# Patient Record
Sex: MALE | Race: BLACK OR AFRICAN AMERICAN | Employment: OTHER | URBAN - METROPOLITAN AREA
[De-identification: names, ages, dates, MRNs, and addresses within clinical notes are randomized per-mention and may not be internally consistent; named-entity substitution may affect disease eponyms.]

---

## 2017-12-06 ENCOUNTER — HOSPITAL ENCOUNTER (OUTPATIENT)
Dept: LAB | Age: 67
Discharge: HOME | End: 2017-12-06

## 2017-12-06 PROCEDURE — 99001 SPECIMEN HANDLING PT-LAB: CPT | Performed by: NURSE PRACTITIONER

## 2018-11-05 ENCOUNTER — APPOINTMENT (OUTPATIENT)
Dept: MRI IMAGING | Age: 68
DRG: 552 | End: 2018-11-05
Attending: EMERGENCY MEDICINE
Payer: MEDICARE

## 2018-11-05 ENCOUNTER — APPOINTMENT (OUTPATIENT)
Dept: CT IMAGING | Age: 68
DRG: 552 | End: 2018-11-05
Attending: EMERGENCY MEDICINE
Payer: MEDICARE

## 2018-11-05 ENCOUNTER — APPOINTMENT (OUTPATIENT)
Dept: GENERAL RADIOLOGY | Age: 68
DRG: 552 | End: 2018-11-05
Attending: EMERGENCY MEDICINE
Payer: MEDICARE

## 2018-11-05 ENCOUNTER — HOSPITAL ENCOUNTER (INPATIENT)
Age: 68
LOS: 8 days | Discharge: HOME HEALTH | DRG: 552 | End: 2018-11-13
Attending: EMERGENCY MEDICINE | Admitting: INTERNAL MEDICINE
Payer: MEDICARE

## 2018-11-05 DIAGNOSIS — M54.9 INTRACTABLE BACK PAIN: Primary | ICD-10-CM

## 2018-11-05 DIAGNOSIS — R03.0 ELEVATED BLOOD PRESSURE READING: ICD-10-CM

## 2018-11-05 DIAGNOSIS — D72.829 LEUKOCYTOSIS, UNSPECIFIED TYPE: ICD-10-CM

## 2018-11-05 PROBLEM — M54.59 INTRACTABLE LOW BACK PAIN: Status: ACTIVE | Noted: 2018-11-05

## 2018-11-05 PROBLEM — R53.81 DEBILITY: Status: ACTIVE | Noted: 2018-11-05

## 2018-11-05 LAB
ALBUMIN SERPL-MCNC: 3.2 G/DL (ref 3.4–5)
ALBUMIN/GLOB SERPL: 0.7 {RATIO} (ref 0.8–1.7)
ALP SERPL-CCNC: 72 U/L (ref 45–117)
ALT SERPL-CCNC: 17 U/L (ref 16–61)
ANION GAP SERPL CALC-SCNC: 12 MMOL/L (ref 3–18)
APPEARANCE UR: CLEAR
AST SERPL-CCNC: 19 U/L (ref 15–37)
BACTERIA URNS QL MICRO: ABNORMAL /HPF
BASOPHILS # BLD: 0 K/UL (ref 0–0.06)
BASOPHILS NFR BLD: 0 % (ref 0–3)
BILIRUB SERPL-MCNC: 0.9 MG/DL (ref 0.2–1)
BILIRUB UR QL: NEGATIVE
BUN SERPL-MCNC: 15 MG/DL (ref 7–18)
BUN/CREAT SERPL: 17 (ref 12–20)
CALCIUM SERPL-MCNC: 8.7 MG/DL (ref 8.5–10.1)
CHLORIDE SERPL-SCNC: 101 MMOL/L (ref 100–108)
CO2 SERPL-SCNC: 24 MMOL/L (ref 21–32)
COLOR UR: ABNORMAL
CREAT SERPL-MCNC: 0.87 MG/DL (ref 0.6–1.3)
CRP SERPL-MCNC: 22.1 MG/DL (ref 0–0.3)
DIFFERENTIAL METHOD BLD: ABNORMAL
EOSINOPHIL # BLD: 0 K/UL (ref 0–0.4)
EOSINOPHIL NFR BLD: 0 % (ref 0–5)
EPITH CASTS URNS QL MICRO: ABNORMAL /LPF (ref 0–5)
ERYTHROCYTE [DISTWIDTH] IN BLOOD BY AUTOMATED COUNT: 13.4 % (ref 11.6–14.5)
ERYTHROCYTE [SEDIMENTATION RATE] IN BLOOD: 30 MM/HR (ref 0–20)
GLOBULIN SER CALC-MCNC: 4.3 G/DL (ref 2–4)
GLUCOSE BLD STRIP.AUTO-MCNC: 167 MG/DL (ref 70–110)
GLUCOSE BLD STRIP.AUTO-MCNC: 194 MG/DL (ref 70–110)
GLUCOSE SERPL-MCNC: 128 MG/DL (ref 74–99)
GLUCOSE UR STRIP.AUTO-MCNC: NEGATIVE MG/DL
HCT VFR BLD AUTO: 40.1 % (ref 36–48)
HGB BLD-MCNC: 14 G/DL (ref 13–16)
HGB UR QL STRIP: NEGATIVE
KETONES UR QL STRIP.AUTO: ABNORMAL MG/DL
LEUKOCYTE ESTERASE UR QL STRIP.AUTO: NEGATIVE
LYMPHOCYTES # BLD: 1.8 K/UL (ref 0.8–3.5)
LYMPHOCYTES NFR BLD: 9 % (ref 20–51)
MAGNESIUM SERPL-MCNC: 1.7 MG/DL (ref 1.6–2.6)
MCH RBC QN AUTO: 31.1 PG (ref 24–34)
MCHC RBC AUTO-ENTMCNC: 34.9 G/DL (ref 31–37)
MCV RBC AUTO: 89.1 FL (ref 74–97)
METAMYELOCYTES NFR BLD MANUAL: 1 %
MONOCYTES # BLD: 1.4 K/UL (ref 0–1)
MONOCYTES NFR BLD: 7 % (ref 2–9)
NEUTS BAND NFR BLD MANUAL: 3 % (ref 0–5)
NEUTS SEG # BLD: 16.6 K/UL (ref 1.8–8)
NEUTS SEG NFR BLD: 80 % (ref 42–75)
NITRITE UR QL STRIP.AUTO: NEGATIVE
PH UR STRIP: 7 [PH] (ref 5–8)
PLATELET # BLD AUTO: 215 K/UL (ref 135–420)
PLATELET COMMENTS,PCOM: ABNORMAL
PMV BLD AUTO: 10.2 FL (ref 9.2–11.8)
POTASSIUM SERPL-SCNC: 4.1 MMOL/L (ref 3.5–5.5)
PROT SERPL-MCNC: 7.5 G/DL (ref 6.4–8.2)
PROT UR STRIP-MCNC: 100 MG/DL
RBC # BLD AUTO: 4.5 M/UL (ref 4.7–5.5)
RBC #/AREA URNS HPF: ABNORMAL /HPF (ref 0–5)
RBC MORPH BLD: ABNORMAL
SODIUM SERPL-SCNC: 137 MMOL/L (ref 136–145)
SP GR UR REFRACTOMETRY: 1.02 (ref 1–1.03)
UROBILINOGEN UR QL STRIP.AUTO: 1 EU/DL (ref 0.2–1)
WBC # BLD AUTO: 20 K/UL (ref 4.6–13.2)
WBC URNS QL MICRO: NEGATIVE /HPF (ref 0–4)

## 2018-11-05 PROCEDURE — 87186 SC STD MICRODIL/AGAR DIL: CPT

## 2018-11-05 PROCEDURE — 74011000250 HC RX REV CODE- 250: Performed by: INTERNAL MEDICINE

## 2018-11-05 PROCEDURE — 87077 CULTURE AEROBIC IDENTIFY: CPT

## 2018-11-05 PROCEDURE — 74011250636 HC RX REV CODE- 250/636: Performed by: INTERNAL MEDICINE

## 2018-11-05 PROCEDURE — 82962 GLUCOSE BLOOD TEST: CPT

## 2018-11-05 PROCEDURE — 96374 THER/PROPH/DIAG INJ IV PUSH: CPT

## 2018-11-05 PROCEDURE — 71045 X-RAY EXAM CHEST 1 VIEW: CPT

## 2018-11-05 PROCEDURE — 74011636320 HC RX REV CODE- 636/320: Performed by: EMERGENCY MEDICINE

## 2018-11-05 PROCEDURE — 72131 CT LUMBAR SPINE W/O DYE: CPT

## 2018-11-05 PROCEDURE — 74011636637 HC RX REV CODE- 636/637: Performed by: INTERNAL MEDICINE

## 2018-11-05 PROCEDURE — 83735 ASSAY OF MAGNESIUM: CPT | Performed by: EMERGENCY MEDICINE

## 2018-11-05 PROCEDURE — 86140 C-REACTIVE PROTEIN: CPT | Performed by: EMERGENCY MEDICINE

## 2018-11-05 PROCEDURE — 80053 COMPREHEN METABOLIC PANEL: CPT | Performed by: EMERGENCY MEDICINE

## 2018-11-05 PROCEDURE — 99285 EMERGENCY DEPT VISIT HI MDM: CPT

## 2018-11-05 PROCEDURE — 85025 COMPLETE CBC W/AUTO DIFF WBC: CPT | Performed by: EMERGENCY MEDICINE

## 2018-11-05 PROCEDURE — 87040 BLOOD CULTURE FOR BACTERIA: CPT | Performed by: EMERGENCY MEDICINE

## 2018-11-05 PROCEDURE — 81001 URINALYSIS AUTO W/SCOPE: CPT | Performed by: EMERGENCY MEDICINE

## 2018-11-05 PROCEDURE — A9575 INJ GADOTERATE MEGLUMI 0.1ML: HCPCS | Performed by: EMERGENCY MEDICINE

## 2018-11-05 PROCEDURE — 74011250636 HC RX REV CODE- 250/636

## 2018-11-05 PROCEDURE — 65270000029 HC RM PRIVATE

## 2018-11-05 PROCEDURE — 74011250637 HC RX REV CODE- 250/637: Performed by: EMERGENCY MEDICINE

## 2018-11-05 PROCEDURE — 74011250637 HC RX REV CODE- 250/637: Performed by: INTERNAL MEDICINE

## 2018-11-05 PROCEDURE — 85652 RBC SED RATE AUTOMATED: CPT | Performed by: EMERGENCY MEDICINE

## 2018-11-05 PROCEDURE — 74011250636 HC RX REV CODE- 250/636: Performed by: EMERGENCY MEDICINE

## 2018-11-05 PROCEDURE — 96376 TX/PRO/DX INJ SAME DRUG ADON: CPT

## 2018-11-05 PROCEDURE — 96361 HYDRATE IV INFUSION ADD-ON: CPT

## 2018-11-05 PROCEDURE — 72158 MRI LUMBAR SPINE W/O & W/DYE: CPT

## 2018-11-05 RX ORDER — NEBIVOLOL 5 MG/1
10 TABLET ORAL DAILY
Status: DISCONTINUED | OUTPATIENT
Start: 2018-11-05 | End: 2018-11-13 | Stop reason: HOSPADM

## 2018-11-05 RX ORDER — ONDANSETRON 2 MG/ML
4 INJECTION INTRAMUSCULAR; INTRAVENOUS
Status: DISCONTINUED | OUTPATIENT
Start: 2018-11-05 | End: 2018-11-13 | Stop reason: HOSPADM

## 2018-11-05 RX ORDER — INSULIN LISPRO 100 [IU]/ML
INJECTION, SOLUTION INTRAVENOUS; SUBCUTANEOUS
Status: DISCONTINUED | OUTPATIENT
Start: 2018-11-05 | End: 2018-11-13 | Stop reason: HOSPADM

## 2018-11-05 RX ORDER — DEXAMETHASONE SODIUM PHOSPHATE 4 MG/ML
10 INJECTION, SOLUTION INTRA-ARTICULAR; INTRALESIONAL; INTRAMUSCULAR; INTRAVENOUS; SOFT TISSUE
Status: COMPLETED | OUTPATIENT
Start: 2018-11-05 | End: 2018-11-05

## 2018-11-05 RX ORDER — NEBIVOLOL 10 MG/1
10 TABLET ORAL
Status: COMPLETED | OUTPATIENT
Start: 2018-11-05 | End: 2018-11-05

## 2018-11-05 RX ORDER — HEPARIN SODIUM 5000 [USP'U]/ML
5000 INJECTION, SOLUTION INTRAVENOUS; SUBCUTANEOUS EVERY 8 HOURS
Status: DISCONTINUED | OUTPATIENT
Start: 2018-11-05 | End: 2018-11-08

## 2018-11-05 RX ORDER — MORPHINE SULFATE 2 MG/ML
4 INJECTION, SOLUTION INTRAMUSCULAR; INTRAVENOUS
Status: COMPLETED | OUTPATIENT
Start: 2018-11-05 | End: 2018-11-05

## 2018-11-05 RX ORDER — MORPHINE SULFATE 4 MG/ML
INJECTION INTRAVENOUS
Status: DISPENSED
Start: 2018-11-05 | End: 2018-11-06

## 2018-11-05 RX ORDER — LOSARTAN POTASSIUM 50 MG/1
50 TABLET ORAL DAILY
Status: DISCONTINUED | OUTPATIENT
Start: 2018-11-05 | End: 2018-11-13 | Stop reason: HOSPADM

## 2018-11-05 RX ORDER — MAGNESIUM SULFATE 100 %
16 CRYSTALS MISCELLANEOUS AS NEEDED
Status: DISCONTINUED | OUTPATIENT
Start: 2018-11-05 | End: 2018-11-13 | Stop reason: HOSPADM

## 2018-11-05 RX ORDER — VANCOMYCIN/0.9 % SOD CHLORIDE 1.5G/250ML
1500 PLASTIC BAG, INJECTION (ML) INTRAVENOUS ONCE
Status: COMPLETED | OUTPATIENT
Start: 2018-11-05 | End: 2018-11-05

## 2018-11-05 RX ORDER — DIAZEPAM 5 MG/1
5 TABLET ORAL
Status: COMPLETED | OUTPATIENT
Start: 2018-11-05 | End: 2018-11-05

## 2018-11-05 RX ORDER — OXYCODONE AND ACETAMINOPHEN 5; 325 MG/1; MG/1
1 TABLET ORAL
Status: DISCONTINUED | OUTPATIENT
Start: 2018-11-05 | End: 2018-11-08

## 2018-11-05 RX ORDER — LOSARTAN POTASSIUM 25 MG/1
50 TABLET ORAL
Status: COMPLETED | OUTPATIENT
Start: 2018-11-05 | End: 2018-11-05

## 2018-11-05 RX ORDER — CYCLOBENZAPRINE HCL 10 MG
5 TABLET ORAL
Status: COMPLETED | OUTPATIENT
Start: 2018-11-05 | End: 2018-11-05

## 2018-11-05 RX ORDER — DEXAMETHASONE SODIUM PHOSPHATE 4 MG/ML
10 INJECTION, SOLUTION INTRA-ARTICULAR; INTRALESIONAL; INTRAMUSCULAR; INTRAVENOUS; SOFT TISSUE
Status: DISCONTINUED | OUTPATIENT
Start: 2018-11-05 | End: 2018-11-05

## 2018-11-05 RX ORDER — DEXTROSE 50 % IN WATER (D50W) INTRAVENOUS SYRINGE
25-50 AS NEEDED
Status: DISCONTINUED | OUTPATIENT
Start: 2018-11-05 | End: 2018-11-13 | Stop reason: HOSPADM

## 2018-11-05 RX ORDER — VANCOMYCIN HYDROCHLORIDE
1250 EVERY 12 HOURS
Status: DISCONTINUED | OUTPATIENT
Start: 2018-11-06 | End: 2018-11-06

## 2018-11-05 RX ORDER — MORPHINE SULFATE 4 MG/ML
4 INJECTION INTRAVENOUS
Status: COMPLETED | OUTPATIENT
Start: 2018-11-05 | End: 2018-11-05

## 2018-11-05 RX ORDER — SODIUM CHLORIDE 9 MG/ML
75 INJECTION, SOLUTION INTRAVENOUS CONTINUOUS
Status: DISCONTINUED | OUTPATIENT
Start: 2018-11-05 | End: 2018-11-06

## 2018-11-05 RX ADMIN — NEBIVOLOL HYDROCHLORIDE 10 MG: 5 TABLET ORAL at 12:54

## 2018-11-05 RX ADMIN — MORPHINE SULFATE 4 MG: 2 INJECTION, SOLUTION INTRAMUSCULAR; INTRAVENOUS at 15:18

## 2018-11-05 RX ADMIN — CEFEPIME 2 G: 2 INJECTION, POWDER, FOR SOLUTION INTRAVENOUS at 21:54

## 2018-11-05 RX ADMIN — DIAZEPAM 5 MG: 5 TABLET ORAL at 12:10

## 2018-11-05 RX ADMIN — VANCOMYCIN HYDROCHLORIDE 1500 MG: 10 INJECTION, POWDER, LYOPHILIZED, FOR SOLUTION INTRAVENOUS at 22:09

## 2018-11-05 RX ADMIN — DEXAMETHASONE SODIUM PHOSPHATE 10 MG: 4 INJECTION, SOLUTION INTRAMUSCULAR; INTRAVENOUS at 12:23

## 2018-11-05 RX ADMIN — SODIUM CHLORIDE 125 ML/HR: 900 INJECTION, SOLUTION INTRAVENOUS at 22:08

## 2018-11-05 RX ADMIN — CYCLOBENZAPRINE HYDROCHLORIDE 5 MG: 10 TABLET, FILM COATED ORAL at 07:52

## 2018-11-05 RX ADMIN — MORPHINE SULFATE 4 MG: 4 INJECTION INTRAVENOUS at 07:53

## 2018-11-05 RX ADMIN — OXYCODONE AND ACETAMINOPHEN 1 TABLET: 5; 325 TABLET ORAL at 22:06

## 2018-11-05 RX ADMIN — GADOTERATE MEGLUMINE 14 ML: 376.9 INJECTION INTRAVENOUS at 16:27

## 2018-11-05 RX ADMIN — NEBIVOLOL HYDROCHLORIDE 10 MG: 5 TABLET ORAL at 21:54

## 2018-11-05 RX ADMIN — SODIUM CHLORIDE 1000 ML: 900 INJECTION, SOLUTION INTRAVENOUS at 07:50

## 2018-11-05 RX ADMIN — LOSARTAN POTASSIUM 50 MG: 50 TABLET ORAL at 21:55

## 2018-11-05 RX ADMIN — INSULIN LISPRO 2 UNITS: 100 INJECTION, SOLUTION INTRAVENOUS; SUBCUTANEOUS at 21:52

## 2018-11-05 RX ADMIN — SODIUM CHLORIDE 125 ML/HR: 900 INJECTION, SOLUTION INTRAVENOUS at 08:47

## 2018-11-05 RX ADMIN — LOSARTAN POTASSIUM 50 MG: 25 TABLET, FILM COATED ORAL at 12:10

## 2018-11-05 NOTE — ED NOTES
Attempted to ambulate patient, pt attempted partial weight bearing without success with the help of myself, DELL Duke and Ghislaine Esquivel RN. Pt able to stand but felt \"wobbly\" and weak in his legs, returned to bed without incident.

## 2018-11-05 NOTE — ED TRIAGE NOTES
Pt arrived via EMS c/o mid to lower back pain that started yesterday, states he had a fall 12 weeks ago and is unsure if the events are related, denies trauma for today's pain, in position of comfort, a&ox4

## 2018-11-05 NOTE — ED PROVIDER NOTES
EMERGENCY DEPARTMENT HISTORY AND PHYSICAL EXAM 
 
7:26 AM 
 
 
Date: 11/5/2018 Patient Name: Mary Anne Stanley History of Presenting Illness Chief Complaint Patient presents with  Back Pain History Provided By: Patient Chief Complaint: Back pain Duration:  2 Days Timing:  Constant and worsening Location: Mid and lower back, worse on the right side Severity: Moderate Modifying Factors: Pain is exacerbated by palpation and ambulation Associated Symptoms: Patient denies numbness, urinary symptoms, weight loss, weight gain, fever, buttocks pain, and any other associated symptoms or complaints. Additional History (Context): Mary Anne Stanley is a 80 y.o. male with a past medical history of DM, throat cancer, and essential HTN benign who presents with c/o back pain onset 2 days ago. Patient describes the pain as located in the mid to lower back, worse on the right side, constant, worsening, moderate, radiates to his right leg, and is exacerbated by ambulating. He states that he fell 12 weeks ago off the porch and is not sure whether this is related to his pain today. He does not take any pain medicine at home. He is usually active and able to ambulate, but due to this pain, he has difficulty walking. He has a history of head and neck cancer and states that he has been \"cancer free\" since 10 years ago when he had a surgery. He does not receive chemotherapy. Patient denies numbness, urinary symptoms, weight loss, weight gain, fever, buttocks pain, and any other associated symptoms or complaints. PCP: Alli Crandall MD 
 
Current Outpatient Medications Medication Sig Dispense Refill  metFORMIN (GLUCOPHAGE) 1,000 mg tablet Take 1,000 mg by mouth daily.  losartan (COZAAR) 50 mg tablet Take  by mouth daily.  nebivolol (BYSTOLIC) 10 mg tablet Take  by mouth daily.  omeprazole (PRILOSEC) 20 mg capsule Take 20 mg by mouth daily.  Dexlansoprazole 60 mg CpDB Take  by mouth.  azelastine-fluticasone 137-50 mcg/spray spry by Nasal route.  calcium-cholecalciferol, d3, 600-125 mg-unit tab Take  by mouth.  omega-3 fatty acids-vitamin e 1,000 mg cap Take 1 Cap by mouth.  IRON, FERROUS SULFATE, PO Take  by mouth. 3 times a week  Aspirin, Buffered 81 mg tab Take  by mouth. Past History Past Medical History: 
Past Medical History:  
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 
Past Surgical History:  
Procedure Laterality Date  HX HEENT  2008 Throat Ca - 1 vocal chord removed  HX HEENT    
 eye surgery Family History: No family history on file. Social History: 
Social History Tobacco Use  Smoking status: Former Smoker Substance Use Topics  Alcohol use: No  
 Drug use: No  
 
 
Allergies: 
No Known Allergies Review of Systems Review of Systems Constitutional: Negative for activity change, fatigue and fever. HENT: Negative for congestion and rhinorrhea. Eyes: Negative for visual disturbance. Respiratory: Negative for shortness of breath. Cardiovascular: Negative for chest pain and palpitations. Gastrointestinal: Negative for abdominal pain, diarrhea, nausea and vomiting. Genitourinary: Negative for dysuria and hematuria. Musculoskeletal: Positive for back pain (lower right). Skin: Negative for rash. Neurological: Negative for dizziness, weakness and light-headedness. All other systems reviewed and are negative. Physical Exam  
 
Visit Vitals Pulse 90 Temp 98.8 °F (37.1 °C) Resp 27 SpO2 98% Physical Exam  
Constitutional: He is oriented to person, place, and time.  He appears well-developed and well-nourished. He appears distressed. Laying in bed, pain with minimal movement HENT:  
Head: Normocephalic and atraumatic. Right Ear: External ear normal.  
Left Ear: External ear normal.  
Nose: Nose normal.  
Mouth/Throat: Oropharynx is clear and moist.  
Eyes: Conjunctivae and EOM are normal. Pupils are equal, round, and reactive to light. No scleral icterus. Neck: Normal range of motion. Neck supple. No JVD present. No tracheal deviation present. No thyromegaly present. Chronic dysphonia Cardiovascular: Normal rate, regular rhythm, normal heart sounds and intact distal pulses. Exam reveals no gallop and no friction rub. No murmur heard. Pulmonary/Chest: Effort normal and breath sounds normal. He exhibits no tenderness. Abdominal: Soft. Bowel sounds are normal. He exhibits no distension. There is no tenderness. There is no rebound and no guarding. Musculoskeletal: Normal range of motion. He exhibits no edema or tenderness. R lumbar midline and paraspinal tenderness, no erythema, warm to touch No R hip pain to palpation No pain with internal or external rotation Pain increases with flexion at the hip Lymphadenopathy:  
  He has no cervical adenopathy. Neurological: He is alert and oriented to person, place, and time. No cranial nerve deficit. Coordination normal.  
Full strength UE, pain limits R LE exam, L LE motor/sensory intact Skin: Skin is warm and dry. Psychiatric: He has a normal mood and affect. His behavior is normal. Judgment and thought content normal.  
Supportive wife at the bedside Nursing note and vitals reviewed. Diagnostic Study Results Labs - No results found for this or any previous visit (from the past 12 hour(s)). Radiologic Studies - No orders to display No results found. Medical Decision Making I am the first provider for this patient. I reviewed the vital signs, available nursing notes, past medical history, past surgical history, family history and social history. Vital Signs-Reviewed the patient's vital signs. Pulse Oximetry Analysis -  97% on room air (normal) Records Reviewed: Nursing Notes (Time of Review: 7:26 AM) 
 
ED Course: Progress Notes, Reevaluation, and Consults: 
12:12 PM 
Patient CT shows no evidence of infection, mass, or fracture. Patient has a white count and still can not ambulate after treatment, increasing my concern for possible spinal infection. I will obtain MRI and lumbar spine and give dose of decadron. I will give him his morning blood pressure medications that he is taking and reevaluate. 5:52 PM 
Patient's MRI shows no clear evidence of infection. There is some inflammation and spinal stenosis. Patient is starting to feel better and will attempt to walk, and if able to walk will consider outpatient care as he does not have any acute infection. If patient can not walk, will consider admission for intractable pain. After extensive medication and testing pt still cannot stand. Discussed the case over secure text with Dr. Loria Severin and will consult. Will discussed admission with the hospitalist. Angelina Mcneal, DO 6:13 PM 
 
Pt was seen by Dr. Yinka Raymond, would like cultures, no abx. Angelina Elizabet, DO 7:28 PM 
 
Sepsis was not present OA, no clear infection noted in the ED so no abx given. Angelina Mcneal, DO 7:28 PM 
 
 
 
Provider Notes (Medical Decision Making): MDM Number of Diagnoses or Management Options Diagnosis management comments: Pt is a 69yo male with a hx of head and neck CA with surgical resection now cancer free without chemo/RT, DM, HTN presents to the ED with complaint R sided low back pain that has been increasing for the past 2 days with increasing pain with R leg movement. He cannot walk now without fever or chills.   Pt denies recent infection, fall, or fever. Pt has marked pain, will give supportive care, hydrate, CT LS as he cannot move well enough for XR, then reevaluate. Graham Brown DO 7:53 AM 
 
 
 
Diagnosis Clinical Impression: Intractable back pain, leukocytosis, elevated BP Disposition: Admit Follow-up Information None Medication List  
  
ASK your doctor about these medications   
aspirin, buffered 81 mg Tab 
  
azelastine-fluticasone 137-50 mcg/spray Spry 
  
calcium-cholecalciferol (d3) 600-125 mg-unit Tab Dexlansoprazole 60 mg Cpdb IRON (FERROUS SULFATE) PO 
  
losartan 50 mg tablet Commonly known as:  COZAAR 
  
metFORMIN 1,000 mg tablet Commonly known as:  GLUCOPHAGE 
  
nebivolol 10 mg tablet Commonly known as:  BYSTOLIC 
  
omega-3 fatty acids-vitamin e 1,000 mg Cap 
  
omeprazole 20 mg capsule Commonly known as:  PRILOSEC 
  
  
 
_______________________________ Attestations: 
Scribe Attestation Oma Sober acting as a scribe for and in the presence of Dwayne Vera MD     
November 05, 2018 at 7:26 AM 
    
Provider Attestation:     
I personally performed the services described in the documentation, reviewed the documentation, as recorded by the scribe in my presence, and it accurately and completely records my words and actions. November 05, 2018 at 7:26 AM - Dwayne Vera MD   
_______________________________

## 2018-11-05 NOTE — ED NOTES
Dr Erika Goyal at bedside, would like patient to ambulate, will seek out [facility] ED tech to ambulate patient.

## 2018-11-06 LAB
ANION GAP SERPL CALC-SCNC: 9 MMOL/L (ref 3–18)
BASOPHILS # BLD: 0 K/UL (ref 0–0.06)
BASOPHILS NFR BLD: 0 % (ref 0–3)
BUN SERPL-MCNC: 17 MG/DL (ref 7–18)
BUN/CREAT SERPL: 23 (ref 12–20)
CALCIUM SERPL-MCNC: 7.8 MG/DL (ref 8.5–10.1)
CHLORIDE SERPL-SCNC: 107 MMOL/L (ref 100–108)
CO2 SERPL-SCNC: 22 MMOL/L (ref 21–32)
CREAT SERPL-MCNC: 0.73 MG/DL (ref 0.6–1.3)
DIFFERENTIAL METHOD BLD: ABNORMAL
EOSINOPHIL # BLD: 0 K/UL (ref 0–0.4)
EOSINOPHIL NFR BLD: 0 % (ref 0–5)
ERYTHROCYTE [DISTWIDTH] IN BLOOD BY AUTOMATED COUNT: 13.1 % (ref 11.6–14.5)
ERYTHROCYTE [SEDIMENTATION RATE] IN BLOOD: 45 MM/HR (ref 0–20)
GLUCOSE BLD STRIP.AUTO-MCNC: 102 MG/DL (ref 70–110)
GLUCOSE BLD STRIP.AUTO-MCNC: 122 MG/DL (ref 70–110)
GLUCOSE BLD STRIP.AUTO-MCNC: 128 MG/DL (ref 70–110)
GLUCOSE BLD STRIP.AUTO-MCNC: 161 MG/DL (ref 70–110)
GLUCOSE SERPL-MCNC: 126 MG/DL (ref 74–99)
HCT VFR BLD AUTO: 34.7 % (ref 36–48)
HGB BLD-MCNC: 12 G/DL (ref 13–16)
LYMPHOCYTES # BLD: 1.8 K/UL (ref 0.8–3.5)
LYMPHOCYTES NFR BLD: 10 % (ref 20–51)
MCH RBC QN AUTO: 30.8 PG (ref 24–34)
MCHC RBC AUTO-ENTMCNC: 34.6 G/DL (ref 31–37)
MCV RBC AUTO: 89.2 FL (ref 74–97)
METAMYELOCYTES NFR BLD MANUAL: 1 %
MONOCYTES # BLD: 1.1 K/UL (ref 0–1)
MONOCYTES NFR BLD: 6 % (ref 2–9)
NEUTS BAND NFR BLD MANUAL: 7 % (ref 0–5)
NEUTS SEG # BLD: 15.2 K/UL (ref 1.8–8)
NEUTS SEG NFR BLD: 76 % (ref 42–75)
PLATELET # BLD AUTO: 206 K/UL (ref 135–420)
PLATELET COMMENTS,PCOM: ABNORMAL
PMV BLD AUTO: 10.1 FL (ref 9.2–11.8)
POTASSIUM SERPL-SCNC: 4 MMOL/L (ref 3.5–5.5)
RBC # BLD AUTO: 3.89 M/UL (ref 4.7–5.5)
RBC MORPH BLD: ABNORMAL
SODIUM SERPL-SCNC: 138 MMOL/L (ref 136–145)
WBC # BLD AUTO: 18.3 K/UL (ref 4.6–13.2)

## 2018-11-06 PROCEDURE — 74011250636 HC RX REV CODE- 250/636: Performed by: INTERNAL MEDICINE

## 2018-11-06 PROCEDURE — 82962 GLUCOSE BLOOD TEST: CPT

## 2018-11-06 PROCEDURE — 90686 IIV4 VACC NO PRSV 0.5 ML IM: CPT | Performed by: INTERNAL MEDICINE

## 2018-11-06 PROCEDURE — 85652 RBC SED RATE AUTOMATED: CPT | Performed by: INTERNAL MEDICINE

## 2018-11-06 PROCEDURE — 74011636637 HC RX REV CODE- 636/637: Performed by: INTERNAL MEDICINE

## 2018-11-06 PROCEDURE — 74011250636 HC RX REV CODE- 250/636: Performed by: EMERGENCY MEDICINE

## 2018-11-06 PROCEDURE — 86141 C-REACTIVE PROTEIN HS: CPT | Performed by: INTERNAL MEDICINE

## 2018-11-06 PROCEDURE — 74011250637 HC RX REV CODE- 250/637: Performed by: INTERNAL MEDICINE

## 2018-11-06 PROCEDURE — 65270000029 HC RM PRIVATE

## 2018-11-06 PROCEDURE — 85025 COMPLETE CBC W/AUTO DIFF WBC: CPT | Performed by: INTERNAL MEDICINE

## 2018-11-06 PROCEDURE — 87040 BLOOD CULTURE FOR BACTERIA: CPT | Performed by: INTERNAL MEDICINE

## 2018-11-06 PROCEDURE — 80048 BASIC METABOLIC PNL TOTAL CA: CPT | Performed by: INTERNAL MEDICINE

## 2018-11-06 PROCEDURE — 90471 IMMUNIZATION ADMIN: CPT

## 2018-11-06 PROCEDURE — 36415 COLL VENOUS BLD VENIPUNCTURE: CPT | Performed by: INTERNAL MEDICINE

## 2018-11-06 PROCEDURE — 74011000250 HC RX REV CODE- 250: Performed by: INTERNAL MEDICINE

## 2018-11-06 RX ORDER — DICLOFENAC EPOLAMINE 0.01 G/1
1 PATCH TOPICAL EVERY 12 HOURS
Status: DISCONTINUED | OUTPATIENT
Start: 2018-11-06 | End: 2018-11-08

## 2018-11-06 RX ORDER — VANCOMYCIN HYDROCHLORIDE
1250 EVERY 12 HOURS
Status: DISCONTINUED | OUTPATIENT
Start: 2018-11-06 | End: 2018-11-07

## 2018-11-06 RX ADMIN — SODIUM CHLORIDE 125 ML/HR: 900 INJECTION, SOLUTION INTRAVENOUS at 13:35

## 2018-11-06 RX ADMIN — HEPARIN SODIUM 5000 UNITS: 5000 INJECTION, SOLUTION INTRAVENOUS; SUBCUTANEOUS at 13:35

## 2018-11-06 RX ADMIN — CEFEPIME 2 G: 2 INJECTION, POWDER, FOR SOLUTION INTRAVENOUS at 05:00

## 2018-11-06 RX ADMIN — HEPARIN SODIUM 5000 UNITS: 5000 INJECTION, SOLUTION INTRAVENOUS; SUBCUTANEOUS at 06:02

## 2018-11-06 RX ADMIN — HEPARIN SODIUM 5000 UNITS: 5000 INJECTION, SOLUTION INTRAVENOUS; SUBCUTANEOUS at 20:12

## 2018-11-06 RX ADMIN — VANCOMYCIN HYDROCHLORIDE 1250 MG: 10 INJECTION, POWDER, LYOPHILIZED, FOR SOLUTION INTRAVENOUS at 08:39

## 2018-11-06 RX ADMIN — OXYCODONE AND ACETAMINOPHEN 1 TABLET: 5; 325 TABLET ORAL at 07:20

## 2018-11-06 RX ADMIN — INFLUENZA VIRUS VACCINE 0.5 ML: 15; 15; 15; 15 SUSPENSION INTRAMUSCULAR at 07:21

## 2018-11-06 RX ADMIN — VANCOMYCIN HYDROCHLORIDE 1250 MG: 10 INJECTION, POWDER, LYOPHILIZED, FOR SOLUTION INTRAVENOUS at 20:12

## 2018-11-06 RX ADMIN — INSULIN LISPRO 2 UNITS: 100 INJECTION, SOLUTION INTRAVENOUS; SUBCUTANEOUS at 21:11

## 2018-11-06 RX ADMIN — OXYCODONE AND ACETAMINOPHEN 1 TABLET: 5; 325 TABLET ORAL at 13:35

## 2018-11-06 RX ADMIN — OXYCODONE AND ACETAMINOPHEN 1 TABLET: 5; 325 TABLET ORAL at 02:50

## 2018-11-06 RX ADMIN — OXYCODONE AND ACETAMINOPHEN 1 TABLET: 5; 325 TABLET ORAL at 18:21

## 2018-11-06 RX ADMIN — OXYCODONE AND ACETAMINOPHEN 1 TABLET: 5; 325 TABLET ORAL at 23:06

## 2018-11-06 NOTE — PROGRESS NOTES
Pt evaluated He reports his right sided low back pain is improving States it only hurts if he puts weight on the right leg or does a lot of moving (mechanical) He does not feel ill Afebrile, VSS Evaluated by ID, blood culture 11/5 positive. This could represent contamination versus true infection. abx per ID Blood cultures are being repeated Hanna Araujo, NP

## 2018-11-06 NOTE — PROGRESS NOTES
Reason for Admission:   Intractable low back pain, leukocytosis RRAT Score:      10 Plan for utilizing home health:      No home health orders at this time. Likelihood of Readmission:  Low Transition of Care Plan:          The patient plans to return home with his wife. His family will provide transportation upon discharge. He reports independence with his ADL/IADLs. He reports not being able to ambulate since Saturday due to his current admitting problem & RLE mobility \"issues\" as per patient. This NN asked nurse Ashish to request a physical therapy evaluation. He states that he obtains his home medications through [address], which are mailed to him and some he may get from his local pharmacy. He states that he also takes his medications as prescribed. He has no concerns for discharge; he reports having a supportive family. Care Management Interventions PCP Verified by CM: Yes(Last seen by PCP 6 months ago) Mode of Transport at Discharge: Self Transition of Care Consult (CM Consult): Discharge Planning Physical Therapy Consult: (Would benefit from PT evaluation- patient reports RLE problems with ambulation) Current Support Network: Lives with Spouse Confirm Follow Up Transport: Family Plan discussed with Pt/Family/Caregiver: Yes Discharge Location Discharge Placement: Home with family assistance

## 2018-11-06 NOTE — CONSULTS
Infectious Disease Consultation Note    Requested by: Dr. Antoine Elliott    Reason: evaluate for discitis lumbar spine    Current abx Prior abx   Cefepime, vancomycin since 11/5      Lines:       Assessment :    80 y.o. male with a past medical history of type 2 DM (last hgbA1C not known), throat cancer, and essential HTN benign who presented to ed with c/o worsening back pain onset 2 days ago. Patient presents with highly complex picture. Etiology of back pain not entirely clear. I concur with dr. Jl Acevedo that presentation is not classic for infectious discitis/osteomyelitis. However, the very elevated CRP, positive blood cultures would be concerning for early discitis. Patient's radiculopathy could be due to lumbar disc edema and nerve compression. Patient has h/o fall 12 weeks ago and wonder if the back pain is trauma related with/without superimposed infection. Also, if the history patient is giving about his antibiotic use in past 3 months is true; that can mask the findings. On the other hand, patient received immunotherapy shots recently and wondering if that could affect the CRP results. Lastly, blood culture 11/5 positive. This could represent contamination versus true infection. Will need to follow up ID of gram positive organism and follow up culture to determine this. Recommendations:    1. Continue vancomycin. D/c cefepime   2. Repeat blood cultures  3. F/u ID of gram positive cocci in blood cultures  4. Further recommendations based on above results, clinical course     Advance Care planning: full code: discussed  with patient/surrogate decision maker:RamakrishnaBessieKalli: [phone]    Thank you for consultation request. Above plan was discussed in details with patient,  and dr Steffany Carrington. Please call me if any further questions or concerns. Will continue to participate in the care of this patient.     HPI:    80 y.o. male with a past medical history of type 2 DM (last hgbA1C not known), throat cancer, and essential HTN benign who presented to ed with c/o worsening back pain onset 2 days ago. Patient describes the pain as located in the mid to lower back, worse on the right side, constant, worsening, moderate, radiates to his right leg, and is exacerbated by ambulating. He states that he fell 12 weeks ago off the porch and is not sure whether this is related to his pain today. He does not take any pain medicine at home. He is usually active and able to ambulate, but due to this pain, he has difficulty walking. He has a history of head and neck cancer and states that he has been \"cancer free\" since 10 years ago when he had a surgery. He does not receive chemotherapy.   Evaluated in ER, WBC 20k, MRI LS spine read as \" Significant facet arthropathy at L2/3, L3/4 and L4/5 with effusions and  multifocal areas of enhancing edema suggesting inflammation, not convincing for infection\". Patient was evaluated by dr. Nancy Tam and recommended to hold abx. Patient was given decadron in ed. I was consulted for further recommendations. Patient was started on cefepime, vancomycin per my verbal recommendations to dr. Xin Ayala. I am seeing the patient for further mx. Patient states that his back pain is slightly better today. The pain is worse right to the lumbar spine. No subjective fever/chills/malaise throughout this time. He received shots in [employer] about 6 days ago. Patient recollects using 2 courses of abx in the past 3 months - each was one week course - last course was one month ago. I called dr. Jennifer Glass (ent surgeon to confirm). She told me that patient received immunotherapy shots for allergies 6 days ago. Was given diflucan. No antibiotic per her knowledge. Patient denies headaches, visual disturbances, sore throat, runny nose, earaches, cp, sob, chills, cough, abdominal pain, diarrhea, burning micturition, pain or weakness in extremities. He denies recent sick contacts.  No h/o recent travel.  No known h/o MRSA colonization or infection in the past.                 Past Medical History:   Diagnosis Date    Cancer 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     Chest pain, unspecified     Diabetes ([address]     Essential hypertension, benign     GERD (gastroesophageal reflux disease)     Nonspecific abnormal electrocardiogram (ECG) (EKG)        Past Surgical History:   Procedure Laterality Date    HX HEENT  2008    Throat Ca - 1 vocal chord removed     HX HEENT      eye surgery           Medication List      ASK your doctor about these medications    aspirin, buffered 81 mg Tab     azelastine-fluticasone 137-50 mcg/spray Spry     calcium-cholecalciferol (d3) 600-125 mg-unit Tab     Dexlansoprazole 60 mg Cpdb     IRON (FERROUS SULFATE) PO     losartan 50 mg tablet  Commonly known as:  COZAAR     metFORMIN 1,000 mg tablet  Commonly known as:  GLUCOPHAGE     nebivolol 10 mg tablet  Commonly known as:  BYSTOLIC     omega-3 fatty acids-vitamin e 1,000 mg Cap     omeprazole 20 mg capsule  Commonly known as:  PRILOSEC            Current Facility-Administered Medications   Medication Dose Route Frequency    sodium chloride (OCEAN) 0.65 % nasal squeeze bottle 2 Spray  2 Spray Both Nostrils Q2H PRN    0.9% sodium chloride infusion  125 mL/hr IntraVENous CONTINUOUS    losartan (COZAAR) tablet 50 mg  50 mg Oral DAILY    nebivolol (BYSTOLIC) tablet 10 mg  10 mg Oral DAILY    oxyCODONE-acetaminophen (PERCOCET) 5-325 mg per tablet 1 Tab  1 Tab Oral Q4H PRN    ondansetron (ZOFRAN) injection 4 mg  4 mg IntraVENous Q4H PRN    heparin (porcine) injection 5,000 Units  5,000 Units SubCUTAneous Q8H    vancomycin (VANCOCIN) 1250 mg in  ml infusion  1,250 mg IntraVENous Q12H    cefepime (MAXIPIME) 2 g in sterile water (preservative free) 10 mL IV syringe  2 g IntraVENous Q8H    insulin lispro (HUMALOG) injection   SubCUTAneous AC&HS    glucose chewable tablet 16 g  16 g Oral PRN  glucagon (GLUCAGEN) injection 1 mg  1 mg IntraMUSCular PRN    dextrose (D50W) injection syrg 12.5-25 g  25-50 mL IntraVENous PRN       Allergies: Patient has no known allergies. No family history on file. Social History     Socioeconomic History    Marital status:      Spouse name: Not on file    Number of children: Not on file    Years of education: Not on file    Highest education level: Not on file   Social Needs    Financial resource strain: Not on file    Food insecurity - worry: Not on file    Food insecurity - inability: Not on file    Transportation needs - medical: Not on file   [email or web address] needs - non-medical: Not on file   Occupational History    Not on file   Tobacco Use    Smoking status: Former Smoker   Substance and Sexual Activity    Alcohol use: No    Drug use: No    Sexual activity: Not on file   Other Topics Concern    Not on file   Social History Narrative    Not on file     Social History     Tobacco Use   Smoking Status Former Smoker        Temp (24hrs), Av.4 °F (36.3 °C), Min:95.7 °F (35.4 °C), Max:98.8 °F (37.1 °C)    Visit Vitals  BP (!) 167/93 (BP 1 Location: Left arm, BP Patient Position: At rest)   Pulse 87   Temp 97.5 °F (36.4 °C)   Resp 16   Ht 5' 10\" (1.778 m) Comment: Simultaneous filing. User may not have seen previous data. Wt 78 kg (172 lb)   SpO2 96%   BMI 24.68 kg/m²       ROS: 12 point ROS obtained in details. Pertinent positives as mentioned in HPI,   otherwise negative    Physical Exam:    Constitutional: He is oriented to person, place, and time. He appears well-developed and well-nourished. Laying in bed, AAOx3, NAD   HENT:   Head: Normocephalic and atraumatic. Right Ear: External ear normal.   Left Ear: External ear normal.   Nose: Nose normal.   Mouth/Throat: Oropharynx is clear and moist.   Eyes: Conjunctivae and EOM are normal. Pupils are equal, round, and reactive to light. No scleral icterus. Neck: Normal range of motion. Neck supple. No JVD present. No tracheal deviation present. No thyromegaly present. Chronic dysphonia    Cardiovascular: Normal rate, regular rhythm, normal heart sounds and intact distal pulses. Exam reveals no gallop and no friction rub. No murmur heard. Pulmonary/Chest: Effort normal and breath sounds normal. He exhibits no tenderness. Abdominal: Soft. Bowel sounds are normal. He exhibits no distension. There is no tenderness. There is no rebound and no guarding. Musculoskeletal: Normal range of motion. He exhibits no edema or tenderness. no lumbar spine tenderness. Tenderness right to lumbar spine around L2-4 levels. no erythema, notwarm to touch. No R hip pain to palpation  No pain with internal or external rotation. Pain increases with flexion at the hip    Lymphadenopathy:   He has no cervical adenopathy. Neurological: He is alert and oriented to person, place, and time. No cranial nerve deficit. Coordination normal.   Full strength UE, pain limits R LE exam, L LE motor/sensory intact    Skin: Skin is warm and dry. Psychiatric: He has a normal mood and affect.  His behavior is normal. Judgment and thought content normal.   Nursing note and vitals reviewed.           Labs: Results:   Chemistry Recent Labs     11/06/18  0357 11/05/18  0704   * 128*    137   K 4.0 4.1    101   CO2 22 24   BUN 17 15   CREA 0.73 0.87   CA 7.8* 8.7   AGAP 9 12   BUCR 23* 17   AP  --  72   TP  --  7.5   ALB  --  3.2*   GLOB  --  4.3*   AGRAT  --  0.7*      CBC w/Diff Recent Labs     11/06/18  0357 11/05/18  0704   WBC 18.3* 20.0*   RBC 3.89* 4.50*   HGB 12.0* 14.0   HCT 34.7* 40.1    215   GRANS 76* 80*   LYMPH 10* 9*   EOS 0 0      Microbiology Recent Labs     11/05/18  1937 11/05/18  1922   CULT NO GROWTH AFTER 11 HOURS NO GROWTH AFTER 11 HOURS          RADIOLOGY:    All available imaging studies/reports in [facility] for this admission were reviewed    Dr. Rufina Mathew, Infectious Disease Specialist  [phone]  November 6, 2018  10:07 AM

## 2018-11-06 NOTE — H&P
History and Physical 
NAME:  Chester Braun :   1950 MRN:   [id number] Date/Time:  2018 CHIEF COMPLAINT: lower back pain HISTORY OF PRESENT ILLNESS:    
Mr. Yenifer Wayne is a 80 y.o.   male with a PMH of throat cancer, HTN, DM-2 who presents with c/c of lower back pain. Patient arrived via EMS c/o lower back pain that started yesterday. He also has weakness of his right leg and had difficulty to walk. He denies bowel or bladder incontinence. He has no fever or chills. He denies urinary symptoms, no urine retension. He had MRI of the lumbar spine in ED that showed multifactorial moderate and severe degrees of foraminal stenosis from L1/2, significant facet arthropathy at L2/3, L3/4 and L4/5 with effusions and multifocal areas of enhancing edema suggesting inflammation. Dr Markos Zambrano was consulted and suspected possible epidural infection. Recommended IV antibiotics and will assess him tomorrow and will decide if needs IR biopsy. Past Medical History:  
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  
Procedure Laterality Date  HX HEENT   Throat Ca - 1 vocal chord removed  HX HEENT    
 eye surgery Social History Tobacco Use  Smoking status: Former Smoker Substance Use Topics  Alcohol use: No  
  
 
No family history on file. No Known Allergies Prior to Admission medications Medication Sig Start Date End Date Taking? Authorizing Provider  
metFORMIN (GLUCOPHAGE) 1,000 mg tablet Take 1,000 mg by mouth daily. Provider, Historical  
losartan (COZAAR) 50 mg tablet Take  by mouth daily.     Provider, Historical  
 nebivolol (BYSTOLIC) 10 mg tablet Take  by mouth daily. Provider, Historical  
omeprazole (PRILOSEC) 20 mg capsule Take 20 mg by mouth daily. Provider, Historical  
Dexlansoprazole 60 mg CpDB Take  by mouth. Provider, Historical  
azelastine-fluticasone 137-50 mcg/spray spry by Nasal route. Provider, Historical  
calcium-cholecalciferol, d3, 600-125 mg-unit tab Take  by mouth. Provider, Historical  
omega-3 fatty acids-vitamin e 1,000 mg cap Take 1 Cap by mouth. Provider, Historical  
IRON, FERROUS SULFATE, PO Take  by mouth. 3 times a week    Provider, Historical  
Aspirin, Buffered 81 mg tab Take  by mouth. Provider, Historical  
 
 
REVIEW OF SYSTEMS:  
 
CONSTITUTIONAL: No Fever, No chills, No weight loss, No Night sweats HEENT:  No epistaxis, No diff in swallowing CVS: No chest pain, No palpitations, No syncope, No peripheral edema, No PND, No orthopnea RS: No shortness of breath, No cough, No hemoptysis, No pleuritic chest pain GI: No abd pain, No vomitting, No diarrhea, No hematemesis, No rectal bleeding, No acid reflux or heartburn NEURO: No focal weakness, No headaches, No seizures PSYCH: No anxiety, No depression MUSCULOSKLETAL: No joint pain or swelling : No hematuria or dysuria SKIN: No rash Physical Exam: VITALS:   
Vital signs reviewed; most recent are: 
 
Visit Vitals BP (!) 150/92 Pulse 89 Temp 97.1 °F (36.2 °C) Resp 22 Wt 78.5 kg (173 lb) SpO2 97% BMI 24.82 kg/m² SpO2 Readings from Last 6 Encounters:  
11/05/18 97% 02/12/16 97% Intake/Output Summary (Last 24 hours) at 11/5/2018 2018 Last data filed at 11/5/2018 2829 Gross per 24 hour Intake 1000 ml Output  Net 1000 ml GENERAL: Not in acute distress HEENT: pink conjunctiva, un icteric sclera, NECK: No lymphadenopthy or thyroid swelling, JVD not seen LYMPH: No supraclavicular or cervical or axillary nodes on both sides CVS: S1S2, No murmurs, No gallop or rub RS: CTA, No wheezing or crackles Abd: Soft, non tender, not distended, No guarding, No rigidity NEURO:  RLE weakness, other extremities normal ton, 2+ reflex, intact sensation Extrm: no leg edema or swelling Skin: No rash Labs: 
Recent Results (from the past 24 hour(s)) CBC WITH AUTOMATED DIFF Collection Time: 11/05/18  7:04 AM  
Result Value Ref Range WBC 20.0 (H) 4.6 - 13.2 K/uL  
 RBC 4.50 (L) 4.70 - 5.50 M/uL  
 HGB 14.0 13.0 - 16.0 g/dL HCT 40.1 36.0 - 48.0 % MCV 89.1 74.0 - 97.0 FL  
 MCH 31.1 24.0 - 34.0 PG  
 MCHC 34.9 31.0 - 37.0 g/dL  
 RDW 13.4 11.6 - 14.5 % PLATELET 695 890 - 702 K/uL MPV 10.2 9.2 - 11.8 FL  
 NEUTROPHILS 80 (H) 42 - 75 % BAND NEUTROPHILS 3 0 - 5 % LYMPHOCYTES 9 (L) 20 - 51 % MONOCYTES 7 2 - 9 % EOSINOPHILS 0 0 - 5 % BASOPHILS 0 0 - 3 % METAMYELOCYTES 1 (H) 0 %  
 ABS. NEUTROPHILS 16.6 (H) 1.8 - 8.0 K/UL  
 ABS. LYMPHOCYTES 1.8 0.8 - 3.5 K/UL  
 ABS. MONOCYTES 1.4 (H) 0 - 1.0 K/UL  
 ABS. EOSINOPHILS 0.0 0.0 - 0.4 K/UL  
 ABS. BASOPHILS 0.0 0.0 - 0.06 K/UL  
 DF MANUAL PLATELET COMMENTS ADEQUATE PLATELETS    
 RBC COMMENTS NORMOCYTIC, NORMOCHROMIC METABOLIC PANEL, COMPREHENSIVE Collection Time: 11/05/18  7:04 AM  
Result Value Ref Range Sodium 137 136 - 145 mmol/L Potassium 4.1 3.5 - 5.5 mmol/L Chloride 101 100 - 108 mmol/L  
 CO2 24 21 - 32 mmol/L Anion gap 12 3.0 - 18 mmol/L Glucose 128 (H) 74 - 99 mg/dL BUN 15 7.0 - 18 MG/DL Creatinine 0.87 0.6 - 1.3 MG/DL  
 BUN/Creatinine ratio 17 12 - 20 GFR est AA >60 >60 ml/min/1.73m2 GFR est non-AA >60 >60 ml/min/1.73m2 Calcium 8.7 8.5 - 10.1 MG/DL Bilirubin, total 0.9 0.2 - 1.0 MG/DL  
 ALT (SGPT) 17 16 - 61 U/L  
 AST (SGOT) 19 15 - 37 U/L Alk. phosphatase 72 45 - 117 U/L Protein, total 7.5 6.4 - 8.2 g/dL Albumin 3.2 (L) 3.4 - 5.0 g/dL Globulin 4.3 (H) 2.0 - 4.0 g/dL A-G Ratio 0.7 (L) 0.8 - 1.7 MAGNESIUM  
 Collection Time: 11/05/18  7:04 AM  
Result Value Ref Range Magnesium 1.7 1.6 - 2.6 mg/dL URINALYSIS W/ RFLX MICROSCOPIC Collection Time: 11/05/18  9:55 AM  
Result Value Ref Range Color DARK YELLOW Appearance CLEAR Specific gravity 1.022 1.005 - 1.030    
 pH (UA) 7.0 5.0 - 8.0 Protein 100 (A) NEG mg/dL Glucose NEGATIVE  NEG mg/dL Ketone TRACE (A) NEG mg/dL Bilirubin NEGATIVE  NEG Blood NEGATIVE  NEG Urobilinogen 1.0 0.2 - 1.0 EU/dL Nitrites NEGATIVE  NEG Leukocyte Esterase NEGATIVE  NEG    
URINE MICROSCOPIC ONLY Collection Time: 11/05/18  9:55 AM  
Result Value Ref Range WBC NEGATIVE  0 - 4 /hpf  
 RBC 0 to 2 0 - 5 /hpf Epithelial cells FEW 0 - 5 /lpf Bacteria FEW (A) NEG /hpf  
GLUCOSE, POC Collection Time: 11/05/18  7:10 PM  
Result Value Ref Range Glucose (POC) 167 (H) 70 - 110 mg/dL Active Problems: 
  Debility (11/5/2018) Leukocytosis (11/5/2018) Intractable low back pain (11/5/2018) Assessment: 1. Lower back pain 2. Suspected Lumbar discitis, L3-4, L4-5 enhancing edema,  
3. H/o throat cancer,  
4. HTN, controlled 5. DM-2, controlled Plan:  
 
 
· Patient being admitted · Start IV antibiotics cefepim and vancomycin · Consulted ID, d/w Dr Camilo Main · Blood culture, CRP, ESR · Dr David Ramon also consulted,  
· Monitor blood sugar, SSI coverage · DVT prophylaxsis Total time:  62 minutes 
 
        
_______________________________________________________________________ Attending Physician:  Anthony Michele MD  
 
 
Copies: Franky Mortensen MD

## 2018-11-06 NOTE — ROUTINE PROCESS
Bedside shift change report given to Maria Isabel Barnes RN (oncoming nurse) by Ivana Rosales RN (offgoing nurse). Report included the following information SBAR, Intake/Output, MAR and Recent Results.

## 2018-11-06 NOTE — PROGRESS NOTES
vss afeb Was sleeping soundly, Awoke easily non toxic. Patient feels improved this morning. Some sense of tightness in right leg And continued mechanical lbp mainly around right PSIS where yesterday more in low lumbar region. No objective neurology Labs In ER on presentation ESR 30,CRP 22.1, WBC 20K This am WBC 18.3 ESR & CRP Pending HCT went from 40-34 AP Patient improving-  
Hard to determine what process is going on. Was started on abx last night which now clouds evaluative picture. Was also given a dose of decadron yesterday which makes it difficult to  WBC. CRP was elevated on presentation- put ESR 30 which is low or an infection HCT has decreased for 40-34. Seems alot though no evident bleeding. ID has been asked to see patient. [facility] Synovitis and synovial cyst would explain all symptoms and clinical picture other than elevated WBC and CRP Still no fever and patient appeared and stated improvement prior to starting abx. Plan Observe ID consult

## 2018-11-06 NOTE — ED NOTES
TRANSFER - OUT REPORT: 
 
Verbal report given to Rebekah Diggs RN(name) on Cecelia Captain  being transferred to (unit) for routine progression of care Report consisted of patients Situation, Background, Assessment and  
Recommendations(SBAR). Information from the following report(s) SBAR, ED Summary, Intake/Output, MAR, Recent Results and Cardiac Rhythm NSR was reviewed with the receiving nurse. Opportunity for questions and clarification was provided. Patient transported with: 
Patient Belongings

## 2018-11-06 NOTE — PROGRESS NOTES
conducted an initial consultation and Spiritual Assessment for Hernandez Kramer, who is a 80 y.o.,male. Patients Primary Language is: [place]. According to the patients EMR Jainism Affiliation is: [facility].  
 
The reason the Patient came to the hospital is:  
Patient Active Problem List  
 Diagnosis Date Noted  Debility 11/05/2018  Leukocytosis 11/05/2018  Intractable low back pain 11/05/2018 The  provided the following Interventions: 
Initiated a relationship of care and support. Explored issues of mina, belief, spirituality and Zoroastrianism/ritual needs while hospitalized. Listened empathically. Provided chaplaincy education. Provided information about Spiritual Care Services. Offered prayer and assurance of continued prayers on patient's behalf. Chart reviewed. The following outcomes where achieved: 
Patient shared limited information about both their medical narrative and spiritual journey/beliefs.  confirmed Patient's Jainism Affiliation. Patient processed feeling about current hospitalization. Patient expressed gratitude for 's visit. Assessment: 
Patient does not have any Zoroastrianism/cultural needs that will affect patients preferences in health care. There are no spiritual or Zoroastrianism issues which require intervention at this time. Plan: 
Chaplains will continue to follow and will provide pastoral care on an as needed/requested basis.  recommends bedside caregivers page  on duty if patient shows signs of acute spiritual or emotional distress. Chaplain Resident Mercedes Elias Spiritual Care  
[phone]

## 2018-11-06 NOTE — CONSULTS
Consult    Patient: Carmella Wilburn MRN: [id number]  SSN: [id number]    YOB: 1950  Age: 80 y.o. Sex: male      Subjective:      Carmella Wilburn is a 80 y.o. male who is being seen for back pain. History of unmonitored DM. No back history. Pain started Saturday. Actually improved now. Initially right paracentral LBP then some radicular complaints. Now radiculopathy improved. Evidently felt weak in EHL TA yesterday- not today. No fever or chills. No cuts or other hx recent infection. No bowel or bladder issues. Denies feeling ill. Evaluated in ER, WBC 20k, MRI LS spine read as stenosis no infection. I am called for consult. PE benign looking male, NAD, mechanical LBP. Ome tenderness on right LB. Some r lbp on SLR no radiculopathy, no numbness no weakness, nl reflexes. MRI with chronic spinal stenosis, also synovial cyst on right l4/5, synovitis multiple levels. Lateral recess stenosis l5 andl3 R>L. No epidural abscess.           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   Social History     Tobacco Use    Smoking status: Former Smoker   Substance Use Topics    Alcohol use: No      Current Facility-Administered Medications   Medication Dose Route Frequency Provider Last Rate Last Dose    morphine 4 mg/mL injection             0.9% sodium chloride infusion  125 mL/hr IntraVENous CONTINUOUS Apple [place]  mL/hr at 11/05/18 0847 125 mL/hr at 11/05/18 0847     Current Outpatient Medications   Medication Sig Dispense Refill    metFORMIN (GLUCOPHAGE) 1,000 mg tablet Take 1,000 mg by mouth daily.  losartan (COZAAR) 50 mg tablet Take  by mouth daily.  nebivolol (BYSTOLIC) 10 mg tablet Take  by mouth daily.  omeprazole (PRILOSEC) 20 mg capsule Take 20 mg by mouth daily.  Dexlansoprazole 60 mg CpDB Take  by mouth.  azelastine-fluticasone 137-50 mcg/spray spry by Nasal route.  calcium-cholecalciferol, d3, 600-125 mg-unit tab Take  by mouth.  omega-3 fatty acids-vitamin e 1,000 mg cap Take 1 Cap by mouth.  IRON, FERROUS SULFATE, PO Take  by mouth. 3 times a week      Aspirin, Buffered 81 mg tab Take  by mouth. No Known Allergies    Review of Systems:  Pertinent items are noted in the History of Present Illness. Objective:     Vitals:    11/05/18 1815 11/05/18 1830 11/05/18 1845 11/05/18 1900   BP: 155/82 138/81  151/85   Pulse: 90 91     Resp: 23 (!) 32 (!) 31 24   Temp:       SpO2: 92% 94% 95% 96%   Weight:            Physical Exam:  General:  Alert, cooperative, no distress, appears stated age. Eyes:  Conjunctivae/corneas clear. PERRL, EOMs intact. Fundi benign   Ears:  Normal TMs and external ear canals both ears. Nose: Nares normal. Septum midline. Mucosa normal. No drainage or sinus tenderness. Mouth/Throat: Lips, mucosa, and tongue normal. Teeth and gums normal.   Neck: Supple, symmetrical, trachea midline, no adenopathy, thyroid: no enlargment/tenderness/nodules, no carotid bruit and no JVD. Back:   Symmetric, no curvature. ROM normal. No CVA tenderness. Lungs:   Clear to auscultation bilaterally. Heart:  Regular rate and rhythm, S1, S2 normal, no murmur, click, rub or gallop. Abdomen:   Soft, non-tender. Bowel sounds normal. No masses,  No organomegaly. Extremities: Extremities normal, atraumatic, no cyanosis or edema. Pulses: 2+ and symmetric all extremities.    Skin: Skin color, texture, turgor normal. No rashes or lesions   Lymph nodes: Cervical, supraclavicular, and axillary nodes normal.   Neurologic: CNII-XII intact. Normal strength, sensation and reflexes throughout. Assessment:       Gentleman with acute lbp though says it is improved from yesterday. Currently no radiculopathy or any neuro findings. Had no abx but did have a dose of decadron in ER. Certainly arthritis with synovitis, synovial cyst and spinal stenosis could explain patients presentation and spontaneous improvement. Concern is is WBC elevated to 20k and history of DM on oral agents without monitoring. This could be early septic arthritis- but lack of fever, spontaneous improvement, lack of source, and patient looking and feeling well would mitigate against that. Plan:     Agree with admission and observation. Pan culture  Hold steroids  Start abx if clinical course concerning  Repeat labs though now wbc will be affected by steroids given. Discussed at length and decision making with patient and family.         Signed By: Batsheva Lyons MD     November 5, 2018

## 2018-11-06 NOTE — PROGRESS NOTES
SUBJECTIVE: 
 
Back pain present. No chest pain or abdominal pain,  No SOB or cough. No nausea or vomiting. No tingling or numbness. OBJECTIVE: 
 
/78 (BP 1 Location: Left arm, BP Patient Position: At rest)   Pulse 83   Temp 98.1 °F (36.7 °C)   Resp 16   Ht 5' 10\" (1.778 m) Comment: Simultaneous filing. User may not have seen previous data. Wt 78 kg (172 lb)   SpO2 99%   BMI 24.68 kg/m² CVS: RRR 
RS: CTA bilaterally, no wheezes GI: NT, BS + Extremities: no pedal edema General: NAD, Awake CNS: moves all extremities ASSESSMENT: 
 
1. Lower back pain 2. Suspected Lumbar discitis, L3-4, L4-5. 
3. GPC bacteremia 4. H/o throat cancer. 5. HTN. 6. DM-2. PLAN: 
 
Cont current management MRI report reviewed ID and surgery input noted BMP:  
Lab Results Component Value Date/Time  11/06/2018 03:57 AM  
 K 4.0 11/06/2018 03:57 AM  
  11/06/2018 03:57 AM  
 CO2 22 11/06/2018 03:57 AM  
 AGAP 9 11/06/2018 03:57 AM  
  (H) 11/06/2018 03:57 AM  
 BUN 17 11/06/2018 03:57 AM  
 CREA 0.73 11/06/2018 03:57 AM  
 GFRAA >60 11/06/2018 03:57 AM  
 GFRNA >60 11/06/2018 03:57 AM  
 
CBC:  
Lab Results Component Value Date/Time  WBC 18.3 (H) 11/06/2018 03:57 AM  
 HGB 12.0 (L) 11/06/2018 03:57 AM  
 HCT 34.7 (L) 11/06/2018 03:57 AM  
  11/06/2018 03:57 AM

## 2018-11-07 ENCOUNTER — APPOINTMENT (OUTPATIENT)
Dept: MRI IMAGING | Age: 68
DRG: 552 | End: 2018-11-07
Attending: INTERNAL MEDICINE
Payer: MEDICARE

## 2018-11-07 LAB
ANION GAP SERPL CALC-SCNC: 13 MMOL/L (ref 3–18)
BASOPHILS # BLD: 0 K/UL (ref 0–0.1)
BASOPHILS NFR BLD: 0 % (ref 0–2)
BUN SERPL-MCNC: 10 MG/DL (ref 7–18)
BUN/CREAT SERPL: 14 (ref 12–20)
CALCIUM SERPL-MCNC: 8.4 MG/DL (ref 8.5–10.1)
CHLORIDE SERPL-SCNC: 105 MMOL/L (ref 100–108)
CO2 SERPL-SCNC: 23 MMOL/L (ref 21–32)
CREAT SERPL-MCNC: 0.74 MG/DL (ref 0.6–1.3)
CRP SERPL HS-MCNC: 205.13 MG/L (ref 0–3)
DATE LAST DOSE: ABNORMAL
DIFFERENTIAL METHOD BLD: ABNORMAL
EOSINOPHIL # BLD: 0.1 K/UL (ref 0–0.4)
EOSINOPHIL NFR BLD: 0 % (ref 0–5)
ERYTHROCYTE [DISTWIDTH] IN BLOOD BY AUTOMATED COUNT: 13.1 % (ref 11.6–14.5)
GLUCOSE BLD STRIP.AUTO-MCNC: 129 MG/DL (ref 70–110)
GLUCOSE BLD STRIP.AUTO-MCNC: 133 MG/DL (ref 70–110)
GLUCOSE BLD STRIP.AUTO-MCNC: 136 MG/DL (ref 70–110)
GLUCOSE BLD STRIP.AUTO-MCNC: 156 MG/DL (ref 70–110)
GLUCOSE SERPL-MCNC: 103 MG/DL (ref 74–99)
HCT VFR BLD AUTO: 35.7 % (ref 36–48)
HGB BLD-MCNC: 12.2 G/DL (ref 13–16)
LYMPHOCYTES # BLD: 1.4 K/UL (ref 0.9–3.6)
LYMPHOCYTES NFR BLD: 11 % (ref 21–52)
MCH RBC QN AUTO: 30.6 PG (ref 24–34)
MCHC RBC AUTO-ENTMCNC: 34.2 G/DL (ref 31–37)
MCV RBC AUTO: 89.5 FL (ref 74–97)
MONOCYTES # BLD: 1.1 K/UL (ref 0.05–1.2)
MONOCYTES NFR BLD: 8 % (ref 3–10)
NEUTS SEG # BLD: 10.5 K/UL (ref 1.8–8)
NEUTS SEG NFR BLD: 81 % (ref 40–73)
PLATELET # BLD AUTO: 194 K/UL (ref 135–420)
PMV BLD AUTO: 10 FL (ref 9.2–11.8)
POTASSIUM SERPL-SCNC: 3.8 MMOL/L (ref 3.5–5.5)
RBC # BLD AUTO: 3.99 M/UL (ref 4.7–5.5)
REPORTED DOSE,DOSE: ABNORMAL UNITS
REPORTED DOSE/TIME,TMG: 2100
SODIUM SERPL-SCNC: 141 MMOL/L (ref 136–145)
VANCOMYCIN TROUGH SERPL-MCNC: 8.7 UG/ML (ref 10–20)
WBC # BLD AUTO: 13 K/UL (ref 4.6–13.2)

## 2018-11-07 PROCEDURE — 97162 PT EVAL MOD COMPLEX 30 MIN: CPT

## 2018-11-07 PROCEDURE — 74011250637 HC RX REV CODE- 250/637: Performed by: INTERNAL MEDICINE

## 2018-11-07 PROCEDURE — 80202 ASSAY OF VANCOMYCIN: CPT | Performed by: INTERNAL MEDICINE

## 2018-11-07 PROCEDURE — 36415 COLL VENOUS BLD VENIPUNCTURE: CPT | Performed by: INTERNAL MEDICINE

## 2018-11-07 PROCEDURE — 74011250636 HC RX REV CODE- 250/636: Performed by: INTERNAL MEDICINE

## 2018-11-07 PROCEDURE — 65270000029 HC RM PRIVATE

## 2018-11-07 PROCEDURE — 74011636637 HC RX REV CODE- 636/637: Performed by: INTERNAL MEDICINE

## 2018-11-07 PROCEDURE — 82962 GLUCOSE BLOOD TEST: CPT

## 2018-11-07 PROCEDURE — 85025 COMPLETE CBC W/AUTO DIFF WBC: CPT | Performed by: INTERNAL MEDICINE

## 2018-11-07 PROCEDURE — 80048 BASIC METABOLIC PNL TOTAL CA: CPT | Performed by: INTERNAL MEDICINE

## 2018-11-07 PROCEDURE — 74011000250 HC RX REV CODE- 250: Performed by: ORTHOPAEDIC SURGERY

## 2018-11-07 PROCEDURE — 72148 MRI LUMBAR SPINE W/O DYE: CPT

## 2018-11-07 PROCEDURE — 74011000250 HC RX REV CODE- 250: Performed by: INTERNAL MEDICINE

## 2018-11-07 RX ORDER — AZELASTINE HYDROCHLORIDE, FLUTICASONE PROPIONATE 137; 50 UG/1; UG/1
1 SPRAY, METERED NASAL EVERY 12 HOURS
Status: DISCONTINUED | OUTPATIENT
Start: 2018-11-07 | End: 2018-11-07

## 2018-11-07 RX ORDER — POLYETHYLENE GLYCOL 3350 17 G/17G
17 POWDER, FOR SOLUTION ORAL DAILY
Status: DISCONTINUED | OUTPATIENT
Start: 2018-11-07 | End: 2018-11-08

## 2018-11-07 RX ORDER — LATANOPROST 50 UG/ML
1 SOLUTION/ DROPS OPHTHALMIC
COMMUNITY

## 2018-11-07 RX ORDER — LATANOPROST 50 UG/ML
1 SOLUTION/ DROPS OPHTHALMIC EVERY EVENING
Status: DISCONTINUED | OUTPATIENT
Start: 2018-11-07 | End: 2018-11-13 | Stop reason: HOSPADM

## 2018-11-07 RX ORDER — VANCOMYCIN/0.9 % SOD CHLORIDE 1.5G/250ML
1500 PLASTIC BAG, INJECTION (ML) INTRAVENOUS ONCE
Status: COMPLETED | OUTPATIENT
Start: 2018-11-07 | End: 2018-11-07

## 2018-11-07 RX ORDER — FACIAL-BODY WIPES
10 EACH TOPICAL
Status: COMPLETED | OUTPATIENT
Start: 2018-11-07 | End: 2018-11-07

## 2018-11-07 RX ORDER — NIFEDIPINE 10 MG/1
10 CAPSULE ORAL ONCE
Status: COMPLETED | OUTPATIENT
Start: 2018-11-07 | End: 2018-11-07

## 2018-11-07 RX ORDER — GLUCOSAM/CHONDRO/HERB 149/HYAL 750-100 MG
1 TABLET ORAL 2 TIMES DAILY
Status: DISCONTINUED | OUTPATIENT
Start: 2018-11-08 | End: 2018-11-13 | Stop reason: HOSPADM

## 2018-11-07 RX ORDER — VANCOMYCIN HYDROCHLORIDE
1250 EVERY 8 HOURS
Status: DISCONTINUED | OUTPATIENT
Start: 2018-11-07 | End: 2018-11-08

## 2018-11-07 RX ADMIN — Medication 2 SPRAY: at 18:02

## 2018-11-07 RX ADMIN — VANCOMYCIN HYDROCHLORIDE 1500 MG: 10 INJECTION, POWDER, LYOPHILIZED, FOR SOLUTION INTRAVENOUS at 12:00

## 2018-11-07 RX ADMIN — POLYETHYLENE GLYCOL 3350 17 G: 17 POWDER, FOR SOLUTION ORAL at 08:54

## 2018-11-07 RX ADMIN — LOSARTAN POTASSIUM 50 MG: 50 TABLET ORAL at 08:53

## 2018-11-07 RX ADMIN — INSULIN LISPRO 2 UNITS: 100 INJECTION, SOLUTION INTRAVENOUS; SUBCUTANEOUS at 12:45

## 2018-11-07 RX ADMIN — HEPARIN SODIUM 5000 UNITS: 5000 INJECTION, SOLUTION INTRAVENOUS; SUBCUTANEOUS at 13:00

## 2018-11-07 RX ADMIN — HEPARIN SODIUM 5000 UNITS: 5000 INJECTION, SOLUTION INTRAVENOUS; SUBCUTANEOUS at 04:19

## 2018-11-07 RX ADMIN — VANCOMYCIN HYDROCHLORIDE 1250 MG: 10 INJECTION, POWDER, LYOPHILIZED, FOR SOLUTION INTRAVENOUS at 20:59

## 2018-11-07 RX ADMIN — BISACODYL 10 MG: 10 SUPPOSITORY RECTAL at 15:00

## 2018-11-07 RX ADMIN — OXYCODONE AND ACETAMINOPHEN 1 TABLET: 5; 325 TABLET ORAL at 18:00

## 2018-11-07 RX ADMIN — OXYCODONE AND ACETAMINOPHEN 1 TABLET: 5; 325 TABLET ORAL at 10:28

## 2018-11-07 RX ADMIN — HEPARIN SODIUM 5000 UNITS: 5000 INJECTION, SOLUTION INTRAVENOUS; SUBCUTANEOUS at 20:59

## 2018-11-07 RX ADMIN — OXYCODONE AND ACETAMINOPHEN 1 TABLET: 5; 325 TABLET ORAL at 06:42

## 2018-11-07 RX ADMIN — NIFEDIPINE 10 MG: 10 CAPSULE, LIQUID FILLED ORAL at 04:19

## 2018-11-07 RX ADMIN — OXYCODONE AND ACETAMINOPHEN 1 TABLET: 5; 325 TABLET ORAL at 21:09

## 2018-11-07 RX ADMIN — NEBIVOLOL HYDROCHLORIDE 10 MG: 5 TABLET ORAL at 08:53

## 2018-11-07 RX ADMIN — LATANOPROST 1 DROP: 50 SOLUTION/ DROPS OPHTHALMIC at 21:09

## 2018-11-07 NOTE — PROGRESS NOTES
Infectious Disease progress Note Requested by: Dr. Antoine Elliott Reason: evaluate for discitis lumbar spine Current abx Prior abx  
 vancomycin since 11/5 Cefepime 11/5-11/6 Lines:  
 
 
Assessment : 
 
80 y.o. male with a past medical history of type 2 DM (last hgbA1C not known), throat cancer, and essential HTN benign who presented to ed with c/o worsening back pain onset 2 days ago. Patient presents with highly complex picture. Etiology of back pain not entirely clear. I concur with dr. Jl Acevedo that presentation is not classic for infectious discitis/osteomyelitis. However, the very elevated CRP, positive blood cultures would be concerning for early discitis. Patient's radiculopathy could be due to lumbar disc edema and nerve compression. Patient has h/o fall 12 weeks ago and wonder if the back pain is trauma related with/without superimposed infection. D/w dr. Keshawn Benjamin - patient has not received any antibiotics in the past 4 months. This would rule out partially treated spinal infection. patient received immunotherapy shots recently and wondering if that could affect the CRP results. Lastly, blood culture 11/5 positive. This could represent contamination versus true infection. Will need to follow up ID of gram positive organism and follow up culture to determine this. Recommendations: 1. Continue vancomycin. 2. F/u Repeat blood cultures 3. F/u ID of gram positive cocci in blood cultures 4. F/u results of repeat MRI spine. Recommend to image right paraspinal area/psoas to r/o psoas abscess - discussed with MRI tech. Advance Care planning: full code: discussed  with patient/surrogate decision maker:Kalli Durbin: [phone] Above plan was discussed in details with patient,  and dr Narciso Barahona. Please call me if any further questions or concerns. Will continue to participate in the care of this patient. subjective: Patient states that his back pain is slightly better today. Able to move his extremities. Patient denies headaches, visual disturbances, sore throat, runny nose, earaches, cp, sob, chills, cough, abdominal pain, diarrhea, burning micturition, pain or weakness in extremities. Medication List  
  
ASK your doctor about these medications   
aspirin, buffered 81 mg Tab 
  
azelastine-fluticasone 137-50 mcg/spray Spry 
  
calcium-cholecalciferol (d3) 600-125 mg-unit Tab Dexlansoprazole 60 mg Cpdb IRON (FERROUS SULFATE) PO 
  
losartan 50 mg tablet Commonly known as:  COZAAR 
  
metFORMIN 1,000 mg tablet Commonly known as:  GLUCOPHAGE 
  
nebivolol 10 mg tablet Commonly known as:  BYSTOLIC 
  
omega-3 fatty acids-vitamin e 1,000 mg Cap 
  
omeprazole 20 mg capsule Commonly known as:  PRILOSEC Current Facility-Administered Medications Medication Dose Route Frequency  polyethylene glycol (MIRALAX) packet 17 g  17 g Oral DAILY  sodium chloride (OCEAN) 0.65 % nasal squeeze bottle 2 Spray  2 Spray Both Nostrils Q2H PRN  
 vancomycin (VANCOCIN) 1250 mg in  ml infusion  1,250 mg IntraVENous Q12H  Vancomycin Trough Level Due 11/7/18 @ 0830  1 Each Other DAILY  diclofenac (FLECTOR) 1.3 % transdermal patch 1 Patch  1 Patch TransDERmal Q12H  
 losartan (COZAAR) tablet 50 mg  50 mg Oral DAILY  nebivolol (BYSTOLIC) tablet 10 mg  10 mg Oral DAILY  oxyCODONE-acetaminophen (PERCOCET) 5-325 mg per tablet 1 Tab  1 Tab Oral Q4H PRN  
 ondansetron (ZOFRAN) injection 4 mg  4 mg IntraVENous Q4H PRN  
 heparin (porcine) injection 5,000 Units  5,000 Units SubCUTAneous Q8H  
 insulin lispro (HUMALOG) injection   SubCUTAneous AC&HS  
 glucose chewable tablet 16 g  16 g Oral PRN  
 glucagon (GLUCAGEN) injection 1 mg  1 mg IntraMUSCular PRN  
 dextrose (D50W) injection syrg 12.5-25 g  25-50 mL IntraVENous PRN Allergies: Patient has no known allergies. Temp (24hrs), Av.2 °F (36.8 °C), Min:97.2 °F (36.2 °C), Max:99.1 °F (37.3 °C) Visit Vitals /85 (BP 1 Location: Left arm, BP Patient Position: At rest) Pulse 97 Temp 99.1 °F (37.3 °C) Resp 20 Ht 5' 10\" (1.778 m) Comment: Simultaneous filing. User may not have seen previous data. Wt 80.1 kg (176 lb 9.6 oz) SpO2 97% BMI 25.34 kg/m² ROS: 12 point ROS obtained in details. Pertinent positives as mentioned in HPI,  
otherwise negative Physical Exam: 
 
Constitutional: He is oriented to person, place, and time. He appears well-developed and well-nourished. Laying in bed, AAOx3, NAD HENT:  
Head: Normocephalic and atraumatic. Right Ear: External ear normal.  
Left Ear: External ear normal.  
Nose: Nose normal.  
Mouth/Throat: Oropharynx is clear and moist.  
Eyes: Conjunctivae and EOM are normal. Pupils are equal, round, and reactive to light. No scleral icterus. Neck: Normal range of motion. Neck supple. No JVD present. No tracheal deviation present. No thyromegaly present. Chronic dysphonia Cardiovascular: Normal rate, regular rhythm, normal heart sounds and intact distal pulses. Exam reveals no gallop and no friction rub. No murmur heard. Pulmonary/Chest: Effort normal and breath sounds normal. He exhibits no tenderness. Abdominal: Soft. Bowel sounds are normal. He exhibits no distension. There is no tenderness. There is no rebound and no guarding. Musculoskeletal: Normal range of motion. He exhibits no edema or tenderness. no lumbar spine tenderness. Tenderness right to lumbar spine around L2-4 levels. no erythema, notwarm to touch. No R hip pain to palpation No pain with internal or external rotation. Pain increases with flexion at the hip Lymphadenopathy:   He has no cervical adenopathy. Neurological: He is alert and oriented to person, place, and time. No cranial nerve deficit.  Coordination normal.  
 Full strength UE, pain limits R LE exam, L LE motor/sensory intact Skin: Skin is warm and dry. Psychiatric: He has a normal mood and affect. His behavior is normal. Judgment and thought content normal.  
Nursing note and vitals reviewed. 
  
 
 
 
Labs: Results:  
Chemistry Recent Labs 11/07/18 
7214 11/06/18 
0357 11/05/18 
3670 * 126* 128*  138 137  
K 3.8 4.0 4.1  107 101 CO2 23 22 24 BUN 10 17 15 CREA 0.74 0.73 0.87 CA 8.4* 7.8* 8.7 AGAP 13 9 12 BUCR 14 23* 17  
AP  --   --  72  
TP  --   --  7.5 ALB  --   --  3.2*  
GLOB  --   --  4.3* AGRAT  --   --  0.7* CBC w/Diff Recent Labs 11/07/18 
9628 11/06/18 
0357 11/05/18 
0168 WBC 13.0 18.3* 20.0*  
RBC 3.99* 3.89* 4.50* HGB 12.2* 12.0* 14.0  
HCT 35.7* 34.7* 40.1  206 215 GRANS 81* 76* 80* LYMPH 11* 10* 9* EOS 0 0 0 Microbiology Recent Labs 11/06/18 
1108 11/05/18 
1937 11/05/18 
1922 CULT NO GROWTH AFTER 19 HOURS AEROBIC BOTTLE POSSIBLE STREPTOCOCCI, ALPHA HEMOLYTIC* AEROBIC BOTTLE STAPHYLOCOCCUS SPECIES*  AEROBIC BOTTLE POSSIBLE 2ND STAPHYLOCOCCUS SPECIES* RADIOLOGY: 
 
All available imaging studies/reports in [facility] for this admission were reviewed Dr. Bobby Perez, Infectious Disease Specialist 
[phone] November 7, 2018 10:07 AM

## 2018-11-07 NOTE — PROGRESS NOTES
ARU/IPR REFERRAL CONTACT NOTE [id number] [place] [address] for Physical Rehabilitation RE:  Velia Conde Thank you for the opportunity to review this patient's case for admission to [id number] [place] [address] for Physical Rehabilitation. Based on our pre-admission screening:  
 
Nannette ] Our Team/Medical Director is following this case. Comments: Will review in team meeting 11/8/18. Thank you for this referral. Should you have any questions please do not hesitate to call. Sincerely, 
JERAMIE Saunders PTA for Natividad Robles [facility] Admissions [facility] Physical Rehabilitation 
[phone]

## 2018-11-07 NOTE — PROGRESS NOTES
11/07/18 0354 Vitals Temp 98.7 °F (37.1 °C) Temp Source Oral  
Pulse (Heart Rate) 74 Heart Rate Source Monitor Resp Rate 20  
O2 Sat (%) 98 % Level of Consciousness Alert  
BP (!) 207/113 
(notified nurse lance martinez) MAP (Calculated) 144 BP 1 Location Left arm BP 1 Method Automatic  
BP Patient Position At rest  
MEWS Score 3 Patient Observation Repositioned Head of bed elevated (degrees) Patient Turned Turns self Observations vital  
Ambulate No (Comment) Activity In bed MD informed who gave a one time order for Nifedipine 10 mg po, same given.

## 2018-11-07 NOTE — PROGRESS NOTES
Problem: Mobility Impaired (Adult and Pediatric) Goal: *Acute Goals and Plan of Care (Insert Text) Physical Therapy Goals Initiated 11/7/2018 and to be accomplished within 7 day(s) 1. Patient will move from supine to sit and sit to supine, scoot up and down and roll side to side in bed with supervision/set-up. 2.  Patient will transfer from bed to chair and chair to bed with supervision/set-up using the least restrictive device. 3.  Patient will perform sit to stand with supervision/set-up. 4.  Patient will ambulate with supervision/set-up for >150 feet with the least restrictive device. 5.  Patient will ascend/descend 4 stairs using least restrictive device with minimal assistance/contact guard assist. 
Outcome: Progressing Towards Goal 
physical Therapy EVALUATION Patient: Arabella Stewart (89 y.o. male) Date: 11/7/2018 Primary Diagnosis: Intractable low back pain Debility Leukocytosis Precautions: Fall OBJECTIVE/ASSESSMENT : 
Based on the objective data described below, the patient presents with impaired functional mobility including bed mobility, transfers, ambulation, and general activity tolerance following admission for intractable back pain. Patient has history of throat cancer. Patient presented today sitting up in recliner, alert and agreeable to PT evaluation. Patient demonstrated 3/5 R hip flexor and quad strength, 4/5 hamstring strength. He reports localized R sided PSIS pain that increases with weight-bearing. Patient transferred to standing with Osmany, ambulated 79 ft with RW and Osmany. Patient required intermittent cuing to push through UEs to offset pain and keep RW close to body for proper mechanics. At conclusion of session, patient left sitting up in recliner with call bell in reach, needs met, and nurse notified.  Patient is highly motivated to participate in rehab services, was independent with all functional mobility PTA. He would benefit from rehab to maximize safety and independence prior to return home. Education: transfers, ambulation, assistive device management, body mechanics, safety awareness -- patient verbalized/demonstrated understanding Patient will benefit from skilled intervention to address the above impairments. Patients rehabilitation potential is considered to be Good Factors which may influence rehabilitation potential include:  
[]         None noted 
[]         Mental ability/status []         Medical condition 
[]         Home/family situation and support systems 
[]         Safety awareness 
[]         Pain tolerance/management 
[]         Other: PLAN : 
Recommendations and Planned Interventions: 
[x]           Bed Mobility Training             [x]    Neuromuscular Re-Education 
[x]           Transfer Training                   []    Orthotic/Prosthetic Training 
[x]           Gait Training                          []    Modalities [x]           Therapeutic Exercises          []    Edema Management/Control 
[x]           Therapeutic Activities            [x]    Patient and Family Training/Education 
[]           Other (comment): Frequency/Duration: Patient will be followed by physical therapy 1-2 times per day, 4-7 days per week to address goals. Discharge Recommendations: Rehab Further Equipment Recommendations for Discharge: rolling walker SUBJECTIVE:  
Patient stated This came on very suddenly. My back started to hurt last week and by Sunday I couldn't get out of bed.  OBJECTIVE DATA SUMMARY:  
 
Past Medical History:  
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  
Procedure Laterality Date  HX HEENT  2008 Throat Ca - 1 vocal chord removed  HX HEENT    
 eye surgery Barriers to Learning/Limitations: None Compensate with: N/A Prior Level of Function/Home Situation: Patient lives with wife in single story home, was independent with all functional mobility. Home Situation Home Environment: Private residence # Steps to Enter: 4 Rails to Enter: No 
One/Two Story Residence: One story Living Alone: No 
Support Systems: Spouse/Significant Other/Partner Patient Expects to be Discharged to[de-identified] Unknown Current DME Used/Available at Home: NoneCritical Behavior: 
Neurologic State: Alert Psychosocial 
Patient Behaviors: Calm; Cooperative Purposeful Interaction: Yes Pt Identified Daily Priority: Clinical issues (comment) Caritas Process: Attend basic human needs Caring Interventions: Reassure Reassure: Caring roundsStrength:   
Strength: Generally decreased, functional(RLE 3/5, LLE 5/5 grossly) Tone & Sensation:  
Tone: Normal(BLE) Sensation: Intact(BLE) Range Of Motion: 
AROM: Within functional limits(BLE) Functional Mobility: 
Bed Mobility: 
Scooting: Supervision Transfers: 
Sit to Stand: Minimum assistance Stand to Sit: Minimum assistance Balance:  
Sitting: Impaired Sitting - Static: Good (unsupported) Sitting - Dynamic: Fair (occasional) Standing: Impaired; With support Standing - Static: Fair Standing - Dynamic : FairAmbulation/Gait Training: 
Distance (ft): 70 Feet (ft) Assistive Device: Walker, rolling Ambulation - Level of Assistance: Minimal assistance Base of Support: Widened;Center of gravity altered;Shift to left Speed/Geovanna: Pace decreased (<100 feet/min) Pain: 
Pre session: 5/10 R low back Post session: 5/10 R low back Activity Tolerance:  
Fair/good Please refer to the flowsheet for vital signs taken during this treatment. After treatment:  
[x] Patient left in no apparent distress sitting up in chair 
[] Patient left sitting on EOB [] Patient left in no apparent distress in bed 
[] Patient declined to be OOB at this time due to 
[x] Call bell left within reach [x] Nursing notified(Velvet) [] Caregiver present 
[] Bed alarm activated 
[] SCDs in place COMMUNICATION/EDUCATION:  
[x]         Fall prevention education was provided and the patient/caregiver indicated understanding. [x]         Patient/family have participated as able in goal setting and plan of care. [x]         Patient/family agree to work toward stated goals and plan of care. []         Patient understands intent and goals of therapy, but is neutral about his/her participation. []         Patient is unable to participate in goal setting and plan of care. Thank you for this referral. 
[place] Time Calculation: 13 mins Mobility [id number] Current  CJ= 20-39%   Goal  CI= 1-19%. The severity rating is based on the Level of Assistance required for Functional Mobility and ADLs.  
 
Eval Complexity: History: MEDIUM  Complexity : 1-2 comorbidities / personal factors will impact the outcome/ POC Exam:MEDIUM Complexity : 3 Standardized tests and measures addressing body structure, function, activity limitation and / or participation in recreation  Presentation: MEDIUM Complexity : Evolving with changing characteristics  Clinical Decision Making:Medium Complexity   Overall Complexity:MEDIUM

## 2018-11-07 NOTE — PROGRESS NOTES
MRI Safety Screening form needs to be filled out and faxed to [phone] MRI can be scheduled. If unable to acquire information from pt, MPOA must be contacted.  If pt is claustrophobic or will need pain meds, please have ordered in advance to help facilitate MRI exam.

## 2018-11-07 NOTE — PROGRESS NOTES
PT is recommending Rehab. Informed [place] of Acute Rehab Unit. Met with pt and gave him list of SNF in case ARU will not accept him.

## 2018-11-07 NOTE — ROUTINE PROCESS
Bedside shift change report given to JERAMIE Guadalupe RN (oncoming nurse) by [place] RN (offgoing nurse). Report included the following information SBAR, Intake/Output, MAR and Recent Results.

## 2018-11-07 NOTE — PROGRESS NOTES
vss afeb, BP elevated overnight 207/113 Co lbp on right- somewhat worse than yesterday- though it was only being rx'd with flector patch. Neuro intact WBC today 13K Blood glucose normal 
Repeat BCx ntd Plan Wbc improving ? Secondary to abx rx or end of steroid effect Continued lbp which would be expected from known synovitis Plan Observe Await cultures Repeat MRI LS spine as patient with increased pain and previous study with motion artifact.

## 2018-11-07 NOTE — PROGRESS NOTES
SUBJECTIVE: 
 
Sitting up in chair. States he had a lot of back pain earlier today but better. No chest or abdominal pain. Cough present. No nausea or vomiting. Passing flatus. OBJECTIVE: 
 
/84 (BP 1 Location: Right arm, BP Patient Position: At rest)   Pulse 89   Temp 98.2 °F (36.8 °C)   Resp 20   Ht 5' 10\" (1.778 m) Comment: Simultaneous filing. User may not have seen previous data. Wt 80.1 kg (176 lb 9.6 oz)   SpO2 98%   BMI 25.34 kg/m² CVS: RRR 
RS: CTA bilaterally, no wheezes GI: NT, BS + Extremities: no pedal edema General: NAD, Awake, hoarse voice CNS: moves all extremities ASSESSMENT: 
 
1. Lower back pain due to # 2 2. Suspected Lumbar discitis, L3-4, L4-5. 
3. GPC bacteremia 4. H/o throat cancer. 5. HTN. 6. DM-2. 
7. Cough and chronic hoarseness of voice 8. Constipation PLAN: 
 
Cont current management MRI L spine with IV constranst 
ID and surgery to follow Speech therapy to evaluate him IS added Treat constipation BMP:  
Lab Results Component Value Date/Time  11/07/2018 03:33 AM  
 K 3.8 11/07/2018 03:33 AM  
  11/07/2018 03:33 AM  
 CO2 23 11/07/2018 03:33 AM  
 AGAP 13 11/07/2018 03:33 AM  
  (H) 11/07/2018 03:33 AM  
 BUN 10 11/07/2018 03:33 AM  
 CREA 0.74 11/07/2018 03:33 AM  
 GFRAA >60 11/07/2018 03:33 AM  
 GFRNA >60 11/07/2018 03:33 AM  
 
CBC:  
Lab Results Component Value Date/Time  WBC 13.0 11/07/2018 03:33 AM  
 HGB 12.2 (L) 11/07/2018 03:33 AM  
 HCT 35.7 (L) 11/07/2018 03:33 AM  
  11/07/2018 03:33 AM

## 2018-11-08 ENCOUNTER — APPOINTMENT (OUTPATIENT)
Dept: CT IMAGING | Age: 68
DRG: 552 | End: 2018-11-08
Attending: INTERNAL MEDICINE
Payer: MEDICARE

## 2018-11-08 LAB
ANION GAP SERPL CALC-SCNC: 14 MMOL/L (ref 3–18)
BASOPHILS # BLD: 0 K/UL (ref 0–0.1)
BASOPHILS NFR BLD: 0 % (ref 0–2)
BUN SERPL-MCNC: 7 MG/DL (ref 7–18)
BUN/CREAT SERPL: 11 (ref 12–20)
CALCIUM SERPL-MCNC: 8.9 MG/DL (ref 8.5–10.1)
CHLORIDE SERPL-SCNC: 103 MMOL/L (ref 100–108)
CO2 SERPL-SCNC: 21 MMOL/L (ref 21–32)
CREAT SERPL-MCNC: 0.66 MG/DL (ref 0.6–1.3)
DATE LAST DOSE: NORMAL
DIFFERENTIAL METHOD BLD: ABNORMAL
EOSINOPHIL # BLD: 0.1 K/UL (ref 0–0.4)
EOSINOPHIL NFR BLD: 1 % (ref 0–5)
ERYTHROCYTE [DISTWIDTH] IN BLOOD BY AUTOMATED COUNT: 12.9 % (ref 11.6–14.5)
GLUCOSE BLD STRIP.AUTO-MCNC: 133 MG/DL (ref 70–110)
GLUCOSE BLD STRIP.AUTO-MCNC: 134 MG/DL (ref 70–110)
GLUCOSE BLD STRIP.AUTO-MCNC: 136 MG/DL (ref 70–110)
GLUCOSE BLD STRIP.AUTO-MCNC: 150 MG/DL (ref 70–110)
GLUCOSE SERPL-MCNC: 119 MG/DL (ref 74–99)
HCT VFR BLD AUTO: 37 % (ref 36–48)
HCT VFR BLD AUTO: 37.4 % (ref 36–48)
HGB BLD-MCNC: 12.7 G/DL (ref 13–16)
HGB BLD-MCNC: 13 G/DL (ref 13–16)
LYMPHOCYTES # BLD: 1.7 K/UL (ref 0.9–3.6)
LYMPHOCYTES NFR BLD: 13 % (ref 21–52)
MCH RBC QN AUTO: 30.4 PG (ref 24–34)
MCHC RBC AUTO-ENTMCNC: 34.3 G/DL (ref 31–37)
MCV RBC AUTO: 88.5 FL (ref 74–97)
MONOCYTES # BLD: 1 K/UL (ref 0.05–1.2)
MONOCYTES NFR BLD: 7 % (ref 3–10)
NEUTS SEG # BLD: 10.3 K/UL (ref 1.8–8)
NEUTS SEG NFR BLD: 79 % (ref 40–73)
PLATELET # BLD AUTO: 215 K/UL (ref 135–420)
PMV BLD AUTO: 9.5 FL (ref 9.2–11.8)
POTASSIUM SERPL-SCNC: 3.8 MMOL/L (ref 3.5–5.5)
RBC # BLD AUTO: 4.18 M/UL (ref 4.7–5.5)
REPORTED DOSE,DOSE: NORMAL UNITS
REPORTED DOSE/TIME,TMG: 400
SODIUM SERPL-SCNC: 138 MMOL/L (ref 136–145)
VANCOMYCIN TROUGH SERPL-MCNC: 17.8 UG/ML (ref 10–20)
WBC # BLD AUTO: 13.1 K/UL (ref 4.6–13.2)

## 2018-11-08 PROCEDURE — 74011250637 HC RX REV CODE- 250/637: Performed by: INTERNAL MEDICINE

## 2018-11-08 PROCEDURE — 74011250636 HC RX REV CODE- 250/636: Performed by: INTERNAL MEDICINE

## 2018-11-08 PROCEDURE — 74011636320 HC RX REV CODE- 636/320: Performed by: INTERNAL MEDICINE

## 2018-11-08 PROCEDURE — 85025 COMPLETE CBC W/AUTO DIFF WBC: CPT

## 2018-11-08 PROCEDURE — 92610 EVALUATE SWALLOWING FUNCTION: CPT

## 2018-11-08 PROCEDURE — 65270000029 HC RM PRIVATE

## 2018-11-08 PROCEDURE — 36415 COLL VENOUS BLD VENIPUNCTURE: CPT

## 2018-11-08 PROCEDURE — 80048 BASIC METABOLIC PNL TOTAL CA: CPT

## 2018-11-08 PROCEDURE — 74011000250 HC RX REV CODE- 250: Performed by: INTERNAL MEDICINE

## 2018-11-08 PROCEDURE — 97165 OT EVAL LOW COMPLEX 30 MIN: CPT

## 2018-11-08 PROCEDURE — 80202 ASSAY OF VANCOMYCIN: CPT

## 2018-11-08 PROCEDURE — 74011636637 HC RX REV CODE- 636/637: Performed by: INTERNAL MEDICINE

## 2018-11-08 PROCEDURE — 73701 CT LOWER EXTREMITY W/DYE: CPT

## 2018-11-08 PROCEDURE — 97535 SELF CARE MNGMENT TRAINING: CPT

## 2018-11-08 PROCEDURE — 82962 GLUCOSE BLOOD TEST: CPT

## 2018-11-08 PROCEDURE — 85018 HEMOGLOBIN: CPT

## 2018-11-08 PROCEDURE — 94640 AIRWAY INHALATION TREATMENT: CPT

## 2018-11-08 RX ORDER — OXYCODONE AND ACETAMINOPHEN 5; 325 MG/1; MG/1
1 TABLET ORAL
Status: DISCONTINUED | OUTPATIENT
Start: 2018-11-08 | End: 2018-11-13 | Stop reason: HOSPADM

## 2018-11-08 RX ORDER — CLONIDINE HYDROCHLORIDE 0.1 MG/1
0.1 TABLET ORAL
Status: DISCONTINUED | OUTPATIENT
Start: 2018-11-08 | End: 2018-11-13 | Stop reason: HOSPADM

## 2018-11-08 RX ORDER — SODIUM CHLORIDE 9 MG/ML
50 INJECTION, SOLUTION INTRAVENOUS CONTINUOUS
Status: DISPENSED | OUTPATIENT
Start: 2018-11-08 | End: 2018-11-08

## 2018-11-08 RX ORDER — LIDOCAINE 4 G/100G
1 PATCH TOPICAL EVERY 24 HOURS
Status: DISCONTINUED | OUTPATIENT
Start: 2018-11-08 | End: 2018-11-13 | Stop reason: HOSPADM

## 2018-11-08 RX ORDER — IPRATROPIUM BROMIDE AND ALBUTEROL SULFATE 2.5; .5 MG/3ML; MG/3ML
3 SOLUTION RESPIRATORY (INHALATION)
Status: DISCONTINUED | OUTPATIENT
Start: 2018-11-08 | End: 2018-11-11

## 2018-11-08 RX ORDER — POLYETHYLENE GLYCOL 3350 17 G/17G
17 POWDER, FOR SOLUTION ORAL 2 TIMES DAILY
Status: DISCONTINUED | OUTPATIENT
Start: 2018-11-08 | End: 2018-11-13 | Stop reason: HOSPADM

## 2018-11-08 RX ADMIN — HEPARIN SODIUM 5000 UNITS: 5000 INJECTION, SOLUTION INTRAVENOUS; SUBCUTANEOUS at 04:11

## 2018-11-08 RX ADMIN — NEBIVOLOL HYDROCHLORIDE 10 MG: 5 TABLET ORAL at 09:16

## 2018-11-08 RX ADMIN — VANCOMYCIN HYDROCHLORIDE 1250 MG: 10 INJECTION, POWDER, LYOPHILIZED, FOR SOLUTION INTRAVENOUS at 03:59

## 2018-11-08 RX ADMIN — OMEGA-3 FATTY ACIDS CAP 1000 MG 1 CAPSULE: 1000 CAP at 09:16

## 2018-11-08 RX ADMIN — LATANOPROST 1 DROP: 50 SOLUTION/ DROPS OPHTHALMIC at 17:10

## 2018-11-08 RX ADMIN — INSULIN LISPRO 2 UNITS: 100 INJECTION, SOLUTION INTRAVENOUS; SUBCUTANEOUS at 12:31

## 2018-11-08 RX ADMIN — SODIUM CHLORIDE 50 ML/HR: 900 INJECTION, SOLUTION INTRAVENOUS at 09:17

## 2018-11-08 RX ADMIN — OXYCODONE AND ACETAMINOPHEN 1 TABLET: 5; 325 TABLET ORAL at 03:55

## 2018-11-08 RX ADMIN — OXYCODONE AND ACETAMINOPHEN 1 TABLET: 5; 325 TABLET ORAL at 17:28

## 2018-11-08 RX ADMIN — IOPAMIDOL 100 ML: 612 INJECTION, SOLUTION INTRAVENOUS at 10:30

## 2018-11-08 RX ADMIN — POLYETHYLENE GLYCOL 3350 17 G: 17 POWDER, FOR SOLUTION ORAL at 17:11

## 2018-11-08 RX ADMIN — OMEGA-3 FATTY ACIDS CAP 1000 MG 1 CAPSULE: 1000 CAP at 17:11

## 2018-11-08 RX ADMIN — IPRATROPIUM BROMIDE AND ALBUTEROL SULFATE 3 ML: .5; 3 SOLUTION RESPIRATORY (INHALATION) at 20:11

## 2018-11-08 RX ADMIN — LOSARTAN POTASSIUM 50 MG: 50 TABLET ORAL at 09:16

## 2018-11-08 NOTE — ROUTINE PROCESS
Report received from [facility]. Patient is alert, in bed, no distress noted. 0425  Patient alert, medicated for back pain. Assisted to recliner per his request because of back discomfort. Call bell in reach. Continue to monitor. 0730 Bedside, Verbal and Written shift change report given to [facility] (oncoming nurse) by Camilo Avila (offgoing nurse). Report included the following information Kardex, ED Summary, MAR and Accordion.

## 2018-11-08 NOTE — PROGRESS NOTES
Infectious Disease progress Note Requested by: Dr. aJsson Horta Reason: evaluate for discitis lumbar spine Current abx Prior abx  
 vancomycin since 11/5 Cefepime 11/5-11/6 Lines:  
 
 
Assessment : 
 
80 y.o. male with a past medical history of type 2 DM (last hgbA1C not known), throat cancer, and essential HTN benign who presented to ed with c/o worsening back pain onset 2 days ago. Patient presents with highly complex picture. Etiology of back pain not entirely clear. I concur with dr. Samantha Casillas that presentation is not classic for infectious discitis/osteomyelitis. However, the very elevated CRP, positive blood cultures would be concerning for early discitis. Patient's radiculopathy could be due to lumbar disc edema and nerve compression. Patient has h/o fall 12 weeks ago and wonder if the back pain is trauma related with/without superimposed infection. D/w dr. Sid Torres - patient has not received any antibiotics in the past 4 months. This would rule out partially treated spinal infection. patient received immunotherapy shots recently and wondering if that could affect the CRP results. Repeat MRI 11/7/18 results noted. Extensive retroperitoneal edema/stranding, left greater than right, with probable bilateral renal cysts. There is edema about the psoas muscle, without evidence of intramuscular abscess. Mild right greater than left gluteal musculature edema. Etiology of retroperitoneal edema unclear - doubt pyelonephritis/UTI related. Need to remain watchful for idiopathic retroperitoneal fibrosis/malignancy. R/o undiagnosed right hip soft tissue pathology. Decreased wbc. Repeat blood cultures 11/6 negative. Cons, strep viridans in blood culture 11/5 could be contaminant. Will discontinue abx and monitor off abx since low clinical suspicion of infection. Recommendations: 
 
1.  discontinue vancomycin. 2. F/u Repeat blood cultures 3.  F/u CT right hip with contrast  
 4. Recommend urology evaluation to determine etiology of retroperitoneal edema if ct right hip non diagnostic Advance Care planning: full code: discussed  with patient/surrogate decision maker:Kalli Durbin: [phone] Above plan was discussed in details with patient,  and dr Bobby Lang. Will d/w dr. Marian Lord. Please call me if any further questions or concerns. Will continue to participate in the care of this patient. subjective: 
 
Patient states that his back pain is slightly better today. Able to move his extremities. Patient denies headaches, visual disturbances, sore throat, runny nose, earaches, cp, sob, chills, cough, abdominal pain, diarrhea, burning micturition, pain or weakness in extremities. Medication List  
  
ASK your doctor about these medications   
aspirin, buffered 81 mg Tab 
  
calcium-cholecalciferol (d3) 600-125 mg-unit Tab Dexlansoprazole 60 mg Cpdb IRON (FERROUS SULFATE) PO 
  
latanoprost 0.005 % ophthalmic solution Commonly known as:  XALATAN 
  
losartan 50 mg tablet Commonly known as:  COZAAR 
  
metFORMIN 1,000 mg tablet Commonly known as:  GLUCOPHAGE 
  
nebivolol 10 mg tablet Commonly known as:  BYSTOLIC 
  
omega-3 fatty acids-vitamin e 1,000 mg Cap 
  
omeprazole 20 mg capsule Commonly known as:  PRILOSEC QNASL 80 mcg/actuation Hfaa Generic drug:  beclomethasone dipropionate Current Facility-Administered Medications Medication Dose Route Frequency  lidocaine 4 % patch 1 Patch  1 Patch TransDERmal Q24H  
 oxyCODONE-acetaminophen (PERCOCET) 5-325 mg per tablet 1 Tab  1 Tab Oral Q6H PRN  polyethylene glycol (MIRALAX) packet 17 g  17 g Oral BID  cloNIDine HCl (CATAPRES) tablet 0.1 mg  0.1 mg Oral Q8H PRN  
 0.9% sodium chloride infusion  50 mL/hr IntraVENous CONTINUOUS  
 albuterol-ipratropium (DUO-NEB) 2.5 MG-0.5 MG/3 ML  3 mL Nebulization Q6H RT  
 Vancomycin Lab Information  1 Each Other Daily  omega 3-DHA-EPA-fish oil 1,000 mg (120 mg-180 mg) capsule 1 Cap  1 Cap Oral BID  latanoprost (XALATAN) 0.005 % ophthalmic solution 1 Drop  1 Drop Both Eyes QPM  
 beclomethasone dipropionate (QNASL 80mcg) HFAA 2 Spray (Patient Supplied)  2 Spray Nasal DAILY  sodium chloride (OCEAN) 0.65 % nasal squeeze bottle 2 Spray  2 Spray Both Nostrils Q2H PRN  
 losartan (COZAAR) tablet 50 mg  50 mg Oral DAILY  nebivolol (BYSTOLIC) tablet 10 mg  10 mg Oral DAILY  ondansetron (ZOFRAN) injection 4 mg  4 mg IntraVENous Q4H PRN  
 insulin lispro (HUMALOG) injection   SubCUTAneous AC&HS  
 glucose chewable tablet 16 g  16 g Oral PRN  
 glucagon (GLUCAGEN) injection 1 mg  1 mg IntraMUSCular PRN  
 dextrose (D50W) injection syrg 12.5-25 g  25-50 mL IntraVENous PRN Allergies: Patient has no known allergies. Temp (24hrs), Av.2 °F (36.8 °C), Min:97.4 °F (36.3 °C), Max:99 °F (37.2 °C) Visit Vitals /78 (BP 1 Location: Left arm, BP Patient Position: At rest) Pulse 94 Temp 97.6 °F (36.4 °C) Resp 18 Ht 5' 10\" (1.778 m) Comment: Simultaneous filing. User may not have seen previous data. Wt 80.1 kg (176 lb 9.6 oz) SpO2 100% BMI 25.34 kg/m² ROS: 12 point ROS obtained in details. Pertinent positives as mentioned in HPI,  
otherwise negative Physical Exam: 
 
Constitutional: He is oriented to person, place, and time. He appears well-developed and well-nourished. Laying in bed, AAOx3, NAD HENT:  
Head: Normocephalic and atraumatic. Right Ear: External ear normal.  
Left Ear: External ear normal.  
Nose: Nose normal.  
Mouth/Throat: Oropharynx is clear and moist.  
Eyes: Conjunctivae and EOM are normal. Pupils are equal, round, and reactive to light. No scleral icterus. Neck: Normal range of motion. Neck supple. No JVD present. No tracheal deviation present. No thyromegaly present. Chronic dysphonia Cardiovascular: Normal rate, regular rhythm, normal heart sounds and intact distal pulses. Exam reveals no gallop and no friction rub. No murmur heard. Pulmonary/Chest: Effort normal and breath sounds normal. He exhibits no tenderness. Abdominal: Soft. Bowel sounds are normal. He exhibits no distension. There is no tenderness. There is no rebound and no guarding. Musculoskeletal: Normal range of motion. He exhibits no edema or tenderness. no lumbar spine tenderness. tenderness right to lumbar spine - right iliac crest. no erythema, notwarm to touch. No R hip pain to palpation No pain with internal or external rotation. Lymphadenopathy:   He has no cervical adenopathy. Neurological: He is alert and oriented to person, place, and time. No cranial nerve deficit. Coordination normal.  
Full strength UE, pain limits R LE exam, L LE motor/sensory intact Skin: Skin is warm and dry. Psychiatric: He has a normal mood and affect. His behavior is normal. Judgment and thought content normal.  
Nursing note and vitals reviewed. 
  
 
 
 
Labs: Results:  
Chemistry Recent Labs 11/08/18 
0340 11/07/18 
5396 11/06/18 
3898 * 103* 126*  141 138  
K 3.8 3.8 4.0  
 105 107 CO2 21 23 22 BUN 7 10 17 CREA 0.66 0.74 0.73 CA 8.9 8.4* 7.8* AGAP 14 13 9 BUCR 11* 14 23* CBC w/Diff Recent Labs 11/08/18 
0340 11/07/18 
0159 11/06/18 
4073 WBC 13.1 13.0 18.3*  
RBC 4.18* 3.99* 3.89* HGB 12.7* 12.2* 12.0*  
HCT 37.0 35.7* 34.7*  
 194 206 GRANS 79* 81* 76* LYMPH 13* 11* 10* EOS 1 0 0 Microbiology Recent Labs 11/06/18 
1108 11/05/18 
1937 11/05/18 
1922 CULT NO GROWTH 2 DAYS AEROBIC BOTTLE STREPTOCOCCUS VIRIDANS*  ANAEROBIC BOTTLE STAPHYLOCOCCUS SPECIES, COAGULASE NEGATIVE* AEROBIC BOTTLE STAPHYLOCOCCUS SPECIES, COAGULASE NEGATIVE*  AEROBIC BOTTLE POSSIBLE 2ND STAPHYLOCOCCUS SPECIES, COAGULASE NEGATIVE* RADIOLOGY: 
 
All available imaging studies/reports in [facility] for this admission were reviewed Dr. Dilip Joyce, Infectious Disease Specialist 
[phone] November 8, 2018 10:07 AM

## 2018-11-08 NOTE — PROGRESS NOTES
SUBJECTIVE: 
 
Patient had red blood come out from rectum this morning. No headaches or dizziness. No chest or abdominal pain currently. No dysuria. Back pain and right hip pain present. No nausea or vomiting. No SOB or cough. OBJECTIVE: 
 
/78 (BP 1 Location: Left arm, BP Patient Position: At rest)   Pulse 94   Temp 97.6 °F (36.4 °C)   Resp 18   Ht 5' 10\" (1.778 m) Comment: Simultaneous filing. User may not have seen previous data. Wt 80.1 kg (176 lb 9.6 oz)   SpO2 100%   BMI 25.34 kg/m² CVS: RRR 
RS: CTA bilaterally, no wheezes GI: NT, BS + Extremities: no pedal edema General: NAD, Awake, hoarse voice CNS: moves all extremities while in bed, Motor strength 4-5/5 in RLE Spine: no CVA tenderness, mild paraspinal tenderness around sacral area and hip joint. ASSESSMENT: 
 
1. Lower back pain due to # 2 2. Suspected Lumbar discitis, L3-4, L4-5. 
3. Staph and strept bacteremia 4. H/o throat cancer. 5. HTN. 6. DM-2. 
7. Cough and chronic hoarseness of voice 8. Constipation, resolved 9. BRPR, could be hemorrhoidal 
10. Right hip pain r/o psoas etiology PLAN: 
 
Cont current management GI consulted, d/roger heparin subQ, Stat H/H, and changed to Lidoderm patch D/W ID about MRI report. Low suspicion for pyelonephritis, check CT Right hip, and monitor off antibiotic. PT/OT to follow BMP:  
Lab Results Component Value Date/Time  11/08/2018 03:40 AM  
 K 3.8 11/08/2018 03:40 AM  
  11/08/2018 03:40 AM  
 CO2 21 11/08/2018 03:40 AM  
 AGAP 14 11/08/2018 03:40 AM  
  (H) 11/08/2018 03:40 AM  
 BUN 7 11/08/2018 03:40 AM  
 CREA 0.66 11/08/2018 03:40 AM  
 GFRAA >60 11/08/2018 03:40 AM  
 GFRNA >60 11/08/2018 03:40 AM  
 
CBC:  
Lab Results Component Value Date/Time  WBC 13.1 11/08/2018 03:40 AM  
 HGB 12.7 (L) 11/08/2018 03:40 AM  
 HCT 37.0 11/08/2018 03:40 AM  
  11/08/2018 03:40 AM

## 2018-11-08 NOTE — ROUTINE PROCESS
[facility] Bedside and Verbal shift change report given to Inez Gonzalez RN (oncoming nurse) by Marek Albright RN (offgoing nurse). Report included the following information SBAR, Kardex, Intake/Output, MAR and Recent Results. Patient alert/oriented x4, wife at bedside. No verbal complaints of pain, nausea/vomiting, fever/chills, sweating, shortness of breath/wheezing, or any other apparent signs of acute distress. Vital signs are stable.

## 2018-11-08 NOTE — PROGRESS NOTES
ARU/IPR REFERRAL CONTACT NOTE [id number] [place] [address] Physical Rehabilitation RE: Andre Fontana Referral received to review this patient's case for admission to [id number] [place] [address] Physical Rehabilitation. Current status reviewed.  When appropriate, will need OT evaluation/treatment on this patient to complete the pre-admission evaluation.  Will continue to follow. Thank you for this referral.  Should you have any questions please do not hesitate to call. Sincerely, Naitvidad Riojas Admissions Liaison [facility] Physical Rehabilitation 
[phone]

## 2018-11-08 NOTE — PROGRESS NOTES
Paged Dr. Jones Record, patient having dafne blood coming from rectum; orders received for occult stool and H&H; vitals stable; no complaints of dizziness; will continue to monitor

## 2018-11-08 NOTE — ROUTINE PROCESS
Bedside shift change report given to Giovana (oncoming nurse) by Candido Pressley RN (offgoing nurse). Report included the following information SBAR, Kardex, MAR, Accordion and Recent Results.

## 2018-11-08 NOTE — PROGRESS NOTES
ARU/IPR REFERRAL CONTACT NOTE [id number] [place] [address] for Physical Rehabilitation 
   
RE: Willie Posey  
             
Thank you for the opportunity to review this patient's case for admission to [address] [place] [address] for Physical Rehabilitation. Per further review with team,  patient does not meet criteria for admission to [facility] Physical Rehabilitation.  Documents do not reflect rehab diagnosis.  Reiseñor 3 vs home with home care upon discharge. Again, Thank you for this referral. Should you have any questions please do not hesitate to call. Sincerely, Natividad Ashley Clinical Liaison [facility] Physical [facility] 
[phone]

## 2018-11-08 NOTE — CONSULTS
[email or web address]  [phone]    GASTROENTEROLOGY CONSULT      Impression:   1. Rectal bleeding - likely hemorrhoidal. Recommend optimizing constipation - titrate Miralax as needed, consider Flex sigmoidoscopy if bleeding occurs again. H/H stable  2. Chronic constipation      Plan:     1. Monitor for further bleeding  2. Miralax- titrate as needed for soft stool  3. Colonoscopy due 2/2019 - can be done outpatient      Chief Complaint: Rectal bleeding      HPI:  Colten Cordero is a 80 y.o. male who I am being asked to see in consultation for an opinion regarding rectal bleeding. He is admitted for back pain concern for possible osteomyelitis. Rectal bleeding occurred this morning while sitting in bed, noted small - moderate amount of bright red blood when he got up (soaked undergarment). No associated rectal or abdominal pain. Single episode, has not reoccurred. He notes a history of a bleeding hemorrhoid earlier this year. He has chronic constipation for which he takes Miralax daily. Prior to this episode he had been constipated and received a suppository yesterday. His last colonoscopy was in 2016, showed polyps with a 3 year repeat recommended.     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     Chest pain, unspecified     Diabetes ([address]     Essential hypertension, benign     GERD (gastroesophageal reflux disease)     Nonspecific abnormal electrocardiogram (ECG) (EKG)        PSH:   Past Surgical History:   Procedure Laterality Date    HX HEENT  2008    Throat Ca - 1 vocal chord removed     HX HEENT      eye surgery        Social HX:   Social History     Socioeconomic History    Marital status:      Spouse name: Not on file    Number of children: Not on file    Years of education: Not on file    Highest education level: Not on file   Social Needs    Financial resource strain: Not on file    Food insecurity - worry: Not on file    Food insecurity - inability: Not on file    Transportation needs - medical: Not on file   DxTerity needs - non-medical: Not on file   Occupational History    Not on file   Tobacco Use    Smoking status: Former Smoker   Substance and Sexual Activity    Alcohol use: No    Drug use: No    Sexual activity: Not on file   Other Topics Concern    Not on file   Social History Narrative    Not on file       FHX:   No family history on file. Allergy:   No Known Allergies    Patient Active Problem List   Diagnosis Code    Debility R53.81    Leukocytosis D72.829    Intractable low back pain M54.5       Home Medications:     Medications Prior to Admission   Medication Sig    latanoprost (XALATAN) 0.005 % ophthalmic solution Administer 1 Drop to both eyes nightly.  beclomethasone dipropionate (QNASL) 80 mcg/actuation HFAA 80 mcg by Nasal route daily. Directions on at home label are as follows: Use 2 sprays in each Nostril Daily    metFORMIN (GLUCOPHAGE) 1,000 mg tablet Take 1,000 mg by mouth daily.  losartan (COZAAR) 50 mg tablet Take  by mouth daily.  nebivolol (BYSTOLIC) 10 mg tablet Take  by mouth daily.  omeprazole (PRILOSEC) 20 mg capsule Take 20 mg by mouth daily.  Dexlansoprazole 60 mg CpDB Take  by mouth.  calcium-cholecalciferol, d3, 600-125 mg-unit tab Take  by mouth.  omega-3 fatty acids-vitamin e 1,000 mg cap Take 1 Cap by mouth.  IRON, FERROUS SULFATE, PO Take  by mouth. 3 times a week    Aspirin, Buffered 81 mg tab Take  by mouth. Review of Systems:     Constitutional: No fevers, chills, weight loss, fatigue. Skin: No rashes, pruritis, jaundice, ulcerations, erythema. HENT: No headaches, nosebleeds, sinus pressure, rhinorrhea, sore throat. Eyes: No visual changes, blurred vision, eye pain, photophobia, jaundice. Cardiovascular: No chest pain, heart palpitations. Respiratory: No cough, SOB, wheezing, chest discomfort, orthopnea. Gastrointestinal: No abdominal pain, nausea, vomiting   Genitourinary: No dysuria, bleeding, discharge, pyuria. Musculoskeletal: No weakness, arthralgias, wasting. Endo: No sweats. Heme: No bruising, easy bleeding. Allergies: As noted. Neurological: Cranial nerves intact. Alert and oriented. Gait not assessed. Psychiatric:  No anxiety, depression, hallucinations. Visit Vitals  BP (!) 173/97 (BP 1 Location: Left arm, BP Patient Position: At rest)   Pulse 100   Temp 99.7 °F (37.6 °C)   Resp 18   Ht 5' 10\" (1.778 m) Comment: Simultaneous filing. User may not have seen previous data. Wt 80.1 kg (176 lb 9.6 oz)   SpO2 96%   BMI 25.34 kg/m²       Physical Assessment:     constitutional: appearance: well developed, well nourished, normal habitus, no deformities, in no acute distress. skin: inspection: no rashes, ulcers, icterus or other lesions; no clubbing or telangiectasias. eyes: inspection: normal conjunctivae and lids; no jaundice pupils: normal  ENMT: mouth: normal oral mucosa,lips and gums; good dentition. neck: trachea midline  respiratory: effort: normal chest excursion;  cardiovascular: normal rhythm; no thrill or murmurs. abdominal: abdomen: normal consistency; no tenderness or masses. hernias: no hernias appreciated. liver: normal size and consistency. spleen: not palpable. rectal: external hemorrhoids   musculoskeletal: digits and nails: no clubbing, cyanosis, petechiae or other inflammatory conditions. neurologic: cranial nerves: II-XII normal.   psychiatric: judgement/insight: within normal limits. memory: within normal limits for recent and remote events. mood and affect: no evidence of depression, anxiety or agitation. orientation: oriented to time, space and person.         Basic Metabolic Profile   Recent Labs     11/08/18  0340      K 3.8      CO2 21   BUN 7   *   CA 8.9         CBC w/Diff    Recent Labs     11/08/18  0848 11/08/18  0340   WBC  -- 13.1   RBC  --  4.18*   HGB 13.0 12.7*   HCT 37.4 37.0   MCV  --  88.5   MCH  --  30.4   MCHC  --  34.3   RDW  --  12.9   PLT  --  215    Recent Labs     11/08/18  0340  11/06/18  0357   GRANS 79*   < > 76*   LYMPH 13*   < > 10*   EOS 1   < > 0   METAS  --   --  1*    < > = values in this interval not displayed. Hepatic Function   No results for input(s): ALB, TP, TBILI, GPT, SGOT, AP, AML, LPSE in the last 72 hours. No lab exists for component: DBILI     Coags   No results for input(s): PTP, INR, APTT in the last 72 hours. No lab exists for component: ARIA Devi.   11/08/18, 1:55 PM   [facility] [address]  Cell: [phone]  [email or web address]

## 2018-11-08 NOTE — PROGRESS NOTES
Problem: Self Care Deficits Care Plan (Adult) Goal: *Acute Goals and Plan of Care (Insert Text) Outcome: Resolved/Met Date Met: 18 Occupational Therapy EVALUATION/discharge Patient: Tee Carter (51 y.o. male) Date: 2018 Primary Diagnosis: Intractable low back pain Debility Leukocytosis Precautions: Fall ASSESSMENT AND RECOMMENDATIONS: 
Mr. Yoni Miranda is a pleasant 80 yr old male admitted to the hospital 18 with c/o significant low back pain, difficulty walking, and RLE weakness. An MRI of the spine revealed the followin. Extensive retroperitoneal edema/stranding, nonspecific. Correlate clinically 
for UTI/pyelonephritis. Bilateral probable renal cysts. -There is edema about the psoas musculature, but no evidence of intramuscular 
abscess. 
  
2. Multilevel severe facet arthropathy with associated small facet effusions, 
and right L4-L5 posterior facet synovial cysts. Also at L4 to left pedicle and 
facet marrow edema, and perifacet edema. Findings may all be due to active facet 
degeneration. Early infection less likely but may be considered in the 
appropriate clinical setting 
-No evidence of discitis/osteomyelitis. 
  
3. Advanced spinal canal and thecal sac stenosis largely due to epidural 
lipomatosis and degenerative changes are not significantly changed since MRI 
from 2 days prior. See prior MRI report for further detail. During the OT eval today, the pt completed bed mobility, sit to stand with a RW, lower body dressing, and a toilet transfer with stand-by assist. The pt reported much improved pain today (4 out of 10 back pain reported), as compared to his pain when he was admitted to the hospital. OT provided education to the pt on techniques to help decrease back pain, facilitate ease with functional transfers and ADL completion, and to promote spinal integrity.  For example, OT educated the pt on the log roll technique for bed mobility, positioning in bed/when sleeping to promote spinal alignment, use of a toilet riser at home to facilitate ease with toilet transfers, and the use of a reacher for picking up items on a low surface/ground and/or performing lower body dressing. An educational packet was reviewed and issued to the pt. The pt also presented with good understanding, recall of information and skills taught, and teach back abilities. He does not require further OT services in the hospital setting, therefore OT will sign off. This OT believes the pt would benefit significantly from outpatient spinal rehabilitation for pain management & manual therapy. Skilled occupational therapy is not indicated at this time. Discharge Recommendations: Outpatient Further Equipment Recommendations for Discharge: reacher (issued to pt) Barriers to Learning/Limitations: None Compensate with: visual, verbal, tactile, kinesthetic cues/model COMPLEXITY Eval Complexity: History: LOW Complexity : Brief history review ; Examination: LOW Complexity : 1-3 performance deficits relating to physical, cognitive , or psychosocial skils that result in activity limitations and / or participation restrictions ; Decision Making:MEDIUM Complexity : Patient may present with comorbidities that affect occupational performnce. Miniml to moderate modification of tasks or assistance (eg, physical or verbal ) with assesment(s) is necessary to enable patient to complete evaluation  Assessment: Low Complexity G-CODES:  
 
Self Care  Current  CI= 1-19%  Goal  CI= 1-19%  D/C  CI= 1-19%. The severity rating is based on the Level of Assistance required for Functional Mobility and ADLs. SUBJECTIVE:  
Patient stated, \"I feel like I can walk all the way up the hallway with this thing. \" (pt referring to using a RW) OBJECTIVE DATA SUMMARY:  
 
Past Medical History:  
Diagnosis Date  Cancer 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  
Procedure Laterality Date  HX HEENT  2008 Throat Ca - 1 vocal chord removed  HX HEENT    
 eye surgery Prior Level of Function/Home Situation: Pt was independent with ADLs, ambulation, driving, and household IADLs. He is retired. Home Situation Home Environment: Private residence # Steps to Enter: 4 Rails to Enter: No 
One/Two Story Residence: One story Living Alone: No 
Support Systems: Spouse/Significant Other/Partner Patient Expects to be Discharged to[de-identified] Private residence Current DME Used/Available at Home: Cane, straight Tub or Shower Type: Tub/Shower combination 
[x]     Right hand dominant   []     Left hand Cognitive/Behavioral Status: 
Neurologic State: Alert Orientation Level: Oriented X4 Cognition: Follows commands Safety/Judgement: Good awareness of safety precautions Vision/Perceptual:   
       
Acuity: [facility]) Coordination: BUE and BLE Balance: 
Sitting - Static: (normal) Sitting - Dynamic: (good) Standing - Static: (good with RW) Standing - Dynamic : (fair+ with RW) Strength: 
Strength: Within functional limits(BUE and BLE) Tone & Sensation: 
Tone: Normal 
Sensation: (Pt denied numbness & tingling of BUE and BLE.) Range of Motion: 
AROM: Within functional limits(BUE and BLE) Functional Mobility and Transfers for ADLs: 
Bed Mobility: 
  
Supine to Sit: Supervision(pt was educated on the log roll technique) Scooting: Supervision Transfers: 
Sit to Stand: Stand-by assistance Toilet Transfer : Stand-by assistance ADL Assessment: 
Feeding: Independent Oral Facial Hygiene/Grooming: Supervision(standing at sink level) Bathing: Stand-by assistance Upper Body Dressing: Independent Lower Body Dressing: Stand-by assistance Toileting: Stand by assistance Cognitive Retraining Safety/Judgement: Good awareness of safety precautions Pain: 
Pt reports 4/10 pain or discomfort prior to treatment.   
Pt reports 4/10 pain or discomfort post treatment. Activity Tolerance:  
good Please refer to the flowsheet for vital signs taken during this treatment. After treatment:  
[x]  Patient left in no apparent distress sitting up in chair 
[]  Patient left in no apparent distress in bed 
[x]  Call bell left within reach 
[]  Nursing notified 
[]  Caregiver present 
[]  Bed alarm activated COMMUNICATION/EDUCATION:  
Communication/Collaboration: 
[x]      Home safety education was provided and the patient/caregiver indicated understanding. [x]      Patient/family have participated as able and agree with findings and recommendations. []      Patient is unable to participate in plan of care at this time. [facility] OT Time Calculation: 36 mins

## 2018-11-08 NOTE — PROGRESS NOTES
vss afeb Neuro intact Pain improved- no pain this morning +- SLR on right, pain or weakness IP- but improved Has ambulated with assist to BR 
 
MRI reviewed- degenerative changes with facet arthropathy, synovitis, synovial cyst formation, spinal stenosis- all finding consistent with patient with back and leg pain without having to consider infection. Radiologist does not retro peritoneal process and renal cysts,cw some possible  inflammatory process. Patient without abdominal pain, normal UA, uncertain if any significance. No evidence of epidural abscess, discitis, or other abscess or collection. As patient improving would continue PT OT.

## 2018-11-08 NOTE — PROGRESS NOTES
Problem: Dysphagia (Adult) Goal: *Acute Goals and Plan of Care (Insert Text) Patient will: 1. Tolerate PO trials with 0 s/s overt distress in 4/5 trials 2. Utilize compensatory swallow strategies/maneuvers (decrease bite/sip, size/rate, alt. liq/sol) with min cues in 4/5 trials 3. Complete an objective swallow study (i.e., MBSS) to assess swallow integrity, r/o aspiration, and determine of safest LRD, min A per MD discretion Rec:    
Reg solid with thin liquids Aspiration precautions HOB >45 during po intake, remain >30 for 30-45 minutes after po Small bites/sips; alternate liquid/solid with slow feeding rate Oral care TID Meds per pt preference Speech LAnguage Pathology bedside swallow evaluation Patient: Peder Masker (18 y.o. male) Date: 11/8/2018 Primary Diagnosis: Intractable low back pain Debility Leukocytosis Precautions: aspiration  Fall ASSESSMENT : 
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 Pt reports that he has been eating reg solids with thin liquids since surgery with no breathing difficulties/PNAs. Stoma closed. He reports that he has one working vocal fold attributing to breathy/hoarse vocal quality. Pt also reported completion of SLP services post surgery with DC of reg solids and thin liquids. Pt completed a MBS at this facility in 2009 with no airway compromise noted with any consistency. This AM, pt tolerated reg solid and thin liquids with no overt s/sx of aspiration and positive oral clearance. Laryngeal elevation appeared weak to palpation. Suspect pt is at baseline for oropharyngeal swallow fxn. Rec reg solid diet with thin liquids, aspiration precautions, oral care TID, and meds as tolerated. SLP available for MBS per MD discretion. Otherwise, will f/u x 1-2 more visits.   
 
Patient will benefit from skilled intervention to address the above impairments. Patients rehabilitation potential is considered to be Good Factors which may influence rehabilitation potential include:  
[x]            Medical condition PLAN : 
Recommendations and Planned Interventions: See above Frequency/Duration: Patient will be followed by speech-language pathology x 1-2 more visits to address goals. Discharge Recommendations: Outpatient and To Be Determined SUBJECTIVE:  
Patient stated I've already seen speech therapy for this and they cleared me for everything. OBJECTIVE:  
 
Past Medical History:  
Diagnosis Date  Cancer 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  
Procedure Laterality Date  HX HEENT  2008 Throat Ca - 1 vocal chord removed  HX HEENT    
 eye surgery Prior Level of Function/Home Situation: private residence Home Situation Home Environment: Private residence # Steps to Enter: 4 Rails to Enter: No 
One/Two Story Residence: One story Living Alone: No 
Support Systems: Spouse/Significant Other/Partner Patient Expects to be Discharged to[de-identified] Unknown Current DME Used/Available at Home: None Diet prior to admission: reg solid with thin Current Diet:  Reg solid with thin Cognitive and Communication Status: 
Neurologic State: Alert Orientation Level: Oriented X4 Cognition: Follows commands Perception: Appears intact Perseveration: No perseveration noted Safety/Judgement: Awareness of environment, Fall prevention Oral Assessment: 
Oral Assessment Labial: No impairment Dentition: Natural;Intact Oral Hygiene: adequate Lingual: No impairment Velum: No impairment Mandible: No impairment P.O. Trials: 
[facility] Vocal quality prior to P.O.: Hoarse;Breathy Consistency Presented: Thin liquid; Solid How Presented: Self-fed/presented;Cup/sip;Spoon;Straw Bolus Acceptance: No impairment Bolus Formation/Control: No impairment Propulsion: No impairment Oral Residue: None Initiation of Swallow: No impairment Laryngeal Elevation: Weak Aspiration Signs/Symptoms: None Pharyngeal Phase Characteristics: No impairment, issues, or problems Effective Modifications: Small sips and bites; Alternate liquids/solids Cues for Modifications: Minimal 
 Oral Phase Severity: No impairment Pharyngeal Phase Severity : Mild GCODESwallowing:  Swallow Current Status CI= 1-19%  Swallow Goal Status CI= 1-19% The severity rating is based on the following outcomes:   
Clinical Judgment Pain: 
Pt c/o 0/10 pain prior to evaluation. Pt c/o 0/10 pain post evaluation. After treatment:  
[]            Patient left in no apparent distress sitting up in chair 
[x]            Patient left in no apparent distress in bed 
[x]            Call bell left within reach 
[]            Nursing notified 
[]            Caregiver present 
[]            Bed alarm activated COMMUNICATION/EDUCATION:  
[x]            SLP educated pt with regard to compensatory swallow strategies and 
     aspiration/reflux precautions including: small bites/sips, 
     alternate liquids/solids, decrease feeding rate, HOB > 45 with all po, and  
     upright in bed at 30 degrees after po for at least 45 minutes. [x]            Patient/family have participated as able in goal setting and plan of care. Thank you for this referral. 
 
Rigoberto Juan M.S. CCC-SLP/L Speech-Language Pathologist

## 2018-11-08 NOTE — PROGRESS NOTES
Pt states he will prefer out-patient rehab or home health. He states his wife will be coming this evening and they will discuss.

## 2018-11-09 ENCOUNTER — APPOINTMENT (OUTPATIENT)
Dept: CT IMAGING | Age: 68
DRG: 552 | End: 2018-11-09
Attending: ORTHOPAEDIC SURGERY
Payer: MEDICARE

## 2018-11-09 ENCOUNTER — APPOINTMENT (OUTPATIENT)
Dept: NON INVASIVE DIAGNOSTICS | Age: 68
DRG: 552 | End: 2018-11-09
Attending: INTERNAL MEDICINE
Payer: MEDICARE

## 2018-11-09 LAB
BACTERIA SPEC CULT: ABNORMAL
BASOPHILS # BLD: 0 K/UL (ref 0–0.1)
BASOPHILS NFR BLD: 0 % (ref 0–2)
DIFFERENTIAL METHOD BLD: ABNORMAL
ECHO AO ROOT DIAM: 3.36 CM
ECHO LA AREA 4C: 16.9 CM2
ECHO LA VOL 2C: 48.1 ML (ref 18–58)
ECHO LA VOL 4C: 43.36 ML (ref 18–58)
ECHO LA VOL BP: 48.53 ML (ref 18–58)
ECHO LA VOL/BSA BIPLANE: 24.73 ML/M2 (ref 16–28)
ECHO LA VOLUME INDEX A2C: 24.51 ML/M2 (ref 16–28)
ECHO LA VOLUME INDEX A4C: 22.09 ML/M2 (ref 16–28)
ECHO LV E' LATERAL VELOCITY: 6.4 CM/S
ECHO LV E' SEPTAL VELOCITY: 5.37 CM/S
ECHO LV INTERNAL DIMENSION DIASTOLIC: 5.2 CM (ref 4.2–5.9)
ECHO LV INTERNAL DIMENSION SYSTOLIC: 3.54 CM
ECHO LV IVSD: 0.83 CM (ref 0.6–1)
ECHO LV MASS 2D: 202.6 G (ref 88–224)
ECHO LV MASS INDEX 2D: 103.2 G/M2 (ref 49–115)
ECHO LV POSTERIOR WALL DIASTOLIC: 1.01 CM (ref 0.6–1)
ECHO LVOT DIAM: 2.14 CM
ECHO LVOT PEAK GRADIENT: 2.9 MMHG
ECHO LVOT PEAK VELOCITY: 84.51 CM/S
ECHO LVOT VTI: 16.72 CM
ECHO MV A VELOCITY: 81.66 CM/S
ECHO MV E DECELERATION TIME (DT): 186 MS
ECHO MV E VELOCITY: 0.62 CM/S
ECHO MV E/A RATIO: 0.01
ECHO MV E/E' LATERAL: 0.1
ECHO MV E/E' RATIO (AVERAGED): 0.11
ECHO MV E/E' SEPTAL: 0.12
ECHO PULMONARY ARTERY SYSTOLIC PRESSURE (PASP): 26 MMHG
ECHO RV TAPSE: 2.06 CM (ref 1.5–2)
ECHO TV REGURGITANT MAX VELOCITY: 241.78 CM/S
ECHO TV REGURGITANT PEAK GRADIENT: 23.4 MMHG
EOSINOPHIL # BLD: 0.1 K/UL (ref 0–0.4)
EOSINOPHIL NFR BLD: 1 % (ref 0–5)
ERYTHROCYTE [DISTWIDTH] IN BLOOD BY AUTOMATED COUNT: 12.6 % (ref 11.6–14.5)
FERRITIN SERPL-MCNC: 148 NG/ML (ref 8–388)
GLUCOSE BLD STRIP.AUTO-MCNC: 143 MG/DL (ref 70–110)
GLUCOSE BLD STRIP.AUTO-MCNC: 151 MG/DL (ref 70–110)
GLUCOSE BLD STRIP.AUTO-MCNC: 226 MG/DL (ref 70–110)
GRAM STN SPEC: ABNORMAL
HCT VFR BLD AUTO: 31 % (ref 36–48)
HGB BLD-MCNC: 10.5 G/DL (ref 13–16)
IRON SATN MFR SERPL: 5 %
IRON SERPL-MCNC: 11 UG/DL (ref 50–175)
LYMPHOCYTES # BLD: 1.4 K/UL (ref 0.9–3.6)
LYMPHOCYTES NFR BLD: 13 % (ref 21–52)
MCH RBC QN AUTO: 29.6 PG (ref 24–34)
MCHC RBC AUTO-ENTMCNC: 33.9 G/DL (ref 31–37)
MCV RBC AUTO: 87.3 FL (ref 74–97)
MONOCYTES # BLD: 1 K/UL (ref 0.05–1.2)
MONOCYTES NFR BLD: 9 % (ref 3–10)
NEUTS SEG # BLD: 8.7 K/UL (ref 1.8–8)
NEUTS SEG NFR BLD: 77 % (ref 40–73)
PLATELET # BLD AUTO: 214 K/UL (ref 135–420)
PMV BLD AUTO: 9.5 FL (ref 9.2–11.8)
RBC # BLD AUTO: 3.55 M/UL (ref 4.7–5.5)
SERVICE CMNT-IMP: ABNORMAL
SERVICE CMNT-IMP: ABNORMAL
TIBC SERPL-MCNC: 231 UG/DL (ref 250–450)
WBC # BLD AUTO: 11.2 K/UL (ref 4.6–13.2)

## 2018-11-09 PROCEDURE — 93306 TTE W/DOPPLER COMPLETE: CPT

## 2018-11-09 PROCEDURE — 74011250637 HC RX REV CODE- 250/637: Performed by: INTERNAL MEDICINE

## 2018-11-09 PROCEDURE — 85025 COMPLETE CBC W/AUTO DIFF WBC: CPT

## 2018-11-09 PROCEDURE — 74011636320 HC RX REV CODE- 636/320: Performed by: INTERNAL MEDICINE

## 2018-11-09 PROCEDURE — 36415 COLL VENOUS BLD VENIPUNCTURE: CPT

## 2018-11-09 PROCEDURE — 82962 GLUCOSE BLOOD TEST: CPT

## 2018-11-09 PROCEDURE — 94640 AIRWAY INHALATION TREATMENT: CPT

## 2018-11-09 PROCEDURE — 74011250637 HC RX REV CODE- 250/637: Performed by: PHYSICIAN ASSISTANT

## 2018-11-09 PROCEDURE — 92526 ORAL FUNCTION THERAPY: CPT

## 2018-11-09 PROCEDURE — 82728 ASSAY OF FERRITIN: CPT

## 2018-11-09 PROCEDURE — 74011636637 HC RX REV CODE- 636/637: Performed by: INTERNAL MEDICINE

## 2018-11-09 PROCEDURE — 74011000250 HC RX REV CODE- 250: Performed by: INTERNAL MEDICINE

## 2018-11-09 PROCEDURE — 93005 ELECTROCARDIOGRAM TRACING: CPT

## 2018-11-09 PROCEDURE — 71260 CT THORAX DX C+: CPT

## 2018-11-09 PROCEDURE — 65270000029 HC RM PRIVATE

## 2018-11-09 PROCEDURE — 83540 ASSAY OF IRON: CPT

## 2018-11-09 RX ORDER — HYDROCORTISONE ACETATE PRAMOXINE HCL 2.5; 1 G/100G; G/100G
CREAM TOPICAL 3 TIMES DAILY
Status: DISCONTINUED | OUTPATIENT
Start: 2018-11-09 | End: 2018-11-13 | Stop reason: HOSPADM

## 2018-11-09 RX ADMIN — IPRATROPIUM BROMIDE AND ALBUTEROL SULFATE 3 ML: .5; 3 SOLUTION RESPIRATORY (INHALATION) at 01:35

## 2018-11-09 RX ADMIN — OMEGA-3 FATTY ACIDS CAP 1000 MG 1 CAPSULE: 1000 CAP at 19:31

## 2018-11-09 RX ADMIN — HYDROCORTISONE ACETATE PRAMOXINE HCL: 2.5; 1 CREAM TOPICAL at 15:47

## 2018-11-09 RX ADMIN — INSULIN LISPRO 4 UNITS: 100 INJECTION, SOLUTION INTRAVENOUS; SUBCUTANEOUS at 21:14

## 2018-11-09 RX ADMIN — IPRATROPIUM BROMIDE AND ALBUTEROL SULFATE 3 ML: .5; 3 SOLUTION RESPIRATORY (INHALATION) at 07:33

## 2018-11-09 RX ADMIN — IOPAMIDOL 97 ML: 612 INJECTION, SOLUTION INTRAVENOUS at 13:00

## 2018-11-09 RX ADMIN — IPRATROPIUM BROMIDE AND ALBUTEROL SULFATE 3 ML: .5; 3 SOLUTION RESPIRATORY (INHALATION) at 20:26

## 2018-11-09 RX ADMIN — CLONIDINE HYDROCHLORIDE 0.1 MG: 0.1 TABLET ORAL at 09:35

## 2018-11-09 RX ADMIN — INSULIN LISPRO 2 UNITS: 100 INJECTION, SOLUTION INTRAVENOUS; SUBCUTANEOUS at 08:01

## 2018-11-09 RX ADMIN — NEBIVOLOL HYDROCHLORIDE 10 MG: 5 TABLET ORAL at 09:00

## 2018-11-09 RX ADMIN — OXYCODONE AND ACETAMINOPHEN 1 TABLET: 5; 325 TABLET ORAL at 01:30

## 2018-11-09 RX ADMIN — OMEGA-3 FATTY ACIDS CAP 1000 MG 1 CAPSULE: 1000 CAP at 09:35

## 2018-11-09 RX ADMIN — HYDROCORTISONE ACETATE PRAMOXINE HCL: 2.5; 1 CREAM TOPICAL at 21:19

## 2018-11-09 RX ADMIN — OXYCODONE AND ACETAMINOPHEN 1 TABLET: 5; 325 TABLET ORAL at 13:34

## 2018-11-09 RX ADMIN — IPRATROPIUM BROMIDE AND ALBUTEROL SULFATE 3 ML: .5; 3 SOLUTION RESPIRATORY (INHALATION) at 14:00

## 2018-11-09 RX ADMIN — LOSARTAN POTASSIUM 50 MG: 50 TABLET ORAL at 09:35

## 2018-11-09 RX ADMIN — OXYCODONE AND ACETAMINOPHEN 1 TABLET: 5; 325 TABLET ORAL at 19:31

## 2018-11-09 RX ADMIN — LATANOPROST 1 DROP: 50 SOLUTION/ DROPS OPHTHALMIC at 19:32

## 2018-11-09 NOTE — ROUTINE PROCESS
Patient called with rectal bleeding, hemorrhoids noted. Patient cleaned and renny pad applied. Patient medicated for pain. 0510 Patient quietly resting on rounds.

## 2018-11-09 NOTE — PROGRESS NOTES
SUBJECTIVE: 
 
Patient had another episode of red blood come out from rectum earlier today. No headaches or dizziness. No chest or abdominal pain currently. No dysuria. Back pain and right hip pain present. No nausea or vomiting. No SOB or cough. OBJECTIVE: 
 
/77 (BP 1 Location: Left arm, BP Patient Position: At rest)   Pulse 94   Temp 98.5 °F (36.9 °C)   Resp 20   Ht 5' 10\" (1.778 m) Comment: Simultaneous filing. User may not have seen previous data. Wt 78.7 kg (173 lb 6.4 oz)   SpO2 97%   BMI 24.88 kg/m² CVS: RRR 
RS: CTA bilaterally, no wheezes GI: NT, BS + Extremities: no pedal edema General: NAD, Awake, hoarse voice CNS: moves all extremities while in bed, Motor strength 4-5/5 in RLE 
 
 
ASSESSMENT: 
 
1. Lower back pain due to # 2 2. Suspected Lumbar discitis, L3-4, L4-5. 
3. Staph and strept bacteremia 4. H/o throat cancer. 5. HTN. 6. DM-2. 
7. Cough and chronic hoarseness of voice 8. Constipation, resolved 9. BRPR, could be hemorrhoidal 
10. Abnormal CT hip PLAN: 
 
Cont current management GI to follow. Check H/H Ct CHEST ABD PELVIS pending ECHO ordered ST/PT/OT to follow BMP:  
No results found for: NA, K, CL, CO2, AGAP, GLU, BUN, CREA, GFRAA, GFRNA 
CBC:  
Lab Results Component Value Date/Time  WBC 11.2 11/09/2018 02:50 AM  
 HGB 10.5 (L) 11/09/2018 02:50 AM  
 HCT 31.0 (L) 11/09/2018 02:50 AM  
  11/09/2018 02:50 AM

## 2018-11-09 NOTE — PROGRESS NOTES
[email or web address] 
[phone] Gastroenterology follow up-Progress note Impression: 1. Rectal bleeding - likely hemorrhoidal. Recommend optimizing constipation - titrate Miralax as needed, consider Flex sigmoidoscopy if bleeding occurs again. H/H decreased 13/37.4 -->10.5/31.0 2. Chronic constipation Plan: 1. Analpram 
2. Miralax- titrate as needed for soft stool 3. Instructed on sitz baths, titrating miralax dose, fiber supplement as bulking agent 4. Continue to monitor H/H 
5. Colonoscopy due 2/2019 - can be done outpatient once acute issues resolved Chief Complaint: Rectal bleeding Subjective:  Had further bleeding today, required use of pad. No pain ROS: Denies any fevers, chills, rash. Eyes: conjunctiva normal, EOM normal  
Neck: ROM normal, supple and trachea normal  
Cardiovascular: heart normal, intact distal pulses, normal rate and regular rhythm Pulmonary/Chest Wall: breath sounds normal and effort normal  
Abdominal: appearance normal, bowel sounds normal and soft, non-acute, non-tender Patient Active Problem List  
Diagnosis Code  Debility R53.81  Leukocytosis D72.829  
 Intractable low back pain M54.5 Visit Vitals /77 (BP 1 Location: Left arm, BP Patient Position: At rest) Pulse 94 Temp 98.5 °F (36.9 °C) Resp 20 Ht 5' 10\" (1.778 m) Comment: Simultaneous filing. User may not have seen previous data. Wt 78.7 kg (173 lb 6.4 oz) SpO2 97% BMI 24.88 kg/m² Intake/Output Summary (Last 24 hours) at 11/9/2018 1305 Last data filed at 11/9/2018 3586 Gross per 24 hour Intake 420 ml Output 1450 ml Net -1030 ml CBC w/Diff Lab Results Component Value Date/Time  WBC 11.2 11/09/2018 02:50 AM  
 RBC 3.55 (L) 11/09/2018 02:50 AM  
 HGB 10.5 (L) 11/09/2018 02:50 AM  
 HCT 31.0 (L) 11/09/2018 02:50 AM  
 MCV 87.3 11/09/2018 02:50 AM  
 MCH 29.6 11/09/2018 02:50 AM  
 MCHC 33.9 11/09/2018 02:50 AM  
 RDW 12.6 11/09/2018 02:50 AM  
  11/09/2018 02:50 AM  
 Lab Results Component Value Date/Time GRANS 77 (H) 11/09/2018 02:50 AM  
 LYMPH 13 (L) 11/09/2018 02:50 AM  
 EOS 1 11/09/2018 02:50 AM  
 BANDS 7 (H) 11/06/2018 03:57 AM  
 BASOS 0 11/09/2018 02:50 AM  
 METAS 1 (H) 11/06/2018 03:57 AM  
  
Basic Metabolic Profile Recent Labs 11/08/18 
0340   
K 3.8  CO2 21 BUN 7  
CA 8.9 Hepatic Function Lab Results Component Value Date/Time ALB 3.2 (L) 11/05/2018 07:04 AM  
 TP 7.5 11/05/2018 07:04 AM  
 AP 72 11/05/2018 07:04 AM  
 Lab Results Component Value Date/Time SGOT 19 11/05/2018 07:04 AM  
  
 
 
Coags No results for input(s): PTP, INR, APTT in the last 72 hours. No lab exists for component: ARIA Patel 
11/09/18, 1:10 PM  
[facility]. [email or web address] Phone: [phone] Pager: [phone]

## 2018-11-09 NOTE — ROUTINE PROCESS
Bedside and Verbal shift change report given to [facility] (oncoming nurse) by Giovana (offgoing nurse). Report included the following information SBAR, Kardex, MAR and Recent Results. Patient quietly resting on rounds, call light and urinal in reach.

## 2018-11-09 NOTE — PROGRESS NOTES
Speech therapy orders received and discontinued. Pt has been seen this AM and DC on reg diet with thin liquids. Please order MBS if aspiration is a concern. Otherwise, no further skilled SLP services are indicated. Thank you for this referral. 
 
Esa Alatorre M.S. CCC-SLP/L Speech-Language Pathologist

## 2018-11-09 NOTE — PROGRESS NOTES
Problem: Dysphagia (Adult) Goal: *Acute Goals and Plan of Care (Insert Text) Patient will: 1. Tolerate PO trials with 0 s/s overt distress in 4/5 trials - met 2. Utilize compensatory swallow strategies/maneuvers (decrease bite/sip, size/rate, alt. liq/sol) with min cues in 4/5 trials - met 3. Complete an objective swallow study (i.e., MBSS) to assess swallow integrity, r/o aspiration, and determine of safest LRD, min A per MD discretion - n/a Rec:    
Reg solid with thin liquids Aspiration precautions HOB >45 during po intake, remain >30 for 30-45 minutes after po Small bites/sips; alternate liquid/solid with slow feeding rate Oral care TID Meds per pt preference Outcome: Resolved/Met Date Met: 11/09/18 Speech language pathology dysphagia treatment/discharge Patient: Philipp Madsen (47 y.o. male) Date: 11/9/2018 Diagnosis: Intractable low back pain Debility Leukocytosis <principal problem not specified> Precautions: aspiration Fall ASSESSMENT: 
Pt was seen at bedside for f/u dysphagia management. Pt tolerated reg solid, thin liquids + straw, and med pass from RN with thin liquid wash without any s/sx of aspiration. Pt with no c/o dysphagia this PM stating, \"I have been through speech therapy ten years ago and nothing really has changed since then. Any problems that I have, I know how to deal with in my own way. \" Continue to rec reg solid diet with thin liquids, aspiration precautions, oral care TID, and meds as tolerated. No further skilled SLP services are indicated at this time. Please re-consult if needed. PLAN: 
Recommendations and Planned Interventions: See above Patient continues to benefit from skilled intervention to address the above impairments. Continue treatment per established plan of care. Discharge Recommendations:  Outpatient and To Be Determined SUBJECTIVE:  
Patient stated: see above OBJECTIVE:  
Cognitive and Communication Status: Neurologic State: Alert Orientation Level: Oriented X4 Cognition: Follows commands Perception: Appears intact Perseveration: No perseveration noted Safety/Judgement: Good awareness of safety precautions Dysphagia Treatment: 
Oral Assessment: 
Oral Assessment Labial: No impairment Dentition: Natural, Intact Oral Hygiene: adequate Lingual: No impairment Velum: No impairment Mandible: No impairment P.O. Trials: 
 Patient Position: 90 in fchair Vocal quality prior to P.O.: Hoarse, Breathy Consistency Presented: Thin liquid, Solid How Presented: Self-fed/presented, Cup/sip, Spoon, Straw Bolus Acceptance: No impairment Bolus Formation/Control: No impairment Propulsion: No impairment Oral Residue: None Initiation of Swallow: No impairment Laryngeal Elevation: Weak Aspiration Signs/Symptoms: None Pharyngeal Phase Characteristics: No impairment, issues, or problems Effective Modifications: Small sips and bites, Alternate liquids/solids Cues for Modifications: Minimal 
   Oral Phase Severity: No impairment Pharyngeal Phase Severity : Mild PAIN: 
Pt reports 0/10 pain or discomfort prior to tx. Pt reports 0/10 pain or discomfort post tx. After treatment:  
[]              Patient left in no apparent distress sitting up in chair 
[x]              Patient left in no apparent distress in bed 
[x]              Call bell left within reach [x]              Nursing notified 
[]              Caregiver present 
[]              Bed alarm activated COMMUNICATION/EDUCATION:  
[x]              SLP educated pt with regard to compensatory swallow strategies and 
      aspiration/reflux precautions including: small bites/sips, 
      alternate liquids/solids, decrease feeding rate, HOB > 45 with all po, and 
                 upright in bed at 30 degrees after po for at least 45 minutes. Thank you for this referral. 
 
Alexandr Tiwari M.S. CCC-SLP/L Speech-Language Pathologist

## 2018-11-09 NOTE — PROGRESS NOTES
vss afeb Wbc 11 Having active rectal bleeding- sousama yanes, HGB 12.7 now 10.5. CO low back pain and sense of weakness rle when walking No objective neurology Abx stopped by ID 
 
AP Continued rectal bleeding may require rigid sig as considered by GI Still no explanation of MRI findings of intra- abdominal inflammatory process 
- will order CT abd and pelvis Patient with spinal stenosis and facet arthropathy with synovial cyst which would explain back and RLE pain. No evidence of infection. Now off abx being observed. WBC down. Pain present but improved. As an isolated finding and complaint patient would be candidate for a lumbar decompression on L3/4,4/5,5/S1, But would avoid semi elective surgery until medical issues regarding infection and bleeding resolved.

## 2018-11-09 NOTE — PROGRESS NOTES
Infectious Disease progress Note Requested by: Dr. Jaswant Cortés Reason: evaluate for discitis lumbar spine Current abx Prior abx Cefepime 11/5-11/6 
[id number]/8 Lines:  
 
 
Assessment : 
 
80 y.o. male with a past medical history of type 2 DM (last hgbA1C not known), throat cancer, and essential HTN benign who presented to ed with c/o worsening back pain onset 2 days ago. Patient presents with highly complex picture. Etiology of back pain not entirely clear. I concur with dr. Jeanie Ardon that presentation is not classic for infectious discitis/osteomyelitis. However, the very elevated CRP, positive blood cultures would be concerning for early discitis. Patient's radiculopathy could be due to lumbar disc edema and nerve compression. Patient has h/o fall 12 weeks ago and wonder if the back pain is trauma related with/without superimposed infection. D/w dr. Marylou Baron - patient has not received any antibiotics in the past 4 months. This would rule out partially treated spinal infection. patient received immunotherapy shots recently and wondering if that could affect the CRP results. Repeat MRI 11/7/18 results noted. Extensive retroperitoneal edema/stranding, left greater than right, with probable bilateral renal cysts. There is edema about the psoas muscle, without evidence of intramuscular abscess. Mild right greater than left gluteal musculature edema. Etiology of retroperitoneal edema unclear - doubt pyelonephritis/UTI related. Ct right hip 11/8 - nonspecific density/fluid or edema in the right paracolic gutter. Plans for ct abd/pelvis noted. Rectal bleeding - Gi f/u appreciated. Plans for sigmoidoscopy Decreased wbc. Repeat blood cultures 11/6 negative. Cons, strep viridans in blood culture 11/5 could be contaminant. Will discontinue abx and monitor off abx since low clinical suspicion of infection. Recommendations: 
 
1. Hold off abx 2. mx of Gi bleed per Gi 
3. F/u CT c/a/p 
4. F/u spine surgery recommendations 5. Further recommendations based on above test results, clinical course Advance Care planning: full code: discussed  with patient/surrogate decision maker:Kalli Durbin: [phone] Above plan was discussed in details with patient,  and dr Yessica Ferrara. Please call me if any further questions or concerns. Will continue to participate in the care of this patient. subjective: 
 
Patient states that his back pain is slightly better today. Able to move his extremities. Patient denies headaches, visual disturbances, sore throat, runny nose, earaches, cp, sob, chills, cough, abdominal pain, diarrhea, burning micturition, pain or weakness in extremities. Medication List  
  
ASK your doctor about these medications   
aspirin, buffered 81 mg Tab 
  
calcium-cholecalciferol (d3) 600-125 mg-unit Tab Dexlansoprazole 60 mg Cpdb IRON (FERROUS SULFATE) PO 
  
latanoprost 0.005 % ophthalmic solution Commonly known as:  XALATAN 
  
losartan 50 mg tablet Commonly known as:  COZAAR 
  
metFORMIN 1,000 mg tablet Commonly known as:  GLUCOPHAGE 
  
nebivolol 10 mg tablet Commonly known as:  BYSTOLIC 
  
omega-3 fatty acids-vitamin e 1,000 mg Cap 
  
omeprazole 20 mg capsule Commonly known as:  PRILOSEC QNASL 80 mcg/actuation Hfaa Generic drug:  beclomethasone dipropionate Current Facility-Administered Medications Medication Dose Route Frequency  lidocaine 4 % patch 1 Patch  1 Patch TransDERmal Q24H  
 oxyCODONE-acetaminophen (PERCOCET) 5-325 mg per tablet 1 Tab  1 Tab Oral Q6H PRN  polyethylene glycol (MIRALAX) packet 17 g  17 g Oral BID  cloNIDine HCl (CATAPRES) tablet 0.1 mg  0.1 mg Oral Q8H PRN  
 albuterol-ipratropium (DUO-NEB) 2.5 MG-0.5 MG/3 ML  3 mL Nebulization Q6H RT  
 omega 3-DHA-EPA-fish oil 1,000 mg (120 mg-180 mg) capsule 1 Cap  1 Cap Oral BID  
  latanoprost (XALATAN) 0.005 % ophthalmic solution 1 Drop  1 Drop Both Eyes QPM  
 beclomethasone dipropionate (QNASL 80mcg) HFAA 2 Spray (Patient Supplied)  2 Spray Nasal DAILY  sodium chloride (OCEAN) 0.65 % nasal squeeze bottle 2 Spray  2 Spray Both Nostrils Q2H PRN  
 losartan (COZAAR) tablet 50 mg  50 mg Oral DAILY  nebivolol (BYSTOLIC) tablet 10 mg  10 mg Oral DAILY  ondansetron (ZOFRAN) injection 4 mg  4 mg IntraVENous Q4H PRN  
 insulin lispro (HUMALOG) injection   SubCUTAneous AC&HS  
 glucose chewable tablet 16 g  16 g Oral PRN  
 glucagon (GLUCAGEN) injection 1 mg  1 mg IntraMUSCular PRN  
 dextrose (D50W) injection syrg 12.5-25 g  25-50 mL IntraVENous PRN Allergies: Patient has no known allergies. Temp (24hrs), Av.8 °F (37.1 °C), Min:98.4 °F (36.9 °C), Max:99.7 °F (37.6 °C) Visit Vitals /77 (BP 1 Location: Left arm, BP Patient Position: At rest) Pulse 94 Temp 98.5 °F (36.9 °C) Resp 20 Ht 5' 10\" (1.778 m) Comment: Simultaneous filing. User may not have seen previous data. Wt 78.7 kg (173 lb 6.4 oz) SpO2 97% BMI 24.88 kg/m² ROS: 12 point ROS obtained in details. Pertinent positives as mentioned in HPI,  
otherwise negative Physical Exam: 
 
Constitutional: He is oriented to person, place, and time. He appears well-developed and well-nourished. Laying in bed, AAOx3, NAD HENT:  
Head: Normocephalic and atraumatic. Right Ear: External ear normal.  
Left Ear: External ear normal.  
Nose: Nose normal.  
Mouth/Throat: Oropharynx is clear and moist.  
Eyes: Conjunctivae and EOM are normal. Pupils are equal, round, and reactive to light. No scleral icterus. Neck: Normal range of motion. Neck supple. No JVD present. No tracheal deviation present. No thyromegaly present. Chronic dysphonia Cardiovascular: Normal rate, regular rhythm, normal heart sounds and intact distal pulses. Exam reveals no gallop and no friction rub. No murmur heard. Pulmonary/Chest: Effort normal and breath sounds normal. He exhibits no tenderness. Abdominal: Soft. Bowel sounds are normal. He exhibits no distension. There is no tenderness. There is no rebound and no guarding. Musculoskeletal: Normal range of motion. He exhibits no edema or tenderness. no lumbar spine tenderness. tenderness right to lumbar spine - right iliac crest. no erythema, notwarm to touch. No R hip pain to palpation No pain with internal or external rotation. Lymphadenopathy:   He has no cervical adenopathy. Neurological: He is alert and oriented to person, place, and time. No cranial nerve deficit. Coordination normal.  
Full strength UE, pain limits R LE exam, L LE motor/sensory intact Skin: Skin is warm and dry. Psychiatric: He has a normal mood and affect. His behavior is normal. Judgment and thought content normal.  
Nursing note and vitals reviewed. 
  
 
 
 
Labs: Results:  
Chemistry Recent Labs 11/08/18 
0340 11/07/18 
0892 * 103*  141  
K 3.8 3.8  105 CO2 21 23 BUN 7 10 CREA 0.66 0.74 CA 8.9 8.4* AGAP 14 13 BUCR 11* 14  
  
CBC w/Diff Recent Labs 11/09/18 
0250 11/08/18 
0848 11/08/18 
0340 11/07/18 
4180 WBC 11.2  --  13.1 13.0  
RBC 3.55*  --  4.18* 3.99* HGB 10.5* 13.0 12.7* 12.2* HCT 31.0* 37.4 37.0 35.7*   --  215 194 GRANS 77*  --  79* 81* LYMPH 13*  --  13* 11* EOS 1  --  1 0 Microbiology Recent Labs 11/06/18 
1108 CULT NO GROWTH 3 DAYS  
  
 
 
RADIOLOGY: 
 
All available imaging studies/reports in [facility] for this admission were reviewed Dr. Rubia Stubbs, Infectious Disease Specialist 
[phone] November 9, 2018 10:07 AM

## 2018-11-09 NOTE — ROUTINE PROCESS
Bedside and Verbal shift change report given to Artie Sanchez RN (oncoming nurse) by Luz Thorpe RN (offgoing nurse). Report included the following information SBAR, Kardex, MAR and Recent Results. Report received at bedside; Patient is alert/oriented, pain complaints addressed by Yarelis Burdick RN, no additional complaints of pain, no nausea/vomiting, fever/chills, shortness of breath/wheezing, or dizziness/ha. Vital signs are stable.

## 2018-11-09 NOTE — PROGRESS NOTES
Patient is experiencing rectal bleed from outward hemorrhoid (dark red/but trickles when pt expels gas or try to have bowel movement; no straining involved). I cleaned patient up and applied a peripad with elastic underwear. No apparent signs of distress (Shortness of breath/wheezing; fever/chills, nausea/vomiting, dizziness or headache) Vital signs are stable. Alisha Fair NP from GI team has been made a hogan (Dr. Adilson Eller will be notified). Will continue to monitor patient

## 2018-11-10 LAB
ANION GAP SERPL CALC-SCNC: 10 MMOL/L (ref 3–18)
ATRIAL RATE: 86 BPM
BASOPHILS # BLD: 0 K/UL (ref 0–0.1)
BASOPHILS NFR BLD: 0 % (ref 0–2)
BUN SERPL-MCNC: 7 MG/DL (ref 7–18)
BUN/CREAT SERPL: 11 (ref 12–20)
CALCIUM SERPL-MCNC: 8.6 MG/DL (ref 8.5–10.1)
CALCULATED P AXIS, ECG09: 44 DEGREES
CALCULATED R AXIS, ECG10: 26 DEGREES
CALCULATED T AXIS, ECG11: 12 DEGREES
CHLORIDE SERPL-SCNC: 102 MMOL/L (ref 100–108)
CO2 SERPL-SCNC: 26 MMOL/L (ref 21–32)
CREAT SERPL-MCNC: 0.64 MG/DL (ref 0.6–1.3)
DIAGNOSIS, 93000: NORMAL
DIFFERENTIAL METHOD BLD: ABNORMAL
EOSINOPHIL # BLD: 0.2 K/UL (ref 0–0.4)
EOSINOPHIL NFR BLD: 2 % (ref 0–5)
ERYTHROCYTE [DISTWIDTH] IN BLOOD BY AUTOMATED COUNT: 12.7 % (ref 11.6–14.5)
GLUCOSE BLD STRIP.AUTO-MCNC: 128 MG/DL (ref 70–110)
GLUCOSE BLD STRIP.AUTO-MCNC: 146 MG/DL (ref 70–110)
GLUCOSE BLD STRIP.AUTO-MCNC: 188 MG/DL (ref 70–110)
GLUCOSE BLD STRIP.AUTO-MCNC: 217 MG/DL (ref 70–110)
GLUCOSE SERPL-MCNC: 109 MG/DL (ref 74–99)
HCT VFR BLD AUTO: 28.6 % (ref 36–48)
HGB BLD-MCNC: 9.9 G/DL (ref 13–16)
LYMPHOCYTES # BLD: 1.8 K/UL (ref 0.9–3.6)
LYMPHOCYTES NFR BLD: 18 % (ref 21–52)
MAGNESIUM SERPL-MCNC: 1.8 MG/DL (ref 1.6–2.6)
MCH RBC QN AUTO: 30.1 PG (ref 24–34)
MCHC RBC AUTO-ENTMCNC: 34.6 G/DL (ref 31–37)
MCV RBC AUTO: 86.9 FL (ref 74–97)
MONOCYTES # BLD: 0.9 K/UL (ref 0.05–1.2)
MONOCYTES NFR BLD: 9 % (ref 3–10)
NEUTS SEG # BLD: 7.2 K/UL (ref 1.8–8)
NEUTS SEG NFR BLD: 71 % (ref 40–73)
P-R INTERVAL, ECG05: 152 MS
PLATELET # BLD AUTO: 209 K/UL (ref 135–420)
PMV BLD AUTO: 8.8 FL (ref 9.2–11.8)
POTASSIUM SERPL-SCNC: 3.4 MMOL/L (ref 3.5–5.5)
Q-T INTERVAL, ECG07: 370 MS
QRS DURATION, ECG06: 76 MS
QTC CALCULATION (BEZET), ECG08: 442 MS
RBC # BLD AUTO: 3.29 M/UL (ref 4.7–5.5)
SODIUM SERPL-SCNC: 138 MMOL/L (ref 136–145)
VENTRICULAR RATE, ECG03: 86 BPM
WBC # BLD AUTO: 10 K/UL (ref 4.6–13.2)

## 2018-11-10 PROCEDURE — 77010033678 HC OXYGEN DAILY

## 2018-11-10 PROCEDURE — 97110 THERAPEUTIC EXERCISES: CPT

## 2018-11-10 PROCEDURE — 74011250637 HC RX REV CODE- 250/637: Performed by: INTERNAL MEDICINE

## 2018-11-10 PROCEDURE — 65270000029 HC RM PRIVATE

## 2018-11-10 PROCEDURE — 74011250636 HC RX REV CODE- 250/636: Performed by: INTERNAL MEDICINE

## 2018-11-10 PROCEDURE — 83735 ASSAY OF MAGNESIUM: CPT

## 2018-11-10 PROCEDURE — 36415 COLL VENOUS BLD VENIPUNCTURE: CPT

## 2018-11-10 PROCEDURE — 94640 AIRWAY INHALATION TREATMENT: CPT

## 2018-11-10 PROCEDURE — 74011636637 HC RX REV CODE- 636/637: Performed by: INTERNAL MEDICINE

## 2018-11-10 PROCEDURE — 80048 BASIC METABOLIC PNL TOTAL CA: CPT

## 2018-11-10 PROCEDURE — 85025 COMPLETE CBC W/AUTO DIFF WBC: CPT

## 2018-11-10 PROCEDURE — 94760 N-INVAS EAR/PLS OXIMETRY 1: CPT

## 2018-11-10 PROCEDURE — 82962 GLUCOSE BLOOD TEST: CPT

## 2018-11-10 PROCEDURE — 74011000250 HC RX REV CODE- 250: Performed by: INTERNAL MEDICINE

## 2018-11-10 PROCEDURE — 97530 THERAPEUTIC ACTIVITIES: CPT

## 2018-11-10 RX ORDER — POTASSIUM CHLORIDE 20 MEQ/1
20 TABLET, EXTENDED RELEASE ORAL
Status: COMPLETED | OUTPATIENT
Start: 2018-11-10 | End: 2018-11-10

## 2018-11-10 RX ORDER — BISACODYL 5 MG
5 TABLET, DELAYED RELEASE (ENTERIC COATED) ORAL
Status: COMPLETED | OUTPATIENT
Start: 2018-11-10 | End: 2018-11-10

## 2018-11-10 RX ORDER — LANOLIN ALCOHOL/MO/W.PET/CERES
1 CREAM (GRAM) TOPICAL
Status: DISCONTINUED | OUTPATIENT
Start: 2018-11-11 | End: 2018-11-13 | Stop reason: HOSPADM

## 2018-11-10 RX ADMIN — IRON SUCROSE 300 MG: 20 INJECTION, SOLUTION INTRAVENOUS at 15:59

## 2018-11-10 RX ADMIN — OMEGA-3 FATTY ACIDS CAP 1000 MG 1 CAPSULE: 1000 CAP at 08:16

## 2018-11-10 RX ADMIN — LATANOPROST 1 DROP: 50 SOLUTION/ DROPS OPHTHALMIC at 17:33

## 2018-11-10 RX ADMIN — HYDROCORTISONE ACETATE PRAMOXINE HCL: 2.5; 1 CREAM TOPICAL at 15:42

## 2018-11-10 RX ADMIN — NEBIVOLOL HYDROCHLORIDE 10 MG: 5 TABLET ORAL at 08:16

## 2018-11-10 RX ADMIN — INSULIN LISPRO 2 UNITS: 100 INJECTION, SOLUTION INTRAVENOUS; SUBCUTANEOUS at 12:26

## 2018-11-10 RX ADMIN — IPRATROPIUM BROMIDE AND ALBUTEROL SULFATE 3 ML: .5; 3 SOLUTION RESPIRATORY (INHALATION) at 20:41

## 2018-11-10 RX ADMIN — IPRATROPIUM BROMIDE AND ALBUTEROL SULFATE 3 ML: .5; 3 SOLUTION RESPIRATORY (INHALATION) at 01:45

## 2018-11-10 RX ADMIN — OXYCODONE AND ACETAMINOPHEN 1 TABLET: 5; 325 TABLET ORAL at 18:54

## 2018-11-10 RX ADMIN — POTASSIUM CHLORIDE 20 MEQ: 20 TABLET, EXTENDED RELEASE ORAL at 15:40

## 2018-11-10 RX ADMIN — IPRATROPIUM BROMIDE AND ALBUTEROL SULFATE 3 ML: .5; 3 SOLUTION RESPIRATORY (INHALATION) at 15:27

## 2018-11-10 RX ADMIN — BISACODYL 5 MG: 5 TABLET, COATED ORAL at 15:40

## 2018-11-10 RX ADMIN — IPRATROPIUM BROMIDE AND ALBUTEROL SULFATE 3 ML: .5; 3 SOLUTION RESPIRATORY (INHALATION) at 07:07

## 2018-11-10 RX ADMIN — OMEGA-3 FATTY ACIDS CAP 1000 MG 1 CAPSULE: 1000 CAP at 17:32

## 2018-11-10 RX ADMIN — OXYCODONE AND ACETAMINOPHEN 1 TABLET: 5; 325 TABLET ORAL at 13:15

## 2018-11-10 RX ADMIN — INSULIN LISPRO 4 UNITS: 100 INJECTION, SOLUTION INTRAVENOUS; SUBCUTANEOUS at 22:11

## 2018-11-10 RX ADMIN — HYDROCORTISONE ACETATE PRAMOXINE HCL: 2.5; 1 CREAM TOPICAL at 22:14

## 2018-11-10 RX ADMIN — LOSARTAN POTASSIUM 50 MG: 50 TABLET ORAL at 08:16

## 2018-11-10 RX ADMIN — CLONIDINE HYDROCHLORIDE 0.1 MG: 0.1 TABLET ORAL at 08:16

## 2018-11-10 RX ADMIN — OXYCODONE AND ACETAMINOPHEN 1 TABLET: 5; 325 TABLET ORAL at 08:16

## 2018-11-10 RX ADMIN — HYDROCORTISONE ACETATE PRAMOXINE HCL: 2.5; 1 CREAM TOPICAL at 10:00

## 2018-11-10 RX ADMIN — OXYCODONE AND ACETAMINOPHEN 1 TABLET: 5; 325 TABLET ORAL at 00:44

## 2018-11-10 NOTE — PROGRESS NOTES
11/10/18 0741 Vitals Temp 99.2 °F (37.3 °C) Temp Source Oral  
Pulse (Heart Rate) (!) 112 Heart Rate Source Monitor Resp Rate 20  
O2 Sat (%) 97 % Level of Consciousness Alert  
BP (!) 188/98 MAP (Calculated) 128 BP 1 Location Left arm BP 1 Method Automatic  
BP Patient Position At rest  
MEWS Score 3  
 
MEWS score of 3. Patient in pain. PRN clonidine and percocet given. Will reassess and apply lidocaine patch as scheduled.

## 2018-11-10 NOTE — ROUTINE PROCESS
MEW score noted at current time. Patient stable will continue to monitor at current time  Patient stable

## 2018-11-10 NOTE — ROUTINE PROCESS
Bedside shift report received from [facility]. Patient is awake, appears stable, and is resting in bed using scheduled nebulizer treatment. Will continue to monitor.

## 2018-11-10 NOTE — PROGRESS NOTES
Problem: Falls - Risk of 
Goal: *Absence of Falls Document Ольга Pavon Fall Risk and appropriate interventions in the flowsheet. Outcome: Progressing Towards Goal 
Fall Risk Interventions: 
Mobility Interventions: Patient to call before getting OOB Medication Interventions: Patient to call before getting OOB Elimination Interventions: Call light in reach Problem: Pressure Injury - Risk of 
Goal: *Prevention of pressure injury Document Edin Scale and appropriate interventions in the flowsheet. Outcome: Progressing Towards Goal 
Pressure Injury Interventions: Activity Interventions: Increase time out of bed Mobility Interventions: HOB 30 degrees or less Nutrition Interventions: Document food/fluid/supplement intake

## 2018-11-10 NOTE — PROGRESS NOTES
Problem: Mobility Impaired (Adult and Pediatric) Goal: *Acute Goals and Plan of Care (Insert Text) Physical Therapy Goals Initiated 11/7/2018 and to be accomplished within 7 day(s) 1. Patient will move from supine to sit and sit to supine, scoot up and down and roll side to side in bed with supervision/set-up. 2.  Patient will transfer from bed to chair and chair to bed with supervision/set-up using the least restrictive device. 3.  Patient will perform sit to stand with supervision/set-up. 4.  Patient will ambulate with supervision/set-up for >150 feet with the least restrictive device. 5.  Patient will ascend/descend 4 stairs using least restrictive device with minimal assistance/contact guard assist.  
physical Therapy TREATMENT Patient: Arabella Stewart (26 y.o. male) Date: 11/10/2018 Diagnosis: Intractable low back pain Debility Leukocytosis <principal problem not specified> Precautions: Fall Chart, physical therapy assessment, plan of care and goals were reviewed. ASSESSMENT: 
Pt performed  Supine  And  Seated  There  Ex    Fairly  Well. Pt performed bed  Mobility  (S). Ambulated  20ft RW  Cues  For upright  Posture  And  And safety  With  RW. Short session due  To pt had  hemroid  Bleeding. NSG notified. Progression toward goals: 
[x]      Improving appropriately and progressing toward goals 
[]      Improving slowly and progressing toward goals 
[]      Not making progress toward goals and plan of care will be adjusted PLAN: 
Patient continues to benefit from skilled intervention to address the above impairments. Continue treatment per established plan of care. Discharge Recommendations:  Rehab, Home Health, Inpatient Rehab and Outpatient Further Equipment Recommendations for Discharge:  rolling walker and N/A  
 
G-CODES:  
 
 
 
SUBJECTIVE:  
Patient stated Im ok had  Pain meds  At 8. OBJECTIVE DATA SUMMARY:  
Critical Behavior: 
Neurologic State: Alert Orientation Level: Oriented X4 Cognition: Appropriate decision making, Appropriate for age attention/concentration, Appropriate safety awareness, Follows commands Safety/Judgement: Good awareness of safety precautions Functional Mobility Training: 
Bed Mobility: 
  
Supine to Sit: Supervision Scooting: Supervision Transfers: 
Sit to Stand: Stand-by assistance Stand to Sit: Stand-by assistance Balance: 
Sitting: Impaired Sitting - Static: Good (unsupported) Sitting - Dynamic: Fair (occasional) Standing: Intact; With support Standing - Static: Fair Standing - Dynamic : FairAmbulation/Gait Training: 
Distance (ft): 20 Feet (ft) Assistive Device: Walker, rolling Ambulation - Level of Assistance: Contact guard assistance Gait Abnormalities: Antalgic;Decreased step clearance Base of Support: Center of gravity altered; Widened Speed/Geovanna: Pace decreased (<100 feet/min) Step Length: Left shortened;Right shortened Therapeutic Exercises:  
Supine there ex:  SLR/AP/quad  Sets   Seated:  Marches/LAQx  10 each Pain: 
Pain Scale 1: Numeric (0 - 10) Pain Intensity 1: 10 
Pain Location 1: Back Pain Orientation 1: Lower Pain Description 1: Aching Pain Intervention(s) 1: Medication (see MAR) Activity Tolerance:  
Fair Please refer to the flowsheet for vital signs taken during this treatment. After treatment:  
[] Patient left in no apparent distress sitting up in chair 
[x] Patient left in no apparent distress in bed 
[x] Call bell left within reach [x] Nursing notified 
[] Caregiver present [x] Bed alarm activated Sun Isi, PTA Time Calculation: 22 mins

## 2018-11-10 NOTE — PROGRESS NOTES
SUBJECTIVE: 
 
Patient had another episode of red blood come out from rectum earlier today. No headaches or dizziness. No chest or abdominal pain currently. No dysuria. Tolerating diet. No nausea or vomiting. No SOB or cough. OBJECTIVE: 
 
/78 (BP 1 Location: Left arm, BP Patient Position: At rest)   Pulse 93   Temp 97.8 °F (36.6 °C)   Resp 20   Ht 5' 10\" (1.778 m)   Wt 78.5 kg (173 lb)   SpO2 96% Comment: room air  BMI 24.82 kg/m² CVS: RRR 
RS: CTA bilaterally, no wheezes GI: NT, BS + Extremities: no pedal edema General: NAD, Awake, hoarse voice CNS: moves all extremities while in bed, Motor strength 4-5/5 in RLE 
 
 
ASSESSMENT: 
 
1. Lower back pain due to # 2 2. Suspected Lumbar inflammatory discitis, L3-4, L4-5. 
3. Staph and strept bacteremia - resolved 4. H/o throat cancer. 5. HTN. 6. DM-2. 
7. Cough and chronic hoarseness of voice 8. Constipation, resolved 9. BRPR, could be hemorrhoidal 
10. Abnormal CT abd pelvis showing perinephric inflammation PLAN: 
 
Cont current management GI to follow - colo on Monday IV venofer and replace K 
ECHO report reviewed ST/PT/OT to follow BMP:  
Lab Results Component Value Date/Time  11/10/2018 02:04 AM  
 K 3.4 (L) 11/10/2018 02:04 AM  
  11/10/2018 02:04 AM  
 CO2 26 11/10/2018 02:04 AM  
 AGAP 10 11/10/2018 02:04 AM  
  (H) 11/10/2018 02:04 AM  
 BUN 7 11/10/2018 02:04 AM  
 CREA 0.64 11/10/2018 02:04 AM  
 GFRAA >60 11/10/2018 02:04 AM  
 GFRNA >60 11/10/2018 02:04 AM  
 
CBC:  
Lab Results Component Value Date/Time  WBC 10.0 11/10/2018 02:04 AM  
 HGB 9.9 (L) 11/10/2018 02:04 AM  
 HCT 28.6 (L) 11/10/2018 02:04 AM  
  11/10/2018 02:04 AM

## 2018-11-10 NOTE — PROGRESS NOTES
Kevin [address], [address] [facility], [address] [place] [address] 
[email or web address] Impression/Plan: 1. LGI bleed, persistent. Ext hemorrhoids noted, large amount of bleeding noted in pad Plan:  [place] on mon Chief Complaint: LGI bleed Subjective:   
cont'd bleeding w/ a drop in Hgb. Had colo 3 yr ago and multiple TAs removed. Repeat colo in 3 yr recommended. Eyes: conjunctiva normal, EOM normal  
Neck: ROM normal, supple and trachea normal  
Cardiovascular: heart normal, intact distal pulses, normal rate and regular rhythm Pulmonary/Chest Wall: breath sounds normal and effort normal  
Abdominal: appearance normal, bowel sounds normal and soft, non-acute, non-tender Visit Vitals BP (!) 188/98 (BP 1 Location: Left arm, BP Patient Position: At rest) Pulse (!) 112 Temp 99.2 °F (37.3 °C) Resp 20 Ht 5' 10\" (1.778 m) Wt 78.5 kg (173 lb) SpO2 97% BMI 24.82 kg/m² Intake/Output Summary (Last 24 hours) at 11/10/2018 1026 Last data filed at 11/10/2018 9220 Gross per 24 hour Intake 730 ml Output 1550 ml Net -820 ml  
 
 
CBC w/Diff Lab Results Component Value Date/Time WBC 10.0 11/10/2018 02:04 AM  
 RBC 3.29 (L) 11/10/2018 02:04 AM  
 HGB 9.9 (L) 11/10/2018 02:04 AM  
 HCT 28.6 (L) 11/10/2018 02:04 AM  
 MCV 86.9 11/10/2018 02:04 AM  
 MCH 30.1 11/10/2018 02:04 AM  
 MCHC 34.6 11/10/2018 02:04 AM  
 RDW 12.7 11/10/2018 02:04 AM  
  11/10/2018 02:04 AM  
 Lab Results Component Value Date/Time GRANS 71 11/10/2018 02:04 AM  
 LYMPH 18 (L) 11/10/2018 02:04 AM  
 EOS 2 11/10/2018 02:04 AM  
 BANDS 7 (H) 11/06/2018 03:57 AM  
 BASOS 0 11/10/2018 02:04 AM  
 METAS 1 (H) 11/06/2018 03:57 AM  
  
Basic Metabolic Profile Recent Labs 11/10/18 
8993   
K 3.4*  
 CO2 26 BUN 7  
CA 8.6 MG 1.8 Hepatic Function Lab Results Component Value Date/Time ALB 3.2 (L) 11/05/2018 07:04 AM  
 TP 7.5 11/05/2018 07:04 AM  
 AP 72 11/05/2018 07:04 AM  
 Lab Results Component Value Date/Time SGOT 19 11/05/2018 07:04 AM  
  
 
 
 
 
 
 
 
Bigg Harman MD, MD 
[facility], [address] 
[email or web address]  L

## 2018-11-10 NOTE — PROGRESS NOTES
Afebrile, bp elevated and tachy 198/98  112 Wbc 10k Bleeding minimal 
Co back pain Ambulated yesterday to BR Neuro stable CT chest/abd/ pelvis perinephric inflammation, no abscess or other pathology Plan Patient with mechanical lbp and some radiculopathy rle- cw arthritis No fever nl wbc off abx Mobilize with PT Control bp Conservative care for back unless patient unable to progress

## 2018-11-10 NOTE — ROUTINE PROCESS
Magdalena Leonard Bedside and Verbal shift change report given to Guerline Porras RN (oncoming nurse) by Kimi Becerril RN (offgoing nurse). Report included the following information SBAR, Kardex, Intake/Output, MAR and Recent Results. Patient has no acute distress (shortness of breath/ wheezing, nausea/vomiting, fever/chills, dizziness/headache). Pain complaints addressed and charted. All other vital signs are stable.

## 2018-11-10 NOTE — PROGRESS NOTES
Notified by [facility], TABBY, that patient having rectal bleed during PT. Bright red blood on one soaked menstrual pad with small/medium blood clot, and about another pad's worth of blood on sheets and bed pad. Blood seen by Dr. Josetta Spatz. Will continue to monitor.

## 2018-11-10 NOTE — ROUTINE PROCESS
Ganga Stafford Bedside and Verbal shift change report given to Elham James RN (oncoming nurse) by John Avery RN (offgoing nurse). Report included the following information SBAR, Kardex, Intake/Output, MAR and Recent Results. Patient has no acute distress (shortness of breath/ wheezing, nausea/vomiting, fever/chills, dizziness/headache). Pain complaints addressed and charted. All other vital signs are stable.

## 2018-11-11 LAB
BASOPHILS # BLD: 0 K/UL (ref 0–0.1)
BASOPHILS NFR BLD: 0 % (ref 0–2)
DIFFERENTIAL METHOD BLD: ABNORMAL
EOSINOPHIL # BLD: 0.2 K/UL (ref 0–0.4)
EOSINOPHIL NFR BLD: 2 % (ref 0–5)
ERYTHROCYTE [DISTWIDTH] IN BLOOD BY AUTOMATED COUNT: 12.6 % (ref 11.6–14.5)
GLUCOSE BLD STRIP.AUTO-MCNC: 112 MG/DL (ref 70–110)
GLUCOSE BLD STRIP.AUTO-MCNC: 116 MG/DL (ref 70–110)
GLUCOSE BLD STRIP.AUTO-MCNC: 132 MG/DL (ref 70–110)
HCT VFR BLD AUTO: 29.1 % (ref 36–48)
HGB BLD-MCNC: 9.9 G/DL (ref 13–16)
LYMPHOCYTES # BLD: 1.1 K/UL (ref 0.9–3.6)
LYMPHOCYTES NFR BLD: 11 % (ref 21–52)
MCH RBC QN AUTO: 29.9 PG (ref 24–34)
MCHC RBC AUTO-ENTMCNC: 34 G/DL (ref 31–37)
MCV RBC AUTO: 87.9 FL (ref 74–97)
MONOCYTES # BLD: 1.1 K/UL (ref 0.05–1.2)
MONOCYTES NFR BLD: 10 % (ref 3–10)
NEUTS SEG # BLD: 8.5 K/UL (ref 1.8–8)
NEUTS SEG NFR BLD: 77 % (ref 40–73)
PLATELET # BLD AUTO: 295 K/UL (ref 135–420)
PMV BLD AUTO: 9.2 FL (ref 9.2–11.8)
POTASSIUM SERPL-SCNC: 3.5 MMOL/L (ref 3.5–5.5)
RBC # BLD AUTO: 3.31 M/UL (ref 4.7–5.5)
WBC # BLD AUTO: 10.9 K/UL (ref 4.6–13.2)

## 2018-11-11 PROCEDURE — 74011250637 HC RX REV CODE- 250/637: Performed by: INTERNAL MEDICINE

## 2018-11-11 PROCEDURE — 94640 AIRWAY INHALATION TREATMENT: CPT

## 2018-11-11 PROCEDURE — 36415 COLL VENOUS BLD VENIPUNCTURE: CPT

## 2018-11-11 PROCEDURE — 77030012890

## 2018-11-11 PROCEDURE — 85025 COMPLETE CBC W/AUTO DIFF WBC: CPT

## 2018-11-11 PROCEDURE — 65270000029 HC RM PRIVATE

## 2018-11-11 PROCEDURE — 84132 ASSAY OF SERUM POTASSIUM: CPT

## 2018-11-11 PROCEDURE — 74011250636 HC RX REV CODE- 250/636: Performed by: INTERNAL MEDICINE

## 2018-11-11 PROCEDURE — 74011000250 HC RX REV CODE- 250: Performed by: INTERNAL MEDICINE

## 2018-11-11 PROCEDURE — 82962 GLUCOSE BLOOD TEST: CPT

## 2018-11-11 PROCEDURE — 74011000258 HC RX REV CODE- 258: Performed by: INTERNAL MEDICINE

## 2018-11-11 RX ORDER — POLYETHYLENE GLYCOL 3350, SODIUM SULFATE, SODIUM CHLORIDE, POTASSIUM CHLORIDE, SODIUM ASCORBATE, AND ASCORBIC ACID 7.5-2.691G
2 KIT ORAL ONCE
Status: COMPLETED | OUTPATIENT
Start: 2018-11-11 | End: 2018-11-11

## 2018-11-11 RX ORDER — IPRATROPIUM BROMIDE AND ALBUTEROL SULFATE 2.5; .5 MG/3ML; MG/3ML
3 SOLUTION RESPIRATORY (INHALATION)
Status: DISCONTINUED | OUTPATIENT
Start: 2018-11-11 | End: 2018-11-13 | Stop reason: HOSPADM

## 2018-11-11 RX ORDER — ACETAMINOPHEN 325 MG/1
650 TABLET ORAL ONCE
Status: COMPLETED | OUTPATIENT
Start: 2018-11-11 | End: 2018-11-11

## 2018-11-11 RX ORDER — BISACODYL 5 MG
10 TABLET, DELAYED RELEASE (ENTERIC COATED) ORAL
Status: COMPLETED | OUTPATIENT
Start: 2018-11-11 | End: 2018-11-11

## 2018-11-11 RX ORDER — POTASSIUM CHLORIDE 20 MEQ/1
20 TABLET, EXTENDED RELEASE ORAL
Status: COMPLETED | OUTPATIENT
Start: 2018-11-11 | End: 2018-11-11

## 2018-11-11 RX ORDER — OXYCODONE AND ACETAMINOPHEN 5; 325 MG/1; MG/1
1 TABLET ORAL ONCE
Status: COMPLETED | OUTPATIENT
Start: 2018-11-11 | End: 2018-11-11

## 2018-11-11 RX ADMIN — FERROUS SULFATE TAB 325 MG (65 MG ELEMENTAL FE) 325 MG: 325 (65 FE) TAB at 08:38

## 2018-11-11 RX ADMIN — IPRATROPIUM BROMIDE AND ALBUTEROL SULFATE 3 ML: .5; 3 SOLUTION RESPIRATORY (INHALATION) at 01:43

## 2018-11-11 RX ADMIN — OXYCODONE AND ACETAMINOPHEN 1 TABLET: 5; 325 TABLET ORAL at 02:13

## 2018-11-11 RX ADMIN — IPRATROPIUM BROMIDE AND ALBUTEROL SULFATE 3 ML: .5; 3 SOLUTION RESPIRATORY (INHALATION) at 08:48

## 2018-11-11 RX ADMIN — LOSARTAN POTASSIUM 50 MG: 50 TABLET ORAL at 08:38

## 2018-11-11 RX ADMIN — BISACODYL 10 MG: 5 TABLET, COATED ORAL at 10:36

## 2018-11-11 RX ADMIN — LATANOPROST 1 DROP: 50 SOLUTION/ DROPS OPHTHALMIC at 19:02

## 2018-11-11 RX ADMIN — CLONIDINE HYDROCHLORIDE 0.1 MG: 0.1 TABLET ORAL at 21:01

## 2018-11-11 RX ADMIN — HYDROCORTISONE ACETATE PRAMOXINE HCL: 2.5; 1 CREAM TOPICAL at 10:37

## 2018-11-11 RX ADMIN — NEBIVOLOL HYDROCHLORIDE 10 MG: 5 TABLET ORAL at 08:38

## 2018-11-11 RX ADMIN — POLYETHYLENE GLYCOL 3350, SODIUM SULFATE, SODIUM CHLORIDE, POTASSIUM CHLORIDE, ASCORBIC ACID, SODIUM ASCORBATE 2 L: KIT at 12:33

## 2018-11-11 RX ADMIN — HYDROCORTISONE ACETATE PRAMOXINE HCL: 2.5; 1 CREAM TOPICAL at 18:59

## 2018-11-11 RX ADMIN — POLYETHYLENE GLYCOL 3350 17 G: 17 POWDER, FOR SOLUTION ORAL at 08:39

## 2018-11-11 RX ADMIN — OXYCODONE AND ACETAMINOPHEN 1 TABLET: 5; 325 TABLET ORAL at 08:38

## 2018-11-11 RX ADMIN — OXYCODONE AND ACETAMINOPHEN 1 TABLET: 5; 325 TABLET ORAL at 21:05

## 2018-11-11 RX ADMIN — OXYCODONE AND ACETAMINOPHEN 1 TABLET: 5; 325 TABLET ORAL at 00:30

## 2018-11-11 RX ADMIN — OMEGA-3 FATTY ACIDS CAP 1000 MG 1 CAPSULE: 1000 CAP at 18:58

## 2018-11-11 RX ADMIN — CLONIDINE HYDROCHLORIDE 0.1 MG: 0.1 TABLET ORAL at 02:13

## 2018-11-11 RX ADMIN — OMEGA-3 FATTY ACIDS CAP 1000 MG 1 CAPSULE: 1000 CAP at 08:38

## 2018-11-11 RX ADMIN — IRON SUCROSE 200 MG: 20 INJECTION, SOLUTION INTRAVENOUS at 14:47

## 2018-11-11 RX ADMIN — POTASSIUM CHLORIDE 20 MEQ: 20 TABLET, EXTENDED RELEASE ORAL at 12:33

## 2018-11-11 RX ADMIN — OXYCODONE AND ACETAMINOPHEN 1 TABLET: 5; 325 TABLET ORAL at 14:50

## 2018-11-11 RX ADMIN — ACETAMINOPHEN 650 MG: 325 TABLET ORAL at 21:01

## 2018-11-11 NOTE — ROUTINE PROCESS
Verbal bedside report received from [facility]. The report included SBAR, LABS, VS and summary of care. Patient denies any pain at this time. Family member at bedside. No complaints of sob, chest pain. Will continue to monitor.

## 2018-11-11 NOTE — PROGRESS NOTES
ADULT PROTOCOL: JET AEROSOL ASSESSMENT Patient  Nory Alarcon     80 y.o.   male     11/11/2018  8:51 AM 
 
Breath Sounds Pre Procedure:  Breath Sounds Bilateral: Clear Left Breath Sounds: Diminished Breath Sounds Post Procedure: Breath Sounds Bilateral: Clear Breathing pattern: Pre procedure  Breathing Pattern: Regular, Dyspnea with exertion Post procedure  Breathing Pattern: Regular, Dyspnea with exertion Cough: Pre procedure  Cough: Non-productive Post procedure Cough: Non-productive Heart Rate: Pre procedure Pulse: 95 
         Post procedure Pulse: 97 Resp Rate: Pre procedure  Respirations: 18 Post procedure Nebulizer Therapy: Current medications Aerosolized Medications: DuoNeb Problem List:  
Patient Active Problem List  
Diagnosis Code  Debility R53.81  Leukocytosis D72.829  
 Intractable low back pain M54.5 Patient alert and cooperative to use MDI: Yes 
 
Home Respiratory Therapy Regimen/Frequency:  NO Medication Device Frequency SEVERITY INDEX: 
 
ITEM 0 1 2 3 4 Score Respiratory Pattern and or Rate Regular 10-19 Regular 20-24  
24-30   
30-34 Severe SOB or  
Greater than 35 0 Breath Sounds Clear Occasional Wheeze Mild Wheezing Moderate Wheezing  wheezing/Absent breath sounds 0 Shortness of Breath None Dyspnea on Exertion Dyspnea at Rest Moderate Shortness of Breath at Rest Severe Shortness of Breath - Limited Speech 1 Total Score:  1 
 
* Scoring Guidelines 0-4 pts:  PRN-BID  
5-7 pts:  BID, TID, QID 
8-9 pts:  TID, QID, Q6 
10-12 pts:  Q4-Q6 * - Guidelines used with clinical judgement. PRN Treatments can be ordered to supplement scheduled treatments. Regardless of score, frequency should not be less than normal home regimen. Recommended Order/Frequency:  PRN Comments: Respiratory Therapist: Erik Vera, RT

## 2018-11-11 NOTE — CONSULTS
1. Intractable back pain    2. Leukocytosis, unspecified type    3. Elevated blood pressure reading        ASSESSMENT:     68yoM with hx of lower back pain initially thought to have epidural infection noted to have some bilateral non specific perinephric stranding        PLAN:      Images reviewed  No  intervention indicated  Abx on hold per ID. Doubt acute kidney process with no fever, no leukocytosis and normal Cr with no CVA tenderness  Urine culture ordered to document no  tract infection  Will see Tuesday to f/u on culture     Stephanie Mata MD  [facility]  [address] [facility], [address] [place] [address]  [phone]      Chief Complaint   Patient presents with    Back Pain       HISTORY OF PRESENT ILLNESS:  Janette Colón is a 80 y.o. male who is seen in consultation as referred by Dr. Juliet Majano  for bilateral nospecific perinephric stranding. HPI:  Location bilateral retroperinoneum  Duration: pain in right lower back right hip x 1 week  Associated signs/symptoms: LE weakness, gi bleeding  Modifying factors: was on abx now off. Vanc, lois  Severity states pain moderate        No flowsheet data found. 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     Current Facility-Administered Medications   Medication Dose Route Frequency Provider Last Rate Last Dose    albuterol-ipratropium (DUO-NEB) 2.5 MG-0.5 MG/3 ML  3 mL Nebulization Q4H PRN Fly Vargas MD        iron sucrose (VENOFER) 200 mg in 0.9% sodium chloride 100 mL IVPB  200 mg IntraVENous ONCE Dodie Cunningham MD        ferrous sulfate tablet 325 mg  1 Tab Oral DAILY WITH BREAKFAST Fly Vargas MD   325 mg at 11/11/18 0838    hydrocortisone-pramoxine (ANALPRAM HC 2.5%-1%) 2.5-1 % rectal cream   Rectal TID ARIA Gold        lidocaine 4 % patch 1 Patch  1 Patch TransDERmal Q24H Fly Vargas MD   1 Patch at 11/11/18 1037    oxyCODONE-acetaminophen (PERCOCET) 5-325 mg per tablet 1 Tab  1 Tab Oral Q6H PRN Fly Vargas MD   1 Tab at 11/11/18 0838    polyethylene glycol (MIRALAX) packet 17 g  17 g Oral BID Fly Vargas MD   17 g at 11/11/18 0839    cloNIDine HCl (CATAPRES) tablet 0.1 mg  0.1 mg Oral Q8H PRN Fly Vargas MD   0.1 mg at 11/11/18 0213    omega 3-DHA-EPA-fish oil 1,000 mg (120 mg-180 mg) capsule 1 Cap  1 Cap Oral BID Fly Vargas MD   1 Cap at 11/11/18 0838    latanoprost (XALATAN) 0.005 % ophthalmic solution 1 Drop  1 Drop Both Eyes QPM Fly Vargas MD   1 Drop at 11/10/18 1733    beclomethasone dipropionate (QNASL 80mcg) HFAA 2 Spray (Patient Supplied)  2 Spray Nasal DAILY Fly Vargas MD   2 Spray at 11/11/18 1037    sodium chloride (OCEAN) 0.65 % nasal squeeze bottle 2 Spray  2 Spray Both Nostrils Q2H PRN Kim Harris MD   2 Spray at 11/07/18 1802    losartan (COZAAR) tablet 50 mg  50 mg Oral DAILY Kim Harris MD   50 mg at 11/11/18 1408    nebivolol (BYSTOLIC) tablet 10 mg  10 mg Oral DAILY Kim Harris MD   10 mg at 11/11/18 0838    ondansetron (ZOFRAN) injection 4 mg  4 mg IntraVENous Q4H PRN Kim Harris MD        insulin lispro (HUMALOG) injection   SubCUTAneous AC&HS Kim Harris MD   Stopped at 11/11/18 0730    glucose chewable tablet 16 g  16 g Oral PRN Kim Harris MD        glucagon (GLUCAGEN) injection 1 mg  1 mg IntraMUSCular PRN Kim Harris MD  dextrose (D50W) injection syrg 12.5-25 g  25-50 mL IntraVENous PRN Florentino Florian MD           Review of Systems  Constitutional: Fever:  negative  Skin: Rash:  negative  HEENT: Hearing difficulty:  negative  Eyes: Blurred vision:  negative  Cardiovascular: Chest pain:  negative  Respiratory: Shortness of breath:  negative  Gastrointestinal: Nausea/vomiting:  negative  Musculoskeletal: Back pain:  negative  Neurological: Weakness:  negative  Psychological: Memory loss:  negative  Comments/additional findings:  negative          PHYSICAL EXAMINATION:   Visit Vitals  /77 (BP 1 Location: Left arm, BP Patient Position: At rest)   Pulse 78   Temp 98.2 °F (36.8 °C)   Resp 18   Ht 5' 10\" (1.778 m)   Wt 173 lb (78.5 kg)   SpO2 97%   BMI 24.82 kg/m²     Constitutional: Well developed, well nourished male. No acute distress. HEENT: Normocephalic, Atraumatic, EOM's intact   CV:  Radial pulse is intact bilaterally. No peripheral swelling noted   Respiratory: No respiratory distress or difficulties breathing   Abdomen:  No abdominal masses or tenderness. No CVA tenderness. No inguinal hernias noted.  Male:  No CVA tenderness   Skin: No evidence of jaundice. Normal color  Neuro/Psych:  Alert and oriented. Affect appropriate. MSK: right lower back/hip pain tender to palpation  Lymphatic:   No enlarged supraclavicular lymph nodes. REVIEW OF LABS AND IMAGING:      Labs: Results:   Chemistry    Recent Labs     11/11/18  0126 11/10/18  0204   GLU  --  109*   NA  --  138   K 3.5 3.4*   CL  --  102   CO2  --  26   BUN  --  7   CREA  --  0.64   CA  --  8.6   AGAP  --  10   BUCR  --  11*      CBC w/Diff Recent Labs     11/11/18  0126 11/10/18  0204 11/09/18  0250   WBC 10.9 10.0 11.2   RBC 3.31* 3.29* 3.55*   HGB 9.9* 9.9* 10.5*   HCT 29.1* 28.6* 31.0*    209 214   GRANS 77* 71 77*   LYMPH 11* 18* 13*   EOS 2 2 1      Cultures No results for input(s): CULT in the last 72 hours.   All Micro Results Procedure Component Value Units Date/Time    CULTURE, BLOOD [[id number]] Collected:  11/06/18 1108    Order Status:  Completed Specimen:  Whole Blood Updated:  11/11/18 0637     Special Requests: NO SPECIAL REQUESTS        Culture result: NO GROWTH 5 DAYS       CULTURE, BLOOD [[id number]]  (Abnormal)  (Susceptibility) Collected:  11/05/18 1937    Order Status:  Completed Specimen:  Whole Blood Updated:  11/09/18 0800     Special Requests: NO SPECIAL REQUESTS        GRAM STAIN       AEROBIC BOTTLE GRAM POSITIVE COCCOBACILLI                  SMEAR CALLED TO AND CORRECTLY REPEATED BY: Britney [id number] 10/06/2018 TO I.T. ANAEROBIC BOTTLE GRAM POSITIVE COCCI IN GROUPS                  SMEAR CALLED TO AND CORRECTLY REPEATED BY: Zenaida Ireland RN [id number] 11/07/18 TO I.T. Culture result:       AEROBIC BOTTLE STREPTOCOCCUS VIRIDANS                  ANAEROBIC BOTTLE STAPHYLOCOCCUS SPECIES, COAGULASE NEGATIVE          CULTURE, BLOOD [[id number]]  (Abnormal)  (Susceptibility) Collected:  11/05/18 1922    Order Status:  Completed Specimen:  Whole Blood Updated:  11/09/18 0749     Special Requests: NO SPECIAL REQUESTS        GRAM STAIN       AEROBIC BOTTLE GRAM POSITIVE COCCI IN GROUPS                  SMEAR CALLED TO AND CORRECTLY REPEATED BY: Britney [id number] 10/06/2018 TO I.T.            Culture result:       AEROBIC BOTTLE STAPHYLOCOCCUS HAEMOLYTICUS                  AEROBIC BOTTLE STAPHYLOCOCCUS WARNERI                  Urinalysis Color   Date Value Ref Range Status   11/05/2018 DARK YELLOW   Final     Appearance   Date Value Ref Range Status   11/05/2018 CLEAR   Final     Specific gravity   Date Value Ref Range Status   11/05/2018 1.022 1.005 - 1.030   Final     pH (UA)   Date Value Ref Range Status   11/05/2018 7.0 5.0 - 8.0   Final     Protein   Date Value Ref Range Status   11/05/2018 100 (A) NEG mg/dL Final     Ketone   Date Value Ref Range Status   11/05/2018 TRACE (A) NEG mg/dL Final     Bilirubin Date Value Ref Range Status   11/05/2018 NEGATIVE  NEG   Final     Blood   Date Value Ref Range Status   11/05/2018 NEGATIVE  NEG   Final     Urobilinogen   Date Value Ref Range Status   11/05/2018 1.0 0.2 - 1.0 EU/dL Final     Nitrites   Date Value Ref Range Status   11/05/2018 NEGATIVE  NEG   Final     Leukocyte Esterase   Date Value Ref Range Status   11/05/2018 NEGATIVE  NEG   Final     Potassium   Date Value Ref Range Status   11/11/2018 3.5 3.5 - 5.5 mmol/L Final     Creatinine   Date Value Ref Range Status   11/10/2018 0.64 0.6 - 1.3 MG/DL Final     BUN   Date Value Ref Range Status   11/10/2018 7 7.0 - 18 MG/DL Final      PSA No results for input(s): PSA in the last 72 hours.    Coagulation No results found for: PTP, INR, APTT

## 2018-11-11 NOTE — PROGRESS NOTES
Patient had very large liquid stool and small amount of rectal bleeding. Patient bathed with bath wipes, and transferred to toilet. Finished stooling and renny area cleaned with soap and water. Bed cleaned and linens changed. Completed first liter of MOVIPREP, and RN holding second liter at this time due to patient fatigue after much exertion. Has bedpan in bed and call bell in reach. Wife to visit soon. Will continue to monitor and provide second liter of MOVIPREP when patient has rested. Josefina Abbott

## 2018-11-11 NOTE — PROGRESS NOTES
Problem: Pressure Injury - Risk of 
Goal: *Prevention of pressure injury Document Edin Scale and appropriate interventions in the flowsheet. Outcome: Progressing Towards Goal 
Pressure Injury Interventions: 
  
 
Moisture Interventions: Moisture barrier Activity Interventions: Increase time out of bed Mobility Interventions: HOB 30 degrees or less, Pressure redistribution bed/mattress (bed type) Nutrition Interventions: Document food/fluid/supplement intake Friction and Shear Interventions: Apply protective barrier, creams and emollients

## 2018-11-11 NOTE — PROGRESS NOTES
SUBJECTIVE: 
 
No more bleeding. No chest pain but back pain present. No SOB or cough. No nausea or vomiting. OBJECTIVE: 
 
/77 (BP 1 Location: Left arm, BP Patient Position: At rest)   Pulse 78   Temp 98.2 °F (36.8 °C)   Resp 18   Ht 5' 10\" (1.778 m)   Wt 78.5 kg (173 lb)   SpO2 97%   BMI 24.82 kg/m² CVS: RRR 
RS: CTA bilaterally, no wheezes GI: NT, BS + Extremities: no pedal edema General: NAD, Awake, hoarse voice CNS: moves all extremities while in bed, Motor strength 4-5/5 in RLE 
 
 
ASSESSMENT: 
 
1. Lower back pain due to # 2 2. Suspected Lumbar inflammatory discitis, L3-4, L4-5. 
3. Staph and strept bacteremia - resolved 4. H/o throat cancer. 5. HTN. 6. DM-2. 
7. Cough and chronic hoarseness of voice 8. Constipation, resolved 9. BRPR, could be hemorrhoidal 
10. Abnormal CT abd pelvis showing perinephric inflammation PLAN: 
 
Cont current management GI to follow - colo in am 
Urology will be consulted - talked to answering service ST/PT/OT to follow BMP:  
Lab Results Component Value Date/Time K 3.5 11/11/2018 01:26 AM  
 
CBC:  
Lab Results Component Value Date/Time  WBC 10.9 11/11/2018 01:26 AM  
 HGB 9.9 (L) 11/11/2018 01:26 AM  
 HCT 29.1 (L) 11/11/2018 01:26 AM  
  11/11/2018 01:26 AM

## 2018-11-11 NOTE — ROUTINE PROCESS
Bedside shift change report given to Ruthy (oncoming nurse) by Radha Hinojosa (offgoing nurse). Report included the following information SBAR, Kardex and MAR.

## 2018-11-11 NOTE — PROGRESS NOTES
Kevin [address], [address] [facility], [address] 
[email or web address] [employer] Impression/Plan: 1. LGI bleeding -decreased Plan:   [place] in am 
 
Chief Complaint: bleeding Subjective:   
 
Bleeding has subsided, H&H stable . No pain Eyes: conjunctiva normal, EOM normal  
Neck: ROM normal, supple and trachea normal  
Cardiovascular: heart normal, intact distal pulses, normal rate and regular rhythm Pulmonary/Chest Wall: breath sounds normal and effort normal  
Abdominal: appearance normal, bowel sounds normal and soft, non-acute, non-tender Visit Vitals /81 (BP 1 Location: Left arm, BP Patient Position: At rest) Pulse 92 Temp 98 °F (36.7 °C) Resp 18 Ht 5' 10\" (1.778 m) Wt 78.5 kg (173 lb) SpO2 95% BMI 24.82 kg/m² Intake/Output Summary (Last 24 hours) at 11/11/2018 0950 Last data filed at 11/11/2018 8955 Gross per 24 hour Intake 300 ml Output 2275 ml Net -1975 ml CBC w/Diff Lab Results Component Value Date/Time WBC 10.9 11/11/2018 01:26 AM  
 RBC 3.31 (L) 11/11/2018 01:26 AM  
 HGB 9.9 (L) 11/11/2018 01:26 AM  
 HCT 29.1 (L) 11/11/2018 01:26 AM  
 MCV 87.9 11/11/2018 01:26 AM  
 MCH 29.9 11/11/2018 01:26 AM  
 MCHC 34.0 11/11/2018 01:26 AM  
 RDW 12.6 11/11/2018 01:26 AM  
  11/11/2018 01:26 AM  
 Lab Results Component Value Date/Time GRANS 77 (H) 11/11/2018 01:26 AM  
 LYMPH 11 (L) 11/11/2018 01:26 AM  
 EOS 2 11/11/2018 01:26 AM  
 BANDS 7 (H) 11/06/2018 03:57 AM  
 BASOS 0 11/11/2018 01:26 AM  
 METAS 1 (H) 11/06/2018 03:57 AM  
  
Basic Metabolic Profile Recent Labs 11/11/18 
0126 11/10/18 
0219 NA  --  138  
K 3.5 3.4*  
CL  --  102 CO2  --  26 BUN  --  7  
CA  --  8.6 MG  --  1.8 Hepatic Function Lab Results Component Value Date/Time  ALB 3.2 (L) 11/05/2018 07:04 AM  
 TP 7.5 11/05/2018 07:04 AM  
 AP 72 11/05/2018 07:04 AM  
 Lab Results Component Value Date/Time SGOT 19 11/05/2018 07:04 AM  
  
 
 
 
 
 
 
 
Chava Field MD, MD 
[facility], [address] 
[email or web address]

## 2018-11-11 NOTE — ROUTINE PROCESS
Bedside and Verbal shift change report given to [address] (oncoming nurse) by Virgilio Moore RN (offgoing nurse). Report included the following information SBAR, Kardex, Intake/Output, Recent Results and Med Rec Status. Patient asleep in bed. No sob noted.

## 2018-11-12 ENCOUNTER — ANESTHESIA (OUTPATIENT)
Dept: ENDOSCOPY | Age: 68
DRG: 552 | End: 2018-11-12
Payer: MEDICARE

## 2018-11-12 ENCOUNTER — ANESTHESIA EVENT (OUTPATIENT)
Dept: ENDOSCOPY | Age: 68
DRG: 552 | End: 2018-11-12
Payer: MEDICARE

## 2018-11-12 PROBLEM — K63.5 COLON POLYPS: Status: ACTIVE | Noted: 2018-11-12

## 2018-11-12 PROBLEM — K64.9 HEMORRHOIDS: Status: ACTIVE | Noted: 2018-11-12

## 2018-11-12 LAB
ANION GAP SERPL CALC-SCNC: 11 MMOL/L (ref 3–18)
APPEARANCE UR: CLEAR
BACTERIA SPEC CULT: NORMAL
BACTERIA URNS QL MICRO: NEGATIVE /HPF
BASOPHILS # BLD: 0 K/UL (ref 0–0.1)
BASOPHILS NFR BLD: 1 % (ref 0–2)
BILIRUB UR QL: NEGATIVE
BUN SERPL-MCNC: 6 MG/DL (ref 7–18)
BUN/CREAT SERPL: 8 (ref 12–20)
CALCIUM SERPL-MCNC: 9 MG/DL (ref 8.5–10.1)
CHLORIDE SERPL-SCNC: 104 MMOL/L (ref 100–108)
CO2 SERPL-SCNC: 24 MMOL/L (ref 21–32)
COLOR UR: YELLOW
CREAT SERPL-MCNC: 0.71 MG/DL (ref 0.6–1.3)
CRP SERPL-MCNC: 9.3 MG/DL (ref 0–0.3)
DIFFERENTIAL METHOD BLD: ABNORMAL
EOSINOPHIL # BLD: 0.2 K/UL (ref 0–0.4)
EOSINOPHIL NFR BLD: 2 % (ref 0–5)
EPITH CASTS URNS QL MICRO: NORMAL /LPF (ref 0–5)
ERYTHROCYTE [DISTWIDTH] IN BLOOD BY AUTOMATED COUNT: 12.9 % (ref 11.6–14.5)
ERYTHROCYTE [SEDIMENTATION RATE] IN BLOOD: 1 MM/HR (ref 0–20)
GLUCOSE BLD STRIP.AUTO-MCNC: 100 MG/DL (ref 70–110)
GLUCOSE BLD STRIP.AUTO-MCNC: 101 MG/DL (ref 70–110)
GLUCOSE BLD STRIP.AUTO-MCNC: 103 MG/DL (ref 70–110)
GLUCOSE BLD STRIP.AUTO-MCNC: 103 MG/DL (ref 70–110)
GLUCOSE BLD STRIP.AUTO-MCNC: 114 MG/DL (ref 70–110)
GLUCOSE BLD STRIP.AUTO-MCNC: 124 MG/DL (ref 70–110)
GLUCOSE SERPL-MCNC: 92 MG/DL (ref 74–99)
GLUCOSE UR STRIP.AUTO-MCNC: NEGATIVE MG/DL
HCT VFR BLD AUTO: 32.3 % (ref 36–48)
HGB BLD-MCNC: 10.9 G/DL (ref 13–16)
HGB UR QL STRIP: NEGATIVE
HYALINE CASTS URNS QL MICRO: NORMAL /LPF (ref 0–2)
KETONES UR QL STRIP.AUTO: 80 MG/DL
LEUKOCYTE ESTERASE UR QL STRIP.AUTO: NEGATIVE
LYMPHOCYTES # BLD: 2 K/UL (ref 0.9–3.6)
LYMPHOCYTES NFR BLD: 23 % (ref 21–52)
MCH RBC QN AUTO: 30 PG (ref 24–34)
MCHC RBC AUTO-ENTMCNC: 33.7 G/DL (ref 31–37)
MCV RBC AUTO: 89 FL (ref 74–97)
MONOCYTES # BLD: 0.5 K/UL (ref 0.05–1.2)
MONOCYTES NFR BLD: 6 % (ref 3–10)
NEUTS SEG # BLD: 5.9 K/UL (ref 1.8–8)
NEUTS SEG NFR BLD: 68 % (ref 40–73)
NITRITE UR QL STRIP.AUTO: NEGATIVE
PH UR STRIP: 6 [PH] (ref 5–8)
PLATELET # BLD AUTO: 325 K/UL (ref 135–420)
PMV BLD AUTO: 9.5 FL (ref 9.2–11.8)
POTASSIUM SERPL-SCNC: 4.1 MMOL/L (ref 3.5–5.5)
PROT UR STRIP-MCNC: 30 MG/DL
RBC # BLD AUTO: 3.63 M/UL (ref 4.7–5.5)
RBC #/AREA URNS HPF: NEGATIVE /HPF (ref 0–5)
SERVICE CMNT-IMP: NORMAL
SODIUM SERPL-SCNC: 139 MMOL/L (ref 136–145)
SP GR UR REFRACTOMETRY: 1.01 (ref 1–1.03)
UROBILINOGEN UR QL STRIP.AUTO: 0.2 EU/DL (ref 0.2–1)
WBC # BLD AUTO: 8.7 K/UL (ref 4.6–13.2)
WBC URNS QL MICRO: NEGATIVE /HPF (ref 0–4)

## 2018-11-12 PROCEDURE — 88305 TISSUE EXAM BY PATHOLOGIST: CPT

## 2018-11-12 PROCEDURE — 86140 C-REACTIVE PROTEIN: CPT

## 2018-11-12 PROCEDURE — 85652 RBC SED RATE AUTOMATED: CPT

## 2018-11-12 PROCEDURE — 77030020018 HC MRKR ENDOSC SPOT 5ML SYR GISP -B: Performed by: INTERNAL MEDICINE

## 2018-11-12 PROCEDURE — 77030008565 HC TBNG SUC IRR ERBE -B: Performed by: INTERNAL MEDICINE

## 2018-11-12 PROCEDURE — 36415 COLL VENOUS BLD VENIPUNCTURE: CPT

## 2018-11-12 PROCEDURE — 77030038604 HC SNR ENDO EXACTO USEN -B: Performed by: INTERNAL MEDICINE

## 2018-11-12 PROCEDURE — 0DBN8ZZ EXCISION OF SIGMOID COLON, VIA NATURAL OR ARTIFICIAL OPENING ENDOSCOPIC: ICD-10-PCS | Performed by: INTERNAL MEDICINE

## 2018-11-12 PROCEDURE — 74011250637 HC RX REV CODE- 250/637

## 2018-11-12 PROCEDURE — 74011250637 HC RX REV CODE- 250/637: Performed by: INTERNAL MEDICINE

## 2018-11-12 PROCEDURE — 76060000033 HC ANESTHESIA 1 TO 1.5 HR: Performed by: INTERNAL MEDICINE

## 2018-11-12 PROCEDURE — 74011250636 HC RX REV CODE- 250/636: Performed by: ANESTHESIOLOGY

## 2018-11-12 PROCEDURE — 74011250636 HC RX REV CODE- 250/636

## 2018-11-12 PROCEDURE — 76040000008: Performed by: INTERNAL MEDICINE

## 2018-11-12 PROCEDURE — 51798 US URINE CAPACITY MEASURE: CPT

## 2018-11-12 PROCEDURE — 74011000250 HC RX REV CODE- 250: Performed by: INTERNAL MEDICINE

## 2018-11-12 PROCEDURE — 77030013992 HC SNR POLYP ENDOSC BSC -B: Performed by: INTERNAL MEDICINE

## 2018-11-12 PROCEDURE — 77030018846 HC SOL IRR STRL H20 ICUM -A: Performed by: INTERNAL MEDICINE

## 2018-11-12 PROCEDURE — 81001 URINALYSIS AUTO W/SCOPE: CPT

## 2018-11-12 PROCEDURE — 65270000029 HC RM PRIVATE

## 2018-11-12 PROCEDURE — 77030003657 HC NDL SCLER BSC -B: Performed by: INTERNAL MEDICINE

## 2018-11-12 PROCEDURE — 77030009426 HC FCPS BIOP ENDOSC BSC -B: Performed by: INTERNAL MEDICINE

## 2018-11-12 PROCEDURE — 97530 THERAPEUTIC ACTIVITIES: CPT

## 2018-11-12 PROCEDURE — 80048 BASIC METABOLIC PNL TOTAL CA: CPT

## 2018-11-12 PROCEDURE — 85025 COMPLETE CBC W/AUTO DIFF WBC: CPT

## 2018-11-12 PROCEDURE — 0DBM8ZZ EXCISION OF DESCENDING COLON, VIA NATURAL OR ARTIFICIAL OPENING ENDOSCOPIC: ICD-10-PCS | Performed by: INTERNAL MEDICINE

## 2018-11-12 PROCEDURE — 82962 GLUCOSE BLOOD TEST: CPT

## 2018-11-12 PROCEDURE — 0DBK8ZZ EXCISION OF ASCENDING COLON, VIA NATURAL OR ARTIFICIAL OPENING ENDOSCOPIC: ICD-10-PCS | Performed by: INTERNAL MEDICINE

## 2018-11-12 PROCEDURE — 0DBL8ZZ EXCISION OF TRANSVERSE COLON, VIA NATURAL OR ARTIFICIAL OPENING ENDOSCOPIC: ICD-10-PCS | Performed by: INTERNAL MEDICINE

## 2018-11-12 PROCEDURE — 77030010936 HC CLP LIG BSC -C: Performed by: INTERNAL MEDICINE

## 2018-11-12 RX ORDER — FAMOTIDINE 20 MG/1
20 TABLET, FILM COATED ORAL ONCE
Status: COMPLETED | OUTPATIENT
Start: 2018-11-12 | End: 2018-11-12

## 2018-11-12 RX ORDER — INSULIN LISPRO 100 [IU]/ML
INJECTION, SOLUTION INTRAVENOUS; SUBCUTANEOUS
Status: DISCONTINUED | OUTPATIENT
Start: 2018-11-12 | End: 2018-11-12 | Stop reason: HOSPADM

## 2018-11-12 RX ORDER — FAMOTIDINE 20 MG/1
TABLET, FILM COATED ORAL
Status: COMPLETED
Start: 2018-11-12 | End: 2018-11-12

## 2018-11-12 RX ORDER — PROPOFOL 10 MG/ML
INJECTION, EMULSION INTRAVENOUS AS NEEDED
Status: DISCONTINUED | OUTPATIENT
Start: 2018-11-12 | End: 2018-11-12 | Stop reason: HOSPADM

## 2018-11-12 RX ORDER — LIDOCAINE HYDROCHLORIDE 20 MG/ML
INJECTION, SOLUTION EPIDURAL; INFILTRATION; INTRACAUDAL; PERINEURAL AS NEEDED
Status: DISCONTINUED | OUTPATIENT
Start: 2018-11-12 | End: 2018-11-12 | Stop reason: HOSPADM

## 2018-11-12 RX ORDER — PROPOFOL 10 MG/ML
INJECTION, EMULSION INTRAVENOUS
Status: DISCONTINUED | OUTPATIENT
Start: 2018-11-12 | End: 2018-11-12 | Stop reason: HOSPADM

## 2018-11-12 RX ORDER — ONDANSETRON 2 MG/ML
4 INJECTION INTRAMUSCULAR; INTRAVENOUS ONCE
Status: DISCONTINUED | OUTPATIENT
Start: 2018-11-12 | End: 2018-11-12 | Stop reason: HOSPADM

## 2018-11-12 RX ORDER — SODIUM CHLORIDE, SODIUM LACTATE, POTASSIUM CHLORIDE, CALCIUM CHLORIDE 600; 310; 30; 20 MG/100ML; MG/100ML; MG/100ML; MG/100ML
75 INJECTION, SOLUTION INTRAVENOUS CONTINUOUS
Status: DISCONTINUED | OUTPATIENT
Start: 2018-11-12 | End: 2018-11-12 | Stop reason: HOSPADM

## 2018-11-12 RX ORDER — INSULIN LISPRO 100 [IU]/ML
INJECTION, SOLUTION INTRAVENOUS; SUBCUTANEOUS ONCE
Status: DISCONTINUED | OUTPATIENT
Start: 2018-11-12 | End: 2018-11-12 | Stop reason: HOSPADM

## 2018-11-12 RX ORDER — LIDOCAINE HYDROCHLORIDE 10 MG/ML
0.1 INJECTION, SOLUTION EPIDURAL; INFILTRATION; INTRACAUDAL; PERINEURAL AS NEEDED
Status: DISCONTINUED | OUTPATIENT
Start: 2018-11-12 | End: 2018-11-12 | Stop reason: HOSPADM

## 2018-11-12 RX ORDER — MAGNESIUM SULFATE 100 %
4 CRYSTALS MISCELLANEOUS AS NEEDED
Status: DISCONTINUED | OUTPATIENT
Start: 2018-11-12 | End: 2018-11-12 | Stop reason: HOSPADM

## 2018-11-12 RX ORDER — DEXTROSE 50 % IN WATER (D50W) INTRAVENOUS SYRINGE
25-50 AS NEEDED
Status: DISCONTINUED | OUTPATIENT
Start: 2018-11-12 | End: 2018-11-12 | Stop reason: HOSPADM

## 2018-11-12 RX ORDER — SODIUM CHLORIDE 0.9 % (FLUSH) 0.9 %
5-10 SYRINGE (ML) INJECTION AS NEEDED
Status: DISCONTINUED | OUTPATIENT
Start: 2018-11-12 | End: 2018-11-12 | Stop reason: HOSPADM

## 2018-11-12 RX ADMIN — LIDOCAINE HYDROCHLORIDE 20 MG: 20 INJECTION, SOLUTION EPIDURAL; INFILTRATION; INTRACAUDAL; PERINEURAL at 09:19

## 2018-11-12 RX ADMIN — SODIUM CHLORIDE, SODIUM LACTATE, POTASSIUM CHLORIDE, AND CALCIUM CHLORIDE 75 ML/HR: 600; 310; 30; 20 INJECTION, SOLUTION INTRAVENOUS at 08:48

## 2018-11-12 RX ADMIN — HYDROCORTISONE ACETATE PRAMOXINE HCL: 2.5; 1 CREAM TOPICAL at 18:05

## 2018-11-12 RX ADMIN — LOSARTAN POTASSIUM 50 MG: 50 TABLET ORAL at 12:24

## 2018-11-12 RX ADMIN — FAMOTIDINE 20 MG: 20 TABLET ORAL at 08:50

## 2018-11-12 RX ADMIN — OMEGA-3 FATTY ACIDS CAP 1000 MG 1 CAPSULE: 1000 CAP at 12:24

## 2018-11-12 RX ADMIN — OMEGA-3 FATTY ACIDS CAP 1000 MG 1 CAPSULE: 1000 CAP at 18:02

## 2018-11-12 RX ADMIN — LIDOCAINE HYDROCHLORIDE 20 MG: 20 INJECTION, SOLUTION EPIDURAL; INFILTRATION; INTRACAUDAL; PERINEURAL at 09:14

## 2018-11-12 RX ADMIN — NEBIVOLOL HYDROCHLORIDE 10 MG: 5 TABLET ORAL at 12:24

## 2018-11-12 RX ADMIN — OXYCODONE AND ACETAMINOPHEN 1 TABLET: 5; 325 TABLET ORAL at 18:02

## 2018-11-12 RX ADMIN — LIDOCAINE HYDROCHLORIDE 20 MG: 20 INJECTION, SOLUTION EPIDURAL; INFILTRATION; INTRACAUDAL; PERINEURAL at 09:22

## 2018-11-12 RX ADMIN — LATANOPROST 1 DROP: 50 SOLUTION/ DROPS OPHTHALMIC at 18:06

## 2018-11-12 RX ADMIN — LIDOCAINE HYDROCHLORIDE 20 MG: 20 INJECTION, SOLUTION EPIDURAL; INFILTRATION; INTRACAUDAL; PERINEURAL at 09:09

## 2018-11-12 RX ADMIN — PROPOFOL 50 MG: 10 INJECTION, EMULSION INTRAVENOUS at 09:21

## 2018-11-12 RX ADMIN — PROPOFOL 50 MG: 10 INJECTION, EMULSION INTRAVENOUS at 09:05

## 2018-11-12 RX ADMIN — LIDOCAINE HYDROCHLORIDE 20 MG: 20 INJECTION, SOLUTION EPIDURAL; INFILTRATION; INTRACAUDAL; PERINEURAL at 09:05

## 2018-11-12 RX ADMIN — PROPOFOL 50 MG: 10 INJECTION, EMULSION INTRAVENOUS at 09:10

## 2018-11-12 RX ADMIN — PROPOFOL 50 MCG/KG/MIN: 10 INJECTION, EMULSION INTRAVENOUS at 09:05

## 2018-11-12 RX ADMIN — FAMOTIDINE 20 MG: 20 TABLET, FILM COATED ORAL at 08:50

## 2018-11-12 RX ADMIN — OXYCODONE AND ACETAMINOPHEN 1 TABLET: 5; 325 TABLET ORAL at 12:24

## 2018-11-12 NOTE — PROGRESS NOTES
vss afeb Neuro intact Continued back pain but improved Labs off abx today with wbc 8.7 and ER of 1 
CRP pending AP Unlikely spinal infection Progressing with PT/OT For GI procedure today Would continue PT OT and home DC when cleared Will continue outpatient evaluation and treatment in my office.

## 2018-11-12 NOTE — PROGRESS NOTES
physical Therapy TREATMENT Patient: Sharath Méndez (89 y.o. male) Date: 11/12/2018 Diagnosis: Intractable low back pain Debility Leukocytosis <principal problem not specified> Procedure(s) (LRB): 
COLONOSCOPY with Polypectomies, Bx's, Injection, Tattooing & Clip Placement x 3 (N/A) Day of Surgery Precautions: Fall Chart, physical therapy assessment, plan of care and goals were reviewed. ASSESSMENT: 
Pt is seen for deficits in strength, mobility, transfers, safety and ambulation 2/2 recent admission for intractable LBP. He is agreeable to skilled session after encourgement as he reported that he just returned from surgery. Pt had a procedure this am and nursing has cleared him for skilled PT session. Bed mobility for rolling with supervision from side to side and use of handrails, he requires moderate verbal cues for hand placement and for log rolling technique to edge of bed. Pt with moderate c/o R hip and LBP with even the gentle and slow movement to edge of bed. He is encouraged to sit for as long as he can and to equalize weight from side to side without success as he reports his pain is at 9/10 when prompted to do so, pt requires sit to supine with only a few seconds of seated activities. Encouraged pt to request pain medication for R hip pain and nurse is also informed of pts complaints. Pt will continue to benefit from skilled PT session to address weakness and decreased functional independence. Progression toward goals: 
[]      Improving appropriately and progressing toward goals [x]      Improving slowly and progressing toward goals 
[]      Not making progress toward goals and plan of care will be adjusted PLAN: 
Patient continues to benefit from skilled intervention to address the above impairments. Continue treatment per established plan of care. Discharge Recommendations:  DC home with follow up for home health for assessment and then OPPT. Further Equipment Recommendations for Discharge:  TBD  
 
G-CODES:  
 
Mobility [id number] Current  CL= 60-79%. The severity rating is based on the Level of Assistance required for Functional Mobility and ADLs. SUBJECTIVE:  
Patient stated I just got back from surgery.  OBJECTIVE DATA SUMMARY:  
Critical Behavior: 
Neurologic State: Alert Orientation Level: Oriented X4 Cognition: Appropriate decision making, Follows commands, Appropriate safety awareness Safety/Judgement: Good awareness of safety precautions Functional Mobility Training: 
Bed Mobility: 
Rolling: Supervision Supine to Sit: Minimum assistance Sit to Supine: Minimum assistance Scooting: Moderate assistance Transfers: 
Sit to Stand: Other (comment)(not assessed 2/2 R hip pain) Balance: 
Sitting: Impaired; Without support Sitting - Static: Poor (constant support) Sitting - Dynamic: Poor (constant support)Ambulation/Gait Training: Pt is not appropriate for weight bearing activities secondary to reports of severe R hip pain and no relief from postioning for comfort and for ease with mobility training. Will re-assess once pain is adequately managed. Therapeutic Exercises:  
Pt refreshed with HEP that included demonstration of ankle pumps, heel slides, QS, GS, HS and abd/add, SLR x 15 reps each; 2 sets. Pain: 
Pt reports 9/10 pain or discomfort prior to treatment.  (with pressure from sitting and standing) Pt reports 9/10 pain or discomfort post treatment. Activity Tolerance: Pt with poor tolerance to session 2/2 to possibly the R hip pain and the recent procedure that he had to remove polyps and hemorrhoids. Please refer to the flowsheet for vital signs taken during this treatment. After treatment:  
[] Patient left in no apparent distress sitting up in chair 
[] Patient left in no apparent distress in bed 
[x] Call bell left within reach [x] Nursing notified 
[] Caregiver present 
[] Bed alarm activated Humberto Perdomo Time Calculation: 25 mins

## 2018-11-12 NOTE — ROUTINE PROCESS
Bedside shift change report given to Ruthy. Patient is stable, is continuing to have stools. Wife is present and has been helping him with bedpan and getting to bedside commode.

## 2018-11-12 NOTE — PROGRESS NOTES
Problem: Mobility Impaired (Adult and Pediatric) Goal: *Acute Goals and Plan of Care (Insert Text) Physical Therapy Goals Initiated 11/7/2018 and to be accomplished within 7 day(s) 1. Patient will move from supine to sit and sit to supine, scoot up and down and roll side to side in bed with supervision/set-up. 2.  Patient will transfer from bed to chair and chair to bed with supervision/set-up using the least restrictive device. 3.  Patient will perform sit to stand with supervision/set-up. 4.  Patient will ambulate with supervision/set-up for >150 feet with the least restrictive device. 5.  Patient will ascend/descend 4 stairs using least restrictive device with minimal assistance/contact guard assist.  
Outcome: Progressing Towards Goal 
physical Therapy TREATMENT Patient: Jacob Davila (84 y.o. male) Date: 11/12/2018 Diagnosis: Intractable low back pain Debility Leukocytosis <principal problem not specified> Procedure(s) (LRB): 
COLONOSCOPY with Polypectomies, Bx's, Injection, Tattooing & Clip Placement x 3 (N/A) Day of Surgery Precautions: Fall Chart, physical therapy assessment, plan of care and goals were reviewed. ASSESSMENT: 
Pt is seen for deficits in strength, mobility, transfers, safety and ambulation 2/2 recent admission for intractable LBP. He is agreeable to skilled session after encourgement as he reported that he just returned from surgery. Pt had a procedure this am and nursing has cleared him for skilled PT session. Bed mobility for rolling with supervision from side to side and use of handrails, he requires moderate verbal cues for hand placement and for log rolling technique to edge of bed. Pt with moderate c/o R hip and LBP with even the gentle and slow movement to edge of bed.  He is encouraged to sit for as long as he can and to equalize weight from side to side without success as he reports his pain is at 9/10 when prompted to do so, pt requires sit to supine with only a few seconds of seated activities. Encouraged pt to request pain medication for R hip pain and nurse is also informed of pts complaints. Pt will continue to benefit from skilled PT session to address weakness and decreased functional independence. Progression toward goals: 
[]      Improving appropriately and progressing toward goals [x]      Improving slowly and progressing toward goals 
[]      Not making progress toward goals and plan of care will be adjusted PLAN: 
Patient continues to benefit from skilled intervention to address the above impairments. Continue treatment per established plan of care. Discharge Recommendations:  DC home with follow up for home health for assessment and then OPPT. Further Equipment Recommendations for Discharge:  TBD  
 
G-CODES:  
 
Mobility [id number] Current  CL= 60-79%. The severity rating is based on the Level of Assistance required for Functional Mobility and ADLs. SUBJECTIVE:  
Patient stated I just got back from surgery.  OBJECTIVE DATA SUMMARY:  
Critical Behavior: 
Neurologic State: Alert Orientation Level: Oriented X4 Cognition: Appropriate decision making, Follows commands, Appropriate safety awareness Safety/Judgement: Good awareness of safety precautions Functional Mobility Training: 
Bed Mobility: 
Rolling: Supervision Supine to Sit: Minimum assistance Sit to Supine: Minimum assistance Scooting: Moderate assistance Transfers: 
Sit to Stand: Other (comment)(not assessed 2/2 R hip pain) Balance: 
Sitting: Impaired; Without support Sitting - Static: Poor (constant support) Sitting - Dynamic: Poor (constant support)Ambulation/Gait Training: Pt is not appropriate for weight bearing activities secondary to reports of severe R hip pain and no relief from postioning for comfort and for ease with mobility training. Will re-assess once pain is adequately managed. Therapeutic Exercises: Pt refreshed with HEP that included demonstration of ankle pumps, heel slides, QS, GS, HS and abd/add, SLR x 15 reps each; 2 sets. Pain: 
Pt reports 9/10 pain or discomfort prior to treatment.  (with pressure from sitting and standing) Pt reports 9/10 pain or discomfort post treatment. Activity Tolerance: Pt with poor tolerance to session 2/2 to possibly the R hip pain and the recent procedure that he had to remove polyps and hemorrhoids. Please refer to the flowsheet for vital signs taken during this treatment. After treatment:  
[] Patient left in no apparent distress sitting up in chair 
[] Patient left in no apparent distress in bed 
[x] Call bell left within reach [x] Nursing notified 
[] Caregiver present 
[] Bed alarm activated Laura Rangel Time Calculation: 25 mins

## 2018-11-12 NOTE — ANESTHESIA PREPROCEDURE EVALUATION
Anesthetic History No history of anesthetic complications Review of Systems / Medical History Patient summary reviewed and pertinent labs reviewed Pulmonary Within defined limits Neuro/Psych Within defined limits Cardiovascular Hypertension: well controlled Exercise tolerance: >4 METS 
  
GI/Hepatic/Renal 
Within defined limits Endo/Other Diabetes: type 2 Other Findings Comments:  
 
 
  
 
 
 
 
Physical Exam 
 
Airway Mallampati: III 
TM Distance: 4 - 6 cm Neck ROM: normal range of motion Mouth opening: Normal 
 
 Cardiovascular Regular rate and rhythm,  S1 and S2 normal,  no murmur, click, rub, or gallop Rhythm: regular Rate: normal 
 
 
 
 Dental 
 
Dentition: Poor dentition Comments: The patient has very poor dentition. Loose, broken, and or missing teeth. I explained the risk of dental damage and or loss. The patient understands and accepts these risks. Pulmonary Breath sounds clear to auscultation Abdominal 
GI exam deferred Other Findings Anesthetic Plan ASA: 2 Anesthesia type: MAC Induction: Intravenous Anesthetic plan and risks discussed with: Patient

## 2018-11-12 NOTE — ROUTINE PROCESS
Bedside shift change report given to Delories Moritz, RN (oncoming nurse) by Jade Merino RN (offgoing nurse). Report included the following information SBAR, Kardex, Recent Results and Cardiac Rhythm NSR.

## 2018-11-12 NOTE — ROUTINE PROCESS
Bedside and Verbal shift change report given to Jayant Bain. RN (oncoming nurse) by Bernard Livingston RN (offgoing nurse). Report included the following information SBAR, Kardex, Intake/Output, MAR and Recent Results. Patient resting quietly in bed.

## 2018-11-12 NOTE — PROGRESS NOTES
Pt sleeping soundly when rounding Did not disturb Okay from spine perspective to d/c 
F/u in office with Dr. Steffen Sherman on 12/11/18 at 11:30 am, [facility] Yosef Gusman NP

## 2018-11-12 NOTE — H&P
History and physical has been reviewed. The patient has been examined. There have been no significant clinical changes since the completion of the originally dated History and Physical. 
 
Rosa Barrios MD 
[facility]. 
[email or web address] Phone: [phone] Cell: [phone] [email or web address]

## 2018-11-12 NOTE — ROUTINE PROCESS
Bedside verbal report received from [facility]. Report included SBAR, LABS, VS and summary of care. Patient resting quietly in bed. Will continue to monitor.

## 2018-11-12 NOTE — PROGRESS NOTES
Problem: Pressure Injury - Risk of 
Goal: *Prevention of pressure injury Document Edni Scale and appropriate interventions in the flowsheet. Pressure Injury Interventions: 
  
 
Moisture Interventions: Absorbent underpads Activity Interventions: Increase time out of bed Mobility Interventions: HOB 30 degrees or less Nutrition Interventions: Document food/fluid/supplement intake Friction and Shear Interventions: Minimize layers

## 2018-11-12 NOTE — PERIOP NOTES
TRANSFER - OUT REPORT: 
 
Verbal report given to [facility] RN on Fariba Morales  being transferred to  for routine post - op Report consisted of patients Situation, Background, Assessment and  
Recommendations(SBAR). Information from the following report(s) SBAR and MAR was reviewed with the receiving nurse. Lines:  
Peripheral IV 11/12/18 Left;Posterior Hand (Active) Site Assessment Clean, dry, & intact 11/12/2018 10:37 AM  
Phlebitis Assessment 0 11/12/2018 10:37 AM  
Infiltration Assessment 0 11/12/2018 10:37 AM  
Dressing Status Clean, dry, & intact 11/12/2018 10:37 AM  
Dressing Type Tape;Transparent 11/12/2018 10:37 AM  
Hub Color/Line Status Yellow; Flushed;Capped 11/12/2018 10:37 AM  
  
 
Opportunity for questions and clarification was provided. Patient transported with: 
 [facility]

## 2018-11-12 NOTE — PROGRESS NOTES
SUBJECTIVE: 
 
Feeling hungry. No chest or abdominal pain currently. No SOB or cough. No nausea or vomiting. No dizziness. No rectal bleeding since yesterday OBJECTIVE: 
 
/87   Pulse 89   Temp 98.2 °F (36.8 °C)   Resp 18   Ht 5' 10\" (1.778 m)   Wt 77.4 kg (170 lb 9.6 oz)   SpO2 96%   BMI 24.48 kg/m² CVS: RRR 
RS: CTA bilaterally, no wheezes GI: NT, BS + Extremities: no pedal edema General: NAD, Awake, hoarse voice CNS: moves all extremities while in bed ASSESSMENT: 
 
1. Lower back pain due to # 2 2. Suspected Lumbar inflammatory discitis, L3-4, L4-5. 
3. Staph and strept bacteremia - resolved 4. H/o throat cancer. 5. HTN. 6. DM-2. 
7. Cough and chronic hoarseness of voice 8. Constipation, resolved 9. BRPR, could be hemorrhoidal 
10. Abnormal CT abd pelvis showing perinephric inflammation 11. Descending Polyps x 4 (4mm & 6mm), External Hemorrhoids, Ascending Polyps x 2, Mild Scattered Diverticulosis, Hepatic Flexure Polyps x 6 (4, 7, 9mm), Sigmoid Polyp s/p polypectomy. PLAN: 
 
Cont current management GI input noted - cont liquid diet today. CRS consulted D/w ID - monitor off antibiotic Urology input noted - order PVR and repeat UA, d/w nursing PT/OT to follow Possible discharge in 1-2 days if okay with GI/Urology BMP:  
Lab Results Component Value Date/Time  11/12/2018 03:44 AM  
 K 4.1 11/12/2018 03:44 AM  
  11/12/2018 03:44 AM  
 CO2 24 11/12/2018 03:44 AM  
 AGAP 11 11/12/2018 03:44 AM  
 GLU 92 11/12/2018 03:44 AM  
 BUN 6 (L) 11/12/2018 03:44 AM  
 CREA 0.71 11/12/2018 03:44 AM  
 GFRAA >60 11/12/2018 03:44 AM  
 GFRNA >60 11/12/2018 03:44 AM  
 
CBC:  
Lab Results Component Value Date/Time  WBC 8.7 11/12/2018 03:44 AM  
 HGB 10.9 (L) 11/12/2018 03:44 AM  
 HCT 32.3 (L) 11/12/2018 03:44 AM  
  11/12/2018 03:44 AM

## 2018-11-12 NOTE — ANESTHESIA POSTPROCEDURE EVALUATION
Procedure(s): 
COLONOSCOPY with Polypectomies, Bx's, Injection, Tattooing & Clip Placement x 3. Anesthesia Post Evaluation Multimodal analgesia: multimodal analgesia used between 6 hours prior to anesthesia start to PACU discharge Patient location during evaluation: bedside Patient participation: complete - patient participated Level of consciousness: awake Pain management: adequate Airway patency: patent Anesthetic complications: no 
Cardiovascular status: acceptable Respiratory status: acceptable Hydration status: acceptable Post anesthesia nausea and vomiting:  controlled Visit Vitals /86 (BP 1 Location: Right arm, BP Patient Position: At rest) Pulse 91 Temp 36.7 °C (98.1 °F) Resp 18 Ht 5' 10\" (1.778 m) Wt 77.4 kg (170 lb 9.6 oz) SpO2 95% BMI 24.48 kg/m²

## 2018-11-12 NOTE — PROGRESS NOTES
[email or web address] 
[phone] [id number] [facility], [address] [facility] Procedure Note Adonis Lowry 1950 
[id number] Date of Procedure: 11/12/2018 Preoperative diagnosis: DX Postoperative diagnosis: Descending Polyps x 4 (4mm & 6mm), External Hemorrhoids, Ascending Polyps x 2, Mild Scattered Diverticulosis, Hepatic Flexure Polyps x 6 (4, 7, 9mm), Sigmoid Polyp at 33. Type of Anesthesia: MAC (Monitored anesthesia care) Description of findings: same as post op dx Procedure: Procedure(s): 
COLONOSCOPY with Polypectomies, Bx's, Injection, Tattooing & Clip Placement x 3 :  Dr. Nikhil Easton MD 
 
Assistant(s): Endoscopy Technician-1: Jaci Lake Endoscopy Technician-2: Gladis Ramon Endoscopy RN-1: Pao Manzano RN Endoscopy RN-2: Meade Schilder, DELL; Kassandra Washington, DELL Devices/implants/grafts/tissues/prosthesis: None EBL:None Specimens:  
ID Type Source Tests Collected by Time Destination 1 : Descending Polyps Preservative Colon, Descending  Justus Encinas MD 11/12/2018 1812 Pathology 2 : Ascending  Polyps Preservative Colon, Ascending  Justus Encinas MD 11/12/2018 8703 Pathology 3 : Hepatic Flexure Polyps Preservative Hepatic Flexure  Justus Encinas MD 11/12/2018 1987 Pathology 4 : Sigmoid Polyp Preservative Sigmoid  Justus Encinas MD 11/12/2018 3166 Pathology Findings: See printed and scanned procedure note Complications: None Dr. Nikhil Easton MD 
11/12/2018  10:27 AM

## 2018-11-12 NOTE — PROCEDURES
[email or web address] [facility]  [facility], [address]    Colonoscopy Procedure Note                 Indications:    Lower rectal bleeding     :  Gaby Kuhn MD    Referring Provider: Farhana Liz MD    Sedation:  MAC anesthesia Propofol    Procedure Details:  After informed consent was obtained with all risks and benefits of procedure explained and preoperative exam completed, the patient was taken to the endoscopy suite and placed in the left lateral decubitus position. Upon sequential sedation as per above, a digital rectal exam was performed and was normal.  The Olympus videocolonoscope was inserted in the rectum and carefully advanced to the cecum, which was identified by the ileocecal valve and appendiceal orifice, terminal ileum. The quality of preparation was adequate. The colonoscope was slowly withdrawn with careful evaluation between folds. Retroflexion in the rectum was performed and was normal.    Findings:   Rectum: Rectal mucosal prolapse with large external hemorrhoids. There is stigmata of recent bleeding. Small internal hemorrhoids also noted. Some dilated veins seen in the rectum  Sigmoid: At around 35 cm or so, there is a large flat, sessile polyp-there was some areas of surface changes that were suggestive of dysplasia. The polyp was lifted with eleview and there was a good lift-resected piecemeal-base was cauterized with the tip of the snare-soft coag, and 3 clips were placed to close the defect. Tattoo was then applied to the mucosa in the same orientation as the polyp in 2 locations, one a fold proximal and a fold distal to the polypectomy site    Descending Colon: 4 Small sessile polyps-ranging from 3 mm to 6 mm-3 removed by cold snare, 1 by cold forcep biopsy. Few scattered diverticula seen. Transverse Colon: Few scattered diverticula also seen.   Hepatic flexure: 6 sessile polyps seen ranging from 4 mm to 9 mm. All removed by cold snare  Ascending Colon: Few scattered diverticula seen. 2 Small polyps 4 mm to 6 mm, removed by cold snare  Cecum: normal  Terminal Ileum: normal    Interventions: See below    Specimen Removed:    ID Type Source Tests Collected by Time Destination   1 : Descending Polyps Preservative Colon, Descending  Angie Celaya MD 11/12/2018 6616 Pathology   2 : Ascending  Polyps Preservative Colon, Ascending  Angie Celaya MD 11/12/2018 0915 Pathology   3 : Hepatic Flexure Polyps Preservative Hepatic Flexure  Angie Celaya MD 11/12/2018 5281 Pathology   4 : Sigmoid Polyp Preservative Sigmoid  Angie Celaya MD 11/12/2018 5834 Pathology       Complications: None. EBL:  None. Recommendations:     1. Stay on clears today-watch for bleeding from polypectomy site. 2. If large polyp has dysplasia/cancer, will likely need a limited colectomy. IF no dysplasia seen, will repeat colonoscopy in 3 to 6 months to look at the site again  3. Recommend colorectal surgery for evaluation of the hemorrhoids.        Ja Byrne MD  11/12/2018  10:18 AM

## 2018-11-13 ENCOUNTER — HOME HEALTH ADMISSION (OUTPATIENT)
Dept: HOME HEALTH SERVICES | Facility: HOME HEALTH | Age: 68
End: 2018-11-13
Payer: MEDICARE

## 2018-11-13 VITALS
OXYGEN SATURATION: 93 % | HEIGHT: 70 IN | BODY MASS INDEX: 24.42 KG/M2 | RESPIRATION RATE: 18 BRPM | TEMPERATURE: 98.5 F | SYSTOLIC BLOOD PRESSURE: 120 MMHG | WEIGHT: 170.6 LBS | DIASTOLIC BLOOD PRESSURE: 86 MMHG | HEART RATE: 94 BPM

## 2018-11-13 LAB
ANION GAP SERPL CALC-SCNC: 13 MMOL/L (ref 3–18)
BASOPHILS # BLD: 0 K/UL (ref 0–0.1)
BASOPHILS NFR BLD: 0 % (ref 0–2)
BUN SERPL-MCNC: 7 MG/DL (ref 7–18)
BUN/CREAT SERPL: 9 (ref 12–20)
CALCIUM SERPL-MCNC: 8.6 MG/DL (ref 8.5–10.1)
CHLORIDE SERPL-SCNC: 100 MMOL/L (ref 100–108)
CO2 SERPL-SCNC: 23 MMOL/L (ref 21–32)
CREAT SERPL-MCNC: 0.76 MG/DL (ref 0.6–1.3)
DIFFERENTIAL METHOD BLD: ABNORMAL
EOSINOPHIL # BLD: 0.2 K/UL (ref 0–0.4)
EOSINOPHIL NFR BLD: 1 % (ref 0–5)
ERYTHROCYTE [DISTWIDTH] IN BLOOD BY AUTOMATED COUNT: 12.7 % (ref 11.6–14.5)
GLUCOSE BLD STRIP.AUTO-MCNC: 106 MG/DL (ref 70–110)
GLUCOSE BLD STRIP.AUTO-MCNC: 112 MG/DL (ref 70–110)
GLUCOSE BLD STRIP.AUTO-MCNC: 87 MG/DL (ref 70–110)
GLUCOSE SERPL-MCNC: 86 MG/DL (ref 74–99)
HCT VFR BLD AUTO: 30 % (ref 36–48)
HGB BLD-MCNC: 10.1 G/DL (ref 13–16)
LYMPHOCYTES # BLD: 1.8 K/UL (ref 0.9–3.6)
LYMPHOCYTES NFR BLD: 16 % (ref 21–52)
MCH RBC QN AUTO: 29.8 PG (ref 24–34)
MCHC RBC AUTO-ENTMCNC: 33.7 G/DL (ref 31–37)
MCV RBC AUTO: 88.5 FL (ref 74–97)
MONOCYTES # BLD: 0.9 K/UL (ref 0.05–1.2)
MONOCYTES NFR BLD: 8 % (ref 3–10)
NEUTS SEG # BLD: 8.6 K/UL (ref 1.8–8)
NEUTS SEG NFR BLD: 75 % (ref 40–73)
PLATELET # BLD AUTO: 290 K/UL (ref 135–420)
PMV BLD AUTO: 8.9 FL (ref 9.2–11.8)
POTASSIUM SERPL-SCNC: 3.6 MMOL/L (ref 3.5–5.5)
RBC # BLD AUTO: 3.39 M/UL (ref 4.7–5.5)
SODIUM SERPL-SCNC: 136 MMOL/L (ref 136–145)
WBC # BLD AUTO: 11.5 K/UL (ref 4.6–13.2)

## 2018-11-13 PROCEDURE — 85025 COMPLETE CBC W/AUTO DIFF WBC: CPT

## 2018-11-13 PROCEDURE — 82962 GLUCOSE BLOOD TEST: CPT

## 2018-11-13 PROCEDURE — 74011000250 HC RX REV CODE- 250: Performed by: INTERNAL MEDICINE

## 2018-11-13 PROCEDURE — 36415 COLL VENOUS BLD VENIPUNCTURE: CPT

## 2018-11-13 PROCEDURE — 80048 BASIC METABOLIC PNL TOTAL CA: CPT

## 2018-11-13 PROCEDURE — 74011250637 HC RX REV CODE- 250/637: Performed by: INTERNAL MEDICINE

## 2018-11-13 PROCEDURE — 87086 URINE CULTURE/COLONY COUNT: CPT

## 2018-11-13 PROCEDURE — 97116 GAIT TRAINING THERAPY: CPT

## 2018-11-13 RX ORDER — PSYLLIUM HUSK 0.4 G
1 CAPSULE ORAL DAILY
Qty: 30 CAP | Refills: 1 | Status: SHIPPED | OUTPATIENT
Start: 2018-11-13 | End: 2018-12-22

## 2018-11-13 RX ORDER — OXYCODONE AND ACETAMINOPHEN 5; 325 MG/1; MG/1
1 TABLET ORAL
Qty: 20 TAB | Refills: 0 | Status: ON HOLD | OUTPATIENT
Start: 2018-11-13 | End: 2018-12-21 | Stop reason: SDUPTHER

## 2018-11-13 RX ORDER — CYCLOBENZAPRINE HCL 5 MG
5 TABLET ORAL
Qty: 20 TAB | Refills: 0 | Status: SHIPPED | OUTPATIENT
Start: 2018-11-13 | End: 2020-02-11

## 2018-11-13 RX ADMIN — OXYCODONE AND ACETAMINOPHEN 1 TABLET: 5; 325 TABLET ORAL at 07:03

## 2018-11-13 RX ADMIN — HYDROCORTISONE ACETATE PRAMOXINE HCL: 2.5; 1 CREAM TOPICAL at 10:35

## 2018-11-13 RX ADMIN — LOSARTAN POTASSIUM 50 MG: 50 TABLET ORAL at 10:35

## 2018-11-13 RX ADMIN — OXYCODONE AND ACETAMINOPHEN 1 TABLET: 5; 325 TABLET ORAL at 00:29

## 2018-11-13 RX ADMIN — FERROUS SULFATE TAB 325 MG (65 MG ELEMENTAL FE) 325 MG: 325 (65 FE) TAB at 10:35

## 2018-11-13 RX ADMIN — NEBIVOLOL HYDROCHLORIDE 10 MG: 5 TABLET ORAL at 10:35

## 2018-11-13 RX ADMIN — OMEGA-3 FATTY ACIDS CAP 1000 MG 1 CAPSULE: 1000 CAP at 10:35

## 2018-11-13 RX ADMIN — OXYCODONE AND ACETAMINOPHEN 1 TABLET: 5; 325 TABLET ORAL at 14:40

## 2018-11-13 NOTE — PROGRESS NOTES
NUTRITION Nutrition Screen RECOMMENDATIONS / PLAN:  
 
- Recommend advancing diet as tolerated to diabetic diet. - Continue RD inpatient monitoring and evaluation. NUTRITION INTERVENTIONS & DIAGNOSIS:  
 
[x] Meals/snacks: modify composition Nutrition Diagnosis: Inadequate energy intake related to current diet order in relation to pt's estimated energy needs as evidenced by pt restricted to clear liquid diet s/p colonoscopy. ASSESSMENT:  
 
Pt s/p colonoscopy on clear liquid diet with good appetite/meal intake. Pt hx of throat cancer with surgery and hx of PEG x 10 years ago, PEG removed and pt tolerating oral diet with stable weight x many years. Tolerating diet and hungry for solid foods. Average po intake adequate to meet patients estimated nutritional needs:   [] Yes     [x] No   [] Unable to determine at this time Diet: DIET CLEAR LIQUID Food Allergies: NKFA Current Appetite:   [x] Good     [] Fair     [] Poor     [] Other: 
Appetite/meal intake prior to admission:   [x] Good     [] Fair     [] Poor     [x] Other: 2-3 meals/day and supplements as needed (Ensure, Boost) Feeding Limitations:  [x] Swallowing difficulty: SLP evaluation completed, signed off 11/9/18     [] Chewing difficulty    [] Other: 
Current Meal Intake:  
Patient Vitals for the past 100 hrs: 
 % Diet Eaten 11/10/18 0917 30 % BM: 11/12 Skin Integrity: WDL Edema:   [x] No     [] Yes Pertinent Medications: Reviewed: ferrous sulfate, SSI, fish oil, zofran, miralax Recent Labs 11/13/18 
0159 11/12/18 
0344 11/11/18 
0126  139  --   
K 3.6 4.1 3.5  104  --   
CO2 23 24  --   
GLU 86 92  --   
BUN 7 6*  --   
CREA 0.76 0.71  --   
CA 8.6 9.0  -- Intake/Output Summary (Last 24 hours) at 11/13/2018 1051 Last data filed at 11/13/2018 8426 Gross per 24 hour Intake 150 ml Output 600 ml Net -450 ml Anthropometrics: 
Ht Readings from Last 1 Encounters: 11/09/18 5' 10\" (1.778 m) Last 3 Recorded Weights in this Encounter 11/09/18 0509 11/09/18 1355 11/12/18 0407 Weight: 78.7 kg (173 lb 6.4 oz) 78.5 kg (173 lb) 77.4 kg (170 lb 9.6 oz) Body mass index is 24.48 kg/m². Weight History: Pt reports stable weight hx x several months PTA. Pt reports weight loss x 10 years ago during time period with throat cancer, regained weight with PEG tube and EN. PEG removed many years ago and pt remains with stable weight hx. Weight Metrics 11/12/2018 2/12/2016 2/9/2016 2/9/2016 2/8/2016 3/24/2015 Weight 170 lb 9.6 oz - 178 lb - 178 lb 169 lb BMI 24.48 kg/m2 25.54 kg/m2 - 25.54 kg/m2 - 23.9 kg/m2 Admitting Diagnosis: Intractable low back pain Debility Leukocytosis Pertinent PMHx: throat cancer with surgery (2008) with hx of PEG, HTN, DM, GERD Education Needs:        [x] None identified  [] Identified - Not appropriate at this time  []  Identified and addressed - refer to education log Learning Limitations:   [x] None identified  [] Identified Cultural, Islam & ethnic food preferences:  [x] None identified    [] Identified and addressed ESTIMATED NUTRITION NEEDS:  
 
Calories: 0727-6050 kcal (MSJx1.2-1.3) based on  [x] Actual BW: 77 kg      [] IBW Protein: 62-77 gm (0.8-1 gm/kg) based on  [] Actual BW      [] IBW Fluid: 1 mL/kcal 
  
MONITORING & EVALUATION:  
 
Nutrition Goal(s): 1. Po intake of meals will meet >75% of patient estimated nutritional needs within the next 7 days. Outcome:  [] Met/Ongoing    []  Not Met    [x] New/Initial Goal  
 
Monitoring:   [x] Food and beverage intake   [x] Diet order   [x] Nutrition-focused physical findings   [x] Treatment/therapy   [] Weight   [] Enteral nutrition intake Previous Recommendations (for follow-up assessments only):     []   Implemented       []   Not Implemented (RD to address)      [] No Longer Appropriate     [] No Recommendation Made Discharge Planning: diabetic diet [x] Participated in care planning, discharge planning, & interdisciplinary rounds as appropriate [address] RD Pager: [phone]

## 2018-11-13 NOTE — PROGRESS NOTES
Problem: Pressure Injury - Risk of 
Goal: *Prevention of pressure injury Document Edin Scale and appropriate interventions in the flowsheet. Outcome: Progressing Towards Goal 
Pressure Injury Interventions: 
  
 
Moisture Interventions: Absorbent underpads, Maintain skin hydration (lotion/cream), Minimize layers Activity Interventions: Assess need for specialty bed, Increase time out of bed, Pressure redistribution bed/mattress(bed type), PT/OT evaluation Mobility Interventions: HOB 30 degrees or less, Pressure redistribution bed/mattress (bed type), PT/OT evaluation Nutrition Interventions: Document food/fluid/supplement intake Friction and Shear Interventions: Apply protective barrier, creams and emollients, HOB 30 degrees or less, Minimize layers

## 2018-11-13 NOTE — PROGRESS NOTES
Progress Note Patient: Kobe Mcclain MRN: [id number]  SSN: [id number] YOB: 1950  Age: 80 y.o. Sex: male Admit Date: 11/5/2018 LOS: 8 days Assessment: #1 Positive blood cultures #2 RP stranding #3 Lumbosacral back pain UA without hematuria or pyuria off Abx. Ordered to document no growth today. RP stranding is impressive but emptying bladder, no hydro and no obstructing or clinically sig stones. No obvious source for this. No  intervention necessary at this time. Will have follow up in 3 months with repeat CT Abd/Pel w/ IV contrast to see if any change. Plan: 1. No Gu intervention 2. Follow up in 3 months with Repeat CT Abd/Pel w/ IV contrast  
 
Will sign off call with questions. Signed By: Tessy Acevedo MD   
 November 13, 2018 PAGER:  [phone] OFFICE:  [id number] [address] [place] [address] Subjective: No events overnight. Pain unchanged. Objective:  
 
Vitals:  
 11/12/18 1613 11/12/18 2046 11/12/18 2300 11/13/18 0400 BP: (!) 167/91 (!) 131/91 120/81 152/88 Pulse: 95 90 95 86 Resp: 18 20 20 18 Temp: 99.2 °F (37.3 °C) 98.3 °F (36.8 °C) 98.3 °F (36.8 °C) 98.5 °F (36.9 °C) SpO2: 96% 98% 98% 95% Weight:      
Height:      
  
 
Intake and Output: 
Current Shift: 11/12 1901 - 11/13 0700 In: 150 [P.O.:150] Out: 300 [Urine:300] Last three shifts: 11/11 0701 - 11/12 1900 In: -  
Out: [address] [Urine:2150] Current Facility-Administered Medications Medication Dose Route Frequency  albuterol-ipratropium (DUO-NEB) 2.5 MG-0.5 MG/3 ML  3 mL Nebulization Q4H PRN  
 ferrous sulfate tablet 325 mg  1 Tab Oral DAILY WITH BREAKFAST  hydrocortisone-pramoxine (ANALPRAM HC 2.5%-1%) 2.5-1 % rectal cream   Rectal TID  lidocaine 4 % patch 1 Patch  1 Patch TransDERmal Q24H  
 oxyCODONE-acetaminophen (PERCOCET) 5-325 mg per tablet 1 Tab  1 Tab Oral Q6H PRN  
  polyethylene glycol (MIRALAX) packet 17 g  17 g Oral BID  cloNIDine HCl (CATAPRES) tablet 0.1 mg  0.1 mg Oral Q8H PRN  
 omega 3-DHA-EPA-fish oil 1,000 mg (120 mg-180 mg) capsule 1 Cap  1 Cap Oral BID  latanoprost (XALATAN) 0.005 % ophthalmic solution 1 Drop  1 Drop Both Eyes QPM  
 beclomethasone dipropionate (QNASL 80mcg) HFAA 2 Spray (Patient Supplied)  2 Spray Nasal DAILY  sodium chloride (OCEAN) 0.65 % nasal squeeze bottle 2 Spray  2 Spray Both Nostrils Q2H PRN  
 losartan (COZAAR) tablet 50 mg  50 mg Oral DAILY  nebivolol (BYSTOLIC) tablet 10 mg  10 mg Oral DAILY  ondansetron (ZOFRAN) injection 4 mg  4 mg IntraVENous Q4H PRN  
 insulin lispro (HUMALOG) injection   SubCUTAneous AC&HS  
 glucose chewable tablet 16 g  16 g Oral PRN  
 glucagon (GLUCAGEN) injection 1 mg  1 mg IntraMUSCular PRN  
 dextrose (D50W) injection syrg 12.5-25 g  25-50 mL IntraVENous PRN Physical Exam:  
GENERAL: alert, cooperative, no distress, appears stated age LUNG: unlabored breathing ABDOMEN: soft, non-tender. No masses,  no organomegaly EXTREMITIES:  extremities normal, atraumatic, no cyanosis or edema SKIN: no jaundice : no CVA tenderness Lab/Data Review: 
BMP:  
Lab Results Component Value Date/Time  11/13/2018 01:59 AM  
 K 3.6 11/13/2018 01:59 AM  
  11/13/2018 01:59 AM  
 CO2 23 11/13/2018 01:59 AM  
 AGAP 13 11/13/2018 01:59 AM  
 GLU 86 11/13/2018 01:59 AM  
 BUN 7 11/13/2018 01:59 AM  
 CREA 0.76 11/13/2018 01:59 AM  
 GFRAA >60 11/13/2018 01:59 AM  
 GFRNA >60 11/13/2018 01:59 AM  
 
CBC:  
Lab Results Component Value Date/Time WBC 11.5 11/13/2018 01:59 AM  
 HGB 10.1 (L) 11/13/2018 01:59 AM  
 HCT 30.0 (L) 11/13/2018 01:59 AM  
  11/13/2018 01:59 AM  
 
COAGS: No results found for: APTT, PTP, INR  
 
 
CT Results: 
Results from Hospital Encounter encounter on 11/05/18 CT CHEST ABD PELV W CONT Narrative CT CHEST ABDOMEN AND PELVIS WITH CONTRAST COMPARISON:  August 12, 2008 . INDICATIONS:    Oroesophageal carcinoma with cough, GI bleeding, abnormal 
retroperitoneum on MR. Following the uneventful administration of  oral contrast and  100 cc of Isovue 300 scanning of the chest, abdomen and pelvis is performed with a multislice 
scanner. Coronal sagittal and axial reconstructions were created from the 3 D 
data set. FINDINGS:  
 
CT CHEST FINDINGS: 
 
Thyroid/Base Of Neck: Normal. 
 
Lungs: There is some bleb formation at the pulmonary apices. No nodule mass or acute 
infiltrate is evident [place] Pleural Spaces: 
Unremarkable. Chest Wall: No breast mass is evident. No axillary lymphadenopathy is evident. Mediastinum, Nidia, Great Vessels, Heart: 
Unremarkable. . 
 
CT ABDOMEN FINDINGS:  
 
Liver/Biliary: Normal. 
 
Spleen: Normal. 
 
Adrenal Glands: Normal. 
 
Kidneys: There is perinephric edema bilaterally. A cyst is seen in the anterior 
right kidney measuring 0.9 cm. A cystic lesion in the left kidney extends into 
the pelvic region measuring 6.3 x 4 cm. Punctate nonobstructive intrarenal 
calculi are present. There are a few sub centimeter low attenuation foci present 
most likely representing small cysts but too small to confidently characterize. There is no hydronephrosis. Negative otherwise. [place] Pancreas: Normal. 
 
Stomach, Small Bowel, appendix, and Colon: The stomach is unremarkable. There is 
dilation predominantly with gas of small bowel loops throughout the abdomen. This is diffuse. The appendix is normal. There is some distention of the colon 
contains a moderate but unremarkable burden of stool. No obstructing process is 
evident. [place] There is no significant adenopathy. The abdominal aorta is unremarkable. The IVC is unremarkable. Peritoneal Spaces: There is no free fluid or free air. Abdominal Wall: No hernia or mass is evident.  
 
CT PELVIS FINDINGS:  
 
 Bladder: The bladder is unremarkable in appearance. Pelvic Small Bowel And Colon: Unremarkable. Lymph Nodes, Soft Tissues: There is no adenopathy. Inflammatory character 
stranding extending from the kidneys does extend into the pelvic sidewalls along 
the anterior pararenal spaces. This is relatively mild in severity. There is no 
fluid collection or abscess. [place] Free Fluid: Not present. Osseous Structures Of The Chest, Abdomen And Pelvis: The lumbar spinal canal is 
relatively narrowed due to short pedicles. . Senescent changes noted consistent 
with age. Impression IMPRESSION:  
 
There is predominantly perinephric and retroperitoneal edema bilaterally. Infrequent causes for this are renal inflammation-most frequently either 
infection or nephritis or pancreatitis. Some nonspecific stranding can be seen in the elderly and the finding here is 
not definitely beyond the range. Diffuse distention of bowel loops predominantly with gas in a nonobstructive 
pattern-the appearance favors adynamic ileus of indeterminate etiology. . 
 
All CT scans at this facility are performed using dose optimization technique as 
appropriate to the performed exam, to include automated exposure control, 
adjustment of the mA and/or kV according to patient's size (Including 
appropriate matching for site-specific examinations), or use of iterative 
reconstruction technique. US Results: No results found for this or any previous visit. Active Problems: 
  Debility (11/5/2018) Leukocytosis (11/5/2018) Intractable low back pain (11/5/2018) Colon polyps (11/12/2018) Hemorrhoids (11/12/2018)

## 2018-11-13 NOTE — PROGRESS NOTES
[facility] [employer] notified of DME order. Pt signed  [employer] for [address]. Home health referral placed in queue for [address] and Lavada June was notified. Important Message from [facility]" reviewed and explained with the patient and/or representative at bedside and signature was obtained. A signed copy provided to patient/representative. Original signed document placed in patient's chart.

## 2018-11-13 NOTE — DISCHARGE SUMMARY
[facility]  Discharge Summary       Patient: Kesha Bull Age: 80 y.o. : 1950 MR#: [id number] SSN: [id number]  PCP on record: Maggie Pittman MD  Admit date: 2018  Discharge date: 2018    Consults:  -Dr Stacie Nguyen., -orthopedic surgery  -Dr Omer Rasmussen., -ID  -DEEDEE Renee, GI  -Dr Nikki Burton., Urology      Procedures: -Colonoscopy 18:   Findings:   Rectum: Rectal mucosal prolapse with large external hemorrhoids. There is stigmata of recent bleeding. Small internal hemorrhoids also noted. Some dilated veins seen in the rectum  Sigmoid: At around 35 cm or so, there is a large flat, sessile polyp-there was some areas of surface changes that were suggestive of dysplasia. The polyp was lifted with eleview and there was a good lift-resected piecemeal-base was cauterized with the tip of the snare-soft coag, and 3 clips were placed to close the defect.      Tattoo was then applied to the mucosa in the same orientation as the polyp in 2 locations, one a fold proximal and a fold distal to the polypectomy site     Descending Colon: 4 Small sessile polyps-ranging from 3 mm to 6 mm-3 removed by cold snare, 1 by cold forcep biopsy. Few scattered diverticula seen. Transverse Colon: Few scattered diverticula also seen. Hepatic flexure: 6 sessile polyps seen ranging from 4 mm to 9 mm. All removed by cold snare  Ascending Colon: Few scattered diverticula seen. 2 Small polyps 4 mm to 6 mm, removed by cold snare  Cecum: normal  Terminal Ileum: normal      -     Significant Diagnostic Studies: -CT spine lumbar wo contrast 18: IMPRESSION:      Mild scoliosis convex to the left centered about L4; subtle anterolisthesis of  L4 on L5.     Posterior ligamentous prominence and facet spurring may result in moderate canal  stenosis at L2-3 and L3-4. There may be minimal canal encroachment at L4-5.    Canal otherwise patent.     Bilateral mild-to-moderate foraminal stenosis, most significant at L3-4 and  L4-5, right slightly greater than left, as described. -CT hip RT 11/08/18: IMPRESSION:      There is some nonspecific density/fluid or edema in the right paracolic gutter,  as described; this is only partially imaged and may continue cephalad as more  significant inflammatory change. The psoas and iliacus muscles are normal.  If  clinical suspicion persists, CT abdomen/pelvis may be helpful. -CT chest abd pelvis 11/09/18: There is predominantly perinephric and retroperitoneal edema bilaterally. Infrequent causes for this are renal inflammation-most frequently either  infection or nephritis or pancreatitis. Some nonspecific stranding can be seen in the elderly and the finding here is  not definitely beyond the range. Diffuse distention of bowel loops predominantly with gas in a nonobstructive  pattern-the appearance favors adynamic ileus of indeterminate etiology    -Echo 11/09/18: Estimated left ventricular ejection fraction is 56 - 60%. Visually measured ejection fraction. Normal left ventricular wall motion, no regional wall motion abnormality noted. Age-appropriate left ventricular diastolic function. There is no evidence of pulmonary hypertension. -MRI lumbar spine w wo contrast 11/05/18:Multifactorial moderate and severe degrees of foraminal stenosis from L1/2  disc level through the sacrum primarily due to epidural lipomatosis with  superimposed degenerative changes at the level of the discs, there is likely  underlying developmentally small spinal canal.     2. Significant facet arthropathy at L2/3, L3/4 and L4/5 with effusions and  multifocal areas of enhancing edema suggesting inflammation, not convincing for  infection. -Grade 1 anterolisthesis at L4/5.  -Less pronounced mild degenerative disc disease at multiple levels. -Multilevel neural foraminal stenosis as discussed.     3.  Poorly evaluated large left renal cyst, predominantly nonenhancing but not  well evaluated due to motion artifact and consider evaluation with ultrasound on  a nonemergent basis. -MRI lumbar spine w/o contrast 11/07/18: IMPRESSION  IMPRESSION:     1. Extensive retroperitoneal edema/stranding, nonspecific. Correlate clinically  for UTI/pyelonephritis. Bilateral probable renal cysts. -There is edema about the psoas musculature, but no evidence of intramuscular  abscess.     2. Multilevel severe facet arthropathy with associated small facet effusions,  and right L4-L5 posterior facet synovial cysts. Also at L4 to left pedicle and  facet marrow edema, and perifacet edema. Findings may all be due to active facet  degeneration. Early infection less likely but may be considered in the  appropriate clinical setting  -No evidence of discitis/osteomyelitis.     3. Advanced spinal canal and thecal sac stenosis largely due to epidural  lipomatosis and degenerative changes are not significantly changed since MRI  from 2 days prior. See prior MRI report for further detail.     Discharge Diagnoses:  -Lower back pain due to suspected lumbar inflammatory discitis L3-4, L4-5  -Staph and strep bacteremia  -Acute blood loss anemia due to below  -GI bleed suspected to be due to large external hemorrhoids  -Multiple sessile polyps seen on colo s/p polypectomey                                             Patient Active Problem List   Diagnosis Code    Debility R53.81    Leukocytosis D72.829    Intractable low back pain M54.5    Colon polyps K63.5    Hemorrhoids K64.9       Hospital Course by Problem     80 y o male w/ multiple medical problems presented to the ED w/ cc of low back pain onset day before presentation. Also c/o right leg weakness and difficulty walking. He denied bladder, bowel symptoms, no f/c.  MRI lumbar spine done in ED showed abnormalities (pasted above), spinal surgeon was consulted and due to suspicion of epidural infection and he was thus admitted for further eval and management. Pt was noted to have elevated CRP and bacteremia and there was concern that he had ealry discitis. Pt had multiple imaging including CT and MRI studies, results all pasted above. Per radiologist read, no clear evidence of infection process noted in any of the imagings. Although there was finding of bilateral perinephric edema on CT abd pelivs, there was no clinical evidence of UTI and pt did not have evidence of Urinary retention. Bacteria noted to be growing in blood cultures were thought to be, by ID, contaminants. Pt grew Staph. hemolyticus, and warneri, Strep vifidans and coag negative Staph. ID recommended holding abx and pt continued to get well off abx. Urology saw pt for positive blood cx, retroperitoneal stranding and lumbosacral/back pain and recommended no intervention and to f/u in 3 months w/ repeat CT/abd/pel w/ IV contrast.    Pt had rectal bleeding during his stay in the hospital while sitting in bed quantified small -moderate bright red blood, not associated w/ rectal/abd pain. He was seen by the GI consultant and underwent colonoscopy, findings pasted above. Pt had multiple sessile polyps. Polyps were removed and pt is to f/u w/ GI.             Today's examination of the patient revealed:   Alert, awake in NAD  Subjective:     Objective:   VS:   Visit Vitals  /86 (BP 1 Location: Left arm, BP Patient Position: At rest)   Pulse 98   Temp 98 °F (36.7 °C)   Resp 18   Ht 5' 10\" (1.778 m)   Wt 77.4 kg (170 lb 9.6 oz)   SpO2 99%   BMI 24.48 kg/m²      Tmax/24hrs: Temp (24hrs), Av.5 °F (36.9 °C), Min:98 °F (36.7 °C), Max:99.2 °F (37.3 °C)     Input/Output:     Intake/Output Summary (Last 24 hours) at 2018 1359  Last data filed at 2018 0659  Gross per 24 hour   Intake 150 ml   Output 600 ml   Net -450 ml       General:  Alert, awake in NAD  Cardiovascular:  Rrr, no murmurs  Pulmonary: ctab   GI:soft, nt, nd    Extremities:  No edema  Additional:      Labs: Recent Results (from the past 24 hour(s))   GLUCOSE, POC    Collection Time: 11/12/18  4:12 PM   Result Value Ref Range    Glucose (POC) 103 70 - 110 mg/dL   GLUCOSE, POC    Collection Time: 11/12/18 10:00 PM   Result Value Ref Range    Glucose (POC) 114 (H) 70 - 110 mg/dL   CBC WITH AUTOMATED DIFF    Collection Time: 11/13/18  1:59 AM   Result Value Ref Range    WBC 11.5 4.6 - 13.2 K/uL    RBC 3.39 (L) 4.70 - 5.50 M/uL    HGB 10.1 (L) 13.0 - 16.0 g/dL    HCT 30.0 (L) 36.0 - 48.0 %    MCV 88.5 74.0 - 97.0 FL    MCH 29.8 24.0 - 34.0 PG    MCHC 33.7 31.0 - 37.0 g/dL    RDW 12.7 11.6 - 14.5 %    PLATELET 345 105 - 924 K/uL    MPV 8.9 (L) 9.2 - 11.8 FL    NEUTROPHILS 75 (H) 40 - 73 %    LYMPHOCYTES 16 (L) 21 - 52 %    MONOCYTES 8 3 - 10 %    EOSINOPHILS 1 0 - 5 %    BASOPHILS 0 0 - 2 %    ABS. NEUTROPHILS 8.6 (H) 1.8 - 8.0 K/UL    ABS. LYMPHOCYTES 1.8 0.9 - 3.6 K/UL    ABS. MONOCYTES 0.9 0.05 - 1.2 K/UL    ABS. EOSINOPHILS 0.2 0.0 - 0.4 K/UL    ABS.  BASOPHILS 0.0 0.0 - 0.1 K/UL    DF AUTOMATED     METABOLIC PANEL, BASIC    Collection Time: 11/13/18  1:59 AM   Result Value Ref Range    Sodium 136 136 - 145 mmol/L    Potassium 3.6 3.5 - 5.5 mmol/L    Chloride 100 100 - 108 mmol/L    CO2 23 21 - 32 mmol/L    Anion gap 13 3.0 - 18 mmol/L    Glucose 86 74 - 99 mg/dL    BUN 7 7.0 - 18 MG/DL    Creatinine 0.76 0.6 - 1.3 MG/DL    BUN/Creatinine ratio 9 (L) 12 - 20      GFR est AA >60 >60 ml/min/1.73m2    GFR est non-AA >60 >60 ml/min/1.73m2    Calcium 8.6 8.5 - 10.1 MG/DL   GLUCOSE, POC    Collection Time: 11/13/18  7:51 AM   Result Value Ref Range    Glucose (POC) 87 70 - 110 mg/dL   GLUCOSE, POC    Collection Time: 11/13/18 12:10 PM   Result Value Ref Range    Glucose (POC) 112 (H) 70 - 110 mg/dL     Additional Data Reviewed:     Condition: stable  Disposition:    []Home   [x]Home with Home Health   []SNF/NH   []Rehab   []Home with family   []Alternate Facility:____________________      Discharge Medications:     Current Discharge Medication List      START taking these medications    Details   oxyCODONE-acetaminophen (PERCOCET) 5-325 mg per tablet Take 1 Tab by mouth every six (6) hours as needed. Max Daily Amount: 4 Tabs. Qty: 20 Tab, Refills: 0    Associated Diagnoses: Intractable back pain      cyclobenzaprine (FLEXERIL) 5 mg tablet Take 1 Tab by mouth three (3) times daily as needed for Muscle Spasm(s). Qty: 20 Tab, Refills: 0         CONTINUE these medications which have NOT CHANGED    Details   latanoprost (XALATAN) 0.005 % ophthalmic solution Administer 1 Drop to both eyes nightly. beclomethasone dipropionate (QNASL) 80 mcg/actuation HFAA 80 mcg by Nasal route daily. Directions on at home label are as follows: Use 2 sprays in each Nostril Daily      metFORMIN (GLUCOPHAGE) 1,000 mg tablet Take 1,000 mg by mouth daily. losartan (COZAAR) 50 mg tablet Take  by mouth daily. nebivolol (BYSTOLIC) 10 mg tablet Take  by mouth daily. omeprazole (PRILOSEC) 20 mg capsule Take 20 mg by mouth daily. Dexlansoprazole 60 mg CpDB Take  by mouth.      calcium-cholecalciferol, d3, 600-125 mg-unit tab Take  by mouth. omega-3 fatty acids-vitamin e 1,000 mg cap Take 1 Cap by mouth. IRON, FERROUS SULFATE, PO Take  by mouth. 3 times a week      Aspirin, Buffered 81 mg tab Take  by mouth. Follow-up Appointments:   1.  Your PCP: Alli Crandall MD, within 7-10days    >30 minutes spent coordinating this discharge (review instructions/follow-up, prescriptions, preparing report for sign off)    Signed:  Jones Farrell MD  11/13/2018  1:59 PM

## 2018-11-13 NOTE — CDMP QUERY
Thank you for your documentation on 11/13 of : \"Anemia, mild; most likely from GI bleeding- stable at [facility] 10/30\" Please clarify  further if  
 
=>Acute blood loss anemia 
=>Other Explanation of clinical findings =>Unable to Determine (no explanation of clinical findings) The medical record reflects the following: 
 
Risk:Lower GI bleeding/rectal bleeding: suspect from hemorrhoids given it appears that there was recent bleeding;  
 
Clinical Indicators: Adm H/H 14/40.1      11/13 H/H  10.1/30 Treatment: GI consult, colonoscopy, monitoring H/H Please clarify your clinical opinion in the progress notes and discharge summary. Thank you, [address] [facility], [address]

## 2018-11-13 NOTE — DISCHARGE INSTRUCTIONS
Learning About Relief for Back Pain  What is back tension and strain? Back strain happens when you overstretch, or pull, a muscle in your back. You may hurt your back in an accident or when you exercise or lift something. Most back pain will get better with rest and time. You can take care of yourself at home to help your back heal.  What can you do first to relieve back pain? When you first feel back pain, try these steps:  · Walk. Take a short walk (10 to 20 minutes) on a level surface (no slopes, hills, or stairs) every 2 to 3 hours. Walk only distances you can manage without pain, especially leg pain. · Relax. Find a comfortable position for rest. Some people are comfortable on the floor or a medium-firm bed with a small pillow under their head and another under their knees. Some people prefer to lie on their side with a pillow between their knees. Don't stay in one position for too long. · Try heat or ice. Try using a heating pad on a low or medium setting, or take a warm shower, for 15 to 20 minutes every 2 to 3 hours. Or you can buy single-use heat wraps that last up to 8 hours. You can also try an ice pack for 10 to 15 minutes every 2 to 3 hours. You can use an ice pack or a bag of frozen vegetables wrapped in a thin towel. There is not strong evidence that either heat or ice will help, but you can try them to see if they help. You may also want to try switching between heat and cold. · Take pain medicine exactly as directed. ¨ If the doctor gave you a prescription medicine for pain, take it as prescribed. ¨ If you are not taking a prescription pain medicine, ask your doctor if you can take an over-the-counter medicine. What else can you do? · Stretch and exercise. Exercises that increase flexibility may relieve your pain and make it easier for your muscles to keep your spine in a good, neutral position. And don't forget to keep walking. · Do self-massage.  You can use self-massage to unwind after work or school or to energize yourself in the morning. You can easily massage your feet, hands, or neck. Self-massage works best if you are in comfortable clothes and are sitting or lying in a comfortable position. Use oil or lotion to massage bare skin. · Reduce stress. Back pain can lead to a vicious [place]: Distress about the pain tenses the muscles in your back, which in turn causes more pain. Learn how to relax your mind and your muscles to lower your stress. Where can you learn more? Go to http://ar-mariaa.info/. Enter V489 in the search box to learn more about \"Learning About Relief for Back Pain. \"  Current as of: March 21, 2017  Content Version: 11.5  © 3606-2123 [employer]. Care instructions adapted under license by [employer] (which disclaims liability or warranty for this information). If you have questions about a medical condition or this instruction, always ask your healthcare professional. [place] [address] any warranty or liability for your use of this information. Colon Polyps: Care Instructions  Your Care Instructions    Colon polyps are growths in the colon or the rectum. The cause of most colon polyps is not known, and most people who get them do not have any problems. But a certain kind can turn into cancer. For this reason, regular testing for colon polyps is important for people age 48 and older and anyone who has an increased risk for colon cancer. Polyps are usually found through routine colon cancer screening tests. Although most colon polyps are not cancerous, they are usually removed and then tested for cancer. Screening for colon cancer saves lives because the cancer can usually be cured if it is caught early. If you have a polyp that is the type that can turn into cancer, you may need more tests to examine your entire colon.  The doctor will remove any other polyps that he or she finds, and you will be tested more often. Follow-up care is a key part of your treatment and safety. Be sure to make and go to all appointments, and call your doctor if you are having problems. It's also a good idea to know your test results and keep a list of the medicines you take. How can you care for yourself at home? Regular exams to look for colon polyps are the best way to prevent polyps from turning into colon cancer. These can include stool tests, sigmoidoscopy, colonoscopy, and CT colonography. Talk with your doctor about a testing schedule that is right for you. To prevent polyps  There is no home treatment that can prevent colon polyps. But these steps may help lower your risk for cancer. · Stay active. Being active can help you get to and stay at a healthy weight. Try to exercise on most days of the week. Walking is a good choice. · Eat well. Choose a variety of vegetables, fruits, legumes (such as peas and beans), fish, poultry, and whole grains. · Do not smoke. If you need help quitting, talk to your doctor about stop-smoking programs and medicines. These can increase your chances of quitting for good. · If you drink alcohol, limit how much you drink. Limit alcohol to 2 drinks a day for men and 1 drink a day for women. When should you call for help? Call your doctor now or seek immediate medical care if:    · You have severe belly pain.     · Your stools are maroon or very bloody.    Watch closely for changes in your health, and be sure to contact your doctor if:    · You have a fever.     · You have nausea or vomiting.     · You have a change in bowel habits (new constipation or diarrhea).     · Your symptoms get worse or are not improving as expected. Where can you learn more? Go to http://ar-mariaa.info/. Enter 95 [id number] in the search box to learn more about \"Colon Polyps: Care Instructions. \"  Current as of: March 28, 2018  Content Version: 11.8  © 5790-7227 [employer]. Care instructions adapted under license by [employer] (which disclaims liability or warranty for this information). If you have questions about a medical condition or this instruction, always ask your healthcare professional. [address] any warranty or liability for your use of this information. Hemorrhoids: Care Instructions  Your Care Instructions    Hemorrhoids are enlarged veins that develop in the anal canal. Bleeding during bowel movements, itching, swelling, and rectal pain are the most common symptoms. They can be uncomfortable at times, but hemorrhoids rarely are a serious problem. You can treat most hemorrhoids with simple changes to your diet and bowel habits. These changes include eating more fiber and not straining to pass stools. Most hemorrhoids do not need surgery or other treatment unless they are very large and painful or bleed a lot. Follow-up care is a key part of your treatment and safety. Be sure to make and go to all appointments, and call your doctor if you are having problems. It's also a good idea to know your test results and keep a list of the medicines you take. How can you care for yourself at home? · Sit in a few inches of warm water (sitz bath) 3 times a day and after bowel movements. The warm water helps with pain and itching. · Put ice on your anal area several times a day for 10 minutes at a time. Put a thin cloth between the ice and your skin. Follow this by placing a warm, wet towel on the area for another 10 to 20 minutes. · Take pain medicines exactly as directed. ? If the doctor gave you a prescription medicine for pain, take it as prescribed. ? If you are not taking a prescription pain medicine, ask your doctor if you can take an over-the-counter medicine. · Keep the anal area clean, but be gentle. Use water and a fragrance-free soap, such as [place], or use baby wipes or medicated pads, such as Tucks.   · Wear cotton underwear and loose clothing to decrease moisture in the anal area. · Eat more fiber. Include foods such as whole-grain breads and cereals, raw vegetables, raw and dried fruits, and beans. · Drink plenty of fluids, enough so that your urine is light yellow or clear like water. If you have kidney, heart, or liver disease and have to limit fluids, talk with your doctor before you increase the amount of fluids you drink. · Use a stool softener that contains bran or psyllium. You can save money by buying bran or psyllium (available in bulk at most health food stores) and sprinkling it on foods or stirring it into fruit juice. Or you can use a product such as Metamucil or Hydrocil. · Practice healthy bowel habits. ? Go to the bathroom as soon as you have the urge. ? Avoid straining to pass stools. Relax and give yourself time to let things happen naturally. ? Do not hold your breath while passing stools. ? Do not read while sitting on the toilet. Get off the toilet as soon as you have finished. · Take your medicines exactly as prescribed. Call your doctor if you think you are having a problem with your medicine. When should you call for help? Call 911 anytime you think you may need emergency care. For example, call if:    · You pass maroon or very bloody stools.    Call your doctor now or seek immediate medical care if:    · You have increased pain.     · You have increased bleeding.    Watch closely for changes in your health, and be sure to contact your doctor if:    · Your symptoms have not improved after 3 or 4 days. Where can you learn more? Go to http://ar-mariaa.info/. Enter F228 in the search box to learn more about \"Hemorrhoids: Care Instructions. \"  Current as of: March 28, 2018  Content Version: 11.8  © 8289-8984 [employer]. Care instructions adapted under license by [employer] (which disclaims liability or warranty for this information).  If you have questions about a medical condition or this instruction, always ask your healthcare professional. [address] any warranty or liability for your use of this information. Acute Low Back Pain: Exercises  Your Care Instructions  Here are some examples of typical rehabilitation exercises for your condition. Start each exercise slowly. Ease off the exercise if you start to have pain. Your doctor or physical therapist will tell you when you can start these exercises and which ones will work best for you. When you are not being active, find a comfortable position for rest. Some people are comfortable on the floor or a medium-firm bed with a small pillow under their head and another under their knees. Some people prefer to lie on their side with a pillow between their knees. Don't stay in one position for too long. Take short walks (10 to 20 minutes) every 2 to 3 hours. Avoid slopes, hills, and stairs until you feel better. Walk only distances you can manage without pain, especially leg pain. How to do the exercises  Back stretches    1. Get down on your hands and knees on the floor. 2. Relax your head and allow it to droop. Round your back up toward the ceiling until you feel a nice stretch in your upper, middle, and lower back. Hold this stretch for as long as it feels comfortable, or about 15 to 30 seconds. 3. Return to the starting position with a flat back while you are on your hands and knees. 4. Let your back sway by pressing your stomach toward the floor. Lift your buttocks toward the ceiling. 5. Hold this position for 15 to 30 seconds. 6. Repeat 2 to 4 times. Follow-up care is a key part of your treatment and safety. Be sure to make and go to all appointments, and call your doctor if you are having problems. It's also a good idea to know your test results and keep a list of the medicines you take. Where can you learn more?   Go to http://ar-mariaa.info/. Enter P935 in the search box to learn more about \"Acute Low Back Pain: Exercises. \"  Current as of: November 29, 2017  Content Version: 11.8  © 5178-6970 [employer] Care instructions adapted under license by [employer] (which disclaims liability or warranty for this information). If you have questions about a medical condition or this instruction, always ask your healthcare professional. [place] [address] any warranty or liability for your use of this information. DISCHARGE SUMMARY from Nurse    PATIENT INSTRUCTIONS:    After general anesthesia or intravenous sedation, for 24 hours or while taking prescription Narcotics:  · Limit your activities  · Do not drive and operate hazardous machinery  · Do not make important personal or business decisions  · Do  not drink alcoholic beverages  · If you have not urinated within 8 hours after discharge, please contact your surgeon on call. Report the following to your surgeon:  · Excessive pain, swelling, redness or odor of or around the surgical area  · Temperature over 100.5  · Nausea and vomiting lasting longer than 4 hours or if unable to take medications  · Any signs of decreased circulation or nerve impairment to extremity: change in color, persistent  numbness, tingling, coldness or increase pain  · Any questions    What to do at Home:  Recommended activity: Activity as tolerated,     If you experience any of the following symptoms severe pain, chest pain, dizziness call 911    *  Please give a list of your current medications to your Primary Care Provider. *  Please update this list whenever your medications are discontinued, doses are      changed, or new medications (including over-the-counter products) are added. *  Please carry medication information at all times in case of emergency situations.     These are general instructions for a healthy lifestyle:    No smoking/ No tobacco products/ Avoid exposure to second hand smoke  Surgeon General's Warning:  Quitting smoking now greatly reduces serious risk to your health. Obesity, smoking, and sedentary lifestyle greatly increases your risk for illness    A healthy diet, regular physical exercise & weight monitoring are important for maintaining a healthy lifestyle    You may be retaining fluid if you have a history of heart failure or if you experience any of the following symptoms:  Weight gain of 3 pounds or more overnight or 5 pounds in a week, increased swelling in our hands or feet or shortness of breath while lying flat in bed. Please call your doctor as soon as you notice any of these symptoms; do not wait until your next office visit. Recognize signs and symptoms of STROKE:    F-face looks uneven    A-arms unable to move or move unevenly    S-speech slurred or non-existent    T-time-call 911 as soon as signs and symptoms begin-DO NOT go       Back to bed or wait to see if you get better-TIME IS BRAIN. Warning Signs of HEART ATTACK     Call 911 if you have these symptoms:   Chest discomfort. Most heart attacks involve discomfort in the center of the chest that lasts more than a few minutes, or that goes away and comes back. It can feel like uncomfortable pressure, squeezing, fullness, or pain.  Discomfort in other areas of the upper body. Symptoms can include pain or discomfort in one or both arms, the back, neck, jaw, or stomach.  Shortness of breath with or without chest discomfort.  Other signs may include breaking out in a cold sweat, nausea, or lightheadedness. Don't wait more than five minutes to call 911 - MINUTES MATTER! Fast action can save your life. Calling 911 is almost always the fastest way to get lifesaving treatment. Emergency Medical Services staff can begin treatment when they arrive -- up to an hour sooner than if someone gets to the hospital by car.      The discharge information has been reviewed with the patient. The patient verbalized understanding. Discharge medications reviewed with the patient and appropriate educational materials and side effects teaching were provided.   ___________________________________________________________________________________________________________________________________Sugar Free Metamucil, 2 teaspoons daily (OTC) for your hemorrhoids and high fiber diet

## 2018-11-13 NOTE — PROGRESS NOTES
conducted a Follow up consultation and Spiritual Assessment for Aditi Pittman, who is a 80 y.o.,male. The  provided the following Interventions: 
Continued the relationship of care and support. Listened empathically. Chart reviewed. The following outcomes were achieved: 
Patient expressed gratitude for pastoral care visit. Assessment: 
There are no further spiritual or Rastafari issues which require Spiritual Care Services interventions at this time. Plan: 
Chaplains will continue to follow and will provide pastoral care on an as needed/requested basis.  recommends bedside caregivers page  on duty if patient shows signs of acute spiritual or emotional distress. [facility] [phone]

## 2018-11-13 NOTE — PROGRESS NOTES
Infectious Disease progress Note Requested by: Dr. Goldsmith Course Reason: evaluate for discitis lumbar spine Current abx Prior abx Cefepime 11/5-11/6 
[id number]/[id number]/8 Lines:  
 
 
Assessment : 
 
80 y.o. male with a past medical history of type 2 DM (last hgbA1C not known), throat cancer, and essential HTN benign who presented to ed with c/o worsening back pain onset 2 days ago. Patient presents with highly complex picture. Etiology of back pain not entirely clear. I concur with dr. Markos Zambrano that presentation is not classic for infectious discitis/osteomyelitis. However, the very elevated CRP, positive blood cultures would be concerning for early discitis. Patient's radiculopathy could be due to lumbar disc edema and nerve compression. Patient has h/o fall 12 weeks ago and wonder if the back pain is trauma related with/without superimposed infection. D/w dr. Kevin Doan - patient has not received any antibiotics in the past 4 months. This would rule out partially treated spinal infection. patient received immunotherapy shots recently and wondering if that could affect the CRP results. Repeat MRI 11/7/18 results noted. Extensive retroperitoneal edema/stranding, left greater than right, with probable bilateral renal cysts. There is edema about the psoas muscle, without evidence of intramuscular abscess. Mild right greater than left gluteal musculature edema. Etiology of retroperitoneal edema unclear - doubt pyelonephritis/UTI related. Ct right hip 11/8 - nonspecific density/fluid or edema in the right paracolic gutter. CT abd/pelvis 11/9 reveals bilateral perinephric edema- urology f/u appreciated. No clinical evidence of UTI, no pyuria, no evidence of urinary retention. Recommend outpatient f/u. Rectal bleeding - Gi f/u appreciated. S/p colonoscopy. Findings of polyp, hemmorhoids Decreased wbc. Repeat blood cultures 11/6 negative.  Cons, strep viridans in blood culture 11/5 likely contaminant. Negative repeat blood cultures. Improving back pain off abx. Spine surgery f/u appreciated. Evidence of facet arthropathy. Scheduled f/u on 12/11/18. Recommendations: 
 
1. Hold off abx 2. mx of Gi bleed per Gi 
3. F/u urology and spine surgery recommendations Will sign off. thanks Advance Care planning: full code: discussed  with patient/surrogate decision maker:Kalli Durbin: [phone] Above plan was discussed in details with patient,  and dr Tawnya Eugene. Please call me if any further questions or concerns. Will continue to participate in the care of this patient. subjective: 
 
Patient states that his back pain is slightly better today. Able to move his extremities. Patient denies headaches, visual disturbances, sore throat, runny nose, earaches, cp, sob, chills, cough, abdominal pain, diarrhea, burning micturition, pain or weakness in extremities. Medication List  
  
ASK your doctor about these medications   
aspirin, buffered 81 mg Tab 
  
calcium-cholecalciferol (d3) 600-125 mg-unit Tab Dexlansoprazole 60 mg Cpdb IRON (FERROUS SULFATE) PO 
  
latanoprost 0.005 % ophthalmic solution Commonly known as:  XALATAN 
  
losartan 50 mg tablet Commonly known as:  COZAAR 
  
metFORMIN 1,000 mg tablet Commonly known as:  GLUCOPHAGE 
  
nebivolol 10 mg tablet Commonly known as:  BYSTOLIC 
  
omega-3 fatty acids-vitamin e 1,000 mg Cap 
  
omeprazole 20 mg capsule Commonly known as:  PRILOSEC QNASL 80 mcg/actuation Hfaa Generic drug:  beclomethasone dipropionate Current Facility-Administered Medications Medication Dose Route Frequency  albuterol-ipratropium (DUO-NEB) 2.5 MG-0.5 MG/3 ML  3 mL Nebulization Q4H PRN  
 ferrous sulfate tablet 325 mg  1 Tab Oral DAILY WITH BREAKFAST  hydrocortisone-pramoxine (ANALPRAM HC 2.5%-1%) 2.5-1 % rectal cream   Rectal TID  lidocaine 4 % patch 1 Patch  1 Patch TransDERmal Q24H  
 oxyCODONE-acetaminophen (PERCOCET) 5-325 mg per tablet 1 Tab  1 Tab Oral Q6H PRN  polyethylene glycol (MIRALAX) packet 17 g  17 g Oral BID  cloNIDine HCl (CATAPRES) tablet 0.1 mg  0.1 mg Oral Q8H PRN  
 omega 3-DHA-EPA-fish oil 1,000 mg (120 mg-180 mg) capsule 1 Cap  1 Cap Oral BID  latanoprost (XALATAN) 0.005 % ophthalmic solution 1 Drop  1 Drop Both Eyes QPM  
 beclomethasone dipropionate (QNASL 80mcg) HFAA 2 Spray (Patient Supplied)  2 Spray Nasal DAILY  sodium chloride (OCEAN) 0.65 % nasal squeeze bottle 2 Spray  2 Spray Both Nostrils Q2H PRN  
 losartan (COZAAR) tablet 50 mg  50 mg Oral DAILY  nebivolol (BYSTOLIC) tablet 10 mg  10 mg Oral DAILY  ondansetron (ZOFRAN) injection 4 mg  4 mg IntraVENous Q4H PRN  
 insulin lispro (HUMALOG) injection   SubCUTAneous AC&HS  
 glucose chewable tablet 16 g  16 g Oral PRN  
 glucagon (GLUCAGEN) injection 1 mg  1 mg IntraMUSCular PRN  
 dextrose (D50W) injection syrg 12.5-25 g  25-50 mL IntraVENous PRN Allergies: Patient has no known allergies. Temp (24hrs), Av.4 °F (36.9 °C), Min:98.1 °F (36.7 °C), Max:99.2 °F (37.3 °C) Visit Vitals BP (!) 148/95 (BP 1 Location: Left arm, BP Patient Position: At rest) Comment: Reported To DELL Thomson Pulse 87 Temp 98.6 °F (37 °C) Resp 18 Ht 5' 10\" (1.778 m) Wt 77.4 kg (170 lb 9.6 oz) SpO2 97% BMI 24.48 kg/m² ROS: 12 point ROS obtained in details. Pertinent positives as mentioned in HPI,  
otherwise negative Physical Exam: 
 
Constitutional: He is oriented to person, place, and time. He appears well-developed and well-nourished. Laying in bed, AAOx3, NAD HENT:  
Head: Normocephalic and atraumatic.   
Right Ear: External ear normal.  
Left Ear: External ear normal.  
Nose: Nose normal.  
Mouth/Throat: Oropharynx is clear and moist.  
Eyes: Conjunctivae and EOM are normal. Pupils are equal, round, and reactive to light. No scleral icterus. Neck: Normal range of motion. Neck supple. No JVD present. No tracheal deviation present. No thyromegaly present. Chronic dysphonia Cardiovascular: Normal rate, regular rhythm, normal heart sounds and intact distal pulses. Exam reveals no gallop and no friction rub. No murmur heard. Pulmonary/Chest: Effort normal and breath sounds normal. He exhibits no tenderness. Abdominal: Soft. Bowel sounds are normal. He exhibits no distension. There is no tenderness. There is no rebound and no guarding. Musculoskeletal: Normal range of motion. He exhibits no edema or tenderness. no lumbar spine tenderness. tenderness right to lumbar spine - right iliac crest. no erythema, notwarm to touch. No R hip pain to palpation No pain with internal or external rotation. Lymphadenopathy:   He has no cervical adenopathy. Neurological: He is alert and oriented to person, place, and time. No cranial nerve deficit. Coordination normal.  
Full strength UE, pain limits R LE exam, L LE motor/sensory intact Skin: Skin is warm and dry. Psychiatric: He has a normal mood and affect. His behavior is normal. Judgment and thought content normal.  
Nursing note and vitals reviewed. 
  
 
 
 
Labs: Results:  
Chemistry Recent Labs 11/13/18 0159 11/12/18 0344 11/11/18 0126 GLU 86 92  --   
 139  --   
K 3.6 4.1 3.5  104  --   
CO2 23 24  --   
BUN 7 6*  --   
CREA 0.76 0.71  --   
CA 8.6 9.0  --   
AGAP 13 11  --   
BUCR 9* 8*  --   
  
CBC w/Diff Recent Labs 11/13/18 0159 11/12/18 0344 11/11/18 0126 WBC 11.5 8.7 10.9 RBC 3.39* 3.63* 3.31* HGB 10.1* 10.9* 9.9*  
HCT 30.0* 32.3* 29.1*  
 325 295 GRANS 75* 68 77* LYMPH 16* 23 11* EOS 1 2 2 Microbiology No results for input(s): CULT in the last 72 hours. RADIOLOGY: 
 
All available imaging studies/reports in [facility] for this admission were reviewed Dr. Javon Schulte, Infectious Disease Specialist 
[phone] November 13, 2018 10:07 AM

## 2018-11-13 NOTE — PROGRESS NOTES
HPI: Colten Cordero is a 80 y.o. male presenting with chief complain of rectal bleeding. Pt initially hospitalized for back pain and leukocytosis. Began having blood per rectum over last 2 days. Colonoscopy performed, polypectomy performed, possible dysplastic lesion. Pt states no prior issues with rectal bleeding. Blood was not with bowel function. Denies incontinence. Usually has 1 bm daily, not on a bowel regimen. Past Medical History:  
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    
 eye surgery [facility] negative for colorectal cancer Social History Socioeconomic History  Marital status:  Spouse name: Not on file  Number of children: Not on file  Years of education: Not on file  Highest education level: Not on file Social Needs  Financial resource strain: Not on file  Food insecurity - worry: Not on file  Food insecurity - inability: Not on file  Transportation needs - medical: Not on file  Transportation needs - non-medical: Not on file Occupational History  Not on file Tobacco Use  Smoking status: Former Smoker Substance and Sexual Activity  Alcohol use: No  
 Drug use: No  
 Sexual activity: Not on file Other Topics Concern  Not on file Social History Narrative  Not on file Review of Systems - aside from above all others negative Outpatient Medications Marked as Taking for the 11/5/18 encounter [facility] Encounter) Medication Sig Dispense Refill  oxyCODONE-acetaminophen (PERCOCET) 5-325 mg per tablet Take 1 Tab by mouth every six (6) hours as needed. Max Daily Amount: 4 Tabs. 20 Tab 0  cyclobenzaprine (FLEXERIL) 5 mg tablet Take 1 Tab by mouth three (3) times daily as needed for Muscle Spasm(s). 20 Tab 0  
 latanoprost (XALATAN) 0.005 % ophthalmic solution Administer 1 Drop to both eyes nightly.  beclomethasone dipropionate (QNASL) 80 mcg/actuation HFAA 80 mcg by Nasal route daily. Directions on at home label are as follows: Use 2 sprays in each Nostril Daily No Known Allergies Vitals:  
 11/13/18 0400 11/13/18 9005 11/13/18 1122 11/13/18 1434 BP: 152/88 (!) 148/95 125/86 120/86 Pulse: 86 87 98 94 Resp: 18 18 18 18 Temp: 98.5 °F (36.9 °C) 98.6 °F (37 °C) 98 °F (36.7 °C) 98.5 °F (36.9 °C) SpO2: 95% 97% 99% 93% Weight:      
Height:      
 
 
Physical Exam  
Constitutional: He appears well-developed and well-nourished. HENT:  
Head: Normocephalic and atraumatic. Eyes: Conjunctivae and EOM are normal.  
Cardiovascular: Exam reveals gallop. Abdominal: Soft. He exhibits no distension and no mass. There is no tenderness. Musculoskeletal: Normal range of motion. Lymphadenopathy:  
  He has no cervical adenopathy. Right: No inguinal adenopathy present. Left: No inguinal adenopathy present. Neurological: He exhibits normal muscle tone. Skin: Skin is warm and dry. Psychiatric: He has a normal mood and affect. His speech is normal.  
Rectum: RPQ external hemorrhoid OLIVIA good tone no mass Anoscopy: enlarged RPQ internal hemorhoid CMP:  
Lab Results Component Value Date/Time   11/13/2018 01:59 AM  
 K 3.6 11/13/2018 01:59 AM  
  11/13/2018 01:59 AM  
 CO2 23 11/13/2018 01:59 AM  
 AGAP 13 11/13/2018 01:59 AM  
 GLU 86 11/13/2018 01:59 AM  
 BUN 7 11/13/2018 01:59 AM  
 CREA 0.76 11/13/2018 01:59 AM  
 GFRAA >60 11/13/2018 01:59 AM  
 GFRNA >60 11/13/2018 01:59 AM  
 CA 8.6 11/13/2018 01:59 AM  
 
CBC:  
Lab Results Component Value Date/Time WBC 11.5 11/13/2018 01:59 AM  
 HGB 10.1 (L) 11/13/2018 01:59 AM  
 HCT 30.0 (L) 11/13/2018 01:59 AM  
  11/13/2018 01:59 AM  
 
Colonoscopy report and consult report by GI reviewed Case discussed with Dr. Gray Das, large hemorrhoids Assessment / Plan GI bleed possibly from hemorrhoids High fiber diet, start metamucil after discharge Pt can follow up in office for banding/hemorrhoidectomy if symptoms persist, card given Will f/u on results of path from colonoscopy to be sure no evidence of malignancy on polypectomy Will follow The diagnoses and plan were discussed with the patient. All questions answered. Plan of care agreed to by all concerned.

## 2018-11-13 NOTE — HOME CARE
Rec HC order - d/c -pending for today [facility] will follow for PT/OT - RW has been ordered from Lili Mari with [facility].   Elizabeth Glez RN

## 2018-11-13 NOTE — PROGRESS NOTES
vss afeb Neuro intact Ambulating Back feels \"pretty good\" Stable for discharge from spine perspective off abx Appears to have facet arthropathy Appt in office 12/11at 11:30

## 2018-11-13 NOTE — ROUTINE PROCESS
Received report from [facility]. Pt AAOx3, NAD, breathing non labored, on room air, HOB up. IV site clean, dry and intact, family member at the bedside. Bed at the lowest level on lock position, call bell w/i reach. Bedside and Verbal shift change report given to DELL Gil (oncoming nurse) by me (offgoing nurse). Report included the following information SBAR, Kardex, Procedure Summary, Intake/Output, MAR and Recent Results.

## 2018-11-13 NOTE — H&P (VIEW-ONLY)
HPI: Gregory Diaz is a 80 y.o. male presenting with chief complain of rectal bleeding. Pt initially hospitalized for back pain and leukocytosis. Began having blood per rectum over last 2 days. Colonoscopy performed, polypectomy performed, possible dysplastic lesion. Pt states no prior issues with rectal bleeding. Blood was not with bowel function. Denies incontinence. Usually has 1 bm daily, not on a bowel regimen. Past Medical History:  
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    
 eye surgery [facility] negative for colorectal cancer Social History Socioeconomic History  Marital status:  Spouse name: Not on file  Number of children: Not on file  Years of education: Not on file  Highest education level: Not on file Social Needs  Financial resource strain: Not on file  Food insecurity - worry: Not on file  Food insecurity - inability: Not on file  Transportation needs - medical: Not on file  Transportation needs - non-medical: Not on file Occupational History  Not on file Tobacco Use  Smoking status: Former Smoker Substance and Sexual Activity  Alcohol use: No  
 Drug use: No  
 Sexual activity: Not on file Other Topics Concern  Not on file Social History Narrative  Not on file Review of Systems - aside from above all others negative Outpatient Medications Marked as Taking for the 11/5/18 encounter [facility] Encounter) Medication Sig Dispense Refill  oxyCODONE-acetaminophen (PERCOCET) 5-325 mg per tablet Take 1 Tab by mouth every six (6) hours as needed. Max Daily Amount: 4 Tabs. 20 Tab 0  cyclobenzaprine (FLEXERIL) 5 mg tablet Take 1 Tab by mouth three (3) times daily as needed for Muscle Spasm(s). 20 Tab 0  
 latanoprost (XALATAN) 0.005 % ophthalmic solution Administer 1 Drop to both eyes nightly.  beclomethasone dipropionate (QNASL) 80 mcg/actuation HFAA 80 mcg by Nasal route daily. Directions on at home label are as follows: Use 2 sprays in each Nostril Daily No Known Allergies Vitals:  
 11/13/18 0400 11/13/18 2087 11/13/18 1122 11/13/18 1434 BP: 152/88 (!) 148/95 125/86 120/86 Pulse: 86 87 98 94 Resp: 18 18 18 18 Temp: 98.5 °F (36.9 °C) 98.6 °F (37 °C) 98 °F (36.7 °C) 98.5 °F (36.9 °C) SpO2: 95% 97% 99% 93% Weight:      
Height:      
 
 
Physical Exam  
Constitutional: He appears well-developed and well-nourished. HENT:  
Head: Normocephalic and atraumatic. Eyes: Conjunctivae and EOM are normal.  
Cardiovascular: Exam reveals gallop. Abdominal: Soft. He exhibits no distension and no mass. There is no tenderness. Musculoskeletal: Normal range of motion. Lymphadenopathy:  
  He has no cervical adenopathy. Right: No inguinal adenopathy present. Left: No inguinal adenopathy present. Neurological: He exhibits normal muscle tone. Skin: Skin is warm and dry. Psychiatric: He has a normal mood and affect. His speech is normal.  
Rectum: RPQ external hemorrhoid OLIVIA good tone no mass Anoscopy: enlarged RPQ internal hemorhoid CMP:  
Lab Results Component Value Date/Time   11/13/2018 01:59 AM  
 K 3.6 11/13/2018 01:59 AM  
  11/13/2018 01:59 AM  
 CO2 23 11/13/2018 01:59 AM  
 AGAP 13 11/13/2018 01:59 AM  
 GLU 86 11/13/2018 01:59 AM  
 BUN 7 11/13/2018 01:59 AM  
 CREA 0.76 11/13/2018 01:59 AM  
 GFRAA >60 11/13/2018 01:59 AM  
 GFRNA >60 11/13/2018 01:59 AM  
 CA 8.6 11/13/2018 01:59 AM  
 
CBC:  
Lab Results Component Value Date/Time WBC 11.5 11/13/2018 01:59 AM  
 HGB 10.1 (L) 11/13/2018 01:59 AM  
 HCT 30.0 (L) 11/13/2018 01:59 AM  
  11/13/2018 01:59 AM  
 
Colonoscopy report and consult report by GI reviewed Case discussed with Dr. Zohaib Hodge, large hemorrhoids Assessment / Plan GI bleed possibly from hemorrhoids High fiber diet, start metamucil after discharge Pt can follow up in office for banding/hemorrhoidectomy if symptoms persist, card given Will f/u on results of path from colonoscopy to be sure no evidence of malignancy on polypectomy Will follow The diagnoses and plan were discussed with the patient. All questions answered. Plan of care agreed to by all concerned.

## 2018-11-13 NOTE — PROGRESS NOTES
Problem: Mobility Impaired (Adult and Pediatric) Goal: *Acute Goals and Plan of Care (Insert Text) Physical Therapy Goals Initiated 11/7/2018 and to be accomplished within 7 day(s) 1. Patient will move from supine to sit and sit to supine, scoot up and down and roll side to side in bed with supervision/set-up. 2.  Patient will transfer from bed to chair and chair to bed with supervision/set-up using the least restrictive device. 3.  Patient will perform sit to stand with supervision/set-up. 4.  Patient will ambulate with supervision/set-up for >150 feet with the least restrictive device. 5.  Patient will ascend/descend 4 stairs using least restrictive device with minimal assistance/contact guard assist.  
Outcome: Progressing Towards Goal 
physical Therapy TREATMENT Patient: Mary Anne Stanley (89 y.o. male) Date: 11/13/2018 Diagnosis: Intractable low back pain Debility Leukocytosis <principal problem not specified> Procedure(s) (LRB): 
COLONOSCOPY with Polypectomies, Bx's, Injection, Tattooing & Clip Placement x 3 (N/A) 1 Day Post-Op Precautions: Fall Chart, physical therapy assessment, plan of care and goals were reviewed. OBJECTIVE/ ASSESSMENT: 
Patient found sitting in b/s chair willing to work with PT. Pt reports he has been ambulating since last night and his back pain is much more manageable. Pt ambulated into hallway with standard walker (which was in his room) this tx, Pt would benefit more from RW as he tends to attempt to push standard walker forward due to pain in right hip when WB. Pt returned to b/s chair and left with needs in reach. PT progressing well and plans to DC home today, needs RW for home. Education: transfers, gait. Progression toward goals: 
[x]      Improving appropriately and progressing toward goals 
[]      Improving slowly and progressing toward goals 
[]      Not making progress toward goals and plan of care will be adjusted PLAN: 
 Patient continues to benefit from skilled intervention to address the above impairments. Continue treatment per established plan of care. Discharge Recommendations:  Home Health vs outpatient PT. Further Equipment Recommendations for Discharge:  rolling walker (front wheeled walker) SUBJECTIVE:  
Patient stated I had rehab for this left shoulder and it went really well.  OBJECTIVE DATA SUMMARY:  
Critical Behavior: 
Neurologic State: Alert Orientation Level: Oriented X4 Cognition: Appropriate safety awareness Safety/Judgement: Good awareness of safety precautions Functional Mobility Training: 
Transfers: 
Sit to Stand: Supervision Stand to Sit: Supervision Balance: 
Sitting: Intact Standing: Impaired; With support Standing - Static: Good Standing - Dynamic : FairAmbulation/Gait Training: 
Distance (ft): 100 Feet (ft) Assistive Device: Jamarcus Boor Ambulation - Level of Assistance: Supervision Gait Abnormalities: Antalgic;Decreased step clearance Base of Support: Center of gravity altered Speed/Geovanna: Slow;Pace decreased (<100 feet/min) Step Length: Left shortened;Right shortened Therapeutic Exercises:  
 
Pain: 
Pre tx pain: not ratedPost tx pain: not ratedActivity Tolerance:  
Fair Please refer to the flowsheet for vital signs taken during this treatment. After treatment:  
[x] Patient left in no apparent distress sitting up in chair 
[] Patient left in no apparent distress in bed 
[x] Call bell left within reach 
[] Nursing notified 
[] Caregiver present 
[] Bed alarm activated 
[] SCDs applied 
[] Ice applied Alida Simon PTA Time Calculation: 13 mins Mobility [id number] Current  CI= 1-19%. The severity rating is based on the Level of Assistance required for Functional Mobility and ADLs. Mobility   Goal  CI= 1-19%. The severity rating is based on the Level of Assistance required for Functional Mobility and ADLs.

## 2018-11-13 NOTE — PROGRESS NOTES
[email or web address] 
[phone] Gastroenterology follow up-Progress note Impression: 1. Lower GI bleeding/rectal bleeding: suspect from hemorrhoids given it appears that there was recent bleeding; doubtful the multiple polyps were the source of bleeding. 2. Multiple colon polyps with large sigmoid polyp s/p polypectomy and endoclip placement and mucosal tattoo3. Large external hemorrhoids; noted on colonoscopy- signs of recent bleeding 4. Anemia, mild; most likely from GI bleeding- stable at [facility] 10/30 Plan: 
1. Watch for bleeding, thus far no overt bleeding 2. F/U pathology- if positive for dysplasia/cancer will need limited colectomy; if negative will need repeat colonoscopy in 3-6 months 3. Recommend colorectal surgery for hemorrhoid evaluation. 4. Monitor H/H and transfuse if Hgb <7 Chief Complaint: rectal bleeding Subjective:  No rectal bleeding. No BM. No abdominal pain. ROS: Denies any fevers, chills, rash. Eyes: conjunctiva normal, EOM normal  
Neck: ROM normal, supple and trachea normal  
Cardiovascular: heart normal, intact distal pulses, normal rate and regular rhythm Pulmonary/Chest Wall: breath sounds normal and effort normal  
Abdominal: appearance normal, bowel sounds normal and soft, non-acute, non-tender Patient Active Problem List  
Diagnosis Code  Debility R53.81  Leukocytosis D72.829  
 Intractable low back pain M54.5  Colon polyps K63.5  Hemorrhoids K64.9 Visit Vitals BP (!) 148/95 (BP 1 Location: Left arm, BP Patient Position: At rest) Comment: Reported To DELL Singer Pulse 87 Temp 98.6 °F (37 °C) Resp 18 Ht 5' 10\" (1.778 m) Wt 77.4 kg (170 lb 9.6 oz) SpO2 97% BMI 24.48 kg/m² Intake/Output Summary (Last 24 hours) at 11/13/2018 1005 Last data filed at 11/13/2018 0384 Gross per 24 hour Intake 150 ml Output 600 ml Net -450 ml CBC w/Diff Lab Results Component Value Date/Time WBC 11.5 11/13/2018 01:59 AM  
 RBC 3.39 (L) 11/13/2018 01:59 AM  
 HGB 10.1 (L) 11/13/2018 01:59 AM  
 HCT 30.0 (L) 11/13/2018 01:59 AM  
 MCV 88.5 11/13/2018 01:59 AM  
 MCH 29.8 11/13/2018 01:59 AM  
 MCHC 33.7 11/13/2018 01:59 AM  
 RDW 12.7 11/13/2018 01:59 AM  
  11/13/2018 01:59 AM  
 Lab Results Component Value Date/Time GRANS 75 (H) 11/13/2018 01:59 AM  
 LYMPH 16 (L) 11/13/2018 01:59 AM  
 EOS 1 11/13/2018 01:59 AM  
 BANDS 7 (H) 11/06/2018 03:57 AM  
 BASOS 0 11/13/2018 01:59 AM  
 METAS 1 (H) 11/06/2018 03:57 AM  
  
Basic Metabolic Profile Recent Labs 11/13/18 
0159   
K 3.6  CO2 23 BUN 7  
CA 8.6 Hepatic Function Lab Results Component Value Date/Time ALB 3.2 (L) 11/05/2018 07:04 AM  
 TP 7.5 11/05/2018 07:04 AM  
 AP 72 11/05/2018 07:04 AM  
 Lab Results Component Value Date/Time SGOT 19 11/05/2018 07:04 AM  
  
 
 
Coags No results for input(s): PTP, INR, APTT in the last 72 hours. No lab exists for component: INREXT Eugene Rosario NP [facility]. [email or web address] Phone: [phone] Pager: [phone]

## 2018-11-13 NOTE — PROGRESS NOTES
Patient discharged home via private vehicle; patient explained medications using teach-back method, no further questions at this time;  iv removed; skin is intact; pain is denied; vitals stable; patient's wife at bedside to discharge patient to home via private vehicle; patient escorted off unit in wheelchair with rolling walker; belongings, discharge paperwork, and prescritiptions

## 2018-11-14 ENCOUNTER — HOME CARE VISIT (OUTPATIENT)
Dept: HOME HEALTH SERVICES | Facility: HOME HEALTH | Age: 68
End: 2018-11-14

## 2018-11-14 LAB
BACTERIA SPEC CULT: ABNORMAL
SERVICE CMNT-IMP: ABNORMAL

## 2018-11-15 ENCOUNTER — HOME CARE VISIT (OUTPATIENT)
Dept: SCHEDULING | Facility: HOME HEALTH | Age: 68
End: 2018-11-15
Payer: MEDICARE

## 2018-11-15 VITALS
SYSTOLIC BLOOD PRESSURE: 102 MMHG | DIASTOLIC BLOOD PRESSURE: 70 MMHG | TEMPERATURE: 97.1 F | HEART RATE: 90 BPM | OXYGEN SATURATION: 97 %

## 2018-11-15 PROCEDURE — 400013 HH SOC

## 2018-11-15 PROCEDURE — 3331090002 HH PPS REVENUE DEBIT

## 2018-11-15 PROCEDURE — 3331090001 HH PPS REVENUE CREDIT

## 2018-11-15 PROCEDURE — G0151 HHCP-SERV OF PT,EA 15 MIN: HCPCS

## 2018-11-16 ENCOUNTER — HOME CARE VISIT (OUTPATIENT)
Dept: HOME HEALTH SERVICES | Facility: HOME HEALTH | Age: 68
End: 2018-11-16
Payer: MEDICARE

## 2018-11-16 PROCEDURE — 3331090001 HH PPS REVENUE CREDIT

## 2018-11-16 PROCEDURE — 3331090002 HH PPS REVENUE DEBIT

## 2018-11-17 PROCEDURE — 3331090001 HH PPS REVENUE CREDIT

## 2018-11-17 PROCEDURE — 3331090002 HH PPS REVENUE DEBIT

## 2018-11-18 PROCEDURE — 3331090001 HH PPS REVENUE CREDIT

## 2018-11-18 PROCEDURE — 3331090002 HH PPS REVENUE DEBIT

## 2018-11-19 ENCOUNTER — HOME CARE VISIT (OUTPATIENT)
Dept: HOME HEALTH SERVICES | Facility: HOME HEALTH | Age: 68
End: 2018-11-19
Payer: MEDICARE

## 2018-11-19 ENCOUNTER — OFFICE VISIT (OUTPATIENT)
Dept: SURGERY | Age: 68
End: 2018-11-19

## 2018-11-19 VITALS
BODY MASS INDEX: 24.2 KG/M2 | WEIGHT: 169 LBS | HEIGHT: 70 IN | SYSTOLIC BLOOD PRESSURE: 125 MMHG | TEMPERATURE: 97.3 F | HEART RATE: 94 BPM | RESPIRATION RATE: 24 BRPM | DIASTOLIC BLOOD PRESSURE: 71 MMHG

## 2018-11-19 DIAGNOSIS — C18.7 MALIGNANT NEOPLASM OF SIGMOID COLON (HCC): Primary | ICD-10-CM

## 2018-11-19 PROCEDURE — 3331090001 HH PPS REVENUE CREDIT

## 2018-11-19 PROCEDURE — 3331090002 HH PPS REVENUE DEBIT

## 2018-11-19 RX ORDER — NEOMYCIN SULFATE 500 MG/1
1000 TABLET ORAL 3 TIMES DAILY
Qty: 6 TAB | Refills: 0 | Status: SHIPPED | OUTPATIENT
Start: 2018-11-19 | End: 2018-11-20

## 2018-11-19 RX ORDER — METRONIDAZOLE 500 MG/1
500 TABLET ORAL 3 TIMES DAILY
Qty: 3 TAB | Refills: 0 | Status: SHIPPED | OUTPATIENT
Start: 2018-11-19 | End: 2018-11-20

## 2018-11-19 RX ORDER — MELOXICAM 15 MG/1
15 TABLET ORAL DAILY
COMMUNITY
End: 2018-12-22

## 2018-11-19 RX ORDER — OXYCODONE AND ACETAMINOPHEN 10; 325 MG/1; MG/1
TABLET ORAL
COMMUNITY
End: 2018-12-22

## 2018-11-19 NOTE — LETTER
11/19/2018 3:29 PM 
 
Patient:  Kobe Mcclain YOB: 1950 Date of Visit: 11/19/2018 Jane Forte MD 
[address] [place] [address] [address] [place] [address] VIA Facsimile: [phone] Kamilah Huynh MD 
[address] [facility] [address] VIA Facsimile: [phone] Dear Draio Francisco saw Bella De Paz in the office today for his newly diagnosed malignant polyp of the sigmoid colon. 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 We will proceed to the operating room in December for robotic sigmoid colectomy. Thank you very much for your referral of Mr. Maria Fernanda Amor. If you have questions, please do not hesitate to call me. I look forward to following Mr. Kofi Alonso along with you and will keep you updated as to his progress. Sincerely, Jim Horner MD

## 2018-11-19 NOTE — PROGRESS NOTES
Subjective: He returns for follow-up. He was seen in the hospital for rectal bleeding. Anoscopy showed very large internal hemorrhoids. He had a colonoscopy by Dr. Erick Arthur. A sigmoid polyp was identified and removed. This showed evidence of invasive carcinoma. The polypectomy site was clipped. Ink was injected proximally and distally. Past medical history and ROS were reviewed and unchanged. Abdomen: Soft, nontender nondistended    CT imaging of the chest abdomen and pelvis recently performed was negative for metastatic disease  Pathology was consistent with adenocarcinoma    Assessment / Plan    Malignant polyp of the sigmoid colon at 35 cm  Schedule robotic sigmoid colectomy  Check CEA    A total of 25 minutes was spent with the patient, with > 50% of time spent in counseling and coordination of care. The diagnoses and plan were discussed with patient. All questions answered. Plan of care agreed to by all concerned.

## 2018-11-20 ENCOUNTER — HOME CARE VISIT (OUTPATIENT)
Dept: HOME HEALTH SERVICES | Facility: HOME HEALTH | Age: 68
End: 2018-11-20
Payer: MEDICARE

## 2018-11-20 ENCOUNTER — HOME CARE VISIT (OUTPATIENT)
Dept: SCHEDULING | Facility: HOME HEALTH | Age: 68
End: 2018-11-20
Payer: MEDICARE

## 2018-11-20 PROCEDURE — 3331090002 HH PPS REVENUE DEBIT

## 2018-11-20 PROCEDURE — 3331090001 HH PPS REVENUE CREDIT

## 2018-11-20 PROCEDURE — G0151 HHCP-SERV OF PT,EA 15 MIN: HCPCS

## 2018-11-21 ENCOUNTER — HOME CARE VISIT (OUTPATIENT)
Dept: SCHEDULING | Facility: HOME HEALTH | Age: 68
End: 2018-11-21
Payer: MEDICARE

## 2018-11-21 VITALS
TEMPERATURE: 96.9 F | DIASTOLIC BLOOD PRESSURE: 82 MMHG | OXYGEN SATURATION: 97 % | SYSTOLIC BLOOD PRESSURE: 140 MMHG | HEART RATE: 95 BPM

## 2018-11-21 PROCEDURE — 3331090002 HH PPS REVENUE DEBIT

## 2018-11-21 PROCEDURE — G0151 HHCP-SERV OF PT,EA 15 MIN: HCPCS

## 2018-11-21 PROCEDURE — 3331090001 HH PPS REVENUE CREDIT

## 2018-11-22 VITALS
SYSTOLIC BLOOD PRESSURE: 140 MMHG | HEART RATE: 93 BPM | DIASTOLIC BLOOD PRESSURE: 91 MMHG | OXYGEN SATURATION: 99 % | TEMPERATURE: 96.6 F

## 2018-11-22 PROCEDURE — 3331090002 HH PPS REVENUE DEBIT

## 2018-11-22 PROCEDURE — 3331090001 HH PPS REVENUE CREDIT

## 2018-11-23 ENCOUNTER — HOME CARE VISIT (OUTPATIENT)
Dept: SCHEDULING | Facility: HOME HEALTH | Age: 68
End: 2018-11-23
Payer: MEDICARE

## 2018-11-23 VITALS
OXYGEN SATURATION: 99 % | HEART RATE: 91 BPM | TEMPERATURE: 98 F | DIASTOLIC BLOOD PRESSURE: 80 MMHG | SYSTOLIC BLOOD PRESSURE: 143 MMHG

## 2018-11-23 PROCEDURE — 3331090001 HH PPS REVENUE CREDIT

## 2018-11-23 PROCEDURE — 3331090002 HH PPS REVENUE DEBIT

## 2018-11-23 PROCEDURE — G0151 HHCP-SERV OF PT,EA 15 MIN: HCPCS

## 2018-11-24 PROCEDURE — 3331090001 HH PPS REVENUE CREDIT

## 2018-11-24 PROCEDURE — 3331090002 HH PPS REVENUE DEBIT

## 2018-11-25 PROCEDURE — 3331090002 HH PPS REVENUE DEBIT

## 2018-11-25 PROCEDURE — 3331090001 HH PPS REVENUE CREDIT

## 2018-11-26 ENCOUNTER — HOME CARE VISIT (OUTPATIENT)
Dept: SCHEDULING | Facility: HOME HEALTH | Age: 68
End: 2018-11-26
Payer: MEDICARE

## 2018-11-26 VITALS
SYSTOLIC BLOOD PRESSURE: 121 MMHG | DIASTOLIC BLOOD PRESSURE: 70 MMHG | TEMPERATURE: 98.2 F | OXYGEN SATURATION: 98 % | HEART RATE: 98 BPM

## 2018-11-26 PROCEDURE — G0151 HHCP-SERV OF PT,EA 15 MIN: HCPCS

## 2018-11-26 PROCEDURE — 3331090001 HH PPS REVENUE CREDIT

## 2018-11-26 PROCEDURE — 3331090002 HH PPS REVENUE DEBIT

## 2018-11-27 PROCEDURE — 3331090002 HH PPS REVENUE DEBIT

## 2018-11-27 PROCEDURE — 3331090001 HH PPS REVENUE CREDIT

## 2018-11-28 ENCOUNTER — HOME CARE VISIT (OUTPATIENT)
Dept: SCHEDULING | Facility: HOME HEALTH | Age: 68
End: 2018-11-28
Payer: MEDICARE

## 2018-11-28 PROCEDURE — 3331090001 HH PPS REVENUE CREDIT

## 2018-11-28 PROCEDURE — 3331090002 HH PPS REVENUE DEBIT

## 2018-11-28 PROCEDURE — G0151 HHCP-SERV OF PT,EA 15 MIN: HCPCS

## 2018-11-29 ENCOUNTER — HOSPITAL ENCOUNTER (OUTPATIENT)
Dept: PREADMISSION TESTING | Age: 68
Discharge: HOME | End: 2018-11-29
Payer: MEDICARE

## 2018-11-29 DIAGNOSIS — C18.7 MALIGNANT NEOPLASM OF SIGMOID COLON (HCC): ICD-10-CM

## 2018-11-29 LAB
ABO + RH BLD: NORMAL
ALBUMIN SERPL-MCNC: 3.3 G/DL (ref 3.4–5)
ANION GAP SERPL CALC-SCNC: 8 MMOL/L (ref 3–18)
APTT PPP: 35.4 SEC (ref 23–36.4)
BASOPHILS # BLD: 0 K/UL (ref 0–0.1)
BASOPHILS NFR BLD: 0 % (ref 0–2)
BLOOD GROUP ANTIBODIES SERPL: NORMAL
BUN SERPL-MCNC: 13 MG/DL (ref 7–18)
BUN/CREAT SERPL: 14 (ref 12–20)
CALCIUM SERPL-MCNC: 8.7 MG/DL (ref 8.5–10.1)
CHLORIDE SERPL-SCNC: 107 MMOL/L (ref 100–108)
CO2 SERPL-SCNC: 25 MMOL/L (ref 21–32)
CREAT SERPL-MCNC: 0.91 MG/DL (ref 0.6–1.3)
DIFFERENTIAL METHOD BLD: ABNORMAL
EOSINOPHIL # BLD: 0.2 K/UL (ref 0–0.4)
EOSINOPHIL NFR BLD: 2 % (ref 0–5)
ERYTHROCYTE [DISTWIDTH] IN BLOOD BY AUTOMATED COUNT: 13.8 % (ref 11.6–14.5)
GLUCOSE SERPL-MCNC: 92 MG/DL (ref 74–99)
HCT VFR BLD AUTO: 35.9 % (ref 36–48)
HGB BLD-MCNC: 11.9 G/DL (ref 13–16)
INR PPP: 1.2 (ref 0.8–1.2)
LYMPHOCYTES # BLD: 2.1 K/UL (ref 0.9–3.6)
LYMPHOCYTES NFR BLD: 19 % (ref 21–52)
MCH RBC QN AUTO: 29.8 PG (ref 24–34)
MCHC RBC AUTO-ENTMCNC: 33.1 G/DL (ref 31–37)
MCV RBC AUTO: 89.8 FL (ref 74–97)
MONOCYTES # BLD: 0.3 K/UL (ref 0.05–1.2)
MONOCYTES NFR BLD: 3 % (ref 3–10)
NEUTS SEG # BLD: 8.4 K/UL (ref 1.8–8)
NEUTS SEG NFR BLD: 76 % (ref 40–73)
PLATELET # BLD AUTO: 296 K/UL (ref 135–420)
PMV BLD AUTO: 9.6 FL (ref 9.2–11.8)
POTASSIUM SERPL-SCNC: 4.9 MMOL/L (ref 3.5–5.5)
PROTHROMBIN TIME: 14.9 SEC (ref 11.5–15.2)
RBC # BLD AUTO: 4 M/UL (ref 4.7–5.5)
SODIUM SERPL-SCNC: 140 MMOL/L (ref 136–145)
SPECIMEN EXP DATE BLD: NORMAL
WBC # BLD AUTO: 11 K/UL (ref 4.6–13.2)

## 2018-11-29 PROCEDURE — 86901 BLOOD TYPING SEROLOGIC RH(D): CPT

## 2018-11-29 PROCEDURE — 82040 ASSAY OF SERUM ALBUMIN: CPT

## 2018-11-29 PROCEDURE — 85025 COMPLETE CBC W/AUTO DIFF WBC: CPT

## 2018-11-29 PROCEDURE — 85610 PROTHROMBIN TIME: CPT

## 2018-11-29 PROCEDURE — 36415 COLL VENOUS BLD VENIPUNCTURE: CPT

## 2018-11-29 PROCEDURE — 85730 THROMBOPLASTIN TIME PARTIAL: CPT

## 2018-11-29 PROCEDURE — 80048 BASIC METABOLIC PNL TOTAL CA: CPT

## 2018-11-30 ENCOUNTER — HOME CARE VISIT (OUTPATIENT)
Dept: SCHEDULING | Facility: HOME HEALTH | Age: 68
End: 2018-11-30
Payer: MEDICARE

## 2018-11-30 VITALS
TEMPERATURE: 96.4 F | OXYGEN SATURATION: 97 % | DIASTOLIC BLOOD PRESSURE: 82 MMHG | HEART RATE: 103 BPM | SYSTOLIC BLOOD PRESSURE: 120 MMHG | TEMPERATURE: 97.6 F | DIASTOLIC BLOOD PRESSURE: 70 MMHG | HEART RATE: 96 BPM | OXYGEN SATURATION: 98 % | SYSTOLIC BLOOD PRESSURE: 124 MMHG

## 2018-11-30 PROCEDURE — G0151 HHCP-SERV OF PT,EA 15 MIN: HCPCS

## 2018-12-03 ENCOUNTER — ANESTHESIA EVENT (OUTPATIENT)
Dept: SURGERY | Age: 68
DRG: 330 | End: 2018-12-03
Payer: MEDICARE

## 2018-12-04 ENCOUNTER — ANESTHESIA (OUTPATIENT)
Dept: SURGERY | Age: 68
DRG: 330 | End: 2018-12-04
Payer: MEDICARE

## 2018-12-04 ENCOUNTER — HOSPITAL ENCOUNTER (INPATIENT)
Age: 68
LOS: 18 days | Discharge: HOME HEALTH | DRG: 330 | End: 2018-12-22
Attending: COLON & RECTAL SURGERY | Admitting: COLON & RECTAL SURGERY
Payer: MEDICARE

## 2018-12-04 DIAGNOSIS — C18.7 MALIGNANT NEOPLASM OF SIGMOID COLON (HCC): Primary | ICD-10-CM

## 2018-12-04 DIAGNOSIS — M54.9 INTRACTABLE BACK PAIN: ICD-10-CM

## 2018-12-04 PROBLEM — C18.9 COLON CANCER (HCC): Status: ACTIVE | Noted: 2018-12-04

## 2018-12-04 LAB
GLUCOSE BLD STRIP.AUTO-MCNC: 128 MG/DL (ref 70–110)
GLUCOSE BLD STRIP.AUTO-MCNC: 131 MG/DL (ref 70–110)
GLUCOSE BLD STRIP.AUTO-MCNC: 99 MG/DL (ref 70–110)

## 2018-12-04 PROCEDURE — 77030032523 HC RELD STPL PK ENDORS INTU -C: Performed by: COLON & RECTAL SURGERY

## 2018-12-04 PROCEDURE — 77030026438 HC STYL ET INTUB CARD -A: Performed by: ANESTHESIOLOGY

## 2018-12-04 PROCEDURE — 77030025303 HC STPLR ENDOSC J&J -G: Performed by: COLON & RECTAL SURGERY

## 2018-12-04 PROCEDURE — 77030028754 HC RCTRCTR LAPSCP ALX DSP AMR -B: Performed by: COLON & RECTAL SURGERY

## 2018-12-04 PROCEDURE — 77030032490 HC SLV COMPR SCD KNE COVD -B: Performed by: COLON & RECTAL SURGERY

## 2018-12-04 PROCEDURE — 77030032522 HC SHT STPL PK ENDOWR INTU -B: Performed by: COLON & RECTAL SURGERY

## 2018-12-04 PROCEDURE — 77030018836 HC SOL IRR NACL ICUM -A: Performed by: COLON & RECTAL SURGERY

## 2018-12-04 PROCEDURE — 74011250636 HC RX REV CODE- 250/636: Performed by: COLON & RECTAL SURGERY

## 2018-12-04 PROCEDURE — 88305 TISSUE EXAM BY PATHOLOGIST: CPT

## 2018-12-04 PROCEDURE — 88309 TISSUE EXAM BY PATHOLOGIST: CPT

## 2018-12-04 PROCEDURE — 82962 GLUCOSE BLOOD TEST: CPT

## 2018-12-04 PROCEDURE — 74011250636 HC RX REV CODE- 250/636: Performed by: NURSE ANESTHETIST, CERTIFIED REGISTERED

## 2018-12-04 PROCEDURE — 76060000043 HC ANESTHESIA 6 TO 6.5 HR: Performed by: COLON & RECTAL SURGERY

## 2018-12-04 PROCEDURE — 74011250637 HC RX REV CODE- 250/637: Performed by: NURSE ANESTHETIST, CERTIFIED REGISTERED

## 2018-12-04 PROCEDURE — 77030003028 HC SUT VCRL J&J -A: Performed by: COLON & RECTAL SURGERY

## 2018-12-04 PROCEDURE — 77030008756 HC TU IRR SUC STRY -B: Performed by: COLON & RECTAL SURGERY

## 2018-12-04 PROCEDURE — 77030035278 HC STPLR SEAL ENDOWR INTU -B: Performed by: COLON & RECTAL SURGERY

## 2018-12-04 PROCEDURE — 77030034850: Performed by: COLON & RECTAL SURGERY

## 2018-12-04 PROCEDURE — 77030020061 HC IV BLD WRMR ADMIN SET 3M -B: Performed by: ANESTHESIOLOGY

## 2018-12-04 PROCEDURE — 77030019605: Performed by: COLON & RECTAL SURGERY

## 2018-12-04 PROCEDURE — 77030002966 HC SUT PDS J&J -A: Performed by: COLON & RECTAL SURGERY

## 2018-12-04 PROCEDURE — 77030035279 HC SEAL VSL ENDOWR XI INTU -I2: Performed by: COLON & RECTAL SURGERY

## 2018-12-04 PROCEDURE — 77030035048 HC TRCR ENDOSC OPTCL COVD -B: Performed by: COLON & RECTAL SURGERY

## 2018-12-04 PROCEDURE — 77030008771 HC TU NG SALEM SUMP -A: Performed by: ANESTHESIOLOGY

## 2018-12-04 PROCEDURE — 77030031139 HC SUT VCRL2 J&J -A: Performed by: COLON & RECTAL SURGERY

## 2018-12-04 PROCEDURE — 76010000884 HC OR TIME 6 TO 6.5HR INTENSV - TIER 2: Performed by: COLON & RECTAL SURGERY

## 2018-12-04 PROCEDURE — 77030008683 HC TU ET CUF COVD -A: Performed by: ANESTHESIOLOGY

## 2018-12-04 PROCEDURE — 65270000029 HC RM PRIVATE

## 2018-12-04 PROCEDURE — 76210000016 HC OR PH I REC 1 TO 1.5 HR: Performed by: COLON & RECTAL SURGERY

## 2018-12-04 PROCEDURE — 74011250636 HC RX REV CODE- 250/636

## 2018-12-04 PROCEDURE — 77030008566 HC TBNG SUC IRR J&J -B: Performed by: COLON & RECTAL SURGERY

## 2018-12-04 PROCEDURE — 74011000250 HC RX REV CODE- 250

## 2018-12-04 PROCEDURE — 77030002986 HC SUT PROL J&J -A: Performed by: COLON & RECTAL SURGERY

## 2018-12-04 PROCEDURE — 77030026918 HC ADMN ST IV BLD BD -A: Performed by: ANESTHESIOLOGY

## 2018-12-04 PROCEDURE — 8E0W4CZ ROBOTIC ASSISTED PROCEDURE OF TRUNK REGION, PERCUTANEOUS ENDOSCOPIC APPROACH: ICD-10-PCS | Performed by: COLON & RECTAL SURGERY

## 2018-12-04 PROCEDURE — 77030008544 HC TBNG MON PRSS MRTM -B: Performed by: ANESTHESIOLOGY

## 2018-12-04 PROCEDURE — 77030035489 HC REDUCR CANN ENDOWR INTU -C: Performed by: COLON & RECTAL SURGERY

## 2018-12-04 PROCEDURE — 77030013079 HC BLNKT BAIR HGGR 3M -A: Performed by: ANESTHESIOLOGY

## 2018-12-04 PROCEDURE — 74011000250 HC RX REV CODE- 250: Performed by: COLON & RECTAL SURGERY

## 2018-12-04 PROCEDURE — 74011250637 HC RX REV CODE- 250/637: Performed by: COLON & RECTAL SURGERY

## 2018-12-04 PROCEDURE — 0DTN4ZZ RESECTION OF SIGMOID COLON, PERCUTANEOUS ENDOSCOPIC APPROACH: ICD-10-PCS | Performed by: COLON & RECTAL SURGERY

## 2018-12-04 PROCEDURE — 77030011296 HC FCPS GRSP ENDOSC J&J -B: Performed by: COLON & RECTAL SURGERY

## 2018-12-04 PROCEDURE — 77030020263 HC SOL INJ SOD CL0.9% LFCR 1000ML: Performed by: COLON & RECTAL SURGERY

## 2018-12-04 PROCEDURE — 77030035277 HC OBTRTR BLDELSS DISP INTU -B: Performed by: COLON & RECTAL SURGERY

## 2018-12-04 PROCEDURE — 77030020703 HC SEAL CANN DISP INTU -B: Performed by: COLON & RECTAL SURGERY

## 2018-12-04 RX ORDER — SODIUM CHLORIDE, SODIUM LACTATE, POTASSIUM CHLORIDE, CALCIUM CHLORIDE 600; 310; 30; 20 MG/100ML; MG/100ML; MG/100ML; MG/100ML
50 INJECTION, SOLUTION INTRAVENOUS CONTINUOUS
Status: DISCONTINUED | OUTPATIENT
Start: 2018-12-04 | End: 2018-12-04 | Stop reason: HOSPADM

## 2018-12-04 RX ORDER — SUCCINYLCHOLINE CHLORIDE 20 MG/ML
INJECTION INTRAMUSCULAR; INTRAVENOUS AS NEEDED
Status: DISCONTINUED | OUTPATIENT
Start: 2018-12-04 | End: 2018-12-04 | Stop reason: HOSPADM

## 2018-12-04 RX ORDER — LOSARTAN POTASSIUM 50 MG/1
50 TABLET ORAL DAILY
Status: DISCONTINUED | OUTPATIENT
Start: 2018-12-05 | End: 2018-12-10

## 2018-12-04 RX ORDER — PROPOFOL 10 MG/ML
INJECTION, EMULSION INTRAVENOUS AS NEEDED
Status: DISCONTINUED | OUTPATIENT
Start: 2018-12-04 | End: 2018-12-04 | Stop reason: HOSPADM

## 2018-12-04 RX ORDER — CEFAZOLIN SODIUM 2 G/50ML
2 SOLUTION INTRAVENOUS
Status: COMPLETED | OUTPATIENT
Start: 2018-12-04 | End: 2018-12-04

## 2018-12-04 RX ORDER — INSULIN LISPRO 100 [IU]/ML
INJECTION, SOLUTION INTRAVENOUS; SUBCUTANEOUS ONCE
Status: DISCONTINUED | OUTPATIENT
Start: 2018-12-04 | End: 2018-12-04 | Stop reason: HOSPADM

## 2018-12-04 RX ORDER — FENTANYL CITRATE 50 UG/ML
INJECTION, SOLUTION INTRAMUSCULAR; INTRAVENOUS AS NEEDED
Status: DISCONTINUED | OUTPATIENT
Start: 2018-12-04 | End: 2018-12-04 | Stop reason: HOSPADM

## 2018-12-04 RX ORDER — CEFAZOLIN SODIUM 2 G/50ML
2 SOLUTION INTRAVENOUS EVERY 8 HOURS
Status: COMPLETED | OUTPATIENT
Start: 2018-12-04 | End: 2018-12-05

## 2018-12-04 RX ORDER — MAGNESIUM SULFATE 100 %
4 CRYSTALS MISCELLANEOUS AS NEEDED
Status: DISCONTINUED | OUTPATIENT
Start: 2018-12-04 | End: 2018-12-22 | Stop reason: HOSPADM

## 2018-12-04 RX ORDER — DEXTROSE 50 % IN WATER (D50W) INTRAVENOUS SYRINGE
25-50 AS NEEDED
Status: DISCONTINUED | OUTPATIENT
Start: 2018-12-04 | End: 2018-12-04 | Stop reason: HOSPADM

## 2018-12-04 RX ORDER — SODIUM CHLORIDE, SODIUM LACTATE, POTASSIUM CHLORIDE, CALCIUM CHLORIDE 600; 310; 30; 20 MG/100ML; MG/100ML; MG/100ML; MG/100ML
75 INJECTION, SOLUTION INTRAVENOUS CONTINUOUS
Status: DISCONTINUED | OUTPATIENT
Start: 2018-12-04 | End: 2018-12-04 | Stop reason: HOSPADM

## 2018-12-04 RX ORDER — METRONIDAZOLE 500 MG/100ML
500 INJECTION, SOLUTION INTRAVENOUS
Status: COMPLETED | OUTPATIENT
Start: 2018-12-04 | End: 2018-12-04

## 2018-12-04 RX ORDER — CEFAZOLIN SODIUM 2 G/50ML
2 SOLUTION INTRAVENOUS EVERY 8 HOURS
Status: DISCONTINUED | OUTPATIENT
Start: 2018-12-04 | End: 2018-12-04 | Stop reason: RX

## 2018-12-04 RX ORDER — ACETAMINOPHEN 500 MG
1000 TABLET ORAL EVERY 6 HOURS
Status: DISCONTINUED | OUTPATIENT
Start: 2018-12-04 | End: 2018-12-11

## 2018-12-04 RX ORDER — LATANOPROST 50 UG/ML
1 SOLUTION/ DROPS OPHTHALMIC EVERY EVENING
Status: DISCONTINUED | OUTPATIENT
Start: 2018-12-04 | End: 2018-12-22 | Stop reason: HOSPADM

## 2018-12-04 RX ORDER — DIPHENHYDRAMINE HYDROCHLORIDE 50 MG/ML
25 INJECTION, SOLUTION INTRAMUSCULAR; INTRAVENOUS
Status: DISCONTINUED | OUTPATIENT
Start: 2018-12-04 | End: 2018-12-22 | Stop reason: HOSPADM

## 2018-12-04 RX ORDER — HYDROMORPHONE HYDROCHLORIDE 2 MG/ML
0.5 INJECTION, SOLUTION INTRAMUSCULAR; INTRAVENOUS; SUBCUTANEOUS AS NEEDED
Status: DISCONTINUED | OUTPATIENT
Start: 2018-12-04 | End: 2018-12-04 | Stop reason: HOSPADM

## 2018-12-04 RX ORDER — LIDOCAINE HYDROCHLORIDE 10 MG/ML
0.1 INJECTION, SOLUTION EPIDURAL; INFILTRATION; INTRACAUDAL; PERINEURAL AS NEEDED
Status: DISCONTINUED | OUTPATIENT
Start: 2018-12-04 | End: 2018-12-04 | Stop reason: HOSPADM

## 2018-12-04 RX ORDER — NEBIVOLOL 5 MG/1
10 TABLET ORAL DAILY
Status: DISCONTINUED | OUTPATIENT
Start: 2018-12-05 | End: 2018-12-11

## 2018-12-04 RX ORDER — INSULIN LISPRO 100 [IU]/ML
INJECTION, SOLUTION INTRAVENOUS; SUBCUTANEOUS EVERY 6 HOURS
Status: DISCONTINUED | OUTPATIENT
Start: 2018-12-04 | End: 2018-12-11

## 2018-12-04 RX ORDER — HEPARIN SODIUM 5000 [USP'U]/ML
5000 INJECTION, SOLUTION INTRAVENOUS; SUBCUTANEOUS EVERY 8 HOURS
Status: DISCONTINUED | OUTPATIENT
Start: 2018-12-05 | End: 2018-12-22 | Stop reason: HOSPADM

## 2018-12-04 RX ORDER — METRONIDAZOLE 500 MG/100ML
500 INJECTION, SOLUTION INTRAVENOUS EVERY 8 HOURS
Status: COMPLETED | OUTPATIENT
Start: 2018-12-04 | End: 2018-12-05

## 2018-12-04 RX ORDER — CELECOXIB 400 MG/1
400 CAPSULE ORAL ONCE
Status: COMPLETED | OUTPATIENT
Start: 2018-12-04 | End: 2018-12-04

## 2018-12-04 RX ORDER — LABETALOL HYDROCHLORIDE 5 MG/ML
INJECTION, SOLUTION INTRAVENOUS AS NEEDED
Status: DISCONTINUED | OUTPATIENT
Start: 2018-12-04 | End: 2018-12-04 | Stop reason: HOSPADM

## 2018-12-04 RX ORDER — MAGNESIUM SULFATE 100 %
4 CRYSTALS MISCELLANEOUS AS NEEDED
Status: DISCONTINUED | OUTPATIENT
Start: 2018-12-04 | End: 2018-12-04 | Stop reason: HOSPADM

## 2018-12-04 RX ORDER — MIDAZOLAM HYDROCHLORIDE 1 MG/ML
INJECTION, SOLUTION INTRAMUSCULAR; INTRAVENOUS AS NEEDED
Status: DISCONTINUED | OUTPATIENT
Start: 2018-12-04 | End: 2018-12-04 | Stop reason: HOSPADM

## 2018-12-04 RX ORDER — GABAPENTIN 300 MG/1
300 CAPSULE ORAL 3 TIMES DAILY
Status: DISCONTINUED | OUTPATIENT
Start: 2018-12-04 | End: 2018-12-10

## 2018-12-04 RX ORDER — METRONIDAZOLE 500 MG/100ML
500 INJECTION, SOLUTION INTRAVENOUS EVERY 8 HOURS
Status: DISCONTINUED | OUTPATIENT
Start: 2018-12-04 | End: 2018-12-04 | Stop reason: RX

## 2018-12-04 RX ORDER — ACETAMINOPHEN 500 MG
1000 TABLET ORAL ONCE
Status: COMPLETED | OUTPATIENT
Start: 2018-12-04 | End: 2018-12-04

## 2018-12-04 RX ORDER — ONDANSETRON 2 MG/ML
INJECTION INTRAMUSCULAR; INTRAVENOUS AS NEEDED
Status: DISCONTINUED | OUTPATIENT
Start: 2018-12-04 | End: 2018-12-04 | Stop reason: HOSPADM

## 2018-12-04 RX ORDER — BUPIVACAINE HYDROCHLORIDE AND EPINEPHRINE 2.5; 5 MG/ML; UG/ML
INJECTION, SOLUTION EPIDURAL; INFILTRATION; INTRACAUDAL; PERINEURAL AS NEEDED
Status: DISCONTINUED | OUTPATIENT
Start: 2018-12-04 | End: 2018-12-04 | Stop reason: HOSPADM

## 2018-12-04 RX ORDER — ONDANSETRON 2 MG/ML
4 INJECTION INTRAMUSCULAR; INTRAVENOUS
Status: DISCONTINUED | OUTPATIENT
Start: 2018-12-04 | End: 2018-12-22 | Stop reason: HOSPADM

## 2018-12-04 RX ORDER — METRONIDAZOLE 500 MG/100ML
500 INJECTION, SOLUTION INTRAVENOUS EVERY 8 HOURS
Status: DISCONTINUED | OUTPATIENT
Start: 2018-12-04 | End: 2018-12-04 | Stop reason: SDUPTHER

## 2018-12-04 RX ORDER — NALOXONE HYDROCHLORIDE 0.4 MG/ML
0.1 INJECTION, SOLUTION INTRAMUSCULAR; INTRAVENOUS; SUBCUTANEOUS ONCE
Status: DISCONTINUED | OUTPATIENT
Start: 2018-12-04 | End: 2018-12-04 | Stop reason: HOSPADM

## 2018-12-04 RX ORDER — HEPARIN SODIUM 5000 [USP'U]/ML
5000 INJECTION, SOLUTION INTRAVENOUS; SUBCUTANEOUS EVERY 8 HOURS
Status: DISCONTINUED | OUTPATIENT
Start: 2018-12-04 | End: 2018-12-04 | Stop reason: SDUPTHER

## 2018-12-04 RX ORDER — GLYCOPYRROLATE 0.2 MG/ML
INJECTION INTRAMUSCULAR; INTRAVENOUS AS NEEDED
Status: DISCONTINUED | OUTPATIENT
Start: 2018-12-04 | End: 2018-12-04 | Stop reason: HOSPADM

## 2018-12-04 RX ORDER — GABAPENTIN 300 MG/1
600 CAPSULE ORAL ONCE
Status: COMPLETED | OUTPATIENT
Start: 2018-12-04 | End: 2018-12-04

## 2018-12-04 RX ORDER — DEXTROSE 50 % IN WATER (D50W) INTRAVENOUS SYRINGE
25-50 AS NEEDED
Status: DISCONTINUED | OUTPATIENT
Start: 2018-12-04 | End: 2018-12-22 | Stop reason: HOSPADM

## 2018-12-04 RX ORDER — CELECOXIB 100 MG/1
200 CAPSULE ORAL 2 TIMES DAILY
Status: DISCONTINUED | OUTPATIENT
Start: 2018-12-04 | End: 2018-12-10

## 2018-12-04 RX ORDER — NEOSTIGMINE METHYLSULFATE 5 MG/5 ML
SYRINGE (ML) INTRAVENOUS AS NEEDED
Status: DISCONTINUED | OUTPATIENT
Start: 2018-12-04 | End: 2018-12-04 | Stop reason: HOSPADM

## 2018-12-04 RX ORDER — MORPHINE SULFATE 2 MG/ML
2 INJECTION, SOLUTION INTRAMUSCULAR; INTRAVENOUS
Status: DISCONTINUED | OUTPATIENT
Start: 2018-12-04 | End: 2018-12-11

## 2018-12-04 RX ORDER — ROCURONIUM BROMIDE 10 MG/ML
INJECTION, SOLUTION INTRAVENOUS AS NEEDED
Status: DISCONTINUED | OUTPATIENT
Start: 2018-12-04 | End: 2018-12-04 | Stop reason: HOSPADM

## 2018-12-04 RX ORDER — LIDOCAINE HYDROCHLORIDE 20 MG/ML
INJECTION, SOLUTION EPIDURAL; INFILTRATION; INTRACAUDAL; PERINEURAL AS NEEDED
Status: DISCONTINUED | OUTPATIENT
Start: 2018-12-04 | End: 2018-12-04 | Stop reason: HOSPADM

## 2018-12-04 RX ORDER — FAMOTIDINE 20 MG/1
20 TABLET, FILM COATED ORAL ONCE
Status: COMPLETED | OUTPATIENT
Start: 2018-12-04 | End: 2018-12-04

## 2018-12-04 RX ORDER — SODIUM CHLORIDE, SODIUM LACTATE, POTASSIUM CHLORIDE, CALCIUM CHLORIDE 600; 310; 30; 20 MG/100ML; MG/100ML; MG/100ML; MG/100ML
75 INJECTION, SOLUTION INTRAVENOUS CONTINUOUS
Status: DISCONTINUED | OUTPATIENT
Start: 2018-12-04 | End: 2018-12-05

## 2018-12-04 RX ADMIN — GABAPENTIN 300 MG: 300 CAPSULE ORAL at 16:17

## 2018-12-04 RX ADMIN — ROCURONIUM BROMIDE 10 MG: 10 INJECTION, SOLUTION INTRAVENOUS at 09:30

## 2018-12-04 RX ADMIN — ONDANSETRON 4 MG: 2 INJECTION INTRAMUSCULAR; INTRAVENOUS at 09:14

## 2018-12-04 RX ADMIN — GABAPENTIN 300 MG: 300 CAPSULE ORAL at 22:05

## 2018-12-04 RX ADMIN — LABETALOL HYDROCHLORIDE 2.5 MG: 5 INJECTION, SOLUTION INTRAVENOUS at 08:37

## 2018-12-04 RX ADMIN — SODIUM CHLORIDE, SODIUM LACTATE, POTASSIUM CHLORIDE, AND CALCIUM CHLORIDE 50 ML/HR: 600; 310; 30; 20 INJECTION, SOLUTION INTRAVENOUS at 06:28

## 2018-12-04 RX ADMIN — Medication 3 MG: at 13:05

## 2018-12-04 RX ADMIN — SODIUM CHLORIDE, SODIUM LACTATE, POTASSIUM CHLORIDE, AND CALCIUM CHLORIDE 75 ML/HR: 600; 310; 30; 20 INJECTION, SOLUTION INTRAVENOUS at 20:34

## 2018-12-04 RX ADMIN — FENTANYL CITRATE 50 MCG: 50 INJECTION, SOLUTION INTRAMUSCULAR; INTRAVENOUS at 09:05

## 2018-12-04 RX ADMIN — ROCURONIUM BROMIDE 20 MG: 10 INJECTION, SOLUTION INTRAVENOUS at 10:30

## 2018-12-04 RX ADMIN — ROCURONIUM BROMIDE 35 MG: 10 INJECTION, SOLUTION INTRAVENOUS at 07:56

## 2018-12-04 RX ADMIN — CEFAZOLIN SODIUM 2 G: 2 SOLUTION INTRAVENOUS at 07:59

## 2018-12-04 RX ADMIN — SUCCINYLCHOLINE CHLORIDE 120 MG: 20 INJECTION INTRAMUSCULAR; INTRAVENOUS at 07:40

## 2018-12-04 RX ADMIN — LABETALOL HYDROCHLORIDE 2.5 MG: 5 INJECTION, SOLUTION INTRAVENOUS at 08:50

## 2018-12-04 RX ADMIN — MIDAZOLAM HYDROCHLORIDE 2 MG: 1 INJECTION, SOLUTION INTRAMUSCULAR; INTRAVENOUS at 07:29

## 2018-12-04 RX ADMIN — ROCURONIUM BROMIDE 5 MG: 10 INJECTION, SOLUTION INTRAVENOUS at 07:40

## 2018-12-04 RX ADMIN — SODIUM CHLORIDE, SODIUM LACTATE, POTASSIUM CHLORIDE, AND CALCIUM CHLORIDE: 600; 310; 30; 20 INJECTION, SOLUTION INTRAVENOUS at 11:30

## 2018-12-04 RX ADMIN — ACETAMINOPHEN 1000 MG: 500 TABLET ORAL at 06:28

## 2018-12-04 RX ADMIN — PROPOFOL 150 MG: 10 INJECTION, EMULSION INTRAVENOUS at 07:40

## 2018-12-04 RX ADMIN — SODIUM CHLORIDE, SODIUM LACTATE, POTASSIUM CHLORIDE, AND CALCIUM CHLORIDE 75 ML/HR: 600; 310; 30; 20 INJECTION, SOLUTION INTRAVENOUS at 16:22

## 2018-12-04 RX ADMIN — CEFAZOLIN SODIUM 2 G: 2 SOLUTION INTRAVENOUS at 11:59

## 2018-12-04 RX ADMIN — CELECOXIB 200 MG: 100 CAPSULE ORAL at 17:17

## 2018-12-04 RX ADMIN — CEFAZOLIN SODIUM 2 G: 2 SOLUTION INTRAVENOUS at 20:35

## 2018-12-04 RX ADMIN — LABETALOL HYDROCHLORIDE 5 MG: 5 INJECTION, SOLUTION INTRAVENOUS at 09:26

## 2018-12-04 RX ADMIN — ACETAMINOPHEN 1000 MG: 500 TABLET ORAL at 17:17

## 2018-12-04 RX ADMIN — LABETALOL HYDROCHLORIDE 2.5 MG: 5 INJECTION, SOLUTION INTRAVENOUS at 08:34

## 2018-12-04 RX ADMIN — GLYCOPYRROLATE 0.6 MG: 0.2 INJECTION INTRAMUSCULAR; INTRAVENOUS at 13:05

## 2018-12-04 RX ADMIN — CELECOXIB 400 MG: 400 CAPSULE ORAL at 06:28

## 2018-12-04 RX ADMIN — GABAPENTIN 600 MG: 300 CAPSULE ORAL at 06:28

## 2018-12-04 RX ADMIN — FENTANYL CITRATE 100 MCG: 50 INJECTION, SOLUTION INTRAMUSCULAR; INTRAVENOUS at 07:40

## 2018-12-04 RX ADMIN — METRONIDAZOLE 500 MG: 500 INJECTION, SOLUTION INTRAVENOUS at 17:17

## 2018-12-04 RX ADMIN — METRONIDAZOLE 500 MG: 500 INJECTION, SOLUTION INTRAVENOUS at 08:13

## 2018-12-04 RX ADMIN — FAMOTIDINE 20 MG: 20 TABLET, FILM COATED ORAL at 06:28

## 2018-12-04 RX ADMIN — MORPHINE SULFATE 2 MG: 2 INJECTION, SOLUTION INTRAMUSCULAR; INTRAVENOUS at 16:17

## 2018-12-04 RX ADMIN — HYDROMORPHONE HYDROCHLORIDE 0.5 MG: 2 INJECTION INTRAMUSCULAR; INTRAVENOUS; SUBCUTANEOUS at 13:48

## 2018-12-04 RX ADMIN — FAMOTIDINE 20 MG: 10 INJECTION INTRAVENOUS at 20:35

## 2018-12-04 RX ADMIN — FENTANYL CITRATE 50 MCG: 50 INJECTION, SOLUTION INTRAMUSCULAR; INTRAVENOUS at 08:30

## 2018-12-04 RX ADMIN — LIDOCAINE HYDROCHLORIDE 40 MG: 20 INJECTION, SOLUTION EPIDURAL; INFILTRATION; INTRACAUDAL; PERINEURAL at 07:40

## 2018-12-04 RX ADMIN — MORPHINE SULFATE 2 MG: 2 INJECTION, SOLUTION INTRAMUSCULAR; INTRAVENOUS at 19:43

## 2018-12-04 RX ADMIN — LATANOPROST 1 DROP: 50 SOLUTION/ DROPS OPHTHALMIC at 20:33

## 2018-12-04 RX ADMIN — FENTANYL CITRATE 50 MCG: 50 INJECTION, SOLUTION INTRAMUSCULAR; INTRAVENOUS at 08:31

## 2018-12-04 NOTE — ANESTHESIA POSTPROCEDURE EVALUATION
Procedure(s): 
ROBOTIC ASSISTED SIGMOID COLECTOMY. Anesthesia Post Evaluation Multimodal analgesia: multimodal analgesia used between 6 hours prior to anesthesia start to PACU discharge Patient location during evaluation: bedside Patient participation: complete - patient participated Level of consciousness: awake Pain management: adequate Airway patency: patent Anesthetic complications: no 
Cardiovascular status: stable Respiratory status: acceptable Hydration status: acceptable Post anesthesia nausea and vomiting:  controlled Visit Vitals /70 (BP 1 Location: Right arm, BP Patient Position: At rest) Pulse 96 Temp 36.4 °C (97.6 °F) Resp 14 Ht 5' 10\" (1.778 m) Wt 76.4 kg (168 lb 6.4 oz) SpO2 99% BMI 24.16 kg/m²

## 2018-12-04 NOTE — PERIOP NOTES
TRANSFER - OUT REPORT:    Verbal report given to [address] [place] [address] on Ana Maria Sheth  being transferred to  for routine post - op       Report consisted of patients Situation, Background, Assessment and   Recommendations(SBAR). Information from the following report(s) SBAR and MAR was reviewed with the receiving nurse. Lines:   Peripheral IV 12/04/18 Right Wrist (Active)   Site Assessment Clean, dry, & intact 12/4/2018  1:35 PM   Phlebitis Assessment 0 12/4/2018  1:35 PM   Infiltration Assessment 0 12/4/2018  1:35 PM   Dressing Status Clean, dry, & intact 12/4/2018  1:35 PM   Dressing Type Tape;Transparent 12/4/2018  1:35 PM   Hub Color/Line Status Pink;Capped 12/4/2018  1:35 PM       Peripheral IV 12/04/18 Left Hand (Active)   Site Assessment Clean, dry, & intact 12/4/2018  1:35 PM   Phlebitis Assessment 0 12/4/2018  1:35 PM   Infiltration Assessment 0 12/4/2018  1:35 PM   Dressing Status Clean, dry, & intact 12/4/2018  1:35 PM   Dressing Type Tape;Transparent 12/4/2018  1:35 PM   Hub Color/Line Status Pink; Infusing 12/4/2018  1:35 PM   Alcohol Cap Used No 12/4/2018  6:31 AM        Opportunity for questions and clarification was provided.       Patient transported with:   O2 @ 2 liters

## 2018-12-04 NOTE — ANESTHESIA PREPROCEDURE EVALUATION
Anesthetic History No history of anesthetic complications Review of Systems / Medical History Patient summary reviewed and pertinent labs reviewed Pulmonary Within defined limits Neuro/Psych Within defined limits Cardiovascular Hypertension Exercise tolerance: <4 METS 
  
GI/Hepatic/Renal 
  
GERD Endo/Other Diabetes Cancer Other Findings Physical Exam 
 
Airway Mallampati: II 
TM Distance: 4 - 6 cm Neck ROM: normal range of motion Mouth opening: Normal 
 
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 Cardiovascular Regular rate and rhythm,  S1 and S2 normal,  no murmur, click, rub, or gallop Dental 
 
Dentition: Full upper dentures and Lower partial plate Pulmonary Breath sounds clear to auscultation Abdominal 
GI exam deferred Other Findings Anesthetic Plan ASA: 3 Anesthesia type: general 
 
 
 
 
Induction: Intravenous Anesthetic plan and risks discussed with: Patient

## 2018-12-04 NOTE — BRIEF OP NOTE
BRIEF OPERATIVE NOTE    Date of Procedure: 12/4/2018   Preoperative Diagnosis: Malignant neoplasm of sigmoid colon (HCC) [C18.7]  Postoperative Diagnosis: Malignant neoplasm of sigmoid colon (HCC) [C18.7]    Procedure(s):  ROBOTIC ASSISTED SIGMOID COLECTOMY, SPLENIC FLEXURE MOBILIZATION  Surgeon(s) and Role:     * Eliseo Nunes MD - Primary         Surgical Assistant: none    Surgical Staff:  Circ-1: Braden De Leon RN  Circ-Relief: Jadon Lowery RN  Scrub Tech-1: Phyllis Ma  Scrub Tech-Relief: Stephanie Sauceda; Dennis DOSHI  Surg Asst-1: Eliza Julio  Surg Asst-Relief: Saroj OCONNOR  Event Time In Time Out   Incision Start 0815    Incision Close 1316      Anesthesia: General   Estimated Blood Loss: 100 ml  Specimens:   ID Type Source Tests Collected by Time Destination   1 : Sigmoid Colon Preservative Sigmoid  Eliseo Nunes MD 12/4/2018 1212 Pathology   2 : donut proximal Preservative   Eliseo Nunes MD 12/4/2018 1237 Pathology   3 : donut Distal Preservative   Eliseo Nunes MD 12/4/2018 1238 Pathology      Findings: sigmoid colon cancer   Complications: none  Implants: * No implants in log *    [id number]

## 2018-12-04 NOTE — PROGRESS NOTES
Reason for Admission:  Malignant neoplasm of sigmoid colon ([facility] [C18.7]  Colon cancer ([facility] [C18.9]                 RRAT Score:   14           Plan for utilizing home health:    No orders at this time                     Likelihood of Readmission:   Moderate                         Do you (patient/family) have any concerns for transition/discharge? None at this time. Transition of Care Plan:       Initial assessment completed with patient and spouse Face sheet information confirmed:  yes. The patient requests we communicate with Taiwo Mendieta and Patrick Rivero, daughter in reference to medical care. This patient lives in a 1 story home with 3 steps to enter with wife. Patient is able to navigate steps as needed. Prior to hospitalization, patient was considered to be independent : yes . Cognitive status of patient:   Alert and oriented   Patient has a current ACP document on file: No  The patient's wife will be available to transport patient home upon discharge. The patient already has cane, walker, and shower available in the home. Patient is not currently active with home health. Completed PT/OT on 11/30/18 with [facility]. Patient has not ever stayed in a skilled nursing facility or rehab. This patient is on dialysis : Yes  Freedom of choice signed: No   Currently, the discharge plan is for home. Care Management Interventions  PCP Verified by CM: Yes  Last Visit to PCP: 11/24/18  Mode of Transport at Discharge: Self  Transition of Care Consult (CM Consult): Discharge Planning  MyChart Signup: No  Discharge Durable Medical Equipment: No  Physical Therapy Consult: No  Occupational Therapy Consult: No  Speech Therapy Consult: No  Current Support Network: Lives with Spouse  Confirm Follow Up Transport: Family  Plan discussed with Pt/Family/Caregiver: Yes  Discharge Location  Discharge Placement: Home    THEE Neville, RN  Pager # [phone]  Care Manager

## 2018-12-04 NOTE — INTERVAL H&P NOTE
H&P Update:  Romelia Huerta was seen and examined. History and physical has been reviewed. The patient has been examined.  There have been no significant clinical changes since the completion of the originally dated History and Physical.    Signed By: James Anderson MD     December 4, 2018 7:17 AM

## 2018-12-05 LAB
ANION GAP SERPL CALC-SCNC: 8 MMOL/L (ref 3–18)
BUN SERPL-MCNC: 11 MG/DL (ref 7–18)
BUN/CREAT SERPL: 14 (ref 12–20)
CALCIUM SERPL-MCNC: 8.2 MG/DL (ref 8.5–10.1)
CHLORIDE SERPL-SCNC: 110 MMOL/L (ref 100–108)
CO2 SERPL-SCNC: 23 MMOL/L (ref 21–32)
CREAT SERPL-MCNC: 0.79 MG/DL (ref 0.6–1.3)
ERYTHROCYTE [DISTWIDTH] IN BLOOD BY AUTOMATED COUNT: 14 % (ref 11.6–14.5)
GLUCOSE BLD STRIP.AUTO-MCNC: 100 MG/DL (ref 70–110)
GLUCOSE BLD STRIP.AUTO-MCNC: 104 MG/DL (ref 70–110)
GLUCOSE BLD STRIP.AUTO-MCNC: 116 MG/DL (ref 70–110)
GLUCOSE BLD STRIP.AUTO-MCNC: 99 MG/DL (ref 70–110)
GLUCOSE SERPL-MCNC: 94 MG/DL (ref 74–99)
HCT VFR BLD AUTO: 29.9 % (ref 36–48)
HGB BLD-MCNC: 9.9 G/DL (ref 13–16)
MAGNESIUM SERPL-MCNC: 1.4 MG/DL (ref 1.6–2.6)
MCH RBC QN AUTO: 29.6 PG (ref 24–34)
MCHC RBC AUTO-ENTMCNC: 33.1 G/DL (ref 31–37)
MCV RBC AUTO: 89.5 FL (ref 74–97)
PHOSPHATE SERPL-MCNC: 4.7 MG/DL (ref 2.5–4.9)
PLATELET # BLD AUTO: 192 K/UL (ref 135–420)
PMV BLD AUTO: 9.5 FL (ref 9.2–11.8)
POTASSIUM SERPL-SCNC: 4.1 MMOL/L (ref 3.5–5.5)
RBC # BLD AUTO: 3.34 M/UL (ref 4.7–5.5)
SODIUM SERPL-SCNC: 141 MMOL/L (ref 136–145)
WBC # BLD AUTO: 11.1 K/UL (ref 4.6–13.2)

## 2018-12-05 PROCEDURE — 82962 GLUCOSE BLOOD TEST: CPT

## 2018-12-05 PROCEDURE — 83735 ASSAY OF MAGNESIUM: CPT

## 2018-12-05 PROCEDURE — 74011250637 HC RX REV CODE- 250/637: Performed by: SURGERY

## 2018-12-05 PROCEDURE — 74011250636 HC RX REV CODE- 250/636: Performed by: COLON & RECTAL SURGERY

## 2018-12-05 PROCEDURE — 74011250636 HC RX REV CODE- 250/636: Performed by: NURSE PRACTITIONER

## 2018-12-05 PROCEDURE — 74011250637 HC RX REV CODE- 250/637: Performed by: COLON & RECTAL SURGERY

## 2018-12-05 PROCEDURE — 80048 BASIC METABOLIC PNL TOTAL CA: CPT

## 2018-12-05 PROCEDURE — 36415 COLL VENOUS BLD VENIPUNCTURE: CPT

## 2018-12-05 PROCEDURE — 74011000250 HC RX REV CODE- 250: Performed by: COLON & RECTAL SURGERY

## 2018-12-05 PROCEDURE — 84100 ASSAY OF PHOSPHORUS: CPT

## 2018-12-05 PROCEDURE — 65270000029 HC RM PRIVATE

## 2018-12-05 PROCEDURE — 85027 COMPLETE CBC AUTOMATED: CPT

## 2018-12-05 RX ORDER — SODIUM CHLORIDE, SODIUM LACTATE, POTASSIUM CHLORIDE, CALCIUM CHLORIDE 600; 310; 30; 20 MG/100ML; MG/100ML; MG/100ML; MG/100ML
75 INJECTION, SOLUTION INTRAVENOUS CONTINUOUS
Status: DISCONTINUED | OUTPATIENT
Start: 2018-12-05 | End: 2018-12-06

## 2018-12-05 RX ADMIN — Medication 1 LOZENGE: at 15:00

## 2018-12-05 RX ADMIN — ACETAMINOPHEN 1000 MG: 500 TABLET ORAL at 05:33

## 2018-12-05 RX ADMIN — FAMOTIDINE 20 MG: 10 INJECTION INTRAVENOUS at 21:00

## 2018-12-05 RX ADMIN — ACETAMINOPHEN 1000 MG: 500 TABLET ORAL at 23:24

## 2018-12-05 RX ADMIN — METRONIDAZOLE 500 MG: 500 INJECTION, SOLUTION INTRAVENOUS at 08:18

## 2018-12-05 RX ADMIN — CELECOXIB 200 MG: 100 CAPSULE ORAL at 08:18

## 2018-12-05 RX ADMIN — MORPHINE SULFATE 2 MG: 2 INJECTION, SOLUTION INTRAMUSCULAR; INTRAVENOUS at 16:31

## 2018-12-05 RX ADMIN — NEBIVOLOL HYDROCHLORIDE 10 MG: 5 TABLET ORAL at 08:18

## 2018-12-05 RX ADMIN — ACETAMINOPHEN 1000 MG: 500 TABLET ORAL at 01:50

## 2018-12-05 RX ADMIN — ACETAMINOPHEN 1000 MG: 500 TABLET ORAL at 18:13

## 2018-12-05 RX ADMIN — HEPARIN SODIUM 5000 UNITS: 5000 INJECTION, SOLUTION INTRAVENOUS; SUBCUTANEOUS at 08:18

## 2018-12-05 RX ADMIN — GABAPENTIN 300 MG: 300 CAPSULE ORAL at 21:00

## 2018-12-05 RX ADMIN — MORPHINE SULFATE 2 MG: 2 INJECTION, SOLUTION INTRAMUSCULAR; INTRAVENOUS at 09:47

## 2018-12-05 RX ADMIN — MORPHINE SULFATE 2 MG: 2 INJECTION, SOLUTION INTRAMUSCULAR; INTRAVENOUS at 06:24

## 2018-12-05 RX ADMIN — FAMOTIDINE 20 MG: 10 INJECTION INTRAVENOUS at 08:18

## 2018-12-05 RX ADMIN — HEPARIN SODIUM 5000 UNITS: 5000 INJECTION, SOLUTION INTRAVENOUS; SUBCUTANEOUS at 16:31

## 2018-12-05 RX ADMIN — SODIUM CHLORIDE, SODIUM LACTATE, POTASSIUM CHLORIDE, AND CALCIUM CHLORIDE 75 ML/HR: 600; 310; 30; 20 INJECTION, SOLUTION INTRAVENOUS at 16:34

## 2018-12-05 RX ADMIN — CEFAZOLIN SODIUM 2 G: 2 SOLUTION INTRAVENOUS at 13:23

## 2018-12-05 RX ADMIN — MORPHINE SULFATE 2 MG: 2 INJECTION, SOLUTION INTRAMUSCULAR; INTRAVENOUS at 13:23

## 2018-12-05 RX ADMIN — CEFAZOLIN SODIUM 2 G: 2 SOLUTION INTRAVENOUS at 04:26

## 2018-12-05 RX ADMIN — CELECOXIB 200 MG: 100 CAPSULE ORAL at 18:12

## 2018-12-05 RX ADMIN — GABAPENTIN 300 MG: 300 CAPSULE ORAL at 08:18

## 2018-12-05 RX ADMIN — LOSARTAN POTASSIUM 50 MG: 50 TABLET ORAL at 08:17

## 2018-12-05 RX ADMIN — HEPARIN SODIUM 5000 UNITS: 5000 INJECTION, SOLUTION INTRAVENOUS; SUBCUTANEOUS at 23:25

## 2018-12-05 RX ADMIN — MORPHINE SULFATE 2 MG: 2 INJECTION, SOLUTION INTRAMUSCULAR; INTRAVENOUS at 01:50

## 2018-12-05 RX ADMIN — MORPHINE SULFATE 2 MG: 2 INJECTION, SOLUTION INTRAMUSCULAR; INTRAVENOUS at 19:32

## 2018-12-05 RX ADMIN — METRONIDAZOLE 500 MG: 500 INJECTION, SOLUTION INTRAVENOUS at 01:55

## 2018-12-05 RX ADMIN — Medication 1 LOZENGE: at 09:00

## 2018-12-05 RX ADMIN — ACETAMINOPHEN 1000 MG: 500 TABLET ORAL at 13:23

## 2018-12-05 RX ADMIN — GABAPENTIN 300 MG: 300 CAPSULE ORAL at 16:31

## 2018-12-05 NOTE — PROGRESS NOTES
[facility]  [address]  [facility] [address] [phone]  Colon and Rectal Surgery Progress Note      Patient: Andre Samuel MRN: [id number]  SSN: [id number]    YOB: 1950  Age: 80 y.o. Sex: male      Admit Date: 12/4/2018    LOS: 1 day     Subjective: Tolerating clear liquids  Incisional pain  No N/V  No BM, No Flatus  Sitting in chair upon arrival  Objective:     Vitals:    12/05/18 0130 12/05/18 0521 12/05/18 0603 12/05/18 1200   BP: 111/72 159/88 117/83 110/71   Pulse: 94 94 87 85   Resp: 14 17 14 16   Temp: 99.5 °F (37.5 °C) 97.3 °F (36.3 °C) 98.9 °F (37.2 °C) 98.2 °F (36.8 °C)   SpO2: 100% 99% 99% 99%   Weight:       Height:            Intake and Output:  Current Shift: No intake/output data recorded. Last three shifts: 12/03 1901 - 12/05 0700  In: 1650 [I.V.:1650]  Out: 1545 [Urine:1550]    Physical Exam:   GENERAL: alert, cooperative, no distress, appears stated age  LUNG: clear to auscultation bilaterally  HEART: regular rate and rhythm, S1, S2 normal, no murmur, click, rub or gallop  ABDOMEN: hard, distended, tenderness at incision sites. Bowel sounds hypoactive.  No masses,  no organomegaly  INCISION: Multiple small incisions with steri strips intact    Lab/Data Review:  CMP:   Lab Results   Component Value Date/Time     12/05/2018 03:25 AM    K 4.1 12/05/2018 03:25 AM     (H) 12/05/2018 03:25 AM    CO2 23 12/05/2018 03:25 AM    AGAP 8 12/05/2018 03:25 AM    GLU 94 12/05/2018 03:25 AM    BUN 11 12/05/2018 03:25 AM    CREA 0.79 12/05/2018 03:25 AM    GFRAA >60 12/05/2018 03:25 AM    GFRNA >60 12/05/2018 03:25 AM    CA 8.2 (L) 12/05/2018 03:25 AM    MG 1.4 (L) 12/05/2018 03:25 AM    PHOS 4.7 12/05/2018 03:25 AM     CBC:   Lab Results   Component Value Date/Time    WBC 11.1 12/05/2018 03:25 AM    HGB 9.9 (L) 12/05/2018 03:25 AM    HCT 29.9 (L) 12/05/2018 03:25 AM     12/05/2018 03:25 AM          Assessment:     Active Problems:    Colon cancer [facility]) (12/4/2018)    S/P: Robotic assisted sigmoid colectomy, splenic flexure  mobilization    Plan:   Continue clear liquids  PT-ambulation    Signed By: Eddie Banks NP        December 5, 2018

## 2018-12-05 NOTE — ROUTINE PROCESS
Bedside shift report given to Eve Navarro (oncoming nurse)    Reviewed:  · Kardex  · SBAR  · MAR  · I/Os

## 2018-12-05 NOTE — OP NOTES
[address] [facility],[address]  MR#: [id number]  : 1950  ACCOUNT #: [de-identified]   DATE OF SERVICE: 2018    PREOPERATIVE DIAGNOSIS:  Adenocarcinoma of the sigmoid colon. POSTOPERATIVE DIAGNOSIS:  Adenocarcinoma of the sigmoid colon. PROCEDURE PERFORMED:  Robotic sigmoid colectomy, splenic flexure mobilization. SURGEON:  Irma Mendez MD    ANESTHESIA:  General.    ESTIMATED BLOOD LOSS:  100 mL. SPECIMENS REMOVED:  Sigmoid colon to Pathology. FINDINGS:  Sigmoid colon cancer. COMPLICATIONS:  None. IMPLANTS:  None. ASSISTANT:  None. INDICATIONS:  The patient is a 72-year-old male with a malignant polyp of the sigmoid colon who was brought to the operating room for robotic sigmoid colectomy. I explained the risks of procedure including bleeding, infection, injury to surrounding structures, need for temporary or permanent stoma and death. He understood and wished to proceed. PROCEDURE:  The patient was properly identified in the holding area, brought to the operating room, laid supine on the operating table. General anesthesia was administered. Mckeon catheter was placed. Arms were tucked at his side. Chest was strapped to the bed. He was placed in a split leg position. Abdomen and perineal areas were prepped and draped in usual sterile fashion. We made a stab incision beneath the left subcostal margin and inserted a Veress needle, insufflated the abdomen to 15 mmHg. We placed 4 robotic ports along the patient's right side, the lower most being a 12 mm and the remainder being 8 mm. This was all done under direct visualization and the patient was placed in Trendelenburg position with right side down. Small bowel was moved out of the way. The small bowel was noted to be significantly dilated as was the colon filled with air.   This made manipulation of the small and large intestine difficult and visualization difficult as well.  We docked the robot. We incised the mesentery at the sacral promontory and scored it along the aorta. We performed medial to lateral dissection. We circumferentially dissected the inferior mesenteric artery, which was implanting high on the aorta and after identifying the left ureter, I transected this with the vessel sealing device. We then completed medial to lateral dissection of the descending and sigmoid colon. We placed a stitch at the junction of the ascending colon and sigmoid colon. We placed a second stitch at the portion of descending colon where it appeared the inferior mesenteric artery had been transected. These were approximately 7 or 8 cm apart. We took all the lateral attachments of the descending and sigmoid colon. We circumferentially dissected the rectum with vessel sealing device and came across the rectum with 2 firings of a 45 mm robotic blue load stapler. At this point, the entire sigmoid colon and ascending colon were freed from surrounding structures. We stepped the mesentery of the descending colon all the way up to the stitch placed at the junction of the descending and sigmoid colon. We then injected ICG dye and used the Firefly mode to evaluate blood flow. Blood flow was actually poor at the level of the junction of the descending and sigmoid colon, but excellent at the previously placed stitch which was just proximal to this. We therefore planned to use this as the proximal extent of our anastomosis. We stepped the mesentery all the way up to this level in preparation for extracorporealization. The patient was then placed in reverse Trendelenburg position. There was omental adhesion in the left upper quadrant. We took this with a vessel sealing device. We identified the transverse colon and splenic flexure and mobilized the splenic flexure downward and to its entirety.   This was technically challenging given omental adhesions, a relatively high splenic flexure and the fact that the transverse colon was greatly dilated making visualization difficult. Eventually, we were able to mobilize this all the way down to meet our lower dissection. We made a 4 cm incision beneath the umbilicus on the left side with a 15 blade. We carried this down to subcutaneous tissues, incised the fascia anteriorly and posteriorly, entered the abdominal cavity, placed a wound protector and extracorporealized the specimen. We came across the colon after placing bowel clamps with a 10 blade and passed the specimen off the field. We then placed a 2-0 Prolene pursestring suture and entered the anvil of the 29 mm EEA stapler. We tied this into place and placed this back into the abdominal cavity after clearing away the staple line of any fat. The abdomen was reinsufflated to 15 mmHg. We advanced the stapler per anus and opened the spike just anterior to the staple line. The proximal colon was brought downward and the anvil mated with the stapler. There was excellent reach. The stapler was closed and after being sure that there was no twisting of the mesentery, it was fired. Air leak test was negative. Blood and fluid were suctioned from the abdominal cavity. The mesenteric rent was checked. There was no small bowel underneath this. All ports removed under direct visualization. The extraction site was closed in 2 layers with 0 Vicryl suture and #1 PDS suture. The right lower quadrant 12 mm port site was closed with a 0 Vicryl suture. Subcutaneous tissues irrigated. Skin incisions were closed with 4-0 Monocryl subcuticular suture. Steri-Strips were applied. The patient tolerated the procedure well. All instrument, sponge and needle counts were correct at the end of the case x2. The patient awoke from anesthesia, was extubated and transported to PACU in stable condition. MD FELIX Way / Lilia De Leon  D: 12/04/2018 13:31     T: 12/04/2018 20:21  JOB #: [id number]

## 2018-12-06 LAB
ANION GAP SERPL CALC-SCNC: 8 MMOL/L (ref 3–18)
BUN SERPL-MCNC: 11 MG/DL (ref 7–18)
BUN/CREAT SERPL: 13 (ref 12–20)
CALCIUM SERPL-MCNC: 8.7 MG/DL (ref 8.5–10.1)
CHLORIDE SERPL-SCNC: 109 MMOL/L (ref 100–108)
CO2 SERPL-SCNC: 23 MMOL/L (ref 21–32)
CREAT SERPL-MCNC: 0.86 MG/DL (ref 0.6–1.3)
ERYTHROCYTE [DISTWIDTH] IN BLOOD BY AUTOMATED COUNT: 13.9 % (ref 11.6–14.5)
GLUCOSE BLD STRIP.AUTO-MCNC: 100 MG/DL (ref 70–110)
GLUCOSE BLD STRIP.AUTO-MCNC: 120 MG/DL (ref 70–110)
GLUCOSE BLD STRIP.AUTO-MCNC: 90 MG/DL (ref 70–110)
GLUCOSE BLD STRIP.AUTO-MCNC: 99 MG/DL (ref 70–110)
GLUCOSE SERPL-MCNC: 87 MG/DL (ref 74–99)
HCT VFR BLD AUTO: 29.4 % (ref 36–48)
HGB BLD-MCNC: 9.8 G/DL (ref 13–16)
MAGNESIUM SERPL-MCNC: 1.4 MG/DL (ref 1.6–2.6)
MCH RBC QN AUTO: 29.7 PG (ref 24–34)
MCHC RBC AUTO-ENTMCNC: 33.3 G/DL (ref 31–37)
MCV RBC AUTO: 89.1 FL (ref 74–97)
PHOSPHATE SERPL-MCNC: 3.7 MG/DL (ref 2.5–4.9)
PLATELET # BLD AUTO: 188 K/UL (ref 135–420)
PMV BLD AUTO: 9.9 FL (ref 9.2–11.8)
POTASSIUM SERPL-SCNC: 4 MMOL/L (ref 3.5–5.5)
RBC # BLD AUTO: 3.3 M/UL (ref 4.7–5.5)
SODIUM SERPL-SCNC: 140 MMOL/L (ref 136–145)
WBC # BLD AUTO: 12.6 K/UL (ref 4.6–13.2)

## 2018-12-06 PROCEDURE — 82962 GLUCOSE BLOOD TEST: CPT

## 2018-12-06 PROCEDURE — 83735 ASSAY OF MAGNESIUM: CPT

## 2018-12-06 PROCEDURE — 74011250637 HC RX REV CODE- 250/637: Performed by: COLON & RECTAL SURGERY

## 2018-12-06 PROCEDURE — 74011000250 HC RX REV CODE- 250: Performed by: COLON & RECTAL SURGERY

## 2018-12-06 PROCEDURE — 85027 COMPLETE CBC AUTOMATED: CPT

## 2018-12-06 PROCEDURE — 80048 BASIC METABOLIC PNL TOTAL CA: CPT

## 2018-12-06 PROCEDURE — 84100 ASSAY OF PHOSPHORUS: CPT

## 2018-12-06 PROCEDURE — 74011250636 HC RX REV CODE- 250/636: Performed by: COLON & RECTAL SURGERY

## 2018-12-06 PROCEDURE — 74011250637 HC RX REV CODE- 250/637: Performed by: SURGERY

## 2018-12-06 PROCEDURE — 97116 GAIT TRAINING THERAPY: CPT

## 2018-12-06 PROCEDURE — 65270000029 HC RM PRIVATE

## 2018-12-06 PROCEDURE — 97161 PT EVAL LOW COMPLEX 20 MIN: CPT

## 2018-12-06 PROCEDURE — 36415 COLL VENOUS BLD VENIPUNCTURE: CPT

## 2018-12-06 RX ORDER — SODIUM CHLORIDE, SODIUM LACTATE, POTASSIUM CHLORIDE, CALCIUM CHLORIDE 600; 310; 30; 20 MG/100ML; MG/100ML; MG/100ML; MG/100ML
75 INJECTION, SOLUTION INTRAVENOUS CONTINUOUS
Status: DISCONTINUED | OUTPATIENT
Start: 2018-12-06 | End: 2018-12-09

## 2018-12-06 RX ADMIN — SODIUM CHLORIDE, SODIUM LACTATE, POTASSIUM CHLORIDE, AND CALCIUM CHLORIDE 75 ML/HR: 600; 310; 30; 20 INJECTION, SOLUTION INTRAVENOUS at 14:00

## 2018-12-06 RX ADMIN — GABAPENTIN 300 MG: 300 CAPSULE ORAL at 21:45

## 2018-12-06 RX ADMIN — MORPHINE SULFATE 2 MG: 2 INJECTION, SOLUTION INTRAMUSCULAR; INTRAVENOUS at 22:04

## 2018-12-06 RX ADMIN — ACETAMINOPHEN 1000 MG: 500 TABLET ORAL at 11:48

## 2018-12-06 RX ADMIN — HEPARIN SODIUM 5000 UNITS: 5000 INJECTION, SOLUTION INTRAVENOUS; SUBCUTANEOUS at 16:03

## 2018-12-06 RX ADMIN — GABAPENTIN 300 MG: 300 CAPSULE ORAL at 09:09

## 2018-12-06 RX ADMIN — LATANOPROST 1 DROP: 50 SOLUTION/ DROPS OPHTHALMIC at 18:47

## 2018-12-06 RX ADMIN — CELECOXIB 200 MG: 100 CAPSULE ORAL at 09:09

## 2018-12-06 RX ADMIN — CELECOXIB 200 MG: 100 CAPSULE ORAL at 17:20

## 2018-12-06 RX ADMIN — Medication 1 LOZENGE: at 16:08

## 2018-12-06 RX ADMIN — HEPARIN SODIUM 5000 UNITS: 5000 INJECTION, SOLUTION INTRAVENOUS; SUBCUTANEOUS at 09:09

## 2018-12-06 RX ADMIN — NEBIVOLOL HYDROCHLORIDE 10 MG: 5 TABLET ORAL at 09:09

## 2018-12-06 RX ADMIN — ACETAMINOPHEN 1000 MG: 500 TABLET ORAL at 06:01

## 2018-12-06 RX ADMIN — GABAPENTIN 300 MG: 300 CAPSULE ORAL at 16:03

## 2018-12-06 RX ADMIN — LOSARTAN POTASSIUM 50 MG: 50 TABLET ORAL at 09:09

## 2018-12-06 RX ADMIN — FAMOTIDINE 20 MG: 10 INJECTION INTRAVENOUS at 20:24

## 2018-12-06 RX ADMIN — FAMOTIDINE 20 MG: 10 INJECTION INTRAVENOUS at 09:09

## 2018-12-06 RX ADMIN — Medication 1 LOZENGE: at 09:10

## 2018-12-06 NOTE — ROUTINE PROCESS
Patient is alert and oriented times three with no signs or symptoms of distress.  Dressing is intact and dry and no nausea and vomiting

## 2018-12-06 NOTE — ROUTINE PROCESS
Patient is alert and oriented times three with no signs or symptoms of distress. Lap sites and incision are clean dry and intact no nausea or vomiting patient tolerating diet well. His abdomen is very distended but soft told him he had to walk.

## 2018-12-06 NOTE — PROGRESS NOTES
Problem: Mobility Impaired (Adult and Pediatric)  Goal: *Acute Goals and Plan of Care (Insert Text)  Physical Therapy Goals  Initiated 12/6/2018 and to be accomplished within 7 day(s)  1. Patient will move from supine to sit and sit to supine  in bed with independence. 2.  Patient will transfer from bed to chair and chair to bed with independence using the least restrictive device. 3.  Patient will perform sit to stand with independence. 4.  Patient will ambulate with modified independence for 400 feet with SPC.   5.  Patient will ascend/descend 3 stairs with 1 handrail(s) with supervision/set-up. Outcome: Progressing Towards Goal  physical Therapy EVALUATION    Patient: Morena Robertson (68 y.o. male)  Date: 12/6/2018  Primary Diagnosis: Malignant neoplasm of sigmoid colon ([facility] [address] [C18.7]  Colon cancer ([facility] [address] [C18.9]  Procedure(s) (LRB):  ROBOTIC ASSISTED SIGMOID COLECTOMY (N/A) 2 Days Post-Op   Precautions:   Fall, Skin    OBJECTIVE/ASSESSMENT :  Based on the objective data described below, the patient presents with impaired functional mobility including bed mobility, transfers, ambulation, and general activity tolerance following admission for colon CA, s/p Colectomy. Patient presented today reclining in bed, agreeable to physical therapy evaluation. Patient transferred with supervision/modif I. Pt was able to ambulate x 300 feet with RW and would like to progress to a SPC as he used PTA. Pt also negotiated 3 steps with 1 railing with CGA. At conclusion of session, patient left sitting up in recliner with call bell in reach, needs met, and nurse notified.   Education:   [x]         Bed mobility  [x]         Transfers  [x]         Ambulation  [x]         Assistive device management  [x]         Stairs  []         Body mechanics  []         Position change  [x]         Activity pacing/energy conservation  []         Other:    Patient will benefit from skilled intervention to address the above impairments. Patients rehabilitation potential is considered to be Good  Factors which may influence rehabilitation potential include:   [x]         None noted  []         Mental ability/status  []         Medical condition  []         Home/family situation and support systems  []         Safety awareness  []         Pain tolerance/management  []         Other:      PLAN :  Recommendations and Planned Interventions:  [x]           Bed Mobility Training             [x]    Neuromuscular Re-Education  [x]           Transfer Training                   []    Orthotic/Prosthetic Training  [x]           Gait Training                          []    Modalities  [x]           Therapeutic Exercises          []    Edema Management/Control  [x]           Therapeutic Activities            [x]    Patient and Family Training/Education  []           Other (comment):    Frequency/Duration: Patient will be followed by physical therapy 1-2 times per day, 4-7 days per week to address goals. Discharge Recommendations: Home Health  Further Equipment Recommendations for Discharge: N/A     SUBJECTIVE:   Patient stated I've been walking in the halls with my wife. 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     Chest pain, unspecified     Diabetes ([address]     Essential hypertension, benign     GERD (gastroesophageal reflux disease)     Nonspecific abnormal electrocardiogram (ECG) (EKG)      Past Surgical History:   Procedure Laterality Date    COLONOSCOPY N/A 11/12/2018    COLONOSCOPY with Polypectomies, Bx's, Injection, Tattooing & Clip Placement x 3 performed by Philipp Mendoza MD at [facility]  11/12/2018         COLONOSCOPY,NATALIA Kirby  11/12/2018         ENDOSCOPIC MUCOSAL RESECT  11/12/2018         HX COLONOSCOPY      HX HEENT  2008    Throat Ca - 1 vocal chord removed     HX HEENT      eye surgery muscles    LAP,SURG,COLECTOMY, PARTIAL, W/ANAST N/A 12/04/2018    Dr. Laura Horta     Barriers to Learning/Limitations: None  Compensate with: N/A  Prior Level of Function/Home Situation: Pt lives with wife in a 1 story home with 3 steps to enter. Pt was receiving home health PT prior to admission for LBP and had started using a SPC and walker for ambulation depending on his level of back pain that particular day. Home Situation  Home Environment: Private residence  # Steps to Enter: 3  Rails to Enter: No  One/Two Story Residence: One story  Living Alone: No  Support Systems: Spouse/Significant Other/Partner  Patient Expects to be Discharged to[de-identified] Private residence  Current DME Used/Available at Home: Eric Dooley, satish, Shower chair, Walker, rolling  Critical Behavior:  Neurologic State: Alert  Psychosocial  Purposeful Interaction: Yes  Pt Identified Daily Priority: Clinical issues (comment)  Caritas Process: Nurture loving kindness;Enable mina/hope;Establish trust;Teaching/learning; Attend basic human needs;Create healing environment  Caring Interventions: Therapeutic modalities  Reassure: Therapeutic listening; Informing; Acceptance  Therapeutic Modalities: Deep breathing  Strength:    Strength: Generally decreased, functional  Tone & Sensation:   Tone: Normal  Sensation: Intact   Range Of Motion:  AROM: Within functional limits  Functional Mobility:  Bed Mobility:  Rolling: Independent  Supine to Sit: Modified independent  Sit to Supine: Modified independent  Transfers:  Sit to Stand: Supervision  Stand to Sit: Supervision  Balance:   Sitting: Intact  Standing: Intact; With support  Ambulation/Gait Training:  Distance (ft): 300 Feet (ft)  Assistive Device: Walker, rolling  Ambulation - Level of Assistance: Stand-by assistance  Stairs:  Number of Stairs Trained: 3  Stairs - Level of Assistance: Contact guard assistance  Rail Use: Left     Pain:  Pre session: 2 (surgical site)  Post session: 2 (surgical site)  Activity Tolerance:   Good  Please refer to the flowsheet for vital signs taken during this treatment. After treatment:   [x] Patient left in no apparent distress sitting up in chair  [] Patient left sitting on EOB  [] Patient left in no apparent distress in bed  [] Patient declined to be OOB at this time due to   [x] Call bell left within reach  [x] Nursing notified(Kimberly)  [] Caregiver present  [x] Bed alarm activated  [] SCDs in place    COMMUNICATION/EDUCATION:   [x]         Fall prevention education was provided and the patient/caregiver indicated understanding. [x]         Patient/family have participated as able in goal setting and plan of care. [x]         Patient/family agree to work toward stated goals and plan of care. []         Patient understands intent and goals of therapy, but is neutral about his/her participation. []         Patient is unable to participate in goal setting and plan of care. Thank you for this referral.  Bailee Milligan, PT   Time Calculation: 23 mins      Mobility  Current  CJ= 20-39%   Goal  CI= 1-19%  D/C  CI= 1-19%. The severity rating is based on the Level of Assistance required for Functional Mobility and ADLs.     Eval Complexity: History: MEDIUM  Complexity : 1-2 comorbidities / personal factors will impact the outcome/ POC Exam:LOW Complexity : 1-2 Standardized tests and measures addressing body structure, function, activity limitation and / or participation in recreation  Presentation: LOW Complexity : Stable, uncomplicated  Clinical Decision Making:Low Complexity   Overall Complexity:LOW

## 2018-12-06 NOTE — ROUTINE PROCESS
Mobility Intervention:       [] Pt dangled at edge of bed    [] Pt assisted OOB to bedside commode    [] Pt assisted OOB to chair    [] Pt ambulated to bathroom    [x] Patient was ambulated in room/hallway    Assistive Device Utilized:       [x] Rolling walker   [] Crutches   [] Straight Cane   [] Knee immobilizer   [] IV pole    After Mobilization:     [x] Patient left in no apparent distress sitting up in chair  [] Patient left in no apparent distress in bed  [x] Call bell left within reach  [x] SCDs on & machine turned on  [] Ice applied  [] RN notified  [] Caregiver present  [] Bed alarm activated    Reason patient not mobilized:      [] Patient refused   [] Nausea/vomiting   [] Low blood pressure   [] Drowsy/lethargic    Pain Rating:     [x] 0  [] 1  Assistive Device:        [] 2  [] 3  [] 4  [] 5  [] 6  Assistive Device:        [] 7  [] 8  [] 9  [] 10    Comments:

## 2018-12-06 NOTE — ROUTINE PROCESS
Bedside shift report given to chirs rn RN (oncoming nurse)    Reviewed:  · Kardex  · SBAR  · MAR  · I/Os

## 2018-12-06 NOTE — PROGRESS NOTES
[facility]  [address] [place] [id number]  [phone]  Colon and Rectal Surgery Progress Note      Patient: Constance Yarbrough MRN: [id number]  SSN: [id number]    YOB: 1950  Age: 80 y.o. Sex: male      Admit Date: 12/4/2018    LOS: 2 days     Subjective:   Sitting in chair upon arrival  BM X2, +Flatus  Abdominal pain and distention improved  No N/V, tolerating sips of clears  Ambulating     Objective:     Vitals:    12/05/18 1838 12/05/18 2200 12/06/18 0622 12/06/18 1119   BP: (!) 151/94 144/85 153/87 127/83   Pulse: 88 85 85 91   Resp: 18 20 15 16   Temp: 98.2 °F (36.8 °C) 98.1 °F (36.7 °C) 98.1 °F (36.7 °C) 98.2 °F (36.8 °C)   SpO2: 95% 96% 95% 97%   Weight:       Height:            Intake and Output:  Current Shift: 12/06 0701 - 12/06 1900  In: 360 [P.O.:360]  Out: 200 [Urine:200]  Last three shifts: 12/04 1901 - 12/06 0700  In: 2270.5 [P.O.:1373; I.V.:897.5]  Out: 2950 [Urine:2950]    Physical Exam:   GENERAL: alert, cooperative, no distress, appears stated age  LUNG: clear to auscultation bilaterally  HEART: regular rate and rhythm, S1, S2 normal, no murmur, click, rub or gallop  ABDOMEN: less distended today, semi soft,  non-tender. Bowel sounds normal. No masses,  no organomegaly.  Incisions intact with steri strips    Lab/Data Review:  CMP:   Lab Results   Component Value Date/Time     12/06/2018 02:22 AM    K 4.0 12/06/2018 02:22 AM     (H) 12/06/2018 02:22 AM    CO2 23 12/06/2018 02:22 AM    AGAP 8 12/06/2018 02:22 AM    GLU 87 12/06/2018 02:22 AM    BUN 11 12/06/2018 02:22 AM    CREA 0.86 12/06/2018 02:22 AM    GFRAA >60 12/06/2018 02:22 AM    GFRNA >60 12/06/2018 02:22 AM    CA 8.7 12/06/2018 02:22 AM    MG 1.4 (L) 12/06/2018 02:22 AM    PHOS 3.7 12/06/2018 02:22 AM     CBC:   Lab Results   Component Value Date/Time    WBC 12.6 12/06/2018 02:22 AM    HGB 9.8 (L) 12/06/2018 02:22 AM    HCT 29.4 (L) 12/06/2018 02:22 AM     12/06/2018 02:22 AM Assessment:     Active Problems:    Colon cancer ([facility] [address] (12/4/2018)        Plan:   Advance to full liquid  Continue ambulating    Signed By: Alexsandra Wagner NP        December 6, 2018

## 2018-12-07 LAB
ANION GAP SERPL CALC-SCNC: 8 MMOL/L (ref 3–18)
BUN SERPL-MCNC: 7 MG/DL (ref 7–18)
BUN/CREAT SERPL: 9 (ref 12–20)
CALCIUM SERPL-MCNC: 8.5 MG/DL (ref 8.5–10.1)
CHLORIDE SERPL-SCNC: 107 MMOL/L (ref 100–108)
CO2 SERPL-SCNC: 23 MMOL/L (ref 21–32)
CREAT SERPL-MCNC: 0.74 MG/DL (ref 0.6–1.3)
ERYTHROCYTE [DISTWIDTH] IN BLOOD BY AUTOMATED COUNT: 14 % (ref 11.6–14.5)
GLUCOSE BLD STRIP.AUTO-MCNC: 112 MG/DL (ref 70–110)
GLUCOSE BLD STRIP.AUTO-MCNC: 118 MG/DL (ref 70–110)
GLUCOSE BLD STRIP.AUTO-MCNC: 78 MG/DL (ref 70–110)
GLUCOSE SERPL-MCNC: 79 MG/DL (ref 74–99)
HCT VFR BLD AUTO: 31.5 % (ref 36–48)
HGB BLD-MCNC: 10.4 G/DL (ref 13–16)
MAGNESIUM SERPL-MCNC: 1.3 MG/DL (ref 1.6–2.6)
MCH RBC QN AUTO: 29.6 PG (ref 24–34)
MCHC RBC AUTO-ENTMCNC: 33 G/DL (ref 31–37)
MCV RBC AUTO: 89.7 FL (ref 74–97)
PHOSPHATE SERPL-MCNC: 3.2 MG/DL (ref 2.5–4.9)
PLATELET # BLD AUTO: 229 K/UL (ref 135–420)
PMV BLD AUTO: 9.6 FL (ref 9.2–11.8)
POTASSIUM SERPL-SCNC: 3.8 MMOL/L (ref 3.5–5.5)
RBC # BLD AUTO: 3.51 M/UL (ref 4.7–5.5)
SODIUM SERPL-SCNC: 138 MMOL/L (ref 136–145)
WBC # BLD AUTO: 10.4 K/UL (ref 4.6–13.2)

## 2018-12-07 PROCEDURE — 74011250636 HC RX REV CODE- 250/636: Performed by: COLON & RECTAL SURGERY

## 2018-12-07 PROCEDURE — 82962 GLUCOSE BLOOD TEST: CPT

## 2018-12-07 PROCEDURE — 83735 ASSAY OF MAGNESIUM: CPT

## 2018-12-07 PROCEDURE — 97116 GAIT TRAINING THERAPY: CPT

## 2018-12-07 PROCEDURE — 84100 ASSAY OF PHOSPHORUS: CPT

## 2018-12-07 PROCEDURE — 74011250637 HC RX REV CODE- 250/637: Performed by: COLON & RECTAL SURGERY

## 2018-12-07 PROCEDURE — 74011000250 HC RX REV CODE- 250: Performed by: COLON & RECTAL SURGERY

## 2018-12-07 PROCEDURE — 65270000029 HC RM PRIVATE

## 2018-12-07 PROCEDURE — 85027 COMPLETE CBC AUTOMATED: CPT

## 2018-12-07 PROCEDURE — 80048 BASIC METABOLIC PNL TOTAL CA: CPT

## 2018-12-07 PROCEDURE — 97110 THERAPEUTIC EXERCISES: CPT

## 2018-12-07 PROCEDURE — 36415 COLL VENOUS BLD VENIPUNCTURE: CPT

## 2018-12-07 RX ORDER — MAGNESIUM SULFATE HEPTAHYDRATE 40 MG/ML
2 INJECTION, SOLUTION INTRAVENOUS
Status: COMPLETED | OUTPATIENT
Start: 2018-12-07 | End: 2018-12-07

## 2018-12-07 RX ADMIN — GABAPENTIN 300 MG: 300 CAPSULE ORAL at 21:50

## 2018-12-07 RX ADMIN — LOSARTAN POTASSIUM 50 MG: 50 TABLET ORAL at 08:34

## 2018-12-07 RX ADMIN — ACETAMINOPHEN 1000 MG: 500 TABLET ORAL at 12:06

## 2018-12-07 RX ADMIN — GABAPENTIN 300 MG: 300 CAPSULE ORAL at 08:34

## 2018-12-07 RX ADMIN — CELECOXIB 200 MG: 100 CAPSULE ORAL at 08:34

## 2018-12-07 RX ADMIN — NEBIVOLOL HYDROCHLORIDE 10 MG: 5 TABLET ORAL at 08:34

## 2018-12-07 RX ADMIN — ACETAMINOPHEN 1000 MG: 500 TABLET ORAL at 01:01

## 2018-12-07 RX ADMIN — HEPARIN SODIUM 5000 UNITS: 5000 INJECTION, SOLUTION INTRAVENOUS; SUBCUTANEOUS at 01:01

## 2018-12-07 RX ADMIN — MAGNESIUM SULFATE IN WATER 2 G: 40 INJECTION, SOLUTION INTRAVENOUS at 12:08

## 2018-12-07 RX ADMIN — LATANOPROST 1 DROP: 50 SOLUTION/ DROPS OPHTHALMIC at 18:21

## 2018-12-07 RX ADMIN — HEPARIN SODIUM 5000 UNITS: 5000 INJECTION, SOLUTION INTRAVENOUS; SUBCUTANEOUS at 08:34

## 2018-12-07 RX ADMIN — MORPHINE SULFATE 2 MG: 2 INJECTION, SOLUTION INTRAMUSCULAR; INTRAVENOUS at 08:45

## 2018-12-07 RX ADMIN — HEPARIN SODIUM 5000 UNITS: 5000 INJECTION, SOLUTION INTRAVENOUS; SUBCUTANEOUS at 15:49

## 2018-12-07 RX ADMIN — ACETAMINOPHEN 1000 MG: 500 TABLET ORAL at 05:39

## 2018-12-07 RX ADMIN — MORPHINE SULFATE 2 MG: 2 INJECTION, SOLUTION INTRAMUSCULAR; INTRAVENOUS at 21:50

## 2018-12-07 RX ADMIN — FAMOTIDINE 20 MG: 10 INJECTION INTRAVENOUS at 21:50

## 2018-12-07 RX ADMIN — FAMOTIDINE 20 MG: 10 INJECTION INTRAVENOUS at 08:35

## 2018-12-07 RX ADMIN — MORPHINE SULFATE 2 MG: 2 INJECTION, SOLUTION INTRAMUSCULAR; INTRAVENOUS at 18:19

## 2018-12-07 RX ADMIN — MAGNESIUM SULFATE IN WATER 2 G: 40 INJECTION, SOLUTION INTRAVENOUS at 10:54

## 2018-12-07 NOTE — PROGRESS NOTES
Problem: Mobility Impaired (Adult and Pediatric)  Goal: *Acute Goals and Plan of Care (Insert Text)  Physical Therapy Goals  Initiated 12/6/2018 and to be accomplished within 7 day(s)  1. Patient will move from supine to sit and sit to supine  in bed with independence. 2.  Patient will transfer from bed to chair and chair to bed with independence using the least restrictive device. 3.  Patient will perform sit to stand with independence. 4.  Patient will ambulate with modified independence for 400 feet with SPC.   5.  Patient will ascend/descend 3 stairs with 1 handrail(s) with supervision/set-up. Outcome: Progressing Towards Goal  physical Therapy TREATMENT/DISCHARGE    Patient: Ana Maria Sheth (65 y.o. male)  Date: 12/7/2018  Diagnosis: Malignant neoplasm of sigmoid colon ([facility] [address] [C18.7]  Colon cancer ([facility] [address] [C18.9] <principal problem not specified>  Procedure(s) (LRB):  ROBOTIC ASSISTED SIGMOID COLECTOMY (N/A) 3 Days Post-Op  Precautions: Fall, Skin  Chart, physical therapy assessment, plan of care and goals were reviewed. OBJECTIVE / ASSESSMENT:  Patient found self amb in room w/ RW transferring from bed to chair willing to work with PT. During tx today, pt was able demonstrate safe mobility w/ no assistance w/ addition of SPC as pt indicates use PTA. Pt amb for a total of 600' today w/ no safety concerns. Only deviation noted was slight increase in trunk sway w/ less supportive device w/ no instance of LOB. Pt able to demonstrate safe, stable practices w/ advanced gait training as well during retro amb, side stepping, and karaoke (cross stepping aka grape adeel) while using SPC. Pt only req CGA w/ karaoke stepping. Pt returned to room, demonstrated there-ex pt has been performing at home w/ [facility] therapy PTA, and left in room w/ all needs in reach.  Pt instructed to cont to amb t/o the day w/ AD (pt indicated wife is brining SPC today, thus instructed to use RW for safety until [facility] arrives) to improve mobility and gas release. Pt also instructed to cont HH there-ex using RW for support t/o day. At this time, pt has met all acute care therapy goals and no longer requires acute therapy services. Therefore, acute PT will sign off and recommends pt cont w/ home therapy. Education:  [x]         Bed mobility  [x]         Transfers  [x]         Ambulation / gait  [x]         Assistive device management  []         Stairs  [x]         Body mechanics  [x]         Position change  [x]         Therapeutic exercises   [x]         Activity pacing / energy conservation  [x]         Other:    Progression toward goals:  [x]      Goals met  []      Improving appropriately and progressing toward goals  []      Improving slowly and progressing toward goals  []      Not making progress toward goals and plan of care will be adjusted     PLAN:  Recommend discharge from PT at this time as pt has met all goals. Rationale for discharge:  [x] Goals Achieved  [] [address]  [] Patient not participating in therapy  [] Other:  Discharge Recommendations:  Home Health  Further Equipment Recommendations for Discharge:  Has RW and SPC     SUBJECTIVE:   Patient stated I was doing all these exercises before I had to come back to the hospital. I was using the chair for support.  (instructed to use RW as offers greater stability.     OBJECTIVE DATA SUMMARY:   Critical Behavior:  Neurologic State: Alert  Orientation Level: Oriented X4  Cognition: Appropriate decision making, Appropriate for age attention/concentration, Appropriate safety awareness  Safety/Judgement: Awareness of environment, Fall prevention, Good awareness of safety precautions  Functional Mobility Training:  Transfers:  Sit to Stand: Modified independent  Stand to Sit: Modified independent  Balance:  Sitting: Intact  Standing: Intact  Ambulation/Gait Training:  Distance (ft): 600 Feet (ft)  Assistive Device: Cane, straight  Ambulation - Level of Assistance: Modified independent  Gait Abnormalities: Trunk sway increased    Therapeutic Exercises:       EXERCISE   Sets   Reps   Active Active Assist   Passive Self- assisted ROM   Comments   Standing HS curls 1 5  [x] [] [] []    Standing hip ABD 1 5 [x] [] [] [] Cued on form    Sit to stands 1 10 [x] [] [] [] Instructed in hospital in place of squats    Short Arc Quads   [] [] [] []    Heel Slides   [] [] [] []    Straight Leg Raises   [] [] [] []    Hip Abd/Add   [] [] [] []    Long Arc Quads   [] [] [] []    Seated Marching   [] [] [] []    Seated Knee Flexion   [] [] [] []    Standing Marching   [] [] [] []      Pain: 0/10   Activity Tolerance:   Good, req 1 short sitting rest break post amb of 500' w/ education, cont shortly after for additional gait training   Please refer to the flowsheet for vital signs taken during this treatment. After treatment:   [x] Patient left in no apparent distress sitting up in chair  [] Patient left in no apparent distress in bed  [x] Call bell left within reach  [x] Nursing notified  [] Caregiver present  [] Bed alarm activated  [] SCDs applied  [] Ice applied    Angelita Pena PTA   Time Calculation: 23 mins    Mobility  Current  CI= 1-19%. The severity rating is based on the Level of Assistance required for Functional Mobility and ADLs. Mobility   Goal  CI= 1-19%. The severity rating is based on the Level of Assistance required for Functional Mobility and ADLs. Mobility [id number] D/C  CI= 1-19%. The severity rating is based on the Level of Assistance required for Functional Mobility and ADLs.

## 2018-12-07 NOTE — PROGRESS NOTES
conducted an initial consultation and Spiritual Assessment for Romelia Huerta, who is a 80 y.o.,male. Patients Primary Language is: [place]. According to the patients EMR Roman Catholic Affiliation is: [facility].     The reason the Patient came to the hospital is:   Patient Active Problem List    Diagnosis Date Noted    Colon cancer [facility]) 12/04/2018    Colon polyps 11/12/2018    Hemorrhoids 11/12/2018    Debility 11/05/2018    Leukocytosis 11/05/2018    Intractable low back pain 11/05/2018        The  provided the following Interventions:  Initiated a relationship of care and support. Explored issues of mina, belief, spirituality and Buddhist/ritual needs while hospitalized. Listened empathically. Provided chaplaincy education. Provided information about Spiritual Care Services. Offered prayer and assurance of continued prayers on patient's behalf. Chart reviewed. The following outcomes where achieved:  Patient shared limited information about both their medical narrative and spiritual journey/beliefs.  confirmed Patient's Roman Catholic Affiliation. Patient processed feeling about current hospitalization. Patient expressed gratitude for 's visit. Assessment:  Patient does not have any Buddhist/cultural needs that will affect patients preferences in health care. There are no spiritual or Buddhist issues which require intervention at this time. Plan:  Chaplains will continue to follow and will provide pastoral care on an as needed/requested basis.  recommends bedside caregivers page  on duty if patient shows signs of acute spiritual or emotional distress.     [address]   [phone]

## 2018-12-07 NOTE — ROUTINE PROCESS
Bedside shift change report given to Kimberly(oncoming nurse) by Julieta Oliver (offgoing nurse). Report included the following information SBAR.

## 2018-12-07 NOTE — PROGRESS NOTES
Discharge planning:  Reviewed chart. Met with patient to discuss discharge plan. Possible need for home health at discharge. [place] of choice obtained and signed for [facility] Mccormick, if needed. CM will continue to monitor transitional needs.     THEE Franco, RN  Pager # [phone]  Care Manager

## 2018-12-07 NOTE — PROGRESS NOTES
Patient c/o of abdomen discomfort and distention but no pain. Patient had small BM.  Dr. William Castaneda called and orders given to make patient NPO and start LR @ 75

## 2018-12-07 NOTE — PROGRESS NOTES
Bedside and Verbal shift change report given to Ammon Quiñonez RN (oncoming nurse) by Constantino Barrera (offgoing nurse). Report included the following information SBAR, Kardex, Procedure Summary, Intake/Output, MAR and Recent Results.

## 2018-12-07 NOTE — ROUTINE PROCESS
Patient is alert and oriented times three with no signs or symptoms of distress. Dressings are clean dry and intact and no nausea or vomiting present.

## 2018-12-07 NOTE — PROGRESS NOTES
[facility]  [address] [place], [place] [id number]  [phone]  Colon and Rectal Surgery Progress Note      Patient: Bossman Ro MRN: [id number]  SSN: [id number]    YOB: 1950  Age: 80 y.o. Sex: male      Admit Date: 12/4/2018    LOS: 3 days     Subjective: Tolerating clears. + bowel function.      Objective:     Vitals:    12/06/18 1510 12/06/18 1850 12/06/18 2200 12/07/18 0610   BP: (!) 150/99 (!) 156/96 (!) 156/94 (!) 166/93   Pulse: 88 91 88 88   Resp: 16 17 22 20   Temp: 98.4 °F (36.9 °C) 97.6 °F (36.4 °C) 97.4 °F (36.3 °C) 98 °F (36.7 °C)   SpO2: 99% 93% 100% 95%   Weight:       Height:            Intake and Output:  Current Shift: 12/07 0701 - 12/07 1900  In: 260 [P.O.:260]  Out: -   Last three shifts: 12/05 1901 - 12/07 0700  In: 4603.5 [P.O.:2626; I.V.:1977.5]  Out: 2250 [Urine:2250]    Physical Exam:     abd soft, still distended but non-tender    Lab/Data Review:    CMP:   Lab Results   Component Value Date/Time     12/07/2018 02:21 AM    K 3.8 12/07/2018 02:21 AM     12/07/2018 02:21 AM    CO2 23 12/07/2018 02:21 AM    AGAP 8 12/07/2018 02:21 AM    GLU 79 12/07/2018 02:21 AM    BUN 7 12/07/2018 02:21 AM    CREA 0.74 12/07/2018 02:21 AM    GFRAA >60 12/07/2018 02:21 AM    GFRNA >60 12/07/2018 02:21 AM    CA 8.5 12/07/2018 02:21 AM    MG 1.3 (L) 12/07/2018 02:21 AM    PHOS 3.2 12/07/2018 02:21 AM     CBC:   Lab Results   Component Value Date/Time    WBC 10.4 12/07/2018 02:21 AM    HGB 10.4 (L) 12/07/2018 02:21 AM    HCT 31.5 (L) 12/07/2018 02:21 AM     12/07/2018 02:21 AM        Assessment:     POD 3 s/p robotic sigmoid colectomy for cancer, mild post-operative ileus    Plan:     Advance to fulls  OOB    Signed By: Cathi Leyva MD        December 7, 2018

## 2018-12-07 NOTE — ROUTINE PROCESS
Mobility Intervention:       [] Pt dangled at edge of bed    [] Pt assisted OOB to bedside commode    [] Pt assisted OOB to chair    [x] Pt ambulated to bathroom    [x] Patient was ambulated in room/hallway    Assistive Device Utilized:       [x] Rolling walker   [] Crutches   [] Straight Cane   [] Knee immobilizer   [] IV pole    After Mobilization:     [] Patient left in no apparent distress sitting up in chair  [x] Patient left in no apparent distress in bed  [x] Call bell left within reach  [x] SCDs on & machine turned on  [] Ice applied  [] RN notified  [] Caregiver present  [] Bed alarm activated    Reason patient not mobilized:      [] Patient refused   [] Nausea/vomiting   [] Low blood pressure   [] Drowsy/lethargic    Pain Rating:     [] 0  [] 1  Assistive Device:        [] 2  [x] 3  [] 4  [] 5  [] 6  Assistive Device:        [] 7  [] 8  [] 9  [] 10    Comments:

## 2018-12-07 NOTE — ROUTINE PROCESS
Patient is alert and oriented times three with no signs or symptoms of distress. Incisions are intact and dry no nausea or vomiting.  Abdomen is still distended

## 2018-12-07 NOTE — ROUTINE PROCESS
Bedside shift change report given to Lanie Ortiz (oncoming nurse) by Sophie Penaloza (offgoing nurse). Report included the following information SBAR.

## 2018-12-07 NOTE — ROUTINE PROCESS
Mobility Intervention:       [] Pt dangled at edge of bed    [] Pt assisted OOB to bedside commode    [] Pt assisted OOB to chair    [x] Pt ambulated to bathroom    [x] Patient was ambulated in room/hallway    Assistive Device Utilized:       [x] Rolling walker   [] Crutches   [] Straight Cane   [] Knee immobilizer   [x] IV pole    After Mobilization:     [] Patient left in no apparent distress sitting up in chair  [x] Patient left in no apparent distress in bed  [x] Call bell left within reach  [x] SCDs on & machine turned on  [] Ice applied  [] RN notified  [] Caregiver present  [] Bed alarm activated    Reason patient not mobilized:      [] Patient refused   [] Nausea/vomiting   [] Low blood pressure   [] Drowsy/lethargic    Pain Rating:     [x] 0  [] 1  Assistive Device:        [] 2  [] 3  [] 4  [] 5  [] 6  Assistive Device:        [] 7  [] 8  [] 9  [] 10    Comments:

## 2018-12-07 NOTE — CDMP QUERY
The medical record reflects the following: 
 
Risk:post op  colectomy Clinical Indicators:  abdominal distension;   
 
Treatment: diet advanced  slowly  to fulls  now 4 days post op Please clarify if this patient is being treated/managed for: 
 
=>mild post op ileus   with  slow advancing of diet =>Other Explanation of clinical findings =>Unable to Determine (no explanation of clinical findings) Please clarify and document your clinical opinion in the progress notes and discharge summary including the definitive and/or presumptive diagnosis, (suspected or probable), related to the above clinical findings. Please include clinical findings supporting your diagnosis. If you DECLINE this query or would like to communicate with [employer], please utilize the \"[employer] message box\" at the TOP of the Progress Note on the right. Thank you,   Liz Viera RN  CCDS   x [phone]

## 2018-12-07 NOTE — ROUTINE PROCESS
Patient is alert and oriented times three with no signs or symptoms of distress. Abdomen is less distended today but is still large.  He is passing gas and having BM's no nausea or vomiting

## 2018-12-07 NOTE — ROUTINE PROCESS

## 2018-12-08 ENCOUNTER — APPOINTMENT (OUTPATIENT)
Dept: GENERAL RADIOLOGY | Age: 68
DRG: 330 | End: 2018-12-08
Attending: INTERNAL MEDICINE
Payer: MEDICARE

## 2018-12-08 LAB
ANION GAP SERPL CALC-SCNC: 10 MMOL/L (ref 3–18)
BUN SERPL-MCNC: 10 MG/DL (ref 7–18)
BUN/CREAT SERPL: 11 (ref 12–20)
CALCIUM SERPL-MCNC: 8.2 MG/DL (ref 8.5–10.1)
CHLORIDE SERPL-SCNC: 108 MMOL/L (ref 100–108)
CO2 SERPL-SCNC: 20 MMOL/L (ref 21–32)
CREAT SERPL-MCNC: 0.95 MG/DL (ref 0.6–1.3)
ERYTHROCYTE [DISTWIDTH] IN BLOOD BY AUTOMATED COUNT: 13.4 % (ref 11.6–14.5)
GLUCOSE BLD STRIP.AUTO-MCNC: 135 MG/DL (ref 70–110)
GLUCOSE BLD STRIP.AUTO-MCNC: 146 MG/DL (ref 70–110)
GLUCOSE BLD STRIP.AUTO-MCNC: 164 MG/DL (ref 70–110)
GLUCOSE SERPL-MCNC: 94 MG/DL (ref 74–99)
HCT VFR BLD AUTO: 28.3 % (ref 36–48)
HGB BLD-MCNC: 9.4 G/DL (ref 13–16)
MAGNESIUM SERPL-MCNC: 1.6 MG/DL (ref 1.6–2.6)
MCH RBC QN AUTO: 29.5 PG (ref 24–34)
MCHC RBC AUTO-ENTMCNC: 33.2 G/DL (ref 31–37)
MCV RBC AUTO: 88.7 FL (ref 74–97)
PHOSPHATE SERPL-MCNC: 4.4 MG/DL (ref 2.5–4.9)
PLATELET # BLD AUTO: 205 K/UL (ref 135–420)
PMV BLD AUTO: 9.4 FL (ref 9.2–11.8)
POTASSIUM SERPL-SCNC: 4.3 MMOL/L (ref 3.5–5.5)
RBC # BLD AUTO: 3.19 M/UL (ref 4.7–5.5)
SODIUM SERPL-SCNC: 138 MMOL/L (ref 136–145)
WBC # BLD AUTO: 9.5 K/UL (ref 4.6–13.2)

## 2018-12-08 PROCEDURE — 85027 COMPLETE CBC AUTOMATED: CPT

## 2018-12-08 PROCEDURE — 74011250637 HC RX REV CODE- 250/637: Performed by: SURGERY

## 2018-12-08 PROCEDURE — 83735 ASSAY OF MAGNESIUM: CPT

## 2018-12-08 PROCEDURE — 80048 BASIC METABOLIC PNL TOTAL CA: CPT

## 2018-12-08 PROCEDURE — 74011250637 HC RX REV CODE- 250/637: Performed by: COLON & RECTAL SURGERY

## 2018-12-08 PROCEDURE — 74018 RADEX ABDOMEN 1 VIEW: CPT

## 2018-12-08 PROCEDURE — 74011636637 HC RX REV CODE- 636/637: Performed by: COLON & RECTAL SURGERY

## 2018-12-08 PROCEDURE — 74011250636 HC RX REV CODE- 250/636: Performed by: COLON & RECTAL SURGERY

## 2018-12-08 PROCEDURE — 36415 COLL VENOUS BLD VENIPUNCTURE: CPT

## 2018-12-08 PROCEDURE — 74011000250 HC RX REV CODE- 250: Performed by: COLON & RECTAL SURGERY

## 2018-12-08 PROCEDURE — 65270000029 HC RM PRIVATE

## 2018-12-08 PROCEDURE — 84100 ASSAY OF PHOSPHORUS: CPT

## 2018-12-08 PROCEDURE — 82962 GLUCOSE BLOOD TEST: CPT

## 2018-12-08 RX ORDER — MINERAL OIL
30 OIL (ML) ORAL
Status: DISCONTINUED | OUTPATIENT
Start: 2018-12-08 | End: 2018-12-10

## 2018-12-08 RX ADMIN — INSULIN LISPRO 2 UNITS: 100 INJECTION, SOLUTION INTRAVENOUS; SUBCUTANEOUS at 19:22

## 2018-12-08 RX ADMIN — LOSARTAN POTASSIUM 50 MG: 50 TABLET ORAL at 08:57

## 2018-12-08 RX ADMIN — MORPHINE SULFATE 2 MG: 2 INJECTION, SOLUTION INTRAMUSCULAR; INTRAVENOUS at 01:28

## 2018-12-08 RX ADMIN — GABAPENTIN 300 MG: 300 CAPSULE ORAL at 16:01

## 2018-12-08 RX ADMIN — FAMOTIDINE 20 MG: 10 INJECTION INTRAVENOUS at 08:58

## 2018-12-08 RX ADMIN — GABAPENTIN 300 MG: 300 CAPSULE ORAL at 08:57

## 2018-12-08 RX ADMIN — ACETAMINOPHEN 1000 MG: 500 TABLET ORAL at 12:41

## 2018-12-08 RX ADMIN — MORPHINE SULFATE 2 MG: 2 INJECTION, SOLUTION INTRAMUSCULAR; INTRAVENOUS at 06:01

## 2018-12-08 RX ADMIN — HEPARIN SODIUM 5000 UNITS: 5000 INJECTION, SOLUTION INTRAVENOUS; SUBCUTANEOUS at 16:01

## 2018-12-08 RX ADMIN — MORPHINE SULFATE 2 MG: 2 INJECTION, SOLUTION INTRAMUSCULAR; INTRAVENOUS at 16:01

## 2018-12-08 RX ADMIN — ACETAMINOPHEN 1000 MG: 500 TABLET ORAL at 06:01

## 2018-12-08 RX ADMIN — MORPHINE SULFATE 2 MG: 2 INJECTION, SOLUTION INTRAMUSCULAR; INTRAVENOUS at 08:58

## 2018-12-08 RX ADMIN — FAMOTIDINE 20 MG: 10 INJECTION INTRAVENOUS at 22:42

## 2018-12-08 RX ADMIN — MORPHINE SULFATE 2 MG: 2 INJECTION, SOLUTION INTRAMUSCULAR; INTRAVENOUS at 22:54

## 2018-12-08 RX ADMIN — GABAPENTIN 300 MG: 300 CAPSULE ORAL at 22:42

## 2018-12-08 RX ADMIN — MINERAL OIL 30 ML: 1000 SOLUTION ORAL at 16:01

## 2018-12-08 RX ADMIN — HEPARIN SODIUM 5000 UNITS: 5000 INJECTION, SOLUTION INTRAVENOUS; SUBCUTANEOUS at 00:00

## 2018-12-08 RX ADMIN — ACETAMINOPHEN 1000 MG: 500 TABLET ORAL at 19:22

## 2018-12-08 RX ADMIN — HEPARIN SODIUM 5000 UNITS: 5000 INJECTION, SOLUTION INTRAVENOUS; SUBCUTANEOUS at 09:08

## 2018-12-08 RX ADMIN — CELECOXIB 200 MG: 100 CAPSULE ORAL at 08:57

## 2018-12-08 RX ADMIN — NEBIVOLOL HYDROCHLORIDE 10 MG: 5 TABLET ORAL at 08:57

## 2018-12-08 RX ADMIN — CELECOXIB 200 MG: 100 CAPSULE ORAL at 19:22

## 2018-12-08 NOTE — PROGRESS NOTES
Yifan Gutierrez M.D. FACS  PROGRESS NOTE    Name: Mehul Wang MRN: [id number]   : 1950 [facility]   Date: 2018 Admission Date: 2018  5:25 AM     Hospital Day: 5  4 Days Post-Op  Subjective:  Pt without acute overnight events. Objective:  Vitals:    18 1853 18 2224 18 0234 18 0617   BP: 128/78 109/70 114/73 108/73   Pulse: 91 90 91 (!) 112   Resp:    Temp: 98.8 °F (37.1 °C) 99.8 °F (37.7 °C) 97.9 °F (36.6 °C) 97.7 °F (36.5 °C)   SpO2: 96% 96% 97% 100%   Weight:       Height:         Date 18 0700 - 18 0659 18 07 - 18 0659   Shift 8594-6008 1285-5901 24 Hour Total 9621-5039 3828-9077 24 Hour Total   INTAKE   P.O. 640  640        P. O. 640  640      Shift Total(mL/kg) 640(8.4)  640(8.4)      OUTPUT   Urine(mL/kg/hr)  100 100        Urine Voided  100 100      Shift Total(mL/kg)  100(1.3) 100(1.3)       -100 540      Weight (kg) 76.4 76.4 76.4 76.4 76.4 76.4         Physical Exam:    General: A&A, NAD, Ox4   Abdomen: abdomen is soft, distended,  Umbilical hernia reducible.  + incisional tenderness. Incision(s) are C/D/I.   No   masses, organomegaly or guarding    Labs:  Recent Results (from the past 24 hour(s))   GLUCOSE, POC    Collection Time: 18 12:10 PM   Result Value Ref Range    Glucose (POC) 118 (H) 70 - 110 mg/dL   GLUCOSE, POC    Collection Time: 18  6:49 PM   Result Value Ref Range    Glucose (POC) 112 (H) 70 - 619 mg/dL   METABOLIC PANEL, BASIC    Collection Time: 18  3:15 AM   Result Value Ref Range    Sodium 138 136 - 145 mmol/L    Potassium 4.3 3.5 - 5.5 mmol/L    Chloride 108 100 - 108 mmol/L    CO2 20 (L) 21 - 32 mmol/L    Anion gap 10 3.0 - 18 mmol/L    Glucose 94 74 - 99 mg/dL    BUN 10 7.0 - 18 MG/DL    Creatinine 0.95 0.6 - 1.3 MG/DL    BUN/Creatinine ratio 11 (L) 12 - 20      GFR est AA >60 >60 ml/min/1.73m2    GFR est non-AA >60 >60 ml/min/1.73m2    Calcium 8.2 (L) 8.5 - 10.1 MG/DL   CBC W/O DIFF    Collection Time: 12/08/18  3:15 AM   Result Value Ref Range    WBC 9.5 4.6 - 13.2 K/uL    RBC 3.19 (L) 4.70 - 5.50 M/uL    HGB 9.4 (L) 13.0 - 16.0 g/dL    HCT 28.3 (L) 36.0 - 48.0 %    MCV 88.7 74.0 - 97.0 FL    MCH 29.5 24.0 - 34.0 PG    MCHC 33.2 31.0 - 37.0 g/dL    RDW 13.4 11.6 - 14.5 %    PLATELET 969 406 - 034 K/uL    MPV 9.4 9.2 - 11.8 FL   MAGNESIUM    Collection Time: 12/08/18  3:15 AM   Result Value Ref Range    Magnesium 1.6 1.6 - 2.6 mg/dL   PHOSPHORUS    Collection Time: 12/08/18  3:15 AM   Result Value Ref Range    Phosphorus 4.4 2.5 - 4.9 MG/DL     All Micro Results     None          Current Medications:  Current Facility-Administered Medications   Medication Dose Route Frequency Provider Last Rate Last Dose    lactated Ringers infusion  75 mL/hr IntraVENous CONTINUOUS Mj Bowden MD 75 mL/hr at 12/06/18 1400 75 mL/hr at 12/06/18 1400    menthol 5 MG (NICE) lozenge  1 Lozenge Mucous Membrane Q6H PRN Ramirez Fortune MD   1 Lozenge at 12/06/18 1608    diphenhydrAMINE (BENADRYL) injection 25 mg  25 mg IntraVENous Q6H PRN Mj Bowden MD        celecoxib (CELEBREX) capsule 200 mg  200 mg Oral BID Mj Bowden MD   Stopped at 12/07/18 1800    morphine injection 2 mg  2 mg IntraVENous Q3H PRN Mj Bowden MD   2 mg at 12/08/18 0601    gabapentin (NEURONTIN) capsule 300 mg  300 mg Oral TID Mj Bowden MD   300 mg at 12/07/18 2150    acetaminophen (TYLENOL) tablet 1,000 mg  1,000 mg Oral Q6H Mj Bowden MD   1,000 mg at 12/08/18 0601    ondansetron (ZOFRAN) injection 4 mg  4 mg IntraVENous Q6H PRN Mj Bowden MD        latanoprost (XALATAN) 0.005 % ophthalmic solution 1 Drop  1 Drop Both Eyes QPM Mj Bowden MD   1 Drop at 12/07/18 1821    losartan (COZAAR) tablet 50 mg  50 mg Oral DAILY Mj Bowden MD   50 mg at 12/07/18 0834    nebivolol (BYSTOLIC) tablet 10 mg  10 mg Oral DAILY Marianne Calvin MD MONISHA   10 mg at 12/07/18 0834    insulin lispro (HUMALOG) injection   SubCUTAneous Q6H Philomena Galeano MD   Stopped at 12/04/18 1800    glucose chewable tablet 16 g  4 Tab Oral PRN Philomena Galeano MD        glucagon [facility]) injection 1 mg  1 mg IntraMUSCular PRN Philomena Galeano MD        dextrose (D50W) injection syrg 12.5-25 g  25-50 mL IntraVENous PRN Philomena Galeano MD        famotidine (PF) (PEPCID) 20 mg in sodium chloride 0.9% 10 mL injection  20 mg IntraVENous Q12H Philomena Galeano MD   20 mg at 12/07/18 2150    heparin (porcine) injection 5,000 Units  5,000 Units SubCUTAneous Q8H Philomena Galeano MD   5,000 Units at 12/08/18 0000       Chart and notes reviewed. Data reviewed. I have evaluated and examined the patient. IMPRESSION:   · POD 4 robotic sigmoid colectomy with postoperative ileus which is slowly resolving.        PLAN:/DISCUSION:   · Advance diet  · OOB to chair, IS, ambulate  · Continue present care        Ree Laura MD

## 2018-12-09 LAB
ANION GAP SERPL CALC-SCNC: 10 MMOL/L (ref 3–18)
BUN SERPL-MCNC: 22 MG/DL (ref 7–18)
BUN/CREAT SERPL: 10 (ref 12–20)
CALCIUM SERPL-MCNC: 9.7 MG/DL (ref 8.5–10.1)
CHLORIDE SERPL-SCNC: 103 MMOL/L (ref 100–108)
CO2 SERPL-SCNC: 22 MMOL/L (ref 21–32)
CREAT SERPL-MCNC: 2.17 MG/DL (ref 0.6–1.3)
ERYTHROCYTE [DISTWIDTH] IN BLOOD BY AUTOMATED COUNT: 13.9 % (ref 11.6–14.5)
GLUCOSE BLD STRIP.AUTO-MCNC: 156 MG/DL (ref 70–110)
GLUCOSE BLD STRIP.AUTO-MCNC: 163 MG/DL (ref 70–110)
GLUCOSE BLD STRIP.AUTO-MCNC: 167 MG/DL (ref 70–110)
GLUCOSE SERPL-MCNC: 130 MG/DL (ref 74–99)
HCT VFR BLD AUTO: 34.1 % (ref 36–48)
HGB BLD-MCNC: 11.6 G/DL (ref 13–16)
MAGNESIUM SERPL-MCNC: 1.8 MG/DL (ref 1.6–2.6)
MCH RBC QN AUTO: 30.3 PG (ref 24–34)
MCHC RBC AUTO-ENTMCNC: 34 G/DL (ref 31–37)
MCV RBC AUTO: 89 FL (ref 74–97)
PHOSPHATE SERPL-MCNC: 5 MG/DL (ref 2.5–4.9)
PLATELET # BLD AUTO: 223 K/UL (ref 135–420)
PMV BLD AUTO: 10.1 FL (ref 9.2–11.8)
POTASSIUM SERPL-SCNC: 4.7 MMOL/L (ref 3.5–5.5)
RBC # BLD AUTO: 3.83 M/UL (ref 4.7–5.5)
SODIUM SERPL-SCNC: 135 MMOL/L (ref 136–145)
WBC # BLD AUTO: 6.3 K/UL (ref 4.6–13.2)

## 2018-12-09 PROCEDURE — 77030008771 HC TU NG SALEM SUMP -A

## 2018-12-09 PROCEDURE — 74011000250 HC RX REV CODE- 250: Performed by: COLON & RECTAL SURGERY

## 2018-12-09 PROCEDURE — 84100 ASSAY OF PHOSPHORUS: CPT

## 2018-12-09 PROCEDURE — 82962 GLUCOSE BLOOD TEST: CPT

## 2018-12-09 PROCEDURE — 36415 COLL VENOUS BLD VENIPUNCTURE: CPT

## 2018-12-09 PROCEDURE — 74011000250 HC RX REV CODE- 250: Performed by: SURGERY

## 2018-12-09 PROCEDURE — 65270000029 HC RM PRIVATE

## 2018-12-09 PROCEDURE — 80048 BASIC METABOLIC PNL TOTAL CA: CPT

## 2018-12-09 PROCEDURE — 74011000258 HC RX REV CODE- 258: Performed by: SURGERY

## 2018-12-09 PROCEDURE — 85027 COMPLETE CBC AUTOMATED: CPT

## 2018-12-09 PROCEDURE — 74011250637 HC RX REV CODE- 250/637: Performed by: SURGERY

## 2018-12-09 PROCEDURE — 74011250636 HC RX REV CODE- 250/636: Performed by: COLON & RECTAL SURGERY

## 2018-12-09 PROCEDURE — 83735 ASSAY OF MAGNESIUM: CPT

## 2018-12-09 PROCEDURE — 74011250636 HC RX REV CODE- 250/636: Performed by: SURGERY

## 2018-12-09 PROCEDURE — 74011250637 HC RX REV CODE- 250/637: Performed by: COLON & RECTAL SURGERY

## 2018-12-09 RX ORDER — SODIUM CHLORIDE 9 MG/ML
999 INJECTION, SOLUTION INTRAVENOUS ONCE
Status: COMPLETED | OUTPATIENT
Start: 2018-12-09 | End: 2018-12-09

## 2018-12-09 RX ORDER — DEXTROSE, SODIUM CHLORIDE, AND POTASSIUM CHLORIDE 5; .9; .15 G/100ML; G/100ML; G/100ML
150 INJECTION INTRAVENOUS CONTINUOUS
Status: DISCONTINUED | OUTPATIENT
Start: 2018-12-09 | End: 2018-12-09

## 2018-12-09 RX ADMIN — LATANOPROST 1 DROP: 50 SOLUTION/ DROPS OPHTHALMIC at 19:09

## 2018-12-09 RX ADMIN — HEPARIN SODIUM 5000 UNITS: 5000 INJECTION, SOLUTION INTRAVENOUS; SUBCUTANEOUS at 11:08

## 2018-12-09 RX ADMIN — GABAPENTIN 300 MG: 300 CAPSULE ORAL at 19:03

## 2018-12-09 RX ADMIN — LOSARTAN POTASSIUM 50 MG: 50 TABLET ORAL at 11:12

## 2018-12-09 RX ADMIN — CELECOXIB 200 MG: 100 CAPSULE ORAL at 11:12

## 2018-12-09 RX ADMIN — FAMOTIDINE 20 MG: 10 INJECTION INTRAVENOUS at 23:15

## 2018-12-09 RX ADMIN — ACETAMINOPHEN 1000 MG: 500 TABLET ORAL at 19:03

## 2018-12-09 RX ADMIN — ACETAMINOPHEN 1000 MG: 500 TABLET ORAL at 23:15

## 2018-12-09 RX ADMIN — GABAPENTIN 300 MG: 300 CAPSULE ORAL at 23:15

## 2018-12-09 RX ADMIN — HEPARIN SODIUM 5000 UNITS: 5000 INJECTION, SOLUTION INTRAVENOUS; SUBCUTANEOUS at 23:15

## 2018-12-09 RX ADMIN — CALCIUM GLUCONATE: 94 INJECTION, SOLUTION INTRAVENOUS at 23:19

## 2018-12-09 RX ADMIN — GABAPENTIN 300 MG: 300 CAPSULE ORAL at 11:12

## 2018-12-09 RX ADMIN — HEPARIN SODIUM 5000 UNITS: 5000 INJECTION, SOLUTION INTRAVENOUS; SUBCUTANEOUS at 19:03

## 2018-12-09 RX ADMIN — MINERAL OIL 30 ML: 1000 SOLUTION ORAL at 19:02

## 2018-12-09 RX ADMIN — ACETAMINOPHEN 1000 MG: 500 TABLET ORAL at 15:19

## 2018-12-09 RX ADMIN — FAMOTIDINE 20 MG: 10 INJECTION INTRAVENOUS at 11:06

## 2018-12-09 RX ADMIN — DEXTROSE MONOHYDRATE, SODIUM CHLORIDE, AND POTASSIUM CHLORIDE 150 ML/HR: 50; 9; 1.49 INJECTION, SOLUTION INTRAVENOUS at 15:20

## 2018-12-09 RX ADMIN — SODIUM CHLORIDE 999 ML/HR: 900 INJECTION, SOLUTION INTRAVENOUS at 12:30

## 2018-12-09 RX ADMIN — HEPARIN SODIUM 5000 UNITS: 5000 INJECTION, SOLUTION INTRAVENOUS; SUBCUTANEOUS at 00:00

## 2018-12-09 RX ADMIN — ACETAMINOPHEN 1000 MG: 500 TABLET ORAL at 00:00

## 2018-12-09 RX ADMIN — CELECOXIB 200 MG: 100 CAPSULE ORAL at 19:03

## 2018-12-09 RX ADMIN — MINERAL OIL 30 ML: 1000 SOLUTION ORAL at 11:13

## 2018-12-09 NOTE — PROGRESS NOTES
Patients sitting in chair alert awake and oriented. NG tube to Rnare patent draining greenish looking drainage. No s/s of distress. Steri strips to abdomen dry and intact.

## 2018-12-09 NOTE — PROGRESS NOTES
Yifan Washington M.D. FACS  PROGRESS NOTE    Name: Víctor Calhoun MRN: [id number]   : 1950 Hospital: [facility]   Date: 2018 Admission Date: 2018  5:25 AM     Hospital Day: 6  5 Days Post-Op  Subjective:  Patient with significant abdominal discomfort and distention yesterday. I reviewed the KUB independently on the monitor which reveals ileus. Nasogastric tube placed. Over 1900 mL's of green bilious emesis has been evacuated from the stomach. The patient states that he does feel much better with a nasogastric tube in place. He had one small liquid bowel movement this morning and passed a lot of gas. Objective:  Vitals:    18 1550 18 1950 188 18 0200   BP: 107/73 110/80 111/80 107/68   Pulse: 97 99 97 97   Resp: 17  20   Temp: 97.4 °F (36.3 °C) 97.9 °F (36.6 °C) 98 °F (36.7 °C) 96.8 °F (36 °C)   SpO2: 97% 98% 96% 95%   Weight:       Height:         Date 18 0700 - 18 0659 18 0700 - 12/10/18 0659   Shift 8574-1985 8238-0998 24 Hour Total 1672-6101 5807-2333 24 Hour Total   INTAKE   P.O. 720  720 0  0     P. O. 720  720 0  0   Shift Total(mL/kg) 720(9.4)  720(9.4) 0(0)  0(0)   OUTPUT   Urine(mL/kg/hr)    300  300     Urine Voided    300  300   Shift Total(mL/kg)    300(3.9)  300(3.9)     720 -300  -300   Weight (kg) 76.4 76.4 76.4 76.4 76.4 76.4     Physical Exam:    General: Awake and alert, oriented x4, no apparent distress   Abdomen: abdomen is sof distendedt, minimal tenderness. Incision(s) are C/D/I.   No masses, organomegaly or guarding    Labs:  Recent Results (from the past 24 hour(s))   GLUCOSE, POC    Collection Time: 18 12:35 PM   Result Value Ref Range    Glucose (POC) 146 (H) 70 - 110 mg/dL   GLUCOSE, POC    Collection Time: 18  4:54 PM   Result Value Ref Range    Glucose (POC) 164 (H) 70 - 110 mg/dL   GLUCOSE, POC    Collection Time: 18 10:25 PM   Result Value Ref Range    Glucose (POC) 135 (H) 70 - 227 mg/dL   METABOLIC PANEL, BASIC    Collection Time: 12/09/18  4:59 AM   Result Value Ref Range    Sodium 135 (L) 136 - 145 mmol/L    Potassium 4.7 3.5 - 5.5 mmol/L    Chloride 103 100 - 108 mmol/L    CO2 22 21 - 32 mmol/L    Anion gap 10 3.0 - 18 mmol/L    Glucose 130 (H) 74 - 99 mg/dL    BUN 22 (H) 7.0 - 18 MG/DL    Creatinine 2.17 (H) 0.6 - 1.3 MG/DL    BUN/Creatinine ratio 10 (L) 12 - 20      GFR est AA 37 (L) >60 ml/min/1.73m2    GFR est non-AA 30 (L) >60 ml/min/1.73m2    Calcium 9.7 8.5 - 10.1 MG/DL   CBC W/O DIFF    Collection Time: 12/09/18  4:59 AM   Result Value Ref Range    WBC 6.3 4.6 - 13.2 K/uL    RBC 3.83 (L) 4.70 - 5.50 M/uL    HGB 11.6 (L) 13.0 - 16.0 g/dL    HCT 34.1 (L) 36.0 - 48.0 %    MCV 89.0 74.0 - 97.0 FL    MCH 30.3 24.0 - 34.0 PG    MCHC 34.0 31.0 - 37.0 g/dL    RDW 13.9 11.6 - 14.5 %    PLATELET 263 235 - 866 K/uL    MPV 10.1 9.2 - 11.8 FL   MAGNESIUM    Collection Time: 12/09/18  4:59 AM   Result Value Ref Range    Magnesium 1.8 1.6 - 2.6 mg/dL   PHOSPHORUS    Collection Time: 12/09/18  4:59 AM   Result Value Ref Range    Phosphorus 5.0 (H) 2.5 - 4.9 MG/DL     All Micro Results     None          Current Medications:  Current Facility-Administered Medications   Medication Dose Route Frequency Provider Last Rate Last Dose    mineral oil 30 mL  30 mL Oral ACB&D Pascual Morrow MD   30 mL at 12/08/18 1601    lactated Ringers infusion  75 mL/hr IntraVENous CONTINUOUS Shira Cobian MD 75 mL/hr at 12/06/18 1400 75 mL/hr at 12/06/18 1400    menthol 5 MG (NICE) lozenge  1 Lozenge Mucous Membrane Q6H PRN Pascual Morrow MD   1 Lozenge at 12/06/18 1608    diphenhydrAMINE (BENADRYL) injection 25 mg  25 mg IntraVENous Q6H PRN Shira Cobian MD        celecoxib (CELEBREX) capsule 200 mg  200 mg Oral BID Shira Cobian MD   200 mg at 12/08/18 1922    morphine injection 2 mg  2 mg IntraVENous Q3H PRN Shira Cobian MD   2 mg at 12/08/18 6687    gabapentin (NEURONTIN) capsule 300 mg  300 mg Oral TID Dolly Leon MD   300 mg at 12/08/18 2242    acetaminophen (TYLENOL) tablet 1,000 mg  1,000 mg Oral Q6H Dolly Leon MD   Stopped at 12/09/18 0600    ondansetron (ZOFRAN) injection 4 mg  4 mg IntraVENous Q6H PRN Dolly Leon MD        latanoprost (XALATAN) 0.005 % ophthalmic solution 1 Drop  1 Drop Both Eyes QPM Dolly Leon MD   1 Drop at 12/07/18 1821    losartan (COZAAR) tablet 50 mg  50 mg Oral DAILY Dolly Leon MD   50 mg at 12/08/18 0857    nebivolol (BYSTOLIC) tablet 10 mg  10 mg Oral DAILY Dolly Leon MD   10 mg at 12/08/18 0857    insulin lispro (HUMALOG) injection   SubCUTAneous Q6H Dolly Leon MD   Stopped at 12/09/18 0000    glucose chewable tablet 16 g  4 Tab Oral PRN Dolly Leon MD        glucagon [facility]) injection 1 mg  1 mg IntraMUSCular PRN Dolly Leon MD        dextrose (D50W) injection syrg 12.5-25 g  25-50 mL IntraVENous PRN Dolly Leon MD        famotidine (PF) (PEPCID) 20 mg in sodium chloride 0.9% 10 mL injection  20 mg IntraVENous Q12H Dolly Leon MD   20 mg at 12/08/18 2242    heparin (porcine) injection 5,000 Units  5,000 Units SubCUTAneous Q8H Dolly Leon MD   5,000 Units at 12/09/18 0000     IMPRESSION:   · Patient is postop day 5 from robotic sigmoid colectomy. PLAN:/DISCUSION:   · Continue NG tube to low intermittent wall suction. · PPN  · IV fluid D5 normal saline with 20 of K at 150 mils an hour until the TPN starts  · Bolus 1 L of normal saline now.         Belkis Vila MD

## 2018-12-09 NOTE — PROGRESS NOTES
NUTRITION consult    Nutrition Consult: TPN: RD to Manage     RECOMMENDATIONS / PLAN:     - Plan to provide parenteral nutrition support, monitor labs and adjust electrolytes daily.  - Discontinue IVF when TPN starts. - Check triglyceride and prealbumin.  - Continue RD inpatient monitoring and evaluation. Please Note:   Parenteral nutrition support to be provided using custom product, allowing for modification of electrolyte and macronutrient content day to day. Lipids included in PN on MWF. Macronutrient Goal: 92 gm amino acids, 400 gm dextrose, 250 mL lipids (1942 kcal weekly average). PARENTERAL NUTRITION ORDER:     Electrolyte Adjustments: not applicable, first PN bag to start tonight     Day 1 PN to provide: 250 mEq Na, 20 mEq K, 10 mEq Ca, 15 mEq Mg, 920 kcal, 60 gm amino acids, 200 gm dextrose, 10 mL MVI, 2.5 mL trace elements    PN Rate: 125 mL/hr   Glucose Infusion Rate: 1.82 mg/kg/min    Osmolarity: 730 mOsm   Parenteral Nutrition Access Device: peripheral IV  Indication for PN:  Postop ileus   Refeeding Risk:      []  Yes  [x] No     NUTRITION DIAGNOSIS & INTERVENTIONS:     [x] Parenteral nutrition: initiate  [x] IV fluid: discontinue D5 NS with 20 mEq/L KCl at 150 mL/hr (180 gm dextrose, 612 kcal)  [x] Collaboration and referral of nutrition care: MD to review PN order and note daily; will not call to verify content and changes, per MD request. Discussed IVF with Dr. Wicho Pollard, will discontinue when PN starts per telephone order from MD.     Nutrition Diagnosis:  Inadequate oral intake related to altered GI function and diet intolerance as evidenced by abdominal distention and discomfort and inability to consume po diet. ASSESSMENT:     S/p sigmoid colectomy on 12/4. Was tolerating a diet, then had abdominal pain and distention, NGT placed. KUB indicative of ileus per MD note. + small BM and flatus.      PN infusion adequate to meet patients estimated nutritional needs:   [] Yes     [x] No [] Unable to determine at this time    Diet: DIET NPO      Food Allergies: NKFA  Current Appetite:   [] Good     [] Fair     [] Poor     [x] Other: NPO  Appetite/meal intake prior to admission:   [x] Good     [] Fair     [] Poor     [] Other:  Feeding Limitations:  [] Swallowing difficulty    [] Chewing difficulty    [] Other:  Current Meal Intake:   Patient Vitals for the past 100 hrs:   % Diet Eaten   12/09/18 1245 0 %   12/09/18 0944 0 %   12/08/18 1711 75 %   12/08/18 1235 100 %   12/08/18 0908 100 %   12/06/18 1725 75 %   12/06/18 1207 75 %   12/06/18 0821 100 %   12/05/18 1631 0 %   12/05/18 1315 100 %       BM:  12/7  Skin Integrity: surgical incision to abdomen  Edema:  [x] No     [] Yes   Pertinent Medications: Reviewed: SSI, mineral oil     Labs: BMP, MG, Phos ordered daily; CMP, Triglyceride, Prealbumin ordered initially and weekly  Recent Labs     12/09/18 0459 12/08/18 0315 12/07/18  0221   * 138 138   K 4.7 4.3 3.8    108 107   CO2 22 20* 23   * 94 79   BUN 22* 10 7   CREA 2.17* 0.95 0.74   CA 9.7 8.2* 8.5   MG 1.8 1.6 1.3*   PHOS 5.0* 4.4 3.2   Prealbumin: ordered   Triglyceride: ordered  Recent Labs     12/09/18 0459 12/08/18 0315 12/07/18  0221   WBC 6.3 9.5 10.4   HGB 11.6* 9.4* 10.4*   HCT 34.1* 28.3* 31.5*    205 229         Intake/Output Summary (Last 24 hours) at 12/9/2018 1339  Last data filed at 12/9/2018 1245  Gross per 24 hour   Intake 120 ml   Output 300 ml   Net -180 ml       Anthropometrics:   Ht Readings from Last 1 Encounters:   12/04/18 5' 10\" (1.778 m)       Last 3 Recorded Weights in this Encounter    11/29/18 1448 12/04/18 0630   Weight: 76.7 kg (169 lb) 76.4 kg (168 lb 6.4 oz)        Body mass index is 24.16 kg/m². Weight History: Pt reports weight loss of 8 lbs PTA, unknown time frame.    Weight Metrics 12/4/2018 11/19/2018 11/12/2018 2/12/2016 2/9/2016 2/9/2016 2/8/2016   Weight 168 lb 6.4 oz 169 lb 170 lb 9.6 oz - 178 lb - 178 lb   BMI 24.16 kg/m2 24.25 kg/m2 24.48 kg/m2 25.54 kg/m2 - 25.54 kg/m2 -          Admitting Diagnosis: Malignant neoplasm of sigmoid colon (HCC) [C18.7]  Colon cancer (HCC) [C18.9]  Pertinent PMHx: colon polyps    Education Needs:        [] None identified  [] Identified - Not appropriate at this time  []  Identified and addressed - refer to education log  Learning Limitations:   [] None identified  [] Identified    Cultural, Buddhism & ethnic food preferences:  [x] None identified    [] Identified and addressed     ESTIMATED NUTRITION NEEDS:     Calories: 4240-8850 kcal (HBEx1.2-1.3) based on   [x] Actual BW  76 kg    [] IBW:   Protein: 76-92 gm (1-1.2 gm/kg) based on   [x] Actual BW      [] IBW:   Fluid: 1 mL/kcal     MONITORING & EVALUATION:     Nutrition Goals:   1. Patient will receive nutrition via PN until they are able to tolerate adequate po intake/enteral nutrition. Outcome:  [] Met    []  Not Met    [x] New Goal  2. Patient will tolerate advancement of macronutrients towards meeting estimated nutrition needs within the next 7 days. Outcome:  [] Met    []  Not Met    [x] New Goal    Monitoring:  [x] Parenteral nutrition intake and administration  [x] Biochemical data, medical tests, and procedures   [x] Fluid intake   [x] Nutrition-focused physical findings   [x] Treatment/therapy   [] Food and beverage intake   [] Diet order      [] Weight        Previous Recommendations (for follow-up assessments only):     []   Implemented       []   Not Implemented (RD to address)      [] No Longer Appropriate     [] No Recommendation Made        Discharge Planning: Nutrition recommendations pending patient's ability to tolerate po intake/enteral nutrition.    [x]  Participated in care planning, discharge planning, & interdisciplinary rounds as appropriate      Nidhi Winters RD, [address] [place]   Pager: [phone]

## 2018-12-10 ENCOUNTER — APPOINTMENT (OUTPATIENT)
Dept: INTERVENTIONAL RADIOLOGY/VASCULAR | Age: 68
DRG: 330 | End: 2018-12-10
Attending: COLON & RECTAL SURGERY
Payer: MEDICARE

## 2018-12-10 ENCOUNTER — APPOINTMENT (OUTPATIENT)
Dept: ULTRASOUND IMAGING | Age: 68
DRG: 330 | End: 2018-12-10
Attending: INTERNAL MEDICINE
Payer: MEDICARE

## 2018-12-10 LAB
ANION GAP SERPL CALC-SCNC: 8 MMOL/L (ref 3–18)
BUN SERPL-MCNC: 33 MG/DL (ref 7–18)
BUN/CREAT SERPL: 10 (ref 12–20)
CALCIUM SERPL-MCNC: 8.3 MG/DL (ref 8.5–10.1)
CHLORIDE SERPL-SCNC: 102 MMOL/L (ref 100–108)
CO2 SERPL-SCNC: 27 MMOL/L (ref 21–32)
CREAT SERPL-MCNC: 3.34 MG/DL (ref 0.6–1.3)
ERYTHROCYTE [DISTWIDTH] IN BLOOD BY AUTOMATED COUNT: 13.4 % (ref 11.6–14.5)
GLUCOSE BLD STRIP.AUTO-MCNC: 158 MG/DL (ref 70–110)
GLUCOSE BLD STRIP.AUTO-MCNC: 176 MG/DL (ref 70–110)
GLUCOSE BLD STRIP.AUTO-MCNC: 189 MG/DL (ref 70–110)
GLUCOSE BLD STRIP.AUTO-MCNC: 200 MG/DL (ref 70–110)
GLUCOSE SERPL-MCNC: 160 MG/DL (ref 74–99)
HCT VFR BLD AUTO: 27.9 % (ref 36–48)
HGB BLD-MCNC: 9.5 G/DL (ref 13–16)
MAGNESIUM SERPL-MCNC: 1.9 MG/DL (ref 1.6–2.6)
MCH RBC QN AUTO: 29.3 PG (ref 24–34)
MCHC RBC AUTO-ENTMCNC: 34.1 G/DL (ref 31–37)
MCV RBC AUTO: 86.1 FL (ref 74–97)
PHOSPHATE SERPL-MCNC: 4 MG/DL (ref 2.5–4.9)
PLATELET # BLD AUTO: 261 K/UL (ref 135–420)
PMV BLD AUTO: 9.8 FL (ref 9.2–11.8)
POTASSIUM SERPL-SCNC: 4.1 MMOL/L (ref 3.5–5.5)
PREALB SERPL-MCNC: 9.15 MG/DL (ref 20–40)
RBC # BLD AUTO: 3.24 M/UL (ref 4.7–5.5)
SODIUM SERPL-SCNC: 137 MMOL/L (ref 136–145)
TRIGL SERPL-MCNC: 129 MG/DL (ref ?–150)
WBC # BLD AUTO: 5.3 K/UL (ref 4.6–13.2)

## 2018-12-10 PROCEDURE — 74011250636 HC RX REV CODE- 250/636: Performed by: COLON & RECTAL SURGERY

## 2018-12-10 PROCEDURE — 82962 GLUCOSE BLOOD TEST: CPT

## 2018-12-10 PROCEDURE — 80048 BASIC METABOLIC PNL TOTAL CA: CPT

## 2018-12-10 PROCEDURE — 74011000250 HC RX REV CODE- 250: Performed by: COLON & RECTAL SURGERY

## 2018-12-10 PROCEDURE — 84100 ASSAY OF PHOSPHORUS: CPT

## 2018-12-10 PROCEDURE — 85027 COMPLETE CBC AUTOMATED: CPT

## 2018-12-10 PROCEDURE — 65270000029 HC RM PRIVATE

## 2018-12-10 PROCEDURE — 02HV33Z INSERTION OF INFUSION DEVICE INTO SUPERIOR VENA CAVA, PERCUTANEOUS APPROACH: ICD-10-PCS | Performed by: RADIOLOGY

## 2018-12-10 PROCEDURE — 36415 COLL VENOUS BLD VENIPUNCTURE: CPT

## 2018-12-10 PROCEDURE — 77001 FLUOROGUIDE FOR VEIN DEVICE: CPT

## 2018-12-10 PROCEDURE — 84134 ASSAY OF PREALBUMIN: CPT

## 2018-12-10 PROCEDURE — 82570 ASSAY OF URINE CREATININE: CPT

## 2018-12-10 PROCEDURE — 74011000258 HC RX REV CODE- 258: Performed by: COLON & RECTAL SURGERY

## 2018-12-10 PROCEDURE — 76770 US EXAM ABDO BACK WALL COMP: CPT

## 2018-12-10 PROCEDURE — 83735 ASSAY OF MAGNESIUM: CPT

## 2018-12-10 PROCEDURE — 84156 ASSAY OF PROTEIN URINE: CPT

## 2018-12-10 PROCEDURE — 84478 ASSAY OF TRIGLYCERIDES: CPT

## 2018-12-10 PROCEDURE — 77030008771 HC TU NG SALEM SUMP -A

## 2018-12-10 PROCEDURE — 84300 ASSAY OF URINE SODIUM: CPT

## 2018-12-10 PROCEDURE — 74011636637 HC RX REV CODE- 636/637: Performed by: COLON & RECTAL SURGERY

## 2018-12-10 RX ORDER — SODIUM CHLORIDE 0.9 % (FLUSH) 0.9 %
10 SYRINGE (ML) INJECTION AS NEEDED
Status: DISCONTINUED | OUTPATIENT
Start: 2018-12-10 | End: 2018-12-22 | Stop reason: HOSPADM

## 2018-12-10 RX ORDER — GABAPENTIN 100 MG/1
100 CAPSULE ORAL 3 TIMES DAILY
Status: DISCONTINUED | OUTPATIENT
Start: 2018-12-10 | End: 2018-12-11

## 2018-12-10 RX ORDER — LIDOCAINE HYDROCHLORIDE 10 MG/ML
INJECTION, SOLUTION EPIDURAL; INFILTRATION; INTRACAUDAL; PERINEURAL
Status: DISPENSED
Start: 2018-12-10 | End: 2018-12-10

## 2018-12-10 RX ADMIN — FAMOTIDINE 20 MG: 10 INJECTION INTRAVENOUS at 09:48

## 2018-12-10 RX ADMIN — HEPARIN SODIUM 5000 UNITS: 5000 INJECTION, SOLUTION INTRAVENOUS; SUBCUTANEOUS at 08:00

## 2018-12-10 RX ADMIN — MORPHINE SULFATE 2 MG: 2 INJECTION, SOLUTION INTRAMUSCULAR; INTRAVENOUS at 22:34

## 2018-12-10 RX ADMIN — LATANOPROST 1 DROP: 50 SOLUTION/ DROPS OPHTHALMIC at 18:00

## 2018-12-10 RX ADMIN — HEPARIN SODIUM 5000 UNITS: 5000 INJECTION, SOLUTION INTRAVENOUS; SUBCUTANEOUS at 17:30

## 2018-12-10 RX ADMIN — FAMOTIDINE 20 MG: 10 INJECTION INTRAVENOUS at 09:00

## 2018-12-10 RX ADMIN — INSULIN LISPRO 2 UNITS: 100 INJECTION, SOLUTION INTRAVENOUS; SUBCUTANEOUS at 19:06

## 2018-12-10 RX ADMIN — CALCIUM GLUCONATE: 94 INJECTION, SOLUTION INTRAVENOUS at 21:22

## 2018-12-10 RX ADMIN — INSULIN LISPRO 2 UNITS: 100 INJECTION, SOLUTION INTRAVENOUS; SUBCUTANEOUS at 14:05

## 2018-12-10 NOTE — PROGRESS NOTES
NUTRITION consult    Nutrition Consult: TPN: RD to Manage     RECOMMENDATIONS / PLAN:     - Plan to provide parenteral nutrition support, monitor labs and adjust electrolytes daily. - Check CMP. - Continue RD inpatient monitoring and evaluation. Please Note:   Parenteral nutrition support to be provided using custom product, allowing for modification of electrolyte and macronutrient content day to day. Lipids included in PN on MWF. Macronutrient Goal: 92 gm amino acids, 400 gm dextrose, 250 mL lipids (1942 kcal weekly average). PARENTERAL NUTRITION ORDER:     Electrolyte Adjustments: Na and rate decreased, K decreased     Day 2 PN to provide: 200 mEq Na, 10 mEq K, 10 mEq Ca, 15 mEq Mg, 1630 kcal, 70 gm amino acids, 250 gm dextrose, 250 mL lipids, 10 mL MVI, 2.5 mL trace elements    PN Rate: 100 mL/hr   Glucose Infusion Rate: 2.2 mg/kg/min    Osmolarity: 1022 mOsm   Parenteral Nutrition Access Device: PICC placed 12/10/18  Indication for PN:  Postop ileus   Refeeding Risk:      []  Yes  [x] No     NUTRITION DIAGNOSIS & INTERVENTIONS:     [x] Parenteral nutrition: continue, modify contents   [x] Collaboration and referral of nutrition care: MD to review PN order and note daily; will not call to verify content and changes, per MD request. Discussed rate with Nephrology. Nutrition Diagnosis:  Inadequate oral intake related to altered GI function and diet intolerance as evidenced by abdominal distention and discomfort and inability to consume po diet. ASSESSMENT:     12/10: Nephrology consulted for JOSE ANTONIO. NGT to suction with 3 L out in the past 24 hours. 12/9: S/p sigmoid colectomy on 12/4. Was tolerating a diet, then had abdominal pain and distention, NGT placed. KUB indicative of ileus per MD note. + small BM and flatus.      PN infusion adequate to meet patients estimated nutritional needs:   [] Yes     [x] No   [] Unable to determine at this time    Diet: TPN ADULT - PERIPHERAL  DIET NPO  TPN ADULT - CENTRAL      Food Allergies: NKFA  Current Appetite:   [] Good     [] Fair     [] Poor     [x] Other: NPO  Appetite/meal intake prior to admission:   [x] Good     [] Fair     [] Poor     [] Other:  Feeding Limitations:  [] Swallowing difficulty    [] Chewing difficulty    [] Other:  Current Meal Intake:   Patient Vitals for the past 100 hrs:   % Diet Eaten   12/10/18 1027 0 %   12/09/18 1825 0 %   12/09/18 1245 0 %   12/09/18 0944 0 %   12/08/18 1711 75 %   12/08/18 1235 100 %   12/08/18 0908 100 %   12/06/18 1725 75 %   12/06/18 1207 75 %       BM:  12/9, loose   Skin Integrity: surgical incision to abdomen  Edema:  [x] No     [] Yes   Pertinent Medications: Reviewed: SSI    Labs: BMP, MG, Phos ordered daily; CMP, Triglyceride, Prealbumin ordered initially and weekly  Recent Labs     12/10/18  0134 12/09/18  0459 12/08/18  0315    135* 138   K 4.1 4.7 4.3    103 108   CO2 27 22 20*   * 130* 94   BUN 33* 22* 10   CREA 3.34* 2.17* 0.95   CA 8.3* 9.7 8.2*   MG 1.9 1.8 1.6   PHOS 4.0 5.0* 4.4   Prealbumin: ordered   Triglyceride: 129 mg/dL (12/10/18)   Recent Labs     12/10/18  0134 12/09/18  0459 12/08/18  0315   WBC 5.3 6.3 9.5   HGB 9.5* 11.6* 9.4*   HCT 27.9* 34.1* 28.3*    223 205         Intake/Output Summary (Last 24 hours) at 12/10/2018 1349  Last data filed at 12/10/2018 1027  Gross per 24 hour   Intake 0 ml   Output 4225 ml   Net -4225 ml       Anthropometrics:   Ht Readings from Last 1 Encounters:   12/04/18 5' 10\" (1.778 m)       Last 3 Recorded Weights in this Encounter    11/29/18 1448 12/04/18 0630 12/10/18 1309   Weight: 76.7 kg (169 lb) 76.4 kg (168 lb 6.4 oz) 78.9 kg (174 lb)        Body mass index is 24.97 kg/m². Weight History: Pt reports weight loss of 8 lbs PTA, unknown time frame.    Weight Metrics 12/10/2018 12/4/2018 11/19/2018 11/12/2018 2/12/2016 2/9/2016 2/9/2016   Weight 174 lb - 169 lb 170 lb 9.6 oz - 178 lb -   BMI - 24.97 kg/m2 24.25 kg/m2 24.48 kg/m2 25.54 kg/m2 - 25.54 kg/m2          Admitting Diagnosis: Malignant neoplasm of sigmoid colon (HCC) [C18.7]  Colon cancer (HCC) [C18.9]  Pertinent PMHx: colon polyps    Education Needs:        [x] None identified  [] Identified - Not appropriate at this time  []  Identified and addressed - refer to education log  Learning Limitations:   [x] None identified  [] Identified    Cultural, Hindu & ethnic food preferences:  [x] None identified    [] Identified and addressed     ESTIMATED NUTRITION NEEDS:     Calories: 3524-4650 kcal (HBEx1.2-1.3) based on   [x] Actual BW  76 kg    [] IBW:   Protein: 76-92 gm (1-1.2 gm/kg) based on   [x] Actual BW      [] IBW:   Fluid: 1 mL/kcal     MONITORING & EVALUATION:     Nutrition Goals:   1. Patient will receive nutrition via PN until they are able to tolerate adequate po intake/enteral nutrition. Outcome:  [x] Met    []  Not Met    [] New Goal  2. Patient will tolerate advancement of macronutrients towards meeting estimated nutrition needs within the next 7 days. Outcome:  [] Met    [x]  Not Met    [] New Goal    Monitoring:  [x] Parenteral nutrition intake and administration  [x] Biochemical data, medical tests, and procedures   [x] Fluid intake   [x] Nutrition-focused physical findings   [x] Treatment/therapy   [] Food and beverage intake   [] Diet order      [] Weight        Previous Recommendations (for follow-up assessments only):     [x]   Implemented       []   Not Implemented (RD to address)      [] No Longer Appropriate     [] No Recommendation Made        Discharge Planning: Nutrition recommendations pending patient's ability to tolerate po intake/enteral nutrition.    [x]  Participated in care planning, discharge planning, & interdisciplinary rounds as appropriate      Boom Salazar, [address] [place]   Pager: [phone]

## 2018-12-10 NOTE — CONSULTS
Consult Note  Consult requested by: Dr. Slade Duncan is a 80 y.o. male [address] who is being seen on consult for JOSE ANTONIO  No chief complaint on file. Admission diagnosis: s/p robotic assisted   Sigmoid colectomy     75y M with PMH HTN, DM, s/p robotic sigmoid colectomy , post op ileus, seen for JOSE ANTONIO  Impression & Plan:   IMPRESSION:   JOSE ANTONIO, suspect ATN; multiple risk factor including; Hypotension, was on nsaids and ARB, very higher NG tube output, ? Abdominal compartment syndrome. Hypotnesion, better  S/p robot assisted sigmoidectomy ,   Post op ileus  DM  Renal stone, non obstructive,    PLAN:   High likely ATN from risk factor mention above, but abdominal compartment syndrome still a possibility given his presentation. Monitor response with IV hydration and rule out any hydro at present. At present he is on higher IV fluid from TPN, monitor I and O. Check bladder scan for any urine retention. Check urine sodium, urine protien, kidney usg. DC all nsaids, and hold ARB for now. Decrease gabapentin given this JOSE ANTONIO episode. Adjust electrolyte in TPN per chemistry, caution with aggressive K supplement. Adjust all meds per current renal function status. Discussed with Dr. Ingrid Gates. Thank you very much for allowing me to participate in the care of this patient. We will continue to monitor with you and make recommendations as needed.       HPI:  75y M with PMH DM, HTN, admitted for elective surgery earlier this month. After robot assisted sigmoid colectomy , he developed ileus, his creatinine has been trending up since last 48hrs,  Nephrology service consulted for further assistance. During my evaluation, he denies for any previous known history of kidney disease. At present he denies for any chest pain, short of breath. He complains about distended abdomen. He admits he noticed his urine output is dropping.      I reviewed his vitals and meds, he had episode of hypotension and has been receiving ARB , NSAIDs for his pain. 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     Chest pain, unspecified     Diabetes ([address]     Essential hypertension, benign     GERD (gastroesophageal reflux disease)     Nonspecific abnormal electrocardiogram (ECG) (EKG)       Past Surgical History:   Procedure Laterality Date    COLONOSCOPY N/A 11/12/2018    COLONOSCOPY with Polypectomies, Bx's, Injection, Tattooing & Clip Placement x 3 performed by Octavio Batres MD at [address]  11/12/2018         Marielena Lee  11/12/2018         ENDOSCOPIC MUCOSAL RESECT  11/12/2018         HX COLONOSCOPY      HX HEENT  2008    Throat Ca - 1 vocal chord removed     HX HEENT      eye surgery muscles    LAP,SURG,COLECTOMY, PARTIAL, W/ANAST N/A 12/04/2018    Dr. Anthony Murray       Social History     Socioeconomic History    Marital status:      Spouse name: Not on file    Number of children: Not on file    Years of education: Not on file    Highest education level: Not on file   Social Needs    Financial resource strain: Not on file    Food insecurity - worry: Not on file    Food insecurity - inability: Not on file   Eduvant needs - medical: Not on file   Eduvant needs - non-medical: Not on file   Occupational History    Not on file   Tobacco Use    Smoking status: Former Smoker     Last attempt to quit: 11/19/2008     Years since quitting: 10.0    Smokeless tobacco: Never Used   Substance and Sexual Activity    Alcohol use: No    Drug use: No    Sexual activity: Not on file   Other Topics Concern    Not on file   Social History Narrative    Not on file       History reviewed. No pertinent family history. No Known Allergies     Home Medications:     Prior to Admission Medications   Prescriptions Last Dose Informant Patient Reported? Taking?    Aspirin, Buffered 81 mg tab 2018  Yes No   Sig: Take 1 Tab by mouth daily. stop 7 days prior to surgery (18). Dexlansoprazole 60 mg CpDB 12/3/2018  Yes Yes   Sig: Take 1 Cap by mouth daily. IRON, FERROUS SULFATE, PO 2018 at Unknown time  Yes Yes   Sig: Take  by mouth. 3 times a week   beclomethasone dipropionate (QNASL) 80 mcg/actuation HFAA 2018 at Unknown time Self Yes Yes   Si mcg by Nasal route daily. Directions on at home label are as follows: Use 2 sprays in each Nostril Daily   calcium-cholecalciferol, d3, 600-125 mg-unit tab 12/3/2018  Yes Yes   Sig: Take 1 Tab by mouth daily. cyclobenzaprine (FLEXERIL) 5 mg tablet Unknown at Unknown time  No No   Sig: Take 1 Tab by mouth three (3) times daily as needed for Muscle Spasm(s). latanoprost (XALATAN) 0.005 % ophthalmic solution 12/3/2018 Self Yes Yes   Sig: Administer 1 Drop to both eyes nightly. losartan (COZAAR) 50 mg tablet 12/3/2018  Yes Yes   Sig: Take 50 mg by mouth daily. meloxicam (MOBIC) 15 mg tablet 2018  Yes No   Sig: Take 15 mg by mouth daily. metFORMIN (GLUCOPHAGE) 1,000 mg tablet 12/3/2018  Yes Yes   Sig: Take 1,000 mg by mouth two (2) times daily (with meals). do not take morning of surgery (. multivitamin, tx-iron-ca-min (THERA-M W/ IRON) 9 mg iron-400 mcg tab tablet 12/3/2018  Yes Yes   Sig: Take 1 Tab by mouth daily. naloxegol (MOVANTIK) 25 mg tab tablet 12/3/2018  Yes Yes   Sig: Take  by mouth Daily (before breakfast). nebivolol (BYSTOLIC) 10 mg tablet   Yes Yes   Sig: Take 10 mg by mouth daily. omega-3 fatty acids-vitamin e 1,000 mg cap 2018 at Unknown time  Yes Yes   Sig: Take 1 Cap by mouth daily. omeprazole (PRILOSEC) 20 mg capsule 12/3/2018  Yes Yes   Sig: Take 20 mg by mouth daily. oxyCODONE-acetaminophen (PERCOCET 10)  mg per tablet Unknown at Unknown time  Yes No   Sig: Take  by mouth.    oxyCODONE-acetaminophen (PERCOCET) 5-325 mg per tablet Unknown at Unknown time  No No   Sig: Take 1 Tab by mouth every six (6) hours as needed. Max Daily Amount: 4 Tabs. Patient taking differently: Take 1 Tab by mouth every six (6) hours as needed for Pain. psyllium husk (METAMUCIL) 0.4 gram cap Not Taking at Unknown time  No No   Sig: Take 1 Cap by mouth daily. Facility-Administered Medications: None       Current Facility-Administered Medications   Medication Dose Route Frequency    lidocaine (PF) (XYLOCAINE) 10 mg/mL (1 %) injection        famotidine (PF) (PEPCID) 20 mg in sodium chloride 0.9% 10 mL injection  20 mg IntraVENous DAILY    gabapentin (NEURONTIN) capsule 100 mg  100 mg Oral TID    sodium chloride (NS) flush 10 mL  10 mL InterCATHeter PRN    TPN ADULT - PERIPHERAL   IntraVENous CONTINUOUS    menthol 5 MG (NICE) lozenge  1 Lozenge Mucous Membrane Q6H PRN    diphenhydrAMINE (BENADRYL) injection 25 mg  25 mg IntraVENous Q6H PRN    morphine injection 2 mg  2 mg IntraVENous Q3H PRN    acetaminophen (TYLENOL) tablet 1,000 mg  1,000 mg Oral Q6H    ondansetron (ZOFRAN) injection 4 mg  4 mg IntraVENous Q6H PRN    latanoprost (XALATAN) 0.005 % ophthalmic solution 1 Drop  1 Drop Both Eyes QPM    nebivolol (BYSTOLIC) tablet 10 mg  10 mg Oral DAILY    insulin lispro (HUMALOG) injection   SubCUTAneous Q6H    glucose chewable tablet 16 g  4 Tab Oral PRN    glucagon (GLUCAGEN) injection 1 mg  1 mg IntraMUSCular PRN    dextrose (D50W) injection syrg 12.5-25 g  25-50 mL IntraVENous PRN    heparin (porcine) injection 5,000 Units  5,000 Units SubCUTAneous Q8H       Review of Systems:      Complete 10-point review of systems were obtained and discussed in length  with the patient. Complete review of systems was negative/unremarkable  except as mentioned in HPI section.   Data Review:    Labs: Results:       Chemistry Recent Labs     12/10/18  0134 12/09/18  0459 12/08/18  0315   * 130* 94    135* 138   K 4.1 4.7 4.3    103 108   CO2 27 22 20*   BUN 33* 22* 10 CREA 3.34* 2.17* 0.95   CA 8.3* 9.7 8.2*   AGAP 8 10 10   BUCR 10* 10* 11*      CBC w/Diff Recent Labs     12/10/18  0134 12/09/18  0459 12/08/18  0315   WBC 5.3 6.3 9.5   RBC 3.24* 3.83* 3.19*   HGB 9.5* 11.6* 9.4*   HCT 27.9* 34.1* 28.3*    223 205      Coagulation No results for input(s): PTP, INR, APTT in the last 72 hours. No lab exists for component: INREXT    Iron/Ferritin No results for input(s): IRON in the last 72 hours. No lab exists for component: TIBCCALC   BNP No results for input(s): BNPP in the last 72 hours. Cardiac Enzymes No results for input(s): CPK, CKND1, CHLOE in the last 72 hours. No lab exists for component: CKRMB, TROIP   Liver Enzymes No results for input(s): TP, ALB, TBIL, AP, SGOT, GPT in the last 72 hours.     No lab exists for component: DBIL   Thyroid Studies No results found for: T4, T3U, TSH, TSHEXT      EKG: sinus  Physical Assessment:     Visit Vitals  /68 (BP 1 Location: Left arm, BP Patient Position: At rest)   Pulse 82   Temp 98.8 °F (37.1 °C)   Resp 17   Ht 5' 10\" (1.778 m)   Wt 76.4 kg (168 lb 6.4 oz)   SpO2 94%   BMI 24.16 kg/m²     Weight change:     Intake/Output Summary (Last 24 hours) at 12/10/2018 1251  Last data filed at 12/10/2018 1027  Gross per 24 hour   Intake 0 ml   Output 3425 ml   Net -3425 ml     Physical Exam:   General: comfortable, no acute distress   HEENT sclera anicteric, supple neck, no thyromegaly  CVS: S1S2 heard,  no rub  RS: + air entry b/l,   Abd: Soft, Non tender, Not distended, Positive bowel sounds, no organomegaly, no CVA / supra pubic tenderness  Neuro: non focal, awake, alert , CN II-XII are grossly intact  Extrm: no edema, no cyanosis, clubbing   Skin: no visible  Rash  Musculoskeletal: No gross joints or bone deformities     Procedures/imaging: see electronic medical records for all procedures, Xrays and details which were not copied into this note but were reviewed prior to creation of [place] [address], MD  December 10, 2018  [facility]  Office [phone]

## 2018-12-10 NOTE — PROGRESS NOTES
[facility]  [address] [place] [address] [phone]  Colon and Rectal Surgery Progress Note      Patient: Romelia Huerta MRN: [id number]  SSN: [id number]    YOB: 1950  Age: 80 y.o. Sex: male      Admit Date: 12/4/2018    LOS: 6 days     Subjective:     NG placed for continued distension. PPN started. Still with some bowel function. Objective:     Vitals:    12/09/18 1825 12/09/18 2100 12/10/18 0200 12/10/18 0630   BP: 114/72 98/64 103/67 117/82   Pulse: (!) 109 100 100 97   Resp: 18 18 18 12   Temp: 98.1 °F (36.7 °C) 98.9 °F (37.2 °C) 98.2 °F (36.8 °C) 98.7 °F (37.1 °C)   SpO2: 100% 100% 90% 91%   Weight:       Height:            Intake and Output:  Current Shift: No intake/output data recorded.   Last three shifts: 12/08 1901 - 12/10 0700  In: 0   Out: 3625 [Urine:625]    Physical Exam:     abd soft, still distended but non-tender    Lab/Data Review:    CMP:   Lab Results   Component Value Date/Time     12/10/2018 01:34 AM    K 4.1 12/10/2018 01:34 AM     12/10/2018 01:34 AM    CO2 27 12/10/2018 01:34 AM    AGAP 8 12/10/2018 01:34 AM     (H) 12/10/2018 01:34 AM    BUN 33 (H) 12/10/2018 01:34 AM    CREA 3.34 (H) 12/10/2018 01:34 AM    GFRAA 22 (L) 12/10/2018 01:34 AM    GFRNA 19 (L) 12/10/2018 01:34 AM    CA 8.3 (L) 12/10/2018 01:34 AM    MG 1.9 12/10/2018 01:34 AM    PHOS 4.0 12/10/2018 01:34 AM     CBC:   Lab Results   Component Value Date/Time    WBC 5.3 12/10/2018 01:34 AM    HGB 9.5 (L) 12/10/2018 01:34 AM    HCT 27.9 (L) 12/10/2018 01:34 AM     12/10/2018 01:34 AM        Assessment:     POD 6 s/p robotic sigmoid colectomy for cancer, post-operative ileus    Plan:     NPO, NG  TPN  Consult renal for ARF    Signed By: James Anderson MD        December 10, 2018

## 2018-12-10 NOTE — PROGRESS NOTES
Problem: Falls - Risk of  Goal: *Absence of Falls  Document Tawanda Fall Risk and appropriate interventions in the flowsheet.   Outcome: Progressing Towards Goal  Fall Risk Interventions:  Mobility Interventions: Patient to call before getting OOB         Medication Interventions: Patient to call before getting OOB    Elimination Interventions: Call light in reach    History of Falls Interventions: Door open when patient unattended

## 2018-12-10 NOTE — PROGRESS NOTES
Bedside and Verbal shift change report given to Eric (oncoming nurse) by Eyad Mckeon RN (offgoing nurse). Report included the following information SBAR, Kardex, Intake/Output and MAR.

## 2018-12-10 NOTE — PROGRESS NOTES
MD notified of patients condition. Crackles, wheezing, and course lungs sounds heard on auscultation.  Oxygen Saturation 88-93%

## 2018-12-10 NOTE — PROGRESS NOTES
Called Dr. Kim San regarding patients b/p reading of 89/69 hr 105 ordered of NS bolus given and b/p increased to 92/62 with hr of 105 patients is asymptomatic.

## 2018-12-10 NOTE — PROCEDURES
INTERVENTIONAL RADIOLOGY POST PICC LINE NOTE       December 10, 2018       12:33 PM     Preoperative Diagnosis:   IV Access    Postoperative Diagnosis:  Same. :  Katelyn Puente    Assistant:  None. Attending Physician: Katelyn Puente    Type of Anesthesia:  1% Lidocaine local    Procedure/Description: right brachial  upper extremity PICC Line. Findings:  right brachial 5 Kenyan catheter placed. Tip at SVC/RA junction. OK to use. Length 38 cm. Estimated blood Loss: Minimal    Specimen Removed: None    Drains: None     Complications:  None. Condition:  Stable.     Discharge Plan:  continue present therapy

## 2018-12-10 NOTE — PROGRESS NOTES
conducted a Follow up consultation and Spiritual Assessment for Víctor Calhoun, who is a 80 y.o.,male. The  provided the following Interventions:  Continued the relationship of care and support. Listened empathically. Offered assurance of continued prayer on patients behalf. Chart reviewed. The following outcomes were achieved:  Patient expressed gratitude for 's visit. Assessment:  There are no further spiritual or Quaker issues which require Spiritual Care Services interventions at this time. Plan:  Chaplains will continue to follow and will provide pastoral care on an as needed/requested basis.  recommends bedside caregivers page  on duty if patient shows signs of acute spiritual or emotional distress.        [facility]   [phone]

## 2018-12-11 ENCOUNTER — APPOINTMENT (OUTPATIENT)
Dept: GENERAL RADIOLOGY | Age: 68
DRG: 330 | End: 2018-12-11
Attending: STUDENT IN AN ORGANIZED HEALTH CARE EDUCATION/TRAINING PROGRAM
Payer: MEDICARE

## 2018-12-11 ENCOUNTER — ANESTHESIA (OUTPATIENT)
Dept: SURGERY | Age: 68
DRG: 330 | End: 2018-12-11
Payer: MEDICARE

## 2018-12-11 ENCOUNTER — ANESTHESIA EVENT (OUTPATIENT)
Dept: SURGERY | Age: 68
DRG: 330 | End: 2018-12-11
Payer: MEDICARE

## 2018-12-11 ENCOUNTER — APPOINTMENT (OUTPATIENT)
Dept: GENERAL RADIOLOGY | Age: 68
DRG: 330 | End: 2018-12-11
Attending: PHYSICIAN ASSISTANT
Payer: MEDICARE

## 2018-12-11 LAB
ABO + RH BLD: NORMAL
ALBUMIN SERPL-MCNC: 2.2 G/DL (ref 3.4–5)
ALBUMIN/GLOB SERPL: 0.8 {RATIO} (ref 0.8–1.7)
ALP SERPL-CCNC: 40 U/L (ref 45–117)
ALT SERPL-CCNC: 17 U/L (ref 16–61)
ANION GAP SERPL CALC-SCNC: 8 MMOL/L (ref 3–18)
ANION GAP SERPL CALC-SCNC: 9 MMOL/L (ref 3–18)
APTT PPP: 35.4 SEC (ref 23–36.4)
ARTERIAL PATENCY WRIST A: ABNORMAL
AST SERPL-CCNC: 14 U/L (ref 15–37)
BASE DEFICIT BLD-SCNC: 2 MMOL/L
BASOPHILS # BLD: 0 K/UL (ref 0–0.06)
BASOPHILS NFR BLD: 0 % (ref 0–3)
BDY SITE: ABNORMAL
BILIRUB DIRECT SERPL-MCNC: 0.1 MG/DL (ref 0–0.2)
BILIRUB SERPL-MCNC: 0.2 MG/DL (ref 0.2–1)
BLOOD GROUP ANTIBODIES SERPL: NORMAL
BODY TEMPERATURE: 99.2
BUN SERPL-MCNC: 37 MG/DL (ref 7–18)
BUN SERPL-MCNC: 37 MG/DL (ref 7–18)
BUN/CREAT SERPL: 21 (ref 12–20)
BUN/CREAT SERPL: 21 (ref 12–20)
CALCIUM SERPL-MCNC: 7.3 MG/DL (ref 8.5–10.1)
CALCIUM SERPL-MCNC: 8.8 MG/DL (ref 8.5–10.1)
CHLORIDE SERPL-SCNC: 103 MMOL/L (ref 100–108)
CHLORIDE SERPL-SCNC: 106 MMOL/L (ref 100–108)
CO2 SERPL-SCNC: 26 MMOL/L (ref 21–32)
CO2 SERPL-SCNC: 26 MMOL/L (ref 21–32)
CREAT SERPL-MCNC: 1.73 MG/DL (ref 0.6–1.3)
CREAT SERPL-MCNC: 1.75 MG/DL (ref 0.6–1.3)
CREAT UR-MCNC: 293 MG/DL (ref 30–125)
CREAT UR-MCNC: 293 MG/DL (ref 30–125)
DIFFERENTIAL METHOD BLD: ABNORMAL
EOSINOPHIL # BLD: 0.1 K/UL (ref 0–0.4)
EOSINOPHIL NFR BLD: 1 % (ref 0–5)
ERYTHROCYTE [DISTWIDTH] IN BLOOD BY AUTOMATED COUNT: 13.5 % (ref 11.6–14.5)
ERYTHROCYTE [DISTWIDTH] IN BLOOD BY AUTOMATED COUNT: 13.7 % (ref 11.6–14.5)
GAS FLOW.O2 O2 DELIVERY SYS: ABNORMAL L/MIN
GAS FLOW.O2 SETTING OXYMISER: 16 BPM
GLOBULIN SER CALC-MCNC: 2.8 G/DL (ref 2–4)
GLUCOSE BLD STRIP.AUTO-MCNC: 221 MG/DL (ref 70–110)
GLUCOSE BLD STRIP.AUTO-MCNC: 244 MG/DL (ref 70–110)
GLUCOSE BLD STRIP.AUTO-MCNC: 258 MG/DL (ref 70–110)
GLUCOSE SERPL-MCNC: 199 MG/DL (ref 74–99)
GLUCOSE SERPL-MCNC: 214 MG/DL (ref 74–99)
HCO3 BLD-SCNC: 23.3 MMOL/L (ref 22–26)
HCT VFR BLD AUTO: 21.7 % (ref 36–48)
HCT VFR BLD AUTO: 28.3 % (ref 36–48)
HGB BLD-MCNC: 7.4 G/DL (ref 13–16)
HGB BLD-MCNC: 9.7 G/DL (ref 13–16)
INR PPP: 1.2 (ref 0.8–1.2)
INR PPP: 1.2 (ref 0.8–1.2)
INSPIRATION.DURATION SETTING TIME VENT: 1 SEC
LACTATE SERPL-SCNC: 2.4 MMOL/L (ref 0.4–2)
LYMPHOCYTES # BLD: 0.5 K/UL (ref 0.8–3.5)
LYMPHOCYTES NFR BLD: 6 % (ref 20–51)
MAGNESIUM SERPL-MCNC: 2.2 MG/DL (ref 1.6–2.6)
MAGNESIUM SERPL-MCNC: 2.3 MG/DL (ref 1.6–2.6)
MCH RBC QN AUTO: 29.7 PG (ref 24–34)
MCH RBC QN AUTO: 29.8 PG (ref 24–34)
MCHC RBC AUTO-ENTMCNC: 34.1 G/DL (ref 31–37)
MCHC RBC AUTO-ENTMCNC: 34.3 G/DL (ref 31–37)
MCV RBC AUTO: 86.8 FL (ref 74–97)
MCV RBC AUTO: 87.1 FL (ref 74–97)
MONOCYTES # BLD: 0.4 K/UL (ref 0–1)
MONOCYTES NFR BLD: 5 % (ref 2–9)
NEUTS BAND NFR BLD MANUAL: 10 % (ref 0–5)
NEUTS SEG # BLD: 7.4 K/UL (ref 1.8–8)
NEUTS SEG NFR BLD: 78 % (ref 42–75)
O2/TOTAL GAS SETTING VFR VENT: 50 %
PCO2 BLD: 42.5 MMHG (ref 35–45)
PEEP RESPIRATORY: 5 CMH2O
PH BLD: 7.35 [PH] (ref 7.35–7.45)
PHOSPHATE SERPL-MCNC: 3.2 MG/DL (ref 2.5–4.9)
PHOSPHATE SERPL-MCNC: 3.3 MG/DL (ref 2.5–4.9)
PIP ISTAT,IPIP: 12
PLATELET # BLD AUTO: 159 K/UL (ref 135–420)
PLATELET # BLD AUTO: 248 K/UL (ref 135–420)
PLATELET COMMENTS,PCOM: ABNORMAL
PMV BLD AUTO: 9.3 FL (ref 9.2–11.8)
PMV BLD AUTO: 9.9 FL (ref 9.2–11.8)
PO2 BLD: 66 MMHG (ref 80–100)
POTASSIUM SERPL-SCNC: 3.8 MMOL/L (ref 3.5–5.5)
POTASSIUM SERPL-SCNC: 3.9 MMOL/L (ref 3.5–5.5)
PROT SERPL-MCNC: 5 G/DL (ref 6.4–8.2)
PROT UR-MCNC: 320 MG/DL
PROT/CREAT UR-RTO: 1.1
PROTHROMBIN TIME: 14.9 SEC (ref 11.5–15.2)
PROTHROMBIN TIME: 15 SEC (ref 11.5–15.2)
RBC # BLD AUTO: 2.49 M/UL (ref 4.7–5.5)
RBC # BLD AUTO: 3.26 M/UL (ref 4.7–5.5)
RBC MORPH BLD: ABNORMAL
RBC MORPH BLD: ABNORMAL
SAO2 % BLD: 91 % (ref 92–97)
SERVICE CMNT-IMP: ABNORMAL
SODIUM SERPL-SCNC: 138 MMOL/L (ref 136–145)
SODIUM SERPL-SCNC: 140 MMOL/L (ref 136–145)
SODIUM UR-SCNC: 27 MMOL/L (ref 20–110)
SPECIMEN EXP DATE BLD: NORMAL
SPECIMEN TYPE: ABNORMAL
TOTAL RESP. RATE, ITRR: 20
VENTILATION MODE VENT: ABNORMAL
VOLUME CONTROL PLUS IVLCP: YES
VT SETTING VENT: 500 ML
WBC # BLD AUTO: 7.1 K/UL (ref 4.6–13.2)
WBC # BLD AUTO: 8.4 K/UL (ref 4.6–13.2)
WBC MORPH BLD: ABNORMAL

## 2018-12-11 PROCEDURE — 74011250636 HC RX REV CODE- 250/636

## 2018-12-11 PROCEDURE — 77030011264 HC ELECTRD BLD EXT COVD -A: Performed by: COLON & RECTAL SURGERY

## 2018-12-11 PROCEDURE — 74011000250 HC RX REV CODE- 250: Performed by: INTERNAL MEDICINE

## 2018-12-11 PROCEDURE — 77030016154: Performed by: COLON & RECTAL SURGERY

## 2018-12-11 PROCEDURE — 77030018723 HC ELCTRD BLD COVD -A: Performed by: COLON & RECTAL SURGERY

## 2018-12-11 PROCEDURE — 84100 ASSAY OF PHOSPHORUS: CPT

## 2018-12-11 PROCEDURE — 86900 BLOOD TYPING SEROLOGIC ABO: CPT

## 2018-12-11 PROCEDURE — 76060000037 HC ANESTHESIA 3 TO 3.5 HR: Performed by: COLON & RECTAL SURGERY

## 2018-12-11 PROCEDURE — 36592 COLLECT BLOOD FROM PICC: CPT

## 2018-12-11 PROCEDURE — 74011000258 HC RX REV CODE- 258: Performed by: INTERNAL MEDICINE

## 2018-12-11 PROCEDURE — 74011000258 HC RX REV CODE- 258: Performed by: COLON & RECTAL SURGERY

## 2018-12-11 PROCEDURE — 74011636637 HC RX REV CODE- 636/637: Performed by: COLON & RECTAL SURGERY

## 2018-12-11 PROCEDURE — 77030013567 HC DRN WND RESERV BARD -A: Performed by: COLON & RECTAL SURGERY

## 2018-12-11 PROCEDURE — 74011250636 HC RX REV CODE- 250/636: Performed by: NURSE PRACTITIONER

## 2018-12-11 PROCEDURE — 80048 BASIC METABOLIC PNL TOTAL CA: CPT

## 2018-12-11 PROCEDURE — 36600 WITHDRAWAL OF ARTERIAL BLOOD: CPT

## 2018-12-11 PROCEDURE — 83605 ASSAY OF LACTIC ACID: CPT

## 2018-12-11 PROCEDURE — 74011000250 HC RX REV CODE- 250: Performed by: NURSE PRACTITIONER

## 2018-12-11 PROCEDURE — 77030008462 HC STPLR SKN PROX J&J -A: Performed by: COLON & RECTAL SURGERY

## 2018-12-11 PROCEDURE — 77030002916 HC SUT ETHLN J&J -A: Performed by: COLON & RECTAL SURGERY

## 2018-12-11 PROCEDURE — 77030012407 HC DRN WND BARD -B: Performed by: COLON & RECTAL SURGERY

## 2018-12-11 PROCEDURE — 71045 X-RAY EXAM CHEST 1 VIEW: CPT

## 2018-12-11 PROCEDURE — 77030018719 HC DRSG PTCH ANTIMIC J&J -A: Performed by: COLON & RECTAL SURGERY

## 2018-12-11 PROCEDURE — 36415 COLL VENOUS BLD VENIPUNCTURE: CPT

## 2018-12-11 PROCEDURE — 83735 ASSAY OF MAGNESIUM: CPT

## 2018-12-11 PROCEDURE — 87075 CULTR BACTERIA EXCEPT BLOOD: CPT

## 2018-12-11 PROCEDURE — 82962 GLUCOSE BLOOD TEST: CPT

## 2018-12-11 PROCEDURE — 74011000258 HC RX REV CODE- 258: Performed by: NURSE PRACTITIONER

## 2018-12-11 PROCEDURE — 65610000006 HC RM INTENSIVE CARE

## 2018-12-11 PROCEDURE — 77030010541: Performed by: COLON & RECTAL SURGERY

## 2018-12-11 PROCEDURE — C1765 ADHESION BARRIER: HCPCS | Performed by: COLON & RECTAL SURGERY

## 2018-12-11 PROCEDURE — 74011000258 HC RX REV CODE- 258

## 2018-12-11 PROCEDURE — 77030037875 HC DRSG MEPILEX <16IN BORD MOLN -A

## 2018-12-11 PROCEDURE — 85730 THROMBOPLASTIN TIME PARTIAL: CPT

## 2018-12-11 PROCEDURE — 74011250637 HC RX REV CODE- 250/637: Performed by: PHYSICIAN ASSISTANT

## 2018-12-11 PROCEDURE — 85025 COMPLETE CBC W/AUTO DIFF WBC: CPT

## 2018-12-11 PROCEDURE — 0D1B0Z4 BYPASS ILEUM TO CUTANEOUS, OPEN APPROACH: ICD-10-PCS | Performed by: COLON & RECTAL SURGERY

## 2018-12-11 PROCEDURE — 74011000258 HC RX REV CODE- 258: Performed by: STUDENT IN AN ORGANIZED HEALTH CARE EDUCATION/TRAINING PROGRAM

## 2018-12-11 PROCEDURE — 85610 PROTHROMBIN TIME: CPT

## 2018-12-11 PROCEDURE — 0DQU0ZZ REPAIR OMENTUM, OPEN APPROACH: ICD-10-PCS | Performed by: COLON & RECTAL SURGERY

## 2018-12-11 PROCEDURE — 87186 SC STD MICRODIL/AGAR DIL: CPT

## 2018-12-11 PROCEDURE — 74011000250 HC RX REV CODE- 250: Performed by: COLON & RECTAL SURGERY

## 2018-12-11 PROCEDURE — 94762 N-INVAS EAR/PLS OXIMTRY CONT: CPT

## 2018-12-11 PROCEDURE — P9045 ALBUMIN (HUMAN), 5%, 250 ML: HCPCS | Performed by: NURSE PRACTITIONER

## 2018-12-11 PROCEDURE — 74011000250 HC RX REV CODE- 250

## 2018-12-11 PROCEDURE — 77030013797 HC KT TRNSDUC PRSSR EDWD -A

## 2018-12-11 PROCEDURE — 87086 URINE CULTURE/COLONY COUNT: CPT

## 2018-12-11 PROCEDURE — 82803 BLOOD GASES ANY COMBINATION: CPT

## 2018-12-11 PROCEDURE — 77030002966 HC SUT PDS J&J -A: Performed by: COLON & RECTAL SURGERY

## 2018-12-11 PROCEDURE — 77030003028 HC SUT VCRL J&J -A: Performed by: COLON & RECTAL SURGERY

## 2018-12-11 PROCEDURE — 74011250636 HC RX REV CODE- 250/636: Performed by: STUDENT IN AN ORGANIZED HEALTH CARE EDUCATION/TRAINING PROGRAM

## 2018-12-11 PROCEDURE — 85027 COMPLETE CBC AUTOMATED: CPT

## 2018-12-11 PROCEDURE — 94002 VENT MGMT INPAT INIT DAY: CPT

## 2018-12-11 PROCEDURE — 74011636637 HC RX REV CODE- 636/637: Performed by: INTERNAL MEDICINE

## 2018-12-11 PROCEDURE — 87040 BLOOD CULTURE FOR BACTERIA: CPT

## 2018-12-11 PROCEDURE — 77030031139 HC SUT VCRL2 J&J -A: Performed by: COLON & RECTAL SURGERY

## 2018-12-11 PROCEDURE — 87070 CULTURE OTHR SPECIMN AEROBIC: CPT

## 2018-12-11 PROCEDURE — 76010000133 HC OR TIME 3 TO 3.5 HR: Performed by: COLON & RECTAL SURGERY

## 2018-12-11 PROCEDURE — 80076 HEPATIC FUNCTION PANEL: CPT

## 2018-12-11 PROCEDURE — 87077 CULTURE AEROBIC IDENTIFY: CPT

## 2018-12-11 PROCEDURE — 77030018836 HC SOL IRR NACL ICUM -A: Performed by: COLON & RECTAL SURGERY

## 2018-12-11 PROCEDURE — 74011250636 HC RX REV CODE- 250/636: Performed by: COLON & RECTAL SURGERY

## 2018-12-11 RX ORDER — FENTANYL CITRATE 50 UG/ML
50 INJECTION, SOLUTION INTRAMUSCULAR; INTRAVENOUS ONCE
Status: ACTIVE | OUTPATIENT
Start: 2018-12-11 | End: 2018-12-11

## 2018-12-11 RX ORDER — INSULIN LISPRO 100 [IU]/ML
INJECTION, SOLUTION INTRAVENOUS; SUBCUTANEOUS EVERY 6 HOURS
Status: DISCONTINUED | OUTPATIENT
Start: 2018-12-11 | End: 2018-12-20

## 2018-12-11 RX ORDER — ALBUMIN HUMAN 50 G/1000ML
25 SOLUTION INTRAVENOUS ONCE
Status: COMPLETED | OUTPATIENT
Start: 2018-12-11 | End: 2018-12-11

## 2018-12-11 RX ORDER — HYDROCORTISONE SODIUM SUCCINATE 100 MG/2ML
50 INJECTION, POWDER, FOR SOLUTION INTRAMUSCULAR; INTRAVENOUS EVERY 6 HOURS
Status: DISCONTINUED | OUTPATIENT
Start: 2018-12-12 | End: 2018-12-12

## 2018-12-11 RX ORDER — FENTANYL CITRATE 50 UG/ML
INJECTION, SOLUTION INTRAMUSCULAR; INTRAVENOUS
Status: DISPENSED
Start: 2018-12-11 | End: 2018-12-11

## 2018-12-11 RX ORDER — FENTANYL CITRATE 50 UG/ML
50-100 INJECTION, SOLUTION INTRAMUSCULAR; INTRAVENOUS
Status: DISCONTINUED | OUTPATIENT
Start: 2018-12-11 | End: 2018-12-11

## 2018-12-11 RX ORDER — SUCCINYLCHOLINE CHLORIDE 20 MG/ML
INJECTION INTRAMUSCULAR; INTRAVENOUS AS NEEDED
Status: DISCONTINUED | OUTPATIENT
Start: 2018-12-11 | End: 2018-12-11 | Stop reason: HOSPADM

## 2018-12-11 RX ORDER — NOREPINEPHRINE BITARTRATE/D5W 8 MG/250ML
PLASTIC BAG, INJECTION (ML) INTRAVENOUS
Status: COMPLETED
Start: 2018-12-11 | End: 2018-12-11

## 2018-12-11 RX ORDER — MIDAZOLAM HYDROCHLORIDE 1 MG/ML
1-2 INJECTION, SOLUTION INTRAMUSCULAR; INTRAVENOUS EVERY 4 HOURS
Status: DISCONTINUED | OUTPATIENT
Start: 2018-12-11 | End: 2018-12-11

## 2018-12-11 RX ORDER — PROPOFOL 10 MG/ML
INJECTION, EMULSION INTRAVENOUS AS NEEDED
Status: DISCONTINUED | OUTPATIENT
Start: 2018-12-11 | End: 2018-12-11 | Stop reason: HOSPADM

## 2018-12-11 RX ORDER — FENTANYL CITRATE 50 UG/ML
INJECTION, SOLUTION INTRAMUSCULAR; INTRAVENOUS AS NEEDED
Status: DISCONTINUED | OUTPATIENT
Start: 2018-12-11 | End: 2018-12-11 | Stop reason: HOSPADM

## 2018-12-11 RX ORDER — LIDOCAINE HYDROCHLORIDE 20 MG/ML
INJECTION, SOLUTION EPIDURAL; INFILTRATION; INTRACAUDAL; PERINEURAL AS NEEDED
Status: DISCONTINUED | OUTPATIENT
Start: 2018-12-11 | End: 2018-12-11 | Stop reason: HOSPADM

## 2018-12-11 RX ORDER — FENTANYL CITRATE 50 UG/ML
INJECTION, SOLUTION INTRAMUSCULAR; INTRAVENOUS
Status: COMPLETED
Start: 2018-12-11 | End: 2018-12-11

## 2018-12-11 RX ORDER — NOREPINEPHRINE BITARTRATE/D5W 8 MG/250ML
2-10 PLASTIC BAG, INJECTION (ML) INTRAVENOUS
Status: DISCONTINUED | OUTPATIENT
Start: 2018-12-11 | End: 2018-12-11

## 2018-12-11 RX ORDER — FENTANYL CITRATE 50 UG/ML
50-100 INJECTION, SOLUTION INTRAMUSCULAR; INTRAVENOUS
Status: DISCONTINUED | OUTPATIENT
Start: 2018-12-11 | End: 2018-12-12

## 2018-12-11 RX ORDER — MIDAZOLAM HYDROCHLORIDE 1 MG/ML
1-2 INJECTION, SOLUTION INTRAMUSCULAR; INTRAVENOUS
Status: DISCONTINUED | OUTPATIENT
Start: 2018-12-11 | End: 2018-12-12

## 2018-12-11 RX ORDER — HYDROCORTISONE SODIUM SUCCINATE 100 MG/2ML
100 INJECTION, POWDER, FOR SOLUTION INTRAMUSCULAR; INTRAVENOUS ONCE
Status: COMPLETED | OUTPATIENT
Start: 2018-12-11 | End: 2018-12-11

## 2018-12-11 RX ORDER — PHENYLEPHRINE HCL IN 0.9% NACL 1 MG/10 ML
SYRINGE (ML) INTRAVENOUS AS NEEDED
Status: DISCONTINUED | OUTPATIENT
Start: 2018-12-11 | End: 2018-12-11 | Stop reason: HOSPADM

## 2018-12-11 RX ORDER — ALBUTEROL SULFATE 1.25 MG/3ML
2.5 SOLUTION RESPIRATORY (INHALATION)
Status: DISCONTINUED | OUTPATIENT
Start: 2018-12-11 | End: 2018-12-22 | Stop reason: HOSPADM

## 2018-12-11 RX ORDER — FENTANYL CITRATE 50 UG/ML
50 INJECTION, SOLUTION INTRAMUSCULAR; INTRAVENOUS ONCE
Status: COMPLETED | OUTPATIENT
Start: 2018-12-11 | End: 2018-12-11

## 2018-12-11 RX ORDER — PROPOFOL 10 MG/ML
0-50 VIAL (ML) INTRAVENOUS
Status: DISCONTINUED | OUTPATIENT
Start: 2018-12-11 | End: 2018-12-11

## 2018-12-11 RX ORDER — MIDAZOLAM HYDROCHLORIDE 1 MG/ML
1 INJECTION, SOLUTION INTRAMUSCULAR; INTRAVENOUS ONCE
Status: DISCONTINUED | OUTPATIENT
Start: 2018-12-11 | End: 2018-12-11

## 2018-12-11 RX ORDER — HYDROMORPHONE HYDROCHLORIDE 1 MG/ML
0.5 INJECTION, SOLUTION INTRAMUSCULAR; INTRAVENOUS; SUBCUTANEOUS
Status: DISCONTINUED | OUTPATIENT
Start: 2018-12-11 | End: 2018-12-22 | Stop reason: HOSPADM

## 2018-12-11 RX ORDER — CHLORHEXIDINE GLUCONATE 1.2 MG/ML
10 RINSE ORAL EVERY 12 HOURS
Status: DISCONTINUED | OUTPATIENT
Start: 2018-12-11 | End: 2018-12-12

## 2018-12-11 RX ORDER — ROCURONIUM BROMIDE 10 MG/ML
INJECTION, SOLUTION INTRAVENOUS AS NEEDED
Status: DISCONTINUED | OUTPATIENT
Start: 2018-12-11 | End: 2018-12-11 | Stop reason: HOSPADM

## 2018-12-11 RX ORDER — SODIUM CHLORIDE, SODIUM LACTATE, POTASSIUM CHLORIDE, CALCIUM CHLORIDE 600; 310; 30; 20 MG/100ML; MG/100ML; MG/100ML; MG/100ML
INJECTION, SOLUTION INTRAVENOUS
Status: DISCONTINUED | OUTPATIENT
Start: 2018-12-11 | End: 2018-12-11 | Stop reason: HOSPADM

## 2018-12-11 RX ORDER — PROPOFOL 10 MG/ML
INJECTION, EMULSION INTRAVENOUS
Status: COMPLETED
Start: 2018-12-11 | End: 2018-12-11

## 2018-12-11 RX ORDER — ONDANSETRON 2 MG/ML
INJECTION INTRAMUSCULAR; INTRAVENOUS AS NEEDED
Status: DISCONTINUED | OUTPATIENT
Start: 2018-12-11 | End: 2018-12-11 | Stop reason: HOSPADM

## 2018-12-11 RX ADMIN — SODIUM CHLORIDE, SODIUM ACETATE ANHYDROUS, SODIUM GLUCONATE, POTASSIUM CHLORIDE, AND MAGNESIUM CHLORIDE 500 ML: 526; 222; 502; 37; 30 INJECTION, SOLUTION INTRAVENOUS at 08:52

## 2018-12-11 RX ADMIN — FENTANYL CITRATE 50 MCG: 50 INJECTION, SOLUTION INTRAMUSCULAR; INTRAVENOUS at 08:25

## 2018-12-11 RX ADMIN — Medication 100 MCG: at 03:23

## 2018-12-11 RX ADMIN — PIPERACILLIN SODIUM,TAZOBACTAM SODIUM 2.25 G: 2; .25 INJECTION, POWDER, FOR SOLUTION INTRAVENOUS at 12:15

## 2018-12-11 RX ADMIN — HEPARIN SODIUM 5000 UNITS: 5000 INJECTION, SOLUTION INTRAVENOUS; SUBCUTANEOUS at 23:46

## 2018-12-11 RX ADMIN — CHLORHEXIDINE GLUCONATE 0.12% ORAL RINSE 10 ML: 1.2 LIQUID ORAL at 08:28

## 2018-12-11 RX ADMIN — HEPARIN SODIUM 5000 UNITS: 5000 INJECTION, SOLUTION INTRAVENOUS; SUBCUTANEOUS at 15:54

## 2018-12-11 RX ADMIN — LATANOPROST 1 DROP: 50 SOLUTION/ DROPS OPHTHALMIC at 20:54

## 2018-12-11 RX ADMIN — ROCURONIUM BROMIDE 30 MG: 10 INJECTION, SOLUTION INTRAVENOUS at 03:20

## 2018-12-11 RX ADMIN — SODIUM CHLORIDE, SODIUM ACETATE ANHYDROUS, SODIUM GLUCONATE, POTASSIUM CHLORIDE, AND MAGNESIUM CHLORIDE 1000 ML: 526; 222; 502; 37; 30 INJECTION, SOLUTION INTRAVENOUS at 10:50

## 2018-12-11 RX ADMIN — INSULIN LISPRO 6 UNITS: 100 INJECTION, SOLUTION INTRAVENOUS; SUBCUTANEOUS at 12:14

## 2018-12-11 RX ADMIN — CALCIUM GLUCONATE: 94 INJECTION, SOLUTION INTRAVENOUS at 20:55

## 2018-12-11 RX ADMIN — SUCCINYLCHOLINE CHLORIDE 120 MG: 20 INJECTION INTRAMUSCULAR; INTRAVENOUS at 03:10

## 2018-12-11 RX ADMIN — NOREPINEPHRINE BITARTRATE 5 MCG/MIN: 1 INJECTION INTRAVENOUS at 13:05

## 2018-12-11 RX ADMIN — FENTANYL CITRATE 50 MCG: 50 INJECTION, SOLUTION INTRAMUSCULAR; INTRAVENOUS at 10:00

## 2018-12-11 RX ADMIN — Medication 25 MCG/KG/MIN: at 07:10

## 2018-12-11 RX ADMIN — PROPOFOL 150 MG: 10 INJECTION, EMULSION INTRAVENOUS at 03:10

## 2018-12-11 RX ADMIN — FENTANYL CITRATE 50 MCG: 50 INJECTION, SOLUTION INTRAMUSCULAR; INTRAVENOUS at 03:02

## 2018-12-11 RX ADMIN — FENTANYL CITRATE 50 MCG: 50 INJECTION, SOLUTION INTRAMUSCULAR; INTRAVENOUS at 03:09

## 2018-12-11 RX ADMIN — FENTANYL CITRATE 50 MCG: 50 INJECTION, SOLUTION INTRAMUSCULAR; INTRAVENOUS at 03:53

## 2018-12-11 RX ADMIN — Medication 100 MCG: at 04:38

## 2018-12-11 RX ADMIN — Medication 100 MCG: at 05:15

## 2018-12-11 RX ADMIN — HEPARIN SODIUM 5000 UNITS: 5000 INJECTION, SOLUTION INTRAVENOUS; SUBCUTANEOUS at 00:53

## 2018-12-11 RX ADMIN — PIPERACILLIN SODIUM AND TAZOBACTAM SODIUM 3.38 G: 3; .375 INJECTION, POWDER, LYOPHILIZED, FOR SOLUTION INTRAVENOUS at 18:55

## 2018-12-11 RX ADMIN — HYDROCORTISONE SODIUM SUCCINATE 100 MG: 100 INJECTION, POWDER, FOR SOLUTION INTRAMUSCULAR; INTRAVENOUS at 15:54

## 2018-12-11 RX ADMIN — INSULIN LISPRO 9 UNITS: 100 INJECTION, SOLUTION INTRAVENOUS; SUBCUTANEOUS at 23:47

## 2018-12-11 RX ADMIN — SODIUM CHLORIDE, SODIUM LACTATE, POTASSIUM CHLORIDE, CALCIUM CHLORIDE: 600; 310; 30; 20 INJECTION, SOLUTION INTRAVENOUS at 05:34

## 2018-12-11 RX ADMIN — FAMOTIDINE 20 MG: 10 INJECTION INTRAVENOUS at 08:29

## 2018-12-11 RX ADMIN — HEPARIN SODIUM 5000 UNITS: 5000 INJECTION, SOLUTION INTRAVENOUS; SUBCUTANEOUS at 08:28

## 2018-12-11 RX ADMIN — Medication 5 MCG: at 12:45

## 2018-12-11 RX ADMIN — LIDOCAINE HYDROCHLORIDE 100 MG: 20 INJECTION, SOLUTION EPIDURAL; INFILTRATION; INTRACAUDAL; PERINEURAL at 03:09

## 2018-12-11 RX ADMIN — DEXMEDETOMIDINE HYDROCHLORIDE 0.2 MCG/KG/HR: 100 INJECTION, SOLUTION INTRAVENOUS at 09:24

## 2018-12-11 RX ADMIN — CHLORHEXIDINE GLUCONATE 0.12% ORAL RINSE 10 ML: 1.2 LIQUID ORAL at 20:54

## 2018-12-11 RX ADMIN — PROPOFOL 25 MCG/KG/MIN: 10 INJECTION, EMULSION INTRAVENOUS at 07:10

## 2018-12-11 RX ADMIN — SODIUM CHLORIDE, SODIUM ACETATE ANHYDROUS, SODIUM GLUCONATE, POTASSIUM CHLORIDE, AND MAGNESIUM CHLORIDE: 526; 222; 502; 37; 30 INJECTION, SOLUTION INTRAVENOUS at 12:21

## 2018-12-11 RX ADMIN — INSULIN LISPRO 4 UNITS: 100 INJECTION, SOLUTION INTRAVENOUS; SUBCUTANEOUS at 00:56

## 2018-12-11 RX ADMIN — FENTANYL CITRATE 50 MCG: 50 INJECTION, SOLUTION INTRAMUSCULAR; INTRAVENOUS at 03:30

## 2018-12-11 RX ADMIN — INSULIN LISPRO 6 UNITS: 100 INJECTION, SOLUTION INTRAVENOUS; SUBCUTANEOUS at 18:55

## 2018-12-11 RX ADMIN — PIPERACILLIN SODIUM,TAZOBACTAM SODIUM 2.25 G: 2; .25 INJECTION, POWDER, FOR SOLUTION INTRAVENOUS at 01:46

## 2018-12-11 RX ADMIN — SODIUM CHLORIDE, SODIUM ACETATE ANHYDROUS, SODIUM GLUCONATE, POTASSIUM CHLORIDE, AND MAGNESIUM CHLORIDE 500 ML: 526; 222; 502; 37; 30 INJECTION, SOLUTION INTRAVENOUS at 10:30

## 2018-12-11 RX ADMIN — SODIUM CHLORIDE, SODIUM LACTATE, POTASSIUM CHLORIDE, CALCIUM CHLORIDE: 600; 310; 30; 20 INJECTION, SOLUTION INTRAVENOUS at 03:01

## 2018-12-11 RX ADMIN — ALBUMIN (HUMAN) 25 G: 12.5 INJECTION, SOLUTION INTRAVENOUS at 13:00

## 2018-12-11 RX ADMIN — PIPERACILLIN SODIUM,TAZOBACTAM SODIUM 2.25 G: 2; .25 INJECTION, POWDER, FOR SOLUTION INTRAVENOUS at 08:32

## 2018-12-11 RX ADMIN — ONDANSETRON 4 MG: 2 INJECTION INTRAMUSCULAR; INTRAVENOUS at 05:22

## 2018-12-11 RX ADMIN — ALBUMIN (HUMAN) 25 G: 12.5 INJECTION, SOLUTION INTRAVENOUS at 11:46

## 2018-12-11 RX ADMIN — HYDROCORTISONE SODIUM SUCCINATE 50 MG: 100 INJECTION, POWDER, FOR SOLUTION INTRAMUSCULAR; INTRAVENOUS at 23:46

## 2018-12-11 RX ADMIN — FENTANYL CITRATE 50 MCG: 50 INJECTION, SOLUTION INTRAMUSCULAR; INTRAVENOUS at 04:19

## 2018-12-11 RX ADMIN — MORPHINE SULFATE 2 MG: 2 INJECTION, SOLUTION INTRAMUSCULAR; INTRAVENOUS at 01:39

## 2018-12-11 NOTE — ANESTHESIA PREPROCEDURE EVALUATION
Anesthetic History No history of anesthetic complications Review of Systems / Medical History Patient summary reviewed and pertinent labs reviewed Pulmonary Within defined limits Neuro/Psych Within defined limits Cardiovascular Hypertension: well controlled Exercise tolerance: <4 METS 
  
GI/Hepatic/Renal 
  
GERD: well controlled Renal disease: ARF Endo/Other Diabetes: type 2 Cancer Other Findings Physical Exam 
 
Airway Mallampati: II 
TM Distance: 4 - 6 cm Neck ROM: normal range of motion Mouth opening: Normal 
 
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 Cardiovascular Regular rate and rhythm,  S1 and S2 normal,  no murmur, click, rub, or gallop Dental 
 
Dentition: Full upper dentures and Lower partial plate Pulmonary Breath sounds clear to auscultation Abdominal 
GI exam deferred Other Findings Anesthetic Plan ASA: 4, emergent Anesthesia type: general 
 
 
 
 
Induction: Intravenous Anesthetic plan and risks discussed with: Patient

## 2018-12-11 NOTE — PROGRESS NOTES
Wife called about patient status. Aware patient is going back to surgery, will be coming in to see patient.

## 2018-12-11 NOTE — PROGRESS NOTES
MD Raygoza paged concerning vital signs Told physician mews score 6. Told to culture patient if temperature goes above 102. No further orders given.

## 2018-12-11 NOTE — OP NOTES
[facility],[address]  MR#: [id number]  : 1950  ACCOUNT #: [de-identified]   DATE OF SERVICE: 2018    PREOPERATIVE DIAGNOSIS:  Free air, status post robotic sigmoid colectomy. POSTOPERATIVE DIAGNOSIS:  Anastomotic leak of colorectal anastomosis. PROCEDURES PERFORMED:  Exploratory laparotomy with primary repair of anastomotic leak, omental patch, diverting loop ileostomy. SURGEON:  Scarlet Chavarria MD    ASSISTANT:  None    ANESTHESIA:    General.    ESTIMATED BLOOD LOSS:  20 mL. SPECIMENS REMOVED:  Cultures to pathology. FINDINGS:  Very small anastomotic leak at the anterior anastomotic staple line. COMPLICATIONS:  None. IMPLANTS:  None. INDICATION:  The patient is a 61-year-old male who underwent robotic sigmoid colectomy for malignant polyp of the sigmoid colon. He developed postoperative ileus, which was managed conservatively. He developed fevers. Chest x-ray was performed for fever workup that showed free air under the diaphragm. He was brought to the operating room for exploration. I explained the risks including bleeding, infection, injury to surrounding structures, need for a temporary or permanent stoma and death. He understood and wished to proceed. I told him if there was an anastomotic leak or a leak he may require a colostomy or ileostomy. He understood and was willing to proceed. PROCEDURE:  The patient was promptly identified in the holding area, brought to the operating room, was laid supine on the operating room table. General anesthesia was administered. Mckeon catheter was placed. Abdomen and perineal areas were prepped and draped in the usual sterile fashion. The patient had been placed in [address] [place] [address]. We made a midline incision with a 10 blade and carried this down through subcutaneous tissues with cautery, incised the abdomen and opening the abdominal cavity.   There was a rush of free air.  We used a wound protector. We explored the abdomen. We ran the entire small bowel from the cecum to ligament of Treitz. There was no evidence of enterotomy. We inspected the colon. This all appeared normal.  The stomach was inspected as well and this appeared normal.  We took our attention to the left colon. All the remaining left colon appeared healthy. We had identified the anastomosis and there was a very small 2-3 mm hole in the staple line anteriorly consistent with an anastomotic leak. We trimmed this tissue to healthy tissue and closed this defect with several interrupted 3-0 Vicryl suture. We then mobilized the greater omentum from the patient's right upper quadrant and tacked this anterior to the repair with a 3-0 Vicryl suture. The entire abdominal cavity was irrigated thoroughly. It should be noted there was no significant spillage of bowel content during the repair nor was there any significant feculent or bilious type fluid identified on entry into the abdominal cavity. The prior colonic extraction site to the left of midline was reopened and a loop of ileum was placed through this. We then removed the wound protector, changed gowns and gloves, used clean instruments and closed the abdominal wall fascia with #1 PDS suture in running fashion. Subcutaneous tissues were irrigated and the skin was closed with skin staples. It should also be noted that a 15 round Matthew-Garcia drain was placed in the pelvis and brought out through the right lateral robotic port site. This was sutured to the skin with 2-0 nylon suture. The skin incision, once it was closed, was protected with towels and we matured the loop ileostomy with 3-0 Vicryl suture in standard fashion. Subsequent to this, we removed out our gloves and dressed the midline wound with gauze and silk tape and then placed an ileostomy appliance. The patient tolerated the procedure well.   All instrument, sponge and needle counts were correct at the end of the case x2. Given that the patient appeared in a somewhat weakened state per anesthesia, he was left intubated with a plan of extubating in the intensive care unit. He was transferred to the intensive care unit in stable condition. MD FLEIX Jhonston / CARLOS  D: 12/11/2018 05:58     T: 12/11/2018 07:23  JOB #: [id number]

## 2018-12-11 NOTE — ROUTINE PROCESS
Bedside report given to receiving RN for bed #306 Luis Galo). Procedure, dressings, drain, ileostomy and paez. Informed that pt's family was in waiting room (earlier). No further questions asked.

## 2018-12-11 NOTE — PROGRESS NOTES
Pt seen and examined. Exam distended and firm. CXR reviewed, consistent with free air. Discussed with patient and family need for exploration. If anastomotic leak may need colostomy. Pt and family understands and agrees to proceed.

## 2018-12-11 NOTE — PROGRESS NOTES
Patient without c/o pain but abdomen distended ,respritations 24 on 2lpm,temp 101. 3. Mewsscore 6,.Breath sounds with upper airway congestion.

## 2018-12-11 NOTE — BRIEF OP NOTE
BRIEF OPERATIVE NOTE    Date of Procedure: 12/11/2018   Preoperative Diagnosis: free air s/p robotic sigmoid colectomy  Postoperative Diagnosis: anastomotic leak of colorectal anastomosis  Procedure(s):  LAPAROTOMY EXPLORATORY/ PRIMARY REPAIR WITH DIVERTING LOOP ILEOSTOMY  Surgeon(s) and Role:     Iliana Diaz MD - Primary         Surgical Assistant: none    Surgical Staff:  Circ-1: Sima Martins  Scrub Tech-1: Roland Stafford  Surg Asst-1: Sarai OCONNOR  Event Time In Time Out   Incision Start 12/11/2018 0334    Incision Close       Anesthesia: General   Estimated Blood Loss: 20 ml  Specimens:   ID Type Source Tests Collected by Time Destination   1 : Abdominal Fluid Body Fluid Abdominal Fluid CULTURE, ANAEROBIC, CULTURE, BODY FLUID, Jaime Davila MD 12/11/2018 9894 Microbiology      Findings: small leak at anterior colorectal anastomosis   Complications: none  Implants: * No implants in log *    [id number]

## 2018-12-11 NOTE — DIABETES MGMT
Glycemic Control: Pt discussed in interdisciplinary rounds. Blood glucose trending up, likely related to TPN. Pt has order for correctional Lispro coverage. Will evaluate need to add Regular insulin to TPN. Continue inpatient monitoring and intervention.      Delma aJmes RD, CDE

## 2018-12-11 NOTE — PROGRESS NOTES
Problem: Pressure Injury - Risk of  Goal: *Prevention of pressure injury  Document Edin Scale and appropriate interventions in the flowsheet. Outcome: Progressing Towards Goal  Pressure Injury Interventions:  Sensory Interventions: Assess changes in LOC, Discuss PT/OT consult with provider, Keep linens dry and wrinkle-free, Maintain/enhance activity level, Minimize linen layers, Turn and reposition approx. every two hours (pillows and wedges if needed)    Moisture Interventions: Absorbent underpads, Apply protective barrier, creams and emollients, Internal/External urinary devices, Minimize layers    Activity Interventions: Pressure redistribution bed/mattress(bed type)    Mobility Interventions: Pressure redistribution bed/mattress (bed type), HOB 30 degrees or less, Turn and reposition approx.  every two hours(pillow and wedges)    Nutrition Interventions: Document food/fluid/supplement intake, Discuss nutritional consult with provider    Friction and Shear Interventions: HOB 30 degrees or less, Lift team/patient mobility team, Minimize layers, Foam dressings/transparent film/skin sealants

## 2018-12-11 NOTE — PROGRESS NOTES
12/11/18 1121   Airway - Continuous Aspiration of Subglottic Secretions (SAIDA) Tube 12/11/18 Oral   Placement Date/Time: 12/11/18 0311   Number of Attempts: 1  Inserted By: Vernal Lieu CRNA  Present on Admission/Arrival: No  Location: Oral  Placement Verified: Auscultation;BBS  Airway Types: Endotracheal, cuffed  Airway Tube Size: 7.5 mm   Insertion Depth (cm) 25 cm  (advanced per x-ray findings. )

## 2018-12-11 NOTE — ANESTHESIA POSTPROCEDURE EVALUATION
Procedure(s): LAPAROTOMY EXPLORATORY/ PRIMARY REPAIR WITH REVERTING ILEOSTOMY. Anesthesia Post Evaluation Multimodal analgesia: multimodal analgesia used between 6 hours prior to anesthesia start to PACU discharge Patient location during evaluation: ICU Patient participation: complete - patient participated Level of consciousness: responsive to verbal stimuli Pain management: adequate Airway patency: patent Anesthetic complications: no 
Cardiovascular status: stable Respiratory status: acceptable and ventilator Hydration status: acceptable Post anesthesia nausea and vomiting:  controlled Visit Vitals /88 Pulse (!) 116 Temp 37.3 °C (99.2 °F) Resp 16 Ht 5' 10\" (1.778 m) Wt 78.9 kg (174 lb) SpO2 100% BMI 24.97 kg/m²

## 2018-12-11 NOTE — PROGRESS NOTES
Vent note:  No changes in settings. No SBT at this time, pt recently intubated, on sedation. 12/11/18 0735   Patient Observations   Pulse (Heart Rate) (!) 122   Resp Rate 18   O2 Sat (%) 97 %   Airway - Continuous Aspiration of Subglottic Secretions (SAIDA) Tube 12/11/18 Oral   Placement Date/Time: 12/11/18 1667   Number of Attempts: 1  Inserted By: Yolie Stratton CRNA  Present on Admission/Arrival: No  Location: Oral  Placement Verified: Auscultation;BBS  Airway Types: Endotracheal, cuffed  Airway Tube Size: 7.5 mm   Insertion Depth (cm) 22 cm   Line Armani Lips   Side Secured Left   Cuff Pressure 32 cmH20   Site Assessment Clean, dry, & intact   Suction on Yes   Amt Secretions Aspirated (mL) 2 mL   Respiratory   Respiratory (WDL) X   Patient on Vent Yes - If patient is on vent, add Doc Flowsheet Ventilator (). Respiratory Pattern Regular   Chest/Tracheal Assessment Chest expansion, symmetrical   Breath Sounds Bilateral Coarse; Lower; Rhonchi   Cough Cough with suction   Airway Clearance   Suction ET Tube   Sputum Amount Moderate   Sputum Color/Odor White   Sputum Consistency Thick   Skin Integumentary   Skin Integumentary (WDL) WDL   Ventilator Initiate/Discontinue   Bio-Med ID # [id number]   Vent Settings   FIO2 (%) 50 %   CMV Rate Set 16   Back-Up Rate 16   Vt Set (ml) 500 ml   PEEP/VENT (cm H2O) 5 cm H20   Insp Time (sec) 1 sec   Insp Rise Time % 50 %   Flow Trigger 3   Ventilator Measurements   Resp Rate Observed 18   Vt Exhaled (Machine Breath) (ml) 509 ml   Ve Observed (l/min) 9.2 l/min   PIP Observed (cm H2O) 22 cm H2O   Plateau Pressure (cm H2O) 19 cm H2O   MAP (cm H2O) 10   I:E Ratio Actual 1:2.3   Auto PEEP Observed (cm H2O) 0 cm H2O   Static Compliance (ml/cm H20) 36 ml/cm H20   Safety & Alarms   Circuit Temperature 98.8 °F (37.1 °C)   Backup Mode Checked/Apnea Yes   Pressure Max 40 cm H2O   Pressure Min 11 cm H2O   Ve Min 2   Ve Max 20   Vt Min 200 ml   Vt Max 800 ml   RR Max 40   Ambu Bag Yes   Ambu Mask Yes Weaning Parameters   Spontaneous Breathing Trial Complete No (Comments)  (recent intubation)   Age Specific Ventilator Associated Pneumonia Bundle   Patient Age Group Adult   Adult Ventilator Associated Pneumonia Bundle   Elevation of Head to 30-45 Degrees (Unless Contraindicated) Contraindicated   Assessment of Readiness to Extubate Yes   Mechanical VTE Orders Yes   Vent Method/Mode   Ventilation Method Conventional   Ventilator Mode Assist control;VC+   Pulmonary Toilet   Pulmonary Toilet Suction

## 2018-12-11 NOTE — PROGRESS NOTES
Senior Resident Rapid Response Note  [facility]    Patient: Mehul Wang 80 y.o. male  [id number]  1950      Admit Date: 12/4/2018   Chief Complaint: fever w/ tachycardia    RAPID RESPONSE     Rapid Response Called for fever of 101.3 and tachycardia to 120. He is POD7 s/p sigmoid colectomy for cancer. He has an NG tube in place for a post-op ileus. When we arrived, the patient was laying comfortably in bed. He has not chest pain or shortness of breath. Medications Reviewed    OBJECTIVE     Patient Vitals for the past 24 hrs:   Temp Pulse Resp BP SpO2   12/11/18 0030 99.4 °F (37.4 °C) (!) 120 24 132/79 100 %   12/11/18 0014 (!) 101.3 °F (38.5 °C) (!) 120 24 133/80 97 %   12/10/18 1922 99.4 °F (37.4 °C) 88 18 116/75 90 %   12/10/18 1701 98.9 °F (37.2 °C) 85 18 -- 93 %   12/10/18 1241 98.8 °F (37.1 °C) 82 17 135/68 94 %   12/10/18 0630 98.7 °F (37.1 °C) 97 12 117/82 91 %   12/10/18 0200 98.2 °F (36.8 °C) 100 18 103/67 90 %         Intake/Output Summary (Last 24 hours) at 12/11/2018 0058  Last data filed at 12/11/2018 0019  Gross per 24 hour   Intake 0 ml   Output 1650 ml   Net -1650 ml       PHYSICAL:  General:  Alert and Responsive and in no acute distress. HEENT: Conjunctiva pink, sclera anicteric. PERRL. EOMI. Pharynx moist, nonerythematous. Moist mucous membranes. No cervical, supraclavicular, occipital or submandibular lymphadenopathy. No other gross abnormalities present. CV:  RRR, no murmurs, rubs, or gallops. No visible pulsations or thrills. No carotid bruits. RESP:  Unlabored breathing. Bilateral rhonchi present. Equal expansion bilaterally. ABD:  Distended, non-tender. Normoactive bowel sounds. Neuro:  5/5 strength bilateral upper extremities. A+Ox3. Follows commands and answers questions appropriately. Ext:  No edema. 2+ radial pulses bilaterally.     ASSESSMENT, PLAN & DISPOSITION   Rapid Response called for Mehul Wang who is a 80 y.o. year old male admitted for sigmoid colectomy for cancer. Patient looks well on exam, is mentating appropriately. Attending, Dr. Marily Easton, called after the rapid began. He requested a STAT CXR was ordered- large amount of free air was in the abdomen. , under both diaphragms. Blood cultures and urine cultures ordered. Renally dosed Zosyn was started. Ordered IS. Rapid team called Dr. Marily Easton back to relay wet read to him. He will be in to evaluate him. Patient Active Problem List   Diagnosis Code    Debility R53.81    Leukocytosis D72.829    Intractable low back pain M54.5    Colon polyps K63.5    Hemorrhoids K64.9    [facility] C18.9           Disposition: Stable    Gabby Rasheed D.O.    [facility], PGY-1

## 2018-12-11 NOTE — PROGRESS NOTES
Patient appears more distended and c/o back pain. Vitals sign unstable. Rapid response called and after assessment Labs, xray and and urine collected. Antibiotic to be administered. NGT irrigated and dark green contents draining. Continue to monitor. 2:24 AM  Patient typed & screen with lab work obtained Preparing for surgery. Patient aware and wife notified. Continue to monitor.

## 2018-12-11 NOTE — PROGRESS NOTES
attended the interdisciplinary rounds for Ye Velasquez, who is a 80 y.o.,male. Patients Primary Language is: [place]. According to the patients EMR Jewish Affiliation is: Todd Britton.     The reason the Patient came to the hospital is:   Patient Active Problem List    Diagnosis Date Noted    Colon cancer [facility]) 12/04/2018    Colon polyps 11/12/2018    Hemorrhoids 11/12/2018    Debility 11/05/2018    Leukocytosis 11/05/2018    Intractable low back pain 11/05/2018      Not able to assess the patient due to medical condition. No family at bedside. Plan:  Chaplains will continue to follow and will provide pastoral care on an as needed/requested basis.  recommends bedside caregivers page  on duty if patient shows signs of acute spiritual or emotional distress.     [address]  Board Certified [address] [place] [address]   [phone]

## 2018-12-11 NOTE — PROGRESS NOTES
RENAL DAILY PROGRESS NOTE            75y M with PMH HTN, DM, s/p robotic sigmoid colectomy , post op ileus, seen for JOSE ANTONIO  Subjective:       Complaint:   Overnight events noted  RRT was called last night  Went to urgent OR last night for perforation   S/p repair of anastomotic leak  Remain intubated  Low urine output    IMPRESSION:   · JOSE ANTONIO, suspect ATN; multiple risk factor including; Hypotension, was on nsaids and ARB, very higher NG tube output,  Abdominal compartment syndrome. · Hypotnesion, better  · S/p robot assisted sigmoidectomy ,   · Post op ileus  · S/p reapir of anastomotic leak  · DM  · Renal stone, non obstructive,    PLAN:    His renal function has marked improvement after surgery suggestive of compartment syndrome. Continue IV hydration with TPN  Adjust K in TPN  Check bladder pressure per shift.       Discussed with ICU team.       Current Facility-Administered Medications   Medication Dose Route Frequency    piperacillin-tazobactam (ZOSYN) 2.25 g in 0.9% sodium chloride (MBP/ADV) 50 mL MBP  2.25 g IntraVENous Q6H    chlorhexidine (PERIDEX) 0.12 % mouthwash 10 mL  10 mL Oral Q12H    fentaNYL citrate (PF) 50 mcg/mL injection        dexmedeTOMidine (PRECEDEX) 400 mcg in 0.9% sodium chloride 100 mL infusion  0.2-0.7 mcg/kg/hr IntraVENous TITRATE    fentaNYL citrate (PF) injection 50 mcg  50 mcg IntraVENous ONCE    electrolyte-r (NORMOSOL R) bolus infusion 500 mL  500 mL IntraVENous ONCE    famotidine (PF) (PEPCID) 20 mg in sodium chloride 0.9% 10 mL injection  20 mg IntraVENous DAILY    sodium chloride (NS) flush 10 mL  10 mL InterCATHeter PRN    TPN ADULT - CENTRAL   IntraVENous CONTINUOUS    menthol 5 MG (NICE) lozenge  1 Lozenge Mucous Membrane Q6H PRN    diphenhydrAMINE (BENADRYL) injection 25 mg  25 mg IntraVENous Q6H PRN    morphine injection 2 mg  2 mg IntraVENous Q3H PRN    ondansetron (ZOFRAN) injection 4 mg  4 mg IntraVENous Q6H PRN    latanoprost (XALATAN) 0.005 % ophthalmic solution 1 Drop  1 Drop Both Eyes QPM    insulin lispro (HUMALOG) injection   SubCUTAneous Q6H    glucose chewable tablet 16 g  4 Tab Oral PRN    glucagon (GLUCAGEN) injection 1 mg  1 mg IntraMUSCular PRN    dextrose (D50W) injection syrg 12.5-25 g  25-50 mL IntraVENous PRN    heparin (porcine) injection 5,000 Units  5,000 Units SubCUTAneous Q8H       Review of Symptoms: comprehensive ROS negative except above.    Objective:     Patient Vitals for the past 24 hrs:   Temp Pulse Resp BP SpO2   12/11/18 0930 -- (!) 125 18 -- 99 %   12/11/18 0915 -- (!) 124 18 -- 99 %   12/11/18 0900 -- (!) 125 18 101/61 100 %   12/11/18 0845 -- (!) 137 24 -- 99 %   12/11/18 0830 -- (!) 125 17 (!) 87/51 99 %   12/11/18 0815 -- (!) 125 17 -- 100 %   12/11/18 0811 -- -- -- 96/63 --   12/11/18 0807 -- -- -- (!) 67/45 --   12/11/18 0800 98.2 °F (36.8 °C) (!) 121 16 (!) 66/35 91 %   12/11/18 0745 -- (!) 119 18 (!) 65/46 97 %   12/11/18 0735 -- (!) 122 18 -- 97 %   12/11/18 0730 -- (!) 123 16 (!) 86/57 (!) 87 %   12/11/18 0715 -- (!) 125 16 119/70 100 %   12/11/18 0700 -- (!) 123 18 120/73 96 %   12/11/18 0645 -- (!) 121 18 121/78 98 %   12/11/18 0638 -- (!) 133 20 181/82 96 %   12/11/18 0628 99.2 °F (37.3 °C) (!) 116 16 150/87 100 %   12/11/18 0626 -- (!) 115 17 -- 100 %   12/11/18 0623 99.2 °F (37.3 °C) (!) 122 18 151/88 100 %   12/11/18 0234 -- (!) 114 24 144/75 98 %   12/11/18 0030 99.4 °F (37.4 °C) (!) 120 24 132/79 100 %   12/11/18 0014 (!) 101.3 °F (38.5 °C) (!) 120 24 133/80 97 %   12/10/18 1922 99.4 °F (37.4 °C) 88 18 116/75 90 %   12/10/18 1701 98.9 °F (37.2 °C) 85 18 -- 93 %   12/10/18 1241 98.8 °F (37.1 °C) 82 17 135/68 94 %        Weight change:      12/09 1901 - 12/11 0700  In: 2493.3 [I.V.:2463.3]  Out: 2330 [Urine:850]    Intake/Output Summary (Last 24 hours) at 12/11/2018 1009  Last data filed at 12/11/2018 0700  Gross per 24 hour   Intake 2493.33 ml   Output 1205 ml   Net 1288.33 ml     Physical Exam: General: intubated,   HEENT sclera anicteric, supple neck, no thyromegaly  CVS: S1S2 heard,  no rub  RS: + air entry b/l,   Abd:distended abdomen, has colostomy , ANGELI drain  Neuro: intubated, awake, follows simple commend  Extrm: +edema, no cyanosis, clubbing   Skin: no visible  Rash  Musculoskeletal: No gross joints or bone deformities         Data Review:     LABS:   Hematology:   Recent Labs     12/11/18  0105 12/10/18  0134 12/09/18  0459   WBC 7.1 5.3 6.3   HGB 9.7* 9.5* 11.6*   HCT 28.3* 27.9* 34.1*     Chemistry:   Recent Labs     12/11/18  0105 12/10/18  0134 12/09/18  0459   BUN 37* 33* 22*   CREA 1.73* 3.34* 2.17*   CA 8.8 8.3* 9.7   K 3.8 4.1 4.7    137 135*    102 103   CO2 26 27 22   PHOS 3.3 4.0 5.0*   * 160* 130*            Procedures/imaging: see electronic medical records for all procedures, Xrays and details which were not copied into this note but were reviewed prior to creation of Plan          Assessment & Plan:     As above         Lm Vale MD  12/11/2018  10:09 AM

## 2018-12-11 NOTE — ROUTINE PROCESS
Bedside and Verbal shift change report given to DELL Knight (oncoming nurse) by Jase Singh (offgoing nurse). Report included the following information SBAR, Kardex, MAR and Recent Results.     SITUATION:    Code Status: Full Code   Reason for Admission: Malignant neoplasm of sigmoid colon ([facility] [C18.7]   Colon cancer ([facility] [C18.9]    [facility] day: 7   Problem List:       Hospital Problems  Date Reviewed: 12/4/2018          Codes Class Noted POA    Colon cancer [facility]) ICD-10-CM: C18.9  ICD-9-CM: 153.9  12/4/2018 Unknown              BACKGROUND:    Past Medical History:   Past Medical History:   Diagnosis Date    Cancer ([facility] 2008     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     Chest pain, unspecified     Diabetes ([facility]     Essential hypertension, benign     GERD (gastroesophageal reflux disease)     Nonspecific abnormal electrocardiogram (ECG) (EKG)          Patient taking anticoagulants yes     ASSESSMENT:    Changes in Assessment Throughout Shift: started pt on Levo, gave 2 Liters of Normosol and 1 Liter of albumin     Patient has Central Line: yes Reasons if yes: TPN   Patient has Mckeon Cath: yes Reasons if yes: monitoring abdominal pressure      Last Vitals:     Vitals:    12/11/18 1745 12/11/18 1800 12/11/18 1815 12/11/18 1830   BP:  134/65     Pulse: 87 93 95 88   Resp: 18 21 20 19   Temp:       SpO2: 100% 100% 99% 100%   Weight:       Height:            IV and DRAINS (will only show if present)   Airway - Continuous Aspiration of Subglottic Secretions (SAIDA) Tube 12/11/18 Oral-Site Assessment: Clean, dry, & intact  Nasogastric Tube 12/11/18-Site Assessment: Clean, dry, & intact  Peripheral IV 12/11/18 Right;Mid Arm-Site Assessment: Clean, dry, & intact  Peripheral IV 12/11/18 Left Forearm-Site Assessment: Clean, dry, & intact  [REMOVED] Peripheral IV 12/04/18 Right Wrist-Site Assessment: Clean, dry, & intact  [REMOVED] Peripheral IV 12/04/18 Left Hand-Site Assessment: Clean, dry, & intact  [REMOVED] Orogastric Tube 12/04/18-Site Assessment: Clean, dry, & intact  PICC Double Lumen 43/71/77 Right;Basilic-Site Assessment: Clean, dry, & intact  Matthew-Garcia Drain 12/11/18 Abdomen-Site Assessment: Clean, dry, & intact  Airway - Endotracheal Tube 12/11/18 Oral-Site Assessment: Clean, dry, & intact     WOUND (if present)   Wound Type:  incision   Dressing present Dressing Present : Yes   Wound Concerns/Notes:  none     PAIN    Pain Assessment    Pain Intensity 1: 0 (12/11/18 1600)    Pain Location 1: Back    Pain Intervention(s) 1: Medication (see MAR)    Patient Stated Pain Goal: Unable to verbalize/indicate pain  o Interventions for Pain:  none  o Intervention effective: n/a  o Time of last intervention: n/a   o Reassessment Completed: yes      Last 3 Weights:  Last 3 Recorded Weights in this Encounter    12/04/18 0630 12/10/18 1309 12/11/18 0700   Weight: 76.4 kg (168 lb 6.4 oz) 78.9 kg (174 lb) 78.7 kg (173 lb 8 oz)     Weight change:      INTAKE/OUPUT    Current Shift: 12/11 0701 - 12/11 1900  In: 3347.7 [I.V.:3302.7]  Out: 850 [Urine:305; Drains:80]    Last three shifts: 12/09 1901 - 12/11 0700  In: 2493.3 [I.V.:2463.3]  Out: 2330 [Urine:850]     LAB RESULTS     Recent Labs     12/11/18  1500 12/11/18  0105 12/10/18  0134   WBC 8.4 7.1 5.3   HGB 7.4* 9.7* 9.5*   HCT 21.7* 28.3* 27.9*    248 261        Recent Labs     12/11/18  1500 12/11/18  0710 12/11/18  0205 12/11/18  0105 12/10/18  0134     --   --  138 137   K 3.9  --   --  3.8 4.1   *  --   --  199* 160*   BUN 37*  --   --  37* 33*   CREA 1.75*  --   --  1.73* 3.34*   CA 7.3*  --   --  8.8 8.3*   MG 2.3  --   --  2.2 1.9   INR  --  1.2 1.2  --   --        RECOMMENDATIONS AND DISCHARGE PLANNING     1. Pending tests/procedures/ Plan of Care or Other Needs: continue to monitor and wean presser. 2. Discharge plan for patient and Needs/Barriers: TBD    3.  Estimated Discharge Date: TBD Posted on Whiteboard in Patients Room: no      4. The patient's care plan was reviewed with the oncoming nurse. \"HEALS\" SAFETY CHECK      Fall Risk    Total Score: 4    Safety Measures: Safety Measures: Bed/Chair-Wheels locked, Bed in low position, Call light within reach    A safety check occurred in the patient's room between off going nurse and oncoming nurse listed above. The safety check included the below items  Area Items   H  High Alert Medications - Verify all high alert medication drips (heparin, PCA, etc.)   E  Equipment - Suction is set up for ALL patients (with tatyana)  - Red plugs utilized for all equipment (IV pumps, etc.)  - WOWs wiped down at end of shift.  - Room stocked with oxygen, suction, and other unit-specific supplies   A  Alarms - Bed alarm is set for fall risk patients  - Ensure chair alarm is in place and activated if patient is up in a chair   L  Lines - Check IV for any infiltration  - Mckeon bag is empty if patient has a Mckeon   - Tubing and IV bags are labeled   S  Safety   - Room is clean, patient is clean, and equipment is clean. - Hallways are clear from equipment besides carts. - Fall bracelet on for fall risk patients  - Ensure room is clear and free of clutter  - Suction is set up for ALL patients (with tatyana)  - Hallways are clear from equipment besides carts.    - Isolation precautions followed, supplies available outside room, sign posted     Ady Rivera

## 2018-12-11 NOTE — PROGRESS NOTES
NUTRITION consult    Nutrition Consult: TPN: RD to Manage     RECOMMENDATIONS / PLAN:     - Plan to provide parenteral nutrition support, monitor labs and adjust electrolytes daily. Check CMP. - Continue RD inpatient monitoring and evaluation. Please Note:   Parenteral nutrition support to be provided using custom product, allowing for modification of electrolyte and macronutrient content day to day. Lipids included in PN on MWF. Macronutrient Goal: 125 gm amino acids, 380 gm dextrose, 250 mL lipids (2006 kcal weekly average). PARENTERAL NUTRITION ORDER:     Electrolyte Adjustments: K increased, Phos added     Day 3 PN to provide: 200 mEq Na, 27 mEq K, 10 mEq Ca, 15 mEq Mg, 5 mmol Phos, 1352 kcal, 100 gm amino acids, 280 gm dextrose, 10 mL MVI, 2.5 mL trace elements    PN Rate: 100 mL/hr   Glucose Infusion Rate: 2.47 mg/kg/min    Osmolarity: 1210 mOsm   Parenteral Nutrition Access Device: PICC placed 12/10/18  Indication for PN:  Postop ileus   Refeeding Risk:      []  Yes  [x] No     NUTRITION DIAGNOSIS & INTERVENTIONS:     [x] Parenteral nutrition: continue, modify contents   [x] Collaboration and referral of nutrition care: MD to review PN order and note daily; will not call to verify content and changes, per MD request. Discussed rate with Nephrology and Intensivist.    Nutrition Diagnosis:  Inadequate oral intake related to altered GI function and diet intolerance as evidenced by abdominal distention and discomfort and inability to consume po diet. ASSESSMENT:     12/11: RRT for fever with tachycardiac overnight then taken for emergent surgery, s/p ex lap with primary repair of anastomotic leak, omental patch and diverting loop ileostomy (12/11) and remains intubated postop.   12/10: Nephrology consulted for JOSE ANTONIO. NGT to suction with 3 L out in the past 24 hours. 12/9: S/p sigmoid colectomy on 12/4. Was tolerating a diet, then had abdominal pain and distention, NGT placed.   KUB indicative of ileus per MD note. + small BM and flatus.      PN infusion adequate to meet patients estimated nutritional needs:   [] Yes     [x] No   [] Unable to determine at this time    Diet: DIET NPO  TPN ADULT - CENTRAL  TPN ADULT - CENTRAL      Food Allergies: NKFA  Current Appetite:   [] Good     [] Fair     [] Poor     [x] Other: NPO  Appetite/meal intake prior to admission:   [x] Good     [] Fair     [] Poor     [] Other:  Feeding Limitations:  [] Swallowing difficulty    [] Chewing difficulty    [] Other:  Current Meal Intake:   Patient Vitals for the past 100 hrs:   % Diet Eaten   12/10/18 1503 0 %   12/10/18 1027 0 %   12/09/18 1825 0 %   12/09/18 1245 0 %   12/09/18 0944 0 %   12/08/18 1711 75 %   12/08/18 1235 100 %   12/08/18 0908 100 %     NGT to suction, output x last 24 hours: 1400 mL  BM:  12/9, loose   Skin Integrity: surgical incision to abdomen; ANGELI drain  Edema:  [x] No     [] Yes   Pertinent Medications: Reviewed: SSI, Normosol at 100 mL/hr, fluid bolus and albumin given     Labs: BMP, MG, Phos ordered daily; CMP, Triglyceride, Prealbumin ordered initially and weekly  Recent Labs     12/11/18  0105 12/10/18  0134 12/09/18  0459    137 135*   K 3.8 4.1 4.7    102 103   CO2 26 27 22   * 160* 130*   BUN 37* 33* 22*   CREA 1.73* 3.34* 2.17*   CA 8.8 8.3* 9.7   MG 2.2 1.9 1.8   PHOS 3.3 4.0 5.0*   Prealbumin: ordered   Triglyceride: 129 mg/dL (12/10/18)   Recent Labs     12/11/18  0105 12/10/18  0134 12/09/18  0459   WBC 7.1 5.3 6.3   HGB 9.7* 9.5* 11.6*   HCT 28.3* 27.9* 34.1*    261 223         Intake/Output Summary (Last 24 hours) at 12/11/2018 1207  Last data filed at 12/11/2018 1100  Gross per 24 hour   Intake 5024.57 ml   Output 1260 ml   Net 3764.57 ml       Anthropometrics:   Ht Readings from Last 1 Encounters:   12/04/18 5' 10\" (1.778 m)       Last 3 Recorded Weights in this Encounter    12/04/18 0630 12/10/18 1309 12/11/18 0700   Weight: 76.4 kg (168 lb 6.4 oz) 78.9 kg (174 lb) 78.7 kg (173 lb 8 oz)        Body mass index is 24.89 kg/m². Weight History: Pt reports weight loss of 8 lbs PTA, unknown time frame. Weight Metrics 12/11/2018 12/4/2018 11/19/2018 11/12/2018 2/12/2016 2/9/2016 2/9/2016   Weight 173 lb 8 oz - 169 lb 170 lb 9.6 oz - 178 lb -   BMI - 24.89 kg/m2 24.25 kg/m2 24.48 kg/m2 25.54 kg/m2 - 25.54 kg/m2          Admitting Diagnosis: Malignant neoplasm of sigmoid colon (HCC) [C18.7]  Colon cancer (HCC) [C18.9]  Pertinent PMHx: colon polyps    Education Needs:        [x] None identified  [] Identified - Not appropriate at this time  []  Identified and addressed - refer to education log  Learning Limitations:   [x] None identified  [] Identified    Cultural, Gnosticism & ethnic food preferences:  [x] None identified    [] Identified and addressed     ESTIMATED NUTRITION NEEDS:     Calories: 3598-1183 kcal ([id number]) based on   [x] Actual BW 77 kg    [] IBW:   Protein:  gm (1.2-2 gm/kg) based on   [x] Actual BW      [] IBW:   Fluid: 1 mL/kcal     MONITORING & EVALUATION:     Nutrition Goals:   1. Patient will receive nutrition via PN until they are able to tolerate adequate po intake/enteral nutrition. Outcome:  [x] Met    []  Not Met    [] New Goal  2. Patient will tolerate advancement of macronutrients towards meeting estimated nutrition needs within the next 7 days.  Outcome:  [] Met    [x]  Not Met    [] New Goal    Monitoring:  [x] Parenteral nutrition intake and administration  [x] Biochemical data, medical tests, and procedures   [x] Fluid intake   [x] Nutrition-focused physical findings   [x] Treatment/therapy   [] Food and beverage intake   [] Diet order      [] Weight        Previous Recommendations (for follow-up assessments only):     [x]   Implemented       []   Not Implemented (RD to address)      [] No Longer Appropriate     [] No Recommendation Made        Discharge Planning: Nutrition recommendations pending patient's ability to tolerate po intake/enteral nutrition.    [x]  Participated in care planning, discharge planning, & interdisciplinary rounds as appropriate      Lizzie Lauren, [address], [address] [place]   Pager: [phone]

## 2018-12-11 NOTE — CONSULTS
[facility]  Pulmonary, Critical Care, and Sleep Medicine    Name: Morena Robertson MRN: [id number]   : 1950 Hospital: [facility]   Date: 2018        Pulmonary Critical Care Initial Patient Consult                                              Reason for CC Consult: Post op management    Subjective/History:     Morena Robertson has been seen and evaluated at the request of Dr. Hailee Jones for post op and vent management. Patient is a 80 y.o. male who was admitted s/p robotic sigmoid colectomy for cancer, on 18. Patient developed mild post-operative ileus. Patient developed JOSE ANTONIO Renal consulted on 12/10/18. Patient also had some abdominal distention and NGT was placed for decompression. Last night Patient developed a temperature above 102 F and became tachycardic in the 120's with elevated MEWs score and a rapid response was called. Chest xray ordered which showed large amount of free air in abdomen under both diaphragms and patient was taken emergently to the OR for an anastomotic leak. Exp Lap with primary repair of anastomotic leak, omental patch, and diverting loop ileostomy was done and patient was left intubated and taken to ICU for continued care and ventilator management. EBL 20cc. The patient is critically ill and can not provide additional history due to Ventilated. [x]The patient is unable to give any meaningful history or review of systems because the patient is:  [x]Intubated []Sedated   []Unresponsive []Ventilated     [x]The patient is critically ill on      [x]Mechanical ventilation []Pressors   []BiPAP []               Review of Systems:  Review of systems not obtained due to patient factors.     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     Chest pain, unspecified     Diabetes ([facility] [address]     Essential hypertension, benign     GERD (gastroesophageal reflux disease)     Nonspecific abnormal electrocardiogram (ECG) (EKG)         Past Surgical History:  Past Surgical History:   Procedure Laterality Date    COLONOSCOPY N/A 11/12/2018    COLONOSCOPY with Polypectomies, Bx's, Injection, Tattooing & Clip Placement x 3 performed by Nadiya Butler MD at [facility]  11/12/2018         Silvio Yu  11/12/2018         ENDOSCOPIC MUCOSAL RESECT  11/12/2018         HX COLONOSCOPY      HX HEENT  2008    Throat Ca - 1 vocal chord removed     HX HEENT      eye surgery muscles    LAP,SURG,COLECTOMY, PARTIAL, W/ANAST N/A 12/04/2018    Dr. Marty Hicks        Medications:  Prior to Admission medications    Medication Sig Start Date End Date Taking? Authorizing Provider   naloxegol (MOVANTIK) 25 mg tab tablet Take  by mouth Daily (before breakfast). Yes Provider, Historical   multivitamin, tx-iron-ca-min (THERA-M W/ IRON) 9 mg iron-400 mcg tab tablet Take 1 Tab by mouth daily. Yes Provider, Historical   latanoprost (XALATAN) 0.005 % ophthalmic solution Administer 1 Drop to both eyes nightly. Yes Provider, Historical   beclomethasone dipropionate (QNASL) 80 mcg/actuation HFAA 80 mcg by Nasal route daily. Directions on at home label are as follows: Use 2 sprays in each Nostril Daily   Yes Mike Quiñonez MD   metFORMIN (GLUCOPHAGE) 1,000 mg tablet Take 1,000 mg by mouth two (2) times daily (with meals). do not take morning of surgery (12/4/180. Yes Provider, Historical   losartan (COZAAR) 50 mg tablet Take 50 mg by mouth daily. Yes Provider, Historical   nebivolol (BYSTOLIC) 10 mg tablet Take 10 mg by mouth daily. Yes Provider, Historical   omeprazole (PRILOSEC) 20 mg capsule Take 20 mg by mouth daily. Yes Provider, Historical   Dexlansoprazole 60 mg CpDB Take 1 Cap by mouth daily. Yes Provider, Historical   calcium-cholecalciferol, d3, 600-125 mg-unit tab Take 1 Tab by mouth daily.    Yes Provider, Historical   omega-3 fatty acids-vitamin e 1,000 mg cap Take 1 Cap by mouth daily. Yes Provider, Historical   IRON, FERROUS SULFATE, PO Take  by mouth. 3 times a week   Yes Provider, Historical   oxyCODONE-acetaminophen (PERCOCET 10)  mg per tablet Take  by mouth. Provider, Historical   meloxicam (MOBIC) 15 mg tablet Take 15 mg by mouth daily. Provider, Historical   oxyCODONE-acetaminophen (PERCOCET) 5-325 mg per tablet Take 1 Tab by mouth every six (6) hours as needed. Max Daily Amount: 4 Tabs. Patient taking differently: Take 1 Tab by mouth every six (6) hours as needed for Pain. 11/13/18   Nicol More MD   cyclobenzaprine (FLEXERIL) 5 mg tablet Take 1 Tab by mouth three (3) times daily as needed for Muscle Spasm(s). 11/13/18   Nicol More MD   psyllium husk (METAMUCIL) 0.4 gram cap Take 1 Cap by mouth daily. 11/13/18   Nicol More MD   Aspirin, Buffered 81 mg tab Take 1 Tab by mouth daily. stop 7 days prior to surgery (12/4/18).     Provider, Historical       Current Facility-Administered Medications   Medication Dose Route Frequency    piperacillin-tazobactam (ZOSYN) 2.25 g in 0.9% sodium chloride (MBP/ADV) 50 mL MBP  2.25 g IntraVENous Q6H    chlorhexidine (PERIDEX) 0.12 % mouthwash 10 mL  10 mL Oral Q12H    fentaNYL citrate (PF) 50 mcg/mL injection        dexmedeTOMidine (PRECEDEX) 400 mcg in 0.9% sodium chloride 100 mL infusion  0.2-0.7 mcg/kg/hr IntraVENous TITRATE    fentaNYL citrate (PF) injection 50 mcg  50 mcg IntraVENous ONCE    albumin human 5% (BUMINATE) solution 25 g  25 g IntraVENous ONCE    famotidine (PF) (PEPCID) 20 mg in sodium chloride 0.9% 10 mL injection  20 mg IntraVENous DAILY    TPN ADULT - CENTRAL   IntraVENous CONTINUOUS    latanoprost (XALATAN) 0.005 % ophthalmic solution 1 Drop  1 Drop Both Eyes QPM    insulin lispro (HUMALOG) injection   SubCUTAneous Q6H    heparin (porcine) injection 5,000 Units  5,000 Units SubCUTAneous Q8H Allergy:  No Known Allergies     Social History:  Social History     Tobacco Use    Smoking status: Former Smoker     Last attempt to quit: 2008     Years since quitting: 10.0    Smokeless tobacco: Never Used   Substance Use Topics    Alcohol use: No    Drug use: No        Family History:  History reviewed. No pertinent family history.        Objective:   Vital Signs:    Visit Vitals  BP (!) 77/47   Pulse (!) 112   Temp 98.2 °F (36.8 °C)   Resp 18   Ht 5' 10\" (1.778 m)   Wt 78.7 kg (173 lb 8 oz)   SpO2 99%   BMI 24.89 kg/m²       O2 Device: Endotracheal tube, Heated, Humidifier, Ventilator   O2 Flow Rate (L/min): 2 l/min   Temp (24hrs), Av.3 °F (37.4 °C), Min:98.2 °F (36.8 °C), Max:101.3 °F (38.5 °C)       Patient Vitals for the past 8 hrs:   Temp Pulse Resp BP SpO2   18 1045 -- (!) 112 18 (!) 77/47 99 %   18 1030 -- (!) 114 17 (!) 67/51 99 %   18 1015 -- (!) 118 17 -- 99 %   18 1000 -- (!) 123 17 (!) 69/50 99 %   18 0945 -- (!) 123 16 -- 99 %   18 0930 -- (!) 125 18 -- 99 %   18 0915 -- (!) 124 18 -- 99 %   18 0900 -- (!) 125 18 101/61 100 %   18 0845 -- (!) 137 24 -- 99 %   18 0830 -- (!) 125 17 (!) 87/51 99 %   18 0815 -- (!) 125 17 -- 100 %   18 0811 -- -- -- 96/63 --   18 0807 -- -- -- (!) 67/45 --   18 0800 98.2 °F (36.8 °C) (!) 121 16 (!) 66/35 91 %   18 0745 -- (!) 119 18 (!) 65/46 97 %   18 0735 -- (!) 122 18 -- 97 %   18 0730 -- (!) 123 16 (!) 86/57 (!) 87 %   18 0715 -- (!) 125 16 119/70 100 %   18 0700 -- (!) 123 18 120/73 96 %   18 0645 -- (!) 121 18 121/78 98 %   18 0638 -- (!) 133 20 181/82 96 %   18 0628 99.2 °F (37.3 °C) (!) 116 16 150/87 100 %   18 0626 -- (!) 115 17 -- 100 %   18 0623 99.2 °F (37.3 °C) (!) 122 18 151/88 100 %     Intake/Output:   Last shift:      701 - 1900  In: 2531.2 [I.V.:2506.2]  Out: 155 [Urine:125; Drains:30]  Last 3 shifts: 12/09 1901 - 12/11 0700  In: 2493.3 [I.V.:2463.3]  Out: 2330 [Urine:850]    Intake/Output Summary (Last 24 hours) at 12/11/2018 1139  Last data filed at 12/11/2018 1100  Gross per 24 hour   Intake 5024.57 ml   Output 1260 ml   Net 3764.57 ml       Ventilator Settings:  Mode Rate Tidal Volume Pressure FiO2 PEEP   Assist control, VC+   500 ml    50 % 5 cm H20     Peak airway pressure: 22 cm H2O    Minute ventilation: 9.2 l/min      ARDS network Guidelines: Lung protective strategy, Pl pressure goals less than or equal to 30. Cassidy tube to suction at 20-30 cm Hg. Maintain [place] tube with 5-10ml air every 4 hours  Routine oral care every 4 hours  Daily sedation holiday and SBT evaluation starting at 6.00am.      Physical Exam:  General appearance - intubated, awake, anxious  Mental status - unable to assess  Eyes - pupils equal and reactive, extraocular eye movements intact, sclera anicteric  Mouth - mucous membranes moist, ETT in place  Neck - supple, no significant adenopathy  Chest - clear to auscultation, mild rales, clears with suction, symmetric air entry, mild tachypnea, no retractions or cyanosis  Heart - elevated rate 120's, regular rhythm, normal S1, S2, no murmurs, rubs, clicks or gallops  Abdomen - soft, tender, distended, non rigid, no masses or organomegaly  Neurological - Limited, follows commands appropriately, equal strength bilaterally in upper and lower extremities.   Musculoskeletal - no joint tenderness, deformity or swelling  Extremities - peripheral pulses normal, no pedal edema, no clubbing or cyanosis  Skin - normal coloration and turgor, no rashes, no suspicious skin lesions noted      Data:     Recent Results (from the past 24 hour(s))   GLUCOSE, POC    Collection Time: 12/10/18 11:56 AM   Result Value Ref Range    Glucose (POC) 176 (H) 70 - 110 mg/dL   GLUCOSE, POC    Collection Time: 12/10/18  7:01 PM   Result Value Ref Range    Glucose (POC) 158 (H) 70 - 110 mg/dL GLUCOSE, POC    Collection Time: 12/10/18 11:44 PM   Result Value Ref Range    Glucose (POC) 200 (H) 70 - 110 mg/dL   CULTURE, BLOOD    Collection Time: 12/11/18  1:01 AM   Result Value Ref Range    Special Requests: NO SPECIAL REQUESTS      Culture result: NO GROWTH AFTER 5 HOURS     METABOLIC PANEL, BASIC    Collection Time: 12/11/18  1:05 AM   Result Value Ref Range    Sodium 138 136 - 145 mmol/L    Potassium 3.8 3.5 - 5.5 mmol/L    Chloride 103 100 - 108 mmol/L    CO2 26 21 - 32 mmol/L    Anion gap 9 3.0 - 18 mmol/L    Glucose 199 (H) 74 - 99 mg/dL    BUN 37 (H) 7.0 - 18 MG/DL    Creatinine 1.73 (H) 0.6 - 1.3 MG/DL    BUN/Creatinine ratio 21 (H) 12 - 20      GFR est AA 48 (L) >60 ml/min/1.73m2    GFR est non-AA 40 (L) >60 ml/min/1.73m2    Calcium 8.8 8.5 - 10.1 MG/DL   CBC W/O DIFF    Collection Time: 12/11/18  1:05 AM   Result Value Ref Range    WBC 7.1 4.6 - 13.2 K/uL    RBC 3.26 (L) 4.70 - 5.50 M/uL    HGB 9.7 (L) 13.0 - 16.0 g/dL    HCT 28.3 (L) 36.0 - 48.0 %    MCV 86.8 74.0 - 97.0 FL    MCH 29.8 24.0 - 34.0 PG    MCHC 34.3 31.0 - 37.0 g/dL    RDW 13.5 11.6 - 14.5 %    PLATELET 046 688 - 087 K/uL    MPV 9.9 9.2 - 11.8 FL   MAGNESIUM    Collection Time: 12/11/18  1:05 AM   Result Value Ref Range    Magnesium 2.2 1.6 - 2.6 mg/dL   PHOSPHORUS    Collection Time: 12/11/18  1:05 AM   Result Value Ref Range    Phosphorus 3.3 2.5 - 4.9 MG/DL   CULTURE, BLOOD    Collection Time: 12/11/18  1:06 AM   Result Value Ref Range    Special Requests: NO SPECIAL REQUESTS      Culture result: NO GROWTH AFTER 5 HOURS     TYPE & SCREEN    Collection Time: 12/11/18  2:05 AM   Result Value Ref Range    Crossmatch Expiration 12/14/2018     ABO/Rh(D) Stuart Mckeon NEGATIVE     Antibody screen NEG    PROTHROMBIN TIME + INR    Collection Time: 12/11/18  2:05 AM   Result Value Ref Range    Prothrombin time 15.0 11.5 - 15.2 sec    INR 1.2 0.8 - 1.2     PTT    Collection Time: 12/11/18  2:05 AM   Result Value Ref Range    aPTT 35.4 23.0 - 36.4 [address]   CULTURE, BODY FLUID W GRAM STAIN    Collection Time: 12/11/18  3:39 AM   Result Value Ref Range    Special Requests: NO SPECIAL REQUESTS      GRAM STAIN MANY WBC'S      GRAM STAIN NO ORGANISMS SEEN      Culture result: PENDING    LACTIC ACID    Collection Time: 12/11/18  7:10 AM   Result Value Ref Range    Lactic acid 2.4 (HH) 0.4 - 2.0 MMOL/L   PROTHROMBIN TIME + INR    Collection Time: 12/11/18  7:10 AM   Result Value Ref Range    Prothrombin time 14.9 11.5 - 15.2 sec    INR 1.2 0.8 - 1.2     POC G3    Collection Time: 12/11/18  8:10 AM   Result Value Ref Range    Device: VENT      FIO2 (POC) 50 %    pH (POC) 7.349 (L) 7.35 - 7.45      pCO2 (POC) 42.5 35.0 - 45.0 MMHG    pO2 (POC) 66 (L) 80 - 100 MMHG    HCO3 (POC) 23.3 22 - 26 MMOL/L    sO2 (POC) 91 (L) 92 - 97 %    Base deficit (POC) 2 mmol/L    Mode ASSIST CONTROL      Tidal volume 500 ml    Set Rate 16 bpm    PEEP/CPAP (POC) 5 cmH2O    PIP (POC) 12      Allens test (POC) N/A      Inspiratory Time 1 sec    Total resp. rate 20      Site LEFT RADIAL      Patient temp. 99.2      Specimen type (POC) ARTERIAL      Performed by Aurora Eaton     Volume control plus YES     GLUCOSE, POC    Collection Time: 12/11/18 11:26 AM   Result Value Ref Range    Glucose (POC) 244 (H) 70 - 110 mg/dL           Recent Labs     12/11/18  0810   FIO2I 50   HCO3I 23.3   PCO2I 42.5   PHI 7.349*   PO2I 66*       Special Requests:   Date Value Ref Range Status   12/11/2018 NO SPECIAL REQUESTS   Preliminary     Culture result:   Date Value Ref Range Status   12/11/2018 PENDING  Preliminary       Telemetry: Sinus Tachycardia    ECHO   Estimated left ventricular ejection fraction is 56 - 60%. Visually measured ejection fraction. Normal left ventricular wall motion, no regional wall motion abnormality noted. Age-appropriate left ventricular diastolic function. · There is no evidence of pulmonary hypertension.   ·   Imaging:  [x]I have personally reviewed the patients chest radiographs images and report     CXR Results  (Last 48 hours)               12/11/18 0759  XR CHEST PORT Final result    Impression:  Impression:   1. High riding ET tube. Recommend advancing by approximately 3 cm. Narrative:  XR CHEST PORT       Indication: intubated       Comparison: Chest x-ray from 12/11/2018       Findings:   Right upper extremity PICC is unchanged in position. The endotracheal tube is   approximately 8 cm above the johanny, which is too high. NG tube is coiled in the   stomach. The lungs are clear. No effusion or pneumothorax. Cardiomediastinal   silhouette is normal in size. 12/11/18 0110  XR CHEST PORT Final result    Impression:  IMPRESSION:       Large volume pneumoperitoneum noted. Discussed with Dr. Maryse Garcia, who was already   aware of finding. Prominent pulmonary interstitium may be simulated by low lung   volumes. Subtle vascular congestion or infiltrate not excluded. Lines/tubes as   above. Probable atelectasis in the left midlung. Narrative:  EXAMINATION: Chest single view       INDICATION: Abdominal distention       COMPARISON: None       FINDINGS: Single frontal view of the chest obtained. Feeding tube tip at level   of gastric antrum. Right arm PICC line tip at mid SVC level. Low lung volumes   limits evaluation. Borderline prominent cardiac silhouette. Slightly prominent   perihilar interstitium. Left midlung bandlike density, likely atelectasis. No   definite pneumothorax. No acute osseous findings. Large volume pneumoperitoneum   noted. Results from Hospital Encounter encounter on 12/04/18   XR CHEST PORT    Narrative XR CHEST PORT    Indication: intubated    Comparison: Chest x-ray from 12/11/2018    Findings:  Right upper extremity PICC is unchanged in position. The endotracheal tube is  approximately 8 cm above the ojhanny, which is too high. NG tube is coiled in the  stomach. The lungs are clear. No effusion or pneumothorax. Cardiomediastinal  silhouette is normal in size. Impression Impression:  1. High riding ET tube. Recommend advancing by approximately 3 cm. Results from [facility] encounter on 11/05/18   CT CHEST ABD PELV W CONT    Narrative CT CHEST ABDOMEN AND PELVIS WITH CONTRAST        COMPARISON:  August 12, 2008 . INDICATIONS:    Oroesophageal carcinoma with cough, GI bleeding, abnormal  retroperitoneum on MR. Following the uneventful administration of  oral contrast and  100 cc of Isovue  300 scanning of the chest, abdomen and pelvis is performed with a multislice  scanner. Coronal sagittal and axial reconstructions were created from the 3 D  data set. FINDINGS:     CT CHEST FINDINGS:    Thyroid/Base Of Neck: Normal.    Lungs: There is some bleb formation at the pulmonary apices. No nodule mass or acute  infiltrate is evident     . Pleural Spaces:  Unremarkable. Chest Wall:   No breast mass is evident. No axillary lymphadenopathy is evident. Mediastinum, Nidia, Great Vessels, Heart:  Unremarkable. .    CT ABDOMEN FINDINGS:     Liver/Biliary: Normal.    Spleen: Normal.    Adrenal Glands: Normal.    Kidneys: There is perinephric edema bilaterally. A cyst is seen in the anterior  right kidney measuring 0.9 cm. A cystic lesion in the left kidney extends into  the pelvic region measuring 6.3 x 4 cm. Punctate nonobstructive intrarenal  calculi are present. There are a few sub centimeter low attenuation foci present  most likely representing small cysts but too small to confidently characterize. There is no hydronephrosis. Negative otherwise. .    Pancreas: Normal.    Stomach, Small Bowel, appendix, and Colon: The stomach is unremarkable. There is  dilation predominantly with gas of small bowel loops throughout the abdomen. This is diffuse. The appendix is normal. There is some distention of the colon  contains a moderate but unremarkable burden of stool.  No obstructing process is  evident. .    There is no significant adenopathy. The abdominal aorta is unremarkable. The IVC is unremarkable. Peritoneal Spaces: There is no free fluid or free air. Abdominal Wall: No hernia or mass is evident. CT PELVIS FINDINGS:     Bladder: The bladder is unremarkable in appearance. Pelvic Small Bowel And Colon: Unremarkable. Lymph Nodes, Soft Tissues: There is no adenopathy. Inflammatory character  stranding extending from the kidneys does extend into the pelvic sidewalls along  the anterior pararenal spaces. This is relatively mild in severity. There is no  fluid collection or abscess. .    Free Fluid: Not present. Osseous Structures Of The Chest, Abdomen And Pelvis: The lumbar spinal canal is  relatively narrowed due to short pedicles. . Senescent changes noted consistent  with age. Impression IMPRESSION:     There is predominantly perinephric and retroperitoneal edema bilaterally. Infrequent causes for this are renal inflammation-most frequently either  infection or nephritis or pancreatitis. Some nonspecific stranding can be seen in the elderly and the finding here is  not definitely beyond the range. Diffuse distention of bowel loops predominantly with gas in a nonobstructive  pattern-the appearance favors adynamic ileus of indeterminate etiology. .    All CT scans at this facility are performed using dose optimization technique as  appropriate to the performed exam, to include automated exposure control,  adjustment of the mA and/or kV according to patient's size (Including  appropriate matching for site-specific examinations), or use of iterative  reconstruction technique. IMPRESSION:   · Colon cancer now s/p colon resection 12/04/18 with post op ileus and anastomosis site leak on 12/11/18 - now s/p anastomotic leak repair with colostomy placement 12/11/18.   · Failure to extubate post op - on mechanical ventilation  · Hypotension post op now requiring vasopressors. · Possible Sepsis  · JOSE ANTONIO  · Lactic Acidosis  · DM2  · Non obstructive renal stone  · Hx HTN  · Hx GERD  · Hx Throat CA - has 1 vocal cord per chart   RECOMMENDATIONS:   Resp: Mech. Ventilated patients- aim to keep peak plateau pressure less than or equal to 30cm H2O. Titrate FiO2 for goal SPO2> 90%,VAP prevention bundle, head of the bed at 30' all times. Daily sedation holiday and assessment for weaning with SBT as tolerated, not a candidate today due to tachypnea and tachycardia and hypotension. Bronchodilators PRN,  pulmonary hygiene care, Aspiration precautions, Keep HOB >30 degrees  ID: Continue Antibiotics: Zosyn, trend WBCs and Temperature. F/O on cultures  Heme/Onc: Aleukocytosis, H/H, and platelets are stable, trend CBC. CVS: Start patient on Levophed for Hypotension post op , Actively titrate vasopressors aim MAP >65mmHg, Will give 5% Albumin 50g to help with pressure support. Fluids: Normosol @ 75 ml/hr  Renal: Mckeon to BSD, Trend Renal indices, Renal following for JOSE ANTONIO, oliguria  GI: SUP, Trend LFTs, Zofran PRN for N/V   Metabolic: Glycemic control - SSI   Neuro/Sedation/Pain: PRN pain medications, no sedation. Stress ulcer prophylaxis: Pepcid  DVT prophylaxis: Hep SC per surgery  AM labs: Re-check Labs today and then Daily CBC BMP Mag and Phos, ABG and chest Xray in the am.   Diet: Keep NPO  Lines/Devices: R PICC, Mckeon   Central Line Bundle Followed , Mckeon Bundle Followed and Vent Bundle Followed, Vent Day 01    Further recommendations will be based on the patient's response to recommended treatment and results of the investigation ordered.      [x]See my orders for details    My assessment, plan of care, findings, medications, side effects etc were discussed with:  [x]nursing []PT/OT    []respiratory therapy [x]Dr. Venus Yates    []family []Patient       Uzma Romero [facility]     Pulmonary, [address] [facility]        Late entry for services rendered on 12/11/18  I saw and evaluated the patient, performing the key elements of the service. I discussed the findings, assessment and plan with the NP and agree with the NP's findings and plan as documented in the NP's note. All edits and changes made above or are mentioned in my addendum. Total of 80 min critical care time spent at bedside during the course of care providing evaluation,management and care decisions and ordering appropriate treatment related to critical care problems exclusive of procedures. The reason for providing this level of medical care for this critically ill patient was due a critical illness that impaired one or more vital organ systems such that there was a high probability of imminent or life threatening deterioration in the patients condition. This care involved high complexity decision making to assess, manipulate, and support vital system functions, to treat this degree vital organ system failure and to prevent further life threatening deterioration of the patients condition. 60-year-old male who originally [facility] for robotic sigmoid colectomy for rectal cancer which was performed on 12/4/18. Patient's postoperative course was complicated by ileus and JOSE ANTONIO. Nephrology was consulted on 12/10/18. NG tube was placed for decompression due to abdominal distention. Last night patient developed hypotension and became febrile and tachycardic so a rapid response was called. Chest x-ray at that time showed a large amount of free air under the diaphragms. Patient was emergently taken to the OR for an anastomotic leak. Ex lap with primary repair of anastomotic leak, omental patch, and diverting loop ileostomy was done by Dr. Richard Zaman.   I discussed the case with Dr. Richard Zaman and he reports there was some serosanguineous fluid in the abdomen, but it did not appear to be infected, no fecal material or purulent material was found during the ex lap, washout was performed. Patient remained intubated postprocedure and brought to the ICU for further management. Our assessment at bedside showed patient was tachycardic and hypotensive, so patient was given IV fluid x3 boluses, patient continued to have refractory vaso-plegia so vasopressors were started. Due to hypotension and tachycardia, patient was not extubated, however he is neurologically intact and his respiratory mechanics are okay. Due to anastomotic leak patient was placed on broad-spectrum antibiotics-Zosyn therapy which would provide good coverage for gram negatives and anaerobes. Patient was also placed on TPN, we will continue this feeding until patient cleared by surgery-Dr. Kye Fraser.         Magalie Hernández MD/MPH     Pulmonary, Critical Care Medicine  [facility]

## 2018-12-11 NOTE — PROGRESS NOTES
Respiratory therapist called to evaluate patient whohas tachypnea and sounding wet. Abdomen remains distended, but no c/o pain. Faint, intermittent bowel sounds.

## 2018-12-11 NOTE — PROGRESS NOTES
TRANSFER - IN REPORT:    36: Verbal report received from Martha Mendoza CRNA (name) on Jak Stallworth  being received from OR (unit) for routine post - op      Report consisted of patients Situation, Background, Assessment and   Recommendations(SBAR). Information from the following report(s) OR Summary, Intake/Output and MAR was reviewed with the receiving nurse. Opportunity for questions and clarification was provided. 9611: Patient arrived from OR on monitor being ambu-bagged with 100% oxygen. Patient attached to monitors and complete bath done. ETT intact to ventilator with thick white secretions. NGT attached to LCWS with brown drainage. Abdominal dressing dry and intact with ANGELI drain to right abdomen and ileostomy to left abdomen (beefy red stoma present.) Mepilex applied to intact sacrum. PICC line to right arm intact with blood return to both ports - TPN infusing at 100/hr. Blood work drawn and sent to lab per Conrado Og. SCDs on patient. Orders reviewed. 3683: Patient starting to wake up and Conrado Og, brought to bedside and updated. Propofol drip ordered for patient comfort. Wrist restraints applied to protect ETT dislodgement due to patient attempting to bring hands towards mouth. 0710: Propofol drip started due to patient continuous gagging on ETT and restless arm movements towards ETT.     0715: Patient calmer now, family brought to bedside and updated. Given privacy code information and visitation policy reviewed. Questions answered. Wife signed blood consent prior to leaving just in case we needed it while she was sleeping. 0805: Bedside and Verbal shift change report given to Saida Jaquez and Addi Cardoza RN (oncoming nurse) by Deloria Soulier, RN (offgoing nurse). Report included the following information SBAR, OR Summary, Intake/Output, MAR, Recent Results and Cardiac Rhythm ST. Patient noted to be hypotensive with diprivan infusing and calm.  Diprivan drip decreased and monitoring blood pressure closely. Day shift nurses will notify MD of need for alternative sedation medicine due to hypotension.

## 2018-12-12 ENCOUNTER — APPOINTMENT (OUTPATIENT)
Dept: GENERAL RADIOLOGY | Age: 68
DRG: 330 | End: 2018-12-12
Attending: PHYSICIAN ASSISTANT
Payer: MEDICARE

## 2018-12-12 LAB
ALBUMIN SERPL-MCNC: 2.1 G/DL (ref 3.4–5)
ALBUMIN/GLOB SERPL: 0.6 {RATIO} (ref 0.8–1.7)
ALP SERPL-CCNC: 50 U/L (ref 45–117)
ALT SERPL-CCNC: 17 U/L (ref 16–61)
ANION GAP SERPL CALC-SCNC: 4 MMOL/L (ref 3–18)
ANION GAP SERPL CALC-SCNC: 8 MMOL/L (ref 3–18)
ARTERIAL PATENCY WRIST A: YES
AST SERPL-CCNC: 19 U/L (ref 15–37)
BASE EXCESS BLD CALC-SCNC: 2 MMOL/L
BASOPHILS # BLD: 0 K/UL (ref 0–0.1)
BASOPHILS # BLD: 0 K/UL (ref 0–0.1)
BASOPHILS NFR BLD: 0 % (ref 0–2)
BASOPHILS NFR BLD: 0 % (ref 0–2)
BDY SITE: ABNORMAL
BILIRUB DIRECT SERPL-MCNC: 0.1 MG/DL (ref 0–0.2)
BILIRUB SERPL-MCNC: 0.3 MG/DL (ref 0.2–1)
BUN SERPL-MCNC: 29 MG/DL (ref 7–18)
BUN SERPL-MCNC: 32 MG/DL (ref 7–18)
BUN/CREAT SERPL: 24 (ref 12–20)
BUN/CREAT SERPL: 29 (ref 12–20)
CA-I SERPL-SCNC: 0.98 MMOL/L (ref 1.12–1.32)
CA-I SERPL-SCNC: 1.04 MMOL/L (ref 1.12–1.32)
CALCIUM SERPL-MCNC: 7.8 MG/DL (ref 8.5–10.1)
CALCIUM SERPL-MCNC: 7.8 MG/DL (ref 8.5–10.1)
CHLORIDE SERPL-SCNC: 107 MMOL/L (ref 100–108)
CHLORIDE SERPL-SCNC: 109 MMOL/L (ref 100–108)
CO2 SERPL-SCNC: 27 MMOL/L (ref 21–32)
CO2 SERPL-SCNC: 28 MMOL/L (ref 21–32)
CREAT SERPL-MCNC: 0.99 MG/DL (ref 0.6–1.3)
CREAT SERPL-MCNC: 1.32 MG/DL (ref 0.6–1.3)
DIFFERENTIAL METHOD BLD: ABNORMAL
DIFFERENTIAL METHOD BLD: ABNORMAL
EOSINOPHIL # BLD: 0 K/UL (ref 0–0.4)
EOSINOPHIL # BLD: 0.1 K/UL (ref 0–0.4)
EOSINOPHIL NFR BLD: 0 % (ref 0–5)
EOSINOPHIL NFR BLD: 0 % (ref 0–5)
ERYTHROCYTE [DISTWIDTH] IN BLOOD BY AUTOMATED COUNT: 13.7 % (ref 11.6–14.5)
ERYTHROCYTE [DISTWIDTH] IN BLOOD BY AUTOMATED COUNT: 13.7 % (ref 11.6–14.5)
EST. AVERAGE GLUCOSE BLD GHB EST-MCNC: 134 MG/DL
GAS FLOW.O2 O2 DELIVERY SYS: ABNORMAL L/MIN
GAS FLOW.O2 SETTING OXYMISER: 16 BPM
GLOBULIN SER CALC-MCNC: 3.4 G/DL (ref 2–4)
GLUCOSE BLD STRIP.AUTO-MCNC: 194 MG/DL (ref 70–110)
GLUCOSE BLD STRIP.AUTO-MCNC: 203 MG/DL (ref 70–110)
GLUCOSE BLD STRIP.AUTO-MCNC: 243 MG/DL (ref 70–110)
GLUCOSE SERPL-MCNC: 178 MG/DL (ref 74–99)
GLUCOSE SERPL-MCNC: 184 MG/DL (ref 74–99)
HBA1C MFR BLD: 6.3 % (ref 4.2–5.6)
HCO3 BLD-SCNC: 26.1 MMOL/L (ref 22–26)
HCT VFR BLD AUTO: 22.2 % (ref 36–48)
HCT VFR BLD AUTO: 22.6 % (ref 36–48)
HGB BLD-MCNC: 7.5 G/DL (ref 13–16)
HGB BLD-MCNC: 7.9 G/DL (ref 13–16)
INR PPP: 1.2 (ref 0.8–1.2)
INSPIRATION.DURATION SETTING TIME VENT: 1 SEC
LYMPHOCYTES # BLD: 0.8 K/UL (ref 0.9–3.6)
LYMPHOCYTES # BLD: 1 K/UL (ref 0.9–3.6)
LYMPHOCYTES NFR BLD: 8 % (ref 21–52)
LYMPHOCYTES NFR BLD: 9 % (ref 21–52)
MAGNESIUM SERPL-MCNC: 2.3 MG/DL (ref 1.6–2.6)
MAGNESIUM SERPL-MCNC: 2.5 MG/DL (ref 1.6–2.6)
MCH RBC QN AUTO: 29.4 PG (ref 24–34)
MCH RBC QN AUTO: 30.3 PG (ref 24–34)
MCHC RBC AUTO-ENTMCNC: 33.8 G/DL (ref 31–37)
MCHC RBC AUTO-ENTMCNC: 35 G/DL (ref 31–37)
MCV RBC AUTO: 86.6 FL (ref 74–97)
MCV RBC AUTO: 87.1 FL (ref 74–97)
MONOCYTES # BLD: 0.7 K/UL (ref 0.05–1.2)
MONOCYTES # BLD: 1 K/UL (ref 0.05–1.2)
MONOCYTES NFR BLD: 7 % (ref 3–10)
MONOCYTES NFR BLD: 8 % (ref 3–10)
NEUTS SEG # BLD: 10.1 K/UL (ref 1.8–8)
NEUTS SEG # BLD: 8.2 K/UL (ref 1.8–8)
NEUTS SEG NFR BLD: 83 % (ref 40–73)
NEUTS SEG NFR BLD: 85 % (ref 40–73)
O2/TOTAL GAS SETTING VFR VENT: 0.5 %
PCO2 BLD: 38.3 MMHG (ref 35–45)
PEEP RESPIRATORY: 5 CMH2O
PH BLD: 7.44 [PH] (ref 7.35–7.45)
PHOSPHATE SERPL-MCNC: 2.5 MG/DL (ref 2.5–4.9)
PLATELET # BLD AUTO: 163 K/UL (ref 135–420)
PLATELET # BLD AUTO: 169 K/UL (ref 135–420)
PMV BLD AUTO: 10.4 FL (ref 9.2–11.8)
PMV BLD AUTO: 9.7 FL (ref 9.2–11.8)
PO2 BLD: 134 MMHG (ref 80–100)
POTASSIUM SERPL-SCNC: 3.5 MMOL/L (ref 3.5–5.5)
POTASSIUM SERPL-SCNC: 3.7 MMOL/L (ref 3.5–5.5)
PROT SERPL-MCNC: 5.5 G/DL (ref 6.4–8.2)
PROTHROMBIN TIME: 15.4 SEC (ref 11.5–15.2)
RBC # BLD AUTO: 2.55 M/UL (ref 4.7–5.5)
RBC # BLD AUTO: 2.61 M/UL (ref 4.7–5.5)
SAO2 % BLD: 99 % (ref 92–97)
SERVICE CMNT-IMP: ABNORMAL
SODIUM SERPL-SCNC: 141 MMOL/L (ref 136–145)
SODIUM SERPL-SCNC: 142 MMOL/L (ref 136–145)
SPECIMEN TYPE: ABNORMAL
TOTAL RESP. RATE, ITRR: 20
VENTILATION MODE VENT: ABNORMAL
VOLUME CONTROL PLUS IVLCP: YES
VT SETTING VENT: 500 ML
WBC # BLD AUTO: 12.2 K/UL (ref 4.6–13.2)
WBC # BLD AUTO: 9.7 K/UL (ref 4.6–13.2)

## 2018-12-12 PROCEDURE — 74011250636 HC RX REV CODE- 250/636: Performed by: NURSE PRACTITIONER

## 2018-12-12 PROCEDURE — 36600 WITHDRAWAL OF ARTERIAL BLOOD: CPT

## 2018-12-12 PROCEDURE — 82962 GLUCOSE BLOOD TEST: CPT

## 2018-12-12 PROCEDURE — 83735 ASSAY OF MAGNESIUM: CPT

## 2018-12-12 PROCEDURE — 84100 ASSAY OF PHOSPHORUS: CPT

## 2018-12-12 PROCEDURE — 94762 N-INVAS EAR/PLS OXIMTRY CONT: CPT

## 2018-12-12 PROCEDURE — 82330 ASSAY OF CALCIUM: CPT

## 2018-12-12 PROCEDURE — 74011250636 HC RX REV CODE- 250/636: Performed by: PHYSICIAN ASSISTANT

## 2018-12-12 PROCEDURE — 74011000250 HC RX REV CODE- 250: Performed by: INTERNAL MEDICINE

## 2018-12-12 PROCEDURE — 74011250636 HC RX REV CODE- 250/636: Performed by: COLON & RECTAL SURGERY

## 2018-12-12 PROCEDURE — 74011000258 HC RX REV CODE- 258: Performed by: COLON & RECTAL SURGERY

## 2018-12-12 PROCEDURE — 74011250636 HC RX REV CODE- 250/636: Performed by: INTERNAL MEDICINE

## 2018-12-12 PROCEDURE — 65610000006 HC RM INTENSIVE CARE

## 2018-12-12 PROCEDURE — 74011000250 HC RX REV CODE- 250: Performed by: COLON & RECTAL SURGERY

## 2018-12-12 PROCEDURE — 94003 VENT MGMT INPAT SUBQ DAY: CPT

## 2018-12-12 PROCEDURE — 74011000258 HC RX REV CODE- 258: Performed by: NURSE PRACTITIONER

## 2018-12-12 PROCEDURE — 77010033678 HC OXYGEN DAILY

## 2018-12-12 PROCEDURE — 85610 PROTHROMBIN TIME: CPT

## 2018-12-12 PROCEDURE — 71045 X-RAY EXAM CHEST 1 VIEW: CPT

## 2018-12-12 PROCEDURE — 74011000250 HC RX REV CODE- 250: Performed by: NURSE PRACTITIONER

## 2018-12-12 PROCEDURE — 80048 BASIC METABOLIC PNL TOTAL CA: CPT

## 2018-12-12 PROCEDURE — 77030020186 HC BOOT HL PROTCT SAGE -B

## 2018-12-12 PROCEDURE — 82803 BLOOD GASES ANY COMBINATION: CPT

## 2018-12-12 PROCEDURE — 80076 HEPATIC FUNCTION PANEL: CPT

## 2018-12-12 PROCEDURE — 74011000258 HC RX REV CODE- 258: Performed by: PHYSICIAN ASSISTANT

## 2018-12-12 PROCEDURE — 74011636637 HC RX REV CODE- 636/637: Performed by: INTERNAL MEDICINE

## 2018-12-12 PROCEDURE — 85025 COMPLETE CBC W/AUTO DIFF WBC: CPT

## 2018-12-12 PROCEDURE — 77030010547 HC BG URIN W/UMETER -A

## 2018-12-12 PROCEDURE — 83036 HEMOGLOBIN GLYCOSYLATED A1C: CPT

## 2018-12-12 PROCEDURE — 74011000258 HC RX REV CODE- 258: Performed by: INTERNAL MEDICINE

## 2018-12-12 PROCEDURE — 74011636637 HC RX REV CODE- 636/637: Performed by: COLON & RECTAL SURGERY

## 2018-12-12 PROCEDURE — 74011250637 HC RX REV CODE- 250/637: Performed by: PHYSICIAN ASSISTANT

## 2018-12-12 RX ORDER — INSULIN GLARGINE 100 [IU]/ML
10 INJECTION, SOLUTION SUBCUTANEOUS DAILY
Status: DISCONTINUED | OUTPATIENT
Start: 2018-12-12 | End: 2018-12-12

## 2018-12-12 RX ORDER — POTASSIUM CHLORIDE 7.45 MG/ML
10 INJECTION INTRAVENOUS
Status: COMPLETED | OUTPATIENT
Start: 2018-12-12 | End: 2018-12-12

## 2018-12-12 RX ORDER — FENTANYL CITRATE 50 UG/ML
50-100 INJECTION, SOLUTION INTRAMUSCULAR; INTRAVENOUS
Status: DISCONTINUED | OUTPATIENT
Start: 2018-12-12 | End: 2018-12-12

## 2018-12-12 RX ORDER — POTASSIUM CHLORIDE 1.5 G/1.77G
40 POWDER, FOR SOLUTION ORAL 2 TIMES DAILY WITH MEALS
Status: DISCONTINUED | OUTPATIENT
Start: 2018-12-12 | End: 2018-12-12

## 2018-12-12 RX ORDER — HALOPERIDOL 5 MG/ML
3 INJECTION INTRAMUSCULAR ONCE
Status: COMPLETED | OUTPATIENT
Start: 2018-12-12 | End: 2018-12-12

## 2018-12-12 RX ORDER — HYDRALAZINE HYDROCHLORIDE 20 MG/ML
10 INJECTION INTRAMUSCULAR; INTRAVENOUS
Status: DISCONTINUED | OUTPATIENT
Start: 2018-12-12 | End: 2018-12-22 | Stop reason: HOSPADM

## 2018-12-12 RX ORDER — HYDROCORTISONE SODIUM SUCCINATE 100 MG/2ML
50 INJECTION, POWDER, FOR SOLUTION INTRAMUSCULAR; INTRAVENOUS EVERY 12 HOURS
Status: DISCONTINUED | OUTPATIENT
Start: 2018-12-12 | End: 2018-12-13

## 2018-12-12 RX ORDER — FLUTICASONE PROPIONATE 50 MCG
2 SPRAY, SUSPENSION (ML) NASAL DAILY
Status: DISCONTINUED | OUTPATIENT
Start: 2018-12-13 | End: 2018-12-22 | Stop reason: HOSPADM

## 2018-12-12 RX ADMIN — HYDROMORPHONE HYDROCHLORIDE 0.5 MG: 1 INJECTION, SOLUTION INTRAMUSCULAR; INTRAVENOUS; SUBCUTANEOUS at 17:47

## 2018-12-12 RX ADMIN — FAMOTIDINE 20 MG: 10 INJECTION INTRAVENOUS at 09:00

## 2018-12-12 RX ADMIN — DEXMEDETOMIDINE HYDROCHLORIDE 0.4 MCG/KG/HR: 100 INJECTION, SOLUTION INTRAVENOUS at 03:51

## 2018-12-12 RX ADMIN — PIPERACILLIN SODIUM AND TAZOBACTAM SODIUM 3.38 G: 3; .375 INJECTION, POWDER, LYOPHILIZED, FOR SOLUTION INTRAVENOUS at 18:01

## 2018-12-12 RX ADMIN — MIDAZOLAM HYDROCHLORIDE 2 MG: 2 INJECTION, SOLUTION INTRAMUSCULAR; INTRAVENOUS at 04:25

## 2018-12-12 RX ADMIN — INSULIN LISPRO 6 UNITS: 100 INJECTION, SOLUTION INTRAVENOUS; SUBCUTANEOUS at 11:33

## 2018-12-12 RX ADMIN — FENTANYL CITRATE 100 MCG: 50 INJECTION, SOLUTION INTRAMUSCULAR; INTRAVENOUS at 09:54

## 2018-12-12 RX ADMIN — HALOPERIDOL LACTATE 3 MG: 5 INJECTION, SOLUTION INTRAMUSCULAR at 21:39

## 2018-12-12 RX ADMIN — CALCIUM GLUCONATE 1 G: 94 INJECTION, SOLUTION INTRAVENOUS at 20:36

## 2018-12-12 RX ADMIN — POTASSIUM CHLORIDE 10 MEQ: 10 INJECTION, SOLUTION INTRAVENOUS at 11:21

## 2018-12-12 RX ADMIN — HEPARIN SODIUM 5000 UNITS: 5000 INJECTION, SOLUTION INTRAVENOUS; SUBCUTANEOUS at 16:38

## 2018-12-12 RX ADMIN — POTASSIUM CHLORIDE 10 MEQ: 10 INJECTION, SOLUTION INTRAVENOUS at 12:32

## 2018-12-12 RX ADMIN — DEXMEDETOMIDINE HYDROCHLORIDE 0.2 MCG/KG/HR: 100 INJECTION, SOLUTION INTRAVENOUS at 18:00

## 2018-12-12 RX ADMIN — CALCIUM GLUCONATE 2 G: 98 INJECTION, SOLUTION INTRAVENOUS at 09:59

## 2018-12-12 RX ADMIN — PIPERACILLIN SODIUM AND TAZOBACTAM SODIUM 3.38 G: 3; .375 INJECTION, POWDER, LYOPHILIZED, FOR SOLUTION INTRAVENOUS at 09:00

## 2018-12-12 RX ADMIN — HYDROCORTISONE SODIUM SUCCINATE 50 MG: 100 INJECTION, POWDER, FOR SOLUTION INTRAMUSCULAR; INTRAVENOUS at 05:52

## 2018-12-12 RX ADMIN — FENTANYL CITRATE 100 MCG: 50 INJECTION, SOLUTION INTRAMUSCULAR; INTRAVENOUS at 03:50

## 2018-12-12 RX ADMIN — CALCIUM GLUCONATE: 94 INJECTION, SOLUTION INTRAVENOUS at 20:20

## 2018-12-12 RX ADMIN — HYDROCORTISONE SODIUM SUCCINATE 50 MG: 100 INJECTION, POWDER, FOR SOLUTION INTRAMUSCULAR; INTRAVENOUS at 20:25

## 2018-12-12 RX ADMIN — INSULIN LISPRO 3 UNITS: 100 INJECTION, SOLUTION INTRAVENOUS; SUBCUTANEOUS at 05:49

## 2018-12-12 RX ADMIN — PIPERACILLIN SODIUM AND TAZOBACTAM SODIUM 3.38 G: 3; .375 INJECTION, POWDER, LYOPHILIZED, FOR SOLUTION INTRAVENOUS at 00:41

## 2018-12-12 RX ADMIN — POTASSIUM CHLORIDE 10 MEQ: 10 INJECTION, SOLUTION INTRAVENOUS at 09:54

## 2018-12-12 RX ADMIN — SODIUM CHLORIDE, SODIUM ACETATE ANHYDROUS, SODIUM GLUCONATE, POTASSIUM CHLORIDE, AND MAGNESIUM CHLORIDE 75 ML: 526; 222; 502; 37; 30 INJECTION, SOLUTION INTRAVENOUS at 04:36

## 2018-12-12 RX ADMIN — HEPARIN SODIUM 5000 UNITS: 5000 INJECTION, SOLUTION INTRAVENOUS; SUBCUTANEOUS at 09:01

## 2018-12-12 RX ADMIN — CHLORHEXIDINE GLUCONATE 0.12% ORAL RINSE 10 ML: 1.2 LIQUID ORAL at 09:00

## 2018-12-12 RX ADMIN — PIPERACILLIN SODIUM AND TAZOBACTAM SODIUM 3.38 G: 3; .375 INJECTION, POWDER, LYOPHILIZED, FOR SOLUTION INTRAVENOUS at 13:53

## 2018-12-12 RX ADMIN — LATANOPROST 1 DROP: 50 SOLUTION/ DROPS OPHTHALMIC at 18:02

## 2018-12-12 RX ADMIN — INSULIN LISPRO 6 UNITS: 100 INJECTION, SOLUTION INTRAVENOUS; SUBCUTANEOUS at 18:01

## 2018-12-12 NOTE — PROGRESS NOTES
Pt extubated to nasal cannula 4Lpm. No stridor noted, breath sounds clear. Pt tolerated well. Will continue to monitor.

## 2018-12-12 NOTE — PROGRESS NOTES
Problem: Falls - Risk of  Goal: *Absence of Falls  Document Tawanda Fall Risk and appropriate interventions in the flowsheet.   Outcome: Progressing Towards Goal  Fall Risk Interventions:  Mobility Interventions: Communicate number of staff needed for ambulation/transfer, Strengthening exercises (ROM-active/passive)    Mentation Interventions: Door open when patient unattended, Evaluate medications/consider consulting pharmacy, Eyeglasses and hearing aids, Update white board, More frequent rounding    Medication Interventions: Evaluate medications/consider consulting pharmacy    Elimination Interventions: Call light in reach, Patient to call for help with toileting needs, Toileting schedule/hourly rounds    History of Falls Interventions: Consult care management for discharge planning, Door open when patient unattended, Evaluate medications/consider consulting pharmacy

## 2018-12-12 NOTE — PROGRESS NOTES
[facility] INTENSIVE CARE UNIT  [address] [place] [address]  [phone]  Colon and Rectal Surgery Progress Note      Patient: Manuel Chun MRN: [id number]  SSN: [id number]    YOB: 1950  Age: 80 y.o. Sex: male      Admit Date: 12/4/2018    LOS: 8 days     Subjective:     Blood pressures improved. Stable. Objective:     Vitals:    12/12/18 0425 12/12/18 0500 12/12/18 0600 12/12/18 0700   BP:  110/63 119/65 133/70   Pulse: 95 79 76 81   Resp: 22 16 16 16   Temp:       SpO2: 100% 100% 100% 100%   Weight:       Height:            Intake and Output:  Current Shift: No intake/output data recorded.   Last three shifts: 12/10 1901 - 12/12 0700  In: 8135.6 [I.V.:8060.6]  Out: 2880 [Urine:1830; Drains:105]    Physical Exam:     abd soft, distended, appr tender    Lab/Data Review:    CMP:   Lab Results   Component Value Date/Time     12/12/2018 04:15 AM    K 3.5 12/12/2018 04:15 AM     12/12/2018 04:15 AM    CO2 27 12/12/2018 04:15 AM    AGAP 8 12/12/2018 04:15 AM     (H) 12/12/2018 04:15 AM    BUN 32 (H) 12/12/2018 04:15 AM    CREA 1.32 (H) 12/12/2018 04:15 AM    GFRAA >60 12/12/2018 04:15 AM    GFRNA 54 (L) 12/12/2018 04:15 AM    CA 7.8 (L) 12/12/2018 04:15 AM    MG 2.5 12/12/2018 04:15 AM    PHOS 2.5 12/12/2018 04:15 AM    ALB 2.1 (L) 12/12/2018 04:15 AM    TP 5.5 (L) 12/12/2018 04:15 AM    GLOB 3.4 12/12/2018 04:15 AM    AGRAT 0.6 (L) 12/12/2018 04:15 AM    SGOT 19 12/12/2018 04:15 AM    ALT 17 12/12/2018 04:15 AM     CBC:   Lab Results   Component Value Date/Time    WBC 9.7 12/12/2018 04:15 AM    HGB 7.9 (L) 12/12/2018 04:15 AM    HCT 22.6 (L) 12/12/2018 04:15 AM     12/12/2018 04:15 AM      Intra-abdominal cultures negative    Assessment:     POD 1 s/p ex lap, repair anastomotic leak and diverting loop ileostomy for leak after robotic sigmoid colectomy for malingnant polyp     Plan:     NPO, NG  TPN  Wean vent  Continue abx     Signed By: Sagrario Campo, MD        December 12, 2018

## 2018-12-12 NOTE — PROGRESS NOTES
Vent note:  Pt started on SBT. Tolerating well. Will continue to monitor. 12/12/18 0842   Patient Observations   Pulse (Heart Rate) 76   Resp Rate 16   O2 Sat (%) 100 %   Airway - Continuous Aspiration of Subglottic Secretions (SAIDA) Tube 12/11/18 Oral   Placement Date/Time: 12/11/18 0213   Number of Attempts: 1  Inserted By: Jon Cronin CRNA  Present on Admission/Arrival: No  Location: Oral  Placement Verified: Auscultation;BBS  Airway Types: Endotracheal, cuffed  Airway Tube Size: 7.5 mm   Insertion Depth (cm) 25 cm   Line Armani Lips   Side Secured Right   Cuff Pressure (mlt)   Site Assessment Clean, dry, & intact   Suction on Yes   Amt Secretions Aspirated (mL) 0 mL   Respiratory   Respiratory (WDL) X   Patient on Vent Yes - If patient is on vent, add Doc Flowsheet Ventilator (). Respiratory Pattern Regular   Chest/Tracheal Assessment Chest expansion, symmetrical   Breath Sounds Bilateral Coarse;Diminished   Cough Cough with suction   Airway Clearance   Suction ET Tube   Suction Device Inline suction catheter   Sputum Amount Small   Sputum Color/Odor Clear; White   Sputum Consistency Thick   Skin Integumentary   Skin Integumentary (WDL) WDL   Ventilator Initiate/Discontinue   Bio-Med ID # [id number]   Vent Settings   FIO2 (%) 40 %   Back-Up Rate 16   Vt Set (ml) 500 ml   Pressure Support (cm H2O) 6 cm H2O   PEEP/VENT (cm H2O) 5 cm H20   Insp Flow (l/min) 55 l/min   Insp Rise Time % 50 %   Flow Trigger 3   Expiratory Sensitivity 25 %   Ventilator Measurements   Resp Rate Observed 16   Vt Spont (ml) 403 ml   Ve Observed (l/min) 9.4 l/min   PIP Observed (cm H2O) 12 cm H2O   MAP (cm H2O) 7.7   I:E Ratio Actual 1:1.3   Auto PEEP Observed (cm H2O) 0 cm H2O   Safety & Alarms   Circuit Temperature 97.9 °F (36.6 °C)   Backup Mode Checked/Apnea Yes   Pressure Max 40 cm H2O   Pressure Min 11 cm H2O   Ve Min 2   Ve Max 20   Vt Min 200 ml   Vt Max 1200 ml   RR Max 40   Ambu Bag Yes   Ambu Mask Yes   ABCDEF Bundle   SBT Safety Screen Passed Yes   Weaning Parameters   Spontaneous Breathing Trial Complete Yes   Resp Rate Observed 21   Ve 9.1      RSBI 59   Age Specific Ventilator Associated Pneumonia Bundle   Patient Age Group Adult   Adult Ventilator Associated Pneumonia Bundle   Elevation of Head to 30-45 Degrees (Unless Contraindicated) Yes   Assessment of Readiness to Extubate Yes   Mechanical VTE Orders Yes   Vent Method/Mode   Ventilation Method Conventional   Ventilator Mode Spontaneous;Pressure support   Pulmonary Toilet   Pulmonary Toilet H. O.B elevated;Suction

## 2018-12-12 NOTE — PROGRESS NOTES
NUTRITION consult    Nutrition Consult: TPN: RD to Manage     RECOMMENDATIONS / PLAN:     - Plan to provide parenteral nutrition support, monitor labs and adjust electrolytes daily.   - Monitor GI symptoms and readiness for oral diet/enteral nutrition initiation.  - Continue RD inpatient monitoring and evaluation. Please Note:   Parenteral nutrition support to be provided using custom product, allowing for modification of electrolyte and macronutrient content day to day. Lipids included in PN on MWF. Macronutrient Goal: 125 gm amino acids, 380 gm dextrose, 250 mL lipids (2006 kcal weekly average). PARENTERAL NUTRITION ORDER:     Electrolyte Adjustments: Na decreased, K increased, Ca increased, Mg decreased, Phos increased   Additional replacement given today: 30 mEq KCl, 2 gm Ca    Day 4 PN to provide: 100 mEq Na, 50 mEq K, 17 mEq Ca, 10 mEq Mg, 18 mmol Phos, 1952 kcal, 125 gm amino acids, 280 gm dextrose, 250 mL lipids, 10 mL MVI, 2.5 mL trace elements, 10 units regular insulin     PN Rate: 100 mL/hr   Glucose Infusion Rate: 2.47 mg/kg/min    Osmolarity: 1266 mOsm   Parenteral Nutrition Access Device: PICC placed 12/10/18  Indication for PN:  Postop ileus   Refeeding Risk:      []  Yes  [x] No     NUTRITION DIAGNOSIS & INTERVENTIONS:     [x] Parenteral nutrition: continue, modify contents   [x] Collaboration and referral of nutrition care: MD to review PN order and note daily; will not call to verify content and changes, per MD request. Discussed rate and insulin in PN with Dr Herman Waller     Nutrition Diagnosis:  Inadequate oral intake related to altered GI function and diet intolerance as evidenced by abdominal distention and discomfort and inability to consume po diet. ASSESSMENT:     12/12: Hyperglycemia, receiving steroid and insulin added to next PN. Renal function improving, bladder pressures improving. Ileostomy with output today, NGT output improved. Plan for extubation today.   12/11: RRT for fever with tachycardiac overnight then taken for emergent surgery, s/p ex lap with primary repair of anastomotic leak, omental patch and diverting loop ileostomy (12/11) and remains intubated postop.   12/10: Nephrology consulted for JOSE ANTONIO. NGT to suction with 3 L out in the past 24 hours. 12/9: S/p sigmoid colectomy on 12/4. Was tolerating a diet, then had abdominal pain and distention, NGT placed. KUB indicative of ileus per MD note. + small BM and flatus.      PN infusion adequate to meet patients estimated nutritional needs:   [] Yes     [x] No   [] Unable to determine at this time    Diet: DIET NPO  TPN ADULT - CENTRAL  TPN ADULT - CENTRAL      Food Allergies: NKFA  Current Appetite:   [] Good     [] Fair     [] Poor     [x] Other: NPO  Appetite/meal intake prior to admission:   [x] Good     [] Fair     [] Poor     [] Other:  Feeding Limitations:  [] Swallowing difficulty    [] Chewing difficulty    [] Other:  Current Meal Intake:   Patient Vitals for the past 100 hrs:   % Diet Eaten   12/10/18 1503 0 %   12/10/18 1027 0 %   12/09/18 1825 0 %   12/09/18 1245 0 %   12/09/18 0944 0 %   12/08/18 1711 75 %   12/08/18 1235 100 %   12/08/18 0908 100 %     NGT to suction, output x last 24 hours: 400 mL  BM:  12/12, ileostomy   Skin Integrity: surgical incision to abdomen; ANGELI drain  Edema:  [x] No     [] Yes   Pertinent Medications: Reviewed: SSI, steroid     Labs: BMP, MG, Phos ordered daily; CMP, Triglyceride, Prealbumin ordered initially and weekly  Recent Labs     12/12/18  0415 12/11/18  1500 12/11/18  0105    140 138   K 3.5 3.9 3.8    106 103   CO2 27 26 26   * 214* 199*   BUN 32* 37* 37*   CREA 1.32* 1.75* 1.73*   CA 7.8* 7.3* 8.8   MG 2.5 2.3 2.2   PHOS 2.5 3.2 3.3   ALB 2.1* 2.2*  --    SGOT 19 14*  --    ALT 17 17  --    Prealbumin: 9.15 mg/dL (12/10/18)   Triglyceride: 129 mg/dL (12/10/18)   Recent Labs     12/12/18  0415 12/11/18  1500 12/11/18  0105   WBC 9.7 8.4 7.1   HGB 7.9* 7.4* 9.7*   HCT 22.6* 21.7* 28.3*    159 248         Intake/Output Summary (Last 24 hours) at 12/12/2018 1123  Last data filed at 12/12/2018 0640  Gross per 24 hour   Intake 3111.06 ml   Output 1745 ml   Net 1366.06 ml       Anthropometrics:   Ht Readings from Last 1 Encounters:   12/04/18 5' 10\" (1.778 m)       Last 3 Recorded Weights in this Encounter    12/10/18 1309 12/11/18 0700 12/12/18 1121   Weight: 78.9 kg (174 lb) 78.7 kg (173 lb 8 oz) 78.7 kg (173 lb 8 oz)        Body mass index is 24.89 kg/m². Weight History: Pt reports weight loss of 8 lbs PTA, unknown time frame. Weight Metrics 12/12/2018 12/4/2018 11/19/2018 11/12/2018 2/12/2016 2/9/2016 2/9/2016   Weight 173 lb 8 oz - 169 lb 170 lb 9.6 oz - 178 lb -   BMI - 24.89 kg/m2 24.25 kg/m2 24.48 kg/m2 25.54 kg/m2 - 25.54 kg/m2          Admitting Diagnosis: Malignant neoplasm of sigmoid colon (HCC) [C18.7]  Colon cancer (HCC) [C18.9]  Pertinent PMHx: colon polyps    Education Needs:        [x] None identified  [] Identified - Not appropriate at this time  []  Identified and addressed - refer to education log  Learning Limitations:   [x] None identified  [] Identified    Cultural, Anglican & ethnic food preferences:  [x] None identified    [] Identified and addressed     ESTIMATED NUTRITION NEEDS:     Calories: 2448-4169 kcal ([id number]) based on   [x] Actual BW 77 kg    [] IBW:   Protein:  gm (1.2-2 gm/kg) based on   [x] Actual BW      [] IBW:   Fluid: 1 mL/kcal     MONITORING & EVALUATION:     Nutrition Goals:   1. Patient will receive nutrition via PN until they are able to tolerate adequate po intake/enteral nutrition. Outcome:  [x] Met    []  Not Met    [] New Goal  2. Patient will tolerate advancement of macronutrients towards meeting estimated nutrition needs within the next 7 days.  Outcome:  [] Met    [x]  Not Met    [] New Goal    Monitoring:  [x] Parenteral nutrition intake and administration  [x] Biochemical data, medical tests, and procedures   [x] Fluid intake   [x] Nutrition-focused physical findings   [x] Treatment/therapy   [] Food and beverage intake   [] Diet order      [] Weight        Previous Recommendations (for follow-up assessments only):     [x]   Implemented       []   Not Implemented (RD to address)      [] No Longer Appropriate     [] No Recommendation Made        Discharge Planning: Nutrition recommendations pending patient's ability to tolerate po intake/enteral nutrition.    [x]  Participated in care planning, discharge planning, & interdisciplinary rounds as appropriate      Mike Rothman, [address]   Pager: [phone]

## 2018-12-12 NOTE — ROUTINE PROCESS
10:49 AM-- Removed restraints from pt. Pt became agitated and asking for restraints to be put back on. Pt confused, only oriented to self. Reapplied restraints. Will continue to monitor. 11:53 AM-- Pt anxious and confused, banging on side of bed, asking for every to come in room. Pt pulled out PIV. Spoke with Dr. Karlene Harman, may restart Precedex.

## 2018-12-12 NOTE — PROGRESS NOTES
[facility]   Pulmonary, Critical Care, and Sleep Medicine     ICU Patient Progress Note    Name: Morena Robertson   : 1950   MRN: [id number]   Date: 2018      Subjective/History:     Morena Robertson has been seen and evaluated at the request of Dr. Hailee Jones for post op and vent management. Patient is a 80 y.o. male who was admitted s/p robotic sigmoid colectomy for cancer, on 18. Patient developed mild post-operative ileus. Take emergently to OR on 18 for an anastomotic leak. Exp Lap with primary repair of anastomotic leak, omental patch, and diverting loop ileostomy was done and patient was left intubated and taken to ICU for continued care and ventilator management. 18  Maintained intubated overnight. Awake alert and following commands. On SBT this am, doing well. Plan to extubate today. Replace e-lytes    Hypotension improved - off pressors  AFebrile  ANGELI Drain to abdomen     [x]The patient is unable to give any meaningful history or review of systems because the patient is:  [x]Intubated []Sedated   []Unresponsive []Ventilated     [x]The patient is critically ill on      [x]Mechanical ventilation []Pressors   []BiPAP []               Review of Systems:  Review of systems not obtained due to patient factors.     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     Chest pain, unspecified     Diabetes ([facility] [address]     Essential hypertension, benign     GERD (gastroesophageal reflux disease)     Nonspecific abnormal electrocardiogram (ECG) (EKG)         Past Surgical History:  Past Surgical History:   Procedure Laterality Date    COLONOSCOPY N/A 2018    COLONOSCOPY with Polypectomies, Bx's, Injection, Tattooing & Clip Placement x 3 performed by Kei Ho MD at [address] [place] [address]  2018         Bryce Jenkins  2018  ENDOSCOPIC MUCOSAL RESECT  11/12/2018         EXPLORATORY OF ABDOMEN N/A 12/11/2018    Dr. Melvi Ramos HX COLONOSCOPY      HX HEENT  2008    Throat Ca - 1 vocal chord removed     HX HEENT      eye surgery muscles    LAP,SURG,COLECTOMY, PARTIAL, W/ANAST N/A 12/04/2018    Dr. Maryjean Boeck        Medications:  Prior to Admission medications    Medication Sig Start Date End Date Taking? Authorizing Provider   naloxegol (MOVANTIK) 25 mg tab tablet Take  by mouth Daily (before breakfast). Yes Provider, Historical   multivitamin, tx-iron-ca-min (THERA-M W/ IRON) 9 mg iron-400 mcg tab tablet Take 1 Tab by mouth daily. Yes Provider, Historical   latanoprost (XALATAN) 0.005 % ophthalmic solution Administer 1 Drop to both eyes nightly. Yes Provider, Historical   beclomethasone dipropionate (QNASL) 80 mcg/actuation HFAA 80 mcg by Nasal route daily. Directions on at home label are as follows: Use 2 sprays in each Nostril Daily   Yes Senait Collazo MD   metFORMIN (GLUCOPHAGE) 1,000 mg tablet Take 1,000 mg by mouth two (2) times daily (with meals). do not take morning of surgery (12/4/180. Yes Provider, Historical   losartan (COZAAR) 50 mg tablet Take 50 mg by mouth daily. Yes Provider, Historical   nebivolol (BYSTOLIC) 10 mg tablet Take 10 mg by mouth daily. Yes Provider, Historical   omeprazole (PRILOSEC) 20 mg capsule Take 20 mg by mouth daily. Yes Provider, Historical   Dexlansoprazole 60 mg CpDB Take 1 Cap by mouth daily. Yes Provider, Historical   calcium-cholecalciferol, d3, 600-125 mg-unit tab Take 1 Tab by mouth daily. Yes Provider, Historical   omega-3 fatty acids-vitamin e 1,000 mg cap Take 1 Cap by mouth daily. Yes Provider, Historical   IRON, FERROUS SULFATE, PO Take  by mouth. 3 times a week   Yes Provider, Historical   oxyCODONE-acetaminophen (PERCOCET 10)  mg per tablet Take  by mouth. Provider, Historical   meloxicam (MOBIC) 15 mg tablet Take 15 mg by mouth daily. Provider, Historical   oxyCODONE-acetaminophen (PERCOCET) 5-325 mg per tablet Take 1 Tab by mouth every six (6) hours as needed. Max Daily Amount: 4 Tabs. Patient taking differently: Take 1 Tab by mouth every six (6) hours as needed for Pain. 11/13/18   Alex Cazares MD   cyclobenzaprine (FLEXERIL) 5 mg tablet Take 1 Tab by mouth three (3) times daily as needed for Muscle Spasm(s). 11/13/18   Alex Cazares MD   psyllium husk (METAMUCIL) 0.4 gram cap Take 1 Cap by mouth daily. 11/13/18   Alex Cazares MD   Aspirin, Buffered 81 mg tab Take 1 Tab by mouth daily. stop 7 days prior to surgery (12/4/18). Provider, Historical       Current Facility-Administered Medications   Medication Dose Route Frequency    calcium gluconate 2 g in 0.9% sodium chloride 100 mL IVPB  2 g IntraVENous ONCE    potassium chloride 10 mEq in 100 ml IVPB  10 mEq IntraVENous Q1H    chlorhexidine (PERIDEX) 0.12 % mouthwash 10 mL  10 mL Oral Q12H    TPN ADULT - CENTRAL   IntraVENous CONTINUOUS    insulin lispro (HUMALOG) injection   SubCUTAneous Q6H    NOREPINephrine (LEVOPHED) 16 mg in dextrose 5% 250 mL infusion  2-16 mcg/min IntraVENous TITRATE    piperacillin-tazobactam (ZOSYN) 3.375 g in 0.9% sodium chloride (MBP/ADV) 100 mL MBP  3.375 g IntraVENous Q6H    hydrocortisone Sod Succ (PF) (SOLU-CORTEF) injection 50 mg  50 mg IntraVENous Q6H    famotidine (PF) (PEPCID) 20 mg in sodium chloride 0.9% 10 mL injection  20 mg IntraVENous DAILY    latanoprost (XALATAN) 0.005 % ophthalmic solution 1 Drop  1 Drop Both Eyes QPM    heparin (porcine) injection 5,000 Units  5,000 Units SubCUTAneous Q8H       Allergy:  No Known Allergies     Social History:  Social History     Tobacco Use    Smoking status: Former Smoker     Last attempt to quit: 11/19/2008     Years since quitting: 10.0    Smokeless tobacco: Never Used   Substance Use Topics    Alcohol use: No    Drug use: No        Family History:  History reviewed.  No pertinent family history. Objective:   Vital Signs:    Visit Vitals  /70   Pulse 76   Temp 97.2 °F (36.2 °C)   Resp 16   Ht 5' 10\" (1.778 m)   Wt 78.7 kg (173 lb 8 oz)   SpO2 100%   BMI 24.89 kg/m²       O2 Device: Endotracheal tube, Ventilator   O2 Flow Rate (L/min): 2 l/min   Temp (24hrs), Av.9 °F (37.2 °C), Min:97.2 °F (36.2 °C), Max:100.5 °F (38.1 °C)       Patient Vitals for the past 8 hrs:   Temp Pulse Resp BP SpO2   18 0842 -- 76 16 -- 100 %   18 0800 97.2 °F (36.2 °C) -- -- -- --   18 0700 -- 81 16 133/70 100 %   18 0600 -- 76 16 119/65 100 %   18 0500 -- 79 16 110/63 100 %   18 0425 -- 95 22 -- 100 %   18 0400 98.4 °F (36.9 °C) 91 19 176/78 100 %   18 0300 -- (!) 110 22 168/73 100 %     Intake/Output:   Last shift:      No intake/output data recorded. Last 3 shifts: 12/10 1901 -  0700  In: 8135.6 [I.V.:8060.6]  Out: 2680 [Urine:1630; Drains:105]    Intake/Output Summary (Last 24 hours) at 2018 1004  Last data filed at 2018 0640  Gross per 24 hour   Intake 4214.96 ml   Output 1745 ml   Net 2469.96 ml       Ventilator Settings:  Mode Rate Tidal Volume Pressure FiO2 PEEP   Spontaneous, Pressure support   500 ml  6 cm H2O 40 % 5 cm H20     Peak airway pressure: 12 cm H2O    Minute ventilation: 9.4 l/min      ARDS network Guidelines: Lung protective strategy, Pl pressure goals less than or equal to 30. Meagher tube to suction at 20-30 cm Hg.   Maintain Meagher tube with 5-10ml air every 4 hours  Routine oral care every 4 hours  Daily sedation holiday and SBT evaluation starting at 6.00am.      Physical Exam:  General appearance - intubated, awake, following commands  Mental status - unable to assess  Eyes - pupils equal and reactive, extraocular eye movements intact, sclera anicteric  Mouth - mucous membranes moist, ETT in place  Neck - supple, no significant adenopathy  Chest - clear to auscultation, mild rales, clears with suction, symmetric air entry, mild tachypnea, no retractions or cyanosis  Heart - regular rate, regular rhythm, normal S1, S2, no murmurs, rubs, clicks or gallops. Abdomen - soft, tender, distended, non rigid, no masses or organomegaly  Neurological - Follows commands appropriately, equal strength bilaterally in upper and lower extremities. Musculoskeletal - no joint tenderness, deformity or swelling  Extremities - peripheral pulses normal, no pedal edema, no clubbing or cyanosis  Skin - normal coloration and turgor, no rashes, no suspicious skin lesions noted      Data:     Recent Results (from the past 24 hour(s))   GLUCOSE, POC    Collection Time: 12/11/18 11:26 AM   Result Value Ref Range    Glucose (POC) 244 (H) 70 - 110 mg/dL   CBC WITH AUTOMATED DIFF    Collection Time: 12/11/18  3:00 PM   Result Value Ref Range    WBC 8.4 4.6 - 13.2 K/uL    RBC 2.49 (L) 4.70 - 5.50 M/uL    HGB 7.4 (L) 13.0 - 16.0 g/dL    HCT 21.7 (L) 36.0 - 48.0 %    MCV 87.1 74.0 - 97.0 FL    MCH 29.7 24.0 - 34.0 PG    MCHC 34.1 31.0 - 37.0 g/dL    RDW 13.7 11.6 - 14.5 %    PLATELET 320 644 - 388 K/uL    MPV 9.3 9.2 - 11.8 FL    NEUTROPHILS 78 (H) 42 - 75 %    BAND NEUTROPHILS 10 (H) 0 - 5 %    LYMPHOCYTES 6 (L) 20 - 51 %    MONOCYTES 5 2 - 9 %    EOSINOPHILS 1 0 - 5 %    BASOPHILS 0 0 - 3 %    ABS. NEUTROPHILS 7.4 1.8 - 8.0 K/UL    ABS. LYMPHOCYTES 0.5 (L) 0.8 - 3.5 K/UL    ABS. MONOCYTES 0.4 0 - 1.0 K/UL    ABS. EOSINOPHILS 0.1 0.0 - 0.4 K/UL    ABS.  BASOPHILS 0.0 0.0 - 0.06 K/UL    DF MANUAL      PLATELET COMMENTS ADEQUATE PLATELETS      RBC COMMENTS NORMOCYTIC, NORMOCHROMIC      RBC COMMENTS SCHISTOCYTES  FEW        WBC COMMENTS DOHLE BODIES     METABOLIC PANEL, BASIC    Collection Time: 12/11/18  3:00 PM   Result Value Ref Range    Sodium 140 136 - 145 mmol/L    Potassium 3.9 3.5 - 5.5 mmol/L    Chloride 106 100 - 108 mmol/L    CO2 26 21 - 32 mmol/L    Anion gap 8 3.0 - 18 mmol/L    Glucose 214 (H) 74 - 99 mg/dL    BUN 37 (H) 7.0 - 18 MG/DL    Creatinine 1.75 (H) 0.6 - 1.3 MG/DL    BUN/Creatinine ratio 21 (H) 12 - 20      GFR est AA 47 (L) >60 ml/min/1.73m2    GFR est non-AA 39 (L) >60 ml/min/1.73m2    Calcium 7.3 (L) 8.5 - 10.1 MG/DL   MAGNESIUM    Collection Time: 12/11/18  3:00 PM   Result Value Ref Range    Magnesium 2.3 1.6 - 2.6 mg/dL   PHOSPHORUS    Collection Time: 12/11/18  3:00 PM   Result Value Ref Range    Phosphorus 3.2 2.5 - 4.9 MG/DL   HEPATIC FUNCTION PANEL    Collection Time: 12/11/18  3:00 PM   Result Value Ref Range    Protein, total 5.0 (L) 6.4 - 8.2 g/dL    Albumin 2.2 (L) 3.4 - 5.0 g/dL    Globulin 2.8 2.0 - 4.0 g/dL    A-G Ratio 0.8 0.8 - 1.7      Bilirubin, total 0.2 0.2 - 1.0 MG/DL    Bilirubin, direct 0.1 0.0 - 0.2 MG/DL    Alk.  phosphatase 40 (L) 45 - 117 U/L    AST (SGOT) 14 (L) 15 - 37 U/L    ALT (SGPT) 17 16 - 61 U/L   GLUCOSE, POC    Collection Time: 12/11/18  5:21 PM   Result Value Ref Range    Glucose (POC) 221 (H) 70 - 110 mg/dL   GLUCOSE, POC    Collection Time: 12/11/18 10:56 PM   Result Value Ref Range    Glucose (POC) 258 (H) 70 - 110 mg/dL   MAGNESIUM    Collection Time: 12/12/18  4:15 AM   Result Value Ref Range    Magnesium 2.5 1.6 - 2.6 mg/dL   PHOSPHORUS    Collection Time: 12/12/18  4:15 AM   Result Value Ref Range    Phosphorus 2.5 2.5 - 4.9 MG/DL   PROTHROMBIN TIME + INR    Collection Time: 12/12/18  4:15 AM   Result Value Ref Range    Prothrombin time 15.4 (H) 11.5 - 15.2 sec    INR 1.2 0.8 - 1.2     HEMOGLOBIN A1C WITH EAG    Collection Time: 12/12/18  4:15 AM   Result Value Ref Range    Hemoglobin A1c 6.3 (H) 4.2 - 5.6 %    Est. average glucose 134 mg/dL   CBC WITH AUTOMATED DIFF    Collection Time: 12/12/18  4:15 AM   Result Value Ref Range    WBC 9.7 4.6 - 13.2 K/uL    RBC 2.61 (L) 4.70 - 5.50 M/uL    HGB 7.9 (L) 13.0 - 16.0 g/dL    HCT 22.6 (L) 36.0 - 48.0 %    MCV 86.6 74.0 - 97.0 FL    MCH 30.3 24.0 - 34.0 PG    MCHC 35.0 31.0 - 37.0 g/dL    RDW 13.7 11.6 - 14.5 %    PLATELET 027 328 - 390 K/uL    MPV 10.4 9.2 - 11.8 FL    NEUTROPHILS 85 (H) 40 - 73 %    LYMPHOCYTES 8 (L) 21 - 52 %    MONOCYTES 7 3 - 10 %    EOSINOPHILS 0 0 - 5 %    BASOPHILS 0 0 - 2 %    ABS. NEUTROPHILS 8.2 (H) 1.8 - 8.0 K/UL    ABS. LYMPHOCYTES 0.8 (L) 0.9 - 3.6 K/UL    ABS. MONOCYTES 0.7 0.05 - 1.2 K/UL    ABS. EOSINOPHILS 0.0 0.0 - 0.4 K/UL    ABS. BASOPHILS 0.0 0.0 - 0.1 K/UL    DF AUTOMATED     METABOLIC PANEL, BASIC    Collection Time: 12/12/18  4:15 AM   Result Value Ref Range    Sodium 142 136 - 145 mmol/L    Potassium 3.5 3.5 - 5.5 mmol/L    Chloride 107 100 - 108 mmol/L    CO2 27 21 - 32 mmol/L    Anion gap 8 3.0 - 18 mmol/L    Glucose 184 (H) 74 - 99 mg/dL    BUN 32 (H) 7.0 - 18 MG/DL    Creatinine 1.32 (H) 0.6 - 1.3 MG/DL    BUN/Creatinine ratio 24 (H) 12 - 20      GFR est AA >60 >60 ml/min/1.73m2    GFR est non-AA 54 (L) >60 ml/min/1.73m2    Calcium 7.8 (L) 8.5 - 10.1 MG/DL   CALCIUM, IONIZED    Collection Time: 12/12/18  4:15 AM   Result Value Ref Range    Ionized Calcium 0.98 (L) 1.12 - 1.32 MMOL/L   HEPATIC FUNCTION PANEL    Collection Time: 12/12/18  4:15 AM   Result Value Ref Range    Protein, total 5.5 (L) 6.4 - 8.2 g/dL    Albumin 2.1 (L) 3.4 - 5.0 g/dL    Globulin 3.4 2.0 - 4.0 g/dL    A-G Ratio 0.6 (L) 0.8 - 1.7      Bilirubin, total 0.3 0.2 - 1.0 MG/DL    Bilirubin, direct 0.1 0.0 - 0.2 MG/DL    Alk. phosphatase 50 45 - 117 U/L    AST (SGOT) 19 15 - 37 U/L    ALT (SGPT) 17 16 - 61 U/L   POC G3    Collection Time: 12/12/18  4:36 AM   Result Value Ref Range    Device: VENT      FIO2 (POC) 0.50 %    pH (POC) 7.442 7.35 - 7.45      pCO2 (POC) 38.3 35.0 - 45.0 MMHG    pO2 (POC) 134 (H) 80 - 100 MMHG    HCO3 (POC) 26.1 (H) 22 - 26 MMOL/L    sO2 (POC) 99 (H) 92 - 97 %    Base excess (POC) 2 mmol/L    Mode ASSIST CONTROL      Tidal volume 500 ml    Set Rate 16 bpm    PEEP/CPAP (POC) 5.0 cmH2O    Allens test (POC) YES      Inspiratory Time 1.00 sec    Total resp.  rate 20      Site RIGHT RADIAL      Specimen type (POC) ARTERIAL      Performed by Mayela Huff     Volume control plus YES     GLUCOSE, POC    Collection Time: 12/12/18  5:40 AM   Result Value Ref Range    Glucose (POC) 194 (H) 70 - 110 mg/dL           Recent Labs     12/12/18  0436 12/11/18  0810   FIO2I 0.50 50   HCO3I 26.1* 23.3   PCO2I 38.3 42.5   PHI 7.442 7.349*   PO2I 134* 66*       Special Requests:   Date Value Ref Range Status   12/11/2018 NO SPECIAL REQUESTS   Preliminary   12/11/2018 NO SPECIAL REQUESTS   Preliminary     Culture result:   Date Value Ref Range Status   12/11/2018 CULTURE IN PROGRESS,FURTHER UPDATES TO FOLLOW   Preliminary   12/11/2018 RARE GRAM NEGATIVE RODS (A)   Preliminary   12/11/2018 RARE POSSIBLE 2ND GRAM NEGATIVE GRISELDA (A)   Preliminary   12/11/2018 RARE STREPTOCOCCI,NON HEMOLYTIC (A)   Preliminary   12/11/2018    Preliminary    CALLED TO AND CORRECTLY REPEATED BY:  DELL REECE, ICU, AT 0804 12/12/18 TO DRM         Telemetry: Sinus Tachycardia    ECHO   Estimated left ventricular ejection fraction is 56 - 60%. Visually measured ejection fraction. Normal left ventricular wall motion, no regional wall motion abnormality noted. Age-appropriate left ventricular diastolic function. · There is no evidence of pulmonary hypertension. ·   Imaging:  [x]I have personally reviewed the patients chest radiographs images and report     CXR Results  (Last 48 hours)               12/12/18 0525  XR CHEST PORT Final result    Impression:  IMPRESSION:       Increased atelectasis in the left lower lobe. .   Satisfactory line and tube placement       Narrative:  CHEST PORTABLE 0451 hours       CPT CODE: 15227       COMPARISON: December 11. INDICATIONS: ETT placement. FINDINGS:        Portable single view chest demonstrates:       Lungs: Multisegment atelectasis is present in the left lower lobe. .        Cardiac Silhouette And Mediastinal Contours: The cardiomediastinal contours are   unremarkable. Pleural Spaces: No pneumothorax or pleural effusion evident. Bones And Soft Tissues: Unremarkable for age. An endotracheal tube is present. The tip is 4 cm above the johanny. Nasogastric tube extends into the stomach. PICC line extends to the cavoatrial junction           12/11/18 0759  XR CHEST PORT Final result    Impression:  Impression:   1. High riding ET tube. Recommend advancing by approximately 3 cm. Narrative:  XR CHEST PORT       Indication: intubated       Comparison: Chest x-ray from 12/11/2018       Findings:   Right upper extremity PICC is unchanged in position. The endotracheal tube is   approximately 8 cm above the johanny, which is too high. NG tube is coiled in the   stomach. The lungs are clear. No effusion or pneumothorax. Cardiomediastinal   silhouette is normal in size. 12/11/18 0110  XR CHEST PORT Final result    Impression:  IMPRESSION:       Large volume pneumoperitoneum noted. Discussed with Dr. Tracey Sim, who was already   aware of finding. Prominent pulmonary interstitium may be simulated by low lung   volumes. Subtle vascular congestion or infiltrate not excluded. Lines/tubes as   above. Probable atelectasis in the left midlung. Narrative:  EXAMINATION: Chest single view       INDICATION: Abdominal distention       COMPARISON: None       FINDINGS: Single frontal view of the chest obtained. Feeding tube tip at level   of gastric antrum. Right arm PICC line tip at mid SVC level. Low lung volumes   limits evaluation. Borderline prominent cardiac silhouette. Slightly prominent   perihilar interstitium. Left midlung bandlike density, likely atelectasis. No   definite pneumothorax. No acute osseous findings. Large volume pneumoperitoneum   noted. Results from [place] encounter on 12/04/18   XR CHEST PORT    Narrative CHEST PORTABLE 0451 hours    CPT CODE: 15931    COMPARISON: December 11. INDICATIONS: ETT placement.     FINDINGS:     Portable single view chest demonstrates:    Lungs: Multisegment atelectasis is present in the left lower lobe. .     Cardiac Silhouette And Mediastinal Contours: The cardiomediastinal contours are  unremarkable. Pleural Spaces: No pneumothorax or pleural effusion evident. Bones And Soft Tissues: Unremarkable for age. An endotracheal tube is present. The tip is 4 cm above the johanny. Nasogastric tube extends into the stomach. PICC line extends to the cavoatrial junction      Impression IMPRESSION:    Increased atelectasis in the left lower lobe. .  Satisfactory line and tube placement       Results from Hospital Encounter encounter on 11/05/18   CT CHEST ABD PELV W CONT    Narrative CT CHEST ABDOMEN AND PELVIS WITH CONTRAST        COMPARISON:  August 12, 2008 . INDICATIONS:    Oroesophageal carcinoma with cough, GI bleeding, abnormal  retroperitoneum on MR. Following the uneventful administration of  oral contrast and  100 cc of Isovue  300 scanning of the chest, abdomen and pelvis is performed with a multislice  scanner. Coronal sagittal and axial reconstructions were created from the 3 D  data set. FINDINGS:     CT CHEST FINDINGS:    Thyroid/Base Of Neck: Normal.    Lungs: There is some bleb formation at the pulmonary apices. No nodule mass or acute  infiltrate is evident     . Pleural Spaces:  Unremarkable. Chest Wall:   No breast mass is evident. No axillary lymphadenopathy is evident. Mediastinum, Nidia, Great Vessels, Heart:  Unremarkable. .    CT ABDOMEN FINDINGS:     Liver/Biliary: Normal.    Spleen: Normal.    Adrenal Glands: Normal.    Kidneys: There is perinephric edema bilaterally. A cyst is seen in the anterior  right kidney measuring 0.9 cm. A cystic lesion in the left kidney extends into  the pelvic region measuring 6.3 x 4 cm. Punctate nonobstructive intrarenal  calculi are present.  There are a few sub centimeter low attenuation foci present  most likely representing small cysts but too small to confidently characterize. There is no hydronephrosis. Negative otherwise. .    Pancreas: Normal.    Stomach, Small Bowel, appendix, and Colon: The stomach is unremarkable. There is  dilation predominantly with gas of small bowel loops throughout the abdomen. This is diffuse. The appendix is normal. There is some distention of the colon  contains a moderate but unremarkable burden of stool. No obstructing process is  evident. .    There is no significant adenopathy. The abdominal aorta is unremarkable. The IVC is unremarkable. Peritoneal Spaces: There is no free fluid or free air. Abdominal Wall: No hernia or mass is evident. CT PELVIS FINDINGS:     Bladder: The bladder is unremarkable in appearance. Pelvic Small Bowel And Colon: Unremarkable. Lymph Nodes, Soft Tissues: There is no adenopathy. Inflammatory character  stranding extending from the kidneys does extend into the pelvic sidewalls along  the anterior pararenal spaces. This is relatively mild in severity. There is no  fluid collection or abscess. .    Free Fluid: Not present. Osseous Structures Of The Chest, Abdomen And Pelvis: The lumbar spinal canal is  relatively narrowed due to short pedicles. . Senescent changes noted consistent  with age. Impression IMPRESSION:     There is predominantly perinephric and retroperitoneal edema bilaterally. Infrequent causes for this are renal inflammation-most frequently either  infection or nephritis or pancreatitis. Some nonspecific stranding can be seen in the elderly and the finding here is  not definitely beyond the range. Diffuse distention of bowel loops predominantly with gas in a nonobstructive  pattern-the appearance favors adynamic ileus of indeterminate etiology.      .    All CT scans at this facility are performed using dose optimization technique as  appropriate to the performed exam, to include automated exposure control,  adjustment of the mA and/or kV according to patient's size (Including  appropriate matching for site-specific examinations), or use of iterative  reconstruction technique. IMPRESSION:   · Colon cancer now s/p colon resection 12/04/18 with post op ileus and anastomosis site leak on 12/11/18 - now s/p anastomotic leak repair with colostomy placement 12/11/18. · Failure to extubate post op - on mechanical ventilation  · Hypotension post op - resolved  · JOSE ANTONIO - improving  · DM2  · Non obstructive renal stone  · Hx HTN  · Hx GERD  · Hx Throat CA - has 1 vocal cord per chart   RECOMMENDATIONS:   Resp: Mech. Ventilated patients- aim to keep peak plateau pressure less than or equal to 30cm H2O. Titrate FiO2 for goal SPO2> 90%,VAP prevention bundle, head of the bed at 30' all times. Daily sedation holiday and assessment for weaning with SBT, plan for SBT with extubation today. Bronchodilators PRN, pulmonary hygiene care, Aspiration precautions, Keep HOB >30 degrees  ID: Continue Antibiotics: Zosyn, trend WBCs and Temperature. F/O on cultures  Heme/Onc: Aleukocytosis, H/H, and platelets are stable, trend CBC. CVS: Hypotension improved now off vasopressors. Continue to monitor. Renal: Mckeon to BSD, consider discontinue once ok with renal, Trend Renal indices, Renal following for JOSE ANTONIO. GI: SUP, Trend LFTs, Zofran PRN for N/V   Metabolic: Glycemic control - SSI - may discontinue once eating. Neuro/Sedation/Pain: PRN pain medications, no sedation. Stress ulcer prophylaxis: Pepcid, discontinue once extubated. DVT prophylaxis: Heparin SC per surgery  AM labs: Re-check Labs today and then Daily CBC BMP Mag and Phos, ABG and chest Xray in the am.   Diet: Keep NPO, RN to perform bedside swallow evaluation once extubated. Lines/Devices: R PICC, Mckeon   Central Line Bundle Followed , Mckeon Bundle Followed and Vent Bundle Followed, Vent Day 01    Further recommendations will be based on the patient's response to recommended treatment and results of the investigation ordered. [x]See my orders for details    My assessment, plan of care, findings, medications, side effects etc were discussed with:  [x]nursing []PT/OT    []respiratory therapy [x]Dr. Lenore Norton    []family []Patient       Glenda Marshall [facility]     Pulmonary, [address] [facility]          I saw and evaluated the patient, performing the key elements of the service. I discussed the findings, assessment and plan with the NP and agree with the NP's findings and plan as documented in the NP's note. All edits and changes made above or are mentioned in my addendum. Total of 45 min critical care time spent at bedside during the course of care providing evaluation,management and care decisions and ordering appropriate treatment related to critical care problems exclusive of procedures. The reason for providing this level of medical care for this critically ill patient was due a critical illness that impaired one or more vital organ systems such that there was a high probability of imminent or life threatening deterioration in the patients condition. This care involved high complexity decision making to assess, manipulate, and support vital system functions, to treat this degree vital organ system failure and to prevent further life threatening deterioration of the patients condition. 50-year-old male who originally presented to [facility] for robotic sigmoid colectomy for rectal cancer which was performed on 12/4/18. Patient's postoperative course was complicated by ileus and JOSE ANTONIO. Nephrology was consulted on 12/10/18. NG tube was placed for decompression due to abdominal distention. Last night patient developed hypotension and became febrile and tachycardic so a rapid response was called. Chest x-ray at that time showed a large amount of free air under the diaphragms. Patient was emergently taken to the OR for an anastomotic leak.   Ex lap with primary repair of anastomotic leak, omental patch, and diverting loop ileostomy was done by Dr. Luanne Becker. I discussed the case with Dr. Luanne Becker yesterday and he reports there was some serosanguineous fluid in the abdomen, but it did not appear to be infected, no fecal material or purulent material was found during the ex lap, washout was performed. Fluid cultures today growing gram-negative rods-patient covered on broad-spectrum antibiotics with Zosyn monotherapy given good gram-negative coverage and anaerobic coverage. Final culture and sensitivities are pending. Patient this morning tolerated SBT well, with much better blood pressure, not requiring vasopressors, and patient's heart rate was in normal sinus rhythm with controlled ventricular rate, so patient was extubated without event. NG tube remains in place, on low intermittent suction per general surgery's recommendation. Patient developed some acute encephalopathy, appears to be acute delirium which is likely multifactorial in nature given patient was intubated for about 30 hours and is undergone 2 surgeries in the last 9 days, also patient has severe sepsis. Patient was also placed on TPN, we will continue this feeding until patient cleared by surgery-Dr. Luanne Becker. I have updated the patient's family including wife and daughter at bedside.       Adriana Olmedo MD/MPH     Pulmonary, Critical Care Medicine  [facility]

## 2018-12-12 NOTE — DIABETES MGMT
Glycemic Control: Pt discussed in interdisciplinary rounds. Blood glucose elevated above targets. 10 units of Regular insulin being added to TPN. Monitor need to adjust as steroids tapered.      Claritza Nicolas, [address], [address] [facility]

## 2018-12-12 NOTE — PROGRESS NOTES
Bedside and Verbal shift change report given to DEPARTMENT [facility] MEDICAL [place], RN and Marce Jenkins (oncoming nurse) by Liza Cline RN (offgoing nurse). Report included the following information SBAR, Kardex, Intake/Output, MAR and Recent Results.

## 2018-12-13 ENCOUNTER — APPOINTMENT (OUTPATIENT)
Dept: GENERAL RADIOLOGY | Age: 68
DRG: 330 | End: 2018-12-13
Attending: NURSE PRACTITIONER
Payer: MEDICARE

## 2018-12-13 ENCOUNTER — APPOINTMENT (OUTPATIENT)
Dept: GENERAL RADIOLOGY | Age: 68
DRG: 330 | End: 2018-12-13
Attending: COLON & RECTAL SURGERY
Payer: MEDICARE

## 2018-12-13 LAB
ANION GAP SERPL CALC-SCNC: 6 MMOL/L (ref 3–18)
ANION GAP SERPL CALC-SCNC: 7 MMOL/L (ref 3–18)
BACTERIA SPEC CULT: NORMAL
BUN SERPL-MCNC: 22 MG/DL (ref 7–18)
BUN SERPL-MCNC: 23 MG/DL (ref 7–18)
BUN/CREAT SERPL: 22 (ref 12–20)
BUN/CREAT SERPL: 24 (ref 12–20)
CA-I SERPL-SCNC: 1.13 MMOL/L (ref 1.12–1.32)
CALCIUM SERPL-MCNC: 8.6 MG/DL (ref 8.5–10.1)
CALCIUM SERPL-MCNC: 8.8 MG/DL (ref 8.5–10.1)
CHLORIDE SERPL-SCNC: 106 MMOL/L (ref 100–108)
CHLORIDE SERPL-SCNC: 108 MMOL/L (ref 100–108)
CK MB CFR SERPL CALC: ABNORMAL % (ref 0–4)
CK MB SERPL-MCNC: <1 NG/ML (ref 5–25)
CK SERPL-CCNC: 59 U/L (ref 39–308)
CO2 SERPL-SCNC: 28 MMOL/L (ref 21–32)
CO2 SERPL-SCNC: 28 MMOL/L (ref 21–32)
CREAT SERPL-MCNC: 0.96 MG/DL (ref 0.6–1.3)
CREAT SERPL-MCNC: 0.98 MG/DL (ref 0.6–1.3)
ERYTHROCYTE [DISTWIDTH] IN BLOOD BY AUTOMATED COUNT: 13.7 % (ref 11.6–14.5)
ERYTHROCYTE [DISTWIDTH] IN BLOOD BY AUTOMATED COUNT: 13.8 % (ref 11.6–14.5)
GLUCOSE BLD STRIP.AUTO-MCNC: 186 MG/DL (ref 70–110)
GLUCOSE BLD STRIP.AUTO-MCNC: 239 MG/DL (ref 70–110)
GLUCOSE BLD STRIP.AUTO-MCNC: 243 MG/DL (ref 70–110)
GLUCOSE SERPL-MCNC: 218 MG/DL (ref 74–99)
GLUCOSE SERPL-MCNC: 226 MG/DL (ref 74–99)
HCT VFR BLD AUTO: 23.3 % (ref 36–48)
HCT VFR BLD AUTO: 27.2 % (ref 36–48)
HGB BLD-MCNC: 8 G/DL (ref 13–16)
HGB BLD-MCNC: 9.1 G/DL (ref 13–16)
INR PPP: 1.1 (ref 0.8–1.2)
LACTATE SERPL-SCNC: 1.5 MMOL/L (ref 0.4–2)
MAGNESIUM SERPL-MCNC: 1.7 MG/DL (ref 1.6–2.6)
MAGNESIUM SERPL-MCNC: 2 MG/DL (ref 1.6–2.6)
MCH RBC QN AUTO: 28.3 PG (ref 24–34)
MCH RBC QN AUTO: 29.6 PG (ref 24–34)
MCHC RBC AUTO-ENTMCNC: 33.5 G/DL (ref 31–37)
MCHC RBC AUTO-ENTMCNC: 34.3 G/DL (ref 31–37)
MCV RBC AUTO: 84.7 FL (ref 74–97)
MCV RBC AUTO: 86.3 FL (ref 74–97)
PHOSPHATE SERPL-MCNC: 1.6 MG/DL (ref 2.5–4.9)
PHOSPHATE SERPL-MCNC: 3.9 MG/DL (ref 2.5–4.9)
PLATELET # BLD AUTO: 194 K/UL (ref 135–420)
PLATELET # BLD AUTO: 197 K/UL (ref 135–420)
PMV BLD AUTO: 9.5 FL (ref 9.2–11.8)
PMV BLD AUTO: 9.7 FL (ref 9.2–11.8)
POTASSIUM SERPL-SCNC: 2.9 MMOL/L (ref 3.5–5.5)
POTASSIUM SERPL-SCNC: 3.3 MMOL/L (ref 3.5–5.5)
PROTHROMBIN TIME: 13.7 SEC (ref 11.5–15.2)
RBC # BLD AUTO: 2.7 M/UL (ref 4.7–5.5)
RBC # BLD AUTO: 3.21 M/UL (ref 4.7–5.5)
SERVICE CMNT-IMP: NORMAL
SODIUM SERPL-SCNC: 141 MMOL/L (ref 136–145)
SODIUM SERPL-SCNC: 142 MMOL/L (ref 136–145)
TROPONIN I SERPL-MCNC: 0.16 NG/ML (ref 0–0.04)
WBC # BLD AUTO: 13.8 K/UL (ref 4.6–13.2)
WBC # BLD AUTO: 15.4 K/UL (ref 4.6–13.2)

## 2018-12-13 PROCEDURE — 74011250636 HC RX REV CODE- 250/636: Performed by: NURSE PRACTITIONER

## 2018-12-13 PROCEDURE — 65610000006 HC RM INTENSIVE CARE

## 2018-12-13 PROCEDURE — 85027 COMPLETE CBC AUTOMATED: CPT

## 2018-12-13 PROCEDURE — 93005 ELECTROCARDIOGRAM TRACING: CPT

## 2018-12-13 PROCEDURE — 74011000250 HC RX REV CODE- 250: Performed by: NURSE PRACTITIONER

## 2018-12-13 PROCEDURE — 85610 PROTHROMBIN TIME: CPT

## 2018-12-13 PROCEDURE — 74011000258 HC RX REV CODE- 258: Performed by: PHYSICIAN ASSISTANT

## 2018-12-13 PROCEDURE — 74011250636 HC RX REV CODE- 250/636

## 2018-12-13 PROCEDURE — 74011000258 HC RX REV CODE- 258: Performed by: COLON & RECTAL SURGERY

## 2018-12-13 PROCEDURE — 77030037870 HC GLD SHT PREVALON SAGE -B

## 2018-12-13 PROCEDURE — 84100 ASSAY OF PHOSPHORUS: CPT

## 2018-12-13 PROCEDURE — 74011000250 HC RX REV CODE- 250: Performed by: PHYSICIAN ASSISTANT

## 2018-12-13 PROCEDURE — 74011250636 HC RX REV CODE- 250/636: Performed by: PHYSICIAN ASSISTANT

## 2018-12-13 PROCEDURE — 74011000250 HC RX REV CODE- 250: Performed by: COLON & RECTAL SURGERY

## 2018-12-13 PROCEDURE — 71045 X-RAY EXAM CHEST 1 VIEW: CPT

## 2018-12-13 PROCEDURE — 74011000258 HC RX REV CODE- 258: Performed by: NURSE PRACTITIONER

## 2018-12-13 PROCEDURE — 82330 ASSAY OF CALCIUM: CPT

## 2018-12-13 PROCEDURE — 83605 ASSAY OF LACTIC ACID: CPT

## 2018-12-13 PROCEDURE — 36592 COLLECT BLOOD FROM PICC: CPT

## 2018-12-13 PROCEDURE — 82550 ASSAY OF CK (CPK): CPT

## 2018-12-13 PROCEDURE — 74011636637 HC RX REV CODE- 636/637: Performed by: INTERNAL MEDICINE

## 2018-12-13 PROCEDURE — 80048 BASIC METABOLIC PNL TOTAL CA: CPT

## 2018-12-13 PROCEDURE — 77030008771 HC TU NG SALEM SUMP -A

## 2018-12-13 PROCEDURE — 82962 GLUCOSE BLOOD TEST: CPT

## 2018-12-13 PROCEDURE — 74011250636 HC RX REV CODE- 250/636: Performed by: INTERNAL MEDICINE

## 2018-12-13 PROCEDURE — 74011250636 HC RX REV CODE- 250/636: Performed by: COLON & RECTAL SURGERY

## 2018-12-13 PROCEDURE — 83735 ASSAY OF MAGNESIUM: CPT

## 2018-12-13 RX ORDER — HYDRALAZINE HYDROCHLORIDE 20 MG/ML
INJECTION INTRAMUSCULAR; INTRAVENOUS
Status: COMPLETED
Start: 2018-12-13 | End: 2018-12-13

## 2018-12-13 RX ORDER — HALOPERIDOL 5 MG/ML
INJECTION INTRAMUSCULAR
Status: COMPLETED
Start: 2018-12-13 | End: 2018-12-13

## 2018-12-13 RX ORDER — MAGNESIUM SULFATE HEPTAHYDRATE 40 MG/ML
2 INJECTION, SOLUTION INTRAVENOUS ONCE
Status: COMPLETED | OUTPATIENT
Start: 2018-12-13 | End: 2018-12-14

## 2018-12-13 RX ORDER — POTASSIUM CHLORIDE 14.9 MG/ML
20 INJECTION INTRAVENOUS ONCE
Status: COMPLETED | OUTPATIENT
Start: 2018-12-14 | End: 2018-12-14

## 2018-12-13 RX ORDER — HYDROCORTISONE SODIUM SUCCINATE 100 MG/2ML
25 INJECTION, POWDER, FOR SOLUTION INTRAMUSCULAR; INTRAVENOUS ONCE
Status: DISCONTINUED | OUTPATIENT
Start: 2018-12-14 | End: 2018-12-13

## 2018-12-13 RX ORDER — LABETALOL HCL 20 MG/4 ML
20 SYRINGE (ML) INTRAVENOUS ONCE
Status: COMPLETED | OUTPATIENT
Start: 2018-12-13 | End: 2018-12-13

## 2018-12-13 RX ORDER — HALOPERIDOL 5 MG/ML
5 INJECTION INTRAMUSCULAR ONCE
Status: COMPLETED | OUTPATIENT
Start: 2018-12-13 | End: 2018-12-13

## 2018-12-13 RX ORDER — LABETALOL HCL 20 MG/4 ML
SYRINGE (ML) INTRAVENOUS
Status: COMPLETED
Start: 2018-12-13 | End: 2018-12-13

## 2018-12-13 RX ORDER — LABETALOL HCL 20 MG/4 ML
10 SYRINGE (ML) INTRAVENOUS ONCE
Status: DISCONTINUED | OUTPATIENT
Start: 2018-12-13 | End: 2018-12-13

## 2018-12-13 RX ADMIN — HYDROCORTISONE SODIUM SUCCINATE 50 MG: 100 INJECTION, POWDER, FOR SOLUTION INTRAMUSCULAR; INTRAVENOUS at 09:27

## 2018-12-13 RX ADMIN — DEXMEDETOMIDINE HYDROCHLORIDE 0.6 MCG/KG/HR: 100 INJECTION, SOLUTION INTRAVENOUS at 05:37

## 2018-12-13 RX ADMIN — HALOPERIDOL LACTATE 5 MG: 5 INJECTION, SOLUTION INTRAMUSCULAR at 00:08

## 2018-12-13 RX ADMIN — HYDROMORPHONE HYDROCHLORIDE 0.5 MG: 1 INJECTION, SOLUTION INTRAMUSCULAR; INTRAVENOUS; SUBCUTANEOUS at 23:18

## 2018-12-13 RX ADMIN — HYDRALAZINE HYDROCHLORIDE 10 MG: 20 INJECTION INTRAMUSCULAR; INTRAVENOUS at 16:55

## 2018-12-13 RX ADMIN — HYDROMORPHONE HYDROCHLORIDE 0.5 MG: 1 INJECTION, SOLUTION INTRAMUSCULAR; INTRAVENOUS; SUBCUTANEOUS at 16:19

## 2018-12-13 RX ADMIN — MAGNESIUM SULFATE IN WATER 2 G: 40 INJECTION, SOLUTION INTRAVENOUS at 23:04

## 2018-12-13 RX ADMIN — HEPARIN SODIUM 5000 UNITS: 5000 INJECTION, SOLUTION INTRAVENOUS; SUBCUTANEOUS at 16:22

## 2018-12-13 RX ADMIN — LABETALOL HYDROCHLORIDE 20 MG: 5 INJECTION, SOLUTION INTRAVENOUS at 22:00

## 2018-12-13 RX ADMIN — DIPHENHYDRAMINE HYDROCHLORIDE 25 MG: 50 INJECTION, SOLUTION INTRAMUSCULAR; INTRAVENOUS at 00:09

## 2018-12-13 RX ADMIN — HYDRALAZINE HYDROCHLORIDE 10 MG: 20 INJECTION INTRAMUSCULAR; INTRAVENOUS at 19:31

## 2018-12-13 RX ADMIN — HEPARIN SODIUM 5000 UNITS: 5000 INJECTION, SOLUTION INTRAVENOUS; SUBCUTANEOUS at 08:26

## 2018-12-13 RX ADMIN — CALCIUM GLUCONATE: 94 INJECTION, SOLUTION INTRAVENOUS at 20:03

## 2018-12-13 RX ADMIN — PIPERACILLIN SODIUM AND TAZOBACTAM SODIUM 3.38 G: 3; .375 INJECTION, POWDER, LYOPHILIZED, FOR SOLUTION INTRAVENOUS at 16:17

## 2018-12-13 RX ADMIN — PIPERACILLIN SODIUM AND TAZOBACTAM SODIUM 3.38 G: 3; .375 INJECTION, POWDER, LYOPHILIZED, FOR SOLUTION INTRAVENOUS at 08:26

## 2018-12-13 RX ADMIN — HEPARIN SODIUM 5000 UNITS: 5000 INJECTION, SOLUTION INTRAVENOUS; SUBCUTANEOUS at 00:09

## 2018-12-13 RX ADMIN — PIPERACILLIN SODIUM AND TAZOBACTAM SODIUM 3.38 G: 3; .375 INJECTION, POWDER, LYOPHILIZED, FOR SOLUTION INTRAVENOUS at 00:10

## 2018-12-13 RX ADMIN — PIPERACILLIN SODIUM AND TAZOBACTAM SODIUM 3.38 G: 3; .375 INJECTION, POWDER, LYOPHILIZED, FOR SOLUTION INTRAVENOUS at 19:42

## 2018-12-13 RX ADMIN — HYDRALAZINE HYDROCHLORIDE 10 MG: 20 INJECTION INTRAMUSCULAR; INTRAVENOUS at 00:09

## 2018-12-13 RX ADMIN — INSULIN LISPRO 3 UNITS: 100 INJECTION, SOLUTION INTRAVENOUS; SUBCUTANEOUS at 00:16

## 2018-12-13 RX ADMIN — HYDROMORPHONE HYDROCHLORIDE 0.5 MG: 1 INJECTION, SOLUTION INTRAMUSCULAR; INTRAVENOUS; SUBCUTANEOUS at 10:12

## 2018-12-13 RX ADMIN — HYDRALAZINE HYDROCHLORIDE 10 MG: 20 INJECTION INTRAMUSCULAR; INTRAVENOUS at 07:05

## 2018-12-13 RX ADMIN — POTASSIUM PHOSPHATE, MONOBASIC AND POTASSIUM PHOSPHATE, DIBASIC: 224; 236 INJECTION, SOLUTION INTRAVENOUS at 09:28

## 2018-12-13 RX ADMIN — HALOPERIDOL 5 MG: 5 INJECTION INTRAMUSCULAR at 00:08

## 2018-12-13 RX ADMIN — INSULIN LISPRO 6 UNITS: 100 INJECTION, SOLUTION INTRAVENOUS; SUBCUTANEOUS at 05:05

## 2018-12-13 RX ADMIN — INSULIN LISPRO 6 UNITS: 100 INJECTION, SOLUTION INTRAVENOUS; SUBCUTANEOUS at 16:17

## 2018-12-13 RX ADMIN — SODIUM CHLORIDE 5 MG/HR: 900 INJECTION, SOLUTION INTRAVENOUS at 22:08

## 2018-12-13 NOTE — ROUTINE PROCESS
Discussed with Dr Wilner Swenson about D/C precedex drip and need something instead when done. MD will review.

## 2018-12-13 NOTE — PROGRESS NOTES
attended the interdisciplinary rounds for Shira Hsieh, who is a 80 y.o.,male. Patients Primary Language is: [place]. According to the patients EMR [employer] Affiliation is: [facility].     The reason the Patient came to the hospital is:   Patient Active Problem List    Diagnosis Date Noted    Colon cancer [facility]) 12/04/2018    Colon polyps 11/12/2018    Hemorrhoids 11/12/2018    Debility 11/05/2018    Leukocytosis 11/05/2018    Intractable low back pain 11/05/2018          Plan:  Rafy Levy will continue to follow and will provide pastoral care on an as needed/requested basis.  recommends bedside caregivers page  on duty if patient shows signs of acute spiritual or emotional distress.     [address]  Board Certified [address] [place] [address]   [phone]

## 2018-12-13 NOTE — PROGRESS NOTES
[facility]   Pulmonary, Critical Care, and Sleep Medicine     ICU Patient Progress Note    Name: Deanna Muñoz   : 1950   MRN: [id number]   Date: 2018      Subjective/History:   80 y.o. male admitted to the hospital following robotic sigmoid colectomy for cancer, on 18; developed mild post-operative ileus and taken emergently to OR for repair of anastomotic leak, omental patch and diverting loop ileostomy on . Transferred to ICU for vent management following second surgery. Extubated .     18  No new events overnight. Following extubation, pt was noted to have intermittent confusion and agitation requiring precedex gtt to be started. Pt remains on precedex gtt - able to follow commands, answer some questions however not still intermittently confused and agitated. Hemodynamically stable; afebrile. SBP in 160-170 range. Review of Systems:  Review of systems not obtained due to patient factors.     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     Chest pain, unspecified     Diabetes ([facility] [address]     Essential hypertension, benign     GERD (gastroesophageal reflux disease)     Nonspecific abnormal electrocardiogram (ECG) (EKG)         Past Surgical History:  Past Surgical History:   Procedure Laterality Date    COLONOSCOPY N/A 2018    COLONOSCOPY with Polypectomies, Bx's, Injection, Tattooing & Clip Placement x 3 performed by Ant Galindo MD at [facility]  2018         COLONOSCOPY,NATALIA Reich [place]  2018         ENDOSCOPIC MUCOSAL RESECT  2018         EXPLORATORY OF ABDOMEN N/A 2018    Dr. Brittanie Noe    HX COLONOSCOPY      HX HEENT      Throat Ca - 1 vocal chord removed     HX HEENT      eye surgery muscles    LAP,SURG,COLECTOMY, PARTIAL, W/ANAST N/A 2018    Dr. Brittanie Noe        Medications:  Prior to Admission medications    Medication Sig Start Date End Date Taking? Authorizing Provider   naloxegol (MOVANTIK) 25 mg tab tablet Take  by mouth Daily (before breakfast). Yes Provider, Historical   multivitamin, tx-iron-ca-min (THERA-M W/ IRON) 9 mg iron-400 mcg tab tablet Take 1 Tab by mouth daily. Yes Provider, Historical   latanoprost (XALATAN) 0.005 % ophthalmic solution Administer 1 Drop to both eyes nightly. Yes Provider, Historical   beclomethasone dipropionate (QNASL) 80 mcg/actuation HFAA 80 mcg by Nasal route daily. Directions on at home label are as follows: Use 2 sprays in each Nostril Daily   Yes Sarah Bey MD   metFORMIN (GLUCOPHAGE) 1,000 mg tablet Take 1,000 mg by mouth two (2) times daily (with meals). do not take morning of surgery (12/4/180. Yes Provider, Historical   losartan (COZAAR) 50 mg tablet Take 50 mg by mouth daily. Yes Provider, Historical   nebivolol (BYSTOLIC) 10 mg tablet Take 10 mg by mouth daily. Yes Provider, Historical   omeprazole (PRILOSEC) 20 mg capsule Take 20 mg by mouth daily. Yes Provider, Historical   Dexlansoprazole 60 mg CpDB Take 1 Cap by mouth daily. Yes Provider, Historical   calcium-cholecalciferol, d3, 600-125 mg-unit tab Take 1 Tab by mouth daily. Yes Provider, Historical   omega-3 fatty acids-vitamin e 1,000 mg cap Take 1 Cap by mouth daily. Yes Provider, Historical   IRON, FERROUS SULFATE, PO Take  by mouth. 3 times a week   Yes Provider, Historical   oxyCODONE-acetaminophen (PERCOCET 10)  mg per tablet Take  by mouth. Provider, Historical   meloxicam (MOBIC) 15 mg tablet Take 15 mg by mouth daily. Provider, Historical   oxyCODONE-acetaminophen (PERCOCET) 5-325 mg per tablet Take 1 Tab by mouth every six (6) hours as needed. Max Daily Amount: 4 Tabs. Patient taking differently: Take 1 Tab by mouth every six (6) hours as needed for Pain.  11/13/18   Sabrina Escobar MD   cyclobenzaprine (FLEXERIL) 5 mg tablet Take 1 Tab by mouth three (3) times daily as needed for Muscle Spasm(s). 18   Bushra Hinson MD   psyllium husk (METAMUCIL) 0.4 gram cap Take 1 Cap by mouth daily. 18   Bushra Hinson MD   Aspirin, Buffered 81 mg tab Take 1 Tab by mouth daily. stop 7 days prior to surgery (18). Provider, Historical       Current Facility-Administered Medications   Medication Dose Route Frequency    potassium phosphate 20 mmol in 0.9% sodium chloride 250 mL infusion   IntraVENous ONCE    TPN ADULT - CENTRAL   IntraVENous CONTINUOUS    fluticasone (FLONASE) 50 mcg/actuation nasal spray 2 Spray  2 Spray Both Nostrils DAILY    insulin lispro (HUMALOG) injection   SubCUTAneous Q6H    piperacillin-tazobactam (ZOSYN) 3.375 g in 0.9% sodium chloride (MBP/ADV) 100 mL MBP  3.375 g IntraVENous Q6H    latanoprost (XALATAN) 0.005 % ophthalmic solution 1 Drop  1 Drop Both Eyes QPM    heparin (porcine) injection 5,000 Units  5,000 Units SubCUTAneous Q8H       Allergy:  No Known Allergies     Social History:  Social History     Tobacco Use    Smoking status: Former Smoker     Last attempt to quit: 2008     Years since quitting: 10.0    Smokeless tobacco: Never Used   Substance Use Topics    Alcohol use: No    Drug use: No        Family History:  History reviewed. No pertinent family history. Objective:   Vital Signs:    Visit Vitals  /83   Pulse 86   Temp 97.8 °F (36.6 °C)   Resp 21   Ht 5' 10\" (1.778 m)   Wt 78.7 kg (173 lb 8 oz)   SpO2 100%   BMI 24.89 kg/m²       O2 Device: Room air   O2 Flow Rate (L/min): 3 l/min   Temp (24hrs), Av.9 °F (36.6 °C), Min:97.5 °F (36.4 °C), Max:98.2 °F (36.8 °C)       Patient Vitals for the past 8 hrs:   Temp Pulse Resp BP SpO2   18 0800 97.8 °F (36.6 °C) -- -- -- --   18 0600 -- 86 21 184/83 100 %   18 0504 -- 89 23 177/84 100 %   18 0500 -- 93 24 -- 100 %     Intake/Output:   Last shift:      No intake/output data recorded.   Last 3 shifts:  1901 - 12/13 0700  In: 5076.4 [I.V.:5056.4]  Out: 1293 [Urine:2125; Drains:153]    Intake/Output Summary (Last 24 hours) at 12/13/2018 1243  Last data filed at 12/13/2018 0600  Gross per 24 hour   Intake 2510.4 ml   Output 1828 ml   Net 682.4 ml       Physical Exam:  General appearance - awake, alert with intermittent confusion  Eyes - pupils equal and reactive, extraocular eye movements intact, sclera anicteric  Mouth - mucous membranes dry  Neck - supple, no significant adenopathy  Chest - symmetrical chest expansion, no accessory muscle use; clear breath sounds, no wheezing  Heart - regular rate, regular rhythm, normal S1, S2, no murmurs, rubs, clicks or gallops. Abdomen - soft, distended, + ostomy with pink mucosa  Neurological - Follows commands appropriately, equal strength bilaterally in upper and lower extremities.    Musculoskeletal - no joint tenderness, deformity or swelling  Extremities - peripheral pulses normal, no pedal edema, no clubbing or cyanosis  Skin - normal color and turgor, no rashes, no suspicious skin lesions noted      Data:     Recent Results (from the past 24 hour(s))   MAGNESIUM    Collection Time: 12/12/18  2:50 PM   Result Value Ref Range    Magnesium 2.3 1.6 - 2.6 mg/dL   METABOLIC PANEL, BASIC    Collection Time: 12/12/18  2:50 PM   Result Value Ref Range    Sodium 141 136 - 145 mmol/L    Potassium 3.7 3.5 - 5.5 mmol/L    Chloride 109 (H) 100 - 108 mmol/L    CO2 28 21 - 32 mmol/L    Anion gap 4 3.0 - 18 mmol/L    Glucose 178 (H) 74 - 99 mg/dL    BUN 29 (H) 7.0 - 18 MG/DL    Creatinine 0.99 0.6 - 1.3 MG/DL    BUN/Creatinine ratio 29 (H) 12 - 20      GFR est AA >60 >60 ml/min/1.73m2    GFR est non-AA >60 >60 ml/min/1.73m2    Calcium 7.8 (L) 8.5 - 10.1 MG/DL   CBC WITH AUTOMATED DIFF    Collection Time: 12/12/18  2:50 PM   Result Value Ref Range    WBC 12.2 4.6 - 13.2 K/uL    RBC 2.55 (L) 4.70 - 5.50 M/uL    HGB 7.5 (L) 13.0 - 16.0 g/dL    HCT 22.2 (L) 36.0 - 48.0 %    MCV 87.1 74.0 - 97.0 FL    MCH 29.4 24.0 - 34.0 PG    MCHC 33.8 31.0 - 37.0 g/dL    RDW 13.7 11.6 - 14.5 %    PLATELET 610 704 - 159 K/uL    MPV 9.7 9.2 - 11.8 FL    NEUTROPHILS 83 (H) 40 - 73 %    LYMPHOCYTES 9 (L) 21 - 52 %    MONOCYTES 8 3 - 10 %    EOSINOPHILS 0 0 - 5 %    BASOPHILS 0 0 - 2 %    ABS. NEUTROPHILS 10.1 (H) 1.8 - 8.0 K/UL    ABS. LYMPHOCYTES 1.0 0.9 - 3.6 K/UL    ABS. MONOCYTES 1.0 0.05 - 1.2 K/UL    ABS. EOSINOPHILS 0.1 0.0 - 0.4 K/UL    ABS.  BASOPHILS 0.0 0.0 - 0.1 K/UL    DF AUTOMATED     GLUCOSE, POC    Collection Time: 12/12/18  3:21 PM   Result Value Ref Range    Glucose (POC) 203 (H) 70 - 110 mg/dL   CALCIUM, IONIZED    Collection Time: 12/12/18  4:30 PM   Result Value Ref Range    Ionized Calcium 1.04 (L) 1.12 - 1.32 MMOL/L   GLUCOSE, POC    Collection Time: 12/13/18 12:15 AM   Result Value Ref Range    Glucose (POC) 186 (H) 70 - 110 mg/dL   CBC W/O DIFF    Collection Time: 12/13/18  3:50 AM   Result Value Ref Range    WBC 15.4 (H) 4.6 - 13.2 K/uL    RBC 2.70 (L) 4.70 - 5.50 M/uL    HGB 8.0 (L) 13.0 - 16.0 g/dL    HCT 23.3 (L) 36.0 - 48.0 %    MCV 86.3 74.0 - 97.0 FL    MCH 29.6 24.0 - 34.0 PG    MCHC 34.3 31.0 - 37.0 g/dL    RDW 13.7 11.6 - 14.5 %    PLATELET 769 107 - 761 K/uL    MPV 9.7 9.2 - 11.8 FL   MAGNESIUM    Collection Time: 12/13/18  3:50 AM   Result Value Ref Range    Magnesium 2.0 1.6 - 2.6 mg/dL   PHOSPHORUS    Collection Time: 12/13/18  3:50 AM   Result Value Ref Range    Phosphorus 1.6 (L) 2.5 - 4.9 MG/DL   PROTHROMBIN TIME + INR    Collection Time: 12/13/18  3:50 AM   Result Value Ref Range    Prothrombin time 13.7 11.5 - 15.2 sec    INR 1.1 0.8 - 1.2     METABOLIC PANEL, BASIC    Collection Time: 12/13/18  3:50 AM   Result Value Ref Range    Sodium 142 136 - 145 mmol/L    Potassium 3.3 (L) 3.5 - 5.5 mmol/L    Chloride 108 100 - 108 mmol/L    CO2 28 21 - 32 mmol/L    Anion gap 6 3.0 - 18 mmol/L    Glucose 218 (H) 74 - 99 mg/dL    BUN 22 (H) 7.0 - 18 MG/DL    Creatinine 0.98 0.6 - 1.3 MG/DL BUN/Creatinine ratio 22 (H) 12 - 20      GFR est AA >60 >60 ml/min/1.73m2    GFR est non-AA >60 >60 ml/min/1.73m2    Calcium 8.6 8.5 - 10.1 MG/DL   LACTIC ACID    Collection Time: 12/13/18  3:50 AM   Result Value Ref Range    Lactic acid 1.5 0.4 - 2.0 MMOL/L   CALCIUM, IONIZED    Collection Time: 12/13/18  3:50 AM   Result Value Ref Range    Ionized Calcium 1.13 1.12 - 1.32 MMOL/L   GLUCOSE, POC    Collection Time: 12/13/18 12:17 PM   Result Value Ref Range    Glucose (POC) 243 (H) 70 - 110 mg/dL           Recent Labs     12/12/18  0436 12/11/18  0810   FIO2I 0.50 50   HCO3I 26.1* 23.3   PCO2I 38.3 42.5   PHI 7.442 7.349*   PO2I 134* 66*       Special Requests:   Date Value Ref Range Status   12/11/2018 NO SPECIAL REQUESTS   Preliminary   12/11/2018 NO SPECIAL REQUESTS   Preliminary     Culture result:   Date Value Ref Range Status   12/11/2018 NO ANAEROBES ISOLATED 2 DAYS   Preliminary   12/11/2018 RARE KLEBSIELLA PNEUMONIAE (A)   Preliminary   12/11/2018 RARE POSSIBLE 2ND GRAM NEGATIVE GRISELDA (A)   Preliminary   12/11/2018 RARE STREPTOCOCCI,NON HEMOLYTIC (A)   Preliminary   12/11/2018    Preliminary    CALLED TO AND CORRECTLY REPEATED BY:  CHEVY RN, ICU, AT 0804 12/12/18 TO DRM         Telemetry: Sinus Tachycardia    ECHO   Estimated left ventricular ejection fraction is 56 - 60%. Visually measured ejection fraction. Normal left ventricular wall motion, no regional wall motion abnormality noted. Age-appropriate left ventricular diastolic function. · There is no evidence of pulmonary hypertension. ·   Imaging:  [x]I have personally reviewed the patients chest radiographs images and report     CXR Results  (Last 48 hours)               12/13/18 0857  XR CHEST PORT Final result    Impression:  IMPRESSION:       Appropriately positioned NG tube. Possible central venous congestion versus hypoinflation. Possible ileus.          Narrative:  PORTABLE CHEST RADIOGRAPH        CPT CODE: 88949        INDICATION: Shortness of breath. NG tube reinserted. COMPARISON: 12/12/2018. FINDINGS:       Frontal view of the chest obtained at 0843 hours. Appropriately positioned NG   tube with tip and side-port in the gastric body. Right-sided PICC line remains   in place. Several additional lines overlie the patient. Lungs are hypoinflated. Cardiomediastinal silhouette is unchanged. There may be central venous   congestion, though emphasized by hypoinflation. No focal opacity, significant   effusion or pneumothorax. Prominent gas-filled loops of bowel. 12/12/18 0525  XR CHEST PORT Final result    Impression:  IMPRESSION:       Increased atelectasis in the left lower lobe. .   Satisfactory line and tube placement       Narrative:  CHEST PORTABLE 0451 hours       CPT CODE: 12828       COMPARISON: December 11. INDICATIONS: ETT placement. FINDINGS:        Portable single view chest demonstrates:       Lungs: Multisegment atelectasis is present in the left lower lobe. .        Cardiac Silhouette And Mediastinal Contours: The cardiomediastinal contours are   unremarkable. Pleural Spaces: No pneumothorax or pleural effusion evident. Bones And Soft Tissues: Unremarkable for age. An endotracheal tube is present. The tip is 4 cm above the johanny. Nasogastric tube extends into the stomach. PICC line extends to the cavoatrial junction                  Results from Hospital Encounter encounter on 12/04/18   XR CHEST PORT    Narrative PORTABLE CHEST RADIOGRAPH     CPT CODE: 94520     INDICATION: Shortness of breath. NG tube reinserted. COMPARISON: 12/12/2018. FINDINGS:    Frontal view of the chest obtained at 0843 hours. Appropriately positioned NG  tube with tip and side-port in the gastric body. Right-sided PICC line remains  in place. Several additional lines overlie the patient. Lungs are hypoinflated. Cardiomediastinal silhouette is unchanged.  There may be central venous  congestion, though emphasized by hypoinflation. No focal opacity, significant  effusion or pneumothorax. Prominent gas-filled loops of bowel. Impression IMPRESSION:    Appropriately positioned NG tube. Possible central venous congestion versus hypoinflation. Possible ileus. Results from [facility] encounter on 11/05/18   CT CHEST ABD PELV W CONT    Narrative CT CHEST ABDOMEN AND PELVIS WITH CONTRAST        COMPARISON:  August 12, 2008 . INDICATIONS:    Oroesophageal carcinoma with cough, GI bleeding, abnormal  retroperitoneum on MR. Following the uneventful administration of  oral contrast and  100 cc of Isovue  300 scanning of the chest, abdomen and pelvis is performed with a multislice  scanner. Coronal sagittal and axial reconstructions were created from the 3 D  data set. FINDINGS:     CT CHEST FINDINGS:    Thyroid/Base Of Neck: Normal.    Lungs: There is some bleb formation at the pulmonary apices. No nodule mass or acute  infiltrate is evident     . Pleural Spaces:  Unremarkable. Chest Wall:   No breast mass is evident. No axillary lymphadenopathy is evident. Mediastinum, Nidia, Great Vessels, Heart:  Unremarkable. .    CT ABDOMEN FINDINGS:     Liver/Biliary: Normal.    Spleen: Normal.    Adrenal Glands: Normal.    Kidneys: There is perinephric edema bilaterally. A cyst is seen in the anterior  right kidney measuring 0.9 cm. A cystic lesion in the left kidney extends into  the pelvic region measuring 6.3 x 4 cm. Punctate nonobstructive intrarenal  calculi are present. There are a few sub centimeter low attenuation foci present  most likely representing small cysts but too small to confidently characterize. There is no hydronephrosis. Negative otherwise. .    Pancreas: Normal.    Stomach, Small Bowel, appendix, and Colon: The stomach is unremarkable. There is  dilation predominantly with gas of small bowel loops throughout the abdomen.   This is diffuse. The appendix is normal. There is some distention of the colon  contains a moderate but unremarkable burden of stool. No obstructing process is  evident. .    There is no significant adenopathy. The abdominal aorta is unremarkable. The IVC is unremarkable. Peritoneal Spaces: There is no free fluid or free air. Abdominal Wall: No hernia or mass is evident. CT PELVIS FINDINGS:     Bladder: The bladder is unremarkable in appearance. Pelvic Small Bowel And Colon: Unremarkable. Lymph Nodes, Soft Tissues: There is no adenopathy. Inflammatory character  stranding extending from the kidneys does extend into the pelvic sidewalls along  the anterior pararenal spaces. This is relatively mild in severity. There is no  fluid collection or abscess. .    Free Fluid: Not present. Osseous Structures Of The Chest, Abdomen And Pelvis: The lumbar spinal canal is  relatively narrowed due to short pedicles. . Senescent changes noted consistent  with age. Impression IMPRESSION:     There is predominantly perinephric and retroperitoneal edema bilaterally. Infrequent causes for this are renal inflammation-most frequently either  infection or nephritis or pancreatitis. Some nonspecific stranding can be seen in the elderly and the finding here is  not definitely beyond the range. Diffuse distention of bowel loops predominantly with gas in a nonobstructive  pattern-the appearance favors adynamic ileus of indeterminate etiology. .    All CT scans at this facility are performed using dose optimization technique as  appropriate to the performed exam, to include automated exposure control,  adjustment of the mA and/or kV according to patient's size (Including  appropriate matching for site-specific examinations), or use of iterative  reconstruction technique.          IMPRESSION:   · Colonic polyp s/p colon resection 16/38/73 complicated by post op ileus and anastomosis site leak s/p anastomotic leak repair with colostomy placement 12/11/18  · Acute encephalopathy, possible critical illness delirium  · Klebsiella on abdominal fluid -sensitive to zosyn  · JOSE ANTONIO -resolved  · Hyperglycemia   · HTN  · Non obstructive renal stone  · Hx GERD  · Hx Throat CA - has 1 vocal cord per chart  · Hx DM -latest Hgb A1c 6.3   RECOMMENDATIONS:   · Resp: incentive spirometry once patient is cooperative. Strict aspiration precautions   · ID: follow blood c/s; urine c/s negative; abdominal fluid + Klebsiella -continue Zosyn. Trend WBCs, temp curve. D/c stress dose steroid  · Heme/Onc: stable H/H, platelets   · CVS: monitor hemodynamics closely - may need to restart anti-HTN meds  · Renal: paez cath, strict I/O.   · GI: TPN, NGT per surgery  · Metabolic: glycemic control with sliding scale insulin. D/c insulin on TPN  · Neuro/Sedation/Pain: optimize pain control -on prn dilaudid. precedex gtt for delirium -monitor CAM-ICU, RASS  · DVT/GI prophylaxis: heparin SQ; no GI proph  · Lines/Devices: R PICC, Paez   · Discussed in interdisciplinary rounds       Hudson Mejia   Pulmonary, Critical Care Medicine  [facility]          Late entry for services rendered 12/13/2018: I saw and evaluated the patient, performing the key elements of the service. I discussed the findings, assessment and plan with the PA and agree with the PA's findings and plan as documented in the PA's note. All edits and changes made above or are mentioned in my addendum. Total of 40 min critical care time spent at bedside during the course of care providing evaluation,management and care decisions and ordering appropriate treatment related to critical care problems exclusive of procedures.   The reason for providing this level of medical care for this critically ill patient was due a critical illness that impaired one or more vital organ systems such that there was a high probability of imminent or life threatening deterioration in the patients condition. This care involved high complexity decision making to assess, manipulate, and support vital system functions, to treat this degree vital organ system failure and to prevent further life threatening deterioration of the patients condition. 46-year-old male who originally presented to [facility] for robotic sigmoid colectomy for rectal cancer which was performed on 12/4/18.  Patient's postoperative course was complicated by ileus and JOSE ANTONIO.  Nephrology was consulted on 12/10/18.  NG tube was placed for decompression due to abdominal distention.  A few nights ago, the patient developed hypotension and became febrile and tachycardic so a rapid response was called.  Chest x-ray at that time showed a large amount of free air under the diaphragms.  Patient was emergently taken to the OR for an anastomotic leak.  Ex lap with primary repair of anastomotic leak, omental patch, and diverting loop ileostomy was done by Dr. Makenzie Lemus discussed the case with Dr. Brian Palacios  after the procedure and he reports there was some serosanguineous fluid in the abdomen, but it did not appear to be infected, no fecal material or purulent material was found during the ex lap, washout was performed. Fluid cultures is growing gram-negative rods, 2 species and strep-patient covered on broad-spectrum antibiotics with Zosyn monotherapy given good gram-negative coverage and anaerobic coverage, awaiting final sensitivities. Patient was extubated yesterday. Since extubation patient has had acute delirium progressively worsened overnight into today. Patient requiring Precedex drip and restraints, patient pulled out NG tube overnight and was advised to have it replaced by colorectal surgery. Patient remains on TPN per surgery. Will start as needed low-dose antipsychotic for acute agitation. Patient also had mild JOSE ATNONIO, likely due to sepsis, nephrology following. Renal function has improved back down to baseline. Dr. Zora Friedman also helped adjust electrolytes and TPN with help of dietary. The factor limiting patient from leaving ICU would be his worsening delirium. No focal neurological defects to be suspicious for CVA or meningitis/encephalitis. I have updated the patient's family including wife and daughter at bedside.           Edwin Bernal MD/MPH     Pulmonary, Critical Care Medicine  [facility]

## 2018-12-13 NOTE — ROUTINE PROCESS
Patient C/O abd pain as 5 out of 10 pain. Dilaudid 0.5 mg IVP given. Patient also inc of urine. Inc care given and renny care. Condom cath new one reapplied. Patient turned and repositioned.

## 2018-12-13 NOTE — ROUTINE PROCESS
Bedside and Verbal shift change report given to DELL Knight (oncoming nurse) by Chel Berrios (offgoing nurse). Report included the following information SBAR, Kardex, MAR and Recent Results.     SITUATION:    Code Status: Full Code  Reason for Admission: Malignant neoplasm of sigmoid colon ([facility] [C18.7]   Colon cancer ([facility] [C18.9]    [facility] day: 8   Problem List:       Hospital Problems  Date Reviewed: 12/4/2018          Codes Class Noted POA    Colon cancer ([facility] ICD-10-CM: C18.9  ICD-9-CM: 153.9  12/4/2018 Unknown              BACKGROUND:    Past Medical History:   Past Medical History:   Diagnosis Date    Cancer ([facility] 2008     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     Chest pain, unspecified     Diabetes ([facility]     Essential hypertension, benign     GERD (gastroesophageal reflux disease)     Nonspecific abnormal electrocardiogram (ECG) (EKG)          Patient taking anticoagulants yes     ASSESSMENT:   Changes in Assessment Throughout Shift: Pt extubated   Patient has Central Line: yes Reasons if yes: TPN   Patient has Mckeon Cath: no Reasons if yes: n/a      Last Vitals:     Vitals:    12/12/18 1815 12/12/18 1830 12/12/18 1845 12/12/18 1900   BP:  163/86  158/83   Pulse: (!) 102 (!) 105 93 95   Resp: 26 24 21 15   Temp:       SpO2: 94% 94% 96% 97%   Weight:       Height:            IV and DRAINS (will only show if present)   [REMOVED] Airway - Continuous Aspiration of Subglottic Secretions (SAIDA) Tube 12/11/18 Oral-Site Assessment: Clean, dry, & intact  Nasogastric Tube 12/11/18-Site Assessment: Clean, dry, & intact  Peripheral IV 12/11/18 Right;Mid Arm-Site Assessment: Clean, dry, & intact  [REMOVED] Peripheral IV 12/11/18 Left Forearm-Site Assessment: Clean, dry, & intact  [REMOVED] Peripheral IV 12/04/18 Right Wrist-Site Assessment: Clean, dry, & intact  [REMOVED] Peripheral IV 12/04/18 Left Hand-Site Assessment: Clean, dry, & intact  [REMOVED] Orogastric Tube 12/04/18-Site Assessment: Clean, dry, & intact  PICC Double Lumen 91/08/60 Right;Basilic-Site Assessment: Clean, dry, & intact  Matthew-Garcia Drain 12/11/18 Abdomen-Site Assessment: Clean, dry, & intact  [REMOVED] Airway - Endotracheal Tube 12/11/18 Oral-Site Assessment: Clean, dry, & intact     WOUND (if present)   Wound Type:  incision   Dressing present Dressing Present : Yes   Wound Concerns/Notes:  none     PAIN    Pain Assessment    Pain Intensity 1: 0 (12/12/18 1630)    Pain Location 1: Back    Pain Intervention(s) 1: Medication (see MAR)    Patient Stated Pain Goal: 0  o Interventions for Pain:  none  o Intervention effective: n/a  o Time of last intervention: n/a   o Reassessment Completed: yes      Last 3 Weights:  Last 3 Recorded Weights in this Encounter    12/10/18 1309 12/11/18 0700 12/12/18 1121   Weight: 78.9 kg (174 lb) 78.7 kg (173 lb 8 oz) 78.7 kg (173 lb 8 oz)     Weight change:      INTAKE/OUPUT    Current Shift: No intake/output data recorded. Last three shifts: 12/11 0701 - 12/12 1900  In: 7946.7 [I.V.:7881.7]  Out: 6099 [Urine:1555; Drains:183]     LAB RESULTS     Recent Labs     12/12/18  1450 12/12/18  0415 12/11/18  1500   WBC 12.2 9.7 8.4   HGB 7.5* 7.9* 7.4*   HCT 22.2* 22.6* 21.7*    163 159        Recent Labs     12/12/18  1450 12/12/18  0415 12/11/18  1500 12/11/18  0710 12/11/18  0205    142 140  --   --    K 3.7 3.5 3.9  --   --    * 184* 214*  --   --    BUN 29* 32* 37*  --   --    CREA 0.99 1.32* 1.75*  --   --    CA 7.8* 7.8* 7.3*  --   --    MG 2.3 2.5 2.3  --   --    INR  --  1.2  --  1.2 1.2       RECOMMENDATIONS AND DISCHARGE PLANNING     1. Pending tests/procedures/ Plan of Care or Other Needs: continue to monitor and wean presser. 2. Discharge plan for patient and Needs/Barriers: TBD    3. Estimated Discharge Date: TBD Posted on Whiteboard in Patients Room: no      4. The patient's care plan was reviewed with the oncoming nurse.        \"HEALS\" SAFETY CHECK      Fall Risk    Total Score: 4    Safety Measures: Safety Measures: Bed/Chair-Wheels locked, Bed in low position, Call light within reach    A safety check occurred in the patient's room between off going nurse and oncoming nurse listed above. The safety check included the below items  Area Items   H  High Alert Medications - Verify all high alert medication drips (heparin, PCA, etc.)   E  Equipment - Suction is set up for ALL patients (with tatyana)  - Red plugs utilized for all equipment (IV pumps, etc.)  - WOWs wiped down at end of shift.  - Room stocked with oxygen, suction, and other unit-specific supplies   A  Alarms - Bed alarm is set for fall risk patients  - Ensure chair alarm is in place and activated if patient is up in a chair   L  Lines - Check IV for any infiltration  - Mckeon bag is empty if patient has a Mckeon   - Tubing and IV bags are labeled   S  Safety   - Room is clean, patient is clean, and equipment is clean. - Hallways are clear from equipment besides carts. - Fall bracelet on for fall risk patients  - Ensure room is clear and free of clutter  - Suction is set up for ALL patients (with tatyana)  - Hallways are clear from equipment besides carts.    - Isolation precautions followed, supplies available outside room, sign posted     Aundria Scales

## 2018-12-13 NOTE — PROGRESS NOTES
NUTRITION consult    Nutrition Consult: TPN: RD to Manage     RECOMMENDATIONS / PLAN:     - Plan to provide parenteral nutrition support, monitor labs and adjust electrolytes daily.   - Monitor GI symptoms and readiness for oral diet/enteral nutrition initiation.  - Continue RD inpatient monitoring and evaluation. Please Note:   Parenteral nutrition support to be provided using custom product, allowing for modification of electrolyte and macronutrient content day to day. Lipids included in PN on MWF. Macronutrient Goal: 115 gm amino acids, 380 gm dextrose, 250 mL lipids (1966 kcal weekly average). PARENTERAL NUTRITION ORDER:     Electrolyte Adjustments: K increased, Phos increased; insulin removed (steroids discontinued)    Additional replacement given today: 20 mmol K Phos    Day 5 PN to provide: 100 mEq Na, 68 mEq K, 17 mEq Ca, 10 mEq Mg, 30 mmol Phos, 1412 kcal, 115 gm amino acids, 280 gm dextrose, 10 mL MVI, 2.5 mL trace elements     PN Rate: 100 mL/hr   Glucose Infusion Rate: 2.47 mg/kg/min    Osmolarity: 1225 mOsm   Parenteral Nutrition Access Device: PICC placed 12/10/18  Indication for PN:  Postop ileus   Refeeding Risk:      []  Yes  [x] No     NUTRITION DIAGNOSIS & INTERVENTIONS:     [x] Parenteral nutrition: continue, modify contents   [x] Collaboration and referral of nutrition care: MD to review PN order and note daily; will not call to verify content and changes, per MD request. Discussed rate and removing insulin in PN with Dr Linda Hirsch and PA during interdisciplinary rounds     Nutrition Diagnosis:  Inadequate oral intake related to altered GI function and diet intolerance as evidenced by abdominal distention and discomfort and inability to consume po diet. ASSESSMENT:     12/13: Extubated, confused and NGT pulled by pt then replaced per CRS. Steroids stopped- dextrose in PN kept the same and insulin removed. 12/12: Hyperglycemia, receiving steroid and insulin added to next PN.  Renal function improving, bladder pressures improving. Ileostomy with output today, NGT output improved. Plan for extubation today. 12/11: RRT for fever with tachycardiac overnight then taken for emergent surgery, s/p ex lap with primary repair of anastomotic leak, omental patch and diverting loop ileostomy (12/11) and remains intubated postop.   12/10: Nephrology consulted for JOSE ANTONIO. NGT to suction with 3 L out in the past 24 hours. 12/9: S/p sigmoid colectomy on 12/4. Was tolerating a diet, then had abdominal pain and distention, NGT placed. KUB indicative of ileus per MD note. + small BM and flatus.      PN infusion adequate to meet patients estimated nutritional needs:   [] Yes     [x] No   [] Unable to determine at this time    Diet: DIET NPO  TPN ADULT - CENTRAL  TPN ADULT - CENTRAL      Food Allergies: NKFA  Current Appetite:   [] Good     [] Fair     [] Poor     [x] Other: NPO  Appetite/meal intake prior to admission:   [x] Good     [] Fair     [] Poor     [] Other:  Feeding Limitations:  [] Swallowing difficulty    [] Chewing difficulty    [] Other:  Current Meal Intake:   Patient Vitals for the past 100 hrs:   % Diet Eaten   12/10/18 1503 0 %   12/10/18 1027 0 %   12/09/18 1825 0 %   12/09/18 1245 0 %   12/09/18 0944 0 %     NGT to suction, output x last 24 hours: 600 mL  BM:  12/13, ileostomy   Skin Integrity: surgical incision to abdomen; ANGELI drain  Edema:  [x] No     [] Yes   Pertinent Medications: Reviewed: SSI, steroid     Labs: BMP, MG, Phos ordered daily; CMP, Triglyceride, Prealbumin ordered initially and weekly  Recent Labs     12/13/18  0350 12/12/18  1450 12/12/18  0415 12/11/18  1500    141 142 140   K 3.3* 3.7 3.5 3.9    109* 107 106   CO2 28 28 27 26   * 178* 184* 214*   BUN 22* 29* 32* 37*   CREA 0.98 0.99 1.32* 1.75*   CA 8.6 7.8* 7.8* 7.3*   MG 2.0 2.3 2.5 2.3   PHOS 1.6*  --  2.5 3.2   ALB  --   --  2.1* 2.2*   SGOT  --   --  19 14*   ALT  --   --  17 17   Prealbumin: 9.15 mg/dL (12/10/18)   Triglyceride: 129 mg/dL (12/10/18)   Recent Labs     12/13/18  0350 12/12/18  1450 12/12/18  0415   WBC 15.4* 12.2 9.7   HGB 8.0* 7.5* 7.9*   HCT 23.3* 22.2* 22.6*    169 163         Intake/Output Summary (Last 24 hours) at 12/13/2018 1246  Last data filed at 12/13/2018 0600  Gross per 24 hour   Intake 2510.4 ml   Output 1828 ml   Net 682.4 ml       Anthropometrics:   Ht Readings from Last 1 Encounters:   12/04/18 5' 10\" (1.778 m)       Last 3 Recorded Weights in this Encounter    12/10/18 1309 12/11/18 0700 12/12/18 1121   Weight: 78.9 kg (174 lb) 78.7 kg (173 lb 8 oz) 78.7 kg (173 lb 8 oz)        Body mass index is 24.89 kg/m². Weight History: Pt reports weight loss of 8 lbs PTA, unknown time frame. Weight Metrics 12/12/2018 12/4/2018 11/19/2018 11/12/2018 2/12/2016 2/9/2016 2/9/2016   Weight 173 lb 8 oz - 169 lb 170 lb 9.6 oz - 178 lb -   BMI - 24.89 kg/m2 24.25 kg/m2 24.48 kg/m2 25.54 kg/m2 - 25.54 kg/m2          Admitting Diagnosis: Malignant neoplasm of sigmoid colon (HCC) [C18.7]  Colon cancer (HCC) [C18.9]  Pertinent PMHx: colon polyps    Education Needs:        [x] None identified  [] Identified - Not appropriate at this time  []  Identified and addressed - refer to education log  Learning Limitations:   [x] None identified  [] Identified    Cultural, Yazidi & ethnic food preferences:  [x] None identified    [] Identified and addressed     ESTIMATED NUTRITION NEEDS:     Calories: 8686-7174 kcal (HBEx1.2-1.5) based on   [x] Actual BW 79 kg    [] IBW:   Protein:  gm (1-1.5 gm/kg) based on   [x] Actual BW      [] IBW:   Fluid: 1 mL/kcal     MONITORING & EVALUATION:     Nutrition Goals:   1. Patient will receive nutrition via PN until they are able to tolerate adequate po intake/enteral nutrition. Outcome:  [x] Met    []  Not Met    [] New Goal  2. Patient will tolerate advancement of macronutrients towards meeting estimated nutrition needs within the next 7 days. Outcome:  [] Met    [x]  Not Met    [] New Goal    Monitoring:  [x] Parenteral nutrition intake and administration  [x] Biochemical data, medical tests, and procedures   [x] Fluid intake   [x] Nutrition-focused physical findings   [x] Treatment/therapy   [] Food and beverage intake   [] Diet order      [] Weight        Previous Recommendations (for follow-up assessments only):     [x]   Implemented       []   Not Implemented (RD to address)      [] No Longer Appropriate     [] No Recommendation Made        Discharge Planning: Nutrition recommendations pending patient's ability to tolerate po intake/enteral nutrition.    [x]  Participated in care planning, discharge planning, & interdisciplinary rounds as appropriate      Arian Wilson, [address], [address] [place] [address]   Pager: [phone]

## 2018-12-13 NOTE — PROGRESS NOTES
[facility] 3 INTENSIVE CARE UNIT  [address] [place] [id number]  [phone]  Colon and Rectal Surgery Progress Note      Patient: Kristine Pascual MRN: [id number]  SSN: [id number]    YOB: 1950  Age: 80 y.o. Sex: male      Admit Date: 12/4/2018    LOS: 9 days     Subjective:     Extubated. Appears sob. Objective:     Vitals:    12/13/18 0400 12/13/18 0500 12/13/18 0504 12/13/18 0600   BP: 169/89  177/84 184/83   Pulse: 92 93 89 86   Resp: 23 24 23 21   Temp: 98 °F (36.7 °C)      SpO2: 100% 100% 100% 100%   Weight:       Height:            Intake and Output:  Current Shift: No intake/output data recorded.   Last three shifts: 12/11 1901 - 12/13 0700  In: 5076.4 [I.V.:5056.4]  Out: 0141 [Urine:2125; Drains:153]    Physical Exam:     abd soft, distended, appr tender    Lab/Data Review:    CMP:   Lab Results   Component Value Date/Time     12/13/2018 03:50 AM    K 3.3 (L) 12/13/2018 03:50 AM     12/13/2018 03:50 AM    CO2 28 12/13/2018 03:50 AM    AGAP 6 12/13/2018 03:50 AM     (H) 12/13/2018 03:50 AM    BUN 22 (H) 12/13/2018 03:50 AM    CREA 0.98 12/13/2018 03:50 AM    GFRAA >60 12/13/2018 03:50 AM    GFRNA >60 12/13/2018 03:50 AM    CA 8.6 12/13/2018 03:50 AM    MG 2.0 12/13/2018 03:50 AM    PHOS 1.6 (L) 12/13/2018 03:50 AM     CBC:   Lab Results   Component Value Date/Time    WBC 15.4 (H) 12/13/2018 03:50 AM    HGB 8.0 (L) 12/13/2018 03:50 AM    HCT 23.3 (L) 12/13/2018 03:50 AM     12/13/2018 03:50 AM      Intra-abdominal cultures GNR and strep    Assessment:     POD 2 s/p ex lap, repair anastomotic leak and diverting loop ileostomy for leak after robotic sigmoid colectomy for malingnant polyp     Plan:     NPO, NG, await improved exam prior to D/C NG  TPN  Continue abx, continue to follow cultures  Please consider D/C steroids as they inhibit healing    Signed By: Luis Eduardo Vance MD        December 13, 2018

## 2018-12-13 NOTE — ROUTINE PROCESS
Patient C/O abd pain to abd. He said it hurts. Dilaudid 0.5 mg IVP given for pain. After inc clean up.

## 2018-12-13 NOTE — PROGRESS NOTES
RENAL DAILY PROGRESS NOTE            75y M with PMH HTN, DM, s/p robotic sigmoid colectomy , post op ileus, seen for JOSE ANTONIO  Subjective:       Complaint:   Overnight events noted  S/p extubated yesterday   Mckeon catheter is out, incontinent     IMPRESSION:   · JOSE ANTONIO, suspect ATN; multiple risk factor including; Hypotension, was on nsaids and ARB, very higher NG tube output,  Abdominal compartment syndrome. ( marked improvement after surgery suggestive of ACS)   · S/p robot assisted sigmoidectomy ,   · Post op ileus  · S/p reapir of anastomotic leak  · DM  · Renal stone, non obstructive,   · Hypophosphatemia  · Hypokalemia    PLAN:   ·  Renal function now at baseline. · His blood pressure is on higher side, seem agitated from pain, agree with pain meds , see response to BP before adjusting BP meds. · Adjust phos and K in TPN, discussed with dietician colleague.     Discussed with ICU team.       Current Facility-Administered Medications   Medication Dose Route Frequency    potassium phosphate 20 mmol in 0.9% sodium chloride 250 mL infusion   IntraVENous ONCE    hydrocortisone Sod Succ (PF) (SOLU-CORTEF) injection 50 mg  50 mg IntraVENous Q12H    TPN ADULT - CENTRAL   IntraVENous CONTINUOUS    dexmedeTOMidine (PRECEDEX) 400 mcg in 0.9% sodium chloride 100 mL infusion  0.2-0.7 mcg/kg/hr IntraVENous TITRATE    fluticasone (FLONASE) 50 mcg/actuation nasal spray 2 Spray  2 Spray Both Nostrils DAILY    hydrALAZINE (APRESOLINE) 20 mg/mL injection 10 mg  10 mg IntraVENous Q6H PRN    HYDROmorphone (DILAUDID) syringe 0.5 mg  0.5 mg IntraVENous Q3H PRN    insulin lispro (HUMALOG) injection   SubCUTAneous Q6H    albuterol (ACCUNEB) nebulizer solution 2.5 mg  2.5 mg Nebulization Q6H PRN    piperacillin-tazobactam (ZOSYN) 3.375 g in 0.9% sodium chloride (MBP/ADV) 100 mL MBP  3.375 g IntraVENous Q6H    sodium chloride (NS) flush 10 mL  10 mL InterCATHeter PRN    menthol 5 MG (NICE) lozenge  1 Lozenge Mucous Membrane Q6H PRN    diphenhydrAMINE (BENADRYL) injection 25 mg  25 mg IntraVENous Q6H PRN    ondansetron (ZOFRAN) injection 4 mg  4 mg IntraVENous Q6H PRN    latanoprost (XALATAN) 0.005 % ophthalmic solution 1 Drop  1 Drop Both Eyes QPM    glucose chewable tablet 16 g  4 Tab Oral PRN    glucagon (GLUCAGEN) injection 1 mg  1 mg IntraMUSCular PRN    dextrose (D50W) injection syrg 12.5-25 g  25-50 mL IntraVENous PRN    heparin (porcine) injection 5,000 Units  5,000 Units SubCUTAneous Q8H       Review of Symptoms: comprehensive ROS negative except above.    Objective:     Patient Vitals for the past 24 hrs:   Temp Pulse Resp BP SpO2   12/13/18 0800 97.8 °F (36.6 °C) -- -- -- --   12/13/18 0600 -- 86 21 184/83 100 %   12/13/18 0504 -- 89 23 177/84 100 %   12/13/18 0500 -- 93 24 -- 100 %   12/13/18 0400 98 °F (36.7 °C) 92 23 169/89 100 %   12/13/18 0300 -- (!) 107 27 (!) 174/91 98 %   12/13/18 0200 -- 97 23 164/82 100 %   12/13/18 0100 -- (!) 116 27 165/89 100 %   12/13/18 0000 98 °F (36.7 °C) (!) 110 24 165/89 98 %   12/12/18 2300 -- (!) 105 24 (!) 187/95 97 %   12/12/18 2200 -- (!) 102 22 143/70 96 %   12/12/18 2100 -- (!) 122 27 (!) 180/106 95 %   12/12/18 2000 98.2 °F (36.8 °C) 93 22 164/82 96 %   12/12/18 1900 -- 95 15 158/83 97 %   12/12/18 1845 -- 93 21 -- 96 %   12/12/18 1830 -- (!) 105 24 163/86 94 %   12/12/18 1815 -- (!) 102 26 -- 94 %   12/12/18 1800 -- 93 22 172/74 94 %   12/12/18 1745 -- 97 27 -- 95 %   12/12/18 1730 -- 89 19 154/71 96 %   12/12/18 1715 -- 87 26 -- 96 %   12/12/18 1700 -- 90 24 147/88 96 %   12/12/18 1645 -- 88 25 -- 94 %   12/12/18 1630 -- 87 21 145/83 96 %   12/12/18 1615 -- 86 26 -- 96 %   12/12/18 1600 97.5 °F (36.4 °C) 84 24 144/77 97 %   12/12/18 1545 -- 83 22 -- 97 %   12/12/18 1530 -- 85 24 141/76 96 %   12/12/18 1515 -- 82 23 -- 96 %   12/12/18 1500 -- 82 23 145/69 95 %   12/12/18 1445 -- 81 21 -- 97 %   12/12/18 1430 -- 85 20 129/68 97 %   12/12/18 1415 -- 84 21 -- 96 % 12/12/18 1400 -- 84 20 150/76 97 %   12/12/18 1345 -- 83 22 -- 97 %   12/12/18 1330 -- 84 23 148/76 96 %   12/12/18 1315 -- 85 19 -- 97 %   12/12/18 1300 -- 83 19 137/71 96 %   12/12/18 1245 -- 84 24 -- 94 %   12/12/18 1230 -- 82 20 136/77 96 %   12/12/18 1215 -- 82 21 -- 96 %   12/12/18 1200 97.6 °F (36.4 °C) 80 20 143/75 98 %   12/12/18 1145 -- 83 20 -- 100 %   12/12/18 1130 -- 85 17 136/78 99 %   12/12/18 1115 -- 80 18 -- 100 %   12/12/18 1100 -- 84 21 137/65 100 %   12/12/18 1045 -- 84 18 -- 100 %   12/12/18 1030 -- 83 17 123/77 98 %   12/12/18 1015 -- 85 19 -- 98 %   12/12/18 1000 -- 86 19 150/88 98 %   12/12/18 0945 -- 87 19 -- 91 %        Weight change:      12/11 1901 - 12/13 0700  In: 5076.4 [I.V.:5056.4]  Out: 1614 [Urine:2125; Drains:153]    Intake/Output Summary (Last 24 hours) at 12/13/2018 0939  Last data filed at 12/13/2018 0600  Gross per 24 hour   Intake 2861.8 ml   Output 2028 ml   Net 833.8 ml     Physical Exam:   General: extubated,   HEENT  no thyromegaly  CVS: S1S2 heard,  no rub  RS: + air entry b/l,   Abd:distended abdomen, has colostomy , ANGELI drain  Neuro:, awake  Extrm: +edema, no cyanosis, clubbing   Skin: no visible  Rash  Musculoskeletal: No gross joints or bone deformities         Data Review:     LABS:   Hematology:   Recent Labs     12/13/18  0350 12/12/18  1450 12/12/18  0415 12/11/18  1500 12/11/18  0105   WBC 15.4* 12.2 9.7 8.4 7.1   HGB 8.0* 7.5* 7.9* 7.4* 9.7*   HCT 23.3* 22.2* 22.6* 21.7* 28.3*     Chemistry:   Recent Labs     12/13/18  0350 12/12/18  1450 12/12/18  0415 12/11/18  1500 12/11/18  0105   BUN 22* 29* 32* 37* 37*   CREA 0.98 0.99 1.32* 1.75* 1.73*   CA 8.6 7.8* 7.8* 7.3* 8.8   ALB  --   --  2.1* 2.2*  --    K 3.3* 3.7 3.5 3.9 3.8    141 142 140 138    109* 107 106 103   CO2 28 28 27 26 26   PHOS 1.6*  --  2.5 3.2 3.3   * 178* 184* 214* 199*            Procedures/imaging: see electronic medical records for all procedures, Xrays and details which were not copied into this note but were reviewed prior to creation of Plan          Assessment & Plan:     As above         Marvin Merlos MD  12/13/2018  10:09 AM

## 2018-12-13 NOTE — PROGRESS NOTES
Bedside and Verbal shift change report given to DELL Enrique (oncoming nurse) by Miguel A Nascimento RN   (offgoing nurse). Report included the following information SBAR, Kardex, Intake/Output, MAR and Recent Results.

## 2018-12-13 NOTE — ROUTINE PROCESS
Dr Sunil Rees was notified re: removal of NGT by patient and questions about dressing changes. MD wants ngt re-inserted and ANGELI drain dressing changed PRN/ soil and ABD dressing will be changed tomarrow by MD himself. # 18 fr NGT inserted in right nare to 70 cm. Afterwards immediately Portable CXR was here to perform test. XRAY confirmed NGT placement with Zenon Mata approval for placement. NGT placed back on LIS bile green color secretions were noted. approx 100 ml intitally.

## 2018-12-13 NOTE — ROUTINE PROCESS
Upon entry into room for complete assessment . Patient is in bilateral wrist restrains/ side rails up and had pulled out NGT. Patient has a hoarse voice with calling out help for his daught. He said he wants to pray to God with his daughter.

## 2018-12-14 ENCOUNTER — APPOINTMENT (OUTPATIENT)
Dept: GENERAL RADIOLOGY | Age: 68
DRG: 330 | End: 2018-12-14
Attending: NURSE PRACTITIONER
Payer: MEDICARE

## 2018-12-14 LAB
ANION GAP SERPL CALC-SCNC: 7 MMOL/L (ref 3–18)
ANION GAP SERPL CALC-SCNC: 8 MMOL/L (ref 3–18)
ATRIAL RATE: 112 BPM
BACTERIA SPEC CULT: ABNORMAL
BUN SERPL-MCNC: 23 MG/DL (ref 7–18)
BUN SERPL-MCNC: 25 MG/DL (ref 7–18)
BUN/CREAT SERPL: 26 (ref 12–20)
BUN/CREAT SERPL: 27 (ref 12–20)
CA-I SERPL-SCNC: 1.29 MMOL/L (ref 1.12–1.32)
CALCIUM SERPL-MCNC: 8.6 MG/DL (ref 8.5–10.1)
CALCIUM SERPL-MCNC: 8.8 MG/DL (ref 8.5–10.1)
CALCULATED P AXIS, ECG09: 60 DEGREES
CALCULATED R AXIS, ECG10: 42 DEGREES
CALCULATED T AXIS, ECG11: 5 DEGREES
CHLORIDE SERPL-SCNC: 106 MMOL/L (ref 100–108)
CHLORIDE SERPL-SCNC: 106 MMOL/L (ref 100–108)
CK MB CFR SERPL CALC: ABNORMAL % (ref 0–4)
CK MB CFR SERPL CALC: ABNORMAL % (ref 0–4)
CK MB SERPL-MCNC: <1 NG/ML (ref 5–25)
CK MB SERPL-MCNC: <1 NG/ML (ref 5–25)
CK SERPL-CCNC: 35 U/L (ref 39–308)
CK SERPL-CCNC: 51 U/L (ref 39–308)
CO2 SERPL-SCNC: 29 MMOL/L (ref 21–32)
CO2 SERPL-SCNC: 30 MMOL/L (ref 21–32)
CREAT SERPL-MCNC: 0.84 MG/DL (ref 0.6–1.3)
CREAT SERPL-MCNC: 0.96 MG/DL (ref 0.6–1.3)
DIAGNOSIS, 93000: NORMAL
ERYTHROCYTE [DISTWIDTH] IN BLOOD BY AUTOMATED COUNT: 13.8 % (ref 11.6–14.5)
GLUCOSE BLD STRIP.AUTO-MCNC: 198 MG/DL (ref 70–110)
GLUCOSE BLD STRIP.AUTO-MCNC: 201 MG/DL (ref 70–110)
GLUCOSE BLD STRIP.AUTO-MCNC: 227 MG/DL (ref 70–110)
GLUCOSE BLD STRIP.AUTO-MCNC: 246 MG/DL (ref 70–110)
GLUCOSE BLD STRIP.AUTO-MCNC: 254 MG/DL (ref 70–110)
GLUCOSE SERPL-MCNC: 175 MG/DL (ref 74–99)
GLUCOSE SERPL-MCNC: 247 MG/DL (ref 74–99)
GRAM STN SPEC: ABNORMAL
GRAM STN SPEC: ABNORMAL
HCT VFR BLD AUTO: 26.9 % (ref 36–48)
HGB BLD-MCNC: 9.1 G/DL (ref 13–16)
INR PPP: 1.2 (ref 0.8–1.2)
MAGNESIUM SERPL-MCNC: 1.8 MG/DL (ref 1.6–2.6)
MAGNESIUM SERPL-MCNC: 2 MG/DL (ref 1.6–2.6)
MCH RBC QN AUTO: 28.9 PG (ref 24–34)
MCHC RBC AUTO-ENTMCNC: 33.8 G/DL (ref 31–37)
MCV RBC AUTO: 85.4 FL (ref 74–97)
P-R INTERVAL, ECG05: 128 MS
PHOSPHATE SERPL-MCNC: 3.5 MG/DL (ref 2.5–4.9)
PHOSPHATE SERPL-MCNC: 4.6 MG/DL (ref 2.5–4.9)
PLATELET # BLD AUTO: 241 K/UL (ref 135–420)
PMV BLD AUTO: 9.6 FL (ref 9.2–11.8)
POTASSIUM SERPL-SCNC: 3 MMOL/L (ref 3.5–5.5)
POTASSIUM SERPL-SCNC: 3.4 MMOL/L (ref 3.5–5.5)
PROTHROMBIN TIME: 14.9 SEC (ref 11.5–15.2)
Q-T INTERVAL, ECG07: 342 MS
QRS DURATION, ECG06: 78 MS
QTC CALCULATION (BEZET), ECG08: 466 MS
RBC # BLD AUTO: 3.15 M/UL (ref 4.7–5.5)
SERVICE CMNT-IMP: ABNORMAL
SODIUM SERPL-SCNC: 143 MMOL/L (ref 136–145)
SODIUM SERPL-SCNC: 143 MMOL/L (ref 136–145)
TROPONIN I SERPL-MCNC: 0.07 NG/ML (ref 0–0.04)
TROPONIN I SERPL-MCNC: 0.18 NG/ML (ref 0–0.04)
VENTRICULAR RATE, ECG03: 112 BPM
WBC # BLD AUTO: 13.7 K/UL (ref 4.6–13.2)

## 2018-12-14 PROCEDURE — 74011250637 HC RX REV CODE- 250/637: Performed by: SURGERY

## 2018-12-14 PROCEDURE — 82330 ASSAY OF CALCIUM: CPT

## 2018-12-14 PROCEDURE — 84100 ASSAY OF PHOSPHORUS: CPT

## 2018-12-14 PROCEDURE — 74011250637 HC RX REV CODE- 250/637: Performed by: INTERNAL MEDICINE

## 2018-12-14 PROCEDURE — 74011250636 HC RX REV CODE- 250/636: Performed by: NURSE PRACTITIONER

## 2018-12-14 PROCEDURE — 80048 BASIC METABOLIC PNL TOTAL CA: CPT

## 2018-12-14 PROCEDURE — 83735 ASSAY OF MAGNESIUM: CPT

## 2018-12-14 PROCEDURE — 85610 PROTHROMBIN TIME: CPT

## 2018-12-14 PROCEDURE — 82962 GLUCOSE BLOOD TEST: CPT

## 2018-12-14 PROCEDURE — 74011636637 HC RX REV CODE- 636/637: Performed by: INTERNAL MEDICINE

## 2018-12-14 PROCEDURE — 74011250636 HC RX REV CODE- 250/636: Performed by: COLON & RECTAL SURGERY

## 2018-12-14 PROCEDURE — 74011636637 HC RX REV CODE- 636/637: Performed by: COLON & RECTAL SURGERY

## 2018-12-14 PROCEDURE — 74011000250 HC RX REV CODE- 250: Performed by: COLON & RECTAL SURGERY

## 2018-12-14 PROCEDURE — 74011250636 HC RX REV CODE- 250/636: Performed by: INTERNAL MEDICINE

## 2018-12-14 PROCEDURE — 77010033678 HC OXYGEN DAILY

## 2018-12-14 PROCEDURE — 74011250636 HC RX REV CODE- 250/636: Performed by: PHYSICIAN ASSISTANT

## 2018-12-14 PROCEDURE — 74011000250 HC RX REV CODE- 250: Performed by: PHYSICIAN ASSISTANT

## 2018-12-14 PROCEDURE — 85027 COMPLETE CBC AUTOMATED: CPT

## 2018-12-14 PROCEDURE — 65610000006 HC RM INTENSIVE CARE

## 2018-12-14 PROCEDURE — 74011000258 HC RX REV CODE- 258: Performed by: COLON & RECTAL SURGERY

## 2018-12-14 PROCEDURE — 74018 RADEX ABDOMEN 1 VIEW: CPT

## 2018-12-14 PROCEDURE — 82550 ASSAY OF CK (CPK): CPT

## 2018-12-14 RX ORDER — METOPROLOL TARTRATE 5 MG/5ML
5 INJECTION INTRAVENOUS EVERY 4 HOURS
Status: DISCONTINUED | OUTPATIENT
Start: 2018-12-15 | End: 2018-12-19

## 2018-12-14 RX ORDER — POTASSIUM CHLORIDE 14.9 MG/ML
20 INJECTION INTRAVENOUS ONCE
Status: COMPLETED | OUTPATIENT
Start: 2018-12-14 | End: 2018-12-14

## 2018-12-14 RX ORDER — VANCOMYCIN/0.9 % SOD CHLORIDE 1.5G/250ML
1500 PLASTIC BAG, INJECTION (ML) INTRAVENOUS ONCE
Status: COMPLETED | OUTPATIENT
Start: 2018-12-14 | End: 2018-12-14

## 2018-12-14 RX ORDER — POTASSIUM CHLORIDE 7.45 MG/ML
10 INJECTION INTRAVENOUS
Status: COMPLETED | OUTPATIENT
Start: 2018-12-14 | End: 2018-12-15

## 2018-12-14 RX ORDER — MAGNESIUM SULFATE HEPTAHYDRATE 40 MG/ML
2 INJECTION, SOLUTION INTRAVENOUS ONCE
Status: COMPLETED | OUTPATIENT
Start: 2018-12-14 | End: 2018-12-14

## 2018-12-14 RX ORDER — METOPROLOL TARTRATE 5 MG/5ML
5 INJECTION INTRAVENOUS EVERY 6 HOURS
Status: DISCONTINUED | OUTPATIENT
Start: 2018-12-14 | End: 2018-12-14

## 2018-12-14 RX ORDER — VANCOMYCIN HYDROCHLORIDE
1250 EVERY 12 HOURS
Status: DISCONTINUED | OUTPATIENT
Start: 2018-12-15 | End: 2018-12-15

## 2018-12-14 RX ADMIN — HEPARIN SODIUM 5000 UNITS: 5000 INJECTION, SOLUTION INTRAVENOUS; SUBCUTANEOUS at 00:48

## 2018-12-14 RX ADMIN — PIPERACILLIN SODIUM AND TAZOBACTAM SODIUM 3.38 G: 3; .375 INJECTION, POWDER, LYOPHILIZED, FOR SOLUTION INTRAVENOUS at 20:51

## 2018-12-14 RX ADMIN — POTASSIUM CHLORIDE 10 MEQ: 10 INJECTION, SOLUTION INTRAVENOUS at 22:00

## 2018-12-14 RX ADMIN — MAGNESIUM SULFATE IN WATER 2 G: 40 INJECTION, SOLUTION INTRAVENOUS at 20:47

## 2018-12-14 RX ADMIN — Medication 1 LOZENGE: at 06:11

## 2018-12-14 RX ADMIN — POTASSIUM CHLORIDE 20 MEQ: 14.9 INJECTION, SOLUTION INTRAVENOUS at 00:00

## 2018-12-14 RX ADMIN — PIPERACILLIN SODIUM AND TAZOBACTAM SODIUM 3.38 G: 3; .375 INJECTION, POWDER, LYOPHILIZED, FOR SOLUTION INTRAVENOUS at 15:50

## 2018-12-14 RX ADMIN — HYDRALAZINE HYDROCHLORIDE 10 MG: 20 INJECTION INTRAMUSCULAR; INTRAVENOUS at 06:49

## 2018-12-14 RX ADMIN — HYDROMORPHONE HYDROCHLORIDE 0.5 MG: 1 INJECTION, SOLUTION INTRAMUSCULAR; INTRAVENOUS; SUBCUTANEOUS at 18:51

## 2018-12-14 RX ADMIN — HYDRALAZINE HYDROCHLORIDE 10 MG: 20 INJECTION INTRAMUSCULAR; INTRAVENOUS at 11:22

## 2018-12-14 RX ADMIN — INSULIN LISPRO 6 UNITS: 100 INJECTION, SOLUTION INTRAVENOUS; SUBCUTANEOUS at 11:56

## 2018-12-14 RX ADMIN — POTASSIUM CHLORIDE 20 MEQ: 14.9 INJECTION, SOLUTION INTRAVENOUS at 06:00

## 2018-12-14 RX ADMIN — METOPROLOL TARTRATE 5 MG: 5 INJECTION, SOLUTION INTRAVENOUS at 20:52

## 2018-12-14 RX ADMIN — METOPROLOL TARTRATE 5 MG: 5 INJECTION, SOLUTION INTRAVENOUS at 09:09

## 2018-12-14 RX ADMIN — HEPARIN SODIUM 5000 UNITS: 5000 INJECTION, SOLUTION INTRAVENOUS; SUBCUTANEOUS at 09:08

## 2018-12-14 RX ADMIN — VANCOMYCIN HYDROCHLORIDE 1500 MG: 10 INJECTION, POWDER, LYOPHILIZED, FOR SOLUTION INTRAVENOUS at 18:51

## 2018-12-14 RX ADMIN — LATANOPROST 1 DROP: 50 SOLUTION/ DROPS OPHTHALMIC at 19:05

## 2018-12-14 RX ADMIN — CALCIUM GLUCONATE: 94 INJECTION, SOLUTION INTRAVENOUS at 20:27

## 2018-12-14 RX ADMIN — INSULIN LISPRO 9 UNITS: 100 INJECTION, SOLUTION INTRAVENOUS; SUBCUTANEOUS at 00:55

## 2018-12-14 RX ADMIN — POTASSIUM CHLORIDE 10 MEQ: 10 INJECTION, SOLUTION INTRAVENOUS at 23:00

## 2018-12-14 RX ADMIN — INSULIN LISPRO 6 UNITS: 100 INJECTION, SOLUTION INTRAVENOUS; SUBCUTANEOUS at 18:51

## 2018-12-14 RX ADMIN — HYDROMORPHONE HYDROCHLORIDE 0.5 MG: 1 INJECTION, SOLUTION INTRAMUSCULAR; INTRAVENOUS; SUBCUTANEOUS at 04:03

## 2018-12-14 RX ADMIN — PIPERACILLIN SODIUM AND TAZOBACTAM SODIUM 3.38 G: 3; .375 INJECTION, POWDER, LYOPHILIZED, FOR SOLUTION INTRAVENOUS at 02:25

## 2018-12-14 RX ADMIN — POTASSIUM CHLORIDE 10 MEQ: 10 INJECTION, SOLUTION INTRAVENOUS at 20:00

## 2018-12-14 RX ADMIN — INSULIN LISPRO 3 UNITS: 100 INJECTION, SOLUTION INTRAVENOUS; SUBCUTANEOUS at 06:10

## 2018-12-14 RX ADMIN — METOPROLOL TARTRATE 5 MG: 5 INJECTION, SOLUTION INTRAVENOUS at 15:50

## 2018-12-14 RX ADMIN — POTASSIUM CHLORIDE 20 MEQ: 14.9 INJECTION, SOLUTION INTRAVENOUS at 02:00

## 2018-12-14 RX ADMIN — POTASSIUM CHLORIDE 20 MEQ: 14.9 INJECTION, SOLUTION INTRAVENOUS at 04:00

## 2018-12-14 RX ADMIN — HYDROMORPHONE HYDROCHLORIDE 0.5 MG: 1 INJECTION, SOLUTION INTRAMUSCULAR; INTRAVENOUS; SUBCUTANEOUS at 11:56

## 2018-12-14 RX ADMIN — HEPARIN SODIUM 5000 UNITS: 5000 INJECTION, SOLUTION INTRAVENOUS; SUBCUTANEOUS at 15:50

## 2018-12-14 RX ADMIN — POTASSIUM CHLORIDE 10 MEQ: 10 INJECTION, SOLUTION INTRAVENOUS at 21:00

## 2018-12-14 RX ADMIN — PIPERACILLIN SODIUM AND TAZOBACTAM SODIUM 3.38 G: 3; .375 INJECTION, POWDER, LYOPHILIZED, FOR SOLUTION INTRAVENOUS at 09:08

## 2018-12-14 RX ADMIN — HYDROMORPHONE HYDROCHLORIDE 0.5 MG: 1 INJECTION, SOLUTION INTRAMUSCULAR; INTRAVENOUS; SUBCUTANEOUS at 09:07

## 2018-12-14 RX ADMIN — HYDROMORPHONE HYDROCHLORIDE 0.5 MG: 1 INJECTION, SOLUTION INTRAMUSCULAR; INTRAVENOUS; SUBCUTANEOUS at 15:55

## 2018-12-14 RX ADMIN — DIPHENHYDRAMINE HYDROCHLORIDE 25 MG: 50 INJECTION, SOLUTION INTRAMUSCULAR; INTRAVENOUS at 04:02

## 2018-12-14 RX ADMIN — FLUTICASONE PROPIONATE 2 SPRAY: 50 SPRAY, METERED NASAL at 09:38

## 2018-12-14 NOTE — ROUTINE PROCESS
Patient's had episode of Resp Distress became Hypoxic, RR went up in the 45-50's, HR in the 150's and BP >200. Patient's in TPN at 100ml/Hr and IV Zosyn. Labetalol 20mg IV given for elevated BP. Cardene Gtt started. BP down in the 150's. NT suctioning done with small thick yellowish secretions. Patient's occasionally able to expectorate and suction self by Renne Osler.

## 2018-12-14 NOTE — PROGRESS NOTES
[facility] 3 INTENSIVE CARE UNIT  [address] [place] [address] [address] [facility] [id number]  [phone]  Colon and Rectal Surgery Progress Note      Patient: Christopher Ellis MRN: [id number]  SSN: [id number]    YOB: 1950  Age: 80 y.o. Sex: male      Admit Date: 12/4/2018    LOS: 10 days     Subjective:     Stable but tachycardic. Sinus. Objective:     Vitals:    12/14/18 1000 12/14/18 1030 12/14/18 1100 12/14/18 1115   BP: 158/82 144/80 (!) 200/94 163/67   Pulse: (!) 101 (!) 107 (!) 116 (!) 120   Resp: 21 17 25 27   Temp:       SpO2: 100% 100% 100% 98%   Weight:       Height:            Intake and Output:  Current Shift: No intake/output data recorded.   Last three shifts: 12/12 1901 - 12/14 0700  In: 4146.6 [I.V.:4146.6]  Out: 4775 [Urine:2675; Drains:50]    Physical Exam:     abd soft, distended, appr tender    Lab/Data Review:    CMP:   Lab Results   Component Value Date/Time     12/14/2018 04:35 AM    K 3.0 (L) 12/14/2018 04:35 AM     12/14/2018 04:35 AM    CO2 29 12/14/2018 04:35 AM    AGAP 8 12/14/2018 04:35 AM     (H) 12/14/2018 04:35 AM    BUN 23 (H) 12/14/2018 04:35 AM    CREA 0.84 12/14/2018 04:35 AM    GFRAA >60 12/14/2018 04:35 AM    GFRNA >60 12/14/2018 04:35 AM    CA 8.8 12/14/2018 04:35 AM    MG 2.0 12/14/2018 04:35 AM    PHOS 3.5 12/14/2018 04:35 AM     CBC:   Lab Results   Component Value Date/Time    WBC 13.7 (H) 12/14/2018 04:35 AM    HGB 9.1 (L) 12/14/2018 04:35 AM    HCT 26.9 (L) 12/14/2018 04:35 AM     12/14/2018 04:35 AM        Assessment:     POD 3 s/p ex lap, repair anastomotic leak and diverting loop ileostomy for leak after robotic sigmoid colectomy for malingnant polyp     Plan:     NPO, NG, await improved exam prior to D/C NG  TPN  Continue abx, continue to follow cultures    Signed By: Benjy Delaney MD        December 14, 2018

## 2018-12-14 NOTE — ROUTINE PROCESS
Bedside and Verbal shift change report given to Danya Burgos RN (oncoming nurse) by Laura Armas RN (offgoing nurse). Report included the following information SBAR, Intake/Output, MAR, Recent Results, Cardiac Rhythm SR and Quality Measures.

## 2018-12-14 NOTE — PROGRESS NOTES
attended the interdisciplinary rounds for Nhung Sequeira, who is a 80 y.o.,male. Patients Primary Language is: Ulis Lipoma. According to the patients EMR Yazidism Affiliation is: [facility].     The reason the Patient came to the hospital is:   Patient Active Problem List    Diagnosis Date Noted    Colon cancer [facility]) 12/04/2018    Colon polyps 11/12/2018    Hemorrhoids 11/12/2018    Debility 11/05/2018    Leukocytosis 11/05/2018    Intractable low back pain 11/05/2018      Plan:  Kimani Norton will continue to follow and will provide pastoral care on an as needed/requested basis.  recommends bedside caregivers page  on duty if patient shows signs of acute spiritual or emotional distress.     [address]  Board Certified [address] [place] [address]   [phone]

## 2018-12-14 NOTE — PROGRESS NOTES
[facility]   Pulmonary, Critical Care, and Sleep Medicine     ICU Patient Progress Note    Name: Jak Stallworth   : 1950   MRN: [id number]   Date: 2018      Subjective/History:   80 y.o. male admitted to the hospital following robotic sigmoid colectomy for cancer, on 18; developed mild post-operative ileus and taken emergently to OR for repair of anastomotic leak, omental patch and diverting loop ileostomy on . Transferred to ICU for vent management following second surgery. Extubated .     18   Overnight, pt had become tachycardic, hypertensive, tachypneic. 12L EKG done showed sinus tachycardia. CXR without acute process. Bedside U/S done and revealed + lung sliding b/l. Patient was given labetalol 20 mg IV x 2 with improvement in tachycardia, BP however effect was only temporary. Patient was started on nicardipine with eventual improvement in autonomic instability and tachypnea. Throughout the morning, nicardipine was weaned and discontinued; pt was started on scheduled metoprolol. This morning, pt states he feels better. No recurrence of prior event. He is awake, alert, oriented, cooperative. Off precedex gtt. Hemodynamically stable; afebrile. NGT output approximately 1100 mL dark green bilious material.       Review of Systems:  Review of systems not obtained due to patient factors.     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     Chest pain, unspecified     Diabetes ([facility] [address]     Essential hypertension, benign     GERD (gastroesophageal reflux disease)     Nonspecific abnormal electrocardiogram (ECG) (EKG)         Past Surgical History:  Past Surgical History:   Procedure Laterality Date    COLONOSCOPY N/A 2018    COLONOSCOPY with Polypectomies, Bx's, Injection, Tattooing & Clip Placement x 3 performed by Naresh Thomas MD at [address] COLONOSCOPY,DIAGNOSTIC  11/12/2018         COLONOSCOPY,REMV LESN,SNARE  11/12/2018         ENDOSCOPIC MUCOSAL RESECT  11/12/2018         EXPLORATORY OF ABDOMEN N/A 12/11/2018    Dr. Lisa Gibson HX COLONOSCOPY      HX HEENT  2008    Throat Ca - 1 vocal chord removed     HX HEENT      eye surgery muscles    LAP,SURG,COLECTOMY, PARTIAL, W/ANAST N/A 12/04/2018    Dr. Funmilayo Lindo        Medications:  Prior to Admission medications    Medication Sig Start Date End Date Taking? Authorizing Provider   naloxegol (MOVANTIK) 25 mg tab tablet Take  by mouth Daily (before breakfast). Yes Provider, Historical   multivitamin, tx-iron-ca-min (THERA-M W/ IRON) 9 mg iron-400 mcg tab tablet Take 1 Tab by mouth daily. Yes Provider, Historical   latanoprost (XALATAN) 0.005 % ophthalmic solution Administer 1 Drop to both eyes nightly. Yes Provider, Historical   beclomethasone dipropionate (QNASL) 80 mcg/actuation HFAA 80 mcg by Nasal route daily. Directions on at home label are as follows: Use 2 sprays in each Nostril Daily   Yes Diego Franks MD   metFORMIN (GLUCOPHAGE) 1,000 mg tablet Take 1,000 mg by mouth two (2) times daily (with meals). do not take morning of surgery (12/4/180. Yes Provider, Historical   losartan (COZAAR) 50 mg tablet Take 50 mg by mouth daily. Yes Provider, Historical   nebivolol (BYSTOLIC) 10 mg tablet Take 10 mg by mouth daily. Yes Provider, Historical   omeprazole (PRILOSEC) 20 mg capsule Take 20 mg by mouth daily. Yes Provider, Historical   Dexlansoprazole 60 mg CpDB Take 1 Cap by mouth daily. Yes Provider, Historical   calcium-cholecalciferol, d3, 600-125 mg-unit tab Take 1 Tab by mouth daily. Yes Provider, Historical   omega-3 fatty acids-vitamin e 1,000 mg cap Take 1 Cap by mouth daily. Yes Provider, Historical   IRON, FERROUS SULFATE, PO Take  by mouth. 3 times a week   Yes Provider, Historical   oxyCODONE-acetaminophen (PERCOCET 10)  mg per tablet Take  by mouth. Provider, Historical   meloxicam (MOBIC) 15 mg tablet Take 15 mg by mouth daily. Provider, Historical   oxyCODONE-acetaminophen (PERCOCET) 5-325 mg per tablet Take 1 Tab by mouth every six (6) hours as needed. Max Daily Amount: 4 Tabs. Patient taking differently: Take 1 Tab by mouth every six (6) hours as needed for Pain. 11/13/18   Marguerite Meza MD   cyclobenzaprine (FLEXERIL) 5 mg tablet Take 1 Tab by mouth three (3) times daily as needed for Muscle Spasm(s). 11/13/18   Marguerite Meza MD   psyllium husk (METAMUCIL) 0.4 gram cap Take 1 Cap by mouth daily. 11/13/18   Marguerite Meza MD   Aspirin, Buffered 81 mg tab Take 1 Tab by mouth daily. stop 7 days prior to surgery (12/4/18). Provider, Historical       Current Facility-Administered Medications   Medication Dose Route Frequency    metoprolol (LOPRESSOR) injection 5 mg  5 mg IntraVENous Q6H    TPN ADULT - CENTRAL   IntraVENous CONTINUOUS    TPN ADULT - CENTRAL   IntraVENous CONTINUOUS    fluticasone (FLONASE) 50 mcg/actuation nasal spray 2 Spray  2 Spray Both Nostrils DAILY    insulin lispro (HUMALOG) injection   SubCUTAneous Q6H    piperacillin-tazobactam (ZOSYN) 3.375 g in 0.9% sodium chloride (MBP/ADV) 100 mL MBP  3.375 g IntraVENous Q6H    latanoprost (XALATAN) 0.005 % ophthalmic solution 1 Drop  1 Drop Both Eyes QPM    heparin (porcine) injection 5,000 Units  5,000 Units SubCUTAneous Q8H       Allergy:  No Known Allergies     Social History:  Social History     Tobacco Use    Smoking status: Former Smoker     Last attempt to quit: 11/19/2008     Years since quitting: 10.0    Smokeless tobacco: Never Used   Substance Use Topics    Alcohol use: No    Drug use: No        Family History:  History reviewed. No pertinent family history.        Objective:   Vital Signs:    Visit Vitals  /71   Pulse (!) 118   Temp 98.2 °F (36.8 °C)   Resp 21   Ht 5' 10\" (1.778 m)   Wt 82.8 kg (182 lb 9.6 oz)   SpO2 100%   BMI 26.20 kg/m²       O2 Device: Room air   O2 Flow Rate (L/min): 3 l/min   Temp (24hrs), Av.3 °F (36.8 °C), Min:97.7 °F (36.5 °C), Max:98.9 °F (37.2 °C)       Patient Vitals for the past 8 hrs:   Temp Pulse Resp BP SpO2   18 1330 -- (!) 118 21 144/71 100 %   18 1300 -- (!) 119 25 144/70 99 %   18 1230 -- (!) 118 24 137/68 98 %   18 1200 98.2 °F (36.8 °C) (!) 124 30 132/65 98 %   18 1130 -- (!) 115 27 164/70 99 %   18 1115 -- (!) 120 27 163/67 98 %   18 1100 -- (!) 116 25 (!) 200/94 100 %   18 1030 -- (!) 107 17 144/80 100 %   18 1000 -- (!) 101 21 158/82 100 %   18 0930 -- 100 19 145/80 100 %   18 0900 -- (!) 123 28 (!) 168/92 100 %   18 0830 -- (!) 121 27 151/89 100 %   18 0815 -- (!) 126 30 159/87 99 %   18 0800 97.9 °F (36.6 °C) (!) 123 29 (!) 166/93 100 %   18 0745 -- (!) 129 (!) 31 (!) 168/91 99 %   18 0730 -- (!) 124 29 (!) 141/102 99 %   18 0715 -- (!) 125 (!) 31 167/86 100 %     Intake/Output:   Last shift:      No intake/output data recorded. Last 3 shifts:  1901 -  0700  In: 4146.6 [I.V.:4146.6]  Out: 4775 [Urine:2675; Drains:50]    Intake/Output Summary (Last 24 hours) at 2018 1514  Last data filed at 2018 0600  Gross per 24 hour   Intake 2969.22 ml   Output 3575 ml   Net -605.78 ml       Physical Exam:  General appearance - awake, alert, oriented, cooperative  Eyes - pupils equal and reactive, extraocular eye movements intact, sclera anicteric  Mouth - mucous membranes dry  Neck - supple, no significant adenopathy; no upper airway stridor  Chest - symmetrical chest expansion, no accessory muscle use; clear breath sounds, no wheezing  Heart - regular rate, regular rhythm, normal S1, S2, no murmurs, rubs, clicks or gallops.   Abdomen - soft, distended, tympanitic; + ostomy with pink mucosa; + active bowel sounds  Neurological - Follows commands appropriately, equal strength bilaterally in upper and lower extremities.    Musculoskeletal - no joint tenderness, deformity or swelling  Extremities - peripheral pulses normal, no pedal edema, no clubbing or cyanosis  Skin - normal color and turgor, no rashes, no suspicious skin lesions noted      Data:     Recent Results (from the past 24 hour(s))   GLUCOSE, POC    Collection Time: 12/13/18  5:49 PM   Result Value Ref Range    Glucose (POC) 239 (H) 70 - 110 mg/dL   EKG, 12 LEAD, SUBSEQUENT    Collection Time: 12/13/18  9:21 PM   Result Value Ref Range    Ventricular Rate 112 BPM    Atrial Rate 112 BPM    P-R Interval 128 ms    QRS Duration 78 ms    Q-T Interval 342 ms    QTC Calculation (Bezet) 466 ms    Calculated P Axis 60 degrees    Calculated R Axis 42 degrees    Calculated T Axis 5 degrees    Diagnosis       Sinus tachycardia  Septal infarct , age undetermined  Abnormal ECG     METABOLIC PANEL, BASIC    Collection Time: 12/13/18  9:52 PM   Result Value Ref Range    Sodium 141 136 - 145 mmol/L    Potassium 2.9 (LL) 3.5 - 5.5 mmol/L    Chloride 106 100 - 108 mmol/L    CO2 28 21 - 32 mmol/L    Anion gap 7 3.0 - 18 mmol/L    Glucose 226 (H) 74 - 99 mg/dL    BUN 23 (H) 7.0 - 18 MG/DL    Creatinine 0.96 0.6 - 1.3 MG/DL    BUN/Creatinine ratio 24 (H) 12 - 20      GFR est AA >60 >60 ml/min/1.73m2    GFR est non-AA >60 >60 ml/min/1.73m2    Calcium 8.8 8.5 - 10.1 MG/DL   PHOSPHORUS    Collection Time: 12/13/18  9:52 PM   Result Value Ref Range    Phosphorus 3.9 2.5 - 4.9 MG/DL   MAGNESIUM    Collection Time: 12/13/18  9:52 PM   Result Value Ref Range    Magnesium 1.7 1.6 - 2.6 mg/dL   CARDIAC PANEL,(CK, CKMB & TROPONIN)    Collection Time: 12/13/18  9:52 PM   Result Value Ref Range    CK 59 39 - 308 U/L    CK - MB <1.0 <3.6 ng/ml    CK-MB Index  0.0 - 4.0 %     CALCULATION NOT PERFORMED WHEN RESULT IS BELOW LINEAR LIMIT    Troponin-I, Qt. 0.16 (H) 0.0 - 0.045 NG/ML   CBC W/O DIFF    Collection Time: 12/13/18  9:59 PM   Result Value Ref Range    WBC 13.8 (H) 4.6 - 13.2 K/uL RBC 3.21 (L) 4.70 - 5.50 M/uL    HGB 9.1 (L) 13.0 - 16.0 g/dL    HCT 27.2 (L) 36.0 - 48.0 %    MCV 84.7 74.0 - 97.0 FL    MCH 28.3 24.0 - 34.0 PG    MCHC 33.5 31.0 - 37.0 g/dL    RDW 13.8 11.6 - 14.5 %    PLATELET 863 699 - 293 K/uL    MPV 9.5 9.2 - 11.8 FL   GLUCOSE, POC    Collection Time: 12/14/18 12:52 AM   Result Value Ref Range    Glucose (POC) 254 (H) 70 - 110 mg/dL   CBC W/O DIFF    Collection Time: 12/14/18  4:35 AM   Result Value Ref Range    WBC 13.7 (H) 4.6 - 13.2 K/uL    RBC 3.15 (L) 4.70 - 5.50 M/uL    HGB 9.1 (L) 13.0 - 16.0 g/dL    HCT 26.9 (L) 36.0 - 48.0 %    MCV 85.4 74.0 - 97.0 FL    MCH 28.9 24.0 - 34.0 PG    MCHC 33.8 31.0 - 37.0 g/dL    RDW 13.8 11.6 - 14.5 %    PLATELET 527 306 - 356 K/uL    MPV 9.6 9.2 - 11.8 FL   MAGNESIUM    Collection Time: 12/14/18  4:35 AM   Result Value Ref Range    Magnesium 2.0 1.6 - 2.6 mg/dL   PHOSPHORUS    Collection Time: 12/14/18  4:35 AM   Result Value Ref Range    Phosphorus 3.5 2.5 - 4.9 MG/DL   PROTHROMBIN TIME + INR    Collection Time: 12/14/18  4:35 AM   Result Value Ref Range    Prothrombin time 14.9 11.5 - 15.2 sec    INR 1.2 0.8 - 1.2     METABOLIC PANEL, BASIC    Collection Time: 12/14/18  4:35 AM   Result Value Ref Range    Sodium 143 136 - 145 mmol/L    Potassium 3.0 (L) 3.5 - 5.5 mmol/L    Chloride 106 100 - 108 mmol/L    CO2 29 21 - 32 mmol/L    Anion gap 8 3.0 - 18 mmol/L    Glucose 175 (H) 74 - 99 mg/dL    BUN 23 (H) 7.0 - 18 MG/DL    Creatinine 0.84 0.6 - 1.3 MG/DL    BUN/Creatinine ratio 27 (H) 12 - 20      GFR est AA >60 >60 ml/min/1.73m2    GFR est non-AA >60 >60 ml/min/1.73m2    Calcium 8.8 8.5 - 10.1 MG/DL   CARDIAC PANEL,(CK, CKMB & TROPONIN)    Collection Time: 12/14/18  4:35 AM   Result Value Ref Range    CK 51 39 - 308 U/L    CK - MB <1.0 <3.6 ng/ml    CK-MB Index  0.0 - 4.0 %     CALCULATION NOT PERFORMED WHEN RESULT IS BELOW LINEAR LIMIT    Troponin-I, Qt. 0.18 (H) 0.0 - 0.045 NG/ML   GLUCOSE, POC    Collection Time: 12/14/18  6:09 AM Result Value Ref Range    Glucose (POC) 198 (H) 70 - 110 mg/dL   GLUCOSE, POC    Collection Time: 12/14/18 11:29 AM   Result Value Ref Range    Glucose (POC) 227 (H) 70 - 110 mg/dL   CALCIUM, IONIZED    Collection Time: 12/14/18  1:50 PM   Result Value Ref Range    Ionized Calcium 1.29 1.12 - 1.32 MMOL/L           Recent Labs     12/12/18  0436   FIO2I 0.50   HCO3I 26.1*   PCO2I 38.3   PHI 7.442   PO2I 134*       Special Requests:   Date Value Ref Range Status   12/11/2018 NO SPECIAL REQUESTS   Preliminary   12/11/2018 NO SPECIAL REQUESTS   Final     Culture result:   Date Value Ref Range Status   12/11/2018 NO ANAEROBES ISOLATED 3 DAYS   Preliminary   12/11/2018 RARE KLEBSIELLA PNEUMONIAE (A)   Final   12/11/2018 RARE ESCHERICHIA COLI (A)   Final   12/11/2018 RARE ENTEROCOCCUS FAECALIS GROUP D (A)   Final   12/11/2018    Final    CALLED TO AND CORRECTLY REPEATED BY:  CHEVY RN, ICU, AT 0195 12/12/18 TO DRM         Telemetry: Sinus Tachycardia    ECHO   Estimated left ventricular ejection fraction is 56 - 60%. Visually measured ejection fraction. Normal left ventricular wall motion, no regional wall motion abnormality noted. Age-appropriate left ventricular diastolic function. · There is no evidence of pulmonary hypertension. ·   Imaging:  [x]I have personally reviewed the patients chest radiographs images and report     CXR Results  (Last 48 hours)               12/13/18 2130  XR CHEST PORT Final result    Impression:  IMPRESSION:       Vascular congestion suggested. No change to prior. Narrative:  PORTABLE CHEST RADIOGRAPH        CPT CODE: 45039        INDICATION: Respiratory distress. Colon cancer. COMPARISON: 12/13/2018. FINDINGS:       Frontal view of the chest obtained at 2116 hours. Tip of right PICC line   projects over cavoatrial junction. Tip of NG tube projects over the mid stomach. The cardiomediastinal silhouette is within normal limits. Vascular congestion.     There is no evidence of focal consolidation, pleural effusion or pneumothorax. Evaluation of the osseous structures is stable. 12/13/18 0857  XR CHEST PORT Final result    Impression:  IMPRESSION:       Appropriately positioned NG tube. Possible central venous congestion versus hypoinflation. Possible ileus. Narrative:  PORTABLE CHEST RADIOGRAPH        CPT CODE: 33753        INDICATION: Shortness of breath. NG tube reinserted. COMPARISON: 12/12/2018. FINDINGS:       Frontal view of the chest obtained at 0843 hours. Appropriately positioned NG   tube with tip and side-port in the gastric body. Right-sided PICC line remains   in place. Several additional lines overlie the patient. Lungs are hypoinflated. Cardiomediastinal silhouette is unchanged. There may be central venous   congestion, though emphasized by hypoinflation. No focal opacity, significant   effusion or pneumothorax. Prominent gas-filled loops of bowel. Results from [place] encounter on 12/04/18   XR ABD (KUB)    Narrative EXAM:  Abdomen AP    CLINICAL INDICATION:  Abdominal distention    COMPARISON:  12/8/18    TECHNIQUE:  AP abdomen in supine position. FINDINGS:      -NG/OG tube placement. Distal tip in the body of the stomach. - ANGELI drainage tube in the right side of the abdomen. - Multiple midline skin staples. - Multiple dilated loops of small bowel, measuring up to about 5.9 cm. Impression IMPRESSION:      1.  Multiple dilated loops of small bowel. Ileus vs. evolving partial small  bowel obstruction. In light of the recent surgical intervention, ileus is  favored. 2.  NG/OG tube and right ANGELI drainage tube in place. Results from [place] encounter on 11/05/18   CT CHEST ABD PELV W CONT    Narrative CT CHEST ABDOMEN AND PELVIS WITH CONTRAST        COMPARISON:  August 12, 2008 .     INDICATIONS:    Oroesophageal carcinoma with cough, GI bleeding, abnormal  retroperitoneum on MR. Following the uneventful administration of  oral contrast and  100 cc of Isovue  300 scanning of the chest, abdomen and pelvis is performed with a multislice  scanner. Coronal sagittal and axial reconstructions were created from the 3 D  data set. FINDINGS:     CT CHEST FINDINGS:    Thyroid/Base Of Neck: Normal.    Lungs: There is some bleb formation at the pulmonary apices. No nodule mass or acute  infiltrate is evident     . Pleural Spaces:  Unremarkable. Chest Wall:   No breast mass is evident. No axillary lymphadenopathy is evident. Mediastinum, Nidia, Great Vessels, Heart:  Unremarkable. .    CT ABDOMEN FINDINGS:     Liver/Biliary: Normal.    Spleen: Normal.    Adrenal Glands: Normal.    Kidneys: There is perinephric edema bilaterally. A cyst is seen in the anterior  right kidney measuring 0.9 cm. A cystic lesion in the left kidney extends into  the pelvic region measuring 6.3 x 4 cm. Punctate nonobstructive intrarenal  calculi are present. There are a few sub centimeter low attenuation foci present  most likely representing small cysts but too small to confidently characterize. There is no hydronephrosis. Negative otherwise. .    Pancreas: Normal.    Stomach, Small Bowel, appendix, and Colon: The stomach is unremarkable. There is  dilation predominantly with gas of small bowel loops throughout the abdomen. This is diffuse. The appendix is normal. There is some distention of the colon  contains a moderate but unremarkable burden of stool. No obstructing process is  evident. .    There is no significant adenopathy. The abdominal aorta is unremarkable. The IVC is unremarkable. Peritoneal Spaces: There is no free fluid or free air. Abdominal Wall: No hernia or mass is evident. CT PELVIS FINDINGS:     Bladder: The bladder is unremarkable in appearance. Pelvic Small Bowel And Colon: Unremarkable. Lymph Nodes, Soft Tissues:  There is no adenopathy. Inflammatory character  stranding extending from the kidneys does extend into the pelvic sidewalls along  the anterior pararenal spaces. This is relatively mild in severity. There is no  fluid collection or abscess. .    Free Fluid: Not present. Osseous Structures Of The Chest, Abdomen And Pelvis: The lumbar spinal canal is  relatively narrowed due to short pedicles. . Senescent changes noted consistent  with age. Impression IMPRESSION:     There is predominantly perinephric and retroperitoneal edema bilaterally. Infrequent causes for this are renal inflammation-most frequently either  infection or nephritis or pancreatitis. Some nonspecific stranding can be seen in the elderly and the finding here is  not definitely beyond the range. Diffuse distention of bowel loops predominantly with gas in a nonobstructive  pattern-the appearance favors adynamic ileus of indeterminate etiology. .    All CT scans at this facility are performed using dose optimization technique as  appropriate to the performed exam, to include automated exposure control,  adjustment of the mA and/or kV according to patient's size (Including  appropriate matching for site-specific examinations), or use of iterative  reconstruction technique. IMPRESSION:   · Colonic polyp s/p colon resection 80/12/30 complicated by post op ileus and anastomosis site leak s/p anastomotic leak repair with colostomy placement 12/11/18  · Tachycardia, hypertension -improving  · Acute encephalopathy, possible critical illness delirium -resolved  · Klebsiella, E coli and Enterococus faecalis on abdominal fluid -sensitive to zosyn (E coli, Kleb) and vanc (Enterococcus)  · Hyperglycemia   · Hx Non obstructive renal stone  · Hx GERD  · Hx Throat CA - has 1 vocal cord per chart  · Hx DM -latest Hgb A1c 6.3  · Echo on 11/2018 -  EF: 56-60%   RECOMMENDATIONS:   · Resp: encourage incentive spirometry; mobilize - PT/OT.  Strict aspiration precautions   · ID: follow blood c/s. Urine c/s negative. Abdominal fluid + Klebsiella and E Coli -continue Zosyn. Recommend to add vancomycin. Trend WBCs, temp curve. · Heme/Onc: stable H/H, platelets   · CVS: monitor hemodynamics closely. Continue schedule lopressors IV - once off NPO, recommend to start coreg, norvasc. Cardiology consult  · Renal: paez cath, strict I/O.   · GI: TPN, NGT per surgery  · Metabolic: glycemic control with sliding scale insulin. Add 10 units insulin on TPN  · Neuro/Sedation/Pain: optimize pain control -on prn dilaudid. · DVT/GI prophylaxis: heparin SQ; no GI proph  · Lines/Devices: R PICC, Paez   · Discussed in interdisciplinary rounds       Christiano Salas   Pulmonary, Critical Care Medicine  [address] [place] [address] [facility]        Late entry for services on 12/14/2018: I saw and evaluated the patient, performing the key elements of the service. I discussed the findings, assessment and plan with the PA and agree with the PA's findings and plan as documented in the PA's note. All edits and changes made above or are mentioned in my addendum. Total of 40 min critical care time spent at bedside during the course of care providing evaluation,management and care decisions and ordering appropriate treatment related to critical care problems exclusive of procedures. The reason for providing this level of medical care for this critically ill patient was due a critical illness that impaired one or more vital organ systems such that there was a high probability of imminent or life threatening deterioration in the patients condition. This care involved high complexity decision making to assess, manipulate, and support vital system functions, to treat this degree vital organ system failure and to prevent further life threatening deterioration of the patients condition.     41-year-old male who originally presented [facility] for robotic sigmoid colectomy for rectal cancer which was performed on 12/4/18.  Patient's postoperative course was complicated by ileus and JOSE ANTONIO.  Nephrology was consulted on 12/10/18.  NG tube was placed for decompression due to abdominal distention.  A few nights ago, the patient developed hypotension and became febrile and tachycardic so a rapid response was called.  Chest x-ray at that time showed a large amount of free air under the diaphragms.  Patient was emergently taken to the OR for an anastomotic leak.  Ex lap with primary repair of anastomotic leak, omental patch, and diverting loop ileostomy was done by Dr. Hitesh Rivera fecal material or purulent material was found during the ex lap, washout was performed.  Fluid cultures is growing E. coli, Klebsiella, enterococcus. Patient was initially covered with Zosyn monotherapy, vancomycin added today for enterococcus. Patient post extubation developed acute delirium that has waxed and waned, much improved today, patient is at baseline, Precedex drip discontinued. Overnight patient also developed tachycardia with ventricular rate in the 150s and hypotension which improved with the fluid bolus and potassium administration (potassium was 2.9), cardiology consulted today. Patient remains on TPN per surgery. Will start as needed low-dose antipsychotic for acute agitation. Patient also had mild JOSE ANTONIO, likely due to sepsis, nephrology following. Renal function has improved back down to baseline. Dr. Andre Salcedo also helped adjust electrolytes and TPN with help of dietary. We will continue to monitor for signs of acute delirium as a symptoms so far waxed and waned.       Wilber West MD/MPH     Pulmonary, Critical Care Medicine  [facility]

## 2018-12-14 NOTE — PROGRESS NOTES
Kinetic Dosing- Initial Progress Note    Pharmacy Consult ordered by Asya Brewer, per protocol     Indication: intra-abdominal infection    Patient clinical status and labs ordered/reviewed. Pt Weight Weight: 82.8 kg (182 lb 9.6 oz)   Serum Creatinine Lab Results   Component Value Date/Time    Creatinine 0.84 12/14/2018 04:35 AM       Creatinine Clearance Estimated Creatinine Clearance: 88.1 mL/min (based on SCr of 0.84 mg/dL). BUN Lab Results   Component Value Date/Time    BUN 23 (H) 12/14/2018 04:35 AM       WBC Lab Results   Component Value Date/Time    WBC 13.7 (H) 12/14/2018 04:35 AM      Temperature Temp: 98.2 °F (36.8 °C)   HR Pulse (Heart Rate): (!) 118       BP BP: 144/71           Kinetic Dosing Parameters:   Vd = 47L     K = .078 hr-1              t ½ = 9 hr    Drug Levels:   Vancomycin    No results for input(s): VANCP, VANCT, VANCR, VANRA in the last 72 hours. Gentamicin   No results for input(s): GENP, GENT in the last 72 hours. No lab exists for component:  GENR   Tobramycin   No results for input(s): TOBP, TOBT, TOBR in the last 72 hours. Amikacin   No results for input(s): Kayleigh White in the last 72 hours.     No lab exists for component:  OPAL [place] DAMIKR     Dose for naïve patient was initiated at: vancomycin 1500mg x1 then 1250mg q12h     Continue to monitor    Sign: SANDI Byrne [facility] [place]  Date: 12/14/2018  Time: 3:47 PM

## 2018-12-14 NOTE — ROUTINE PROCESS
Bedside, Verbal and Written shift change report given to Justin Nino (oncoming nurse) by Jennyfer Rowland RN   (offgoing nurse). Report included the following information SBAR, Kardex, Intake/Output, MAR, Recent Results, Cardiac Rhythm ST and Alarm Parameters .

## 2018-12-14 NOTE — ROUTINE PROCESS
Bedside and Verbal shift change report given to Jaison Enriquez RN (oncoming nurse) by Toño Gallardo RN (offgoing nurse). Report included the following information SBAR, Intake/Output, MAR, Recent Results, Cardiac Rhythm SR and Quality Measures.

## 2018-12-14 NOTE — CONSULTS
[facility] - Consult Note    Consultation request by Tressa Cleaning MD for advice/opinion related to evaluating Malignant neoplasm of sigmoid colon [facility]) [C18.7]  Colon cancer [facility]) [C18.9]    Date of  Admission: 12/4/2018  5:25 AM   Primary Care Physician:  Vanessa Dahl MD     Assessment:     Patient Active Problem List   Diagnosis Code    Debility R53.81    Leukocytosis D72.829    Intractable low back pain M54.5    Colon polyps K63.5    Hemorrhoids K64.9    Colon cancer ([facility] C18.9       -Colon cancer, admitted following robotic sigmoid colectomy 12/04/18, taken emergently to OR for repair of anastomotic leak, omental patch and diverting loop ileostomy on 12/11. Transferred to ICU for vent management following second surgery. Extubated 12/12.  -Sinus tachycardia physiologic in setting of recent abdominal surgery, electrolyte imbalances, anemia.  -Confusion and agitation requiring precedex and restraints.  -Hypokalemia, continue replacement.  -Troponin indeterminate and not consistent with ACS. -Hypertension, elevated this admission.  -Normal LV function EF 56-60% without wall motion abnormalities by echo 11/2018.  -Recent staph and strep bacteremia. No primary cardiologist.       Plan:     Would continue electrolyte replacement as needed. Would continue to follow Hgb and fluid status closely. Would continue IV lopressor until able to tolerate oral medications. Would continue supportive care, post operative care. History of Present Illness: This is a 80 y.o. male admitted for Malignant neoplasm of sigmoid colon ([facility] [address] [C18.7]  Colon cancer ([facility] [address] [C18.9]. Patient complains of: Tachycardia. Patient is a 80year old male with colon cancer admitted following robotic sigmoid colectomy 12/04/18, taken emergently to OR for repair of anastomotic leak, omental patch and diverting loop ileostomy on 12/11. Transferred to ICU for vent management following second surgery. Extubated 12/12. Cardiology is asked to comment on tachycardia. Patient has sinus tachycardia with PACs, no clear cute afib or SVT. Patients BP is stable, hypertensive. Patient is on IV medications until able to resume oral medications. Cardiac risk factors: hypertension      Review of Symptoms:    Constitutional: positive for chills  Eyes: negative for visual disturbance  Ears, nose, mouth, throat, and face: negative for nasal congestion  Respiratory: positive for cough  Cardiovascular: negative for chest pain, palpitations, syncope  Gastrointestinal: negative for vomiting  Genitourinary:negative for dysuria  Hematologic/lymphatic: negative for bleeding  Musculoskeletal:negative for muscle weakness  Neurological: negative for dizziness     Past Medical History:     Past Medical History:   Diagnosis Date    Cancer ([facility] 2008     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     Chest pain, unspecified     Diabetes ([facility]     Essential hypertension, benign     GERD (gastroesophageal reflux disease)     Nonspecific abnormal electrocardiogram (ECG) (EKG)          Social History:     Social History     Socioeconomic History    Marital status:      Spouse name: Not on file    Number of children: Not on file    Years of education: Not on file    Highest education level: Not on file   Tobacco Use    Smoking status: Former Smoker     Last attempt to quit: 11/19/2008     Years since quitting: 10.0    Smokeless tobacco: Never Used   Substance and Sexual Activity    Alcohol use: No    Drug use: No        Family History:   History reviewed. No pertinent family history.      Medications:   No Known Allergies     Current Facility-Administered Medications   Medication Dose Route Frequency    metoprolol (LOPRESSOR) injection 5 mg  5 mg IntraVENous Q6H    TPN ADULT - CENTRAL   IntraVENous CONTINUOUS    TPN ADULT - CENTRAL   IntraVENous CONTINUOUS    fluticasone (FLONASE) 50 mcg/actuation nasal spray 2 Spray  2 [place] Both Nostrils DAILY    hydrALAZINE (APRESOLINE) 20 mg/mL injection 10 mg  10 mg IntraVENous Q6H PRN    HYDROmorphone (DILAUDID) syringe 0.5 mg  0.5 mg IntraVENous Q3H PRN    insulin lispro (HUMALOG) injection   SubCUTAneous Q6H    albuterol (ACCUNEB) nebulizer solution 2.5 mg  2.5 mg Nebulization Q6H PRN    piperacillin-tazobactam (ZOSYN) 3.375 g in 0.9% sodium chloride (MBP/ADV) 100 mL MBP  3.375 g IntraVENous Q6H    sodium chloride (NS) flush 10 mL  10 mL InterCATHeter PRN    menthol 5 MG (NICE) lozenge  1 Lozenge Mucous Membrane Q6H PRN    diphenhydrAMINE (BENADRYL) injection 25 mg  25 mg IntraVENous Q6H PRN    ondansetron (ZOFRAN) injection 4 mg  4 mg IntraVENous Q6H PRN    latanoprost (XALATAN) 0.005 % ophthalmic solution 1 Drop  1 Drop Both Eyes QPM    glucose chewable tablet 16 g  4 Tab Oral PRN    glucagon (GLUCAGEN) injection 1 mg  1 mg IntraMUSCular PRN    dextrose (D50W) injection syrg 12.5-25 g  25-50 mL IntraVENous PRN    heparin (porcine) injection 5,000 Units  5,000 Units SubCUTAneous Q8H         Physical Exam:     Visit Vitals  /71   Pulse (!) 118   Temp 98.2 °F (36.8 °C)   Resp 21   Ht 5' 10\" (1.778 m)   Wt 82.8 kg (182 lb 9.6 oz)   SpO2 100%   BMI 26.20 kg/m²     BP Readings from Last 3 Encounters:   12/14/18 144/71   11/30/18 124/82   11/28/18 120/70     Pulse Readings from Last 3 Encounters:   12/14/18 (!) 118   11/30/18 (!) 103   11/28/18 96     Wt Readings from Last 3 Encounters:   12/13/18 82.8 kg (182 lb 9.6 oz)   11/19/18 76.7 kg (169 lb)   11/12/18 77.4 kg (170 lb 9.6 oz)       General:  alert, cooperative, no distress, appears stated age  Neck:  no JVD  Lungs:  clear to auscultation bilaterally  Heart:  regular rate and rhythm  Abdomen:  abdomen is soft without significant tenderness, masses, organomegaly or guarding  Extremities:  extremities normal, atraumatic, no cyanosis or edema  Skin: Warm and dry.  no hyperpigmentation, vitiligo, or suspicious lesions  Neuro: alert, oriented x3, affect appropriate  Psych: non focal     Data Review:     Recent Labs     12/14/18 0435 12/13/18 2159 12/13/18  0350   WBC 13.7* 13.8* 15.4*   HGB 9.1* 9.1* 8.0*   HCT 26.9* 27.2* 23.3*    194 197     Recent Labs     12/14/18 0435 12/13/18 2152 12/13/18  0350  12/12/18 0415 12/11/18  1500    141 142   < > 142 140   K 3.0* 2.9* 3.3*   < > 3.5 3.9    106 108   < > 107 106   CO2 29 28 28   < > 27 26   * 226* 218*   < > 184* 214*   BUN 23* 23* 22*   < > 32* 37*   CREA 0.84 0.96 0.98   < > 1.32* 1.75*   CA 8.8 8.8 8.6   < > 7.8* 7.3*   MG 2.0 1.7 2.0   < > 2.5 2.3   PHOS 3.5 3.9 1.6*  --  2.5 3.2   ALB  --   --   --   --  2.1* 2.2*   SGOT  --   --   --   --  19 14*   ALT  --   --   --   --  17 17   INR 1.2  --  1.1  --  1.2  --     < > = values in this interval not displayed.        Results for orders placed or performed during the hospital encounter of 11/05/18   EKG, 12 LEAD, INITIAL   Result Value Ref Range    Ventricular Rate 86 BPM    Atrial Rate 86 BPM    P-R Interval 152 ms    QRS Duration 76 ms    Q-T Interval 370 ms    QTC Calculation (Bezet) 442 ms    Calculated P Axis 44 degrees    Calculated R Axis 26 degrees    Calculated T Axis 12 degrees    Diagnosis       Normal sinus rhythm  Normal ECG  When compared with ECG of 12-AUG-2008 07:15,  No significant change was found  Confirmed by Tanner Stokes ([id number]) on 11/10/2018 8:30:39 AM         All Cardiac Markers in the last 24 hours:    Lab Results   Component Value Date/Time    CPK 51 12/14/2018 04:35 AM    CPK 59 12/13/2018 09:52 PM    CKMB <1.0 12/14/2018 04:35 AM    CKMB <1.0 12/13/2018 09:52 PM    CKND1  12/14/2018 04:35 AM     CALCULATION NOT PERFORMED WHEN RESULT IS BELOW LINEAR LIMIT    CKND1  12/13/2018 09:52 PM     CALCULATION NOT PERFORMED WHEN RESULT IS BELOW LINEAR LIMIT    TROIQ 0.18 (H) 12/14/2018 04:35 AM    TROIQ 0.16 (H) 12/13/2018 09:52 PM       Last Lipid:    Lab Results Component Value Date/Time    Triglyceride 129 12/10/2018 01:34 AM       Signed By: ARIA Pablo     December 14, 2018

## 2018-12-14 NOTE — PROGRESS NOTES
NUTRITION consult    Nutrition Consult: TPN: RD to Manage     RECOMMENDATIONS / PLAN:     - Plan to provide parenteral nutrition support, monitor labs and adjust electrolytes daily.   - Monitor GI symptoms and readiness for oral diet/enteral nutrition initiation.  - Continue RD inpatient monitoring and evaluation. Please Note:   Parenteral nutrition support to be provided using custom product, allowing for modification of electrolyte and macronutrient content day to day. Lipids included in PN on MWF. Macronutrient Goal: 115 gm amino acids, 380 gm dextrose, 250 mL lipids (1966 kcal weekly average). PARENTERAL NUTRITION ORDER:     Electrolyte Adjustments: Na decreased, K increased, Ca decreased; insulin added  Additional replacement given today: 80 mEq KCl     Day 6 PN to provide: 50 mEq Na, 80 mEq K, 15 mEq Ca, 10 mEq Mg, 30 mmol Phos, 1980 kcal, 115 gm amino acids, 300 gm dextrose, 250 mL lipids, 10 mL MVI, 2.5 mL trace elements, 10 units regular insulin      PN Rate: 100 mL/hr   Glucose Infusion Rate: 2.52 mg/kg/min    Osmolarity: 1247 mOsm   Parenteral Nutrition Access Device: PICC placed 12/10/18  Indication for PN:  Postop ileus   Refeeding Risk:      []  Yes  [x] No     NUTRITION DIAGNOSIS & INTERVENTIONS:     [x] Parenteral nutrition: continue, modify contents   [x] Collaboration and referral of nutrition care: MD to review PN order and note daily; will not call to verify content and changes, per MD request. Discussed insulin in PN with PA; interdisciplinary rounds     Nutrition Diagnosis:  Inadequate oral intake related to altered GI function and diet intolerance as evidenced by abdominal distention and discomfort and inability to consume po diet. ASSESSMENT:     12/14: 1100 mL out from NGT overnight. Tachycardiac, received 80 mEq of K replacement. Recent BG up to 227 mg/L.    12/13: Extubated, confused and NGT pulled by pt then replaced per CRS.  Steroids stopped- dextrose in PN kept the same and insulin removed. 12/12: Hyperglycemia, receiving steroid and insulin added to next PN. Renal function improving, bladder pressures improving. Ileostomy with output today, NGT output improved. Plan for extubation today. 12/11: RRT for fever with tachycardiac overnight then taken for emergent surgery, s/p ex lap with primary repair of anastomotic leak, omental patch and diverting loop ileostomy (12/11) and remains intubated postop.   12/10: Nephrology consulted for JOSE ANTONIO. NGT to suction with 3 L out in the past 24 hours. 12/9: S/p sigmoid colectomy on 12/4. Was tolerating a diet, then had abdominal pain and distention, NGT placed. KUB indicative of ileus per MD note. + small BM and flatus.      PN infusion adequate to meet patients estimated nutritional needs:   [] Yes     [x] No   [] Unable to determine at this time    Diet: DIET NPO  TPN ADULT - CENTRAL  TPN ADULT - CENTRAL      Food Allergies: NKFA  Current Appetite:   [] Good     [] Fair     [] Poor     [x] Other: NPO  Appetite/meal intake prior to admission:   [x] Good     [] Fair     [] Poor     [] Other:  Feeding Limitations:  [] Swallowing difficulty    [] Chewing difficulty    [] Other:  Current Meal Intake:   Patient Vitals for the past 100 hrs:   % Diet Eaten   12/10/18 1503 0 %   12/10/18 1027 0 %     NGT to suction, output x last 24 hours: 1100 mL  BM:  12/13, ileostomy   Skin Integrity: surgical incision to abdomen; ANGELI drain  Edema:  [x] No     [] Yes   Pertinent Medications: Reviewed: SSI, zofran    Labs: BMP, MG, Phos ordered daily; CMP, Triglyceride, Prealbumin ordered initially and weekly  Recent Labs     12/14/18  0435 12/13/18  2152 12/13/18  0350  12/12/18  0415 12/11/18  1500    141 142   < > 142 140   K 3.0* 2.9* 3.3*   < > 3.5 3.9    106 108   < > 107 106   CO2 29 28 28   < > 27 26   * 226* 218*   < > 184* 214*   BUN 23* 23* 22*   < > 32* 37*   CREA 0.84 0.96 0.98   < > 1.32* 1.75*   CA 8.8 8.8 8.6   < > 7.8* 7.3*   MG 2.0 1.7 2.0   < > 2.5 2.3   PHOS 3.5 3.9 1.6*  --  2.5 3.2   ALB  --   --   --   --  2.1* 2.2*   SGOT  --   --   --   --  19 14*   ALT  --   --   --   --  17 17    < > = values in this interval not displayed. Prealbumin: 9.15 mg/dL (12/10/18)   Triglyceride: 129 mg/dL (12/10/18)   Recent Labs     12/14/18  0435 12/13/18  2159 12/13/18  0350   WBC 13.7* 13.8* 15.4*   HGB 9.1* 9.1* 8.0*   HCT 26.9* 27.2* 23.3*    194 197         Intake/Output Summary (Last 24 hours) at 12/14/2018 1242  Last data filed at 12/14/2018 0600  Gross per 24 hour   Intake 2969.22 ml   Output 3575 ml   Net -605.78 ml       Anthropometrics:   Ht Readings from Last 1 Encounters:   12/13/18 5' 10\" (1.778 m)       Last 3 Recorded Weights in this Encounter    12/11/18 0700 12/12/18 1121 12/13/18 1646   Weight: 78.7 kg (173 lb 8 oz) 78.7 kg (173 lb 8 oz) 82.8 kg (182 lb 9.6 oz)        Body mass index is 26.2 kg/m². Weight History: Pt reports weight loss of 8 lbs PTA, unknown time frame. Weight Metrics 12/13/2018 12/4/2018 11/19/2018 11/12/2018 2/12/2016 2/9/2016 2/9/2016   Weight 182 lb 9.6 oz - 169 lb 170 lb 9.6 oz - 178 lb -   BMI - 26.2 kg/m2 24.25 kg/m2 24.48 kg/m2 25.54 kg/m2 - 25.54 kg/m2          Admitting Diagnosis: Malignant neoplasm of sigmoid colon ([facility]) [C18.7]  Colon cancer ([facility]) [C18.9]  Pertinent PMHx: colon polyps    Education Needs:        [x] None identified  [] Identified - Not appropriate at this time  []  Identified and addressed - refer to education log  Learning Limitations:   [x] None identified  [] Identified    Cultural, Buddhism & ethnic food preferences:  [x] None identified    [] Identified and addressed     ESTIMATED NUTRITION NEEDS:     Calories: 7556-2904 kcal (HBEx1.2-1.5) based on   [x] Actual BW 79 kg    [] IBW:   Protein:  gm (1-1.5 gm/kg) based on   [x] Actual BW      [] IBW:   Fluid: 1 mL/kcal     MONITORING & EVALUATION:     Nutrition Goals:   1.  Patient will receive nutrition via PN until they are able to tolerate adequate po intake/enteral nutrition. Outcome:  [x] Met    []  Not Met    [] New Goal  2. Patient will tolerate advancement of macronutrients towards meeting estimated nutrition needs within the next 7 days. Outcome:  [] Met    [x]  Not Met    [] New Goal    Monitoring:  [x] Parenteral nutrition intake and administration  [x] Biochemical data, medical tests, and procedures   [x] Fluid intake   [x] Nutrition-focused physical findings   [x] Treatment/therapy   [] Food and beverage intake   [] Diet order      [] Weight        Previous Recommendations (for follow-up assessments only):     [x]   Implemented       []   Not Implemented (RD to address)      [] No Longer Appropriate     [] No Recommendation Made        Discharge Planning: Nutrition recommendations pending patient's ability to tolerate po intake/enteral nutrition.    [x]  Participated in care planning, discharge planning, & interdisciplinary rounds as appropriate      Mike Rothman, [address] [place] [address]   Pager: [phone]

## 2018-12-14 NOTE — ROUTINE PROCESS
1950:  Rec'd Wen Acosta  from SHAMIKA Khan RN. SBAR reviewed patient-side with two-nurse check-offs done of meds, dressings, wounds, LDA's & revelant assessment findings including neuro status, vent settings, rhythm & pulses, diet. Bed in lowest position with noted SR up, wheels locked and exit alarm on. Tonight:  Bright spirits. No resp distress. HR, BP progressiviely elevating:  Notified Ms. Josseline López who came to room, also with Ogoy. EKG ordered. Also noted another RN giving IV medicines and stopping infusions used as carrier for planned IV bolus meds. At this point, the other RN assumed patient's care with no input or turnover. Unit management aware.

## 2018-12-15 LAB
ANION GAP SERPL CALC-SCNC: 9 MMOL/L (ref 3–18)
BUN SERPL-MCNC: 27 MG/DL (ref 7–18)
BUN/CREAT SERPL: 32 (ref 12–20)
CALCIUM SERPL-MCNC: 9.1 MG/DL (ref 8.5–10.1)
CHLORIDE SERPL-SCNC: 107 MMOL/L (ref 100–108)
CO2 SERPL-SCNC: 29 MMOL/L (ref 21–32)
CREAT SERPL-MCNC: 0.84 MG/DL (ref 0.6–1.3)
ERYTHROCYTE [DISTWIDTH] IN BLOOD BY AUTOMATED COUNT: 14.3 % (ref 11.6–14.5)
GLUCOSE BLD STRIP.AUTO-MCNC: 186 MG/DL (ref 70–110)
GLUCOSE BLD STRIP.AUTO-MCNC: 260 MG/DL (ref 70–110)
GLUCOSE BLD STRIP.AUTO-MCNC: 283 MG/DL (ref 70–110)
GLUCOSE SERPL-MCNC: 175 MG/DL (ref 74–99)
HCT VFR BLD AUTO: 26.9 % (ref 36–48)
HGB BLD-MCNC: 9.1 G/DL (ref 13–16)
INR PPP: 1.2 (ref 0.8–1.2)
MAGNESIUM SERPL-MCNC: 2.2 MG/DL (ref 1.6–2.6)
MCH RBC QN AUTO: 29.3 PG (ref 24–34)
MCHC RBC AUTO-ENTMCNC: 33.8 G/DL (ref 31–37)
MCV RBC AUTO: 86.5 FL (ref 74–97)
PHOSPHATE SERPL-MCNC: 4.3 MG/DL (ref 2.5–4.9)
PLATELET # BLD AUTO: 279 K/UL (ref 135–420)
PMV BLD AUTO: 10.1 FL (ref 9.2–11.8)
POTASSIUM SERPL-SCNC: 3.4 MMOL/L (ref 3.5–5.5)
PROTHROMBIN TIME: 14.5 SEC (ref 11.5–15.2)
RBC # BLD AUTO: 3.11 M/UL (ref 4.7–5.5)
SODIUM SERPL-SCNC: 145 MMOL/L (ref 136–145)
WBC # BLD AUTO: 14.2 K/UL (ref 4.6–13.2)

## 2018-12-15 PROCEDURE — 74011000250 HC RX REV CODE- 250: Performed by: COLON & RECTAL SURGERY

## 2018-12-15 PROCEDURE — 74011636637 HC RX REV CODE- 636/637: Performed by: COLON & RECTAL SURGERY

## 2018-12-15 PROCEDURE — 74011250636 HC RX REV CODE- 250/636: Performed by: PHYSICIAN ASSISTANT

## 2018-12-15 PROCEDURE — 82962 GLUCOSE BLOOD TEST: CPT

## 2018-12-15 PROCEDURE — 97530 THERAPEUTIC ACTIVITIES: CPT

## 2018-12-15 PROCEDURE — 97161 PT EVAL LOW COMPLEX 20 MIN: CPT

## 2018-12-15 PROCEDURE — 83735 ASSAY OF MAGNESIUM: CPT

## 2018-12-15 PROCEDURE — 84100 ASSAY OF PHOSPHORUS: CPT

## 2018-12-15 PROCEDURE — 74011000258 HC RX REV CODE- 258: Performed by: COLON & RECTAL SURGERY

## 2018-12-15 PROCEDURE — 80048 BASIC METABOLIC PNL TOTAL CA: CPT

## 2018-12-15 PROCEDURE — 74011636637 HC RX REV CODE- 636/637: Performed by: INTERNAL MEDICINE

## 2018-12-15 PROCEDURE — 74011000250 HC RX REV CODE- 250: Performed by: PHYSICIAN ASSISTANT

## 2018-12-15 PROCEDURE — 77010033678 HC OXYGEN DAILY

## 2018-12-15 PROCEDURE — 74011250636 HC RX REV CODE- 250/636: Performed by: COLON & RECTAL SURGERY

## 2018-12-15 PROCEDURE — 74011250636 HC RX REV CODE- 250/636: Performed by: INTERNAL MEDICINE

## 2018-12-15 PROCEDURE — 94762 N-INVAS EAR/PLS OXIMTRY CONT: CPT

## 2018-12-15 PROCEDURE — 85610 PROTHROMBIN TIME: CPT

## 2018-12-15 PROCEDURE — 65660000000 HC RM CCU STEPDOWN

## 2018-12-15 PROCEDURE — 85027 COMPLETE CBC AUTOMATED: CPT

## 2018-12-15 RX ORDER — HYDRALAZINE HYDROCHLORIDE 20 MG/ML
20 INJECTION INTRAMUSCULAR; INTRAVENOUS ONCE
Status: COMPLETED | OUTPATIENT
Start: 2018-12-15 | End: 2018-12-15

## 2018-12-15 RX ADMIN — METOPROLOL TARTRATE 5 MG: 5 INJECTION, SOLUTION INTRAVENOUS at 16:00

## 2018-12-15 RX ADMIN — PIPERACILLIN SODIUM AND TAZOBACTAM SODIUM 3.38 G: 3; .375 INJECTION, POWDER, LYOPHILIZED, FOR SOLUTION INTRAVENOUS at 16:00

## 2018-12-15 RX ADMIN — INSULIN LISPRO 9 UNITS: 100 INJECTION, SOLUTION INTRAVENOUS; SUBCUTANEOUS at 19:27

## 2018-12-15 RX ADMIN — HYDROMORPHONE HYDROCHLORIDE 0.5 MG: 1 INJECTION, SOLUTION INTRAMUSCULAR; INTRAVENOUS; SUBCUTANEOUS at 21:09

## 2018-12-15 RX ADMIN — PIPERACILLIN SODIUM AND TAZOBACTAM SODIUM 3.38 G: 3; .375 INJECTION, POWDER, LYOPHILIZED, FOR SOLUTION INTRAVENOUS at 10:15

## 2018-12-15 RX ADMIN — POTASSIUM CHLORIDE: 2 INJECTION, SOLUTION, CONCENTRATE INTRAVENOUS at 21:31

## 2018-12-15 RX ADMIN — HEPARIN SODIUM 5000 UNITS: 5000 INJECTION, SOLUTION INTRAVENOUS; SUBCUTANEOUS at 00:08

## 2018-12-15 RX ADMIN — HYDRALAZINE HYDROCHLORIDE 10 MG: 20 INJECTION INTRAMUSCULAR; INTRAVENOUS at 12:57

## 2018-12-15 RX ADMIN — INSULIN LISPRO 3 UNITS: 100 INJECTION, SOLUTION INTRAVENOUS; SUBCUTANEOUS at 05:21

## 2018-12-15 RX ADMIN — POTASSIUM CHLORIDE 10 MEQ: 10 INJECTION, SOLUTION INTRAVENOUS at 00:00

## 2018-12-15 RX ADMIN — HYDROMORPHONE HYDROCHLORIDE 0.5 MG: 1 INJECTION, SOLUTION INTRAMUSCULAR; INTRAVENOUS; SUBCUTANEOUS at 10:23

## 2018-12-15 RX ADMIN — HYDROMORPHONE HYDROCHLORIDE 0.5 MG: 1 INJECTION, SOLUTION INTRAMUSCULAR; INTRAVENOUS; SUBCUTANEOUS at 00:04

## 2018-12-15 RX ADMIN — PIPERACILLIN SODIUM AND TAZOBACTAM SODIUM 3.38 G: 3; .375 INJECTION, POWDER, LYOPHILIZED, FOR SOLUTION INTRAVENOUS at 04:07

## 2018-12-15 RX ADMIN — HEPARIN SODIUM 5000 UNITS: 5000 INJECTION, SOLUTION INTRAVENOUS; SUBCUTANEOUS at 10:15

## 2018-12-15 RX ADMIN — METOPROLOL TARTRATE 5 MG: 5 INJECTION, SOLUTION INTRAVENOUS at 04:06

## 2018-12-15 RX ADMIN — METOPROLOL TARTRATE 5 MG: 5 INJECTION, SOLUTION INTRAVENOUS at 12:57

## 2018-12-15 RX ADMIN — HEPARIN SODIUM 5000 UNITS: 5000 INJECTION, SOLUTION INTRAVENOUS; SUBCUTANEOUS at 16:01

## 2018-12-15 RX ADMIN — Medication 10 ML: at 21:10

## 2018-12-15 RX ADMIN — VANCOMYCIN HYDROCHLORIDE 1250 MG: 10 INJECTION, POWDER, LYOPHILIZED, FOR SOLUTION INTRAVENOUS at 05:49

## 2018-12-15 RX ADMIN — METOPROLOL TARTRATE 5 MG: 5 INJECTION, SOLUTION INTRAVENOUS at 10:16

## 2018-12-15 RX ADMIN — METOPROLOL TARTRATE 5 MG: 5 INJECTION, SOLUTION INTRAVENOUS at 21:11

## 2018-12-15 RX ADMIN — INSULIN LISPRO 6 UNITS: 100 INJECTION, SOLUTION INTRAVENOUS; SUBCUTANEOUS at 00:09

## 2018-12-15 RX ADMIN — METOPROLOL TARTRATE 5 MG: 5 INJECTION, SOLUTION INTRAVENOUS at 00:05

## 2018-12-15 RX ADMIN — FLUTICASONE PROPIONATE 2 SPRAY: 50 SPRAY, METERED NASAL at 09:00

## 2018-12-15 RX ADMIN — PIPERACILLIN SODIUM AND TAZOBACTAM SODIUM 3.38 G: 3; .375 INJECTION, POWDER, LYOPHILIZED, FOR SOLUTION INTRAVENOUS at 21:14

## 2018-12-15 RX ADMIN — HYDRALAZINE HYDROCHLORIDE 20 MG: 20 INJECTION INTRAMUSCULAR; INTRAVENOUS at 05:23

## 2018-12-15 NOTE — PROGRESS NOTES
[facility] 3 INTENSIVE CARE UNIT  [address] [place] [id number]  [phone]  Colon and Rectal Surgery Progress Note      Patient: Mehul Wang MRN: [id number]  SSN: [id number]    YOB: 1950  Age: 80 y.o. Sex: male      Admit Date: 12/4/2018    LOS: 11 days     Subjective:     Stable but tachycardic. Sinus.      Objective:     Vitals:    12/15/18 0523 12/15/18 0530 12/15/18 0600 12/15/18 0630   BP: (!) 183/107 142/81 166/80 (!) 152/93   Pulse: (!) 128 (!) 129 (!) 127 (!) 125   Resp:  21 19 24   Temp:       SpO2:  99% 98% 98%   Weight:       Height:            Intake and Output:  Current Shift: 12/14 1901 - 12/15 0700  In: 1455 [I.V.:1455]  Out: 1345 [Urine:500; Drains:70]  Last three shifts: 12/13 0701 - 12/14 1900  In: 4919.2 [I.V.:4919.2]  Out: 5980 [Urine:2225; Drains:235]    Physical Exam:     abd soft, less distended, appr tender  Incision healthy  ANGELI with serous fluid    Lab/Data Review:    CMP:   Lab Results   Component Value Date/Time     12/15/2018 02:15 AM    K 3.4 (L) 12/15/2018 02:15 AM     12/15/2018 02:15 AM    CO2 29 12/15/2018 02:15 AM    AGAP 9 12/15/2018 02:15 AM     (H) 12/15/2018 02:15 AM    BUN 27 (H) 12/15/2018 02:15 AM    CREA 0.84 12/15/2018 02:15 AM    GFRAA >60 12/15/2018 02:15 AM    GFRNA >60 12/15/2018 02:15 AM    CA 9.1 12/15/2018 02:15 AM    MG 2.2 12/15/2018 02:15 AM    PHOS 4.3 12/15/2018 02:15 AM     CBC:   Lab Results   Component Value Date/Time    WBC 14.2 (H) 12/15/2018 02:15 AM    HGB 9.1 (L) 12/15/2018 02:15 AM    HCT 26.9 (L) 12/15/2018 02:15 AM     12/15/2018 02:15 AM        Assessment:     POD 4 s/p ex lap, repair anastomotic leak and diverting loop ileostomy for leak after robotic sigmoid colectomy for malingnant polyp     Plan:     NPO, NG, await improved exam prior to D/C NG  TPN  Continue abx, continue to follow cultures    Signed By: Madalyn Palmer MD        December 15, 2018

## 2018-12-15 NOTE — PROGRESS NOTES
Bedside and Verbal shift change report given to TIA Jama (oncoming nurse) by Andrew Perez (offgoing nurse). Report included the following information SBAR and Kardex.

## 2018-12-15 NOTE — ROUTINE PROCESS
Awake on walking rounds voicing no complaints at this time. abd is semi-soft with active BS x 4. Midline incision closed with skin staples, edges are well approximating without redness, tenderness or inflammation noted. Ileostomy draining green secretions with gas noted and pouch burped. SR up x 3. CB/telephone at hand. 2200  No change in status. Turned and repositioned in bed, no complaints of discomfort rec;d. SR up x 3. CB/telephone at hand. 0000 complaining of back pain and was medicated as ordered. Turned and repositioned in bed. SR up x 3. CB/telephone at hand. 0400  Quiet hours during the night. AM care with linen change provided. Turned and repositioned in bed. SR up x 3.  CB/telephone at hand.

## 2018-12-15 NOTE — PROGRESS NOTES
Problem: Mobility Impaired (Adult and Pediatric)  Goal: *Acute Goals and Plan of Care (Insert Text)  Physical Therapy Goals  Initiated 12/15/2018 and to be accomplished within 5-7 day(s)  1. Patient will move from supine to sit and sit to supine  in bed with modified independence. 2.  Patient will transfer from bed to chair and chair to bed with supervision/set-up using the least restrictive device. 3.  Patient will perform sit to stand with supervision/set-up. 4.  Patient will ambulate with supervision/set-up for 150 feet with the least restrictive device. 5.  Patient will ascend/descend 3 stairs with 1-2 handrail(s) with minimal assistance/contact guard assist.  Outcome: Progressing Towards Goal  physical Therapy EVALUATION    Patient: Víctor Calhoun (18 y.o. male)  Date: 12/15/2018  Primary Diagnosis: Malignant neoplasm of sigmoid colon ([facility] [address] [C18.7]  Colon cancer ([facility] [address] [C18.9]  Procedure(s) (LRB):  LAPAROTOMY EXPLORATORY/ PRIMARY REPAIR WITH DIVERTING LOOP ILEOSTOMY (N/A) 4 Days Post-Op   Precautions:    Fall, Skin    ASSESSMENT :  Based on the objective data described below, the patient presents to Physical Therapy with decreased endurance and activity tolerance, decrease I with functional mobility. He was found supine in bed, slipped down near to the foot of the bed and uncomfortable. He was agreeable for PT consult. He performed supine to sit with CG/SBA and upon sit to stand began to get leakage from his colostomy bag. He returned to sitting on the EOB and nursing was called. Once nursing emptied bag patient stood and ambulated at bedside with RW and CGA. He then sat on the EOB with CGA and then to supine with CGA/SBA. Patient will benefit from skilled intervention to address the above impairments.   Patients rehabilitation potential is considered to be Good  Factors which may influence rehabilitation potential include:   [x]         None noted  []         Mental ability/status  [] Medical condition  []         Home/family situation and support systems  []         Safety awareness  []         Pain tolerance/management  []         Other:      PLAN :  Recommendations and Planned Interventions:  [x]           Bed Mobility Training             [x]    Neuromuscular Re-Education  [x]           Transfer Training                   []    Orthotic/Prosthetic Training  [x]           Gait Training                          []    Modalities  [x]           Therapeutic Exercises          []    Edema Management/Control  [x]           Therapeutic Activities            [x]    Patient and Family Training/Education  []           Other (comment):    Frequency/Duration: Patient will be followed by physical therapy 1-2 times per day/4-7 days per week to address goals. Discharge Recommendations: Home Health  Further Equipment Recommendations for Discharge: N/A     G-CODES      Mobility  Current  CI= 1-19%   Goal  CH= 0%. The severity rating is based on the Level of Assistance required for Functional Mobility and ADLs. Eval Complexity: History: MEDIUM  Complexity : 1-2 comorbidities / personal factors will impact the outcome/ POC Exam:MEDIUM Complexity : 3 Standardized tests and measures addressing body structure, function, activity limitation and / or participation in recreation  Presentation: LOW Complexity : Stable, uncomplicated  Clinical Decision Making:Other outcome measures level of assistance with functional mobility  LOW Overall Complexity:LOW     SUBJECTIVE:   Patient stated I could sit up all day. 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     Chest pain, unspecified     Diabetes ([facility] [address]     Essential hypertension, benign     GERD (gastroesophageal reflux disease)     Nonspecific abnormal electrocardiogram (ECG) (EKG)      Past Surgical History:   Procedure Laterality Date    COLONOSCOPY N/A 11/12/2018    COLONOSCOPY with Polypectomies, Bx's, Injection, Tattooing & Clip Placement x 3 performed by Sussy Kebede MD at [facility]  11/12/2018         Bayron Alatorre  11/12/2018         ENDOSCOPIC MUCOSAL RESECT  11/12/2018         EXPLORATORY OF ABDOMEN N/A 12/11/2018    Dr. Peace Lemus HX COLONOSCOPY      HX HEENT  2008    Throat Ca - 1 vocal chord removed     HX HEENT      eye surgery muscles    LAP,SURG,COLECTOMY, PARTIAL, W/ANAST N/A 12/04/2018    Dr. Piotr Moctezuma     Prior Level of Function/Home Situation: lives at home with wife and son, has a SC and RW that he uses  Home Situation  Home Environment: Private residence  # Steps to Enter: 3  Rails to Enter: No  One/Two Story Residence: One story  Living Alone: No  Support Systems: Child(catalina), Spouse/Significant Other/Partner  Patient Expects to be Discharged to[de-identified] Private residence  Current DME Used/Available at Home: Rob Fine, straight, Walker, rolling  Critical Behavior:  Neurologic State: Alert; Appropriate for age  Orientation Level: Oriented X4  Cognition: Follows commands; Appropriate for age attention/concentration; Appropriate decision making; Appropriate safety awareness  Safety/Judgement: Fall prevention  Psychosocial  Patient Behaviors: Calm; Cooperative  Purposeful Interaction: Yes      Strength:    Strength: Within functional limits(B LE)   Tone & Sensation:   Tone: Normal     Range Of Motion:  AROM: Within functional limits(B LE)   Functional Mobility:  Bed Mobility:     Supine to Sit: Contact guard assistance;Stand-by assistance  Sit to Supine: Contact guard assistance;Stand-by assistance   Transfers:  Sit to Stand: Contact guard assistance  Stand to Sit: Contact guard assistance   Balance:   Sitting: Intact; Without support  Standing: Intact; With support  Ambulation/Gait Training:  Distance (ft): 4 Feet (ft)(bedside)  Assistive Device: Walker, rolling  Ambulation - Level of Assistance: Contact guard assistance     Gait Description (WDL): Exceptions to [facility]    Therapeutic Exercises:      Pain:  Pt reports 0/10 pain or discomfort prior to treatment.    Pt reports 0/10 pain or discomfort post treatment. Activity Tolerance: Tolerated well  Please refer to the flowsheet for vital signs taken during this treatment. After treatment:   []         Patient left in no apparent distress sitting up in chair  [x]         Patient left in no apparent distress in bed  [x]         Call bell left within reach  [x]         Nursing notified  []         Caregiver present  []         Bed alarm activated    COMMUNICATION/EDUCATION:   [x]         Fall prevention education was provided and the patient/caregiver indicated understanding. [x]         Patient/family have participated as able in goal setting and plan of care. [x]         Patient/family agree to work toward stated goals and plan of care. []         Patient understands intent and goals of therapy, but is neutral about his/her participation. []         Patient is unable to participate in goal setting and plan of care.     Thank you for this referral.  Daniel Grayson, PT   Time Calculation: 38 mins

## 2018-12-15 NOTE — PROGRESS NOTES
NUTRITION consult    Nutrition Consult: TPN: RD to Manage     RECOMMENDATIONS / PLAN:     - Plan to provide parenteral nutrition support, monitor labs and adjust electrolytes daily.   - Monitor GI symptoms and readiness for oral diet/enteral nutrition initiation.  - Continue RD inpatient monitoring and evaluation. Please Note:   Parenteral nutrition support to be provided using custom product, allowing for modification of electrolyte and macronutrient content day to day. Lipids included in PN on MWF. Macronutrient Goal: 115 gm amino acids, 380 gm dextrose, 250 mL lipids (1966 kcal weekly average). PARENTERAL NUTRITION ORDER:     Electrolyte Adjustments: Na removed, K increased, Ca decreased, Mg increased, Phos decreased; insulin increased (discussed with Dr Isaias Molina)  Additional replacement given yesterday/overnight: 50 mEq KCl     Day 7 PN to provide: 100 mEq K, 10 mEq Ca, 12 mEq Mg, 20 mmol Phos, 1480 kcal, 115 gm amino acids, 300 gm dextrose, 10 mL MVI, 2.5 mL trace elements, 12 units regular insulin      PN Rate: 100 mL/hr   Glucose Infusion Rate: 2.53 mg/kg/min    Osmolarity: 1206 mOsm   Parenteral Nutrition Access Device: PICC placed 12/10/18  Indication for PN:  Postop ileus   Refeeding Risk:      []  Yes  [x] No     NUTRITION DIAGNOSIS & INTERVENTIONS:     [x] Parenteral nutrition: continue, modify contents   [x] Collaboration and referral of nutrition care: MD to review PN order and note daily; will not call to verify content and changes, per MD request. Discussed insulin in PN with Dr Isaias Molina    Nutrition Diagnosis:  Inadequate oral intake related to altered GI function and diet intolerance as evidenced by abdominal distention and discomfort and inability to consume po diet. ASSESSMENT:     12/15: Hyperglycemia, improved. 12/14: 1100 mL out from NGT overnight. Tachycardiac, received 80 mEq of K replacement.  Recent BG up to 227 mg/L.    12/13: Extubated, confused and NGT pulled by pt then replaced per CRS. Steroids stopped- dextrose in PN kept the same and insulin removed. 12/12: Hyperglycemia, receiving steroid and insulin added to next PN. Renal function improving, bladder pressures improving. Ileostomy with output today, NGT output improved. Plan for extubation today. 12/11: RRT for fever with tachycardiac overnight then taken for emergent surgery, s/p ex lap with primary repair of anastomotic leak, omental patch and diverting loop ileostomy (12/11) and remains intubated postop.   12/10: Nephrology consulted for JOSE ANTONIO. NGT to suction with 3 L out in the past 24 hours. 12/9: S/p sigmoid colectomy on 12/4. Was tolerating a diet, then had abdominal pain and distention, NGT placed. KUB indicative of ileus per MD note. + small BM and flatus. PN infusion adequate to meet patients estimated nutritional needs:   [] Yes     [x] No   [] Unable to determine at this time    Diet: DIET NPO  TPN ADULT - CENTRAL  TPN ADULT - CENTRAL      Food Allergies: NKFA  Current Appetite:   [] Good     [] Fair     [] Poor     [x] Other: NPO  Appetite/meal intake prior to admission:   [x] Good     [] Fair     [] Poor     [] Other:  Feeding Limitations:  [] Swallowing difficulty    [] Chewing difficulty    [] Other:  Current Meal Intake:   No data found.      NGT to suction, output x last 24 hours: 1725 mL  BM:  12/14, ileostomy   Skin Integrity: surgical incision to abdomen; ANGELI drain  Edema:  [x] No     [] Yes   Pertinent Medications: Reviewed: SSI, zofran    Labs: BMP, MG, Phos ordered daily; CMP, Triglyceride, Prealbumin ordered initially and weekly  Recent Labs     12/15/18  0215 12/14/18  1543 12/14/18  0435    143 143   K 3.4* 3.4* 3.0*    106 106   CO2 29 30 29   * 247* 175*   BUN 27* 25* 23*   CREA 0.84 0.96 0.84   CA 9.1 8.6 8.8   MG 2.2 1.8 2.0   PHOS 4.3 4.6 3.5   Prealbumin: 9.15 mg/dL (12/10/18)   Triglyceride: 129 mg/dL (12/10/18)   Recent Labs     12/15/18  0215 12/14/18  0437 12/13/18  2159   WBC 14.2* 13.7* 13.8*   HGB 9.1* 9.1* 9.1*   HCT 26.9* 26.9* 27.2*    241 194         Intake/Output Summary (Last 24 hours) at 12/15/2018 1224  Last data filed at 12/15/2018 0643  Gross per 24 hour   Intake 2805 ml   Output 2595 ml   Net 210 ml       Anthropometrics:   Ht Readings from Last 1 Encounters:   12/13/18 5' 10\" (1.778 m)       Last 3 Recorded Weights in this Encounter    12/12/18 1121 12/13/18 1646 12/15/18 0405   Weight: 78.7 kg (173 lb 8 oz) 82.8 kg (182 lb 9.6 oz) 82.5 kg (181 lb 14.4 oz)        Body mass index is 26.1 kg/m². Weight History: Pt reports weight loss of 8 lbs PTA, unknown time frame. Weight Metrics 12/15/2018 12/4/2018 11/19/2018 11/12/2018 2/12/2016 2/9/2016 2/9/2016   Weight 181 lb 14.4 oz - 169 lb 170 lb 9.6 oz - 178 lb -   BMI - 26.1 kg/m2 24.25 kg/m2 24.48 kg/m2 25.54 kg/m2 - 25.54 kg/m2          Admitting Diagnosis: Malignant neoplasm of sigmoid colon (HCC) [C18.7]  Colon cancer (HCC) [C18.9]  Pertinent PMHx: colon polyps    Education Needs:        [x] None identified  [] Identified - Not appropriate at this time  []  Identified and addressed - refer to education log  Learning Limitations:   [x] None identified  [] Identified    Cultural, Christianity & ethnic food preferences:  [x] None identified    [] Identified and addressed     ESTIMATED NUTRITION NEEDS:     Calories: 8924-8035 kcal (HBEx1.2-1.5) based on   [x] Actual BW 79 kg    [] IBW:   Protein:  gm (1-1.5 gm/kg) based on   [x] Actual BW      [] IBW:   Fluid: 1 mL/kcal     MONITORING & EVALUATION:     Nutrition Goals:   1. Patient will receive nutrition via PN until they are able to tolerate adequate po intake/enteral nutrition. Outcome:  [x] Met    []  Not Met    [] New Goal  2. Patient will tolerate advancement of macronutrients towards meeting estimated nutrition needs within the next 7 days.  Outcome:  [] Met    [x]  Not Met    [] New Goal    Monitoring:  [x] Parenteral nutrition intake and administration  [x] Biochemical data, medical tests, and procedures   [x] Fluid intake   [x] Nutrition-focused physical findings   [x] Treatment/therapy   [] Food and beverage intake   [] Diet order      [] Weight        Previous Recommendations (for follow-up assessments only):     [x]   Implemented       []   Not Implemented (RD to address)      [] No Longer Appropriate     [] No Recommendation Made        Discharge Planning: Nutrition recommendations pending patient's ability to tolerate po intake/enteral nutrition.    [x]  Participated in care planning, discharge planning, & interdisciplinary rounds as appropriate      Joseph Elliott, 67 [address]   Pager: [phone]

## 2018-12-15 NOTE — ROUTINE PROCESS
Bedside and Verbal shift change report given to Ayush Ho RN (oncoming nurse) by Ladarius Wall RN (offgoing nurse). Report included the following information SBAR, Kardex, Intake/Output, MAR and Recent Results.

## 2018-12-16 LAB
ANION GAP SERPL CALC-SCNC: 7 MMOL/L (ref 3–18)
BACTERIA SPEC CULT: NORMAL
BUN SERPL-MCNC: 31 MG/DL (ref 7–18)
BUN/CREAT SERPL: 30 (ref 12–20)
CALCIUM SERPL-MCNC: 8.5 MG/DL (ref 8.5–10.1)
CHLORIDE SERPL-SCNC: 110 MMOL/L (ref 100–108)
CO2 SERPL-SCNC: 28 MMOL/L (ref 21–32)
CREAT SERPL-MCNC: 1.04 MG/DL (ref 0.6–1.3)
DATE LAST DOSE: ABNORMAL
ERYTHROCYTE [DISTWIDTH] IN BLOOD BY AUTOMATED COUNT: 14.4 % (ref 11.6–14.5)
GLUCOSE BLD STRIP.AUTO-MCNC: 169 MG/DL (ref 70–110)
GLUCOSE BLD STRIP.AUTO-MCNC: 177 MG/DL (ref 70–110)
GLUCOSE BLD STRIP.AUTO-MCNC: 178 MG/DL (ref 70–110)
GLUCOSE BLD STRIP.AUTO-MCNC: 229 MG/DL (ref 70–110)
GLUCOSE BLD STRIP.AUTO-MCNC: 254 MG/DL (ref 70–110)
GLUCOSE SERPL-MCNC: 188 MG/DL (ref 74–99)
HCT VFR BLD AUTO: 21.5 % (ref 36–48)
HGB BLD-MCNC: 7.2 G/DL (ref 13–16)
INR PPP: 1.2 (ref 0.8–1.2)
MAGNESIUM SERPL-MCNC: 1.9 MG/DL (ref 1.6–2.6)
MCH RBC QN AUTO: 29.4 PG (ref 24–34)
MCHC RBC AUTO-ENTMCNC: 33.5 G/DL (ref 31–37)
MCV RBC AUTO: 87.8 FL (ref 74–97)
PHOSPHATE SERPL-MCNC: 3.9 MG/DL (ref 2.5–4.9)
PLATELET # BLD AUTO: 329 K/UL (ref 135–420)
PMV BLD AUTO: 10.2 FL (ref 9.2–11.8)
POTASSIUM SERPL-SCNC: 3.3 MMOL/L (ref 3.5–5.5)
PROTHROMBIN TIME: 14.9 SEC (ref 11.5–15.2)
RBC # BLD AUTO: 2.45 M/UL (ref 4.7–5.5)
REPORTED DOSE,DOSE: ABNORMAL UNITS
REPORTED DOSE/TIME,TMG: 600
SERVICE CMNT-IMP: NORMAL
SODIUM SERPL-SCNC: 145 MMOL/L (ref 136–145)
VANCOMYCIN TROUGH SERPL-MCNC: 4.9 UG/ML (ref 10–20)
WBC # BLD AUTO: 17.7 K/UL (ref 4.6–13.2)

## 2018-12-16 PROCEDURE — 36415 COLL VENOUS BLD VENIPUNCTURE: CPT

## 2018-12-16 PROCEDURE — 74011636637 HC RX REV CODE- 636/637: Performed by: COLON & RECTAL SURGERY

## 2018-12-16 PROCEDURE — 77030010520

## 2018-12-16 PROCEDURE — 85027 COMPLETE CBC AUTOMATED: CPT

## 2018-12-16 PROCEDURE — 65660000000 HC RM CCU STEPDOWN

## 2018-12-16 PROCEDURE — 77010033678 HC OXYGEN DAILY

## 2018-12-16 PROCEDURE — 77030010545

## 2018-12-16 PROCEDURE — 80202 ASSAY OF VANCOMYCIN: CPT

## 2018-12-16 PROCEDURE — 82962 GLUCOSE BLOOD TEST: CPT

## 2018-12-16 PROCEDURE — 74011636637 HC RX REV CODE- 636/637: Performed by: INTERNAL MEDICINE

## 2018-12-16 PROCEDURE — 80048 BASIC METABOLIC PNL TOTAL CA: CPT

## 2018-12-16 PROCEDURE — 77030010522

## 2018-12-16 PROCEDURE — 74011000250 HC RX REV CODE- 250: Performed by: COLON & RECTAL SURGERY

## 2018-12-16 PROCEDURE — 74011000250 HC RX REV CODE- 250: Performed by: PHYSICIAN ASSISTANT

## 2018-12-16 PROCEDURE — 85610 PROTHROMBIN TIME: CPT

## 2018-12-16 PROCEDURE — 83735 ASSAY OF MAGNESIUM: CPT

## 2018-12-16 PROCEDURE — 84100 ASSAY OF PHOSPHORUS: CPT

## 2018-12-16 PROCEDURE — 74011250636 HC RX REV CODE- 250/636: Performed by: COLON & RECTAL SURGERY

## 2018-12-16 PROCEDURE — 77030010541

## 2018-12-16 PROCEDURE — 74011250636 HC RX REV CODE- 250/636: Performed by: INTERNAL MEDICINE

## 2018-12-16 PROCEDURE — 74011000258 HC RX REV CODE- 258: Performed by: COLON & RECTAL SURGERY

## 2018-12-16 RX ADMIN — POTASSIUM CHLORIDE: 2 INJECTION, SOLUTION, CONCENTRATE INTRAVENOUS at 21:50

## 2018-12-16 RX ADMIN — HEPARIN SODIUM 5000 UNITS: 5000 INJECTION, SOLUTION INTRAVENOUS; SUBCUTANEOUS at 00:52

## 2018-12-16 RX ADMIN — PIPERACILLIN SODIUM AND TAZOBACTAM SODIUM 3.38 G: 3; .375 INJECTION, POWDER, LYOPHILIZED, FOR SOLUTION INTRAVENOUS at 17:05

## 2018-12-16 RX ADMIN — HYDROMORPHONE HYDROCHLORIDE 0.5 MG: 1 INJECTION, SOLUTION INTRAMUSCULAR; INTRAVENOUS; SUBCUTANEOUS at 08:57

## 2018-12-16 RX ADMIN — METOPROLOL TARTRATE 5 MG: 5 INJECTION, SOLUTION INTRAVENOUS at 00:47

## 2018-12-16 RX ADMIN — INSULIN LISPRO 3 UNITS: 100 INJECTION, SOLUTION INTRAVENOUS; SUBCUTANEOUS at 23:37

## 2018-12-16 RX ADMIN — HEPARIN SODIUM 5000 UNITS: 5000 INJECTION, SOLUTION INTRAVENOUS; SUBCUTANEOUS at 08:58

## 2018-12-16 RX ADMIN — HEPARIN SODIUM 5000 UNITS: 5000 INJECTION, SOLUTION INTRAVENOUS; SUBCUTANEOUS at 23:33

## 2018-12-16 RX ADMIN — HYDROMORPHONE HYDROCHLORIDE 0.5 MG: 1 INJECTION, SOLUTION INTRAMUSCULAR; INTRAVENOUS; SUBCUTANEOUS at 13:23

## 2018-12-16 RX ADMIN — INSULIN LISPRO 9 UNITS: 100 INJECTION, SOLUTION INTRAVENOUS; SUBCUTANEOUS at 00:55

## 2018-12-16 RX ADMIN — METOPROLOL TARTRATE 5 MG: 5 INJECTION, SOLUTION INTRAVENOUS at 21:04

## 2018-12-16 RX ADMIN — METOPROLOL TARTRATE 5 MG: 5 INJECTION, SOLUTION INTRAVENOUS at 08:58

## 2018-12-16 RX ADMIN — HYDROMORPHONE HYDROCHLORIDE 0.5 MG: 1 INJECTION, SOLUTION INTRAMUSCULAR; INTRAVENOUS; SUBCUTANEOUS at 03:37

## 2018-12-16 RX ADMIN — INSULIN LISPRO 3 UNITS: 100 INJECTION, SOLUTION INTRAVENOUS; SUBCUTANEOUS at 13:31

## 2018-12-16 RX ADMIN — HEPARIN SODIUM 5000 UNITS: 5000 INJECTION, SOLUTION INTRAVENOUS; SUBCUTANEOUS at 20:32

## 2018-12-16 RX ADMIN — HYDROMORPHONE HYDROCHLORIDE 0.5 MG: 1 INJECTION, SOLUTION INTRAMUSCULAR; INTRAVENOUS; SUBCUTANEOUS at 00:49

## 2018-12-16 RX ADMIN — INSULIN LISPRO 6 UNITS: 100 INJECTION, SOLUTION INTRAVENOUS; SUBCUTANEOUS at 18:00

## 2018-12-16 RX ADMIN — METOPROLOL TARTRATE 5 MG: 5 INJECTION, SOLUTION INTRAVENOUS at 23:33

## 2018-12-16 RX ADMIN — METOPROLOL TARTRATE 5 MG: 5 INJECTION, SOLUTION INTRAVENOUS at 13:31

## 2018-12-16 RX ADMIN — METOPROLOL TARTRATE 5 MG: 5 INJECTION, SOLUTION INTRAVENOUS at 03:37

## 2018-12-16 RX ADMIN — PIPERACILLIN SODIUM AND TAZOBACTAM SODIUM 3.38 G: 3; .375 INJECTION, POWDER, LYOPHILIZED, FOR SOLUTION INTRAVENOUS at 08:57

## 2018-12-16 RX ADMIN — PIPERACILLIN SODIUM AND TAZOBACTAM SODIUM 3.38 G: 3; .375 INJECTION, POWDER, LYOPHILIZED, FOR SOLUTION INTRAVENOUS at 21:49

## 2018-12-16 RX ADMIN — INSULIN LISPRO 3 UNITS: 100 INJECTION, SOLUTION INTRAVENOUS; SUBCUTANEOUS at 05:25

## 2018-12-16 RX ADMIN — METOPROLOL TARTRATE 5 MG: 5 INJECTION, SOLUTION INTRAVENOUS at 18:10

## 2018-12-16 RX ADMIN — PIPERACILLIN SODIUM AND TAZOBACTAM SODIUM 3.38 G: 3; .375 INJECTION, POWDER, LYOPHILIZED, FOR SOLUTION INTRAVENOUS at 02:30

## 2018-12-16 RX ADMIN — LATANOPROST 1 DROP: 50 SOLUTION/ DROPS OPHTHALMIC at 20:30

## 2018-12-16 NOTE — PROGRESS NOTES
RENAL DAILY PROGRESS NOTE            75y M with PMH HTN, DM, s/p robotic sigmoid colectomy , post op ileus, seen for JOSE ANTONIO  Subjective:       Complaint:   Overnight events noted  Now out of ICU. Feels okay     IMPRESSION:   · JOSE ANTONIO, suspect ATN; multiple risk factor including; Hypotension, was on nsaids and ARB, very higher NG tube output,  Abdominal compartment syndrome. ( marked improvement after surgery suggestive of ACS) -- recovering. · S/p robot assisted sigmoidectomy ,   · Post op ileus  · S/p reapir of anastomotic leak  · DM  · Renal stone, non obstructive,   · Hypophosphatemia  · Hypokalemia    PLAN:   · His renal function is at baseline, voiding well. · Will be available if any question or concern.   · Adjust phos and K in TPN,          Current Facility-Administered Medications   Medication Dose Route Frequency    TPN ADULT - CENTRAL   IntraVENous CONTINUOUS    metoprolol (LOPRESSOR) injection 5 mg  5 mg IntraVENous Q4H    fluticasone (FLONASE) 50 mcg/actuation nasal spray 2 Spray  2 Spray Both Nostrils DAILY    hydrALAZINE (APRESOLINE) 20 mg/mL injection 10 mg  10 mg IntraVENous Q6H PRN    HYDROmorphone (DILAUDID) syringe 0.5 mg  0.5 mg IntraVENous Q3H PRN    insulin lispro (HUMALOG) injection   SubCUTAneous Q6H    albuterol (ACCUNEB) nebulizer solution 2.5 mg  2.5 mg Nebulization Q6H PRN    piperacillin-tazobactam (ZOSYN) 3.375 g in 0.9% sodium chloride (MBP/ADV) 100 mL MBP  3.375 g IntraVENous Q6H    sodium chloride (NS) flush 10 mL  10 mL InterCATHeter PRN    menthol 5 MG (NICE) lozenge  1 Lozenge Mucous Membrane Q6H PRN    diphenhydrAMINE (BENADRYL) injection 25 mg  25 mg IntraVENous Q6H PRN    ondansetron (ZOFRAN) injection 4 mg  4 mg IntraVENous Q6H PRN    latanoprost (XALATAN) 0.005 % ophthalmic solution 1 Drop  1 Drop Both Eyes QPM    glucose chewable tablet 16 g  4 Tab Oral PRN    glucagon (GLUCAGEN) injection 1 mg  1 mg IntraMUSCular PRN    dextrose (D50W) injection syrg 12.5-25 g  25-50 mL IntraVENous PRN    heparin (porcine) injection 5,000 Units  5,000 Units SubCUTAneous Q8H       Review of Symptoms: comprehensive ROS negative except above.    Objective:     Patient Vitals for the past 24 hrs:   Temp Pulse Resp BP SpO2   12/16/18 1155 98.3 °F (36.8 °C) (!) 103 20 134/78 99 %   12/16/18 0822 98.2 °F (36.8 °C) (!) 121 20 (!) 134/95 100 %   12/16/18 0443 98.7 °F (37.1 °C) 93 20 (!) 154/91 98 %   12/16/18 0006 99.1 °F (37.3 °C) (!) 117 16 159/74 93 %   12/15/18 2047 99.4 °F (37.4 °C) (!) 121 16 167/76 99 %   12/15/18 1520 -- 93 -- -- --   12/15/18 1508 98.7 °F (37.1 °C) (!) 125 20 168/84 98 %   12/15/18 1330 -- (!) 116 21 118/49 99 %   12/15/18 1300 -- (!) 108 21 (!) 181/137 100 %   12/15/18 1230 -- (!) 112 21 (!) 180/147 99 %   12/15/18 1200 97.8 °F (36.6 °C) (!) 116 19 155/79 97 %        Weight change: -0.408 kg (-14.4 oz)     12/14 1901 - 12/16 0700  In: 2658.3 [I.V.:2658.3]  Out: 2700 [Urine:825; Drains:150]    Intake/Output Summary (Last 24 hours) at 12/16/2018 1157  Last data filed at 12/16/2018 0533  Gross per 24 hour   Intake 1203.33 ml   Output 1355 ml   Net -151.67 ml     Physical Exam:   General: looks comfortable  HEENT  no thyromegaly  CVS: S1S2 heard,  no rub  RS: + air entry b/l,   Abd:distended abdomen, has colostomy , ANGELI drain  Neuro:, awake  Extrm: +edema, no cyanosis, clubbing   Skin: no visible  Rash  Musculoskeletal: No gross joints or bone deformities         Data Review:     LABS:   Hematology:   Recent Labs     12/16/18  0230 12/15/18  0215 12/14/18  0435 12/13/18  2159   WBC 17.7* 14.2* 13.7* 13.8*   HGB 7.2* 9.1* 9.1* 9.1*   HCT 21.5* 26.9* 26.9* 27.2*     Chemistry:   Recent Labs     12/16/18  0230 12/15/18  0215 12/14/18  1543 12/14/18  0435 12/13/18  2152   BUN 31* 27* 25* 23* 23*   CREA 1.04 0.84 0.96 0.84 0.96   CA 8.5 9.1 8.6 8.8 8.8   K 3.3* 3.4* 3.4* 3.0* 2.9*    145 143 143 141   * 107 106 106 106   CO2 28 29 30 29 28   PHOS 3.9 4.3 4.6 3.5 3.9   * 175* 247* 175* 226*            Procedures/imaging: see electronic medical records for all procedures, Xrays and details which were not copied into this note but were reviewed prior to creation of Plan          Assessment & Plan:     As above         Jennifer Martínez MD  12/16/2018  10:09 AM

## 2018-12-16 NOTE — PROGRESS NOTES
NUTRITION consult    Nutrition Consult: TPN: RD to Manage     RECOMMENDATIONS / PLAN:     - Plan to provide parenteral nutrition support, monitor labs and adjust electrolytes daily.   - Monitor GI symptoms and readiness for oral diet/enteral nutrition initiation.  - Continue RD inpatient monitoring and evaluation. Please Note:   Parenteral nutrition support to be provided using custom product, allowing for modification of electrolyte and macronutrient content day to day. Lipids included in PN on MWF. Macronutrient Goal: 115 gm amino acids, 380 gm dextrose, 250 mL lipids (1966 kcal weekly average). PARENTERAL NUTRITION ORDER:     Electrolyte Adjustments: K increased    Day 8 PN to provide: 110 mEq K, 10 mEq Ca, 12 mEq Mg, 20 mmol Phos, 1480 kcal, 115 gm amino acids, 300 gm dextrose, 10 mL MVI, 2.5 mL trace elements, 12 units regular insulin      PN Rate: 100 mL/hr   Glucose Infusion Rate: 2.5 mg/kg/min    Osmolarity: 1214 mOsm   Parenteral Nutrition Access Device: PICC placed 12/10/18  Indication for PN:  Postop ileus   Refeeding Risk:      []  Yes  [x] No     NUTRITION DIAGNOSIS & INTERVENTIONS:     [x] Parenteral nutrition: continue, modify contents   [x] Collaboration and referral of nutrition care: MD to review PN order and note daily; will not call to verify content and changes, per MD request. Discussed insulin in PN with Dr Nicole Holder on 12/15 and no change made to next bag    Nutrition Diagnosis:  Inadequate oral intake related to altered GI function and diet intolerance as evidenced by abdominal distention and discomfort and inability to consume po diet. ASSESSMENT:     12/16: NGT output still significant, contributing to hypokalemia. 12/15: Hyperglycemia, improved. 12/14: 1100 mL out from NGT overnight. Tachycardiac, received 80 mEq of K replacement. Recent BG up to 227 mg/L.    12/13: Extubated, confused and NGT pulled by pt then replaced per CRS.  Steroids stopped- dextrose in PN kept the same and insulin removed. 12/12: Hyperglycemia, receiving steroid and insulin added to next PN. Renal function improving, bladder pressures improving. Ileostomy with output today, NGT output improved. Plan for extubation today. 12/11: RRT for fever with tachycardiac overnight then taken for emergent surgery, s/p ex lap with primary repair of anastomotic leak, omental patch and diverting loop ileostomy (12/11) and remains intubated postop.   12/10: Nephrology consulted for JOSE ANTONIO. NGT to suction with 3 L out in the past 24 hours. 12/9: S/p sigmoid colectomy on 12/4. Was tolerating a diet, then had abdominal pain and distention, NGT placed. KUB indicative of ileus per MD note. + small BM and flatus. PN infusion adequate to meet patients estimated nutritional needs:   [] Yes     [x] No   [] Unable to determine at this time    Diet: DIET NPO  TPN ADULT - CENTRAL  TPN ADULT - CENTRAL      Food Allergies: NKFA  Current Appetite:   [] Good     [] Fair     [] Poor     [x] Other: NPO  Appetite/meal intake prior to admission:   [x] Good     [] Fair     [] Poor     [] Other:  Feeding Limitations:  [] Swallowing difficulty    [] Chewing difficulty    [] Other:  Current Meal Intake:   No data found.      NGT to suction, output x last 24 hours: 400 mL (per chart documentation)  BM:  12/15, ileostomy   Skin Integrity: surgical incision to abdomen; ANGELI drain  Edema:  [x] No     [] Yes   Pertinent Medications: Reviewed: SSI, zofran    Labs: BMP, MG, Phos ordered daily; CMP, Triglyceride, Prealbumin ordered initially and weekly  Recent Labs     12/16/18  0230 12/15/18  0215 12/14/18  1543    145 143   K 3.3* 3.4* 3.4*   * 107 106   CO2 28 29 30   * 175* 247*   BUN 31* 27* 25*   CREA 1.04 0.84 0.96   CA 8.5 9.1 8.6   MG 1.9 2.2 1.8   PHOS 3.9 4.3 4.6   Prealbumin: 9.15 mg/dL (12/10/18)   Triglyceride: 129 mg/dL (12/10/18)   Recent Labs     12/16/18  0230 12/15/18  0215 12/14/18  0435   WBC 17.7* 14.2* 13.7* HGB 7.2* 9.1* 9.1*   HCT 21.5* 26.9* 26.9*    279 241         Intake/Output Summary (Last 24 hours) at 12/16/2018 1304  Last data filed at 12/16/2018 0533  Gross per 24 hour   Intake 1203.33 ml   Output 1355 ml   Net -151.67 ml       Anthropometrics:   Ht Readings from Last 1 Encounters:   12/13/18 5' 10\" (1.778 m)       Last 3 Recorded Weights in this Encounter    12/13/18 1646 12/15/18 0405 12/16/18 0443   Weight: 82.8 kg (182 lb 9.6 oz) 82.5 kg (181 lb 14.4 oz) 82.1 kg (181 lb)        Body mass index is 25.97 kg/m². Weight History: Pt reports weight loss of 8 lbs PTA, unknown time frame. Weight Metrics 12/16/2018 12/4/2018 11/19/2018 11/12/2018 2/12/2016 2/9/2016 2/9/2016   Weight 181 lb - 169 lb 170 lb 9.6 oz - 178 lb -   BMI - 25.97 kg/m2 24.25 kg/m2 24.48 kg/m2 25.54 kg/m2 - 25.54 kg/m2          Admitting Diagnosis: Malignant neoplasm of sigmoid colon (HCC) [C18.7]  Colon cancer (HCC) [C18.9]  Pertinent PMHx: colon polyps    Education Needs:        [x] None identified  [] Identified - Not appropriate at this time  []  Identified and addressed - refer to education log  Learning Limitations:   [x] None identified  [] Identified    Cultural, [employer] & ethnic food preferences:  [x] None identified    [] Identified and addressed     ESTIMATED NUTRITION NEEDS:     Calories: 4068-3117 kcal (HBEx1.2-1.5) based on   [x] Actual BW 79 kg    [] IBW:   Protein:  gm (1-1.5 gm/kg) based on   [x] Actual BW      [] IBW:   Fluid: 1 mL/kcal     MONITORING & EVALUATION:     Nutrition Goals:   1. Patient will receive nutrition via PN until they are able to tolerate adequate po intake/enteral nutrition. Outcome:  [x] Met    []  Not Met    [] New Goal  2. Patient will tolerate advancement of macronutrients towards meeting estimated nutrition needs within the next 7 days.  Outcome:  [] Met    [x]  Not Met    [] New Goal    Monitoring:  [x] Parenteral nutrition intake and administration  [x] Biochemical data, medical tests, and procedures   [x] Fluid intake   [x] Nutrition-focused physical findings   [x] Treatment/therapy   [] Food and beverage intake   [] Diet order      [] Weight        Previous Recommendations (for follow-up assessments only):     [x]   Implemented       []   Not Implemented (RD to address)      [] No Longer Appropriate     [] No Recommendation Made        Discharge Planning: Nutrition recommendations pending patient's ability to tolerate po intake/enteral nutrition.    [x]  Participated in care planning, discharge planning, & interdisciplinary rounds as appropriate      Boom Tatum, [id number]   Pager: [phone]

## 2018-12-16 NOTE — PROGRESS NOTES
Progress Note    Patient: Lorraine Denise MRN: [id number]  SSN: [id number]    YOB: 1950  Age: 80 y.o. Sex: male      Admit Date: 2018    5 Days Post-Op    Procedure:  Procedure(s):  LAPAROTOMY EXPLORATORY/ PRIMARY REPAIR WITH DIVERTING LOOP ILEOSTOMY    Subjective:     Patient has no new complaints. Pt pulled NGT out this am (again), no n/v since, ostomy functioning, pt denies abd pain. Pt disconnected TPN line from IV this pm.    Objective:     Visit Vitals  /80 (BP 1 Location: Left arm, BP Patient Position: At rest)   Pulse (!) 108   Temp 98.3 °F (36.8 °C)   Resp 20   Ht 5' 10\" (1.778 m)   Wt 82.1 kg (181 lb)   SpO2 99%   BMI 25.97 kg/m²       Temp (24hrs), Av.7 °F (37.1 °C), Min:98.2 °F (36.8 °C), Max:99.4 °F (37.4 °C)      Physical Exam:    General:  Alert, cooperative, no distress. Lungs:   Mild b/l rhonchi but good air movement, decreased b/l bases. Heart:  Regular rate and rhythm. Abdomen:   Soft, appropriate incisional tenderness. Bowel sounds present. Incision c/d/i. Ostomy viable w succus in bag. Extremities: Extremities normal, atraumatic, no cyanosis or edema. No calf tenderness. Data Review: images and reports reviewed    Lab Review: All lab results for the last 24 hours reviewed. WBC up to 14 yesterday, 17 today    Assessment:     Hospital Problems  Date Reviewed: 2018          Codes Class Noted POA    Colon cancer [facility]) ICD-10-CM: C18.9  ICD-9-CM: 153.9  2018 Unknown              Plan/Recommendations/Medical Decision Making:     Continue present treatment. POD # 5. NPO for now -- only re-insert NGT if n/v. Cont TPN.   Pt afebrile, non-toxic, but WBC increasing -- if continues to increase, get CT in am.  Cont zosyn    Signed By: Abigail Camarena MD     2018

## 2018-12-16 NOTE — PROGRESS NOTES
Pt pulled out NG tube Md notified, made aware that  ICU said before he was transferred to telemetry that he had pulled it out 3x in the past and they were going to replace it before he comes here. Dr. Melba Bland gave order to leave out.

## 2018-12-16 NOTE — PROGRESS NOTES
[facility]   Pulmonary, Critical Care, and Sleep Medicine     ICU Patient Progress Note    Name: Rom Fortune   : 1950   MRN: [id number]   Date: 12/15/2018      Subjective/History:   80 y.o. male admitted to the hospital following robotic sigmoid colectomy for cancer, on 18; developed mild post-operative ileus and taken emergently to OR for repair of anastomotic leak, omental patch and diverting loop ileostomy on . Transferred to ICU for vent management following second surgery. Extubated .     12/15/18   Overnight, pt had become tachycardic, hypertensive, tachypneic. 12L EKG done showed sinus tachycardia. CXR without acute process. Bedside U/S done and revealed + lung sliding b/l. Patient was given labetalol 20 mg IV x 2 with improvement in tachycardia, BP however effect was only temporary. Patient was started on nicardipine with eventual improvement in autonomic instability and tachypnea. Throughout the morning, nicardipine was weaned and discontinued; pt was started on scheduled metoprolol. This morning, pt states he feels better. No recurrence of prior event. He is awake, alert, oriented, cooperative. Off precedex gtt. Hemodynamically stable; afebrile. NGT output approximately 1100 mL dark green bilious material.       Review of Systems:  Review of systems not obtained due to patient factors.     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     Chest pain, unspecified     Diabetes ([facility] [address]     Essential hypertension, benign     GERD (gastroesophageal reflux disease)     Nonspecific abnormal electrocardiogram (ECG) (EKG)         Past Surgical History:  Past Surgical History:   Procedure Laterality Date    COLONOSCOPY N/A 2018    COLONOSCOPY with Polypectomies, Bx's, Injection, Tattooing & Clip Placement x 3 performed by Winifred Abrams MD at [address] COLONOSCOPY,DIAGNOSTIC  11/12/2018         COLONOSCOPY,REMV LESN,SNARE  11/12/2018         ENDOSCOPIC MUCOSAL RESECT  11/12/2018         EXPLORATORY OF ABDOMEN N/A 12/11/2018    Dr. Rosie Soalno HX COLONOSCOPY      HX HEENT  2008    Throat Ca - 1 vocal chord removed     HX HEENT      eye surgery muscles    LAP,SURG,COLECTOMY, PARTIAL, W/ANAST N/A 12/04/2018    Dr. Sathya Nixon        Medications:  Prior to Admission medications    Medication Sig Start Date End Date Taking? Authorizing Provider   naloxegol (MOVANTIK) 25 mg tab tablet Take  by mouth Daily (before breakfast). Yes Provider, Historical   multivitamin, tx-iron-ca-min (THERA-M W/ IRON) 9 mg iron-400 mcg tab tablet Take 1 Tab by mouth daily. Yes Provider, Historical   latanoprost (XALATAN) 0.005 % ophthalmic solution Administer 1 Drop to both eyes nightly. Yes Provider, Historical   beclomethasone dipropionate (QNASL) 80 mcg/actuation HFAA 80 mcg by Nasal route daily. Directions on at home label are as follows: Use 2 sprays in each Nostril Daily   Yes Charly Zuluaga MD   metFORMIN (GLUCOPHAGE) 1,000 mg tablet Take 1,000 mg by mouth two (2) times daily (with meals). do not take morning of surgery (12/4/180. Yes Provider, Historical   losartan (COZAAR) 50 mg tablet Take 50 mg by mouth daily. Yes Provider, Historical   nebivolol (BYSTOLIC) 10 mg tablet Take 10 mg by mouth daily. Yes Provider, Historical   omeprazole (PRILOSEC) 20 mg capsule Take 20 mg by mouth daily. Yes Provider, Historical   Dexlansoprazole 60 mg CpDB Take 1 Cap by mouth daily. Yes Provider, Historical   calcium-cholecalciferol, d3, 600-125 mg-unit tab Take 1 Tab by mouth daily. Yes Provider, Historical   omega-3 fatty acids-vitamin e 1,000 mg cap Take 1 Cap by mouth daily. Yes Provider, Historical   IRON, FERROUS SULFATE, PO Take  by mouth. 3 times a week   Yes Provider, Historical   oxyCODONE-acetaminophen (PERCOCET 10)  mg per tablet Take  by mouth. Provider, Historical   meloxicam (MOBIC) 15 mg tablet Take 15 mg by mouth daily. Provider, Historical   oxyCODONE-acetaminophen (PERCOCET) 5-325 mg per tablet Take 1 Tab by mouth every six (6) hours as needed. Max Daily Amount: 4 Tabs. Patient taking differently: Take 1 Tab by mouth every six (6) hours as needed for Pain. 11/13/18   Cristóbal Magallanes MD   cyclobenzaprine (FLEXERIL) 5 mg tablet Take 1 Tab by mouth three (3) times daily as needed for Muscle Spasm(s). 11/13/18   Cristóbal Magallanes MD   psyllium husk (METAMUCIL) 0.4 gram cap Take 1 Cap by mouth daily. 11/13/18   Cristóbal Magallanes MD   Aspirin, Buffered 81 mg tab Take 1 Tab by mouth daily. stop 7 days prior to surgery (12/4/18). Provider, Historical       Current Facility-Administered Medications   Medication Dose Route Frequency    [START ON 12/16/2018] Vancomycin TROUGH level to be drawn 12/16 at 17:30  -- approx. 30 minutes BEFORE the next scheduled dose due at 18:00. Thanks! Other ONCE    TPN ADULT - CENTRAL   IntraVENous CONTINUOUS    vancomycin (VANCOCIN) 1250 mg in  ml infusion  1,250 mg IntraVENous Q12H    metoprolol (LOPRESSOR) injection 5 mg  5 mg IntraVENous Q4H    fluticasone (FLONASE) 50 mcg/actuation nasal spray 2 Spray  2 Spray Both Nostrils DAILY    insulin lispro (HUMALOG) injection   SubCUTAneous Q6H    piperacillin-tazobactam (ZOSYN) 3.375 g in 0.9% sodium chloride (MBP/ADV) 100 mL MBP  3.375 g IntraVENous Q6H    latanoprost (XALATAN) 0.005 % ophthalmic solution 1 Drop  1 Drop Both Eyes QPM    heparin (porcine) injection 5,000 Units  5,000 Units SubCUTAneous Q8H       Allergy:  No Known Allergies     Social History:  Social History     Tobacco Use    Smoking status: Former Smoker     Last attempt to quit: 11/19/2008     Years since quitting: 10.0    Smokeless tobacco: Never Used   Substance Use Topics    Alcohol use: No    Drug use: No        Family History:  History reviewed. No pertinent family history. Objective:   Vital Signs:    Visit Vitals  /76 (BP 1 Location: Left arm, BP Patient Position: At rest)   Pulse (!) 121   Temp 99.4 °F (37.4 °C)   Resp 16   Ht 5' 10\" (1.778 m)   Wt 82.5 kg (181 lb 14.4 oz)   SpO2 99%   BMI 26.10 kg/m²       O2 Device: Nasal cannula   O2 Flow Rate (L/min): 3 l/min   Temp (24hrs), Av.6 °F (37 °C), Min:97.8 °F (36.6 °C), Max:99.4 °F (37.4 °C)       Patient Vitals for the past 8 hrs:   Temp Pulse Resp BP SpO2   12/15/18 2047 99.4 °F (37.4 °C) (!) 121 16 167/76 99 %   12/15/18 1520 -- 93 -- -- --   12/15/18 1508 98.7 °F (37.1 °C) (!) 125 20 168/84 98 %     Intake/Output:   Last shift:      No intake/output data recorded. Last 3 shifts:  0701 - 12/15 1900  In: 0230 [I.V.:3405]  Out: 4798 [Urine:1150; Drains:305]    Intake/Output Summary (Last 24 hours) at 12/15/2018 2139  Last data filed at 12/15/2018 3313  Gross per 24 hour   Intake 1100 ml   Output 1015 ml   Net 85 ml       Physical Exam:  General appearance - awake, alert, oriented, cooperative  Eyes - pupils equal and reactive, extraocular eye movements intact, sclera anicteric  Mouth - mucous membranes dry  Neck - supple, no significant adenopathy; no upper airway stridor  Chest - symmetrical chest expansion, no accessory muscle use; clear breath sounds, no wheezing  Heart - regular rate, regular rhythm, normal S1, S2, no murmurs, rubs, clicks or gallops. Abdomen - soft, distended, tympanitic; + ostomy with pink mucosa; + active bowel sounds  Neurological - Follows commands appropriately, equal strength bilaterally in upper and lower extremities.    Musculoskeletal - no joint tenderness, deformity or swelling  Extremities - peripheral pulses normal, no pedal edema, no clubbing or cyanosis  Skin - normal color and turgor, no rashes, no suspicious skin lesions noted      Data:     Recent Results (from the past 24 hour(s))   GLUCOSE, POC    Collection Time: 18 11:56 PM   Result Value Ref Range    Glucose (POC) 201 (H) 70 - 110 mg/dL   CBC W/O DIFF    Collection Time: 12/15/18  2:15 AM   Result Value Ref Range    WBC 14.2 (H) 4.6 - 13.2 K/uL    RBC 3.11 (L) 4.70 - 5.50 M/uL    HGB 9.1 (L) 13.0 - 16.0 g/dL    HCT 26.9 (L) 36.0 - 48.0 %    MCV 86.5 74.0 - 97.0 FL    MCH 29.3 24.0 - 34.0 PG    MCHC 33.8 31.0 - 37.0 g/dL    RDW 14.3 11.6 - 14.5 %    PLATELET 324 538 - 091 K/uL    MPV 10.1 9.2 - 11.8 FL   MAGNESIUM    Collection Time: 12/15/18  2:15 AM   Result Value Ref Range    Magnesium 2.2 1.6 - 2.6 mg/dL   PHOSPHORUS    Collection Time: 12/15/18  2:15 AM   Result Value Ref Range    Phosphorus 4.3 2.5 - 4.9 MG/DL   PROTHROMBIN TIME + INR    Collection Time: 12/15/18  2:15 AM   Result Value Ref Range    Prothrombin time 14.5 11.5 - 15.2 sec    INR 1.2 0.8 - 1.2     METABOLIC PANEL, BASIC    Collection Time: 12/15/18  2:15 AM   Result Value Ref Range    Sodium 145 136 - 145 mmol/L    Potassium 3.4 (L) 3.5 - 5.5 mmol/L    Chloride 107 100 - 108 mmol/L    CO2 29 21 - 32 mmol/L    Anion gap 9 3.0 - 18 mmol/L    Glucose 175 (H) 74 - 99 mg/dL    BUN 27 (H) 7.0 - 18 MG/DL    Creatinine 0.84 0.6 - 1.3 MG/DL    BUN/Creatinine ratio 32 (H) 12 - 20      GFR est AA >60 >60 ml/min/1.73m2    GFR est non-AA >60 >60 ml/min/1.73m2    Calcium 9.1 8.5 - 10.1 MG/DL   GLUCOSE, POC    Collection Time: 12/15/18  5:10 AM   Result Value Ref Range    Glucose (POC) 186 (H) 70 - 110 mg/dL   GLUCOSE, POC    Collection Time: 12/15/18 11:27 AM   Result Value Ref Range    Glucose (POC) 283 (H) 70 - 110 mg/dL   GLUCOSE, POC    Collection Time: 12/15/18  7:25 PM   Result Value Ref Range    Glucose (POC) 260 (H) 70 - 110 mg/dL           No results for input(s): FIO2I, IFO2, HCO3I, IHCO3, HCOPOC, PCO2I, PCOPOC, IPHI, PHI, PHPOC, PO2I, PO2POC in the last 72 hours.     No lab exists for component: IPOC2    Special Requests:   Date Value Ref Range Status   12/11/2018 NO SPECIAL REQUESTS   Preliminary   12/11/2018 NO SPECIAL REQUESTS   Final     Culture result:   Date Value Ref Range Status   12/11/2018 NO ANAEROBES ISOLATED 4 DAYS   Preliminary   12/11/2018 RARE KLEBSIELLA PNEUMONIAE (A)   Final   12/11/2018 RARE ESCHERICHIA COLI (A)   Final   12/11/2018 RARE ENTEROCOCCUS FAECALIS GROUP D (A)   Final   12/11/2018    Final    CALLED TO AND CORRECTLY REPEATED BY:  Keny Conner RN, ICU, AT [id number] 12/12/18 TO DRM         Telemetry: Sinus Tachycardia    ECHO   Estimated left ventricular ejection fraction is 56 - 60%. Visually measured ejection fraction. Normal left ventricular wall motion, no regional wall motion abnormality noted. Age-appropriate left ventricular diastolic function. · There is no evidence of pulmonary hypertension. ·   Imaging:  [x]I have personally reviewed the patients chest radiographs images and report     CXR Results  (Last 48 hours)    None           Results from Hospital Encounter encounter on 12/04/18   XR ABD (KUB)    Narrative EXAM:  Abdomen AP    CLINICAL INDICATION:  Abdominal distention    COMPARISON:  12/8/18    TECHNIQUE:  AP abdomen in supine position. FINDINGS:      -NG/OG tube placement. Distal tip in the body of the stomach. - ANGELI drainage tube in the right side of the abdomen. - Multiple midline skin staples. - Multiple dilated loops of small bowel, measuring up to about 5.9 cm. Impression IMPRESSION:      1.  Multiple dilated loops of small bowel. Ileus vs. evolving partial small  bowel obstruction. In light of the recent surgical intervention, ileus is  favored. 2.  NG/OG tube and right ANGELI drainage tube in place. Results from [place] encounter on 11/05/18   CT CHEST ABD PELV W CONT    Narrative CT CHEST ABDOMEN AND PELVIS WITH CONTRAST        COMPARISON:  August 12, 2008 . INDICATIONS:    Oroesophageal carcinoma with cough, GI bleeding, abnormal  retroperitoneum on MR.     Following the uneventful administration of  oral contrast and  100 cc of Isovue  300 scanning of the chest, abdomen and pelvis is performed with a multislice  scanner. Coronal sagittal and axial reconstructions were created from the 3 D  data set. FINDINGS:     CT CHEST FINDINGS:    Thyroid/Base Of Neck: Normal.    Lungs: There is some bleb formation at the pulmonary apices. No nodule mass or acute  infiltrate is evident     . Pleural Spaces:  Unremarkable. Chest Wall:   No breast mass is evident. No axillary lymphadenopathy is evident. Mediastinum, Nidia, Great Vessels, Heart:  Unremarkable. .    CT ABDOMEN FINDINGS:     Liver/Biliary: Normal.    Spleen: Normal.    Adrenal Glands: Normal.    Kidneys: There is perinephric edema bilaterally. A cyst is seen in the anterior  right kidney measuring 0.9 cm. A cystic lesion in the left kidney extends into  the pelvic region measuring 6.3 x 4 cm. Punctate nonobstructive intrarenal  calculi are present. There are a few sub centimeter low attenuation foci present  most likely representing small cysts but too small to confidently characterize. There is no hydronephrosis. Negative otherwise. .    Pancreas: Normal.    Stomach, Small Bowel, appendix, and Colon: The stomach is unremarkable. There is  dilation predominantly with gas of small bowel loops throughout the abdomen. This is diffuse. The appendix is normal. There is some distention of the colon  contains a moderate but unremarkable burden of stool. No obstructing process is  evident. .    There is no significant adenopathy. The abdominal aorta is unremarkable. The IVC is unremarkable. Peritoneal Spaces: There is no free fluid or free air. Abdominal Wall: No hernia or mass is evident. CT PELVIS FINDINGS:     Bladder: The bladder is unremarkable in appearance. Pelvic Small Bowel And Colon: Unremarkable. Lymph Nodes, Soft Tissues: There is no adenopathy. Inflammatory character  stranding extending from the kidneys does extend into the pelvic sidewalls along  the anterior pararenal spaces.  This is relatively mild in severity. There is no  fluid collection or abscess. .    Free Fluid: Not present. Osseous Structures Of The Chest, Abdomen And Pelvis: The lumbar spinal canal is  relatively narrowed due to short pedicles. . Senescent changes noted consistent  with age. Impression IMPRESSION:     There is predominantly perinephric and retroperitoneal edema bilaterally. Infrequent causes for this are renal inflammation-most frequently either  infection or nephritis or pancreatitis. Some nonspecific stranding can be seen in the elderly and the finding here is  not definitely beyond the range. Diffuse distention of bowel loops predominantly with gas in a nonobstructive  pattern-the appearance favors adynamic ileus of indeterminate etiology. .    All CT scans at this facility are performed using dose optimization technique as  appropriate to the performed exam, to include automated exposure control,  adjustment of the mA and/or kV according to patient's size (Including  appropriate matching for site-specific examinations), or use of iterative  reconstruction technique. IMPRESSION:   · Colonic polyp s/p colon resection 36/95/79 complicated by post op ileus and anastomosis site leak s/p anastomotic leak repair with colostomy placement 12/11/18  · Tachycardia, hypertension -improving  · Acute encephalopathy, possible critical illness delirium -resolved  · Klebsiella, E coli and Enterococus faecalis on abdominal fluid -sensitive to zosyn (E coli, Kleb) and vanc (Enterococcus)  · Hyperglycemia   · Hx Non obstructive renal stone  · Hx GERD  · Hx Throat CA - has 1 vocal cord per chart  · Hx DM -latest Hgb A1c 6.3  · Echo on 11/2018 -  EF: 56-60%   RECOMMENDATIONS:   · Resp: encourage incentive spirometry; mobilize - PT/OT. Strict aspiration precautions   · ID: follow blood c/s. Urine c/s negative. Abdominal fluid + Klebsiella and E Coli -continue Zosyn. Recommend to add vancomycin. Trend WBCs, temp curve. · Heme/Onc: stable H/H, platelets   · CVS: monitor hemodynamics closely. Continue schedule lopressors IV - once off NPO, recommend to start coreg, norvasc. Cardiology consult  · Renal: paez cath, strict I/O.   · GI: TPN, NGT per surgery  · Metabolic: glycemic control with sliding scale insulin. Add 10 units insulin on TPN  · Neuro/Sedation/Pain: optimize pain control -on prn dilaudid. · DVT/GI prophylaxis: heparin SQ; no GI proph  · Lines/Devices: R PICC, Paez   · Discussed in interdisciplinary rounds       Randolph Joyner MD   Pulmonary, [facility] [address] [facility]        Late entry for services on 12/14/2018: I saw and evaluated the patient, performing the key elements of the service. I discussed the findings, assessment and plan with the PA and agree with the PA's findings and plan as documented in the PA's note. All edits and changes made above or are mentioned in my addendum. Total of 30 min critical care time spent at bedside during the course of care providing evaluation,management and care decisions and ordering appropriate treatment related to critical care problems exclusive of procedures. The reason for providing this level of medical care for this critically ill patient was due a critical illness that impaired one or more vital organ systems such that there was a high probability of imminent or life threatening deterioration in the patients condition. This care involved high complexity decision making to assess, manipulate, and support vital system functions, to treat this degree vital organ system failure and to prevent further life threatening deterioration of the patients condition.     72-year-old male who originally presented to [facility] for robotic sigmoid colectomy for rectal cancer which was performed on 12/4/18.  Patient's postoperative course was complicated by ileus and JOSE ANTONIO.  Nephrology was consulted on 12/10/18.  NG tube was placed for decompression due to abdominal distention.  A few nights ago, the patient developed hypotension and became febrile and tachycardic so a rapid response was called.  Chest x-ray at that time showed a large amount of free air under the diaphragms.  Patient was emergently taken to the OR for an anastomotic leak.  Ex lap with primary repair of anastomotic leak, omental patch, and diverting loop ileostomy was done by Dr. Araceli Rahman fecal material or purulent material was found during the ex lap, washout was performed.  Fluid cultures is growing E. coli, Klebsiella, enterococcus. Patient was initially covered with Zosyn monotherapy, vancomycin added yesterday for enterococcus --- final sensitivities show Klebsiella, E. coli, and enterococcus faecalis, all sensitive to Zosyn specifically E faecalis penicillin sensitive, so vancomycin discontinued today. Patient had issues with postoperative and post extubation delirium, currently resolved. Patient also had issues with tachycardia and hypotension, which resolved with fluid administration and potassium administration, doing better today-cardiology consulted and following. Patient remains on TPN per surgery. Patient also had mild JOSE ANTONIO, likely due to sepsis, nephrology following. Renal function has improved back down to baseline. Dr. Jonnie Echevarria also helped adjust electrolytes and TPN with help of dietary.        Rosamaria Chan MD/MPH     Pulmonary, Critical Care Medicine  [facility]

## 2018-12-16 NOTE — ROUTINE PROCESS
Bedside shift report given to Melquiades Saucedo RN oncoming nurse by Miguelina Chawla RN off going nurse including Rebecca Tiwari, SBAR and [facility]

## 2018-12-16 NOTE — PROGRESS NOTES
Problem: Pressure Injury - Risk of  Goal: *Prevention of pressure injury  Document Edin Scale and appropriate interventions in the flowsheet. Outcome: Progressing Towards Goal  Pressure Injury Interventions:  Sensory Interventions: Assess changes in LOC    Moisture Interventions: Absorbent underpads    Activity Interventions: Pressure redistribution bed/mattress(bed type), Assess need for specialty bed    Mobility Interventions: Assess need for specialty bed, Float heels, HOB 30 degrees or less, Pressure redistribution bed/mattress (bed type)    Nutrition Interventions: Document food/fluid/supplement intake    Friction and Shear Interventions: Apply protective barrier, creams and emollients, Feet elevated on foot rest, Foam dressings/transparent film/skin sealants, HOB 30 degrees or less               Problem: Falls - Risk of  Goal: *Absence of Falls  Document Tawanda Fall Risk and appropriate interventions in the flowsheet.   Outcome: Progressing Towards Goal  Fall Risk Interventions:  Mobility Interventions: Patient to call before getting OOB    Mentation Interventions: Adequate sleep, hydration, pain control    Medication Interventions: Patient to call before getting OOB    Elimination Interventions: Call light in reach, Patient to call for help with toileting needs, Toilet paper/wipes in reach, Toileting schedule/hourly rounds, Urinal in reach    History of Falls Interventions: Bed/chair exit alarm, Evaluate medications/consider consulting pharmacy

## 2018-12-16 NOTE — ROUTINE PROCESS
MEW score noted patient in pain 10/10 awaiting pharmacy to deliver pain medication.   Heart rate elevated md aware patient already being treated for this

## 2018-12-17 ENCOUNTER — APPOINTMENT (OUTPATIENT)
Dept: CT IMAGING | Age: 68
DRG: 330 | End: 2018-12-17
Attending: COLON & RECTAL SURGERY
Payer: MEDICARE

## 2018-12-17 ENCOUNTER — APPOINTMENT (OUTPATIENT)
Dept: GENERAL RADIOLOGY | Age: 68
DRG: 330 | End: 2018-12-17
Attending: COLON & RECTAL SURGERY
Payer: MEDICARE

## 2018-12-17 LAB
ANION GAP SERPL CALC-SCNC: 6 MMOL/L (ref 3–18)
BACTERIA SPEC CULT: NORMAL
BACTERIA SPEC CULT: NORMAL
BUN SERPL-MCNC: 32 MG/DL (ref 7–18)
BUN/CREAT SERPL: 32 (ref 12–20)
CALCIUM SERPL-MCNC: 8.8 MG/DL (ref 8.5–10.1)
CHLORIDE SERPL-SCNC: 113 MMOL/L (ref 100–108)
CO2 SERPL-SCNC: 27 MMOL/L (ref 21–32)
CREAT SERPL-MCNC: 1 MG/DL (ref 0.6–1.3)
ERYTHROCYTE [DISTWIDTH] IN BLOOD BY AUTOMATED COUNT: 14.8 % (ref 11.6–14.5)
GLUCOSE BLD STRIP.AUTO-MCNC: 172 MG/DL (ref 70–110)
GLUCOSE BLD STRIP.AUTO-MCNC: 200 MG/DL (ref 70–110)
GLUCOSE BLD STRIP.AUTO-MCNC: 230 MG/DL (ref 70–110)
GLUCOSE SERPL-MCNC: 141 MG/DL (ref 74–99)
HCT VFR BLD AUTO: 25.8 % (ref 36–48)
HGB BLD-MCNC: 8.5 G/DL (ref 13–16)
INR PPP: 1.2 (ref 0.8–1.2)
MAGNESIUM SERPL-MCNC: 2 MG/DL (ref 1.6–2.6)
MCH RBC QN AUTO: 29.2 PG (ref 24–34)
MCHC RBC AUTO-ENTMCNC: 32.9 G/DL (ref 31–37)
MCV RBC AUTO: 88.7 FL (ref 74–97)
PHOSPHATE SERPL-MCNC: 3.5 MG/DL (ref 2.5–4.9)
PLATELET # BLD AUTO: 334 K/UL (ref 135–420)
PMV BLD AUTO: 10.3 FL (ref 9.2–11.8)
POTASSIUM SERPL-SCNC: 3.6 MMOL/L (ref 3.5–5.5)
PROTHROMBIN TIME: 15.3 SEC (ref 11.5–15.2)
RBC # BLD AUTO: 2.91 M/UL (ref 4.7–5.5)
SERVICE CMNT-IMP: NORMAL
SERVICE CMNT-IMP: NORMAL
SODIUM SERPL-SCNC: 146 MMOL/L (ref 136–145)
WBC # BLD AUTO: 18.8 K/UL (ref 4.6–13.2)

## 2018-12-17 PROCEDURE — 83735 ASSAY OF MAGNESIUM: CPT

## 2018-12-17 PROCEDURE — 85610 PROTHROMBIN TIME: CPT

## 2018-12-17 PROCEDURE — 74176 CT ABD & PELVIS W/O CONTRAST: CPT

## 2018-12-17 PROCEDURE — 71046 X-RAY EXAM CHEST 2 VIEWS: CPT

## 2018-12-17 PROCEDURE — 82962 GLUCOSE BLOOD TEST: CPT

## 2018-12-17 PROCEDURE — 74011000250 HC RX REV CODE- 250: Performed by: PHYSICIAN ASSISTANT

## 2018-12-17 PROCEDURE — 85027 COMPLETE CBC AUTOMATED: CPT

## 2018-12-17 PROCEDURE — 80048 BASIC METABOLIC PNL TOTAL CA: CPT

## 2018-12-17 PROCEDURE — 65660000000 HC RM CCU STEPDOWN

## 2018-12-17 PROCEDURE — 74011250636 HC RX REV CODE- 250/636: Performed by: PHYSICIAN ASSISTANT

## 2018-12-17 PROCEDURE — 84100 ASSAY OF PHOSPHORUS: CPT

## 2018-12-17 PROCEDURE — 74011636637 HC RX REV CODE- 636/637: Performed by: COLON & RECTAL SURGERY

## 2018-12-17 PROCEDURE — 74011636320 HC RX REV CODE- 636/320: Performed by: COLON & RECTAL SURGERY

## 2018-12-17 PROCEDURE — 74011636637 HC RX REV CODE- 636/637: Performed by: INTERNAL MEDICINE

## 2018-12-17 PROCEDURE — 74011000250 HC RX REV CODE- 250: Performed by: COLON & RECTAL SURGERY

## 2018-12-17 PROCEDURE — 74011000258 HC RX REV CODE- 258: Performed by: COLON & RECTAL SURGERY

## 2018-12-17 PROCEDURE — 74011250636 HC RX REV CODE- 250/636: Performed by: COLON & RECTAL SURGERY

## 2018-12-17 PROCEDURE — 74011250636 HC RX REV CODE- 250/636: Performed by: INTERNAL MEDICINE

## 2018-12-17 PROCEDURE — 97165 OT EVAL LOW COMPLEX 30 MIN: CPT

## 2018-12-17 RX ADMIN — METOPROLOL TARTRATE 5 MG: 5 INJECTION, SOLUTION INTRAVENOUS at 03:18

## 2018-12-17 RX ADMIN — LATANOPROST 1 DROP: 50 SOLUTION/ DROPS OPHTHALMIC at 17:06

## 2018-12-17 RX ADMIN — METOPROLOL TARTRATE 5 MG: 5 INJECTION, SOLUTION INTRAVENOUS at 20:32

## 2018-12-17 RX ADMIN — INSULIN LISPRO 5 UNITS: 100 INJECTION, SOLUTION INTRAVENOUS; SUBCUTANEOUS at 06:54

## 2018-12-17 RX ADMIN — HYDROMORPHONE HYDROCHLORIDE 0.5 MG: 1 INJECTION, SOLUTION INTRAMUSCULAR; INTRAVENOUS; SUBCUTANEOUS at 17:03

## 2018-12-17 RX ADMIN — PIPERACILLIN SODIUM AND TAZOBACTAM SODIUM 3.38 G: 3; .375 INJECTION, POWDER, LYOPHILIZED, FOR SOLUTION INTRAVENOUS at 02:17

## 2018-12-17 RX ADMIN — METOPROLOL TARTRATE 5 MG: 5 INJECTION, SOLUTION INTRAVENOUS at 11:59

## 2018-12-17 RX ADMIN — HYDROMORPHONE HYDROCHLORIDE 0.5 MG: 1 INJECTION, SOLUTION INTRAMUSCULAR; INTRAVENOUS; SUBCUTANEOUS at 06:49

## 2018-12-17 RX ADMIN — HEPARIN SODIUM 5000 UNITS: 5000 INJECTION, SOLUTION INTRAVENOUS; SUBCUTANEOUS at 16:31

## 2018-12-17 RX ADMIN — HYDROMORPHONE HYDROCHLORIDE 0.5 MG: 1 INJECTION, SOLUTION INTRAMUSCULAR; INTRAVENOUS; SUBCUTANEOUS at 10:21

## 2018-12-17 RX ADMIN — HEPARIN SODIUM 5000 UNITS: 5000 INJECTION, SOLUTION INTRAVENOUS; SUBCUTANEOUS at 08:22

## 2018-12-17 RX ADMIN — INSULIN LISPRO 3 UNITS: 100 INJECTION, SOLUTION INTRAVENOUS; SUBCUTANEOUS at 12:06

## 2018-12-17 RX ADMIN — HYDRALAZINE HYDROCHLORIDE 10 MG: 20 INJECTION INTRAMUSCULAR; INTRAVENOUS at 06:49

## 2018-12-17 RX ADMIN — PIPERACILLIN SODIUM AND TAZOBACTAM SODIUM 3.38 G: 3; .375 INJECTION, POWDER, LYOPHILIZED, FOR SOLUTION INTRAVENOUS at 14:10

## 2018-12-17 RX ADMIN — PIPERACILLIN SODIUM AND TAZOBACTAM SODIUM 3.38 G: 3; .375 INJECTION, POWDER, LYOPHILIZED, FOR SOLUTION INTRAVENOUS at 08:26

## 2018-12-17 RX ADMIN — INSULIN LISPRO 6 UNITS: 100 INJECTION, SOLUTION INTRAVENOUS; SUBCUTANEOUS at 17:16

## 2018-12-17 RX ADMIN — DIATRIZOATE MEGLUMINE AND DIATRIZOATE SODIUM 30 ML: 600; 100 SOLUTION ORAL; RECTAL at 11:50

## 2018-12-17 RX ADMIN — HYDROMORPHONE HYDROCHLORIDE 0.5 MG: 1 INJECTION, SOLUTION INTRAMUSCULAR; INTRAVENOUS; SUBCUTANEOUS at 03:18

## 2018-12-17 RX ADMIN — METOPROLOL TARTRATE 5 MG: 5 INJECTION, SOLUTION INTRAVENOUS at 16:31

## 2018-12-17 RX ADMIN — HYDROMORPHONE HYDROCHLORIDE 0.5 MG: 1 INJECTION, SOLUTION INTRAMUSCULAR; INTRAVENOUS; SUBCUTANEOUS at 14:07

## 2018-12-17 RX ADMIN — METOPROLOL TARTRATE 5 MG: 5 INJECTION, SOLUTION INTRAVENOUS at 08:22

## 2018-12-17 RX ADMIN — CALCIUM GLUCONATE: 94 INJECTION, SOLUTION INTRAVENOUS at 22:11

## 2018-12-17 RX ADMIN — PIPERACILLIN SODIUM AND TAZOBACTAM SODIUM 3.38 G: 3; .375 INJECTION, POWDER, LYOPHILIZED, FOR SOLUTION INTRAVENOUS at 20:32

## 2018-12-17 NOTE — PROGRESS NOTES
Problem: Mobility Impaired (Adult and Pediatric)  Goal: *Acute Goals and Plan of Care (Insert Text)  Physical Therapy Goals  Initiated 12/6/2018 and to be accomplished within 7 day(s)  1. Patient will move from supine to sit and sit to supine  in bed with independence. 2.  Patient will transfer from bed to chair and chair to bed with independence using the least restrictive device. 3.  Patient will perform sit to stand with independence. 4.  Patient will ambulate with modified independence for 400 feet with SPC.   5.  Patient will ascend/descend 3 stairs with 1 handrail(s) with supervision/set-up. 1259 - Pt off unit. Will f/u later in day. 1357 - 2nd attempt for PT services. Pt still off unit for testing. Will f/u at later date.

## 2018-12-17 NOTE — PROGRESS NOTES
Problem: Pressure Injury - Risk of  Goal: *Prevention of pressure injury  Document Edin Scale and appropriate interventions in the flowsheet. Outcome: Progressing Towards Goal  Pressure Injury Interventions:  Sensory Interventions: Assess changes in LOC    Moisture Interventions: Absorbent underpads, Moisture barrier    Activity Interventions: PT/OT evaluation, Increase time out of bed    Mobility Interventions: Pressure redistribution bed/mattress (bed type), PT/OT evaluation    Nutrition Interventions: Discuss nutritional consult with provider    Friction and Shear Interventions: Apply protective barrier, creams and emollients, Feet elevated on foot rest, Foam dressings/transparent film/skin sealants, HOB 30 degrees or less               Problem: Falls - Risk of  Goal: *Absence of Falls  Document Tawanda Fall Risk and appropriate interventions in the flowsheet.   Outcome: Progressing Towards Goal  Fall Risk Interventions:  Mobility Interventions: Bed/chair exit alarm, PT Consult for mobility concerns    Mentation Interventions: Adequate sleep, hydration, pain control    Medication Interventions: Bed/chair exit alarm    Elimination Interventions: Bed/chair exit alarm, Call light in reach, Urinal in reach    History of Falls Interventions: Bed/chair exit alarm

## 2018-12-17 NOTE — PROGRESS NOTES
NUTRITION consult    Nutrition Consult: TPN: RD to Manage     RECOMMENDATIONS / PLAN:     - Plan to provide parenteral nutrition support, monitor labs and adjust electrolytes daily.   - Advance diet as tolerated. - Continue RD inpatient monitoring and evaluation. Please Note:   Parenteral nutrition support to be provided using custom product, allowing for modification of electrolyte and macronutrient content day to day. Lipids included in PN on MWF. Macronutrient Goal: 115 gm amino acids, 380 gm dextrose, 250 mL lipids (1966 kcal weekly average). PARENTERAL NUTRITION ORDER:     Electrolyte Adjustments: K decreased    Day 9 PN to provide: 90 mEq K, 10 mEq Ca, 12 mEq Mg, 20 mmol Phos, 1980 kcal, 115 gm amino acids, 300 gm dextrose, 250 mL lipids, 10 mL MVI, 2.5 mL trace elements, 12 units regular insulin      PN Rate: 100 mL/hr   Glucose Infusion Rate: 2.47 mg/kg/min    Osmolarity: 1212 mOsm   Parenteral Nutrition Access Device: PICC placed 12/10/18  Indication for PN:  Postop ileus   Refeeding Risk:      []  Yes  [x] No     NUTRITION DIAGNOSIS & INTERVENTIONS:     [x] Parenteral nutrition: continue, modify contents   [x] Meals/snacks: modified composition, advance as tolerated   [x] Collaboration and referral of nutrition care: MD to review PN order and note daily; will not call to verify content and changes, per MD request. Discussed insulin in PN with Dr Isaias Molina on 12/15 and no change made to next bag    Nutrition Diagnosis:  Inadequate oral intake related to altered GI function and diet intolerance as evidenced by abdominal distention and discomfort and inability to consume po diet. ASSESSMENT:     12/17: NGT pulled by pt and remains out, started on clears and plan for CT to rule out abscess. Continue TPN per MD noted. 12/16: NGT output still significant, contributing to hypokalemia. 12/15: Hyperglycemia, improved. 12/14: 1100 mL out from NGT overnight.  Tachycardiac, received 80 mEq of K replacement. Recent BG up to 227 mg/L.    12/13: Extubated, confused and NGT pulled by pt then replaced per CRS. Steroids stopped- dextrose in PN kept the same and insulin removed. 12/12: Hyperglycemia, receiving steroid and insulin added to next PN. Renal function improving, bladder pressures improving. Ileostomy with output today, NGT output improved. Plan for extubation today. 12/11: RRT for fever with tachycardiac overnight then taken for emergent surgery, s/p ex lap with primary repair of anastomotic leak, omental patch and diverting loop ileostomy (12/11) and remains intubated postop.   12/10: Nephrology consulted for JOSE ANTONIO. NGT to suction with 3 L out in the past 24 hours. 12/9: S/p sigmoid colectomy on 12/4. Was tolerating a diet, then had abdominal pain and distention, NGT placed. KUB indicative of ileus per MD note. + small BM and flatus. PN infusion adequate to meet patients estimated nutritional needs:   [] Yes     [x] No   [] Unable to determine at this time    Average po intake adequate to meet patients estimated nutritional needs:   [] Yes     [x] No   [] Unable to determine at this time    Diet: TPN ADULT - CENTRAL  DIET CLEAR LIQUID No Conc.  Sweets  TPN ADULT - CENTRAL      Food Allergies: NKFA  Current Appetite:   [] Good     [] Fair     [] Poor     [x] Other: NPO  Appetite/meal intake prior to admission:   [x] Good     [] Fair     [] Poor     [] Other:  Feeding Limitations:  [] Swallowing difficulty    [] Chewing difficulty    [] Other:  Current Meal Intake:   Patient Vitals for the past 100 hrs:   % Diet Eaten   12/17/18 0800 0 %        NGT removed   BM:  12/17, ileostomy   Skin Integrity: surgical incision to abdomen; ANGELI drain  Edema:  [x] No     [] Yes   Pertinent Medications: Reviewed: SSI, zofran    Labs: BMP, MG, Phos ordered daily; CMP, Triglyceride, Prealbumin ordered initially and weekly  Recent Labs     12/17/18  0229 12/16/18  0230 12/15/18  0215   * 145 145   K 3.6 3.3* 3.4*   * 110* 107   CO2 27 28 29   * 188* 175*   BUN 32* 31* 27*   CREA 1.00 1.04 0.84   CA 8.8 8.5 9.1   MG 2.0 1.9 2.2   PHOS 3.5 3.9 4.3   Prealbumin: 9.15 mg/dL (12/10/18)   Triglyceride: 129 mg/dL (12/10/18)   Recent Labs     12/17/18  0229 12/16/18  0230 12/15/18  0215   WBC 18.8* 17.7* 14.2*   HGB 8.5* 7.2* 9.1*   HCT 25.8* 21.5* 26.9*    329 279         Intake/Output Summary (Last 24 hours) at 12/17/2018 1216  Last data filed at 12/17/2018 1035  Gross per 24 hour   Intake 1516.67 ml   Output 1285 ml   Net 231.67 ml       Anthropometrics:   Ht Readings from Last 1 Encounters:   12/13/18 5' 10\" (1.778 m)       Last 3 Recorded Weights in this Encounter    12/15/18 0405 12/16/18 0443 12/17/18 0726   Weight: 82.5 kg (181 lb 14.4 oz) 82.1 kg (181 lb) 84.4 kg (186 lb)        Body mass index is 26.69 kg/m². Weight History: Pt reports weight loss of 8 lbs PTA, unknown time frame. Weight Metrics 12/17/2018 12/4/2018 11/19/2018 11/12/2018 2/12/2016 2/9/2016 2/9/2016   Weight 186 lb - 169 lb 170 lb 9.6 oz - 178 lb -   BMI - 26.69 kg/m2 24.25 kg/m2 24.48 kg/m2 25.54 kg/m2 - 25.54 kg/m2          Admitting Diagnosis: Malignant neoplasm of sigmoid colon (HCC) [C18.7]  Colon cancer (HCC) [C18.9]  Pertinent PMHx: colon polyps    Education Needs:        [x] None identified  [] Identified - Not appropriate at this time  []  Identified and addressed - refer to education log  Learning Limitations:   [x] None identified  [] Identified    Cultural, [place] & ethnic food preferences:  [x] None identified    [] Identified and addressed     ESTIMATED NUTRITION NEEDS:     Calories: 4451-9283 kcal (HBEx1.2-1.5) based on   [x] Actual BW 79 kg    [] IBW:   Protein:  gm (1-1.5 gm/kg) based on   [x] Actual BW      [] IBW:   Fluid: 1 mL/kcal     MONITORING & EVALUATION:     Nutrition Goals:   1. Patient will receive nutrition via PN until they are able to tolerate adequate po intake/enteral nutrition. Outcome:  [x] Met    []  Not Met    [] New Goal  2. Patient will tolerate advancement of macronutrients towards meeting estimated nutrition needs within the next 7 days. Outcome:  [] Met    [x]  Not Met    [] New Goal    Monitoring:  [x] Parenteral nutrition intake and administration  [x] Biochemical data, medical tests, and procedures   [x] Fluid intake   [x] Nutrition-focused physical findings   [x] Treatment/therapy   [] Food and beverage intake   [] Diet order      [] Weight        Previous Recommendations (for follow-up assessments only):     [x]   Implemented       []   Not Implemented (RD to address)      [] No Longer Appropriate     [] No Recommendation Made        Discharge Planning: Nutrition recommendations pending patient's ability to tolerate po intake/enteral nutrition.    [x]  Participated in care planning, discharge planning, & interdisciplinary rounds as appropriate      Efrem Cox, [address]   Pager: [phone]

## 2018-12-17 NOTE — ROUTINE PROCESS
Bedside shift report given to Alanis Talbert, RN oncoming nurse by Ramirez Manzano RN off going nurse including KARDEX, SBAR and [facility]

## 2018-12-17 NOTE — PROGRESS NOTES
Problem: Self Care Deficits Care Plan (Adult)  Goal: *Acute Goals and Plan of Care (Insert Text)  Occupational Therapy Goals  Initiated 12/17/2018 within 7 day(s). 1.  Patient will perform lower body dressing with modified independence   2. Patient will perform grooming with modified independence in standing at sink level. 3.  Patient will perform toileting with modified independence. 4.  Patient will perform toilet transfers with modified independence. 5.  Patient will participate in bilateral upper extremity therapeutic exercise/activities with supervision/set-up for 10-15 minutes. 6.  Patient will utilize energy conservation techniques during functional activities with min verbal cues. Outcome: Progressing Towards Goal  Occupational Therapy EVALUATION    Patient: Shira Hsieh (44 y.o. male)  Date: 12/17/2018  Primary Diagnosis: Malignant neoplasm of sigmoid colon ([facility] [address] [C18.7]  Colon cancer ([facility] [address] [C18.9]  Procedure(s) (LRB):  LAPAROTOMY EXPLORATORY/ PRIMARY REPAIR WITH DIVERTING LOOP ILEOSTOMY (N/A) 6 Days Post-Op     ASSESSMENT :  Mr. Frankie Tejeda is a 80 yr old male admitted to the hospital 12-4-18. The pt was found to be seated in the bedside chair, upon this OT entering his room and he was agreeable to participate. During the OT eval, the pt required contact guard assist for sit to stand with a RW, min assist for lower body dressing, and contact guard assist for ambulating ~12 feet with a RW in his room. The pt presents with mild strength deficits, deconditioning, and impaired dynamic standing balance, which compromises his ADL performance and overall functional independence    Patient will benefit from skilled intervention to address the above impairments.   Patients rehabilitation potential is considered to be Good  Factors which may influence rehabilitation potential include:   []             None noted  []             Mental ability/status  [x]             Medical condition  [] Home/family situation and support systems  []             Safety awareness  []             Pain tolerance/management  []             Other:      PLAN :  Recommendations and Planned Interventions:  [x]               Self Care Training                  [x]        Therapeutic Activities  [x]               Functional Mobility Training    []        Cognitive Retraining  [x]               Therapeutic Exercises           [x]        Endurance Activities  [x]               Balance Training                   []        Neuromuscular Re-Education  []               Visual/Perceptual Training     [x]   Home Safety Training  [x]               Patient Education                 []        Family Training/Education  []               Other (comment):    Frequency/Duration: Patient will be followed by occupational therapy 1-2 times per day/3-4 days per week to address goals. Discharge Recommendations: Home Health  Further Equipment Recommendations for Discharge: sock aid (issued to pt)     Barriers to Learning/Limitations: None  Compensate with: visual, verbal, tactile, kinesthetic cues/model     PATIENT COMPLEXITY      Eval Complexity: History: LOW Complexity : Brief history review ; Examination: LOW Complexity : 1-3 performance deficits relating to physical, cognitive , or psychosocial skils that result in activity limitations and / or participation restrictions ; Decision Making:MEDIUM Complexity : Patient may present with comorbidities that affect occupational performnce. Miniml to moderate modification of tasks or assistance (eg, physical or verbal ) with assesment(s) is necessary to enable patient to complete evaluation  Assessment: Low Complexity     G-CODES:     Self Care  Current  CJ= 20-39%   Goal  CI= 1-19%. The severity rating is based on the Level of Assistance required for Functional Mobility and ADLs.     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  11/12/2018         Lew Ramires  11/12/2018         ENDOSCOPIC MUCOSAL RESECT  11/12/2018         EXPLORATORY OF ABDOMEN N/A 12/11/2018    Dr. Alston Saliva HX COLONOSCOPY      HX HEENT  2008    Throat Ca - 1 vocal chord removed     HX HEENT      eye surgery muscles    LAP,SURG,COLECTOMY, PARTIAL, W/ANAST N/A 12/04/2018    Dr. Kailyn Mcmullen     Prior Level of Function/Home Situation: Patient was mostly independent with ADLs, except for requiring intermittent assist with managing the donning & doffing of his shoes & socks. His spouse mostly manages the household chores, however he assisted some when he could. The pt used a hurrycane vs. RW for ambulation. He does drive. The pt was receiving home health OT & PT, prior to this hospitalization. Home Situation  Home Environment: Private residence  # Steps to Enter: 3  Rails to Enter: No  One/Two Story Residence: One story  Living Alone: No(Lives with his spouse & son.)  Support Systems: Family member(s)  Patient Expects to be Discharged to[de-identified] Private residence  Current DME Used/Available at Home: (hurrycane, RW, reacher, shoe horn, shower chair)  [x]  Right hand dominant   []  Left hand dominant  Cognitive/Behavioral Status:  Neurologic State: Alert  Orientation Level: Oriented X4  Cognition: Appropriate decision making; Appropriate for age attention/concentration; Follows commands     Vision/Perceptual:     Wears corrective lenses Coordination:  Coordination: Within functional limits(BUE and BLE)            Balance:  Sitting - Static: (normal)  Sitting - Dynamic: (good)  Standing - Static: (good with RW)  Standing - Dynamic : (fair with RW)    Strength:  Strength: Within functional limits(BUE and BLE)      Tone & Sensation:  Tone: Normal(BUE and BLE)  Sensation: (Pt denied numbness & tingling of BUE and BLE.)        Range of Motion:  AROM: Within functional limits(BUE and BLE)        Functional Mobility and Transfers for ADLs:  Transfers:  Sit to Stand: Contact guard assistance     ADL Assessment:  Feeding: Independent(feeding ability based on clinical judgment )    Oral Facial Hygiene/Grooming: Contact guard assistance(in standing at sink level)    Bathing: Minimum assistance    Upper Body Dressing: Independent    Lower Body Dressing: Minimum assistance    Toileting: Contact guard assistance                ADL Intervention:    OT educated the pt on the use of a sock aid for donning his socks, as he had difficulty with this at his baseline. Pain:  Pt reports 0/10 pain or discomfort prior to treatment.    Pt reports 0/10 pain or discomfort post treatment. Activity Tolerance:   good    Please refer to the flowsheet for vital signs taken during this treatment. After treatment:   [x] Patient left in no apparent distress sitting up in chair  [] Patient left in no apparent distress in bed  [x] Call bell left within reach  [] Nursing notified  [] Caregiver present  [] Bed alarm activated    COMMUNICATION/EDUCATION:   [] Home safety education was provided and the patient/caregiver indicated understanding. [x] Patient have participated as able in goal setting and plan of care. [x] Patient agree to work toward stated goals and plan of care. [] Patient understands intent and goals of therapy, but is neutral about his/her participation. [] Patient is unable to participate in goal setting and plan of care.     Thank you for this referral.  Charli Gonzalez, OT  Time Calculation: 17 mins

## 2018-12-17 NOTE — PROGRESS NOTES
0715 Bedside and Verbal shift change report given to Tim 91Yulia (oncoming nurse) by Rell Josue (offgoing nurse). Report included the following information SBAR, Kardex, ED Summary, Intake/Output, MAR, Recent Results and Cardiac Rhythm NSR.       5618 Patient OFT to Radiology for CXRay    1150 Patient drinking Gastroview. CT made aware and will send for patient around [id number] Patient completed drinking gastroview    1332 Patient ANA LAURA to CT of abdomen    1407 back in room    1900 Bedside and Verbal shift change report given to Danna Metz (oncoming nurse) by Torie Limon RN (offgoing nurse). Report included the following information SBAR, Kardex, ED Summary, Intake/Output, MAR, Recent Results and Cardiac Rhythm NSR.

## 2018-12-17 NOTE — PROGRESS NOTES
[facility] 2S TELEMETRY  [address] [place] [address] [id number] [phone]  Colon and Rectal Surgery Progress Note      Patient: Morena Robertson MRN: [id number]  SSN: [id number]    YOB: 1950  Age: 80 y.o. Sex: male      Admit Date: 12/4/2018    LOS: 13 days     Subjective:     Feels ok. Self D/C'd NG. No vomiting. + stoma function. Objective:     Vitals:    12/16/18 2124 12/17/18 0050 12/17/18 0622 12/17/18 0726   BP: 138/79 166/83 (!) 168/96    Pulse: 91 93 100    Resp: 20 20 20    Temp: 98.6 °F (37 °C) 98.7 °F (37.1 °C) 98.5 °F (36.9 °C)    SpO2: 98% 98% 95%    Weight:    84.4 kg (186 lb)   Height:            Intake and Output:  Current Shift: No intake/output data recorded.   Last three shifts: 12/15 1901 - 12/17 0700  In: 1203.3 [I.V.:1203.3]  Out: 1205 [Urine:825; Drains:30]    Physical Exam:     abd soft, less distended, appr tender  Incision healthy  ANGELI with serous fluid    Lab/Data Review:    CMP:   Lab Results   Component Value Date/Time     (H) 12/17/2018 02:29 AM    K 3.6 12/17/2018 02:29 AM     (H) 12/17/2018 02:29 AM    CO2 27 12/17/2018 02:29 AM    AGAP 6 12/17/2018 02:29 AM     (H) 12/17/2018 02:29 AM    BUN 32 (H) 12/17/2018 02:29 AM    CREA 1.00 12/17/2018 02:29 AM    GFRAA >60 12/17/2018 02:29 AM    GFRNA >60 12/17/2018 02:29 AM    CA 8.8 12/17/2018 02:29 AM    MG 2.0 12/17/2018 02:29 AM    PHOS 3.5 12/17/2018 02:29 AM     CBC:   Lab Results   Component Value Date/Time    WBC 18.8 (H) 12/17/2018 02:29 AM    HGB 8.5 (L) 12/17/2018 02:29 AM    HCT 25.8 (L) 12/17/2018 02:29 AM     12/17/2018 02:29 AM        Assessment:     POD 6 s/p ex lap, repair anastomotic leak and diverting loop ileostomy for leak after robotic sigmoid colectomy for malingnant polyp     Plan:     Clears  TPN  Continue abx, continue to follow cultures  Check CT abd/pelvis to R/O residual abscess  Check CXR to R/O PNA    Signed By: Juan Sanchez MD        December 17, 2018

## 2018-12-17 NOTE — PROGRESS NOTES
PT recommending , [address] signed for [facility]. PT has family support, will continue to monitor with d/c needs.   Gladis Rizzo, MSW  Case Management  [phone]

## 2018-12-17 NOTE — WOUND CARE
Physical Exam   Room 217: colostomy care & teaching  Introduced myself & services to pt at this time. No family in room. Taught pt how to empty pouch, 150cc liquid green drainage, added to I&O's. Will plan to follow pt for care & teaching.   Cole SPRAGUEN, RN, [facility], [address]

## 2018-12-18 ENCOUNTER — HOME HEALTH ADMISSION (OUTPATIENT)
Dept: HOME HEALTH SERVICES | Facility: HOME HEALTH | Age: 68
End: 2018-12-18
Payer: MEDICARE

## 2018-12-18 LAB
ANION GAP SERPL CALC-SCNC: 8 MMOL/L (ref 3–18)
BASOPHILS # BLD: 0 K/UL (ref 0–0.1)
BASOPHILS NFR BLD: 0 % (ref 0–2)
BUN SERPL-MCNC: 29 MG/DL (ref 7–18)
BUN/CREAT SERPL: 29 (ref 12–20)
CALCIUM SERPL-MCNC: 8.6 MG/DL (ref 8.5–10.1)
CHLORIDE SERPL-SCNC: 111 MMOL/L (ref 100–108)
CO2 SERPL-SCNC: 23 MMOL/L (ref 21–32)
CREAT SERPL-MCNC: 1.01 MG/DL (ref 0.6–1.3)
DIFFERENTIAL METHOD BLD: ABNORMAL
EOSINOPHIL # BLD: 0.5 K/UL (ref 0–0.4)
EOSINOPHIL NFR BLD: 3 % (ref 0–5)
ERYTHROCYTE [DISTWIDTH] IN BLOOD BY AUTOMATED COUNT: 14.7 % (ref 11.6–14.5)
GLUCOSE BLD STRIP.AUTO-MCNC: 170 MG/DL (ref 70–110)
GLUCOSE BLD STRIP.AUTO-MCNC: 175 MG/DL (ref 70–110)
GLUCOSE BLD STRIP.AUTO-MCNC: 183 MG/DL (ref 70–110)
GLUCOSE BLD STRIP.AUTO-MCNC: 202 MG/DL (ref 70–110)
GLUCOSE SERPL-MCNC: 150 MG/DL (ref 74–99)
HCT VFR BLD AUTO: 26.3 % (ref 36–48)
HGB BLD-MCNC: 8.5 G/DL (ref 13–16)
INR PPP: 1.4 (ref 0.8–1.2)
LYMPHOCYTES # BLD: 2.2 K/UL (ref 0.9–3.6)
LYMPHOCYTES NFR BLD: 14 % (ref 21–52)
MAGNESIUM SERPL-MCNC: 1.9 MG/DL (ref 1.6–2.6)
MCH RBC QN AUTO: 28 PG (ref 24–34)
MCHC RBC AUTO-ENTMCNC: 32.3 G/DL (ref 31–37)
MCV RBC AUTO: 86.5 FL (ref 74–97)
MONOCYTES # BLD: 0.7 K/UL (ref 0.05–1.2)
MONOCYTES NFR BLD: 4 % (ref 3–10)
NEUTS SEG # BLD: 12.4 K/UL (ref 1.8–8)
NEUTS SEG NFR BLD: 79 % (ref 40–73)
PHOSPHATE SERPL-MCNC: 3.1 MG/DL (ref 2.5–4.9)
PLATELET # BLD AUTO: 379 K/UL (ref 135–420)
PMV BLD AUTO: 10.3 FL (ref 9.2–11.8)
POTASSIUM SERPL-SCNC: 3.8 MMOL/L (ref 3.5–5.5)
PROTHROMBIN TIME: 17.4 SEC (ref 11.5–15.2)
RBC # BLD AUTO: 3.04 M/UL (ref 4.7–5.5)
SODIUM SERPL-SCNC: 142 MMOL/L (ref 136–145)
WBC # BLD AUTO: 15.8 K/UL (ref 4.6–13.2)

## 2018-12-18 PROCEDURE — 65660000000 HC RM CCU STEPDOWN

## 2018-12-18 PROCEDURE — 74011000250 HC RX REV CODE- 250: Performed by: COLON & RECTAL SURGERY

## 2018-12-18 PROCEDURE — 84100 ASSAY OF PHOSPHORUS: CPT

## 2018-12-18 PROCEDURE — 74011250636 HC RX REV CODE- 250/636: Performed by: INTERNAL MEDICINE

## 2018-12-18 PROCEDURE — 83735 ASSAY OF MAGNESIUM: CPT

## 2018-12-18 PROCEDURE — 80048 BASIC METABOLIC PNL TOTAL CA: CPT

## 2018-12-18 PROCEDURE — 74011636637 HC RX REV CODE- 636/637: Performed by: COLON & RECTAL SURGERY

## 2018-12-18 PROCEDURE — 74011636637 HC RX REV CODE- 636/637: Performed by: INTERNAL MEDICINE

## 2018-12-18 PROCEDURE — 74011250636 HC RX REV CODE- 250/636: Performed by: COLON & RECTAL SURGERY

## 2018-12-18 PROCEDURE — 74011000258 HC RX REV CODE- 258: Performed by: COLON & RECTAL SURGERY

## 2018-12-18 PROCEDURE — 82962 GLUCOSE BLOOD TEST: CPT

## 2018-12-18 PROCEDURE — 97116 GAIT TRAINING THERAPY: CPT

## 2018-12-18 PROCEDURE — 85610 PROTHROMBIN TIME: CPT

## 2018-12-18 PROCEDURE — 85025 COMPLETE CBC W/AUTO DIFF WBC: CPT

## 2018-12-18 PROCEDURE — 74011000250 HC RX REV CODE- 250: Performed by: PHYSICIAN ASSISTANT

## 2018-12-18 RX ADMIN — LATANOPROST 1 DROP: 50 SOLUTION/ DROPS OPHTHALMIC at 16:34

## 2018-12-18 RX ADMIN — INSULIN LISPRO 3 UNITS: 100 INJECTION, SOLUTION INTRAVENOUS; SUBCUTANEOUS at 05:17

## 2018-12-18 RX ADMIN — METOPROLOL TARTRATE 5 MG: 5 INJECTION, SOLUTION INTRAVENOUS at 14:53

## 2018-12-18 RX ADMIN — PIPERACILLIN SODIUM AND TAZOBACTAM SODIUM 3.38 G: 3; .375 INJECTION, POWDER, LYOPHILIZED, FOR SOLUTION INTRAVENOUS at 20:11

## 2018-12-18 RX ADMIN — HYDROMORPHONE HYDROCHLORIDE 0.5 MG: 1 INJECTION, SOLUTION INTRAMUSCULAR; INTRAVENOUS; SUBCUTANEOUS at 05:32

## 2018-12-18 RX ADMIN — HEPARIN SODIUM 5000 UNITS: 5000 INJECTION, SOLUTION INTRAVENOUS; SUBCUTANEOUS at 16:33

## 2018-12-18 RX ADMIN — PIPERACILLIN SODIUM AND TAZOBACTAM SODIUM 3.38 G: 3; .375 INJECTION, POWDER, LYOPHILIZED, FOR SOLUTION INTRAVENOUS at 03:30

## 2018-12-18 RX ADMIN — METOPROLOL TARTRATE 5 MG: 5 INJECTION, SOLUTION INTRAVENOUS at 16:33

## 2018-12-18 RX ADMIN — ALTEPLASE 0.5 MG: 2.2 INJECTION, POWDER, LYOPHILIZED, FOR SOLUTION INTRAVENOUS at 09:22

## 2018-12-18 RX ADMIN — HYDROMORPHONE HYDROCHLORIDE 0.5 MG: 1 INJECTION, SOLUTION INTRAMUSCULAR; INTRAVENOUS; SUBCUTANEOUS at 16:33

## 2018-12-18 RX ADMIN — HEPARIN SODIUM 5000 UNITS: 5000 INJECTION, SOLUTION INTRAVENOUS; SUBCUTANEOUS at 00:20

## 2018-12-18 RX ADMIN — INSULIN LISPRO 3 UNITS: 100 INJECTION, SOLUTION INTRAVENOUS; SUBCUTANEOUS at 17:37

## 2018-12-18 RX ADMIN — METOPROLOL TARTRATE 5 MG: 5 INJECTION, SOLUTION INTRAVENOUS at 03:28

## 2018-12-18 RX ADMIN — METOPROLOL TARTRATE 5 MG: 5 INJECTION, SOLUTION INTRAVENOUS at 08:59

## 2018-12-18 RX ADMIN — PIPERACILLIN SODIUM AND TAZOBACTAM SODIUM 3.38 G: 3; .375 INJECTION, POWDER, LYOPHILIZED, FOR SOLUTION INTRAVENOUS at 08:59

## 2018-12-18 RX ADMIN — CALCIUM GLUCONATE: 94 INJECTION, SOLUTION INTRAVENOUS at 20:23

## 2018-12-18 RX ADMIN — METOPROLOL TARTRATE 5 MG: 5 INJECTION, SOLUTION INTRAVENOUS at 20:11

## 2018-12-18 RX ADMIN — INSULIN LISPRO 3 UNITS: 100 INJECTION, SOLUTION INTRAVENOUS; SUBCUTANEOUS at 12:46

## 2018-12-18 RX ADMIN — METOPROLOL TARTRATE 5 MG: 5 INJECTION, SOLUTION INTRAVENOUS at 00:19

## 2018-12-18 RX ADMIN — PIPERACILLIN SODIUM AND TAZOBACTAM SODIUM 3.38 G: 3; .375 INJECTION, POWDER, LYOPHILIZED, FOR SOLUTION INTRAVENOUS at 14:53

## 2018-12-18 RX ADMIN — INSULIN LISPRO 3 UNITS: 100 INJECTION, SOLUTION INTRAVENOUS; SUBCUTANEOUS at 00:12

## 2018-12-18 RX ADMIN — HEPARIN SODIUM 5000 UNITS: 5000 INJECTION, SOLUTION INTRAVENOUS; SUBCUTANEOUS at 08:59

## 2018-12-18 NOTE — PROGRESS NOTES
NUTRITION consult    Nutrition Consult: TPN: RD to Manage     RECOMMENDATIONS / PLAN:     - Plan to provide parenteral nutrition support, monitor labs and adjust electrolytes daily.   - Advance diet as tolerated. - Continue RD inpatient monitoring and evaluation. Please Note:   Parenteral nutrition support to be provided using custom product, allowing for modification of electrolyte and macronutrient content day to day. Lipids included in PN on MWF. Macronutrient Goal: 115 gm amino acids, 380 gm dextrose, 250 mL lipids (1966 kcal weekly average). PARENTERAL NUTRITION ORDER:     Electrolyte Adjustments: insulin increased, no electrolyte changes     Day 10 PN to provide: 90 mEq K, 10 mEq Ca, 12 mEq Mg, 20 mmol Phos, 1480 kcal, 115 gm amino acids, 300 gm dextrose, 10 mL MVI, 2.5 mL trace elements, 17 units regular insulin      PN Rate: 100 mL/hr   Glucose Infusion Rate: 2.54 mg/kg/min    Osmolarity: 1198 mOsm   Parenteral Nutrition Access Device: PICC placed 12/10/18  Indication for PN:  Postop ileus   Refeeding Risk:      []  Yes  [x] No     NUTRITION DIAGNOSIS & INTERVENTIONS:     [x] Parenteral nutrition: continue, modify contents   [x] Meals/snacks: modified composition, advance as tolerated   [x] Collaboration and referral of nutrition care: MD to review PN order and note daily; will not call to verify content and changes, per MD request. Discussed insulin in PN with Dr Heike Grant     Nutrition Diagnosis:  Inadequate oral intake related to altered GI function and diet intolerance as evidenced by abdominal distention and discomfort and inability to consume po diet. ASSESSMENT:     12/18: Tolerating clears, likely to advance diet tomorrow per MD. Hyperglycemia, insulin in next bag increased. 12/17: NGT pulled by pt and remains out, started on clears and plan for CT to rule out abscess. Continue TPN per MD noted. 12/16: NGT output still significant, contributing to hypokalemia.    12/15: Hyperglycemia, improved. 12/14: 1100 mL out from NGT overnight. Tachycardiac, received 80 mEq of K replacement. Recent BG up to 227 mg/L.    12/13: Extubated, confused and NGT pulled by pt then replaced per CRS. Steroids stopped- dextrose in PN kept the same and insulin removed. 12/12: Hyperglycemia, receiving steroid and insulin added to next PN. Renal function improving, bladder pressures improving. Ileostomy with output today, NGT output improved. Plan for extubation today. 12/11: RRT for fever with tachycardiac overnight then taken for emergent surgery, s/p ex lap with primary repair of anastomotic leak, omental patch and diverting loop ileostomy (12/11) and remains intubated postop.   12/10: Nephrology consulted for JOSE ANTONIO. NGT to suction with 3 L out in the past 24 hours. 12/9: S/p sigmoid colectomy on 12/4. Was tolerating a diet, then had abdominal pain and distention, NGT placed. KUB indicative of ileus per MD note. + small BM and flatus. PN infusion adequate to meet patients estimated nutritional needs:   [] Yes     [x] No   [] Unable to determine at this time    Average po intake adequate to meet patients estimated nutritional needs:   [] Yes     [x] No   [] Unable to determine at this time    Diet: DIET CLEAR LIQUID No Conc.  Sweets  TPN ADULT - CENTRAL  TPN ADULT - CENTRAL      Food Allergies: NKFA  Current Appetite:   [] Good     [] Fair     [] Poor     [x] Other: started on clears with fair meal intake per RN   Appetite/meal intake prior to admission:   [x] Good     [] Fair     [] Poor     [] Other:  Feeding Limitations:  [] Swallowing difficulty    [] Chewing difficulty    [] Other:  Current Meal Intake:   Patient Vitals for the past 100 hrs:   % Diet Eaten   12/17/18 0800 0 %        BM:  12/18, ileostomy   Skin Integrity: surgical incision to abdomen; ANGELI drain  Edema:  [x] No     [] Yes   Pertinent Medications: Reviewed: SSI, zofran    Labs: BMP, MG, Phos ordered daily; CMP, Triglyceride, Prealbumin ordered initially and weekly  Recent Labs     12/18/18  0425 12/17/18  0229 12/16/18  0230    146* 145   K 3.8 3.6 3.3*   * 113* 110*   CO2 23 27 28   * 141* 188*   BUN 29* 32* 31*   CREA 1.01 1.00 1.04   CA 8.6 8.8 8.5   MG 1.9 2.0 1.9   PHOS 3.1 3.5 3.9   Prealbumin: 9.15 mg/dL (12/10/18)   Triglyceride: 129 mg/dL (12/10/18)   Recent Labs     12/18/18  0425 12/17/18 0229 12/16/18  0230   WBC 15.8* 18.8* 17.7*   HGB 8.5* 8.5* 7.2*   HCT 26.3* 25.8* 21.5*    334 329         Intake/Output Summary (Last 24 hours) at 12/18/2018 1305  Last data filed at 12/18/2018 1015  Gross per 24 hour   Intake 3525 ml   Output 4220 ml   Net -695 ml       Anthropometrics:   Ht Readings from Last 1 Encounters:   12/13/18 5' 10\" (1.778 m)       Last 3 Recorded Weights in this Encounter    12/16/18 0443 12/17/18 0726 12/18/18 0418   Weight: 82.1 kg (181 lb) 84.4 kg (186 lb) 82 kg (180 lb 12.8 oz)        Body mass index is 25.94 kg/m². Weight History: Pt reports weight loss of 8 lbs PTA, unknown time frame. Weight Metrics 12/18/2018 12/4/2018 11/19/2018 11/12/2018 2/12/2016 2/9/2016 2/9/2016   Weight 180 lb 12.8 oz - 169 lb 170 lb 9.6 oz - 178 lb -   BMI - 25.94 kg/m2 24.25 kg/m2 24.48 kg/m2 25.54 kg/m2 - 25.54 kg/m2          Admitting Diagnosis: Malignant neoplasm of sigmoid colon (HCC) [C18.7]  Colon cancer (HCC) [C18.9]  Pertinent PMHx: colon polyps    Education Needs:        [x] None identified  [] Identified - Not appropriate at this time  []  Identified and addressed - refer to education log  Learning Limitations:   [x] None identified  [] Identified    Cultural, [employer] & ethnic food preferences:  [x] None identified    [] Identified and addressed     ESTIMATED NUTRITION NEEDS:     Calories: 8101-3786 kcal (HBEx1.2-1.5) based on   [x] Actual BW 79 kg    [] IBW:   Protein:  gm (1-1.5 gm/kg) based on   [x] Actual BW      [] IBW:   Fluid: 1 mL/kcal     MONITORING & EVALUATION:     Nutrition Goals:   1. Patient will receive nutrition via PN until they are able to tolerate adequate po intake/enteral nutrition. Outcome:  [x] Met    []  Not Met    [] New Goal  2. Patient will tolerate advancement of macronutrients towards meeting estimated nutrition needs within the next 7 days. Outcome:  [] Met    [x]  Not Met    [] New Goal  3. Po intake of meals will meet >75% of patient estimated nutritional needs within the next 7 days. Outcome:  [] Met/Ongoing    []  Not Met    [x] New/Initial Goal      Monitoring:  [x] Parenteral nutrition intake and administration  [x] Biochemical data, medical tests, and procedures   [x] Fluid intake   [x] Nutrition-focused physical findings   [x] Treatment/therapy   [x] Food and beverage intake   [x] Diet order      [] Weight        Previous Recommendations (for follow-up assessments only):     [x]   Implemented       []   Not Implemented (RD to address)      [] No Longer Appropriate     [] No Recommendation Made        Discharge Planning: Nutrition recommendations pending patient's ability to tolerate po intake/enteral nutrition.    [x]  Participated in care planning, discharge planning, & interdisciplinary rounds as appropriate      Lazara Navas, [address] [place] [address] [address] [place] [address]   Pager: [phone]

## 2018-12-18 NOTE — WOUND CARE
Physical Exam   Room 217: continued ileostomy care & teaching  Pt has 2 piece appliance intact. Provided pt with ileostomy management booklet & [employer] educational folder. Went over what to expect, ileostomy care, appliance change every 4 days, empty when 1/3 full. Went over fluid intake importance to reduce dehydration. Discussed different foods intake to reduce food blockage: careful with popcorn, nuts, seeds, & chew thoroughly. Pt is pleasant & conversant. States he has been watching the staff care for his pouch. Encouraged to participate when staff are in his room for continued practice & hands-on learning. Pt agreed. Will plan to continue ileostomy care & teaching.   Peter SPRAGUEN, RN, [facility], [address]

## 2018-12-18 NOTE — HOME CARE
Received HH referral, [facility] will follow for SN for new Ileostomy care and teaching; pt states he has a RW,cane and shower chair at home; pt lives with wife ; [facility] will follow. SRI FRANKEL.

## 2018-12-18 NOTE — ROUTINE PROCESS
Received bedside report from [facility], patient was resting in bed with wife at bedside, HOB elevated, bed in lowest position and oriented to call button. Gave bedside report to Nicolas, using SBAR, MAR, and Kardex.

## 2018-12-18 NOTE — PROGRESS NOTES
Problem: Pressure Injury - Risk of  Goal: *Prevention of pressure injury  Document Edin Scale and appropriate interventions in the flowsheet. Outcome: Progressing Towards Goal  Pressure Injury Interventions:  Sensory Interventions: Assess changes in LOC    Moisture Interventions: Absorbent underpads    Activity Interventions: PT/OT evaluation    Mobility Interventions: Pressure redistribution bed/mattress (bed type)    Nutrition Interventions: Discuss nutritional consult with provider    Friction and Shear Interventions: Apply protective barrier, creams and emollients               Problem: Falls - Risk of  Goal: *Absence of Falls  Document Tawanda Fall Risk and appropriate interventions in the flowsheet.   Outcome: Progressing Towards Goal  Fall Risk Interventions:  Mobility Interventions: Bed/chair exit alarm    Mentation Interventions: Adequate sleep, hydration, pain control    Medication Interventions: Bed/chair exit alarm    Elimination Interventions: Bed/chair exit alarm    History of Falls Interventions: Bed/chair exit alarm

## 2018-12-18 NOTE — PROGRESS NOTES
[facility] referral sent to [facility] in [place].  Spoke with Mayo Pierre re: referral. Bert Cho, [phone]

## 2018-12-18 NOTE — PROGRESS NOTES
Problem: Mobility Impaired (Adult and Pediatric)  Goal: *Acute Goals and Plan of Care (Insert Text)  Physical Therapy Goals  Initiated 12/6/2018 and to be accomplished within 7 day(s)  1. Patient will move from supine to sit and sit to supine  in bed with independence. 2.  Patient will transfer from bed to chair and chair to bed with independence using the least restrictive device. 3.  Patient will perform sit to stand with independence. 4.  Patient will ambulate with modified independence for 400 feet with SPC.   5.  Patient will ascend/descend 3 stairs with 1 handrail(s) with supervision/set-up. Outcome: Progressing Towards Goal  physical Therapy TREATMENT    Patient: Lester Rangel (59 y.o. male)  Date: 12/18/2018  Diagnosis: Malignant neoplasm of sigmoid colon ([facility] [address] [C18.7]  Colon cancer ([facility] [address] [C18.9] <principal problem not specified>  Procedure(s) (LRB):  LAPAROTOMY EXPLORATORY/ PRIMARY REPAIR WITH DIVERTING LOOP ILEOSTOMY (N/A) 7 Days Post-Op  Precautions: Fall, Skin   Chart, physical therapy assessment, plan of care and goals were reviewed. OBJECTIVE/ ASSESSMENT:  Patient found sitting in b/s chair willing to work with PT. Pt stood to RW and began to ambulate. RW is too low for pt and new RW was provided. Even with appropriately adjusted walker, pt ambulates with forward flexed posture. Pt declines pain throughout tx and stands tall intermittently, but mostly reamained flexed. Pt also presents with narrow gait, which he reports he has been informed of in the past. Pt has a few slight LOB requiring CG and one large LOB requiring min A in stairwell (due to tripping over wet floor sign.) Pt negotiated 3 total stairs with CG and bilateral hand rails this tx. Pt returned to room and left with needs in reach in b/s chair. Pt required one seated rst break this tx that lasted ~one minute.       Education:  []         Bed mobility  [x]         Transfers  [x]         Ambulation / gait  [] Assistive device management  [x]         Stairs  []         Body mechanics  [x]         Position change   []         Therapeutic exercise  []         Activity pacing / energy conservation  []         Other:    Progression toward goals:  []      Improving appropriately and progressing toward goals  [x]      Improving slowly and progressing toward goals  []      Not making progress toward goals and plan of care will be adjusted     PLAN:  Patient continues to benefit from skilled intervention to address the above impairments. Continue treatment per established plan of care. Discharge Recommendations:  Home Health with supervision. Further Equipment Recommendations for Discharge:  rolling walker     SUBJECTIVE:   Patient stated I haven't been out of the bed since yesterday.     OBJECTIVE DATA SUMMARY:   Critical Behavior:  Neurologic State: Alert  Orientation Level: Oriented X4  Cognition: Appropriate decision making, Appropriate for age attention/concentration, Appropriate safety awareness  Safety/Judgement: Fall prevention  Functional Mobility Training:  Transfers:  Sit to Stand: Stand-by assistance  Stand to Sit: Stand-by assistance  Balance:  Sitting: Intact  Standing: Impaired; With support  Standing - Static: Good  Standing - Dynamic : Fair  Ambulation/Gait Training:  Distance (ft): 440 Feet (ft)  Assistive Device: Walker, rolling  Ambulation - Level of Assistance: Stand-by assistance;Contact guard assistance  Gait Abnormalities: Antalgic;Decreased step clearance  Base of Support: Narrowed  Stance: Weight shift  Speed/Geovanna: Slow;Pace decreased (<100 feet/min)  Step Length: Left shortened;Right shortened    Therapeutic Exercises:   Reviewed    Pain:  Pre tx pain: 0  Post tx pain: 0  Activity Tolerance:   Fair  Please refer to the flowsheet for vital signs taken during this treatment.   After treatment:   [x] Patient left in no apparent distress sitting up in chair  [] Patient left in no apparent distress in bed  [x] Call bell left within reach  [] Nursing notified  [] Caregiver present  [] Bed alarm activated  [] SCDs applied  [] Ice applied      Xu Pace PTA   Time Calculation: 23 mins    Mobility  Current  CJ= 20-39%. The severity rating is based on the Level of Assistance required for Functional Mobility and ADLs. Mobility   Goal  CI= 1-19%. The severity rating is based on the Level of Assistance required for Functional Mobility and ADLs.

## 2018-12-18 NOTE — PROGRESS NOTES
[facility] 2S TELEMETRY  [address] [address] [address]  [phone]  Colon and Rectal Surgery Progress Note      Patient: Gurdeep Henry MRN: [id number]  SSN: [id number]    YOB: 1950  Age: 80 y.o. Sex: male      Admit Date: 12/4/2018    LOS: 14 days     Subjective: Tolerating clears.       Objective:     Vitals:    12/18/18 0316 12/18/18 0418 12/18/18 0746 12/18/18 1118   BP: 165/85  155/80 132/77   Pulse: 91  100 (!) 106   Resp: 18 18 18   Temp: 99 °F (37.2 °C)  99 °F (37.2 °C) 98.3 °F (36.8 °C)   SpO2: 95%  98% 97%   Weight:  82 kg (180 lb 12.8 oz)     Height:            Intake and Output:  Current Shift: 12/18 0701 - 12/18 1900  In: -   Out: 1000 [Urine:500]  Last three shifts: 12/16 1901 - 12/18 0700  In: 5441.7 [P.O.:1440; I.V.:4001.7]  Out: 4655 [Urine:1600; Drains:30]    Physical Exam:     abd soft, less distended, appr tender  Incision healthy  ANGELI with serous fluid    Lab/Data Review:    CMP:   Lab Results   Component Value Date/Time     12/18/2018 04:25 AM    K 3.8 12/18/2018 04:25 AM     (H) 12/18/2018 04:25 AM    CO2 23 12/18/2018 04:25 AM    AGAP 8 12/18/2018 04:25 AM     (H) 12/18/2018 04:25 AM    BUN 29 (H) 12/18/2018 04:25 AM    CREA 1.01 12/18/2018 04:25 AM    GFRAA >60 12/18/2018 04:25 AM    GFRNA >60 12/18/2018 04:25 AM    CA 8.6 12/18/2018 04:25 AM    MG 1.9 12/18/2018 04:25 AM    PHOS 3.1 12/18/2018 04:25 AM     CBC:   Lab Results   Component Value Date/Time    WBC 15.8 (H) 12/18/2018 04:25 AM    HGB 8.5 (L) 12/18/2018 04:25 AM    HCT 26.3 (L) 12/18/2018 04:25 AM     12/18/2018 04:25 AM      CT abd/pelvis no abscess  CXR no PNA    Assessment:     POD 7 s/p ex lap, repair anastomotic leak and diverting loop ileostomy for leak after robotic sigmoid colectomy for malingnant polyp     Plan:     Continue clears await improved exam  Continue abx  TPN    Signed By: Iram Goldsmith MD        December 18, 2018

## 2018-12-19 LAB
ANION GAP SERPL CALC-SCNC: 6 MMOL/L (ref 3–18)
BUN SERPL-MCNC: 27 MG/DL (ref 7–18)
BUN/CREAT SERPL: 26 (ref 12–20)
CALCIUM SERPL-MCNC: 8.4 MG/DL (ref 8.5–10.1)
CHLORIDE SERPL-SCNC: 110 MMOL/L (ref 100–108)
CO2 SERPL-SCNC: 24 MMOL/L (ref 21–32)
CREAT SERPL-MCNC: 1.02 MG/DL (ref 0.6–1.3)
ERYTHROCYTE [DISTWIDTH] IN BLOOD BY AUTOMATED COUNT: 14.6 % (ref 11.6–14.5)
GLUCOSE BLD STRIP.AUTO-MCNC: 123 MG/DL (ref 70–110)
GLUCOSE BLD STRIP.AUTO-MCNC: 153 MG/DL (ref 70–110)
GLUCOSE BLD STRIP.AUTO-MCNC: 154 MG/DL (ref 70–110)
GLUCOSE BLD STRIP.AUTO-MCNC: 165 MG/DL (ref 70–110)
GLUCOSE BLD STRIP.AUTO-MCNC: 92 MG/DL (ref 70–110)
GLUCOSE BLD STRIP.AUTO-MCNC: >600 MG/DL (ref 70–110)
GLUCOSE SERPL-MCNC: 94 MG/DL (ref 74–99)
HCT VFR BLD AUTO: 24.6 % (ref 36–48)
HGB BLD-MCNC: 8.1 G/DL (ref 13–16)
INR PPP: 1.3 (ref 0.8–1.2)
MAGNESIUM SERPL-MCNC: 1.9 MG/DL (ref 1.6–2.6)
MCH RBC QN AUTO: 28.7 PG (ref 24–34)
MCHC RBC AUTO-ENTMCNC: 32.9 G/DL (ref 31–37)
MCV RBC AUTO: 87.2 FL (ref 74–97)
PHOSPHATE SERPL-MCNC: 3.8 MG/DL (ref 2.5–4.9)
PLATELET # BLD AUTO: 417 K/UL (ref 135–420)
PMV BLD AUTO: 10 FL (ref 9.2–11.8)
POTASSIUM SERPL-SCNC: 3.7 MMOL/L (ref 3.5–5.5)
PROTHROMBIN TIME: 16.1 SEC (ref 11.5–15.2)
RBC # BLD AUTO: 2.82 M/UL (ref 4.7–5.5)
SODIUM SERPL-SCNC: 140 MMOL/L (ref 136–145)
WBC # BLD AUTO: 12.7 K/UL (ref 4.6–13.2)

## 2018-12-19 PROCEDURE — 83735 ASSAY OF MAGNESIUM: CPT

## 2018-12-19 PROCEDURE — 74011250637 HC RX REV CODE- 250/637: Performed by: COLON & RECTAL SURGERY

## 2018-12-19 PROCEDURE — 77030011641 HC PASTE OST ADH BMS -A

## 2018-12-19 PROCEDURE — 74011636637 HC RX REV CODE- 636/637: Performed by: INTERNAL MEDICINE

## 2018-12-19 PROCEDURE — 77030010811

## 2018-12-19 PROCEDURE — 74011000250 HC RX REV CODE- 250: Performed by: PHYSICIAN ASSISTANT

## 2018-12-19 PROCEDURE — 97116 GAIT TRAINING THERAPY: CPT

## 2018-12-19 PROCEDURE — 77030010520

## 2018-12-19 PROCEDURE — 74011000258 HC RX REV CODE- 258: Performed by: COLON & RECTAL SURGERY

## 2018-12-19 PROCEDURE — 77030010522

## 2018-12-19 PROCEDURE — 85610 PROTHROMBIN TIME: CPT

## 2018-12-19 PROCEDURE — 74011250636 HC RX REV CODE- 250/636: Performed by: COLON & RECTAL SURGERY

## 2018-12-19 PROCEDURE — 36592 COLLECT BLOOD FROM PICC: CPT

## 2018-12-19 PROCEDURE — 65660000000 HC RM CCU STEPDOWN

## 2018-12-19 PROCEDURE — 80048 BASIC METABOLIC PNL TOTAL CA: CPT

## 2018-12-19 PROCEDURE — 82962 GLUCOSE BLOOD TEST: CPT

## 2018-12-19 PROCEDURE — 74011250636 HC RX REV CODE- 250/636: Performed by: INTERNAL MEDICINE

## 2018-12-19 PROCEDURE — 77030010541

## 2018-12-19 PROCEDURE — 85027 COMPLETE CBC AUTOMATED: CPT

## 2018-12-19 PROCEDURE — 74011250637 HC RX REV CODE- 250/637: Performed by: INTERNAL MEDICINE

## 2018-12-19 PROCEDURE — 84100 ASSAY OF PHOSPHORUS: CPT

## 2018-12-19 RX ORDER — NEBIVOLOL 5 MG/1
10 TABLET ORAL DAILY
Status: DISCONTINUED | OUTPATIENT
Start: 2018-12-19 | End: 2018-12-22 | Stop reason: HOSPADM

## 2018-12-19 RX ADMIN — PIPERACILLIN SODIUM AND TAZOBACTAM SODIUM 3.38 G: 3; .375 INJECTION, POWDER, LYOPHILIZED, FOR SOLUTION INTRAVENOUS at 21:21

## 2018-12-19 RX ADMIN — HYDROMORPHONE HYDROCHLORIDE 0.5 MG: 1 INJECTION, SOLUTION INTRAMUSCULAR; INTRAVENOUS; SUBCUTANEOUS at 08:29

## 2018-12-19 RX ADMIN — INSULIN LISPRO 15 UNITS: 100 INJECTION, SOLUTION INTRAVENOUS; SUBCUTANEOUS at 05:15

## 2018-12-19 RX ADMIN — METOPROLOL TARTRATE 5 MG: 5 INJECTION, SOLUTION INTRAVENOUS at 00:11

## 2018-12-19 RX ADMIN — PIPERACILLIN SODIUM AND TAZOBACTAM SODIUM 3.38 G: 3; .375 INJECTION, POWDER, LYOPHILIZED, FOR SOLUTION INTRAVENOUS at 03:17

## 2018-12-19 RX ADMIN — HEPARIN SODIUM 5000 UNITS: 5000 INJECTION, SOLUTION INTRAVENOUS; SUBCUTANEOUS at 15:57

## 2018-12-19 RX ADMIN — FLUTICASONE PROPIONATE 2 SPRAY: 50 SPRAY, METERED NASAL at 08:42

## 2018-12-19 RX ADMIN — LATANOPROST 1 DROP: 50 SOLUTION/ DROPS OPHTHALMIC at 17:50

## 2018-12-19 RX ADMIN — METOPROLOL TARTRATE 5 MG: 5 INJECTION, SOLUTION INTRAVENOUS at 03:17

## 2018-12-19 RX ADMIN — HYDROMORPHONE HYDROCHLORIDE 0.5 MG: 1 INJECTION, SOLUTION INTRAMUSCULAR; INTRAVENOUS; SUBCUTANEOUS at 00:22

## 2018-12-19 RX ADMIN — HEPARIN SODIUM 5000 UNITS: 5000 INJECTION, SOLUTION INTRAVENOUS; SUBCUTANEOUS at 00:11

## 2018-12-19 RX ADMIN — HYDROMORPHONE HYDROCHLORIDE 0.5 MG: 1 INJECTION, SOLUTION INTRAMUSCULAR; INTRAVENOUS; SUBCUTANEOUS at 17:49

## 2018-12-19 RX ADMIN — INSULIN LISPRO 3 UNITS: 100 INJECTION, SOLUTION INTRAVENOUS; SUBCUTANEOUS at 12:39

## 2018-12-19 RX ADMIN — INSULIN LISPRO 3 UNITS: 100 INJECTION, SOLUTION INTRAVENOUS; SUBCUTANEOUS at 00:20

## 2018-12-19 RX ADMIN — HEPARIN SODIUM 5000 UNITS: 5000 INJECTION, SOLUTION INTRAVENOUS; SUBCUTANEOUS at 08:27

## 2018-12-19 RX ADMIN — HEPARIN SODIUM 5000 UNITS: 5000 INJECTION, SOLUTION INTRAVENOUS; SUBCUTANEOUS at 23:41

## 2018-12-19 RX ADMIN — NEBIVOLOL HYDROCHLORIDE 10 MG: 5 TABLET ORAL at 08:26

## 2018-12-19 RX ADMIN — PIPERACILLIN SODIUM AND TAZOBACTAM SODIUM 3.38 G: 3; .375 INJECTION, POWDER, LYOPHILIZED, FOR SOLUTION INTRAVENOUS at 08:27

## 2018-12-19 RX ADMIN — PIPERACILLIN SODIUM AND TAZOBACTAM SODIUM 3.38 G: 3; .375 INJECTION, POWDER, LYOPHILIZED, FOR SOLUTION INTRAVENOUS at 15:53

## 2018-12-19 RX ADMIN — INSULIN LISPRO 3 UNITS: 100 INJECTION, SOLUTION INTRAVENOUS; SUBCUTANEOUS at 17:49

## 2018-12-19 NOTE — ROUTINE PROCESS
Bedside shift change report given to JERAMIE Marmolejo (oncoming nurse) by.me   (offgoing nurse).  Report included the following information SBAR, Kardex, Intake/Output and Cardiac Rhythm ST.

## 2018-12-19 NOTE — WOUND CARE
Physical Exam   Room 217: continued ileostomy care & teaching  Taught pt how to empty ileostomy pouch. Went over ziplock bag of supplies: wafers, pouches, eakins & discussed how they work, how to apply, when to empty, & when to change. Pt read over his [employer] educational folder & tips pages. Pt is conversive & attentive. Will continue to follow for colostomy care & teaching.   Joseluis SPRAGUEN, RN, [facility], [address]

## 2018-12-19 NOTE — PROGRESS NOTES
[facility] 2S TELEMETRY  [address] [place] [id number]  [phone]  Colon and Rectal Surgery Progress Note      Patient: Yunior Figueroa MRN: [id number]  SSN: [id number]    YOB: 1950  Age: 80 y.o. Sex: male      Admit Date: 12/4/2018    LOS: 15 days     Subjective: Tolerating clears. Objective:     Vitals:    12/19/18 0009 12/19/18 0315 12/19/18 0328 12/19/18 0434   BP: 157/81 159/84  160/80   Pulse: 97 92  96   Resp: 18 18  20   Temp: 98.8 °F (37.1 °C) 97.9 °F (36.6 °C)  97.1 °F (36.2 °C)   SpO2: 97% 98%  98%   Weight:   79.9 kg (176 lb 3.2 oz)    Height:            Intake and Output:  Current Shift: No intake/output data recorded.   Last three shifts: 12/17 1901 - 12/19 0700  In: 2800 [P.O.:480; I.V.:2320]  Out: 4615 [Urine:1550; Drains:15]    Physical Exam:     abd soft, much less distended, NT  Incision healthy  ANGELI with serous fluid    Lab/Data Review:    CMP:   Lab Results   Component Value Date/Time     12/19/2018 06:00 AM    K 3.7 12/19/2018 06:00 AM     (H) 12/19/2018 06:00 AM    CO2 24 12/19/2018 06:00 AM    AGAP 6 12/19/2018 06:00 AM    GLU 94 12/19/2018 06:00 AM    BUN 27 (H) 12/19/2018 06:00 AM    CREA 1.02 12/19/2018 06:00 AM    GFRAA >60 12/19/2018 06:00 AM    GFRNA >60 12/19/2018 06:00 AM    CA 8.4 (L) 12/19/2018 06:00 AM    MG 1.9 12/19/2018 06:00 AM    PHOS 3.8 12/19/2018 06:00 AM     CBC:   Lab Results   Component Value Date/Time    WBC 12.7 12/19/2018 06:00 AM    HGB 8.1 (L) 12/19/2018 06:00 AM    HCT 24.6 (L) 12/19/2018 06:00 AM     12/19/2018 06:00 AM        Assessment:     POD 8 s/p ex lap, repair anastomotic leak and diverting loop ileostomy for leak after robotic sigmoid colectomy for malingnant polyp     Plan:     Advance to fulls  Continue abx  OK to d/c TPN after current bag  Likely D/C ANGELI tomorrow  PT eval for d/c    Signed By: Windsor Gottron, MD        December 19, 2018

## 2018-12-19 NOTE — ROUTINE PROCESS
Bedside shift change report given to Elly Mendiola (oncoming nurse) by Annalisa Yun RN (offgoing nurse).  Report included the following information SBAR, Kardex, Intake/Output and Cardiac Rhythm ST.

## 2018-12-19 NOTE — PROGRESS NOTES
Blood glucose is HIMD Dina who states that the ordered dose of 15 units SC should be given and recheck same later.

## 2018-12-19 NOTE — PROGRESS NOTES
Problem: Mobility Impaired (Adult and Pediatric)  Goal: *Acute Goals and Plan of Care (Insert Text)  Physical Therapy Goals  Initiated 12/15/2018 and to be accomplished within 5-7 day(s)  1. Patient will move from supine to sit and sit to supine  in bed with modified independence. 2.  Patient will transfer from bed to chair and chair to bed with supervision/set-up using the least restrictive device. 3.  Patient will perform sit to stand with supervision/set-up. 4.  Patient will ambulate with supervision/set-up for 150 feet with the least restrictive device. 5.  Patient will ascend/descend 3 stairs with 1-2 handrail(s) with minimal assistance/contact guard assist.   Outcome: Progressing Towards Goal  physical Therapy TREATMENT    Patient: Mehul Wang (74 y.o. male)  Date: 12/19/2018  Diagnosis: Malignant neoplasm of sigmoid colon ([facility] [address] [C18.7]  Colon cancer ([facility] [address] [C18.9] <principal problem not specified>  Procedure(s) (LRB):  LAPAROTOMY EXPLORATORY/ PRIMARY REPAIR WITH DIVERTING LOOP ILEOSTOMY (N/A) 8 Days Post-Op  Precautions: Fall, Skin   Chart, physical therapy assessment, plan of care and goals were reviewed. OBJECTIVE/ ASSESSMENT:  Patient found supine in bed willing to work with PT. Pt sat EOB and then reports he need to drain his ostomy. Pt was provided with container and pt drained 250cc (liquid) on his own. Pt also urinated into urinal. Pt then ambulated into hallway with RW. With minimal cues, pt begins to widen his stance, which was discussed last tx session (pt has narrowed KALE.) Pt has no LOB this tx though he does require some VCs for safety as he reaches outside of RW while in room. Pt continues to present with slightly forward flexed posture. Pt is able to recall 1 of 3 LE exercises discussed last tx. Pt urged to continue on own 3 x per day minimum.       Education:  []         Bed mobility  [x]         Transfers  [x]         Ambulation / gait  []         Assistive device management  [x]         Stairs  []         Body mechanics  []         Position change   [x]         Therapeutic exercise  [x]         Activity pacing / energy conservation  []         Other:    Progression toward goals:  [x]      Improving appropriately and progressing toward goals  []      Improving slowly and progressing toward goals  []      Not making progress toward goals and plan of care will be adjusted     PLAN:  Patient continues to benefit from skilled intervention to address the above impairments. Continue treatment per established plan of care. Discharge Recommendations:  Home Health  Further Equipment Recommendations for Discharge:  rolling walker     SUBJECTIVE:   Patient stated It's coming back.  referring to strength. OBJECTIVE DATA SUMMARY:   Critical Behavior:  Neurologic State: Alert  Orientation Level: Oriented X4  Cognition: Appropriate decision making, Appropriate for age attention/concentration, Appropriate safety awareness, Follows commands  Safety/Judgement: Fall prevention  Functional Mobility Training:  Transfers:  Sit to Stand: Supervision  Stand to Sit: Supervision  Balance:  Sitting: Intact  Standing: Impaired; With support  Standing - Static: Good  Standing - Dynamic : Fair(+)  Ambulation/Gait Training:  Distance (ft): 440 Feet (ft)  Assistive Device: Walker, rolling  Ambulation - Level of Assistance: Stand-by assistance;Contact guard assistance  Gait Abnormalities: Decreased step clearance  Base of Support: Narrowed  Stance: Weight shift  Speed/Geovanna: Slow  Step Length: Left shortened;Right shortened      Therapeutic Exercises:  Reviewed LAQ, seated marches and ankle pumps in chair as well as supine therex. Pain:  Pre tx pain: 0  Post tx pain: 0  Activity Tolerance:   Fair  Please refer to the flowsheet for vital signs taken during this treatment.   After treatment:   [x] Patient left in no apparent distress sitting up in chair  [] Patient left in no apparent distress in bed  [x] Call bell left within reach  [] Nursing notified  [] Caregiver present  [] Bed alarm activated  [] SCDs applied  [] Ice applied      Xu Pace PTA   Time Calculation: 27 mins    Mobility  Current  CI= 1-19%. The severity rating is based on the Level of Assistance required for Functional Mobility and ADLs. Mobility   Goal  CI= 1-19%. The severity rating is based on the Level of Assistance required for Functional Mobility and ADLs.

## 2018-12-19 NOTE — PROGRESS NOTES
NUTRITION consult    Nutrition Consult: TPN: RD to Manage     RECOMMENDATIONS / PLAN:     - Discontinue TPN once current bag ends at 8 pm tonight.   - Advance diet as tolerated. - Add supplements: Glucerna Shake TID.   - Continue RD inpatient monitoring and evaluation. NUTRITION DIAGNOSIS & INTERVENTIONS:     [x] Parenteral nutrition: discontinue 12/19 at 20:00 per MD  [x] Meals/snacks: modified composition, advance as tolerated   [x] Medical food supplement therapy: initiate     Nutrition Diagnosis:  Inadequate oral intake related to altered GI function and diet intolerance as evidenced by abdominal distention and discomfort and inability to consume po diet and requiring GI surgery, started on liquid diet and advancing per MD.     ASSESSMENT:     12/19: Kira Odor clears with good appetite, ate 50% of breakfast and advanced to full liquids today. Will stop PN tonight per MD note. Noted 15 units regular insulin given for elevated accucheck, last  mg/dL today. 12/18: Tolerating clears, likely to advance diet tomorrow per MD. Hyperglycemia, insulin in next bag increased. 12/17: NGT pulled by pt and remains out, started on clears and plan for CT to rule out abscess. Continue TPN per MD noted. 12/16: NGT output still significant, contributing to hypokalemia. 12/15: Hyperglycemia, improved. 12/14: 1100 mL out from NGT overnight. Tachycardiac, received 80 mEq of K replacement. Recent BG up to 227 mg/L.    12/13: Extubated, confused and NGT pulled by pt then replaced per CRS. Steroids stopped- dextrose in PN kept the same and insulin removed. 12/12: Hyperglycemia, receiving steroid and insulin added to next PN. Renal function improving, bladder pressures improving. Ileostomy with output today, NGT output improved. Plan for extubation today.   12/11: RRT for fever with tachycardiac overnight then taken for emergent surgery, s/p ex lap with primary repair of anastomotic leak, omental patch and diverting loop ileostomy (12/11) and remains intubated postop.   12/10: Nephrology consulted for JOSE ANTONIO. NGT to suction with 3 L out in the past 24 hours. 12/9: S/p sigmoid colectomy on 12/4. Was tolerating a diet, then had abdominal pain and distention, NGT placed. KUB indicative of ileus per MD note. + small BM and flatus.      PN infusion adequate to meet patients estimated nutritional needs:   [] Yes     [x] No   [] Unable to determine at this time    Average po intake adequate to meet patients estimated nutritional needs:   [] Yes     [x] No   [] Unable to determine at this time    Diet: TPN ADULT - CENTRAL  DIET FULL LIQUID      Food Allergies: NKFA  Current Appetite:   [] Good     [x] Fair     [] Poor     [] Other:   Appetite/meal intake prior to admission:   [x] Good     [] Fair     [] Poor     [] Other:  Feeding Limitations:  [] Swallowing difficulty    [] Chewing difficulty    [x] Other: hx of PEG in 2008 per pt, able to tolerate regular consistency diet and thin liquids per SLP recommendation 11/2018  Current Meal Intake:   Patient Vitals for the past 100 hrs:   % Diet Eaten   12/17/18 0800 0 %        BM:  12/19, ileostomy   Skin Integrity: surgical incision to abdomen; ANGELI drain  Edema:  [x] No     [] Yes   Pertinent Medications: Reviewed: SSI, zofran    Labs:   Recent Labs     12/19/18  0600 12/18/18  0425 12/17/18  0229    142 146*   K 3.7 3.8 3.6   * 111* 113*   CO2 24 23 27   GLU 94 150* 141*   BUN 27* 29* 32*   CREA 1.02 1.01 1.00   CA 8.4* 8.6 8.8   MG 1.9 1.9 2.0   PHOS 3.8 3.1 3.5   Prealbumin: 9.15 mg/dL (12/10/18)   Triglyceride: 129 mg/dL (12/10/18)   Recent Labs     12/19/18  0600 12/18/18  0425 12/17/18  0229   WBC 12.7 15.8* 18.8*   HGB 8.1* 8.5* 8.5*   HCT 24.6* 26.3* 25.8*    379 334         Intake/Output Summary (Last 24 hours) at 12/19/2018 1329  Last data filed at 12/19/2018 1052  Gross per 24 hour   Intake 920 ml   Output 1500 ml   Net -580 ml       Anthropometrics:   Ht Readings from Last 1 Encounters:   12/13/18 5' 10\" (1.778 m)     Last 3 Recorded Weights in this Encounter    12/17/18 0726 12/18/18 0418 12/19/18 0328   Weight: 84.4 kg (186 lb) 82 kg (180 lb 12.8 oz) 79.9 kg (176 lb 3.2 oz)     Body mass index is 25.28 kg/m². Weight History: Pt reports weight loss of 8 lbs PTA, unknown time frame. Weight Metrics 12/19/2018 12/4/2018 11/19/2018 11/12/2018 2/12/2016 2/9/2016 2/9/2016   Weight 176 lb 3.2 oz - 169 lb 170 lb 9.6 oz - 178 lb -   BMI - 25.28 kg/m2 24.25 kg/m2 24.48 kg/m2 25.54 kg/m2 - 25.54 kg/m2          Admitting Diagnosis: Malignant neoplasm of sigmoid colon (HCC) [C18.7]  Colon cancer (HCC) [C18.9]  Pertinent PMHx: colon polyps, throat cancer, DM, HTN, GERD, hx of PEG in 2008 (removed)     Education Needs:        [x] None identified  [] Identified - Not appropriate at this time  []  Identified and addressed - refer to education log  Learning Limitations:   [x] None identified  [] Identified    Cultural, Jain & ethnic food preferences:  [x] None identified    [] Identified and addressed     ESTIMATED NUTRITION NEEDS:     Calories: 5637-4641 kcal (HBEx1.2-1.5) based on   [x] Actual BW 79 kg    [] IBW:   Protein:  gm (1-1.5 gm/kg) based on   [x] Actual BW      [] IBW:   Fluid: 1 mL/kcal     MONITORING & EVALUATION:     Nutrition Goals:   1. Po intake of meals will meet >75% of patient estimated nutritional needs within the next 7 days.   Outcome:  [] Met/Ongoing    [x]  Not Met/Progressing    [] New/Initial Goal      Monitoring:   [x] Nutrition-focused physical findings   [x] Treatment/therapy   [x] Food and beverage intake   [x] Diet order      [] Weight        Previous Recommendations (for follow-up assessments only):     [x]   Implemented       []   Not Implemented (RD to address)      [] No Longer Appropriate     [] No Recommendation Made        Discharge Planning: cardiac, diabetic diet    [x]  Participated in care planning, discharge planning, & interdisciplinary rounds as appropriate      Joseph Elliott, [address], [address] [place]   Pager: [phone]

## 2018-12-19 NOTE — DIABETES MGMT
GLYCEMIC CONTROL AND NUTRITION    Assessment/Recommendations:  Blood glucose at 0503 read HI, but was not rechecked. MD notified of HI reading and 15 units of lispro given. Subsequent BG 92 mg/dl. Patient receiving TPN. 17 units of regular insulin included in TPN bag. Corrective insulin ordered every 6 hours as needed  Will continue inpatient monitoring. Most recent blood glucose values:  Results for Trinidad Méndez (MRN [id number]) as of 12/19/2018 12:00   Ref. Range 12/18/2018 11:17 12/18/2018 17:32 12/19/2018 00:13 12/19/2018 05:03 12/19/2018 06:16   GLUCOSE,FAST - POC Latest Ref Range: 70 - 110 mg/dL 202 (H) 183 (H) 154 (H) >600 (HH) 92     Current A1C of 6.3 % is equivalent to average blood glucose of 134 mg/dl over the past 2-3 months.     Current hospital diabetes medications:   Lispro corrective insulin coverage every 6 hours as needed  Previous day's insulin requirements:   Lispro 12 units corrective insulin  Home diabetes medications:  Metformin 1000 mg 2 times daily  Diet:    Full liquid  Education:  ____Refer to Diabetes Education Record             __x_Education not indicated at this time      Ron Teran, [address]

## 2018-12-20 LAB
ANION GAP SERPL CALC-SCNC: 8 MMOL/L (ref 3–18)
BUN SERPL-MCNC: 26 MG/DL (ref 7–18)
BUN/CREAT SERPL: 22 (ref 12–20)
CALCIUM SERPL-MCNC: 8.5 MG/DL (ref 8.5–10.1)
CHLORIDE SERPL-SCNC: 111 MMOL/L (ref 100–108)
CO2 SERPL-SCNC: 21 MMOL/L (ref 21–32)
CREAT SERPL-MCNC: 1.16 MG/DL (ref 0.6–1.3)
ERYTHROCYTE [DISTWIDTH] IN BLOOD BY AUTOMATED COUNT: 15 % (ref 11.6–14.5)
GLUCOSE BLD STRIP.AUTO-MCNC: 123 MG/DL (ref 70–110)
GLUCOSE BLD STRIP.AUTO-MCNC: 129 MG/DL (ref 70–110)
GLUCOSE BLD STRIP.AUTO-MCNC: 131 MG/DL (ref 70–110)
GLUCOSE BLD STRIP.AUTO-MCNC: 143 MG/DL (ref 70–110)
GLUCOSE SERPL-MCNC: 110 MG/DL (ref 74–99)
HCT VFR BLD AUTO: 24.5 % (ref 36–48)
HGB BLD-MCNC: 8.2 G/DL (ref 13–16)
INR PPP: 1.2 (ref 0.8–1.2)
MAGNESIUM SERPL-MCNC: 1.9 MG/DL (ref 1.6–2.6)
MCH RBC QN AUTO: 29.3 PG (ref 24–34)
MCHC RBC AUTO-ENTMCNC: 33.5 G/DL (ref 31–37)
MCV RBC AUTO: 87.5 FL (ref 74–97)
PHOSPHATE SERPL-MCNC: 4.4 MG/DL (ref 2.5–4.9)
PLATELET # BLD AUTO: 437 K/UL (ref 135–420)
PMV BLD AUTO: 10.8 FL (ref 9.2–11.8)
POTASSIUM SERPL-SCNC: 4.3 MMOL/L (ref 3.5–5.5)
PROTHROMBIN TIME: 15.1 SEC (ref 11.5–15.2)
RBC # BLD AUTO: 2.8 M/UL (ref 4.7–5.5)
SODIUM SERPL-SCNC: 140 MMOL/L (ref 136–145)
WBC # BLD AUTO: 12.6 K/UL (ref 4.6–13.2)

## 2018-12-20 PROCEDURE — 74011250636 HC RX REV CODE- 250/636: Performed by: COLON & RECTAL SURGERY

## 2018-12-20 PROCEDURE — 85610 PROTHROMBIN TIME: CPT

## 2018-12-20 PROCEDURE — 97535 SELF CARE MNGMENT TRAINING: CPT

## 2018-12-20 PROCEDURE — 74011250637 HC RX REV CODE- 250/637: Performed by: COLON & RECTAL SURGERY

## 2018-12-20 PROCEDURE — 84100 ASSAY OF PHOSPHORUS: CPT

## 2018-12-20 PROCEDURE — 85027 COMPLETE CBC AUTOMATED: CPT

## 2018-12-20 PROCEDURE — 83735 ASSAY OF MAGNESIUM: CPT

## 2018-12-20 PROCEDURE — 65660000000 HC RM CCU STEPDOWN

## 2018-12-20 PROCEDURE — 80048 BASIC METABOLIC PNL TOTAL CA: CPT

## 2018-12-20 PROCEDURE — 74011000258 HC RX REV CODE- 258: Performed by: COLON & RECTAL SURGERY

## 2018-12-20 PROCEDURE — 82962 GLUCOSE BLOOD TEST: CPT

## 2018-12-20 PROCEDURE — 74011250636 HC RX REV CODE- 250/636: Performed by: INTERNAL MEDICINE

## 2018-12-20 RX ORDER — INSULIN LISPRO 100 [IU]/ML
INJECTION, SOLUTION INTRAVENOUS; SUBCUTANEOUS
Status: DISCONTINUED | OUTPATIENT
Start: 2018-12-20 | End: 2018-12-22 | Stop reason: HOSPADM

## 2018-12-20 RX ADMIN — HYDROMORPHONE HYDROCHLORIDE 0.5 MG: 1 INJECTION, SOLUTION INTRAMUSCULAR; INTRAVENOUS; SUBCUTANEOUS at 02:03

## 2018-12-20 RX ADMIN — FLUTICASONE PROPIONATE 2 SPRAY: 50 SPRAY, METERED NASAL at 11:09

## 2018-12-20 RX ADMIN — PIPERACILLIN SODIUM AND TAZOBACTAM SODIUM 3.38 G: 3; .375 INJECTION, POWDER, LYOPHILIZED, FOR SOLUTION INTRAVENOUS at 02:03

## 2018-12-20 RX ADMIN — HYDROMORPHONE HYDROCHLORIDE 0.5 MG: 1 INJECTION, SOLUTION INTRAMUSCULAR; INTRAVENOUS; SUBCUTANEOUS at 21:49

## 2018-12-20 RX ADMIN — PIPERACILLIN SODIUM AND TAZOBACTAM SODIUM 3.38 G: 3; .375 INJECTION, POWDER, LYOPHILIZED, FOR SOLUTION INTRAVENOUS at 21:42

## 2018-12-20 RX ADMIN — LATANOPROST 1 DROP: 50 SOLUTION/ DROPS OPHTHALMIC at 19:22

## 2018-12-20 RX ADMIN — HYDROMORPHONE HYDROCHLORIDE 0.5 MG: 1 INJECTION, SOLUTION INTRAMUSCULAR; INTRAVENOUS; SUBCUTANEOUS at 12:08

## 2018-12-20 RX ADMIN — NEBIVOLOL HYDROCHLORIDE 10 MG: 5 TABLET ORAL at 11:08

## 2018-12-20 RX ADMIN — HEPARIN SODIUM 5000 UNITS: 5000 INJECTION, SOLUTION INTRAVENOUS; SUBCUTANEOUS at 11:08

## 2018-12-20 RX ADMIN — HEPARIN SODIUM 5000 UNITS: 5000 INJECTION, SOLUTION INTRAVENOUS; SUBCUTANEOUS at 19:24

## 2018-12-20 RX ADMIN — PIPERACILLIN SODIUM AND TAZOBACTAM SODIUM 3.38 G: 3; .375 INJECTION, POWDER, LYOPHILIZED, FOR SOLUTION INTRAVENOUS at 11:08

## 2018-12-20 NOTE — WOUND CARE
Patient follow up performed per request by surgeon. Ostomy appliance intact, clean. Patient stated \"they cleaned me up this morning\". Appliance was leaking and dressings became soiled per patient. Cheyanne SHARPE verified that dressings and appliance changed this am. Patient able to empty appliance without assistance. Instructed patient to ensure appliance not allowed to get too full before attempting to empty. Dressings clean, dry and intact. ANGELI drain charged and patent with serous drainage in bulb. Will continue ostomy education and have patient change appliance. Recommend Home Health for continued education and reinforcement of teaching.

## 2018-12-20 NOTE — PROGRESS NOTES
[facility] 2S TELEMETRY  [address] [place] [address]  [phone]  Colon and Rectal Surgery Progress Note      Patient: Gregory Diaz MRN: [id number]  SSN: [id number]    YOB: 1950  Age: 80 y.o. Sex: male      Admit Date: 12/4/2018    LOS: 16 days     Subjective: Tolerating fulls      Objective:     Vitals:    12/19/18 1507 12/19/18 2119 12/20/18 0042 12/20/18 0457   BP: 118/76 106/63 113/74 122/64   Pulse: 91 89 89 85   Resp: 18 18 18 18   Temp: 98.3 °F (36.8 °C) 98.5 °F (36.9 °C) 98.5 °F (36.9 °C) 98.4 °F (36.9 °C)   SpO2: 99% 98% 97% 97%   Weight:    77.7 kg (171 lb 3.2 oz)   Height:            Intake and Output:  Current Shift: No intake/output data recorded.   Last three shifts: 12/18 1901 - 12/20 0700  In: 1200 [I.V.:1200]  Out: 2250 [Urine:1350]    Physical Exam:     abd soft, much less distended, NT  Incision healthy  ANGELI with serous fluid    Lab/Data Review:    CMP:   Lab Results   Component Value Date/Time     12/20/2018 04:00 AM    K 4.3 12/20/2018 04:00 AM     (H) 12/20/2018 04:00 AM    CO2 21 12/20/2018 04:00 AM    AGAP 8 12/20/2018 04:00 AM     (H) 12/20/2018 04:00 AM    BUN 26 (H) 12/20/2018 04:00 AM    CREA 1.16 12/20/2018 04:00 AM    GFRAA >60 12/20/2018 04:00 AM    GFRNA >60 12/20/2018 04:00 AM    CA 8.5 12/20/2018 04:00 AM    MG 1.9 12/20/2018 04:00 AM    PHOS 4.4 12/20/2018 04:00 AM     CBC:   Lab Results   Component Value Date/Time    WBC 12.6 12/20/2018 04:00 AM    HGB 8.2 (L) 12/20/2018 04:00 AM    HCT 24.5 (L) 12/20/2018 04:00 AM     (H) 12/20/2018 04:00 AM        Assessment:     POD 8 s/p ex lap, repair anastomotic leak and diverting loop ileostomy for leak after robotic sigmoid colectomy for malingnant polyp     Plan:     Advance to fulls  Continue abx  OK to d/c TPN after current bag  Likely D/C ANGELI tomorrow  PT eval for d/c    Signed By: Erwin Roa MD        December 20, 2018

## 2018-12-20 NOTE — PROGRESS NOTES
Problem: Self Care Deficits Care Plan (Adult)  Goal: *Acute Goals and Plan of Care (Insert Text)  Occupational Therapy Goals  Initiated 12/17/2018 within 7 day(s). 1.  Patient will perform lower body dressing with modified independence   2. Patient will perform grooming with modified independence in standing at sink level. 3.  Patient will perform toileting with modified independence. 4.  Patient will perform toilet transfers with modified independence. 5.  Patient will participate in bilateral upper extremity therapeutic exercise/activities with supervision/set-up for 10-15 minutes. 6.  Patient will utilize energy conservation techniques during functional activities with min verbal cues. Outcome: Progressing Towards Goal  Occupational Therapy TREATMENT    Patient: Elvira Leslie (79 y.o. male)  Date: 12/20/2018  Diagnosis: Malignant neoplasm of sigmoid colon ([facility] [C18.7]  Colon cancer ([facility] [C18.9] <principal problem not specified>  Procedure(s) (LRB):  LAPAROTOMY EXPLORATORY/ PRIMARY REPAIR WITH DIVERTING LOOP ILEOSTOMY (N/A) 9 Days Post-Op  Precautions: Fall, Skin  Chart, occupational therapy assessment, plan of care, and goals were reviewed. ASSESSMENT:  Pt cleared for therapy and agreeable at this time; pt sitting up on EOB with lunch tray in front of him. Pt reported needing to empty his ostomy bag. Pt stood to RW and performed bathroom mobility with SBA completing toilet transfer with SBA. Seated on standard commode, pt managed emptying ostomy bag with SBA and voided. Pt performed ostomy hygiene and then stood to RW with stand-step to sink with cues to keep RW in front of him; pt completed hand hygiene with SBA. Pt returned to EOB and education was provided on EC techniques; pt verbalized understanding. Pt returned to [facility] and scooted to [facility] with Supervision. Pt left comfortably with all needs and lunch tray in reach.    Progression toward goals:  [x]          Improving appropriately and progressing toward goals  []          Improving slowly and progressing toward goals  []          Not making progress toward goals and plan of care will be adjusted     PLAN:  Patient continues to benefit from skilled intervention to address the above impairments. Continue treatment per established plan of care. Discharge Recommendations:  Home Health  Further Equipment Recommendations for Discharge:  shower chair      G-CODES:     Self Care  Current  CJ= 20-39%   Goal  CI= 1-19%. The severity rating is based on the Level of Assistance required for Functional Mobility and ADLs. SUBJECTIVE:   Patient stated I have to do this because no one else will and I know sometimes the nurses here are busy.     OBJECTIVE DATA SUMMARY:   Cognitive/Behavioral Status:  Neurologic State: Alert  Orientation Level: Oriented X4  Cognition: Follows commands  Safety/Judgement: Fall prevention    Functional Mobility and Transfers for ADLs:   Bed Mobility:  Scooting: Supervision      Transfers:  Sit to Stand: Stand-by assistance   Toilet Transfer : Stand-by assistance   Bathroom Mobility: Stand-by assistance      Balance:  Sitting: Intact  Sitting - Static: Good (unsupported)  Sitting - Dynamic: Good (unsupported)  Standing: Impaired; With support  Standing - Static: Good  Standing - Dynamic : Fair    ADL Intervention:  Grooming  Grooming Assistance: Stand-by assistance  Washing Hands: Stand-by assistance(standing at sink)  Cues: Verbal cues provided(to keep RW in front of him )    Toileting  Toileting Assistance: Stand-by assistance  Bladder Hygiene: Stand-by assistance  Bowel Hygiene: Stand-by assistance(pt emptied ostomy bag sitting on commode )  Clothing Management: Supervision/set-up  Adaptive Equipment: Walker    Pain:  Pt reports 0/10 pain or discomfort prior to treatment.    Pt reports 0/10 pain or discomfort post treatment.      Activity Tolerance:    Fair     Please refer to the flowsheet for vital signs taken during this treatment. After treatment:   []  Patient left in no apparent distress sitting up in chair  [x]  Patient left in no apparent distress in bed  [x]  Call bell left within reach  []  Nursing notified  []  Caregiver present  []  Bed alarm activated    COMMUNICATION/EDUCATION:   Pt educated on safety in the home and use of shower chair/tub bench as needed 2/2 decrease endurance along with education on EC techniques, pt verbalized understanding.      ELISA SabaA/L   Time Calculation: 28 mins

## 2018-12-20 NOTE — PROGRESS NOTES
[facility] 2S TELEMETRY  [address] [place] [id number] [phone]  Colon and Rectal Surgery Progress Note      Patient: Gabriel Bradley MRN: [id number]  SSN: [id number]    YOB: 1950  Age: 80 y.o. Sex: male      Admit Date: 12/4/2018    LOS: 16 days     Subjective: Tolerating fulls. Objective:     Vitals:    12/19/18 1507 12/19/18 2119 12/20/18 0042 12/20/18 0457   BP: 118/76 106/63 113/74 122/64   Pulse: 91 89 89 85   Resp: 18 18 18 18   Temp: 98.3 °F (36.8 °C) 98.5 °F (36.9 °C) 98.5 °F (36.9 °C) 98.4 °F (36.9 °C)   SpO2: 99% 98% 97% 97%   Weight:    77.7 kg (171 lb 3.2 oz)   Height:            Intake and Output:  Current Shift: No intake/output data recorded.   Last three shifts: 12/18 1901 - 12/20 0700  In: 1200 [I.V.:1200]  Out: 2250 [Urine:1350]    Physical Exam:     abd soft, NTND, Incision healthy  ANGELI with serous fluid    Lab/Data Review:    CMP:   Lab Results   Component Value Date/Time     12/20/2018 04:00 AM    K 4.3 12/20/2018 04:00 AM     (H) 12/20/2018 04:00 AM    CO2 21 12/20/2018 04:00 AM    AGAP 8 12/20/2018 04:00 AM     (H) 12/20/2018 04:00 AM    BUN 26 (H) 12/20/2018 04:00 AM    CREA 1.16 12/20/2018 04:00 AM    GFRAA >60 12/20/2018 04:00 AM    GFRNA >60 12/20/2018 04:00 AM    CA 8.5 12/20/2018 04:00 AM    MG 1.9 12/20/2018 04:00 AM    PHOS 4.4 12/20/2018 04:00 AM     CBC:   Lab Results   Component Value Date/Time    WBC 12.6 12/20/2018 04:00 AM    HGB 8.2 (L) 12/20/2018 04:00 AM    HCT 24.5 (L) 12/20/2018 04:00 AM     (H) 12/20/2018 04:00 AM        Assessment:     POD 9 s/p ex lap, repair anastomotic leak and diverting loop ileostomy for leak after robotic sigmoid colectomy for malingnant polyp     Plan:     Advance to solids  Likely D/C ANGELI tomorrow  PT eval for d/c  Stoma care    Signed By: Tabitha Goodell, MD        December 20, 2018

## 2018-12-20 NOTE — ROUTINE PROCESS
Bedside shift change report given toE Sophia(oncoming nurse) by Brando Garcia (offgoing nurse).  Report included the following information SBAR, Kardex, Intake/Output and Cardiac Rhythm ST.

## 2018-12-20 NOTE — PROGRESS NOTES
NUTRITION consult    Nutrition Consult: TPN: RD to Manage (discontinued)     RECOMMENDATIONS / PLAN:     - Continue current nutrition interventions. - Continue RD inpatient monitoring and evaluation. NUTRITION DIAGNOSIS & INTERVENTIONS:     [x] Parenteral nutrition: discontinued 12/19  [x] Meals/snacks: modified composition  [x] Medical food supplement therapy: Glucerna Shake, TID    Nutrition Diagnosis:  Inadequate oral intake related to altered GI function and diet intolerance as evidenced by abdominal distention and discomfort and inability to consume po diet and requiring GI surgery, started on liquid diet and recently advanced per MD.     ASSESSMENT:     12/20: Diet advanced, tolerating well and consuming about 50% of meals and 100% of supplements. BG levels stable. 12/19: Neo Cazares clears with good appetite, ate 50% of breakfast and advanced to full liquids today. Will stop PN tonight per MD note. Noted 15 units regular insulin given for elevated accucheck, last  mg/dL today. 12/18: Tolerating clears, likely to advance diet tomorrow per MD. Hyperglycemia, insulin in next bag increased. 12/17: NGT pulled by pt and remains out, started on clears and plan for CT to rule out abscess. Continue TPN per MD noted. 12/16: NGT output still significant, contributing to hypokalemia. 12/15: Hyperglycemia, improved. 12/14: 1100 mL out from NGT overnight. Tachycardiac, received 80 mEq of K replacement. Recent BG up to 227 mg/L.    12/13: Extubated, confused and NGT pulled by pt then replaced per CRS. Steroids stopped- dextrose in PN kept the same and insulin removed. 12/12: Hyperglycemia, receiving steroid and insulin added to next PN. Renal function improving, bladder pressures improving. Ileostomy with output today, NGT output improved. Plan for extubation today.   12/11: RRT for fever with tachycardiac overnight then taken for emergent surgery, s/p ex lap with primary repair of anastomotic leak, omental patch and diverting loop ileostomy (12/11) and remains intubated postop.   12/10: Nephrology consulted for JOSE ANTONIO. NGT to suction with 3 L out in the past 24 hours. 12/9: S/p sigmoid colectomy on 12/4. Was tolerating a diet, then had abdominal pain and distention, NGT placed. KUB indicative of ileus per MD note. + small BM and flatus. Average po intake adequate to meet patients estimated nutritional needs:   [x] Yes     [] No   [] Unable to determine at this time    Diet: DIET NUTRITIONAL SUPPLEMENTS Breakfast, Lunch, Dinner; GLUCERNA SHAKE  DIET REGULAR Low Fiber; No Conc.  Sweets      Food Allergies: NKFA  Current Appetite:   [x] Good     [x] Fair     [] Poor     [] Other:   Appetite/meal intake prior to admission:   [x] Good     [] Fair     [] Poor     [] Other:  Feeding Limitations:  [] Swallowing difficulty    [] Chewing difficulty    [x] Other: hx of PEG in 2008 per pt, able to tolerate regular consistency diet and thin liquids per SLP recommendation 11/2018  Current Meal Intake:   Patient Vitals for the past 100 hrs:   % Diet Eaten   12/17/18 0800 0 %        BM:  12/19, ileostomy   Skin Integrity: surgical incision to abdomen; ANGELI drain  Edema:  [x] No     [] Yes   Pertinent Medications: Reviewed: SSI, zofran    Labs:   Recent Labs     12/20/18  0400 12/19/18  0600 12/18/18  0425    140 142   K 4.3 3.7 3.8   * 110* 111*   CO2 21 24 23   * 94 150*   BUN 26* 27* 29*   CREA 1.16 1.02 1.01   CA 8.5 8.4* 8.6   MG 1.9 1.9 1.9   PHOS 4.4 3.8 3.1   Prealbumin: 9.15 mg/dL (12/10/18)   Triglyceride: 129 mg/dL (12/10/18)   Recent Labs     12/20/18  0400 12/19/18  0600 12/18/18  0425   WBC 12.6 12.7 15.8*   HGB 8.2* 8.1* 8.5*   HCT 24.5* 24.6* 26.3*   * 417 379         Intake/Output Summary (Last 24 hours) at 12/20/2018 1452  Last data filed at 12/20/2018 0845  Gross per 24 hour   Intake 1200 ml   Output 1600 ml   Net -400 ml       Anthropometrics:   Ht Readings from Last 1 Encounters:   12/13/18 5' 10\" (1.778 m)     Last 3 Recorded Weights in this Encounter    12/18/18 0418 12/19/18 0328 12/20/18 0457   Weight: 82 kg (180 lb 12.8 oz) 79.9 kg (176 lb 3.2 oz) 77.7 kg (171 lb 3.2 oz)     Body mass index is 24.56 kg/m². Weight History: Pt reports weight loss of 8 lbs PTA, unknown time frame. Weight Metrics 12/20/2018 12/4/2018 11/19/2018 11/12/2018 2/12/2016 2/9/2016 2/9/2016   Weight 171 lb 3.2 oz - 169 lb 170 lb 9.6 oz - 178 lb -   BMI - 24.56 kg/m2 24.25 kg/m2 24.48 kg/m2 25.54 kg/m2 - 25.54 kg/m2          Admitting Diagnosis: Malignant neoplasm of sigmoid colon (HCC) [C18.7]  Colon cancer (HCC) [C18.9]  Pertinent PMHx: colon polyps, throat cancer, DM, HTN, GERD, hx of PEG in 2008 (removed)     Education Needs:        [x] None identified  [] Identified - Not appropriate at this time  []  Identified and addressed - refer to education log  Learning Limitations:   [x] None identified  [] Identified    Cultural, Jainism & ethnic food preferences:  [x] None identified    [] Identified and addressed     ESTIMATED NUTRITION NEEDS:     Calories: 8749-3230 kcal (HBEx1.2-1.5) based on   [x] Actual BW 79 kg    [] IBW:   Protein:  gm (1-1.5 gm/kg) based on   [x] Actual BW      [] IBW:   Fluid: 1 mL/kcal     MONITORING & EVALUATION:     Nutrition Goals:   1. Po intake of meals will meet >75% of patient estimated nutritional needs within the next 7 days.   Outcome:  [x] Met/Ongoing    []  Not Met/Progressing    [] New/Initial Goal      Monitoring:   [x] Nutrition-focused physical findings   [x] Treatment/therapy   [x] Food and beverage intake   [x] Diet order      [] Weight         Previous Recommendations (for follow-up assessments only):     [x]   Implemented       []   Not Implemented (RD to address)      [] No Longer Appropriate     [] No Recommendation Made        Discharge Planning: cardiac, diabetic diet    [x]  Participated in care planning, discharge planning, & interdisciplinary rounds as appropriate      Isak Bliss RD  Pager: [phone]

## 2018-12-21 LAB
ANION GAP SERPL CALC-SCNC: 10 MMOL/L (ref 3–18)
BUN SERPL-MCNC: 31 MG/DL (ref 7–18)
BUN/CREAT SERPL: 24 (ref 12–20)
CALCIUM SERPL-MCNC: 9.1 MG/DL (ref 8.5–10.1)
CHLORIDE SERPL-SCNC: 109 MMOL/L (ref 100–108)
CO2 SERPL-SCNC: 19 MMOL/L (ref 21–32)
CREAT SERPL-MCNC: 1.3 MG/DL (ref 0.6–1.3)
ERYTHROCYTE [DISTWIDTH] IN BLOOD BY AUTOMATED COUNT: 15 % (ref 11.6–14.5)
GLUCOSE BLD STRIP.AUTO-MCNC: 104 MG/DL (ref 70–110)
GLUCOSE BLD STRIP.AUTO-MCNC: 111 MG/DL (ref 70–110)
GLUCOSE BLD STRIP.AUTO-MCNC: 112 MG/DL (ref 70–110)
GLUCOSE BLD STRIP.AUTO-MCNC: 118 MG/DL (ref 70–110)
GLUCOSE SERPL-MCNC: 97 MG/DL (ref 74–99)
HCT VFR BLD AUTO: 25 % (ref 36–48)
HGB BLD-MCNC: 8.4 G/DL (ref 13–16)
MAGNESIUM SERPL-MCNC: 2.1 MG/DL (ref 1.6–2.6)
MCH RBC QN AUTO: 29.3 PG (ref 24–34)
MCHC RBC AUTO-ENTMCNC: 33.6 G/DL (ref 31–37)
MCV RBC AUTO: 87.1 FL (ref 74–97)
PHOSPHATE SERPL-MCNC: 5.4 MG/DL (ref 2.5–4.9)
PLATELET # BLD AUTO: 504 K/UL (ref 135–420)
PMV BLD AUTO: 10.4 FL (ref 9.2–11.8)
POTASSIUM SERPL-SCNC: 4.2 MMOL/L (ref 3.5–5.5)
RBC # BLD AUTO: 2.87 M/UL (ref 4.7–5.5)
SODIUM SERPL-SCNC: 138 MMOL/L (ref 136–145)
WBC # BLD AUTO: 11.8 K/UL (ref 4.6–13.2)

## 2018-12-21 PROCEDURE — 97110 THERAPEUTIC EXERCISES: CPT

## 2018-12-21 PROCEDURE — 74011250636 HC RX REV CODE- 250/636: Performed by: INTERNAL MEDICINE

## 2018-12-21 PROCEDURE — 77030010541

## 2018-12-21 PROCEDURE — 80048 BASIC METABOLIC PNL TOTAL CA: CPT

## 2018-12-21 PROCEDURE — 97116 GAIT TRAINING THERAPY: CPT

## 2018-12-21 PROCEDURE — 82962 GLUCOSE BLOOD TEST: CPT

## 2018-12-21 PROCEDURE — 65660000000 HC RM CCU STEPDOWN

## 2018-12-21 PROCEDURE — 84100 ASSAY OF PHOSPHORUS: CPT

## 2018-12-21 PROCEDURE — 83735 ASSAY OF MAGNESIUM: CPT

## 2018-12-21 PROCEDURE — 74011250636 HC RX REV CODE- 250/636: Performed by: COLON & RECTAL SURGERY

## 2018-12-21 PROCEDURE — 85027 COMPLETE CBC AUTOMATED: CPT

## 2018-12-21 PROCEDURE — 74011250637 HC RX REV CODE- 250/637: Performed by: COLON & RECTAL SURGERY

## 2018-12-21 PROCEDURE — 74011000258 HC RX REV CODE- 258: Performed by: COLON & RECTAL SURGERY

## 2018-12-21 PROCEDURE — 77030010520

## 2018-12-21 PROCEDURE — 77030010522

## 2018-12-21 RX ORDER — METRONIDAZOLE 500 MG/1
500 TABLET ORAL 3 TIMES DAILY
Qty: 15 TAB | Refills: 0 | Status: SHIPPED | OUTPATIENT
Start: 2018-12-21 | End: 2018-12-26

## 2018-12-21 RX ORDER — OXYCODONE AND ACETAMINOPHEN 5; 325 MG/1; MG/1
1-2 TABLET ORAL
Qty: 40 TAB | Refills: 0 | Status: SHIPPED | OUTPATIENT
Start: 2018-12-21 | End: 2019-01-10 | Stop reason: SDUPTHER

## 2018-12-21 RX ORDER — CEFPODOXIME PROXETIL 200 MG/1
400 TABLET, FILM COATED ORAL 2 TIMES DAILY
Qty: 20 TAB | Refills: 0 | Status: SHIPPED | OUTPATIENT
Start: 2018-12-21 | End: 2018-12-26

## 2018-12-21 RX ADMIN — PIPERACILLIN SODIUM AND TAZOBACTAM SODIUM 3.38 G: 3; .375 INJECTION, POWDER, LYOPHILIZED, FOR SOLUTION INTRAVENOUS at 02:15

## 2018-12-21 RX ADMIN — HEPARIN SODIUM 5000 UNITS: 5000 INJECTION, SOLUTION INTRAVENOUS; SUBCUTANEOUS at 23:10

## 2018-12-21 RX ADMIN — NEBIVOLOL HYDROCHLORIDE 10 MG: 5 TABLET ORAL at 08:04

## 2018-12-21 RX ADMIN — LATANOPROST 1 DROP: 50 SOLUTION/ DROPS OPHTHALMIC at 15:51

## 2018-12-21 RX ADMIN — HYDROMORPHONE HYDROCHLORIDE 0.5 MG: 1 INJECTION, SOLUTION INTRAMUSCULAR; INTRAVENOUS; SUBCUTANEOUS at 02:15

## 2018-12-21 RX ADMIN — PIPERACILLIN SODIUM AND TAZOBACTAM SODIUM 3.38 G: 3; .375 INJECTION, POWDER, LYOPHILIZED, FOR SOLUTION INTRAVENOUS at 15:43

## 2018-12-21 RX ADMIN — HEPARIN SODIUM 5000 UNITS: 5000 INJECTION, SOLUTION INTRAVENOUS; SUBCUTANEOUS at 15:44

## 2018-12-21 RX ADMIN — HEPARIN SODIUM 5000 UNITS: 5000 INJECTION, SOLUTION INTRAVENOUS; SUBCUTANEOUS at 08:03

## 2018-12-21 RX ADMIN — PIPERACILLIN SODIUM AND TAZOBACTAM SODIUM 3.38 G: 3; .375 INJECTION, POWDER, LYOPHILIZED, FOR SOLUTION INTRAVENOUS at 08:02

## 2018-12-21 RX ADMIN — PIPERACILLIN SODIUM AND TAZOBACTAM SODIUM 3.38 G: 3; .375 INJECTION, POWDER, LYOPHILIZED, FOR SOLUTION INTRAVENOUS at 20:45

## 2018-12-21 RX ADMIN — HEPARIN SODIUM 5000 UNITS: 5000 INJECTION, SOLUTION INTRAVENOUS; SUBCUTANEOUS at 00:27

## 2018-12-21 RX ADMIN — HYDROMORPHONE HYDROCHLORIDE 0.5 MG: 1 INJECTION, SOLUTION INTRAMUSCULAR; INTRAVENOUS; SUBCUTANEOUS at 20:51

## 2018-12-21 RX ADMIN — FLUTICASONE PROPIONATE 2 SPRAY: 50 SPRAY, METERED NASAL at 08:03

## 2018-12-21 RX ADMIN — HYDROMORPHONE HYDROCHLORIDE 0.5 MG: 1 INJECTION, SOLUTION INTRAMUSCULAR; INTRAVENOUS; SUBCUTANEOUS at 15:57

## 2018-12-21 NOTE — ROUTINE PROCESS
Bedside and Verbal shift change report given to Supriya Mckeon (oncoming nurse) by Michael Becker   (offgoing nurse). Report included the following information SBAR, Procedure Summary, Intake/Output, MAR, Recent Results and Cardiac Rhythm Sinus Rhythm.

## 2018-12-21 NOTE — ROUTINE PROCESS
Bedside shift change report given to Heather Calixto. RN (oncoming nurse) by Linda Gaspar RN (offgoing nurse). Report included the following information SBAR, Intake/Output, MAR, Recent Results, Med Rec Status and Cardiac Rhythm NSR.

## 2018-12-21 NOTE — PROGRESS NOTES
Pt unable to participate in OT session due to:  []  Nausea/vomiting  []  Eating  []  Pain  []  Pt lethargic  [x]  Other; doctor in room (12:20 pm)  Will f/u later as schedule allows. Thank you.   Sai Byrne, OT

## 2018-12-21 NOTE — WOUND CARE
Physical Exam   Room 217: continued colostomy teaching  Pt's wafer was changed yesterday. Pt states he is emptying his pouch on his own now. Went over ileostomy supplies, how/when to obtain, & list of supplies. Pt has his [employer] educational folder & ziplock bag of supplies for take home. Pt signed privacy card & I will have [employer] send him a secure start kit to his home address.   Brandin KINGSTON, RN, [facility], [address]

## 2018-12-21 NOTE — PROGRESS NOTES
conducted a Follow up consultation and Spiritual Assessment for Gabriel Bradley, who is a 80 y.o.,male. The  provided the following Interventions:  Continued the relationship of care and support. Listened empathically. Offered prayer and assurance of continued prayer on patients behalf. Chart reviewed. The following outcomes were achieved:  Patient expressed gratitude for 's visit. Assessment:  There are no further spiritual or Roman [employer] issues which require Spiritual Care Services interventions at this time. Plan:  Chaplains will continue to follow and will provide pastoral care on an as needed/requested basis.  recommends bedside caregivers page  on duty if patient shows signs of acute spiritual or emotional distress.        Chaplain Resident [address] [place] [address]   [phone]

## 2018-12-21 NOTE — PROGRESS NOTES
Problem: Self Care Deficits Care Plan (Adult)  Goal: *Acute Goals and Plan of Care (Insert Text)  Occupational Therapy Goals  Initiated 12/17/2018 within 7 day(s). 1.  Patient will perform lower body dressing with modified independence   2. Patient will perform grooming with modified independence in standing at sink level. 3.  Patient will perform toileting with modified independence. 4.  Patient will perform toilet transfers with modified independence. 5.  Patient will participate in bilateral upper extremity therapeutic exercise/activities with supervision/set-up for 10-15 minutes. 6.  Patient will utilize energy conservation techniques during functional activities with min verbal cues. Occupational Therapy TREATMENT    Patient: Vicky Morfin (89 y.o. male)  Date: 12/21/2018  Diagnosis: Malignant neoplasm of sigmoid colon ([facility] [C18.7]  Colon cancer ([facility] [C18.9] <principal problem not specified>  Procedure(s) (LRB):  LAPAROTOMY EXPLORATORY/ PRIMARY REPAIR WITH DIVERTING LOOP ILEOSTOMY (N/A) 10 Days Post-Op  Precautions: Fall, Skin  Chart, occupational therapy assessment, plan of care, and goals were reviewed. ASSESSMENT:  Pt agreeable to OT session with focus on therapeutic exercise for UB. Pt sitting edge of bed with G balance during exercises and required several rest breaks indicating fair activity tolerance. Pt reports that he may be discharged home tomorrow but did not have any questions for OT at this time stating that he had all DME needs at home. See there ex for detailed exercises. Pt would beenfit from continued ed on proper form for HEP and additional exercises to focus on tricep strength.    Progression toward goals:  [x]          Improving appropriately and progressing toward goals  []          Improving slowly and progressing toward goals  []          Not making progress toward goals and plan of care will be adjusted     PLAN:  Patient continues to benefit from skilled intervention to address the above impairments. Continue treatment per established plan of care. Discharge Recommendations:  Home Health  Further Equipment Recommendations for Discharge:  N/A      G-CODES:     Self Care  Current  CI= 1-19%   Goal  CI= 1-19%. The severity rating is based on the Level of Assistance required for Functional Mobility and ADLs. SUBJECTIVE:   Patient stated I didn't realize how out of shape I was.     OBJECTIVE DATA SUMMARY:   Cognitive/Behavioral Status:  Neurologic State: Alert  Orientation Level: Oriented X4  Cognition: Follows commands  Safety/Judgement: Fall prevention    Functional Mobility and Transfers for ADLs:   Bed Mobility:  Supine to Sit: Supervision    Balance:  Sitting: Intact  Therapeutic Exercises:   Pt educated on hEP with red T band including how to grade exercises to increase/decrease challenge. Pt performed 3x10 bicep curls, 2x10 horizontal shld abduction, and 2x10 shld flexion with min v/c for form. Pt demonstrates fair exercise tolerance. Pain:  Pt reports 0/10 pain or discomfort prior to treatment.    Pt reports 0/10 pain or discomfort post treatment. Activity Tolerance:    Fair     Please refer to the flowsheet for vital signs taken during this treatment. After treatment:   []  Patient left in no apparent distress sitting up in chair  [x]  Patient left in no apparent distress in bed  [x]  Call bell left within reach  []  Nursing notified  []  Caregiver present  []  Bed alarm activated    COMMUNICATION/EDUCATION:   [x] Home safety education was provided and the patient/caregiver indicated understanding. [x] Patient/family have participated as able in goal setting and plan of care. [x] Patient/family agree to work toward stated goals and plan of care. [] Patient understands intent and goals of therapy, but is neutral about his/her participation. [] Patient is unable to participate in goal setting and plan of care.       Alicia Lee, OT  Time Calculation: 21 mins

## 2018-12-21 NOTE — DISCHARGE SUMMARY
Colon and Rectal Surgery Discharge Summary     Patient: Lester Rangel MRN: [id number]  SSN: [id number]    YOB: 1950  Age: 80 y.o. Sex: male       Admit Date: 12/4/2018    Discharge Date: 12/22/2018    Admission Diagnoses: [facility] [C18.7]; Colon cancer [facility]) [C18.9]    Discharge Diagnoses: Malignant neoplasm of sigmoid colon    Procedure: 1. Robotic Sigmoid Colectomy   2. Exploratory lapartomy with repair of anastomotic leak and diverting loop ileostomy    Problem List as of 12/21/2018 Date Reviewed: 12/4/2018          Codes Class Noted - Resolved    Colon cancer [facility]) ICD-10-CM: C18.9  ICD-9-CM: 153.9  12/4/2018 - Present        Colon polyps ICD-10-CM: K63.5  ICD-9-CM: 211.3  11/12/2018 - Present        Hemorrhoids ICD-10-CM: K64.9  ICD-9-CM: 455.6  11/12/2018 - Present        Debility ICD-10-CM: R53.81  ICD-9-CM: 799.3  11/5/2018 - Present        Leukocytosis ICD-10-CM: V52.691  ICD-9-CM: 288.60  11/5/2018 - Present        Intractable low back pain ICD-10-CM: M54.5  ICD-9-CM: 724.2  11/5/2018 - Present               Discharge Condition: Good    Hospital Course: To OR for robotic sigmoid colectomy. Post-operatively developed ileus and abdominal distension. NG placed and pt appeared to be improving. Pt developed leukocytosis and worsening abdominal exam on POD 6 and was taken to the OR for exploration where a very small leak in the anterior staple line of the colorectal anastomosis was identified. This was primarily repaired and proximal diversion with loop ileostomy was performed. Pt remained intubated in ICU and extubated POD 2. He was maintained on broad spectrum antibiotics and TPN. NG was removed, and diet slowly advanced. Ambulated with PT. Had persistent fevers, but CT abd/plevis and CXR negative for abscess or pneumonia. Eventually WBC returned to normal and pt remained afebrile. He was tachycardic after second surgery but this resolved as well. Consults: Cardiology and Pulmonary/Critical Care    Significant Diagnostic Studies: CT abd/pelvis negative for abscess    Disposition: home    Discharge Medications:   Current Discharge Medication List      START taking these medications    Details   cefpodoxime (VANTIN) 200 mg tablet Take 2 Tabs by mouth two (2) times a day for 5 days. Qty: 20 Tab, Refills: 0      metroNIDAZOLE (FLAGYL) 500 mg tablet Take 1 Tab by mouth three (3) times daily for 5 days. Qty: 15 Tab, Refills: 0         CONTINUE these medications which have CHANGED    Details   oxyCODONE-acetaminophen (PERCOCET) 5-325 mg per tablet Take 1-2 Tabs by mouth every four (4) hours as needed. Max Daily Amount: 12 Tabs. Qty: 40 Tab, Refills: 0    Associated Diagnoses: Intractable back pain; Malignant neoplasm of sigmoid colon ([facility]         CONTINUE these medications which have NOT CHANGED    Details   multivitamin, tx-iron-ca-min (THERA-M W/ IRON) 9 mg iron-400 mcg tab tablet Take 1 Tab by mouth daily. latanoprost (XALATAN) 0.005 % ophthalmic solution Administer 1 Drop to both eyes nightly. beclomethasone dipropionate (QNASL) 80 mcg/actuation HFAA 80 mcg by Nasal route daily. Directions on at home label are as follows: Use 2 sprays in each Nostril Daily      metFORMIN (GLUCOPHAGE) 1,000 mg tablet Take 1,000 mg by mouth two (2) times daily (with meals). do not take morning of surgery (12/4/180. losartan (COZAAR) 50 mg tablet Take 50 mg by mouth daily. nebivolol (BYSTOLIC) 10 mg tablet Take 10 mg by mouth daily. omeprazole (PRILOSEC) 20 mg capsule Take 20 mg by mouth daily. Dexlansoprazole 60 mg CpDB Take 1 Cap by mouth daily. calcium-cholecalciferol, d3, 600-125 mg-unit tab Take 1 Tab by mouth daily. omega-3 fatty acids-vitamin e 1,000 mg cap Take 1 Cap by mouth daily. IRON, FERROUS SULFATE, PO Take  by mouth.  3 times a week      cyclobenzaprine (FLEXERIL) 5 mg tablet Take 1 Tab by mouth three (3) times daily as needed for Muscle Spasm(s). Qty: 20 Tab, Refills: 0         STOP taking these medications       naloxegol (MOVANTIK) 25 mg tab tablet Comments:   Reason for Stopping:         oxyCODONE-acetaminophen (PERCOCET 10)  mg per tablet Comments:   Reason for Stopping:         meloxicam (MOBIC) 15 mg tablet Comments:   Reason for Stopping:         psyllium husk (METAMUCIL) 0.4 gram cap Comments:   Reason for Stopping:         Aspirin, Buffered 81 mg tab Comments:   Reason for Stopping:               Activity: No heavy lifting for 4 weeks  Diet: Low fiber  Wound Care: stoma care    Follow-up Appointments   Procedures    FOLLOW UP VISIT Appointment in: Two Weeks     Standing Status:   Standing     Number of Occurrences:   1     Order Specific Question:   Appointment in     Answer:    Two Weeks       Signed By: Iram Goldsmith MD     December 21, 2018

## 2018-12-21 NOTE — PROGRESS NOTES
[facility] 2S TELEMETRY  [address] [place] [id number] [phone]  Colon and Rectal Surgery Progress Note      Patient: Yuliana Lewis MRN: [id number]  SSN: [id number]    YOB: 1950  Age: 80 y.o. Sex: male      Admit Date: 12/4/2018    LOS: 17 days     Subjective: Tolerating solids, mild bloating yesterday.      Objective:     Vitals:    12/21/18 0000 12/21/18 0304 12/21/18 0734 12/21/18 1148   BP: 114/70 111/69 110/66 104/69   Pulse: 82 81 83 84   Resp: 18 18 16 20   Temp: 98.2 °F (36.8 °C) 98.3 °F (36.8 °C) 98.3 °F (36.8 °C) 97.8 °F (36.6 °C)   SpO2: 97% 96% 96% 98%   Weight:  77.7 kg (171 lb 4.8 oz)     Height:            Intake and Output:  Current Shift: 12/21 0701 - 12/21 1900  In: -   Out: 450   Last three shifts: 12/19 1901 - 12/21 0700  In: -   Out: 3250 [Urine:550; Drains:5]    Physical Exam:     abd soft, NTND, Incision healthy  ANGELI with serous fluid - removed    Lab/Data Review:    CMP:   Lab Results   Component Value Date/Time     12/21/2018 03:30 AM    K 4.2 12/21/2018 03:30 AM     (H) 12/21/2018 03:30 AM    CO2 19 (L) 12/21/2018 03:30 AM    AGAP 10 12/21/2018 03:30 AM    GLU 97 12/21/2018 03:30 AM    BUN 31 (H) 12/21/2018 03:30 AM    CREA 1.30 12/21/2018 03:30 AM    GFRAA >60 12/21/2018 03:30 AM    GFRNA 55 (L) 12/21/2018 03:30 AM    CA 9.1 12/21/2018 03:30 AM    MG 2.1 12/21/2018 03:30 AM    PHOS 5.4 (H) 12/21/2018 03:30 AM     CBC:   Lab Results   Component Value Date/Time    WBC 11.8 12/21/2018 03:30 AM    HGB 8.4 (L) 12/21/2018 03:30 AM    HCT 25.0 (L) 12/21/2018 03:30 AM     (H) 12/21/2018 03:30 AM        Assessment:     POD 10 s/p ex lap, repair anastomotic leak and diverting loop ileostomy for leak after robotic sigmoid colectomy for malingnant polyp     Plan:     Continue solids  PT eval for d/c  Stoma care  Likely D/C tomorrow if continues to tolerate solids    Signed By: Cari Chaparro MD        December 21, 2018

## 2018-12-21 NOTE — PROGRESS NOTES
Problem: Mobility Impaired (Adult and Pediatric)  Goal: *Acute Goals and Plan of Care (Insert Text)  Physical Therapy Goals  Initiated 12/6/2018 and to be accomplished within 7 day(s)  1. Patient will move from supine to sit and sit to supine  in bed with independence. 2.  Patient will transfer from bed to chair and chair to bed with independence using the least restrictive device. 3.  Patient will perform sit to stand with independence. 4.  Patient will ambulate with modified independence for 400 feet with SPC.   5.  Patient will ascend/descend 3 stairs with 1 handrail(s) with supervision/set-up. Outcome: Progressing Towards Goal  physical Therapy TREATMENT    Patient: Mary Kate Madison (89 y.o. male)  Date: 12/21/2018  Diagnosis: Malignant neoplasm of sigmoid colon ([facility] [C18.7]  Colon cancer ([facility] [C18.9] <principal problem not specified>  Procedure(s) (LRB):  LAPAROTOMY EXPLORATORY/ PRIMARY REPAIR WITH DIVERTING LOOP ILEOSTOMY (N/A) 10 Days Post-Op  Precautions: Fall, Skin   Chart, physical therapy assessment, plan of care and goals were reviewed. OBJECTIVE/ ASSESSMENT:  Patient found supine in bed willing to work with PT. Pt sat at EOB then ambulated into hallway with SPC. Pt's balance is decreased compared to RW. Pt has some slight path deviations, especially when he is not looking forward during gait training. Pt took one seated rest break at 220 ft. Pt's bed was raised to give pt solid support for standing therex. Pt performed standing marches with hand on rail x 10 bilaterally. Pt then performed tandem stance (without UE support) x 15 seconds on each LE with no LOB. Pt then performed SLS about 15 seconds each (no UE support) 3 x on each LE. Pt occasionally requires min a with SLS activities. Pt returned to b/s chair where he was left with needs in reach. Pt is highly motivated and progressing well.       Education:  [x]         Bed mobility  [x]         Transfers  [x]         Ambulation / gait  [x]         Assistive device management  []         Stairs  [x]         Body mechanics  [x]         Position change   [x]         Therapeutic exercise  [x]         Activity pacing / energy conservation  []         Other:    Progression toward goals:  [x]      Improving appropriately and progressing toward goals  []      Improving slowly and progressing toward goals  []      Not making progress toward goals and plan of care will be adjusted     PLAN:  Patient continues to benefit from skilled intervention to address the above impairments. Continue treatment per established plan of care. Discharge Recommendations:  Home Health with supervision vs Rehab  Further Equipment Recommendations for Discharge:  rolling walker     SUBJECTIVE:   Patient stated Paresh Guillaume was hoping to see you yesterday, but nobody came.  pt is motivated to work with PT.    OBJECTIVE DATA SUMMARY:   Critical Behavior:  Neurologic State: Alert  Orientation Level: Oriented X4  Cognition: Follows commands  Safety/Judgement: Fall prevention  Functional Mobility Training:  Bed Mobility:  Supine to Sit: Supervision  Transfers:  Sit to Stand: Stand-by assistance  Stand to Sit: Stand-by assistance  Balance:  Sitting: Intact  Standing: Impaired; With support  Standing - Static: Fair(+)  Standing - Dynamic : Fair  Ambulation/Gait Training:  Distance (ft): 440 Feet (ft)  Assistive Device: Cane, straight  Ambulation - Level of Assistance: Contact guard assistance;Stand-by assistance  Gait Abnormalities: Decreased step clearance  Base of Support: Narrowed  Speed/Geovanna: Slow  Step Length: Left shortened;Right shortened    Therapeutic Exercises:   Per above    Pain:  Pre tx pain: 0  Post tx pain: 0  Pt reports 2/10 pain in right hip with ambulation (chronic)  Activity Tolerance:   Fair  Please refer to the flowsheet for vital signs taken during this treatment.   After treatment:   [x] Patient left in no apparent distress sitting up in chair  [] Patient left in no apparent distress in bed  [x] Call bell left within reach  [] Nursing notified  [] Caregiver present  [] Bed alarm activated  [] SCDs applied  [] Ice applied      Xu Pace PTA   Time Calculation: 26 mins    Mobility  Current  CI= 1-19%. The severity rating is based on the Level of Assistance required for Functional Mobility and ADLs. Mobility   Goal  CI= 1-19%. The severity rating is based on the Level of Assistance required for Functional Mobility and ADLs.

## 2018-12-22 VITALS
SYSTOLIC BLOOD PRESSURE: 109 MMHG | DIASTOLIC BLOOD PRESSURE: 65 MMHG | OXYGEN SATURATION: 96 % | WEIGHT: 163.5 LBS | TEMPERATURE: 96.6 F | RESPIRATION RATE: 18 BRPM | HEART RATE: 98 BPM | BODY MASS INDEX: 23.41 KG/M2 | HEIGHT: 70 IN

## 2018-12-22 LAB
ANION GAP SERPL CALC-SCNC: 10 MMOL/L (ref 3–18)
BUN SERPL-MCNC: 32 MG/DL (ref 7–18)
BUN/CREAT SERPL: 24 (ref 12–20)
CALCIUM SERPL-MCNC: 9.1 MG/DL (ref 8.5–10.1)
CHLORIDE SERPL-SCNC: 110 MMOL/L (ref 100–108)
CO2 SERPL-SCNC: 21 MMOL/L (ref 21–32)
CREAT SERPL-MCNC: 1.36 MG/DL (ref 0.6–1.3)
ERYTHROCYTE [DISTWIDTH] IN BLOOD BY AUTOMATED COUNT: 15.1 % (ref 11.6–14.5)
GLUCOSE BLD STRIP.AUTO-MCNC: 113 MG/DL (ref 70–110)
GLUCOSE SERPL-MCNC: 111 MG/DL (ref 74–99)
HCT VFR BLD AUTO: 26.8 % (ref 36–48)
HGB BLD-MCNC: 9 G/DL (ref 13–16)
MAGNESIUM SERPL-MCNC: 2.3 MG/DL (ref 1.6–2.6)
MCH RBC QN AUTO: 29.4 PG (ref 24–34)
MCHC RBC AUTO-ENTMCNC: 33.6 G/DL (ref 31–37)
MCV RBC AUTO: 87.6 FL (ref 74–97)
PHOSPHATE SERPL-MCNC: 4.9 MG/DL (ref 2.5–4.9)
PLATELET # BLD AUTO: 518 K/UL (ref 135–420)
PMV BLD AUTO: 9.8 FL (ref 9.2–11.8)
POTASSIUM SERPL-SCNC: 4.3 MMOL/L (ref 3.5–5.5)
RBC # BLD AUTO: 3.06 M/UL (ref 4.7–5.5)
SODIUM SERPL-SCNC: 141 MMOL/L (ref 136–145)
WBC # BLD AUTO: 12.2 K/UL (ref 4.6–13.2)

## 2018-12-22 PROCEDURE — 74011250637 HC RX REV CODE- 250/637: Performed by: COLON & RECTAL SURGERY

## 2018-12-22 PROCEDURE — 74011000258 HC RX REV CODE- 258: Performed by: COLON & RECTAL SURGERY

## 2018-12-22 PROCEDURE — 74011250636 HC RX REV CODE- 250/636: Performed by: COLON & RECTAL SURGERY

## 2018-12-22 PROCEDURE — 82962 GLUCOSE BLOOD TEST: CPT

## 2018-12-22 PROCEDURE — 80048 BASIC METABOLIC PNL TOTAL CA: CPT

## 2018-12-22 PROCEDURE — 84100 ASSAY OF PHOSPHORUS: CPT

## 2018-12-22 PROCEDURE — 83735 ASSAY OF MAGNESIUM: CPT

## 2018-12-22 PROCEDURE — 85027 COMPLETE CBC AUTOMATED: CPT

## 2018-12-22 RX ADMIN — NEBIVOLOL HYDROCHLORIDE 10 MG: 5 TABLET ORAL at 08:33

## 2018-12-22 RX ADMIN — PIPERACILLIN SODIUM AND TAZOBACTAM SODIUM 3.38 G: 3; .375 INJECTION, POWDER, LYOPHILIZED, FOR SOLUTION INTRAVENOUS at 03:20

## 2018-12-22 RX ADMIN — HEPARIN SODIUM 5000 UNITS: 5000 INJECTION, SOLUTION INTRAVENOUS; SUBCUTANEOUS at 08:27

## 2018-12-22 RX ADMIN — FLUTICASONE PROPIONATE 2 SPRAY: 50 SPRAY, METERED NASAL at 08:29

## 2018-12-22 RX ADMIN — PIPERACILLIN SODIUM AND TAZOBACTAM SODIUM 3.38 G: 3; .375 INJECTION, POWDER, LYOPHILIZED, FOR SOLUTION INTRAVENOUS at 08:27

## 2018-12-22 NOTE — ROUTINE PROCESS
Bedside and Verbal shift change report given to Linda Edwards (oncoming nurse) by Eleni Joseph   (offgoing nurse). Report included the following information SBAR, Procedure Summary, Intake/Output, MAR, Recent Results and Cardiac Rhythm Sinus Rhythm.

## 2018-12-22 NOTE — ROUTINE PROCESS
Bedside and Verbal shift change report given to Christian Landers (oncoming nurse) by irma (offgoing nurse). Report included the following information Kardex, Intake/Output and MAR.

## 2018-12-22 NOTE — ROUTINE PROCESS
Patient is stable for discharge per dr. Jesus Luciano [facility] arranged. d/c instructions given.

## 2018-12-22 NOTE — PROGRESS NOTES
D/c noted for today, pt will go home with [facility] pt has already been accepted, called [facility] to update new order for ilostomy bag education. Confirmed that pt has a rolling walker at home.  Wife is present to d/c.     CARLYN Tong  Case Management  [phone]

## 2018-12-23 ENCOUNTER — HOME CARE VISIT (OUTPATIENT)
Dept: SCHEDULING | Facility: HOME HEALTH | Age: 68
End: 2018-12-23
Payer: MEDICARE

## 2018-12-23 PROCEDURE — 400013 HH SOC

## 2018-12-23 PROCEDURE — G0299 HHS/HOSPICE OF RN EA 15 MIN: HCPCS

## 2018-12-24 ENCOUNTER — HOME CARE VISIT (OUTPATIENT)
Dept: HOME HEALTH SERVICES | Facility: HOME HEALTH | Age: 68
End: 2018-12-24
Payer: MEDICARE

## 2018-12-26 ENCOUNTER — HOME CARE VISIT (OUTPATIENT)
Dept: SCHEDULING | Facility: HOME HEALTH | Age: 68
End: 2018-12-26
Payer: MEDICARE

## 2018-12-26 ENCOUNTER — HOME CARE VISIT (OUTPATIENT)
Dept: HOME HEALTH SERVICES | Facility: HOME HEALTH | Age: 68
End: 2018-12-26
Payer: MEDICARE

## 2018-12-26 PROCEDURE — G0299 HHS/HOSPICE OF RN EA 15 MIN: HCPCS

## 2018-12-27 ENCOUNTER — TELEPHONE (OUTPATIENT)
Dept: SURGERY | Age: 68
End: 2018-12-27

## 2018-12-27 VITALS
OXYGEN SATURATION: 98 % | SYSTOLIC BLOOD PRESSURE: 80 MMHG | HEART RATE: 72 BPM | DIASTOLIC BLOOD PRESSURE: 50 MMHG | TEMPERATURE: 97 F | RESPIRATION RATE: 20 BRPM

## 2018-12-27 DIAGNOSIS — E86.0 DEHYDRATION: Primary | ICD-10-CM

## 2018-12-27 NOTE — TELEPHONE ENCOUNTER
Verbal order with readback from dr de la fuente for home health to draw bmp called to home health and order placed in [facility]

## 2018-12-28 ENCOUNTER — HOME CARE VISIT (OUTPATIENT)
Dept: SCHEDULING | Facility: HOME HEALTH | Age: 68
End: 2018-12-28
Payer: MEDICARE

## 2018-12-28 PROCEDURE — G0299 HHS/HOSPICE OF RN EA 15 MIN: HCPCS

## 2018-12-29 ENCOUNTER — HOSPITAL ENCOUNTER (OUTPATIENT)
Dept: LAB | Age: 68
Discharge: HOME | End: 2018-12-29

## 2018-12-29 LAB — XX-LABCORP SPECIMEN COL,LCBCF: NORMAL

## 2018-12-29 PROCEDURE — 99001 SPECIMEN HANDLING PT-LAB: CPT

## 2018-12-30 LAB
BUN SERPL-MCNC: 95 MG/DL (ref 8–27)
BUN/CREAT SERPL: 13 (ref 10–24)
CALCIUM SERPL-MCNC: 9.6 MG/DL (ref 8.6–10.2)
CHLORIDE SERPL-SCNC: 97 MMOL/L (ref 96–106)
CO2 SERPL-SCNC: 11 MMOL/L (ref 20–29)
CREAT SERPL-MCNC: 7.09 MG/DL (ref 0.76–1.27)
GLUCOSE SERPL-MCNC: 218 MG/DL (ref 65–99)
POTASSIUM SERPL-SCNC: 5.9 MMOL/L (ref 3.5–5.2)
SODIUM SERPL-SCNC: 130 MMOL/L (ref 134–144)
SPECIMEN STATUS REPORT, ROLRST: NORMAL

## 2019-01-01 ENCOUNTER — HOME CARE VISIT (OUTPATIENT)
Dept: SCHEDULING | Facility: HOME HEALTH | Age: 69
End: 2019-01-01
Payer: MEDICARE

## 2019-01-01 PROCEDURE — G0299 HHS/HOSPICE OF RN EA 15 MIN: HCPCS

## 2019-01-02 ENCOUNTER — HOME CARE VISIT (OUTPATIENT)
Dept: SCHEDULING | Facility: HOME HEALTH | Age: 69
End: 2019-01-02
Payer: MEDICARE

## 2019-01-02 VITALS
HEART RATE: 102 BPM | TEMPERATURE: 97.3 F | RESPIRATION RATE: 16 BRPM | OXYGEN SATURATION: 97 % | DIASTOLIC BLOOD PRESSURE: 72 MMHG | SYSTOLIC BLOOD PRESSURE: 120 MMHG

## 2019-01-02 VITALS
SYSTOLIC BLOOD PRESSURE: 112 MMHG | TEMPERATURE: 97.4 F | OXYGEN SATURATION: 100 % | HEART RATE: 52 BPM | DIASTOLIC BLOOD PRESSURE: 80 MMHG

## 2019-01-02 PROCEDURE — G0151 HHCP-SERV OF PT,EA 15 MIN: HCPCS

## 2019-01-04 ENCOUNTER — HOME CARE VISIT (OUTPATIENT)
Dept: SCHEDULING | Facility: HOME HEALTH | Age: 69
End: 2019-01-04
Payer: MEDICARE

## 2019-01-04 PROCEDURE — G0299 HHS/HOSPICE OF RN EA 15 MIN: HCPCS

## 2019-01-06 VITALS
TEMPERATURE: 98.1 F | OXYGEN SATURATION: 97 % | SYSTOLIC BLOOD PRESSURE: 120 MMHG | RESPIRATION RATE: 18 BRPM | HEART RATE: 98 BPM | DIASTOLIC BLOOD PRESSURE: 70 MMHG

## 2019-01-07 ENCOUNTER — HOSPITAL ENCOUNTER (OUTPATIENT)
Dept: LAB | Age: 69
Discharge: HOME | End: 2019-01-07

## 2019-01-07 ENCOUNTER — HOME CARE VISIT (OUTPATIENT)
Dept: SCHEDULING | Facility: HOME HEALTH | Age: 69
End: 2019-01-07
Payer: MEDICARE

## 2019-01-07 VITALS
BODY MASS INDEX: 20.23 KG/M2 | SYSTOLIC BLOOD PRESSURE: 137 MMHG | DIASTOLIC BLOOD PRESSURE: 89 MMHG | WEIGHT: 141 LBS | OXYGEN SATURATION: 97 % | HEART RATE: 105 BPM | RESPIRATION RATE: 16 BRPM

## 2019-01-07 DIAGNOSIS — N17.0 ACUTE RENAL FAILURE WITH TUBULAR NECROSIS (HCC): Primary | ICD-10-CM

## 2019-01-07 LAB
ALBUMIN SERPL-MCNC: 3.4 G/DL (ref 3.4–5)
ALBUMIN/GLOB SERPL: 0.8 {RATIO} (ref 0.8–1.7)
ALP SERPL-CCNC: 98 U/L (ref 45–117)
ALT SERPL-CCNC: 24 U/L (ref 16–61)
ANION GAP SERPL CALC-SCNC: 11 MMOL/L (ref 3–18)
AST SERPL-CCNC: 18 U/L (ref 15–37)
BASOPHILS # BLD: 0 K/UL (ref 0–0.1)
BASOPHILS NFR BLD: 0 % (ref 0–2)
BILIRUB SERPL-MCNC: 0.2 MG/DL (ref 0.2–1)
BUN SERPL-MCNC: 22 MG/DL (ref 7–18)
BUN/CREAT SERPL: 12 (ref 12–20)
CALCIUM SERPL-MCNC: 9.6 MG/DL (ref 8.5–10.1)
CHLORIDE SERPL-SCNC: 110 MMOL/L (ref 100–108)
CO2 SERPL-SCNC: 17 MMOL/L (ref 21–32)
CREAT SERPL-MCNC: 1.79 MG/DL (ref 0.6–1.3)
DIFFERENTIAL METHOD BLD: ABNORMAL
EOSINOPHIL # BLD: 0.5 K/UL (ref 0–0.4)
EOSINOPHIL NFR BLD: 5 % (ref 0–5)
ERYTHROCYTE [DISTWIDTH] IN BLOOD BY AUTOMATED COUNT: 15.7 % (ref 11.6–14.5)
GLOBULIN SER CALC-MCNC: 4.4 G/DL (ref 2–4)
GLUCOSE SERPL-MCNC: 131 MG/DL (ref 74–99)
HCT VFR BLD AUTO: 30 % (ref 36–48)
HGB BLD-MCNC: 10 G/DL (ref 13–16)
LYMPHOCYTES # BLD: 2.8 K/UL (ref 0.9–3.6)
LYMPHOCYTES NFR BLD: 27 % (ref 21–52)
MCH RBC QN AUTO: 29.1 PG (ref 24–34)
MCHC RBC AUTO-ENTMCNC: 33.3 G/DL (ref 31–37)
MCV RBC AUTO: 87.2 FL (ref 74–97)
MONOCYTES # BLD: 0.4 K/UL (ref 0.05–1.2)
MONOCYTES NFR BLD: 4 % (ref 3–10)
NEUTS SEG # BLD: 6.5 K/UL (ref 1.8–8)
NEUTS SEG NFR BLD: 64 % (ref 40–73)
PLATELET # BLD AUTO: 291 K/UL (ref 135–420)
PMV BLD AUTO: 9.5 FL (ref 9.2–11.8)
POTASSIUM SERPL-SCNC: 5.3 MMOL/L (ref 3.5–5.5)
PROT SERPL-MCNC: 7.8 G/DL (ref 6.4–8.2)
RBC # BLD AUTO: 3.44 M/UL (ref 4.7–5.5)
SODIUM SERPL-SCNC: 138 MMOL/L (ref 136–145)
WBC # BLD AUTO: 10.3 K/UL (ref 4.6–13.2)

## 2019-01-07 PROCEDURE — G0299 HHS/HOSPICE OF RN EA 15 MIN: HCPCS

## 2019-01-07 PROCEDURE — 85025 COMPLETE CBC W/AUTO DIFF WBC: CPT

## 2019-01-07 PROCEDURE — 80053 COMPREHEN METABOLIC PANEL: CPT

## 2019-01-07 PROCEDURE — 36415 COLL VENOUS BLD VENIPUNCTURE: CPT

## 2019-01-07 NOTE — PROGRESS NOTES
Results noted. No call made to anyone I am aware of regarding these results. Pt called and told to go to the ER for recheck and possible IVF and K lowering regimen.

## 2019-01-08 ENCOUNTER — HOME CARE VISIT (OUTPATIENT)
Dept: SCHEDULING | Facility: HOME HEALTH | Age: 69
End: 2019-01-08
Payer: MEDICARE

## 2019-01-08 PROCEDURE — G0151 HHCP-SERV OF PT,EA 15 MIN: HCPCS

## 2019-01-09 VITALS
HEART RATE: 129 BPM | OXYGEN SATURATION: 98 % | SYSTOLIC BLOOD PRESSURE: 100 MMHG | TEMPERATURE: 97.2 F | DIASTOLIC BLOOD PRESSURE: 70 MMHG

## 2019-01-10 ENCOUNTER — HOME CARE VISIT (OUTPATIENT)
Dept: SCHEDULING | Facility: HOME HEALTH | Age: 69
End: 2019-01-10
Payer: MEDICARE

## 2019-01-10 ENCOUNTER — OFFICE VISIT (OUTPATIENT)
Dept: SURGERY | Age: 69
End: 2019-01-10

## 2019-01-10 VITALS
OXYGEN SATURATION: 98 % | SYSTOLIC BLOOD PRESSURE: 110 MMHG | DIASTOLIC BLOOD PRESSURE: 62 MMHG | TEMPERATURE: 97.8 F | HEART RATE: 100 BPM | BODY MASS INDEX: 20.76 KG/M2 | RESPIRATION RATE: 17 BRPM | WEIGHT: 145 LBS | HEIGHT: 70 IN

## 2019-01-10 DIAGNOSIS — C18.7 MALIGNANT NEOPLASM OF SIGMOID COLON (HCC): Primary | ICD-10-CM

## 2019-01-10 DIAGNOSIS — E86.0 DEHYDRATION: ICD-10-CM

## 2019-01-10 DIAGNOSIS — M54.9 INTRACTABLE BACK PAIN: ICD-10-CM

## 2019-01-10 PROCEDURE — G0299 HHS/HOSPICE OF RN EA 15 MIN: HCPCS

## 2019-01-10 RX ORDER — OXYCODONE AND ACETAMINOPHEN 5; 325 MG/1; MG/1
1-2 TABLET ORAL
Qty: 40 TAB | Refills: 0 | Status: ON HOLD | OUTPATIENT
Start: 2019-01-10 | End: 2020-02-27 | Stop reason: SDUPTHER

## 2019-01-10 NOTE — PROGRESS NOTES
1. Have you been to the ER, urgent care clinic since your last visit? Hospitalized since your last visit? No 
 
2. Have you seen or consulted any other health care providers outside of the [address] [place] [address] since your last visit? Include any pap smears or colon screening. No  
 
Chief Complaint Patient presents with  Post OP Follow Up  
  12/11/18 ex lap with repair of anastomic leak and loop ileostomy Bag working ok, no issues. Patient gets full quickly.

## 2019-01-10 NOTE — LETTER
1/10/2019 2:53 PM 
 
Patient:  Brian Nicole YOB: 1950 Date of Visit: 1/10/2019 Kalli Akers MD 
[address] [place] [address] [address] [place] [address] VIA In Basket Galvin Essex, MD 
[address]  [place] [address] VIA Facsimile: [phone] Dear Robbie Sepulveda is seen in the office today in follow-up after robotic sigmoid colectomy for the malignant polyp of the sigmoid colon you identified on colonoscopy. His postoperative course was complicated by a very small anastomotic leak which required reexploration, primary repair of the small defect and diverting ileostomy. Subsequent to this he recovered well. He is currently tolerating diet and his stoma is functional. 
 
Pathology revealed only residual adenomatous change, making his stage pT1N0. I will give him some additional time to recover, but the hope would be to return him to the operating room this summer for ileostomy reversal.  We will be sure to work on his nutritional status and control of his diabetes to prevent further issues. Thank you very much for your referral of Mr. Mehreen Pete. If you have questions, please do not hesitate to call me. I look forward to following Mr. Iam Mckeon along with you and will keep you updated as to his progress. Sincerely, Rupali Lopez MD

## 2019-01-10 NOTE — PROGRESS NOTES
Subjective: Tolerating diet. Ileostomy functional.  Labs showed acute renal failure but follow-up labs were normal.  He says he is emptying his appliance 4-5 times per day. Past medical history and ROS were reviewed and unchanged. Abdomen: Soft, nontender nondistended Staples removed, Steri-Strips applied Ileostomy left lower quadrant is pink Assessment / Plan Status post robotic sigmoid colectomy complicated by small anastomotic leak, status post diverting ileostomy 
recheck labs in 4 days Follow-up in 2 weeks Increase Imodium to 2 tablets twice per day The diagnoses and plan were discussed with patient. All questions answered. Plan of care agreed to by all concerned.

## 2019-01-11 ENCOUNTER — HOME CARE VISIT (OUTPATIENT)
Dept: SCHEDULING | Facility: HOME HEALTH | Age: 69
End: 2019-01-11
Payer: MEDICARE

## 2019-01-11 VITALS
OXYGEN SATURATION: 96 % | RESPIRATION RATE: 16 BRPM | BODY MASS INDEX: 20.37 KG/M2 | SYSTOLIC BLOOD PRESSURE: 106 MMHG | DIASTOLIC BLOOD PRESSURE: 70 MMHG | WEIGHT: 142 LBS | HEART RATE: 91 BPM

## 2019-01-11 PROCEDURE — G0157 HHC PT ASSISTANT EA 15: HCPCS

## 2019-01-13 VITALS
DIASTOLIC BLOOD PRESSURE: 54 MMHG | SYSTOLIC BLOOD PRESSURE: 90 MMHG | HEART RATE: 105 BPM | OXYGEN SATURATION: 98 % | TEMPERATURE: 97.5 F

## 2019-01-14 ENCOUNTER — HOSPITAL ENCOUNTER (OUTPATIENT)
Dept: LAB | Age: 69
Discharge: HOME | End: 2019-01-14
Payer: MEDICARE

## 2019-01-14 ENCOUNTER — HOME CARE VISIT (OUTPATIENT)
Dept: SCHEDULING | Facility: HOME HEALTH | Age: 69
End: 2019-01-14
Payer: MEDICARE

## 2019-01-14 DIAGNOSIS — E86.0 DEHYDRATION: ICD-10-CM

## 2019-01-14 DIAGNOSIS — N17.0 ACUTE RENAL FAILURE WITH TUBULAR NECROSIS (HCC): ICD-10-CM

## 2019-01-14 LAB
ANION GAP SERPL CALC-SCNC: 7 MMOL/L (ref 3–18)
BASOPHILS # BLD: 0 K/UL (ref 0–0.1)
BASOPHILS NFR BLD: 0 % (ref 0–2)
BUN SERPL-MCNC: 28 MG/DL (ref 7–18)
BUN/CREAT SERPL: 19 (ref 12–20)
CALCIUM SERPL-MCNC: 9.4 MG/DL (ref 8.5–10.1)
CHLORIDE SERPL-SCNC: 113 MMOL/L (ref 100–108)
CO2 SERPL-SCNC: 18 MMOL/L (ref 21–32)
CREAT SERPL-MCNC: 1.48 MG/DL (ref 0.6–1.3)
DIFFERENTIAL METHOD BLD: ABNORMAL
EOSINOPHIL # BLD: 0.2 K/UL (ref 0–0.4)
EOSINOPHIL NFR BLD: 2 % (ref 0–5)
ERYTHROCYTE [DISTWIDTH] IN BLOOD BY AUTOMATED COUNT: 15.6 % (ref 11.6–14.5)
GLUCOSE SERPL-MCNC: 101 MG/DL (ref 74–99)
HCT VFR BLD AUTO: 28.7 % (ref 36–48)
HGB BLD-MCNC: 9.4 G/DL (ref 13–16)
LYMPHOCYTES # BLD: 2.3 K/UL (ref 0.9–3.6)
LYMPHOCYTES NFR BLD: 23 % (ref 21–52)
MCH RBC QN AUTO: 28.7 PG (ref 24–34)
MCHC RBC AUTO-ENTMCNC: 32.8 G/DL (ref 31–37)
MCV RBC AUTO: 87.8 FL (ref 74–97)
MONOCYTES # BLD: 0.3 K/UL (ref 0.05–1.2)
MONOCYTES NFR BLD: 3 % (ref 3–10)
NEUTS SEG # BLD: 6.9 K/UL (ref 1.8–8)
NEUTS SEG NFR BLD: 72 % (ref 40–73)
PLATELET # BLD AUTO: 214 K/UL (ref 135–420)
PMV BLD AUTO: 10.1 FL (ref 9.2–11.8)
POTASSIUM SERPL-SCNC: 5.7 MMOL/L (ref 3.5–5.5)
RBC # BLD AUTO: 3.27 M/UL (ref 4.7–5.5)
SODIUM SERPL-SCNC: 138 MMOL/L (ref 136–145)
WBC # BLD AUTO: 9.7 K/UL (ref 4.6–13.2)

## 2019-01-14 PROCEDURE — G0299 HHS/HOSPICE OF RN EA 15 MIN: HCPCS

## 2019-01-14 PROCEDURE — 36415 COLL VENOUS BLD VENIPUNCTURE: CPT

## 2019-01-14 PROCEDURE — 80048 BASIC METABOLIC PNL TOTAL CA: CPT

## 2019-01-14 PROCEDURE — 85025 COMPLETE CBC W/AUTO DIFF WBC: CPT

## 2019-01-15 ENCOUNTER — HOME CARE VISIT (OUTPATIENT)
Dept: SCHEDULING | Facility: HOME HEALTH | Age: 69
End: 2019-01-15
Payer: MEDICARE

## 2019-01-15 ENCOUNTER — DOCUMENTATION ONLY (OUTPATIENT)
Dept: SURGERY | Age: 69
End: 2019-01-15

## 2019-01-15 PROCEDURE — G0157 HHC PT ASSISTANT EA 15: HCPCS

## 2019-01-15 NOTE — PROGRESS NOTES
Made patient an appt with Dr. Hellen Blanchard (nephrology) due to high potassium levels. Dr. Isabel Brice wanted this appt made asap. He is being seen by Dr. Jeremy Santiago tomorrow 1/16/19 at 330 pm. Patient notified and notes faxed to Dr. Kenny Bangura office.

## 2019-01-16 ENCOUNTER — HOSPITAL ENCOUNTER (OUTPATIENT)
Dept: LAB | Age: 69
Discharge: HOME | End: 2019-01-16

## 2019-01-16 VITALS
OXYGEN SATURATION: 98 % | HEART RATE: 86 BPM | DIASTOLIC BLOOD PRESSURE: 67 MMHG | SYSTOLIC BLOOD PRESSURE: 121 MMHG | TEMPERATURE: 97.7 F

## 2019-01-16 LAB — XX-LABCORP SPECIMEN COL,LCBCF: NORMAL

## 2019-01-16 PROCEDURE — 99001 SPECIMEN HANDLING PT-LAB: CPT

## 2019-01-17 ENCOUNTER — HOME CARE VISIT (OUTPATIENT)
Dept: SCHEDULING | Facility: HOME HEALTH | Age: 69
End: 2019-01-17
Payer: MEDICARE

## 2019-01-17 VITALS
SYSTOLIC BLOOD PRESSURE: 110 MMHG | OXYGEN SATURATION: 99 % | DIASTOLIC BLOOD PRESSURE: 65 MMHG | WEIGHT: 144.5 LBS | BODY MASS INDEX: 20.73 KG/M2 | RESPIRATION RATE: 16 BRPM | HEART RATE: 85 BPM

## 2019-01-17 PROCEDURE — G0299 HHS/HOSPICE OF RN EA 15 MIN: HCPCS

## 2019-01-18 ENCOUNTER — HOME CARE VISIT (OUTPATIENT)
Dept: SCHEDULING | Facility: HOME HEALTH | Age: 69
End: 2019-01-18
Payer: MEDICARE

## 2019-01-18 PROCEDURE — G0157 HHC PT ASSISTANT EA 15: HCPCS

## 2019-01-19 VITALS
DIASTOLIC BLOOD PRESSURE: 70 MMHG | TEMPERATURE: 98.6 F | HEART RATE: 90 BPM | SYSTOLIC BLOOD PRESSURE: 110 MMHG | OXYGEN SATURATION: 97 % | RESPIRATION RATE: 20 BRPM

## 2019-01-20 VITALS
TEMPERATURE: 97.9 F | SYSTOLIC BLOOD PRESSURE: 103 MMHG | HEART RATE: 91 BPM | DIASTOLIC BLOOD PRESSURE: 64 MMHG | OXYGEN SATURATION: 99 %

## 2019-01-22 ENCOUNTER — HOME CARE VISIT (OUTPATIENT)
Dept: SCHEDULING | Facility: HOME HEALTH | Age: 69
End: 2019-01-22
Payer: MEDICARE

## 2019-01-22 PROCEDURE — G0157 HHC PT ASSISTANT EA 15: HCPCS

## 2019-01-23 VITALS
OXYGEN SATURATION: 98 % | SYSTOLIC BLOOD PRESSURE: 120 MMHG | DIASTOLIC BLOOD PRESSURE: 69 MMHG | HEART RATE: 88 BPM | TEMPERATURE: 97.7 F

## 2019-01-24 ENCOUNTER — HOME CARE VISIT (OUTPATIENT)
Dept: HOME HEALTH SERVICES | Facility: HOME HEALTH | Age: 69
End: 2019-01-24
Payer: MEDICARE

## 2019-01-24 ENCOUNTER — OFFICE VISIT (OUTPATIENT)
Dept: SURGERY | Age: 69
End: 2019-01-24

## 2019-01-24 VITALS
TEMPERATURE: 97.9 F | DIASTOLIC BLOOD PRESSURE: 56 MMHG | RESPIRATION RATE: 20 BRPM | HEIGHT: 71 IN | SYSTOLIC BLOOD PRESSURE: 94 MMHG | HEART RATE: 88 BPM | WEIGHT: 150 LBS | BODY MASS INDEX: 21 KG/M2

## 2019-01-24 DIAGNOSIS — C18.7 MALIGNANT NEOPLASM OF SIGMOID COLON (HCC): Primary | ICD-10-CM

## 2019-01-24 RX ORDER — SODIUM BICARBONATE 325 MG/1
325 TABLET ORAL 2 TIMES DAILY
COMMUNITY
End: 2020-02-11

## 2019-01-24 NOTE — PROGRESS NOTES
Chief Complaint   Patient presents with    Colon Cancer   1. Have you been to the ER, urgent care clinic since your last visit? Hospitalized since your last visit? No    2. Have you seen or consulted any other health care providers outside of the [address] [place] [address] since your last visit? Include any pap smears or colon screening.  Yes pcp and dr Carleen Thompson nephrology

## 2019-01-24 NOTE — PROGRESS NOTES
Subjective: Tolerating diet and stoma is functional.  Saw Dr. Albert Daugherty who recommended sodium bicarbonate tabs. Past medical history and ROS were reviewed and unchanged. Abdomen: Soft, nontender nondistended  Ileostomy pink    Assessment / Plan    Status post robotic sigmoid colectomy for stage I adenocarcinoma of the sigmoid colon  Status post repair of anastomotic leak with diverting ileostomy  Diet as tolerated  Follow-up with renal for electrolyte and renal issues  Imodium to control output from ileostomy as he is currently doing  Follow-up in 3 months  Will be a candidate for ileostomy reversal in May the earliest  Consider Gastrografin enema versus CT with rectal contrast prior to reversal    The diagnoses and plan were discussed with patient. All questions answered. Plan of care agreed to by all concerned.

## 2019-01-25 ENCOUNTER — HOME CARE VISIT (OUTPATIENT)
Dept: SCHEDULING | Facility: HOME HEALTH | Age: 69
End: 2019-01-25
Payer: MEDICARE

## 2019-01-25 PROCEDURE — G0157 HHC PT ASSISTANT EA 15: HCPCS

## 2019-01-27 VITALS
SYSTOLIC BLOOD PRESSURE: 99 MMHG | OXYGEN SATURATION: 98 % | TEMPERATURE: 97.7 F | DIASTOLIC BLOOD PRESSURE: 66 MMHG | HEART RATE: 85 BPM

## 2019-01-29 ENCOUNTER — HOME CARE VISIT (OUTPATIENT)
Dept: SCHEDULING | Facility: HOME HEALTH | Age: 69
End: 2019-01-29
Payer: MEDICARE

## 2019-01-29 VITALS
DIASTOLIC BLOOD PRESSURE: 64 MMHG | OXYGEN SATURATION: 98 % | SYSTOLIC BLOOD PRESSURE: 99 MMHG | TEMPERATURE: 97.7 F | HEART RATE: 83 BPM

## 2019-01-29 PROCEDURE — G0157 HHC PT ASSISTANT EA 15: HCPCS

## 2019-01-31 ENCOUNTER — HOME CARE VISIT (OUTPATIENT)
Dept: SCHEDULING | Facility: HOME HEALTH | Age: 69
End: 2019-01-31
Payer: MEDICARE

## 2019-01-31 VITALS
TEMPERATURE: 97.8 F | OXYGEN SATURATION: 99 % | SYSTOLIC BLOOD PRESSURE: 98 MMHG | HEART RATE: 84 BPM | DIASTOLIC BLOOD PRESSURE: 64 MMHG

## 2019-01-31 PROCEDURE — G0151 HHCP-SERV OF PT,EA 15 MIN: HCPCS

## 2019-03-04 ENCOUNTER — HOSPITAL ENCOUNTER (OUTPATIENT)
Dept: LAB | Age: 69
Discharge: HOME | End: 2019-03-04
Payer: MEDICARE

## 2019-03-04 DIAGNOSIS — N18.30 CHRONIC KIDNEY DISEASE, STAGE III (MODERATE) (HCC): ICD-10-CM

## 2019-03-04 LAB
ALBUMIN SERPL-MCNC: 3.6 G/DL (ref 3.4–5)
ANION GAP SERPL CALC-SCNC: 9 MMOL/L (ref 3–18)
BUN SERPL-MCNC: 27 MG/DL (ref 7–18)
BUN/CREAT SERPL: 18 (ref 12–20)
CALCIUM SERPL-MCNC: 9.5 MG/DL (ref 8.5–10.1)
CHLORIDE SERPL-SCNC: 108 MMOL/L (ref 100–108)
CO2 SERPL-SCNC: 23 MMOL/L (ref 21–32)
CREAT SERPL-MCNC: 1.5 MG/DL (ref 0.6–1.3)
CREAT UR-MCNC: 352 MG/DL (ref 30–125)
GLUCOSE SERPL-MCNC: 86 MG/DL (ref 74–99)
MAGNESIUM SERPL-MCNC: 1.8 MG/DL (ref 1.6–2.6)
MICROALBUMIN UR-MCNC: 22.3 MG/DL (ref 0–3)
MICROALBUMIN/CREAT UR-RTO: 63 MG/G (ref 0–30)
PHOSPHATE SERPL-MCNC: 4.2 MG/DL (ref 2.5–4.9)
POTASSIUM SERPL-SCNC: 4.8 MMOL/L (ref 3.5–5.5)
SODIUM SERPL-SCNC: 140 MMOL/L (ref 136–145)

## 2019-03-04 PROCEDURE — 82043 UR ALBUMIN QUANTITATIVE: CPT

## 2019-03-04 PROCEDURE — 83735 ASSAY OF MAGNESIUM: CPT

## 2019-03-04 PROCEDURE — 80069 RENAL FUNCTION PANEL: CPT

## 2019-03-04 PROCEDURE — 36415 COLL VENOUS BLD VENIPUNCTURE: CPT

## 2019-04-22 ENCOUNTER — OFFICE VISIT (OUTPATIENT)
Dept: SURGERY | Age: 69
End: 2019-04-22

## 2019-04-22 VITALS
HEIGHT: 71 IN | RESPIRATION RATE: 18 BRPM | DIASTOLIC BLOOD PRESSURE: 62 MMHG | BODY MASS INDEX: 21 KG/M2 | WEIGHT: 150 LBS | SYSTOLIC BLOOD PRESSURE: 106 MMHG | TEMPERATURE: 97.7 F | HEART RATE: 64 BPM

## 2019-04-22 DIAGNOSIS — Z08 ENCOUNTER FOR ROUTINE CANCER FOLLOW-UP: Primary | ICD-10-CM

## 2019-04-22 DIAGNOSIS — Z85.038 PERSONAL HISTORY OF COLON CANCER: ICD-10-CM

## 2019-04-22 NOTE — PROGRESS NOTES
Subjective: Tolerating diet and stoma is functional but prolapsing significantly. Weight fluctuates. Past medical history and ROS were reviewed and unchanged. Abdomen: Soft, nontender nondistended  Ileostomy pink with significant prolapse of the proximal limb of the stoma, reduced    Assessment / Plan    Status post robotic sigmoid colectomy for stage I sigmoid cancer complicated by anastomotic leak requiring diverting ileostomy  Schedule Gastrografin enema and flexible sigmoidoscopy  Follow-up in 1 month to discuss ileostomy reversal    A total of 15 minutes was spent with the patient, with >50% of time spent on counseling and coordination of care. The diagnoses and plan were discussed with patient. All questions answered. Plan of care agreed to by all concerned.

## 2019-04-22 NOTE — PROGRESS NOTES
1. Have you been to the ER, urgent care clinic since your last visit? Hospitalized since your last visit? No    2. Have you seen or consulted any other health care providers outside of the [address] [place] [address] since your last visit? Include any pap smears or colon screening. Yes Where: Dr. Maria A Raymond, Dr. Isiah Leone, Dr. Nellie Mendoza     12/11/18 Exploratory laparotomy with primary repair of anastomotic leak, omental patch, diverting loop ileostomy. Here for f/u, appetite good although not gaining weight like the things he should be, discomfort around stoma, empties bag 6 times a day. Patient accompanied by wife. Patient has a cane for assistance walking.

## 2019-04-25 ENCOUNTER — HOSPITAL ENCOUNTER (OUTPATIENT)
Dept: GENERAL RADIOLOGY | Age: 69
Discharge: HOME | End: 2019-04-25
Attending: COLON & RECTAL SURGERY
Payer: MEDICARE

## 2019-04-25 DIAGNOSIS — Z85.038 PERSONAL HISTORY OF COLON CANCER: ICD-10-CM

## 2019-04-25 PROCEDURE — 74270 X-RAY XM COLON 1CNTRST STD: CPT

## 2019-04-25 PROCEDURE — 74011636320 HC RX REV CODE- 636/320: Performed by: COLON & RECTAL SURGERY

## 2019-04-25 RX ADMIN — DIATRIZOATE MEGLUMINE AND DIATRIZOATE SODIUM 600 ML: 660; 100 LIQUID ORAL; RECTAL at 08:20

## 2019-05-08 ENCOUNTER — HOSPITAL ENCOUNTER (OUTPATIENT)
Age: 69
Setting detail: OUTPATIENT SURGERY
Discharge: HOME | End: 2019-05-08
Attending: COLON & RECTAL SURGERY | Admitting: COLON & RECTAL SURGERY
Payer: MEDICARE

## 2019-05-08 VITALS
RESPIRATION RATE: 16 BRPM | BODY MASS INDEX: 20.62 KG/M2 | TEMPERATURE: 97.8 F | OXYGEN SATURATION: 100 % | HEART RATE: 85 BPM | DIASTOLIC BLOOD PRESSURE: 68 MMHG | HEIGHT: 70 IN | WEIGHT: 144 LBS | SYSTOLIC BLOOD PRESSURE: 107 MMHG

## 2019-05-08 PROCEDURE — 77030013995 HC SNR POLYP ENDOSC OCOA -A: Performed by: COLON & RECTAL SURGERY

## 2019-05-08 PROCEDURE — 76040000019: Performed by: COLON & RECTAL SURGERY

## 2019-05-08 PROCEDURE — 88305 TISSUE EXAM BY PATHOLOGIST: CPT

## 2019-05-08 NOTE — DISCHARGE INSTRUCTIONS
Patient Education        Sigmoidoscopy: What to Expect at [facility]    A sigmoidoscopy lets your doctor look inside the lower part of your large intestine. This is also called the colon. The doctor uses a lighted tube called a sigmoidoscope (or scope). This test let the doctor look for small growths (called polyps), cancer, bleeding, hemorrhoids, or other problems. The doctor also may have used the scope to remove polyps. Or he or she may have used it to take tissue samples that need to be tested. You shouldn't have any pain after the procedure. But it is normal to pass gas. You may have mild discomfort from having gas. If your doctor removed polyps, you will likely need to schedule a colonoscopy to look at the whole colon. This care sheet gives you a general idea about how long it will take for you to recover. But each person recovers at a different pace. Follow the steps below to get better as quickly as possible. How can you care for yourself at home? Activity    · Most people are able to return to work right away unless they have had a sedative during the procedure.     · You may need someone to drive you home if you have had a sedative. In most cases, you can drive yourself home. Diet    · You can eat your normal diet. If your stomach is upset, try bland, low-fat foods like plain rice, broiled chicken, toast, and yogurt.     · Be sure to drink plenty of liquids to replace those you have lost during the preparation for the procedure. Exercise    · You can return to normal exercise right away.    Medicine    · Your doctor will tell you if and when you can restart your medicines. He or she will also give you instructions about taking any new medicines.     · If you take blood thinners, such as warfarin (Coumadin), clopidogrel (Plavix), or aspirin, be sure to talk to your doctor. He or she will tell you if and when to start taking those medicines again.  Make sure that you understand exactly what your doctor wants you to do. Follow-up care is a key part of your treatment and safety. Be sure to make and go to all appointments, and call your doctor if you are having problems. It's also a good idea to know your test results and keep a list of the medicines you take. When should you call for help? Call your doctor now or seek immediate medical care if:    · You have new or worse belly pain.     · You have blood in your stools.    Watch closely for changes in your health, and be sure to contact your doctor if you have any problems. Where can you learn more? Go to http://ar-mariaa.info/. Enter X068 in the search box to learn more about \"Sigmoidoscopy: What to Expect at Home. \"  Current as of: March 27, 2018  Content Version: 11.9  © 7872-1726 [employer]. Care instructions adapted under license by [employer] (which disclaims liability or warranty for this information). If you have questions about a medical condition or this instruction, always ask your healthcare professional. [address] any warranty or liability for your use of this information.

## 2019-05-08 NOTE — H&P
HPI: Kristine Pascual is a 80 y.o. male presenting with chief complain of history of sigmoid colectomy for stage I sigmoid cancer. 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 Janice Leon HX COLONOSCOPY      HX GI      robotic sigmoid colectomy complicated by small anastomotic leak, status post diverting ileostomy    HX HEENT  2008    Throat Ca - 1 vocal chord removed     HX HEENT Bilateral 1970    eye surgery muscles    LAP,SURG,COLECTOMY, PARTIAL, W/ANAST N/A 12/04/2018    Dr. Harjinder Teixeira       History reviewed. No pertinent family history. Social History     Socioeconomic History    Marital status:      Spouse name: Not on file    Number of children: Not on file    Years of education: Not on file    Highest education level: Not on file   Tobacco Use    Smoking status: Former Smoker     Last attempt to quit: 11/19/2008     Years since quitting: 10.4    Smokeless tobacco: Never Used   Substance and Sexual Activity    Alcohol use: No    Drug use: No       Review of Systems - neg        No Known Allergies    There were no vitals filed for this visit. Physical Exam   Constitutional: He appears well-developed and well-nourished. HENT:   Head: Normocephalic and atraumatic.    Eyes: Conjunctivae and EOM are normal. Abdominal: Soft. He exhibits no distension and no mass. There is no tenderness. Musculoskeletal: Normal range of motion. Lymphadenopathy:     He has no cervical adenopathy. Right: No inguinal adenopathy present. Left: No inguinal adenopathy present. Neurological: He exhibits normal muscle tone. Skin: Skin is warm and dry. Psychiatric: He has a normal mood and affect. His speech is normal.       Assessment / Plan    Flexible sigmoidoscopy    The diagnoses and plan were discussed with the patient. All questions answered. Plan of care agreed to by all concerned.

## 2019-05-08 NOTE — PROCEDURES
[facility]  [address] [place] [address], [address]   Anil reddy, [address]  [phone]                    Sigmoidoscopy Procedure Note      Christopher Ellis  1950  [id number]                Date of Procedure: 5/8/2019    Preoperative diagnosis: I13,   Encounter for routine cancer follow-up  Z85.038,  Personal history of colon cancer    Postoperative diagnosis: colorecto anastomosis bx    :  Benjy Delaney MD    Assistant(s): Endoscopy Technician-1: Carlos Cary  Endoscopy RN-1: Catrachita Vieyra RN    Sedation: None    Procedure Details:  Prior to the procedure, a history and physical were performed. The patients medications, allergies and sensitivities were reviewed and all questions were answered. After informed consent was obtained for the procedure, with all risks and benefits of procedure explained. The patient was taken to the endoscopy suite and placed in the left lateral decubitus position. Patient identification and proposed procedure were verified prior to the procedure by the nurse and I. A digital rectal exam was performed and was normal.  The Olympus video colonoscope was introduced through the anus and advanced to descending colon. The quality of preparation was excellent. The colonoscope was slowly withdrawn and the mucosa examined for any abnormalities. The patient tolerated the procedure well. There were no complications. Findings/Interventions:   Polyps - #1, 5 mm in size, located and colorectal anastomosis, possibly granulation tissue, removed by cold snare and retrieved for pathology. Anastomosis widely patent with no visible defects. EBL: none    Recommendations: Return to office to schedule ileostomy reversal.    NO aspirin for 5 days.      Discharge Disposition:  Jason Rush MD  5/8/2019  4:04 PM

## 2019-06-10 ENCOUNTER — OFFICE VISIT (OUTPATIENT)
Dept: SURGERY | Age: 69
End: 2019-06-10

## 2019-06-10 ENCOUNTER — HOSPITAL ENCOUNTER (OUTPATIENT)
Dept: LAB | Age: 69
Discharge: HOME | End: 2019-06-10
Payer: MEDICARE

## 2019-06-10 VITALS
DIASTOLIC BLOOD PRESSURE: 76 MMHG | RESPIRATION RATE: 18 BRPM | HEIGHT: 71 IN | SYSTOLIC BLOOD PRESSURE: 118 MMHG | WEIGHT: 152 LBS | HEART RATE: 88 BPM | TEMPERATURE: 98 F | BODY MASS INDEX: 21.28 KG/M2 | OXYGEN SATURATION: 98 %

## 2019-06-10 DIAGNOSIS — Z85.038 PERSONAL HISTORY OF COLON CANCER: ICD-10-CM

## 2019-06-10 DIAGNOSIS — Z85.038 PERSONAL HISTORY OF COLON CANCER: Primary | ICD-10-CM

## 2019-06-10 DIAGNOSIS — Z43.2 ATTENTION TO ILEOSTOMY (HCC): ICD-10-CM

## 2019-06-10 PROCEDURE — 93005 ELECTROCARDIOGRAM TRACING: CPT

## 2019-06-10 NOTE — PROGRESS NOTES
Subjective: Tolerating diet and ileostomy is functional.  Significant prolapse. Past medical history and ROS were reviewed and unchanged. Abdomen: Soft, nontender nondistended  Ileostomy pink    Gastrografin enema negative for leak  Flexible sigmoidoscopy with intact anastomosis    Assessment / Plan    Status post robotic sigmoid colectomy [employer] by anastomotic leak, status post loop ileostomy  Reverse ileostomy    A total of 15 minutes was spent with the patient, with >50% of time spent on counseling and coordination of care. The diagnoses and plan were discussed with patient. All questions answered. Plan of care agreed to by all concerned.

## 2019-06-11 LAB
ATRIAL RATE: 84 BPM
CALCULATED P AXIS, ECG09: 66 DEGREES
CALCULATED R AXIS, ECG10: 43 DEGREES
CALCULATED T AXIS, ECG11: 53 DEGREES
DIAGNOSIS, 93000: NORMAL
P-R INTERVAL, ECG05: 172 MS
Q-T INTERVAL, ECG07: 350 MS
QRS DURATION, ECG06: 72 MS
QTC CALCULATION (BEZET), ECG08: 413 MS
VENTRICULAR RATE, ECG03: 84 BPM

## 2019-06-25 ENCOUNTER — HOSPITAL ENCOUNTER (OUTPATIENT)
Dept: PREADMISSION TESTING | Age: 69
Discharge: HOME | End: 2019-06-25
Payer: MEDICARE

## 2019-06-25 DIAGNOSIS — Z43.2 ATTENTION TO ILEOSTOMY (HCC): ICD-10-CM

## 2019-06-25 DIAGNOSIS — Z85.038 PERSONAL HISTORY OF COLON CANCER: ICD-10-CM

## 2019-06-25 LAB
ABO + RH BLD: NORMAL
ALBUMIN SERPL-MCNC: 3.9 G/DL (ref 3.4–5)
ANION GAP SERPL CALC-SCNC: 3 MMOL/L (ref 3–18)
APTT PPP: 33 SEC (ref 23–36.4)
BASOPHILS # BLD: 0 K/UL (ref 0–0.1)
BASOPHILS NFR BLD: 0 % (ref 0–2)
BLOOD GROUP ANTIBODIES SERPL: NORMAL
BUN SERPL-MCNC: 15 MG/DL (ref 7–18)
BUN/CREAT SERPL: 10 (ref 12–20)
CALCIUM SERPL-MCNC: 9.4 MG/DL (ref 8.5–10.1)
CHLORIDE SERPL-SCNC: 113 MMOL/L (ref 100–108)
CO2 SERPL-SCNC: 25 MMOL/L (ref 21–32)
CREAT SERPL-MCNC: 1.43 MG/DL (ref 0.6–1.3)
DIFFERENTIAL METHOD BLD: ABNORMAL
EOSINOPHIL # BLD: 0.2 K/UL (ref 0–0.4)
EOSINOPHIL NFR BLD: 2 % (ref 0–5)
ERYTHROCYTE [DISTWIDTH] IN BLOOD BY AUTOMATED COUNT: 15.2 % (ref 11.6–14.5)
GLUCOSE SERPL-MCNC: 76 MG/DL (ref 74–99)
HCT VFR BLD AUTO: 36.2 % (ref 36–48)
HGB BLD-MCNC: 12.1 G/DL (ref 13–16)
INR PPP: 1.2 (ref 0.8–1.2)
LYMPHOCYTES # BLD: 1.8 K/UL (ref 0.9–3.6)
LYMPHOCYTES NFR BLD: 16 % (ref 21–52)
MCH RBC QN AUTO: 29.6 PG (ref 24–34)
MCHC RBC AUTO-ENTMCNC: 33.4 G/DL (ref 31–37)
MCV RBC AUTO: 88.5 FL (ref 74–97)
MONOCYTES # BLD: 0.7 K/UL (ref 0.05–1.2)
MONOCYTES NFR BLD: 6 % (ref 3–10)
NEUTS SEG # BLD: 8.6 K/UL (ref 1.8–8)
NEUTS SEG NFR BLD: 76 % (ref 40–73)
PLATELET # BLD AUTO: 226 K/UL (ref 135–420)
PMV BLD AUTO: 9.5 FL (ref 9.2–11.8)
POTASSIUM SERPL-SCNC: 5.2 MMOL/L (ref 3.5–5.5)
PROTHROMBIN TIME: 14.5 SEC (ref 11.5–15.2)
RBC # BLD AUTO: 4.09 M/UL (ref 4.7–5.5)
SODIUM SERPL-SCNC: 141 MMOL/L (ref 136–145)
SPECIMEN EXP DATE BLD: NORMAL
WBC # BLD AUTO: 11.4 K/UL (ref 4.6–13.2)

## 2019-06-25 PROCEDURE — 82040 ASSAY OF SERUM ALBUMIN: CPT

## 2019-06-25 PROCEDURE — 85610 PROTHROMBIN TIME: CPT

## 2019-06-25 PROCEDURE — 80048 BASIC METABOLIC PNL TOTAL CA: CPT

## 2019-06-25 PROCEDURE — 36415 COLL VENOUS BLD VENIPUNCTURE: CPT

## 2019-06-25 PROCEDURE — 86900 BLOOD TYPING SEROLOGIC ABO: CPT

## 2019-06-25 PROCEDURE — 85730 THROMBOPLASTIN TIME PARTIAL: CPT

## 2019-06-25 PROCEDURE — 85025 COMPLETE CBC W/AUTO DIFF WBC: CPT

## 2019-06-25 NOTE — PERIOP NOTES
Mr Harper Wu was here today for his  PAT appointment. Health assessment was completed and instructions given regarding NPO status, medications, Hibiclens washes, and removal of all jewelry and/or body piercing. Instructed patient not to remove the red Blood Bank armband that will be placed on their arm when the Type and Screen is drawn. Opportunity was given to ask questions and all questions were answered. Understanding of instructions was verbalized.

## 2019-07-03 ENCOUNTER — ANESTHESIA EVENT (OUTPATIENT)
Dept: SURGERY | Age: 69
DRG: 331 | End: 2019-07-03
Payer: MEDICARE

## 2019-07-05 ENCOUNTER — HOSPITAL ENCOUNTER (INPATIENT)
Age: 69
LOS: 10 days | Discharge: HOME HEALTH | DRG: 331 | End: 2019-07-15
Attending: COLON & RECTAL SURGERY | Admitting: COLON & RECTAL SURGERY
Payer: MEDICARE

## 2019-07-05 ENCOUNTER — ANESTHESIA (OUTPATIENT)
Dept: SURGERY | Age: 69
DRG: 331 | End: 2019-07-05
Payer: MEDICARE

## 2019-07-05 VITALS
DIASTOLIC BLOOD PRESSURE: 89 MMHG | HEART RATE: 85 BPM | RESPIRATION RATE: 14 BRPM | OXYGEN SATURATION: 99 % | SYSTOLIC BLOOD PRESSURE: 181 MMHG

## 2019-07-05 DIAGNOSIS — Z98.890 HX OF ABDOMINAL SURGERY: Primary | ICD-10-CM

## 2019-07-05 PROBLEM — K57.92 DIVERTICULITIS: Status: ACTIVE | Noted: 2019-07-05

## 2019-07-05 LAB
GLUCOSE BLD STRIP.AUTO-MCNC: 136 MG/DL (ref 70–110)
GLUCOSE BLD STRIP.AUTO-MCNC: 148 MG/DL (ref 70–110)

## 2019-07-05 PROCEDURE — 77030019605: Performed by: COLON & RECTAL SURGERY

## 2019-07-05 PROCEDURE — 74011250636 HC RX REV CODE- 250/636: Performed by: NURSE ANESTHETIST, CERTIFIED REGISTERED

## 2019-07-05 PROCEDURE — 77030036731 HC STPLR ENDOSC J&J -F: Performed by: COLON & RECTAL SURGERY

## 2019-07-05 PROCEDURE — 65270000029 HC RM PRIVATE

## 2019-07-05 PROCEDURE — 76210000016 HC OR PH I REC 1 TO 1.5 HR: Performed by: COLON & RECTAL SURGERY

## 2019-07-05 PROCEDURE — 74011000250 HC RX REV CODE- 250: Performed by: COLON & RECTAL SURGERY

## 2019-07-05 PROCEDURE — 74011000250 HC RX REV CODE- 250

## 2019-07-05 PROCEDURE — 77030003028 HC SUT VCRL J&J -A: Performed by: COLON & RECTAL SURGERY

## 2019-07-05 PROCEDURE — 76010000149 HC OR TIME 1 TO 1.5 HR: Performed by: COLON & RECTAL SURGERY

## 2019-07-05 PROCEDURE — 74011250636 HC RX REV CODE- 250/636: Performed by: COLON & RECTAL SURGERY

## 2019-07-05 PROCEDURE — 74011250637 HC RX REV CODE- 250/637: Performed by: COLON & RECTAL SURGERY

## 2019-07-05 PROCEDURE — 77030002966 HC SUT PDS J&J -A: Performed by: COLON & RECTAL SURGERY

## 2019-07-05 PROCEDURE — 76060000033 HC ANESTHESIA 1 TO 1.5 HR: Performed by: COLON & RECTAL SURGERY

## 2019-07-05 PROCEDURE — 77030020782 HC GWN BAIR PAWS FLX 3M -B: Performed by: COLON & RECTAL SURGERY

## 2019-07-05 PROCEDURE — 88304 TISSUE EXAM BY PATHOLOGIST: CPT

## 2019-07-05 PROCEDURE — 77030011808 HC STPLR ENDOSCOPIC J&J -D: Performed by: COLON & RECTAL SURGERY

## 2019-07-05 PROCEDURE — 74011000250 HC RX REV CODE- 250: Performed by: NURSE ANESTHETIST, CERTIFIED REGISTERED

## 2019-07-05 PROCEDURE — 0DQB0ZZ REPAIR ILEUM, OPEN APPROACH: ICD-10-PCS | Performed by: COLON & RECTAL SURGERY

## 2019-07-05 PROCEDURE — 77030032490 HC SLV COMPR SCD KNE COVD -B: Performed by: COLON & RECTAL SURGERY

## 2019-07-05 PROCEDURE — 77030034850: Performed by: COLON & RECTAL SURGERY

## 2019-07-05 PROCEDURE — 77030018836 HC SOL IRR NACL ICUM -A: Performed by: COLON & RECTAL SURGERY

## 2019-07-05 PROCEDURE — 74011250636 HC RX REV CODE- 250/636

## 2019-07-05 PROCEDURE — 77030027138 HC INCENT SPIROMETER -A

## 2019-07-05 PROCEDURE — 82962 GLUCOSE BLOOD TEST: CPT

## 2019-07-05 PROCEDURE — 77030009979 HC RELD STPLR TCR J&J -C: Performed by: COLON & RECTAL SURGERY

## 2019-07-05 PROCEDURE — 77030031139 HC SUT VCRL2 J&J -A: Performed by: COLON & RECTAL SURGERY

## 2019-07-05 RX ORDER — DIPHENHYDRAMINE HYDROCHLORIDE 50 MG/ML
12.5 INJECTION, SOLUTION INTRAMUSCULAR; INTRAVENOUS
Status: DISCONTINUED | OUTPATIENT
Start: 2019-07-05 | End: 2019-07-05 | Stop reason: HOSPADM

## 2019-07-05 RX ORDER — ONDANSETRON 2 MG/ML
4 INJECTION INTRAMUSCULAR; INTRAVENOUS
Status: DISCONTINUED | OUTPATIENT
Start: 2019-07-05 | End: 2019-07-15 | Stop reason: HOSPADM

## 2019-07-05 RX ORDER — NALBUPHINE HYDROCHLORIDE 10 MG/ML
5 INJECTION, SOLUTION INTRAMUSCULAR; INTRAVENOUS; SUBCUTANEOUS
Status: DISCONTINUED | OUTPATIENT
Start: 2019-07-05 | End: 2019-07-05 | Stop reason: HOSPADM

## 2019-07-05 RX ORDER — LIDOCAINE HYDROCHLORIDE 20 MG/ML
INJECTION, SOLUTION EPIDURAL; INFILTRATION; INTRACAUDAL; PERINEURAL AS NEEDED
Status: DISCONTINUED | OUTPATIENT
Start: 2019-07-05 | End: 2019-07-05 | Stop reason: HOSPADM

## 2019-07-05 RX ORDER — PHENYLEPHRINE HCL IN 0.9% NACL 1 MG/10 ML
SYRINGE (ML) INTRAVENOUS AS NEEDED
Status: DISCONTINUED | OUTPATIENT
Start: 2019-07-05 | End: 2019-07-05 | Stop reason: HOSPADM

## 2019-07-05 RX ORDER — CEFAZOLIN SODIUM 2 G/50ML
2 SOLUTION INTRAVENOUS
Status: COMPLETED | OUTPATIENT
Start: 2019-07-05 | End: 2019-07-05

## 2019-07-05 RX ORDER — HEPARIN SODIUM 5000 [USP'U]/ML
5000 INJECTION, SOLUTION INTRAVENOUS; SUBCUTANEOUS EVERY 8 HOURS
Status: DISCONTINUED | OUTPATIENT
Start: 2019-07-06 | End: 2019-07-15 | Stop reason: HOSPADM

## 2019-07-05 RX ORDER — ONDANSETRON 2 MG/ML
INJECTION INTRAMUSCULAR; INTRAVENOUS AS NEEDED
Status: DISCONTINUED | OUTPATIENT
Start: 2019-07-05 | End: 2019-07-05 | Stop reason: HOSPADM

## 2019-07-05 RX ORDER — HYDROMORPHONE HYDROCHLORIDE 2 MG/ML
0.5 INJECTION, SOLUTION INTRAMUSCULAR; INTRAVENOUS; SUBCUTANEOUS
Status: COMPLETED | OUTPATIENT
Start: 2019-07-05 | End: 2019-07-05

## 2019-07-05 RX ORDER — METRONIDAZOLE 500 MG/100ML
500 INJECTION, SOLUTION INTRAVENOUS EVERY 8 HOURS
Status: COMPLETED | OUTPATIENT
Start: 2019-07-05 | End: 2019-07-06

## 2019-07-05 RX ORDER — SUCCINYLCHOLINE CHLORIDE 20 MG/ML
INJECTION INTRAMUSCULAR; INTRAVENOUS AS NEEDED
Status: DISCONTINUED | OUTPATIENT
Start: 2019-07-05 | End: 2019-07-05 | Stop reason: HOSPADM

## 2019-07-05 RX ORDER — PROPOFOL 10 MG/ML
INJECTION, EMULSION INTRAVENOUS AS NEEDED
Status: DISCONTINUED | OUTPATIENT
Start: 2019-07-05 | End: 2019-07-05 | Stop reason: HOSPADM

## 2019-07-05 RX ORDER — LOSARTAN POTASSIUM 50 MG/1
50 TABLET ORAL DAILY
Status: DISCONTINUED | OUTPATIENT
Start: 2019-07-06 | End: 2019-07-05 | Stop reason: ALTCHOICE

## 2019-07-05 RX ORDER — LATANOPROST 50 UG/ML
1 SOLUTION/ DROPS OPHTHALMIC
Status: DISCONTINUED | OUTPATIENT
Start: 2019-07-05 | End: 2019-07-15 | Stop reason: HOSPADM

## 2019-07-05 RX ORDER — DIPHENHYDRAMINE HYDROCHLORIDE 50 MG/ML
25 INJECTION, SOLUTION INTRAMUSCULAR; INTRAVENOUS
Status: DISCONTINUED | OUTPATIENT
Start: 2019-07-05 | End: 2019-07-15 | Stop reason: HOSPADM

## 2019-07-05 RX ORDER — SODIUM CHLORIDE, SODIUM LACTATE, POTASSIUM CHLORIDE, CALCIUM CHLORIDE 600; 310; 30; 20 MG/100ML; MG/100ML; MG/100ML; MG/100ML
75 INJECTION, SOLUTION INTRAVENOUS CONTINUOUS
Status: DISCONTINUED | OUTPATIENT
Start: 2019-07-05 | End: 2019-07-08

## 2019-07-05 RX ORDER — METRONIDAZOLE 500 MG/100ML
500 INJECTION, SOLUTION INTRAVENOUS
Status: COMPLETED | OUTPATIENT
Start: 2019-07-05 | End: 2019-07-05

## 2019-07-05 RX ORDER — METRONIDAZOLE 500 MG/100ML
500 INJECTION, SOLUTION INTRAVENOUS EVERY 8 HOURS
Status: DISCONTINUED | OUTPATIENT
Start: 2019-07-05 | End: 2019-07-05

## 2019-07-05 RX ORDER — ACETAMINOPHEN 500 MG
1000 TABLET ORAL ONCE
Status: DISCONTINUED | OUTPATIENT
Start: 2019-07-05 | End: 2019-07-05 | Stop reason: HOSPADM

## 2019-07-05 RX ORDER — MORPHINE SULFATE 2 MG/ML
2 INJECTION, SOLUTION INTRAMUSCULAR; INTRAVENOUS
Status: DISCONTINUED | OUTPATIENT
Start: 2019-07-05 | End: 2019-07-05

## 2019-07-05 RX ORDER — CEFAZOLIN SODIUM 2 G/50ML
2 SOLUTION INTRAVENOUS EVERY 8 HOURS
Status: COMPLETED | OUTPATIENT
Start: 2019-07-05 | End: 2019-07-06

## 2019-07-05 RX ORDER — MAGNESIUM SULFATE 100 %
4 CRYSTALS MISCELLANEOUS AS NEEDED
Status: DISCONTINUED | OUTPATIENT
Start: 2019-07-05 | End: 2019-07-15 | Stop reason: HOSPADM

## 2019-07-05 RX ORDER — GABAPENTIN 300 MG/1
600 CAPSULE ORAL ONCE
Status: DISCONTINUED | OUTPATIENT
Start: 2019-07-05 | End: 2019-07-05 | Stop reason: HOSPADM

## 2019-07-05 RX ORDER — NEBIVOLOL 5 MG/1
5 TABLET ORAL EVERY OTHER DAY
Status: DISCONTINUED | OUTPATIENT
Start: 2019-07-06 | End: 2019-07-15 | Stop reason: HOSPADM

## 2019-07-05 RX ORDER — DEXTROSE 50 % IN WATER (D50W) INTRAVENOUS SYRINGE
25-50 AS NEEDED
Status: DISCONTINUED | OUTPATIENT
Start: 2019-07-05 | End: 2019-07-05 | Stop reason: HOSPADM

## 2019-07-05 RX ORDER — BUPIVACAINE HYDROCHLORIDE AND EPINEPHRINE 2.5; 5 MG/ML; UG/ML
INJECTION, SOLUTION EPIDURAL; INFILTRATION; INTRACAUDAL; PERINEURAL AS NEEDED
Status: DISCONTINUED | OUTPATIENT
Start: 2019-07-05 | End: 2019-07-05 | Stop reason: HOSPADM

## 2019-07-05 RX ORDER — GABAPENTIN 300 MG/1
300 CAPSULE ORAL 3 TIMES DAILY
Status: DISCONTINUED | OUTPATIENT
Start: 2019-07-05 | End: 2019-07-15 | Stop reason: HOSPADM

## 2019-07-05 RX ORDER — FENTANYL CITRATE 50 UG/ML
INJECTION, SOLUTION INTRAMUSCULAR; INTRAVENOUS AS NEEDED
Status: DISCONTINUED | OUTPATIENT
Start: 2019-07-05 | End: 2019-07-05 | Stop reason: HOSPADM

## 2019-07-05 RX ORDER — DEXTROSE MONOHYDRATE 100 MG/ML
125-250 INJECTION, SOLUTION INTRAVENOUS AS NEEDED
Status: DISCONTINUED | OUTPATIENT
Start: 2019-07-05 | End: 2019-07-15 | Stop reason: HOSPADM

## 2019-07-05 RX ORDER — LABETALOL HYDROCHLORIDE 5 MG/ML
INJECTION, SOLUTION INTRAVENOUS AS NEEDED
Status: DISCONTINUED | OUTPATIENT
Start: 2019-07-05 | End: 2019-07-05 | Stop reason: HOSPADM

## 2019-07-05 RX ORDER — MIDAZOLAM HYDROCHLORIDE 1 MG/ML
INJECTION, SOLUTION INTRAMUSCULAR; INTRAVENOUS AS NEEDED
Status: DISCONTINUED | OUTPATIENT
Start: 2019-07-05 | End: 2019-07-05 | Stop reason: HOSPADM

## 2019-07-05 RX ORDER — SODIUM CHLORIDE 0.9 % (FLUSH) 0.9 %
5-40 SYRINGE (ML) INJECTION EVERY 8 HOURS
Status: DISCONTINUED | OUTPATIENT
Start: 2019-07-05 | End: 2019-07-05 | Stop reason: HOSPADM

## 2019-07-05 RX ORDER — MORPHINE SULFATE 2 MG/ML
2-4 INJECTION, SOLUTION INTRAMUSCULAR; INTRAVENOUS
Status: DISCONTINUED | OUTPATIENT
Start: 2019-07-05 | End: 2019-07-15 | Stop reason: HOSPADM

## 2019-07-05 RX ORDER — LIDOCAINE HYDROCHLORIDE 10 MG/ML
0.1 INJECTION, SOLUTION EPIDURAL; INFILTRATION; INTRACAUDAL; PERINEURAL AS NEEDED
Status: DISCONTINUED | OUTPATIENT
Start: 2019-07-05 | End: 2019-07-05 | Stop reason: HOSPADM

## 2019-07-05 RX ORDER — ACETAMINOPHEN 500 MG
1000 TABLET ORAL EVERY 6 HOURS
Status: DISCONTINUED | OUTPATIENT
Start: 2019-07-05 | End: 2019-07-15 | Stop reason: HOSPADM

## 2019-07-05 RX ORDER — HYDROMORPHONE HYDROCHLORIDE 2 MG/ML
INJECTION, SOLUTION INTRAMUSCULAR; INTRAVENOUS; SUBCUTANEOUS
Status: COMPLETED
Start: 2019-07-05 | End: 2019-07-05

## 2019-07-05 RX ORDER — SODIUM CHLORIDE, SODIUM LACTATE, POTASSIUM CHLORIDE, CALCIUM CHLORIDE 600; 310; 30; 20 MG/100ML; MG/100ML; MG/100ML; MG/100ML
75 INJECTION, SOLUTION INTRAVENOUS CONTINUOUS
Status: DISCONTINUED | OUTPATIENT
Start: 2019-07-05 | End: 2019-07-05 | Stop reason: HOSPADM

## 2019-07-05 RX ORDER — INSULIN LISPRO 100 [IU]/ML
INJECTION, SOLUTION INTRAVENOUS; SUBCUTANEOUS EVERY 6 HOURS
Status: DISCONTINUED | OUTPATIENT
Start: 2019-07-05 | End: 2019-07-15 | Stop reason: HOSPADM

## 2019-07-05 RX ORDER — SODIUM CHLORIDE, SODIUM LACTATE, POTASSIUM CHLORIDE, CALCIUM CHLORIDE 600; 310; 30; 20 MG/100ML; MG/100ML; MG/100ML; MG/100ML
50 INJECTION, SOLUTION INTRAVENOUS CONTINUOUS
Status: DISCONTINUED | OUTPATIENT
Start: 2019-07-05 | End: 2019-07-05 | Stop reason: HOSPADM

## 2019-07-05 RX ORDER — DEXAMETHASONE SODIUM PHOSPHATE 4 MG/ML
INJECTION, SOLUTION INTRA-ARTICULAR; INTRALESIONAL; INTRAMUSCULAR; INTRAVENOUS; SOFT TISSUE AS NEEDED
Status: DISCONTINUED | OUTPATIENT
Start: 2019-07-05 | End: 2019-07-05 | Stop reason: HOSPADM

## 2019-07-05 RX ORDER — DEXTROSE 50 % IN WATER (D50W) INTRAVENOUS SYRINGE
25-50 AS NEEDED
Status: DISCONTINUED | OUTPATIENT
Start: 2019-07-05 | End: 2019-07-05

## 2019-07-05 RX ORDER — SODIUM CHLORIDE 0.9 % (FLUSH) 0.9 %
5-40 SYRINGE (ML) INJECTION AS NEEDED
Status: DISCONTINUED | OUTPATIENT
Start: 2019-07-05 | End: 2019-07-05 | Stop reason: HOSPADM

## 2019-07-05 RX ORDER — ROCURONIUM BROMIDE 10 MG/ML
INJECTION, SOLUTION INTRAVENOUS AS NEEDED
Status: DISCONTINUED | OUTPATIENT
Start: 2019-07-05 | End: 2019-07-05 | Stop reason: HOSPADM

## 2019-07-05 RX ORDER — ONDANSETRON 2 MG/ML
4 INJECTION INTRAMUSCULAR; INTRAVENOUS ONCE
Status: DISCONTINUED | OUTPATIENT
Start: 2019-07-05 | End: 2019-07-05 | Stop reason: HOSPADM

## 2019-07-05 RX ORDER — INSULIN LISPRO 100 [IU]/ML
INJECTION, SOLUTION INTRAVENOUS; SUBCUTANEOUS ONCE
Status: DISCONTINUED | OUTPATIENT
Start: 2019-07-05 | End: 2019-07-05 | Stop reason: HOSPADM

## 2019-07-05 RX ORDER — MAGNESIUM SULFATE 100 %
4 CRYSTALS MISCELLANEOUS AS NEEDED
Status: DISCONTINUED | OUTPATIENT
Start: 2019-07-05 | End: 2019-07-05 | Stop reason: HOSPADM

## 2019-07-05 RX ADMIN — LABETALOL HYDROCHLORIDE 5 MG: 5 INJECTION, SOLUTION INTRAVENOUS at 12:00

## 2019-07-05 RX ADMIN — SODIUM CHLORIDE, SODIUM LACTATE, POTASSIUM CHLORIDE, AND CALCIUM CHLORIDE: 600; 310; 30; 20 INJECTION, SOLUTION INTRAVENOUS at 11:03

## 2019-07-05 RX ADMIN — LATANOPROST 1 DROP: 50 SOLUTION OPHTHALMIC at 21:52

## 2019-07-05 RX ADMIN — FAMOTIDINE 20 MG: 10 INJECTION INTRAVENOUS at 20:26

## 2019-07-05 RX ADMIN — MORPHINE SULFATE 4 MG: 2 INJECTION, SOLUTION INTRAMUSCULAR; INTRAVENOUS at 20:26

## 2019-07-05 RX ADMIN — ONDANSETRON 4 MG: 2 INJECTION INTRAMUSCULAR; INTRAVENOUS at 10:39

## 2019-07-05 RX ADMIN — METRONIDAZOLE 500 MG: 500 INJECTION, SOLUTION INTRAVENOUS at 14:03

## 2019-07-05 RX ADMIN — MIDAZOLAM HYDROCHLORIDE 2 MG: 1 INJECTION, SOLUTION INTRAMUSCULAR; INTRAVENOUS at 10:35

## 2019-07-05 RX ADMIN — GABAPENTIN 300 MG: 300 CAPSULE ORAL at 16:42

## 2019-07-05 RX ADMIN — CEFAZOLIN 2 G: 10 INJECTION, POWDER, FOR SOLUTION INTRAVENOUS at 10:35

## 2019-07-05 RX ADMIN — ACETAMINOPHEN 1000 MG: 500 TABLET ORAL at 20:26

## 2019-07-05 RX ADMIN — LIDOCAINE HYDROCHLORIDE 20 MG: 20 INJECTION, SOLUTION EPIDURAL; INFILTRATION; INTRACAUDAL; PERINEURAL at 10:39

## 2019-07-05 RX ADMIN — GABAPENTIN 300 MG: 300 CAPSULE ORAL at 21:52

## 2019-07-05 RX ADMIN — HYDROMORPHONE HYDROCHLORIDE 0.5 MG: 2 INJECTION INTRAMUSCULAR; INTRAVENOUS; SUBCUTANEOUS at 12:18

## 2019-07-05 RX ADMIN — METRONIDAZOLE 500 MG: 500 INJECTION, SOLUTION INTRAVENOUS at 21:52

## 2019-07-05 RX ADMIN — HYDROMORPHONE HYDROCHLORIDE 0.5 MG: 2 INJECTION, SOLUTION INTRAMUSCULAR; INTRAVENOUS; SUBCUTANEOUS at 12:42

## 2019-07-05 RX ADMIN — ROCURONIUM BROMIDE 10 MG: 10 INJECTION, SOLUTION INTRAVENOUS at 10:39

## 2019-07-05 RX ADMIN — PROPOFOL 100 MG: 10 INJECTION, EMULSION INTRAVENOUS at 10:39

## 2019-07-05 RX ADMIN — SODIUM CHLORIDE, SODIUM LACTATE, POTASSIUM CHLORIDE, AND CALCIUM CHLORIDE 75 ML/HR: 600; 310; 30; 20 INJECTION, SOLUTION INTRAVENOUS at 13:41

## 2019-07-05 RX ADMIN — SODIUM CHLORIDE, SODIUM LACTATE, POTASSIUM CHLORIDE, AND CALCIUM CHLORIDE 50 ML/HR: 600; 310; 30; 20 INJECTION, SOLUTION INTRAVENOUS at 08:38

## 2019-07-05 RX ADMIN — CEFAZOLIN 2 G: 10 INJECTION, POWDER, FOR SOLUTION INTRAVENOUS at 16:43

## 2019-07-05 RX ADMIN — DEXAMETHASONE SODIUM PHOSPHATE 4 MG: 4 INJECTION, SOLUTION INTRA-ARTICULAR; INTRALESIONAL; INTRAMUSCULAR; INTRAVENOUS; SOFT TISSUE at 10:39

## 2019-07-05 RX ADMIN — FENTANYL CITRATE 50 MCG: 50 INJECTION, SOLUTION INTRAMUSCULAR; INTRAVENOUS at 11:55

## 2019-07-05 RX ADMIN — Medication 100 MCG: at 11:13

## 2019-07-05 RX ADMIN — MORPHINE SULFATE 2 MG: 2 INJECTION, SOLUTION INTRAMUSCULAR; INTRAVENOUS at 14:00

## 2019-07-05 RX ADMIN — HYDROMORPHONE HYDROCHLORIDE 0.5 MG: 2 INJECTION, SOLUTION INTRAMUSCULAR; INTRAVENOUS; SUBCUTANEOUS at 12:55

## 2019-07-05 RX ADMIN — FENTANYL CITRATE 100 MCG: 50 INJECTION, SOLUTION INTRAMUSCULAR; INTRAVENOUS at 11:09

## 2019-07-05 RX ADMIN — HYDROMORPHONE HYDROCHLORIDE 0.5 MG: 2 INJECTION, SOLUTION INTRAMUSCULAR; INTRAVENOUS; SUBCUTANEOUS at 12:18

## 2019-07-05 RX ADMIN — ACETAMINOPHEN 1000 MG: 500 TABLET ORAL at 14:03

## 2019-07-05 RX ADMIN — ROCURONIUM BROMIDE 10 MG: 10 INJECTION, SOLUTION INTRAVENOUS at 11:09

## 2019-07-05 RX ADMIN — FENTANYL CITRATE 100 MCG: 50 INJECTION, SOLUTION INTRAMUSCULAR; INTRAVENOUS at 10:37

## 2019-07-05 RX ADMIN — METRONIDAZOLE 500 MG: 500 INJECTION, SOLUTION INTRAVENOUS at 10:53

## 2019-07-05 RX ADMIN — MORPHINE SULFATE 4 MG: 2 INJECTION, SOLUTION INTRAMUSCULAR; INTRAVENOUS at 16:42

## 2019-07-05 RX ADMIN — SUCCINYLCHOLINE CHLORIDE 100 MG: 20 INJECTION INTRAMUSCULAR; INTRAVENOUS at 10:39

## 2019-07-05 RX ADMIN — HYDROMORPHONE HYDROCHLORIDE 0.5 MG: 2 INJECTION, SOLUTION INTRAMUSCULAR; INTRAVENOUS; SUBCUTANEOUS at 12:28

## 2019-07-05 RX ADMIN — FAMOTIDINE 20 MG: 10 INJECTION INTRAVENOUS at 08:38

## 2019-07-05 NOTE — PROGRESS NOTES
Reason for Admission:  Diverticulitis [K57.92]                 RRAT Score:    14            Plan for utilizing home health:    TBD                       Likelihood of Readmission:   Moderate                         Do you (patient/family) have any concerns for transition/discharge?  no    Transition of Care Plan:       Initial assessment completed with patient. Cognitive status of patient: oriented to time, place, person and situation. Face sheet information confirmed:  yes. The patient designates wife Leticia Wilhelm to participate in his discharge plan and to receive any needed information. This patient lives in a single family home with patient and spouse. Patient is not able to navigate steps as needed. Prior to hospitalization, patient was considered to be independent with ADLs/IADLS : yes . Patient has a current ACP document on file: no  The patient and spouse will be available to transport patient home upon discharge. The patient already has [employer] available in the home. Patient is not currently active with home health. .  Patient has not stayed in a skilled nursing facility or rehab. Was  stay within last 60 days : no. This patient is on dialysis :no      List of available Home Health agencies were provided and reviewed with the patient prior to discharge. Freedom of choice signed: yes, for [facility]. Currently, the discharge plan is [address]  with [address]. The patient states that he can obtain his medications from the pharmacy, and take his medications as directed.     Patient's current insurance is [employer]      Care Management Interventions  Last Visit to PCP: 07/01/19  Mode of Transport at Discharge: Self  Transition of Care Consult (CM Consult): [address]: Yes  Current Support Network: Lives with Spouse  Confirm Follow Up Transport: Family  Plan discussed with Pt/Family/Caregiver: Yes  Freedom of Choice Offered: Yes  Discharge Location  Discharge Placement: Home with home health(home vs)        Tracie Sullivan, RN  Care Manager  [phone]

## 2019-07-05 NOTE — ANESTHESIA PREPROCEDURE EVALUATION
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 Hoarse voice.    Neuro/Psych   Within defined limits           Cardiovascular    Hypertension              Exercise tolerance: >4 METS     GI/Hepatic/Renal     GERD           Endo/Other    Diabetes    Cancer (sigmoid ca, VC ca)     Other Findings              Physical Exam    Airway  Mallampati: II  TM Distance: 4 - 6 cm  Neck ROM: normal range of motion   Mouth opening: Normal     Cardiovascular  Regular rate and rhythm,  S1 and S2 normal,  no murmur, click, rub, or gallop  Rhythm: regular  Rate: normal         Dental    Dentition: Full upper dentures and Lower partial plate     Pulmonary  Breath sounds clear to auscultation               Abdominal  GI exam deferred       Other Findings            Anesthetic Plan    ASA: 3  Anesthesia type: general          Induction: Intravenous  Anesthetic plan and risks discussed with: Patient

## 2019-07-05 NOTE — PROGRESS NOTES
PRN IV morphine given appears comfortable now.  Patient made aware of hand hygiene practices and ways to prevent spread of infection

## 2019-07-05 NOTE — PERIOP NOTES
TRANSFER - OUT REPORT:    Verbal report given to Michelle SHARPE(name) on Cleophus Cabot  being transferred to [address] [place] [address](unit) for routine post - op       Report consisted of patients Situation, Background, Assessment and   Recommendations(SBAR). Information from the following report(s) SBAR, OR Summary, Procedure Summary, Intake/Output and MAR was reviewed with the receiving nurse. Lines:   Peripheral IV 07/05/19 Right Hand (Active)   Site Assessment Clean, dry, & intact 7/5/2019 12:05 PM   Phlebitis Assessment 0 7/5/2019 12:05 PM   Infiltration Assessment 0 7/5/2019 12:05 PM   Dressing Status Clean, dry, & intact 7/5/2019 12:05 PM   Hub Color/Line Status Pink;Capped 7/5/2019 12:05 PM   Alcohol Cap Used No 7/5/2019  8:40 AM        Opportunity for questions and clarification was provided.       Patient transported with:   tydy

## 2019-07-05 NOTE — H&P
HPI: Myrna Jules is a 80 y.o. male presenting with chief complain of history of colectomy with diverting ileostomy. 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     Chest pain, unspecified     Diabetes ([address]     under control, weight controlled    Essential hypertension, benign     no more meds    GERD (gastroesophageal reflux disease)     Nonspecific abnormal electrocardiogram (ECG) (EKG)        Past Surgical History:   Procedure Laterality Date    COLONOSCOPY N/A 11/12/2018    COLONOSCOPY with Polypectomies, Bx's, Injection, Tattooing & Clip Placement x 3 performed by Sher[facility]le Lek, [facility] at [facility]  11/12/2018         [facility]  [facility]         ENDOSCOPIC MUCOSAL RESECT  11/12/2018         EXPLORATORY OF ABDOMEN N/A 12/11/2018    Dr. Carola Edwards N/A 5/8/2019    SIGMOIDOSCOPY FLEXIBLE with polypectomy performed by Pro Brown MD at [facility] ENDOSCOPY    HX COLONOSCOPY      HX GI      robotic sigmoid colectomy complicated by small anastomotic leak, status post diverting ileostomy    HX HEENT  2008    Throat Ca - 1 vocal chord removed     HX HEENT Bilateral 1970    eye surgery muscles    HX TRACHEOSTOMY  2008    LAP,SURG,COLECTOMY, PARTIAL, W/ANAST N/A 12/04/2018    Dr. Marian Davidson       History reviewed. No pertinent family history.     Social History     Socioeconomic History    Marital status:      Spouse name: Not on file    Number of children: Not on file    Years of education: Not on file    Highest education level: Not on file   Tobacco Use    Smoking status: Former Smoker     Last attempt to quit: 11/19/2008     Years since quitting: 10.6    Smokeless tobacco: Never Used   Substance and Sexual Activity    Alcohol use: No    Drug use: No       Review of Systems - neg    Outpatient Medications Marked as Taking for the 7/5/19 encounter [facility] HOSPITAL Encounter)   Medication Sig Dispense Refill    sodium bicarbonate 650 mg tablet Take 650 mg by mouth two (2) times a day.  loperamide (IMODIUM) 2 mg capsule Take 2 mg by mouth four (4) times daily. 3 times daily      oxyCODONE-acetaminophen (PERCOCET) 5-325 mg per tablet Take 1-2 Tabs by mouth every four (4) hours as needed. Max Daily Amount: 12 Tabs. 40 Tab 0    multivitamin, tx-iron-ca-min (THERA-M W/ IRON) 9 mg iron-400 mcg tab tablet Take 1 Tab by mouth daily.  cyclobenzaprine (FLEXERIL) 5 mg tablet Take 1 Tab by mouth three (3) times daily as needed for Muscle Spasm(s). (Patient taking differently: Take 5 mg by mouth three (3) times daily as needed for Muscle Spasm(s) (as needed). ) 20 Tab 0    latanoprost (XALATAN) 0.005 % ophthalmic solution Administer 1 Drop to both eyes nightly.  beclomethasone dipropionate (QNASL) 80 mcg/actuation HFAA 80 mcg by Nasal route daily. Directions on at home label are as follows: Use 2 sprays in each Nostril Daily      nebivolol (BYSTOLIC) 10 mg tablet Take 10 mg by mouth daily. 1/2 tab every other day  Indications: Sinus Tachycardia      omeprazole (PRILOSEC) 20 mg capsule Take 20 mg by mouth daily.  Dexlansoprazole 60 mg CpDB Take 1 Cap by mouth daily.  calcium-cholecalciferol, d3, 600-125 mg-unit tab Take 1 Tab by mouth daily.  omega-3 fatty acids-vitamin e 1,000 mg cap Take 1 Cap by mouth daily.  IRON, FERROUS SULFATE, PO Take  by mouth. 3 times a week         No Known Allergies    Vitals:    06/25/19 1409 07/05/19 0834   BP:  108/71   Pulse:  81   Resp:  20   Temp:  97.4 °F (36.3 °C)   SpO2:  100%   Weight: 68.9 kg (152 lb) 67.2 kg (148 lb 3.2 oz)   Height: 5' 10\" (1.778 m) 5' 10\" (1.778 m)       Physical Exam   Constitutional: He appears well-developed and well-nourished. HENT:   Head: Normocephalic and atraumatic. Eyes: Conjunctivae and EOM are normal.   Abdominal: Soft. He exhibits no distension and no mass.  There is no tenderness. Musculoskeletal: Normal range of motion. Lymphadenopathy:     He has no cervical adenopathy. Right: No inguinal adenopathy present. Left: No inguinal adenopathy present. Neurological: No sensory deficit. Skin: Skin is warm and dry. Psychiatric: He has a normal mood and affect. His speech is normal.       Assessment / Plan    Ileostomy reversay    The diagnoses and plan were discussed with the patient. All questions answered. Plan of care agreed to by all concerned.

## 2019-07-05 NOTE — BRIEF OP NOTE
BRIEF OPERATIVE NOTE    Date of Procedure: 7/5/2019   Preoperative Diagnosis: Z85.038 PERSONAL HISTORY OF COLON CANCER  Z43.2 ATTENTION TO ILEOSTOMY  Postoperative Diagnosis: Z85.038 PERSONAL HISTORY OF COLON CANCER  Z43.2 ATTENTION TO ILEOSTOMY    Procedure(s):  LOOP ILEOSTOMY REVERSAL  Surgeon(s) and Role:     Ed Ortiz MD - Primary         Surgical Assistant: none    Surgical Staff:  Circ-1: Zenaida Butler RN  Circ-2: Eric Garcia RN  Circ-Relief: Lisa Moya RN  Scrub Tech-1: [place]  Surg Asst-1: Carlos Kent  Event Time In Time Out   Incision Start 1105    Incision Close 1155      Anesthesia: General   Estimated Blood Loss: 10 ml  Specimens:   ID Type Source Tests Collected by Time Destination   1 : ileostomy Preservative Intestine  Pro Brown MD 7/5/2019 1127 Pathology      Findings: ileostomy   Complications: none  Implants: * No implants in log *     [id number]

## 2019-07-05 NOTE — PROGRESS NOTES
1335 arrived on unit from PACU    1423 Patient states he does not take Losartan. PTA home med list reflects this. Losartan was ordered.  SBPs in 150s at this time

## 2019-07-05 NOTE — ANESTHESIA POSTPROCEDURE EVALUATION
Procedure(s):  LOOP ILEOSTOMY REVERSAL. general    Anesthesia Post Evaluation      Multimodal analgesia: multimodal analgesia used between 6 hours prior to anesthesia start to PACU discharge  Patient location during evaluation: bedside  Patient participation: complete - patient participated  Level of consciousness: awake  Pain management: adequate  Airway patency: patent  Anesthetic complications: no  Cardiovascular status: stable  Respiratory status: acceptable  Hydration status: acceptable  Post anesthesia nausea and vomiting:  controlled      Vitals Value Taken Time   /74 7/5/2019  1:15 PM   Temp 36.5 °C (97.7 °F) 7/5/2019 12:49 PM   Pulse 89 7/5/2019  1:19 PM   Resp 12 7/5/2019  1:19 PM   SpO2 97 % 7/5/2019  1:19 PM   Vitals shown include unvalidated device data.

## 2019-07-06 LAB
ANION GAP SERPL CALC-SCNC: 9 MMOL/L (ref 3–18)
BUN SERPL-MCNC: 17 MG/DL (ref 7–18)
BUN/CREAT SERPL: 13 (ref 12–20)
CALCIUM SERPL-MCNC: 8.6 MG/DL (ref 8.5–10.1)
CHLORIDE SERPL-SCNC: 106 MMOL/L (ref 100–108)
CO2 SERPL-SCNC: 21 MMOL/L (ref 21–32)
CREAT SERPL-MCNC: 1.27 MG/DL (ref 0.6–1.3)
ERYTHROCYTE [DISTWIDTH] IN BLOOD BY AUTOMATED COUNT: 13.7 % (ref 11.6–14.5)
GLUCOSE BLD STRIP.AUTO-MCNC: 114 MG/DL (ref 70–110)
GLUCOSE BLD STRIP.AUTO-MCNC: 117 MG/DL (ref 70–110)
GLUCOSE BLD STRIP.AUTO-MCNC: 125 MG/DL (ref 70–110)
GLUCOSE BLD STRIP.AUTO-MCNC: 95 MG/DL (ref 70–110)
GLUCOSE BLD STRIP.AUTO-MCNC: 98 MG/DL (ref 70–110)
GLUCOSE SERPL-MCNC: 107 MG/DL (ref 74–99)
HCT VFR BLD AUTO: 34.6 % (ref 36–48)
HGB BLD-MCNC: 11.5 G/DL (ref 13–16)
MAGNESIUM SERPL-MCNC: 1.7 MG/DL (ref 1.6–2.6)
MCH RBC QN AUTO: 28.5 PG (ref 24–34)
MCHC RBC AUTO-ENTMCNC: 33.2 G/DL (ref 31–37)
MCV RBC AUTO: 85.9 FL (ref 74–97)
PHOSPHATE SERPL-MCNC: 3.7 MG/DL (ref 2.5–4.9)
PLATELET # BLD AUTO: 295 K/UL (ref 135–420)
PMV BLD AUTO: 9.1 FL (ref 9.2–11.8)
POTASSIUM SERPL-SCNC: 4.2 MMOL/L (ref 3.5–5.5)
RBC # BLD AUTO: 4.03 M/UL (ref 4.7–5.5)
SODIUM SERPL-SCNC: 136 MMOL/L (ref 136–145)
WBC # BLD AUTO: 17.5 K/UL (ref 4.6–13.2)

## 2019-07-06 PROCEDURE — 80048 BASIC METABOLIC PNL TOTAL CA: CPT

## 2019-07-06 PROCEDURE — 65270000029 HC RM PRIVATE

## 2019-07-06 PROCEDURE — 82962 GLUCOSE BLOOD TEST: CPT

## 2019-07-06 PROCEDURE — 74011000250 HC RX REV CODE- 250: Performed by: COLON & RECTAL SURGERY

## 2019-07-06 PROCEDURE — 85027 COMPLETE CBC AUTOMATED: CPT

## 2019-07-06 PROCEDURE — 83735 ASSAY OF MAGNESIUM: CPT

## 2019-07-06 PROCEDURE — 74011250637 HC RX REV CODE- 250/637: Performed by: COLON & RECTAL SURGERY

## 2019-07-06 PROCEDURE — 74011250636 HC RX REV CODE- 250/636: Performed by: COLON & RECTAL SURGERY

## 2019-07-06 PROCEDURE — 36415 COLL VENOUS BLD VENIPUNCTURE: CPT

## 2019-07-06 PROCEDURE — 84100 ASSAY OF PHOSPHORUS: CPT

## 2019-07-06 RX ADMIN — ACETAMINOPHEN 1000 MG: 500 TABLET ORAL at 01:00

## 2019-07-06 RX ADMIN — MORPHINE SULFATE 4 MG: 2 INJECTION, SOLUTION INTRAMUSCULAR; INTRAVENOUS at 17:11

## 2019-07-06 RX ADMIN — MORPHINE SULFATE 2 MG: 2 INJECTION, SOLUTION INTRAMUSCULAR; INTRAVENOUS at 11:20

## 2019-07-06 RX ADMIN — HEPARIN SODIUM 5000 UNITS: 5000 INJECTION INTRAVENOUS; SUBCUTANEOUS at 09:53

## 2019-07-06 RX ADMIN — LATANOPROST 1 DROP: 50 SOLUTION OPHTHALMIC at 20:38

## 2019-07-06 RX ADMIN — MORPHINE SULFATE 4 MG: 2 INJECTION, SOLUTION INTRAMUSCULAR; INTRAVENOUS at 20:54

## 2019-07-06 RX ADMIN — HEPARIN SODIUM 5000 UNITS: 5000 INJECTION INTRAVENOUS; SUBCUTANEOUS at 17:12

## 2019-07-06 RX ADMIN — CEFAZOLIN 2 G: 10 INJECTION, POWDER, FOR SOLUTION INTRAVENOUS at 10:47

## 2019-07-06 RX ADMIN — ACETAMINOPHEN 1000 MG: 500 TABLET ORAL at 17:11

## 2019-07-06 RX ADMIN — HEPARIN SODIUM 5000 UNITS: 5000 INJECTION INTRAVENOUS; SUBCUTANEOUS at 23:34

## 2019-07-06 RX ADMIN — GABAPENTIN 300 MG: 300 CAPSULE ORAL at 17:12

## 2019-07-06 RX ADMIN — METRONIDAZOLE 500 MG: 500 INJECTION, SOLUTION INTRAVENOUS at 06:19

## 2019-07-06 RX ADMIN — ONDANSETRON 4 MG: 2 INJECTION INTRAMUSCULAR; INTRAVENOUS at 02:53

## 2019-07-06 RX ADMIN — MORPHINE SULFATE 4 MG: 2 INJECTION, SOLUTION INTRAMUSCULAR; INTRAVENOUS at 06:56

## 2019-07-06 RX ADMIN — NEBIVOLOL HYDROCHLORIDE 5 MG: 5 TABLET ORAL at 09:54

## 2019-07-06 RX ADMIN — ACETAMINOPHEN 1000 MG: 500 TABLET ORAL at 09:54

## 2019-07-06 RX ADMIN — GABAPENTIN 300 MG: 300 CAPSULE ORAL at 09:54

## 2019-07-06 RX ADMIN — FAMOTIDINE 20 MG: 10 INJECTION INTRAVENOUS at 09:53

## 2019-07-06 RX ADMIN — CEFAZOLIN 2 G: 10 INJECTION, POWDER, FOR SOLUTION INTRAVENOUS at 01:00

## 2019-07-06 RX ADMIN — GABAPENTIN 300 MG: 300 CAPSULE ORAL at 22:18

## 2019-07-06 RX ADMIN — ACETAMINOPHEN 1000 MG: 500 TABLET ORAL at 20:36

## 2019-07-06 RX ADMIN — FAMOTIDINE 20 MG: 10 INJECTION INTRAVENOUS at 20:38

## 2019-07-06 NOTE — OP NOTES
Addended by: ESTEFANÍA HICKS on: 5/9/2019 07:58 AM     Modules accepted: Orders     [facility]  OPERATIVE REPORT    Name:  Viral Lynn  MR#:   [id number]  :  1950  ACCOUNT #:  [de-identified]  DATE OF SERVICE:  2019    PREOPERATIVE DIAGNOSIS:  History of sigmoid colon cancer. POSTOPERATIVE DIAGNOSIS:  History of sigmoid colon cancer. PROCEDURE PERFORMED:  Loop ileostomy reversal.    SURGEON:  Tulio Martin MD.    ASSISTANT:  None. ANESTHESIA:  General.    COMPLICATIONS:  None. SPECIMENS REMOVED:  Ileostomy to pathology. IMPLANTS:  None. ESTIMATED BLOOD LOSS:  10 mL. FINDINGS:  Ileostomy. INDICATIONS:  The patient is a 50-year-old male who underwent laparoscopic sigmoid colectomy for a stage I sigmoid colon cancer. He developed a small anastomotic leak postoperatively. He was brought to the operating room for primary repair and diverting ileostomy, now presents for ileostomy reversal.  I explained the risks of procedure including bleeding, infection, injury to surrounding structures, and death. He understood and wished to proceed. PROCEDURE:  The patient was properly identified in the holding area and brought to the operating room. He was laid supine on the operating table. General anesthesia was administered. Mckeon catheter was placed. The abdomen was prepped. The ileostomy site was suture closed and the abdomen was prepped and draped in the usual sterile fashion. We created an incision around the ileostomy site with a cautery, carried this down through subcutaneous tissues and circumferentially dissected out the ileostomy. We stapled across the bowel proximally and distally in the areas of healthy intestine. Of note, the area just proximal to the ileostomy was slightly edematous from a prolapse. We avoided this tissue and we were able to find a healthy tissue more proximally. Once we stapled across both of these, we took the mesentery between clamps and tying off mesentery with 0 Vicryl suture.   Specimen was passed off the field. We then created standard side-to-side functional end-to-end anastomosis with the 2 remaining limbs with a 75 mm blue load JAMES stapler. Resulting common defect was closed with a TX 60 linear stapler. The TX staple line and the anterior anastomotic lines were oversewn with 0 Vicryl suture, 2 crotch stitches were placed and the mesenteric rent was closed with a 3-0 Vicryl suture. The anastomosis was delivered back into the abdominal cavity. The hernia sac from the soft tissue was excised. The fascia was closed with running #1 PDS suture. Subcutaneous tissues were irrigated. The skin incision was bisected with a #1 Prolene suture and the wound packed with half-strength Betadine-soaked gauze. Dry sterile dressings were applied. The patient tolerated the procedure well. All instrument, sponge, and needle counts were correct at the end of the case x2. The patient awoke from anesthesia, was extubated, and transported to the PACU in stable condition.       Nkiunj Magdaleno MD      [id number]  D:  07/05/2019 12:02  T:  07/05/2019 20:13  JOB #:  [id number]

## 2019-07-06 NOTE — ROUTINE PROCESS
Bedside shift change report given to Irene Castillo (oncoming nurse) by Luis Armando Watkins (offgoing nurse). Report included the following information SBAR, Kardex, Intake/Output, MAR and Recent Results.

## 2019-07-06 NOTE — PROGRESS NOTES
Yifan Haider M.D. FACS  PROGRESS NOTE    Name: Clemencia Turk MRN: [id number]   : 1950 [facility]   Date: 2019 Admission Date: 2019  7:26 AM     Hospital Day: 2  1 Day Post-Op  Subjective:  Pt without acute o/n events. Emesis at 0900hrs. Objective:  Vitals:    19 2332 19 0400 19 0626 19 1200   BP: 147/85 148/88 160/90 110/71   Pulse: 92 89 99 81   Resp:    Temp: 98.5 °F (36.9 °C) 98 °F (36.7 °C) 97.9 °F (36.6 °C) 97.7 °F (36.5 °C)   SpO2: 98% 100% 100% 96%   Weight:       Height:         Date 19 - 19 0659 19 - 19 0659   Shift 6530-1603 2135-4471 24 Hour Total 8373-0195 6235-7875 24 Hour Total   INTAKE   P.O. 0 0 0        P. O. 0 0 0      I. V.(mL/kg/hr) 2182.5(2.7) 875(1.1) 3057.5(1.9)        Volume (lactated Ringers infusion) 382.5 875 1257.5        Volume (lactated Ringers infusion) 1500  1500        Volume (ceFAZolin (ANCEF) 2g IVPB in 50 mL D5W) 50  50        Volume (metroNIDAZOLE (FLAGYL) IVPB premix 500 mg) 100  100        Volume (ceFAZolin (ANCEF) 2g IVPB in 50 mL D5W) 50  50        Volume (famotidine (PF) (PEPCID) 20 mg in 0.9% sodium chloride 10 mL IV SYRINGE) 0  0        Volume (metroNIDAZOLE (FLAGYL) IVPB premix 500 mg) 0  0        Volume (metroNIDAZOLE (FLAGYL) IVPB premix 500 mg) 100  100      Shift Total(mL/kg) 2182. 5(32.5) [id number]) 3057. 5(45.5)      OUTPUT   Urine(mL/kg/hr) 500(0.6) 1300(1.6) 1800(1.1)        Urine Output (mL) (Urinary Catheter 19 2- way; [place]) 500 1300 1800      Emesis/NG output           Emesis Occurrence(s) 0 x  0 x      Stool           Stool Occurrence(s) 0 x  0 x      Shift Total(mL/kg) 500(7.4) 1300(19.3) 1800(26.8)      NET 1682.5 -425 1257.5      Weight (kg) 67.2 67.2 67.2 67.2 67.2 67.2     Physical Exam:    General: A&A, NAD, Ox4   Abdomen: abdomen is soft with incisional tenderness. Incision(s) are C/D/I.   No masses, organomegaly or [facility]    Labs:  Recent Results (from the past 24 hour(s))   GLUCOSE, POC    Collection Time: 07/05/19  4:52 PM   Result Value Ref Range    Glucose (POC) 136 (H) 70 - 110 mg/dL   GLUCOSE, POC    Collection Time: 07/06/19 12:55 AM   Result Value Ref Range    Glucose (POC) 125 (H) 70 - 928 mg/dL   METABOLIC PANEL, BASIC    Collection Time: 07/06/19  5:40 AM   Result Value Ref Range    Sodium 136 136 - 145 mmol/L    Potassium 4.2 3.5 - 5.5 mmol/L    Chloride 106 100 - 108 mmol/L    CO2 21 21 - 32 mmol/L    Anion gap 9 3.0 - 18 mmol/L    Glucose 107 (H) 74 - 99 mg/dL    BUN 17 7.0 - 18 MG/DL    Creatinine 1.27 0.6 - 1.3 MG/DL    BUN/Creatinine ratio 13 12 - 20      GFR est AA >60 >60 ml/min/1.73m2    GFR est non-AA 57 (L) >60 ml/min/1.73m2    Calcium 8.6 8.5 - 10.1 MG/DL   CBC W/O DIFF    Collection Time: 07/06/19  5:40 AM   Result Value Ref Range    WBC 17.5 (H) 4.6 - 13.2 K/uL    RBC 4.03 (L) 4.70 - 5.50 M/uL    HGB 11.5 (L) 13.0 - 16.0 g/dL    HCT 34.6 (L) 36.0 - 48.0 %    MCV 85.9 74.0 - 97.0 FL    MCH 28.5 24.0 - 34.0 PG    MCHC 33.2 31.0 - 37.0 g/dL    RDW 13.7 11.6 - 14.5 %    PLATELET 761 215 - 498 K/uL    MPV 9.1 (L) 9.2 - 11.8 FL   MAGNESIUM    Collection Time: 07/06/19  5:40 AM   Result Value Ref Range    Magnesium 1.7 1.6 - 2.6 mg/dL   PHOSPHORUS    Collection Time: 07/06/19  5:40 AM   Result Value Ref Range    Phosphorus 3.7 2.5 - 4.9 MG/DL   GLUCOSE, POC    Collection Time: 07/06/19  6:19 AM   Result Value Ref Range    Glucose (POC) 114 (H) 70 - 110 mg/dL   GLUCOSE, POC    Collection Time: 07/06/19 12:07 PM   Result Value Ref Range    Glucose (POC) 117 (H) 70 - 110 mg/dL     All Micro Results     None        Current Medications:  Current Facility-Administered Medications   Medication Dose Route Frequency Provider Last Rate Last Dose    diphenhydrAMINE (BENADRYL) injection 25 mg  25 mg IntraVENous Q6H PRN Iva Habermann, MD        heparin (porcine) injection 5,000 Units  5,000 Units SubCUTAneous Q8H Filipe [place] MD   5,000 Units at 07/06/19 9078    lactated Ringers infusion  75 mL/hr IntraVENous CONTINUOUS Filipe [place] MD 75 mL/hr at 07/05/19 1341 75 mL/hr at 07/05/19 1341    gabapentin (NEURONTIN) capsule 300 mg  300 mg Oral TID Filipe [place] MD   300 mg at 07/06/19 0954    acetaminophen (TYLENOL) tablet 1,000 mg  1,000 mg Oral Q6H Filipe [place] MD   1,000 mg at 07/06/19 0954    ondansetron (ZOFRAN) injection 4 mg  4 mg IntraVENous Q6H PRN Filipe [place] MD   4 mg at 07/06/19 0253    latanoprost (XALATAN) 0.005 % ophthalmic solution 1 Drop  1 Drop Both Eyes QHS Filipe [place] MD   1 Drop at 07/05/19 2152    nebivolol (BYSTOLIC) tablet 5 mg  5 mg Oral EVERY OTHER DAY Filipe [place] MD   5 mg at 07/06/19 6594    insulin lispro (HUMALOG) injection   SubCUTAneous Q6H Filipe [place] MD   Stopped at 07/05/19 1652    glucose chewable tablet 16 g  4 Tab Oral PRN Filipe [place] MD        glucagon [facility]) injection 1 mg  1 mg IntraMUSCular PRN Filipe [place] MD        famotidine (PF) (PEPCID) 20 mg in sodium chloride 0.9% 10 mL injection  20 mg IntraVENous Q12H Filipe [place] MD   20 mg at 07/06/19 0953    dextrose 10% infusion 125-250 mL  125-250 mL IntraVENous PRN Filipe [place] MD        morphine injection 2-4 mg  2-4 mg IntraVENous Q3H PRN Filipe [place] MD   2 mg at 07/06/19 1120     IMPRESSION:   · Pt is POD 1 from ileostomy reversal.       PLAN:/DISCUSION:   · Clear liquids  · OOB to chair, IS  · Renetta Husain MD

## 2019-07-06 NOTE — PROGRESS NOTES
Bedside and verbal report given to Fiorella Rasheed RN, with use of kardex. Rounded on pt Q1 hour throughout shift, no s/s distress nor discomfort noted, call bell in reach.

## 2019-07-07 LAB
ANION GAP SERPL CALC-SCNC: 7 MMOL/L (ref 3–18)
BUN SERPL-MCNC: 16 MG/DL (ref 7–18)
BUN/CREAT SERPL: 13 (ref 12–20)
CALCIUM SERPL-MCNC: 8.8 MG/DL (ref 8.5–10.1)
CHLORIDE SERPL-SCNC: 108 MMOL/L (ref 100–108)
CO2 SERPL-SCNC: 24 MMOL/L (ref 21–32)
CREAT SERPL-MCNC: 1.2 MG/DL (ref 0.6–1.3)
ERYTHROCYTE [DISTWIDTH] IN BLOOD BY AUTOMATED COUNT: 13.8 % (ref 11.6–14.5)
GLUCOSE BLD STRIP.AUTO-MCNC: 121 MG/DL (ref 70–110)
GLUCOSE BLD STRIP.AUTO-MCNC: 85 MG/DL (ref 70–110)
GLUCOSE BLD STRIP.AUTO-MCNC: 96 MG/DL (ref 70–110)
GLUCOSE SERPL-MCNC: 88 MG/DL (ref 74–99)
HCT VFR BLD AUTO: 34 % (ref 36–48)
HGB BLD-MCNC: 11.2 G/DL (ref 13–16)
MAGNESIUM SERPL-MCNC: 1.7 MG/DL (ref 1.6–2.6)
MCH RBC QN AUTO: 28.6 PG (ref 24–34)
MCHC RBC AUTO-ENTMCNC: 32.9 G/DL (ref 31–37)
MCV RBC AUTO: 86.7 FL (ref 74–97)
PHOSPHATE SERPL-MCNC: 3.1 MG/DL (ref 2.5–4.9)
PLATELET # BLD AUTO: 261 K/UL (ref 135–420)
PMV BLD AUTO: 9 FL (ref 9.2–11.8)
POTASSIUM SERPL-SCNC: 4.4 MMOL/L (ref 3.5–5.5)
RBC # BLD AUTO: 3.92 M/UL (ref 4.7–5.5)
SODIUM SERPL-SCNC: 139 MMOL/L (ref 136–145)
WBC # BLD AUTO: 10.8 K/UL (ref 4.6–13.2)

## 2019-07-07 PROCEDURE — 80048 BASIC METABOLIC PNL TOTAL CA: CPT

## 2019-07-07 PROCEDURE — 83735 ASSAY OF MAGNESIUM: CPT

## 2019-07-07 PROCEDURE — 85027 COMPLETE CBC AUTOMATED: CPT

## 2019-07-07 PROCEDURE — 74011250637 HC RX REV CODE- 250/637: Performed by: COLON & RECTAL SURGERY

## 2019-07-07 PROCEDURE — 84100 ASSAY OF PHOSPHORUS: CPT

## 2019-07-07 PROCEDURE — 74011000250 HC RX REV CODE- 250: Performed by: COLON & RECTAL SURGERY

## 2019-07-07 PROCEDURE — 36415 COLL VENOUS BLD VENIPUNCTURE: CPT

## 2019-07-07 PROCEDURE — 65270000029 HC RM PRIVATE

## 2019-07-07 PROCEDURE — 74011250636 HC RX REV CODE- 250/636: Performed by: COLON & RECTAL SURGERY

## 2019-07-07 PROCEDURE — 82962 GLUCOSE BLOOD TEST: CPT

## 2019-07-07 RX ADMIN — GABAPENTIN 300 MG: 300 CAPSULE ORAL at 21:55

## 2019-07-07 RX ADMIN — SODIUM CHLORIDE, SODIUM LACTATE, POTASSIUM CHLORIDE, AND CALCIUM CHLORIDE 75 ML/HR: 600; 310; 30; 20 INJECTION, SOLUTION INTRAVENOUS at 14:24

## 2019-07-07 RX ADMIN — MORPHINE SULFATE 4 MG: 2 INJECTION, SOLUTION INTRAMUSCULAR; INTRAVENOUS at 17:23

## 2019-07-07 RX ADMIN — ACETAMINOPHEN 1000 MG: 500 TABLET ORAL at 02:17

## 2019-07-07 RX ADMIN — LATANOPROST 1 DROP: 50 SOLUTION OPHTHALMIC at 21:55

## 2019-07-07 RX ADMIN — MORPHINE SULFATE 4 MG: 2 INJECTION, SOLUTION INTRAMUSCULAR; INTRAVENOUS at 02:16

## 2019-07-07 RX ADMIN — MORPHINE SULFATE 4 MG: 2 INJECTION, SOLUTION INTRAMUSCULAR; INTRAVENOUS at 21:45

## 2019-07-07 RX ADMIN — GABAPENTIN 300 MG: 300 CAPSULE ORAL at 08:57

## 2019-07-07 RX ADMIN — FAMOTIDINE 20 MG: 10 INJECTION INTRAVENOUS at 21:41

## 2019-07-07 RX ADMIN — HEPARIN SODIUM 5000 UNITS: 5000 INJECTION INTRAVENOUS; SUBCUTANEOUS at 17:40

## 2019-07-07 RX ADMIN — FAMOTIDINE 20 MG: 10 INJECTION INTRAVENOUS at 08:57

## 2019-07-07 RX ADMIN — GABAPENTIN 300 MG: 300 CAPSULE ORAL at 17:23

## 2019-07-07 RX ADMIN — HEPARIN SODIUM 5000 UNITS: 5000 INJECTION INTRAVENOUS; SUBCUTANEOUS at 08:55

## 2019-07-07 RX ADMIN — MORPHINE SULFATE 4 MG: 2 INJECTION, SOLUTION INTRAMUSCULAR; INTRAVENOUS at 09:08

## 2019-07-07 NOTE — ROUTINE PROCESS
Bedside and Verbal shift change report given to [address] [place] [address] (oncoming nurse) by Michelle Avina RN (offgoing nurse). Report included the following information SBAR, Kardex, Intake/Output, MAR and Recent Results.

## 2019-07-07 NOTE — PROGRESS NOTES
Patient's paez discontinued and tolerated removal well. Patient ambulated to bathroom with cane and had a large brown/dark green loose bowel movement from rectum with no evidence of blood. Patient performed self hygiene care, and now sitting in the chair. Will continue to encourage patient to ambulate in room and hallway.

## 2019-07-07 NOTE — PROGRESS NOTES
conducted an initial consultation and Spiritual Assessment for Justino Ramos, who is a 80 y.o.,male. Patients Primary Language is: [place]. According to the patients EMR [employer] Affiliation is: [facility].     The reason the Patient came to the hospital is:   Patient Active Problem List    Diagnosis Date Noted    Diverticulitis 07/05/2019    Colon cancer ([facility] [address] 12/04/2018    Colon polyps 11/12/2018    Hemorrhoids 11/12/2018    Debility 11/05/2018    Leukocytosis 11/05/2018    Intractable low back pain 11/05/2018        The  provided the following Interventions:  Initiated a relationship of care and support. Explored issues of mina, belief, spirituality and Zoroastrianism/ritual needs while hospitalized. Listened empathically. Provided chaplaincy education. Provided information about Spiritual Care Services. Offered prayer and assurance of continued prayers on patient's behalf. Chart reviewed. The following outcomes where achieved:  Patient shared limited information about both their medical narrative and spiritual journey/beliefs. Patient processed feeling about current hospitalization. Patient expressed gratitude for 's visit. Assessment:  Patient does not have any Zoroastrianism/cultural needs that will affect patients preferences in health care. There are no spiritual or Zoroastrianism issues which require intervention at this time. Plan:  Chaplains will continue to follow and will provide pastoral care on an as needed/requested basis.  recommends bedside caregivers page  on duty if patient shows signs of acute spiritual or emotional distress.         [address] [facility].   [phone]

## 2019-07-07 NOTE — PROGRESS NOTES
Problem: General Infection Care Plan (Adult and Pediatric)  Goal: Improvement in signs and symptoms of infection  Outcome: Progressing Towards Goal  Goal: *Optimize nutritional status  Outcome: Progressing Towards Goal     Problem: Pain  Goal: *Control of Pain  Outcome: Progressing Towards Goal  Goal: *PALLIATIVE CARE:  Alleviation of Pain  Outcome: Progressing Towards Goal     Problem: General Medical Care Plan  Goal: *Vital signs within specified parameters  Outcome: Progressing Towards Goal  Goal: *Labs within defined limits  Outcome: Progressing Towards Goal  Goal: *Absence of infection signs and symptoms  Outcome: Progressing Towards Goal  Goal: *Optimal pain control at patient's stated goal  Outcome: Progressing Towards Goal  Goal: *Skin integrity maintained  Outcome: Progressing Towards Goal  Goal: *Fluid volume balance  Outcome: Progressing Towards Goal  Goal: *Optimize nutritional status  Outcome: Progressing Towards Goal  Goal: *Anxiety reduced or absent  Outcome: Progressing Towards Goal  Goal: *Progressive mobility and function (eg: ADL's)  Outcome: Progressing Towards Goal     Problem: Falls - Risk of  Goal: *Absence of Falls  Description  Document Tawanda Fall Risk and appropriate interventions in the flowsheet.   Outcome: Progressing Towards Goal     Problem: Patient Education: Go to Patient Education Activity  Goal: Patient/Family Education  Outcome: Progressing Towards Goal

## 2019-07-07 NOTE — PROGRESS NOTES
Yifan Benitez M.D. FACS  PROGRESS NOTE    Name: Latisha Garcia MRN: [id number]   : 1950 [facility]   Date: [facility] Admission Date: 2019  7:26 AM     Hospital Day: 3  2 Days Post-Op  Subjective:  Patient is postop day 2 from ileostomy reversal without acute overnight events. He has passed gas and have bowel movements. Objective:  Vitals:    19 1626 19 2200 19 0400 19 1136   BP: 122/75 120/72 134/79 111/72   Pulse: 77 78 87 86   Resp: 18 18 18 16   Temp: 98 °F (36.7 °C) 98.6 °F (37 °C) 98.3 °F (36.8 °C) 97.4 °F (36.3 °C)   SpO2: 99% 99% 97% 96%   Weight:       Height:         Date 19 0700 - 19 0659 19 0700 - 19 0659   Shift 9198-6127 4851-2902 24 Hour Total 2616-0040 0582-5360 24 Hour Total   INTAKE   Shift Total(mL/kg)         OUTPUT   Urine(mL/kg/hr)  1200(1.5) 1200(0.7) 150  150     Urine Output (mL) (Urinary Catheter 19 2- way; [place])  1200 1200 150  150   Shift Total(mL/kg)  1200(17.9) 1200(17.9) 150(2.2)  150(2.2)   NET  -1200 -1200 -150  -150   Weight (kg) 67.2 67.2 67.2 67.2 67.2 67.2         Physical Exam:    General: Awake alert, oriented x4, no apparent distress   Abdomen: abdomen is soft with left lower quadrant incisional tenderness. Open wound left lower quadrant with serosanguineous drainage on packing. No surrounding cellulitis fluctuance or crepitance.   No masses, organomegaly or guarding    Labs:  Recent Results (from the past 24 hour(s))   GLUCOSE, POC    Collection Time: 19  7:30 PM   Result Value Ref Range    Glucose (POC) 95 70 - 110 mg/dL   GLUCOSE, POC    Collection Time: 19 10:22 PM   Result Value Ref Range    Glucose (POC) 98 70 - 557 mg/dL   METABOLIC PANEL, BASIC    Collection Time: 19  5:17 AM   Result Value Ref Range    Sodium 139 136 - 145 mmol/L    Potassium 4.4 3.5 - 5.5 mmol/L    Chloride 108 100 - 108 mmol/L    CO2 24 21 - 32 mmol/L    Anion gap 7 3.0 - 18 mmol/L Glucose 88 74 - 99 mg/dL    BUN 16 7.0 - 18 MG/DL    Creatinine 1.20 0.6 - 1.3 MG/DL    BUN/Creatinine ratio 13 12 - 20      GFR est AA >60 >60 ml/min/1.73m2    GFR est non-AA >60 >60 ml/min/1.73m2    Calcium 8.8 8.5 - 10.1 MG/DL   CBC W/O DIFF    Collection Time: 07/07/19  5:17 AM   Result Value Ref Range    WBC 10.8 4.6 - 13.2 K/uL    RBC 3.92 (L) 4.70 - 5.50 M/uL    HGB 11.2 (L) 13.0 - 16.0 g/dL    HCT 34.0 (L) 36.0 - 48.0 %    MCV 86.7 74.0 - 97.0 FL    MCH 28.6 24.0 - 34.0 PG    MCHC 32.9 31.0 - 37.0 g/dL    RDW 13.8 11.6 - 14.5 %    PLATELET 378 305 - 047 K/uL    MPV 9.0 (L) 9.2 - 11.8 FL   MAGNESIUM    Collection Time: 07/07/19  5:17 AM   Result Value Ref Range    Magnesium 1.7 1.6 - 2.6 mg/dL   PHOSPHORUS    Collection Time: 07/07/19  5:17 AM   Result Value Ref Range    Phosphorus 3.1 2.5 - 4.9 MG/DL   GLUCOSE, POC    Collection Time: 07/07/19  5:49 AM   Result Value Ref Range    Glucose (POC) 96 70 - 110 mg/dL   GLUCOSE, POC    Collection Time: 07/07/19 11:34 AM   Result Value Ref Range    Glucose (POC) 85 70 - 110 mg/dL     All Micro Results     None          Current Medications:  Current Facility-Administered Medications   Medication Dose Route Frequency Provider Last Rate Last Dose    diphenhydrAMINE (BENADRYL) injection 25 mg  25 mg IntraVENous Q6H PRN Jose Wright MD        heparin (porcine) injection 5,000 Units  5,000 Units SubCUTAneous Q8H Jose Wright MD   5,000 Units at 07/07/19 8314    lactated Ringers infusion  75 mL/hr IntraVENous CONTINUOUS Jose Wright MD 75 mL/hr at 07/05/19 1341 75 mL/hr at 07/05/19 1341    gabapentin (NEURONTIN) capsule 300 mg  300 mg Oral TID Jose Wright MD   300 mg at 07/07/19 0857    acetaminophen (TYLENOL) tablet 1,000 mg  1,000 mg Oral Q6H Jose Wright MD   1,000 mg at 07/07/19 0217    ondansetron (ZOFRAN) injection 4 mg  4 mg IntraVENous Q6H PRN Jose Wright MD   4 mg at 07/06/19 0253    latanoprost (XALATAN) 0.005 % ophthalmic solution 1 Drop  1 Drop Both Eyes QHS Kenia Gentile MD   1 Drop at 07/06/19 2038    nebivolol (BYSTOLIC) tablet 5 mg  5 mg Oral EVERY OTHER DAY Kenia Gentile MD   5 mg at 07/06/19 1179    insulin lispro (HUMALOG) injection   SubCUTAneous Q6H Kenia Gentile MD   Stopped at 07/05/19 1652    glucose chewable tablet 16 g  4 Tab Oral PRN Kenia Gentile MD        glucagon [facility]) injection 1 mg  1 mg IntraMUSCular PRN Kenia Gentile MD        famotidine (PF) (PEPCID) 20 mg in sodium chloride 0.9% 10 mL injection  20 mg IntraVENous Q12H Kenia Gentile MD   20 mg at 07/07/19 0857    dextrose 10% infusion 125-250 mL  125-250 mL IntraVENous PRN Kenia Gentile MD        morphine injection 2-4 mg  2-4 mg IntraVENous Q3H PRN Kenia Gentile MD   4 mg at 07/07/19 3358       Chart and notes reviewed. Data reviewed. I have evaluated and examined the patient. IMPRESSION:   · Patient is postop day 2 from ileostomy reversal improving daily. PLAN:/DISCUSION:   · Clear liquid diet, advance diet as tolerated.   · Encourage incentive spirometry, out of bed to chair, ambulation  · DVT prophylaxis with SCDs and subcu heparin        Jonathan Clements MD

## 2019-07-08 LAB
ANION GAP SERPL CALC-SCNC: 7 MMOL/L (ref 3–18)
BUN SERPL-MCNC: 16 MG/DL (ref 7–18)
BUN/CREAT SERPL: 14 (ref 12–20)
CALCIUM SERPL-MCNC: 8.6 MG/DL (ref 8.5–10.1)
CHLORIDE SERPL-SCNC: 105 MMOL/L (ref 100–108)
CO2 SERPL-SCNC: 24 MMOL/L (ref 21–32)
CREAT SERPL-MCNC: 1.17 MG/DL (ref 0.6–1.3)
ERYTHROCYTE [DISTWIDTH] IN BLOOD BY AUTOMATED COUNT: 13.5 % (ref 11.6–14.5)
GLUCOSE BLD STRIP.AUTO-MCNC: 119 MG/DL (ref 70–110)
GLUCOSE BLD STRIP.AUTO-MCNC: 82 MG/DL (ref 70–110)
GLUCOSE BLD STRIP.AUTO-MCNC: 84 MG/DL (ref 70–110)
GLUCOSE BLD STRIP.AUTO-MCNC: 99 MG/DL (ref 70–110)
GLUCOSE SERPL-MCNC: 86 MG/DL (ref 74–99)
HCT VFR BLD AUTO: 31.1 % (ref 36–48)
HGB BLD-MCNC: 10.3 G/DL (ref 13–16)
MAGNESIUM SERPL-MCNC: 1.5 MG/DL (ref 1.6–2.6)
MCH RBC QN AUTO: 28.7 PG (ref 24–34)
MCHC RBC AUTO-ENTMCNC: 33.1 G/DL (ref 31–37)
MCV RBC AUTO: 86.6 FL (ref 74–97)
PHOSPHATE SERPL-MCNC: 2.8 MG/DL (ref 2.5–4.9)
PLATELET # BLD AUTO: 254 K/UL (ref 135–420)
PMV BLD AUTO: 9.2 FL (ref 9.2–11.8)
POTASSIUM SERPL-SCNC: 3.7 MMOL/L (ref 3.5–5.5)
RBC # BLD AUTO: 3.59 M/UL (ref 4.7–5.5)
SODIUM SERPL-SCNC: 136 MMOL/L (ref 136–145)
WBC # BLD AUTO: 11.8 K/UL (ref 4.6–13.2)

## 2019-07-08 PROCEDURE — 84100 ASSAY OF PHOSPHORUS: CPT

## 2019-07-08 PROCEDURE — 85027 COMPLETE CBC AUTOMATED: CPT

## 2019-07-08 PROCEDURE — 74011250636 HC RX REV CODE- 250/636: Performed by: COLON & RECTAL SURGERY

## 2019-07-08 PROCEDURE — 65270000029 HC RM PRIVATE

## 2019-07-08 PROCEDURE — 80048 BASIC METABOLIC PNL TOTAL CA: CPT

## 2019-07-08 PROCEDURE — 82962 GLUCOSE BLOOD TEST: CPT

## 2019-07-08 PROCEDURE — 74011250637 HC RX REV CODE- 250/637: Performed by: COLON & RECTAL SURGERY

## 2019-07-08 PROCEDURE — 74011000250 HC RX REV CODE- 250: Performed by: COLON & RECTAL SURGERY

## 2019-07-08 PROCEDURE — 83735 ASSAY OF MAGNESIUM: CPT

## 2019-07-08 PROCEDURE — 36415 COLL VENOUS BLD VENIPUNCTURE: CPT

## 2019-07-08 PROCEDURE — 77030018836 HC SOL IRR NACL ICUM -A

## 2019-07-08 RX ORDER — POTASSIUM CHLORIDE AND SODIUM CHLORIDE 450; 150 MG/100ML; MG/100ML
INJECTION, SOLUTION INTRAVENOUS CONTINUOUS
Status: DISCONTINUED | OUTPATIENT
Start: 2019-07-08 | End: 2019-07-10

## 2019-07-08 RX ADMIN — ACETAMINOPHEN 1000 MG: 500 TABLET ORAL at 15:43

## 2019-07-08 RX ADMIN — NEBIVOLOL HYDROCHLORIDE 5 MG: 5 TABLET ORAL at 10:02

## 2019-07-08 RX ADMIN — GABAPENTIN 300 MG: 300 CAPSULE ORAL at 21:04

## 2019-07-08 RX ADMIN — ACETAMINOPHEN 1000 MG: 500 TABLET ORAL at 20:18

## 2019-07-08 RX ADMIN — MORPHINE SULFATE 2 MG: 2 INJECTION, SOLUTION INTRAMUSCULAR; INTRAVENOUS at 10:23

## 2019-07-08 RX ADMIN — GABAPENTIN 300 MG: 300 CAPSULE ORAL at 10:02

## 2019-07-08 RX ADMIN — SODIUM CHLORIDE AND POTASSIUM CHLORIDE: 4.5; 1.49 INJECTION, SOLUTION INTRAVENOUS at 10:21

## 2019-07-08 RX ADMIN — HEPARIN SODIUM 5000 UNITS: 5000 INJECTION INTRAVENOUS; SUBCUTANEOUS at 00:32

## 2019-07-08 RX ADMIN — HEPARIN SODIUM 5000 UNITS: 5000 INJECTION INTRAVENOUS; SUBCUTANEOUS at 15:44

## 2019-07-08 RX ADMIN — FAMOTIDINE 20 MG: 10 INJECTION INTRAVENOUS at 20:14

## 2019-07-08 RX ADMIN — GABAPENTIN 300 MG: 300 CAPSULE ORAL at 15:43

## 2019-07-08 RX ADMIN — ACETAMINOPHEN 1000 MG: 500 TABLET ORAL at 10:02

## 2019-07-08 RX ADMIN — SODIUM CHLORIDE AND POTASSIUM CHLORIDE: 4.5; 1.49 INJECTION, SOLUTION INTRAVENOUS at 22:14

## 2019-07-08 RX ADMIN — HEPARIN SODIUM 5000 UNITS: 5000 INJECTION INTRAVENOUS; SUBCUTANEOUS at 10:02

## 2019-07-08 RX ADMIN — MORPHINE SULFATE 2 MG: 2 INJECTION, SOLUTION INTRAMUSCULAR; INTRAVENOUS at 21:10

## 2019-07-08 RX ADMIN — FAMOTIDINE 20 MG: 10 INJECTION INTRAVENOUS at 10:02

## 2019-07-08 RX ADMIN — LATANOPROST 1 DROP: 50 SOLUTION OPHTHALMIC at 20:19

## 2019-07-08 NOTE — ROUTINE PROCESS
Bedside and Verbal shift change report given to [address] (oncoming nurse) by Gaby Bonilla RN (offgoing nurse). Report included the following information SBAR, Kardex, Intake/Output, MAR and Recent Results.

## 2019-07-08 NOTE — ROUTINE PROCESS
Received bedside report from [address] [place] [address], patient was resting in bed, watching television with family at bedside, [facility] elevated, bed in lowest position and oriented to call button. Gave bedside report to [employer], using SBAR, MAR, and Kardex.

## 2019-07-09 LAB
ANION GAP SERPL CALC-SCNC: 10 MMOL/L (ref 3–18)
BUN SERPL-MCNC: 19 MG/DL (ref 7–18)
BUN/CREAT SERPL: 15 (ref 12–20)
CALCIUM SERPL-MCNC: 8.4 MG/DL (ref 8.5–10.1)
CHLORIDE SERPL-SCNC: 108 MMOL/L (ref 100–108)
CO2 SERPL-SCNC: 20 MMOL/L (ref 21–32)
CREAT SERPL-MCNC: 1.25 MG/DL (ref 0.6–1.3)
ERYTHROCYTE [DISTWIDTH] IN BLOOD BY AUTOMATED COUNT: 13.4 % (ref 11.6–14.5)
GLUCOSE BLD STRIP.AUTO-MCNC: 106 MG/DL (ref 70–110)
GLUCOSE BLD STRIP.AUTO-MCNC: 59 MG/DL (ref 70–110)
GLUCOSE BLD STRIP.AUTO-MCNC: 65 MG/DL (ref 70–110)
GLUCOSE BLD STRIP.AUTO-MCNC: 67 MG/DL (ref 70–110)
GLUCOSE BLD STRIP.AUTO-MCNC: 70 MG/DL (ref 70–110)
GLUCOSE BLD STRIP.AUTO-MCNC: 77 MG/DL (ref 70–110)
GLUCOSE BLD STRIP.AUTO-MCNC: 81 MG/DL (ref 70–110)
GLUCOSE SERPL-MCNC: 68 MG/DL (ref 74–99)
HCT VFR BLD AUTO: 29.7 % (ref 36–48)
HGB BLD-MCNC: 9.7 G/DL (ref 13–16)
MAGNESIUM SERPL-MCNC: 1.5 MG/DL (ref 1.6–2.6)
MCH RBC QN AUTO: 28.2 PG (ref 24–34)
MCHC RBC AUTO-ENTMCNC: 32.7 G/DL (ref 31–37)
MCV RBC AUTO: 86.3 FL (ref 74–97)
PHOSPHATE SERPL-MCNC: 3.4 MG/DL (ref 2.5–4.9)
PLATELET # BLD AUTO: 243 K/UL (ref 135–420)
PMV BLD AUTO: 8.8 FL (ref 9.2–11.8)
POTASSIUM SERPL-SCNC: 3.7 MMOL/L (ref 3.5–5.5)
RBC # BLD AUTO: 3.44 M/UL (ref 4.7–5.5)
SODIUM SERPL-SCNC: 138 MMOL/L (ref 136–145)
WBC # BLD AUTO: 10.4 K/UL (ref 4.6–13.2)

## 2019-07-09 PROCEDURE — 83735 ASSAY OF MAGNESIUM: CPT

## 2019-07-09 PROCEDURE — 36415 COLL VENOUS BLD VENIPUNCTURE: CPT

## 2019-07-09 PROCEDURE — 74011250636 HC RX REV CODE- 250/636: Performed by: COLON & RECTAL SURGERY

## 2019-07-09 PROCEDURE — 84100 ASSAY OF PHOSPHORUS: CPT

## 2019-07-09 PROCEDURE — 85027 COMPLETE CBC AUTOMATED: CPT

## 2019-07-09 PROCEDURE — 74011000258 HC RX REV CODE- 258: Performed by: COLON & RECTAL SURGERY

## 2019-07-09 PROCEDURE — 74011250637 HC RX REV CODE- 250/637: Performed by: COLON & RECTAL SURGERY

## 2019-07-09 PROCEDURE — 82962 GLUCOSE BLOOD TEST: CPT

## 2019-07-09 PROCEDURE — 65270000029 HC RM PRIVATE

## 2019-07-09 PROCEDURE — 74011000250 HC RX REV CODE- 250: Performed by: COLON & RECTAL SURGERY

## 2019-07-09 PROCEDURE — 80048 BASIC METABOLIC PNL TOTAL CA: CPT

## 2019-07-09 RX ORDER — DEXTROSE MONOHYDRATE 100 MG/ML
250 INJECTION, SOLUTION INTRAVENOUS ONCE
Status: ACTIVE | OUTPATIENT
Start: 2019-07-09 | End: 2019-07-10

## 2019-07-09 RX ADMIN — GABAPENTIN 300 MG: 300 CAPSULE ORAL at 21:01

## 2019-07-09 RX ADMIN — DEXTROSE MONOHYDRATE 125 ML: 10 INJECTION, SOLUTION INTRAVENOUS at 12:58

## 2019-07-09 RX ADMIN — GABAPENTIN 300 MG: 300 CAPSULE ORAL at 09:25

## 2019-07-09 RX ADMIN — ACETAMINOPHEN 1000 MG: 500 TABLET ORAL at 01:31

## 2019-07-09 RX ADMIN — HEPARIN SODIUM 5000 UNITS: 5000 INJECTION INTRAVENOUS; SUBCUTANEOUS at 23:56

## 2019-07-09 RX ADMIN — FAMOTIDINE 20 MG: 10 INJECTION INTRAVENOUS at 09:25

## 2019-07-09 RX ADMIN — GABAPENTIN 300 MG: 300 CAPSULE ORAL at 17:57

## 2019-07-09 RX ADMIN — SODIUM CHLORIDE AND POTASSIUM CHLORIDE: 4.5; 1.49 INJECTION, SOLUTION INTRAVENOUS at 23:58

## 2019-07-09 RX ADMIN — ACETAMINOPHEN 1000 MG: 500 TABLET ORAL at 20:07

## 2019-07-09 RX ADMIN — MORPHINE SULFATE 2 MG: 2 INJECTION, SOLUTION INTRAMUSCULAR; INTRAVENOUS at 17:57

## 2019-07-09 RX ADMIN — FAMOTIDINE 20 MG: 10 INJECTION INTRAVENOUS at 20:43

## 2019-07-09 RX ADMIN — HEPARIN SODIUM 5000 UNITS: 5000 INJECTION INTRAVENOUS; SUBCUTANEOUS at 17:56

## 2019-07-09 RX ADMIN — HEPARIN SODIUM 5000 UNITS: 5000 INJECTION INTRAVENOUS; SUBCUTANEOUS at 09:26

## 2019-07-09 RX ADMIN — LATANOPROST 1 DROP: 50 SOLUTION OPHTHALMIC at 20:45

## 2019-07-09 RX ADMIN — DEXTROSE MONOHYDRATE 125 ML: 10 INJECTION, SOLUTION INTRAVENOUS at 16:12

## 2019-07-09 RX ADMIN — DEXTROSE MONOHYDRATE 250 ML: 10 INJECTION, SOLUTION INTRAVENOUS at 23:54

## 2019-07-09 RX ADMIN — HEPARIN SODIUM 5000 UNITS: 5000 INJECTION INTRAVENOUS; SUBCUTANEOUS at 00:11

## 2019-07-09 NOTE — PROGRESS NOTES
Problem: General Infection Care Plan (Adult and Pediatric)  Goal: Improvement in signs and symptoms of infection  Outcome: Progressing Towards Goal  Goal: *Optimize nutritional status  Outcome: Progressing Towards Goal

## 2019-07-09 NOTE — PROGRESS NOTES
[facility]  [address] [place] [address] [phone]  Colon and Rectal Surgery Progress Note      Patient: Maribel Koroma MRN: [id number]  SSN: [id number]    YOB: 1950  Age: 80 y.o. Sex: male      Admit Date: 7/5/2019    LOS: 4 days     Subjective:   Patient did well overnight and had no acute events. He states that he had 7 small BM that consisted of mucous and some formed stool. He denies N, V, abdominal pain. Objective:     Vitals:    07/08/19 1520 07/08/19 1842 07/08/19 2134 07/09/19 0515   BP: 92/64 98/64 95/63 107/67   Pulse: 90 87 78 75   Resp: 17 18 17 17   Temp: 96.7 °F (35.9 °C) 98.4 °F (36.9 °C) 98.7 °F (37.1 °C) 97.6 °F (36.4 °C)   SpO2: 96% 98% 97% 96%   Weight:       Height:            Intake and Output:  Current Shift: 07/08 1901 - 07/09 0700  In: -   Out: 300 [Urine:300]  Last three shifts: 07/07 0701 - 07/08 1900  In: 6140 [P.O.:2400; I.V.:2740]  Out: 9835 [Urine:1825]    Physical Exam   Constitutional: He appears well-nourished. No distress. Cardiovascular: Normal rate, regular rhythm and normal heart sounds. Exam reveals no gallop and no friction rub. No murmur heard. Pulmonary/Chest: Effort normal. No respiratory distress.    Abdominal:   Soft, non-tender, moderately distended; post-procedure site wrapped in gauze and is c/d/i       Lab/Data Review:  Recent Results (from the past 24 hour(s))   GLUCOSE, POC    Collection Time: 07/08/19 11:06 AM   Result Value Ref Range    Glucose (POC) 99 70 - 110 mg/dL   GLUCOSE, POC    Collection Time: 07/08/19  6:07 PM   Result Value Ref Range    Glucose (POC) 82 70 - 110 mg/dL   GLUCOSE, POC    Collection Time: 07/09/19 12:06 AM   Result Value Ref Range    Glucose (POC) 81 70 - 275 mg/dL   METABOLIC PANEL, BASIC    Collection Time: 07/09/19  4:44 AM   Result Value Ref Range    Sodium 138 136 - 145 mmol/L    Potassium 3.7 3.5 - 5.5 mmol/L    Chloride 108 100 - 108 mmol/L    CO2 20 (L) 21 - 32 mmol/L    Anion gap 10 3.0 - 18 mmol/L    Glucose 68 (L) 74 - 99 mg/dL    BUN 19 (H) 7.0 - 18 MG/DL    Creatinine 1.25 0.6 - 1.3 MG/DL    BUN/Creatinine ratio 15 12 - 20      GFR est AA >60 >60 ml/min/1.73m2    GFR est non-AA 58 (L) >60 ml/min/1.73m2    Calcium 8.4 (L) 8.5 - 10.1 MG/DL   CBC W/O DIFF    Collection Time: 07/09/19  4:44 AM   Result Value Ref Range    WBC 10.4 4.6 - 13.2 K/uL    RBC 3.44 (L) 4.70 - 5.50 M/uL    HGB 9.7 (L) 13.0 - 16.0 g/dL    HCT 29.7 (L) 36.0 - 48.0 %    MCV 86.3 74.0 - 97.0 FL    MCH 28.2 24.0 - 34.0 PG    MCHC 32.7 31.0 - 37.0 g/dL    RDW 13.4 11.6 - 14.5 %    PLATELET 555 020 - 595 K/uL    MPV 8.8 (L) 9.2 - 11.8 FL   MAGNESIUM    Collection Time: 07/09/19  4:44 AM   Result Value Ref Range    Magnesium 1.5 (L) 1.6 - 2.6 mg/dL   PHOSPHORUS    Collection Time: 07/09/19  4:44 AM   Result Value Ref Range    Phosphorus 3.4 2.5 - 4.9 MG/DL   GLUCOSE, POC    Collection Time: 07/09/19  5:18 AM   Result Value Ref Range    Glucose (POC) 77 70 - 110 mg/dL       Assessment and Plan:   Mr. Lidia Mckeon is a 81 yo black male w/PMHx of sigmoid colon cancer who presented for ileostomy reversal.     Sigmoid Colon Cancer s/p Loop Ileostomy Reversal: Continues to have abdominal distention, but without N/V or abdominal pain. +Flactulance and BM. Change fluids from 1/2NS w/KCl to D5-1/2NS with KCl 20mEQ @100 cc/hr since patient has been NPO for 24 hours   - Continue NPO except medications    Jasmyn Calixto MD, PGY-2  [address] [facility]  07/09/19 6:33 AM

## 2019-07-09 NOTE — ROUTINE PROCESS
Bedside and Verbal shift change report given to Alla Cardoza RN (oncoming nurse) by Wilma Gardiner RN (offgoing nurse). Report included the following information SBAR, Kardex and MAR.

## 2019-07-09 NOTE — ROUTINE PROCESS
Bedside and Verbal shift change report given to Arron Nguyen RN (oncoming nurse) by Ariadne Vance RN (offgoing nurse).  Report included the following information Margaret and MAR.     [place] assumes care of pt

## 2019-07-10 LAB
ANION GAP SERPL CALC-SCNC: 9 MMOL/L (ref 3–18)
BUN SERPL-MCNC: 15 MG/DL (ref 7–18)
BUN/CREAT SERPL: 14 (ref 12–20)
CALCIUM SERPL-MCNC: 8.5 MG/DL (ref 8.5–10.1)
CHLORIDE SERPL-SCNC: 110 MMOL/L (ref 100–108)
CO2 SERPL-SCNC: 19 MMOL/L (ref 21–32)
CREAT SERPL-MCNC: 1.07 MG/DL (ref 0.6–1.3)
ERYTHROCYTE [DISTWIDTH] IN BLOOD BY AUTOMATED COUNT: 13.2 % (ref 11.6–14.5)
GLUCOSE BLD STRIP.AUTO-MCNC: 104 MG/DL (ref 70–110)
GLUCOSE BLD STRIP.AUTO-MCNC: 111 MG/DL (ref 70–110)
GLUCOSE BLD STRIP.AUTO-MCNC: 141 MG/DL (ref 70–110)
GLUCOSE BLD STRIP.AUTO-MCNC: 70 MG/DL (ref 70–110)
GLUCOSE BLD STRIP.AUTO-MCNC: 92 MG/DL (ref 70–110)
GLUCOSE BLD STRIP.AUTO-MCNC: 92 MG/DL (ref 70–110)
GLUCOSE SERPL-MCNC: 61 MG/DL (ref 74–99)
HCT VFR BLD AUTO: 28.3 % (ref 36–48)
HGB BLD-MCNC: 9.4 G/DL (ref 13–16)
MAGNESIUM SERPL-MCNC: 1.6 MG/DL (ref 1.6–2.6)
MCH RBC QN AUTO: 28.3 PG (ref 24–34)
MCHC RBC AUTO-ENTMCNC: 33.2 G/DL (ref 31–37)
MCV RBC AUTO: 85.2 FL (ref 74–97)
PHOSPHATE SERPL-MCNC: 3 MG/DL (ref 2.5–4.9)
PLATELET # BLD AUTO: 254 K/UL (ref 135–420)
PMV BLD AUTO: 9.1 FL (ref 9.2–11.8)
POTASSIUM SERPL-SCNC: 3.4 MMOL/L (ref 3.5–5.5)
RBC # BLD AUTO: 3.32 M/UL (ref 4.7–5.5)
SODIUM SERPL-SCNC: 138 MMOL/L (ref 136–145)
WBC # BLD AUTO: 8.8 K/UL (ref 4.6–13.2)

## 2019-07-10 PROCEDURE — 83735 ASSAY OF MAGNESIUM: CPT

## 2019-07-10 PROCEDURE — 74011000250 HC RX REV CODE- 250: Performed by: COLON & RECTAL SURGERY

## 2019-07-10 PROCEDURE — 82962 GLUCOSE BLOOD TEST: CPT

## 2019-07-10 PROCEDURE — 65270000029 HC RM PRIVATE

## 2019-07-10 PROCEDURE — 36415 COLL VENOUS BLD VENIPUNCTURE: CPT

## 2019-07-10 PROCEDURE — 85027 COMPLETE CBC AUTOMATED: CPT

## 2019-07-10 PROCEDURE — 74011000258 HC RX REV CODE- 258: Performed by: COLON & RECTAL SURGERY

## 2019-07-10 PROCEDURE — 80048 BASIC METABOLIC PNL TOTAL CA: CPT

## 2019-07-10 PROCEDURE — 84100 ASSAY OF PHOSPHORUS: CPT

## 2019-07-10 PROCEDURE — 74011250637 HC RX REV CODE- 250/637: Performed by: COLON & RECTAL SURGERY

## 2019-07-10 PROCEDURE — 74011250636 HC RX REV CODE- 250/636: Performed by: STUDENT IN AN ORGANIZED HEALTH CARE EDUCATION/TRAINING PROGRAM

## 2019-07-10 PROCEDURE — 74011250636 HC RX REV CODE- 250/636: Performed by: COLON & RECTAL SURGERY

## 2019-07-10 RX ORDER — DEXTROSE, SODIUM CHLORIDE, AND POTASSIUM CHLORIDE 5; .45; .15 G/100ML; G/100ML; G/100ML
100 INJECTION INTRAVENOUS CONTINUOUS
Status: DISCONTINUED | OUTPATIENT
Start: 2019-07-10 | End: 2019-07-12

## 2019-07-10 RX ADMIN — NEBIVOLOL HYDROCHLORIDE 5 MG: 5 TABLET ORAL at 08:51

## 2019-07-10 RX ADMIN — DEXTROSE MONOHYDRATE 125 ML: 10 INJECTION, SOLUTION INTRAVENOUS at 06:02

## 2019-07-10 RX ADMIN — MORPHINE SULFATE 2 MG: 2 INJECTION, SOLUTION INTRAMUSCULAR; INTRAVENOUS at 01:08

## 2019-07-10 RX ADMIN — DEXTROSE MONOHYDRATE, SODIUM CHLORIDE, AND POTASSIUM CHLORIDE 100 ML/HR: 50; 4.5; 1.49 INJECTION, SOLUTION INTRAVENOUS at 21:59

## 2019-07-10 RX ADMIN — MORPHINE SULFATE 2 MG: 2 INJECTION, SOLUTION INTRAMUSCULAR; INTRAVENOUS at 09:10

## 2019-07-10 RX ADMIN — DEXTROSE MONOHYDRATE 125 ML: 10 INJECTION, SOLUTION INTRAVENOUS at 11:43

## 2019-07-10 RX ADMIN — HEPARIN SODIUM 5000 UNITS: 5000 INJECTION INTRAVENOUS; SUBCUTANEOUS at 08:51

## 2019-07-10 RX ADMIN — FAMOTIDINE 20 MG: 10 INJECTION INTRAVENOUS at 08:52

## 2019-07-10 RX ADMIN — ACETAMINOPHEN 1000 MG: 500 TABLET ORAL at 14:00

## 2019-07-10 RX ADMIN — ACETAMINOPHEN 1000 MG: 500 TABLET ORAL at 20:43

## 2019-07-10 RX ADMIN — FAMOTIDINE 20 MG: 10 INJECTION INTRAVENOUS at 20:49

## 2019-07-10 RX ADMIN — HEPARIN SODIUM 5000 UNITS: 5000 INJECTION INTRAVENOUS; SUBCUTANEOUS at 23:48

## 2019-07-10 RX ADMIN — LATANOPROST 1 DROP: 50 SOLUTION OPHTHALMIC at 20:50

## 2019-07-10 RX ADMIN — HEPARIN SODIUM 5000 UNITS: 5000 INJECTION INTRAVENOUS; SUBCUTANEOUS at 16:56

## 2019-07-10 RX ADMIN — GABAPENTIN 300 MG: 300 CAPSULE ORAL at 16:55

## 2019-07-10 RX ADMIN — DEXTROSE MONOHYDRATE, SODIUM CHLORIDE, AND POTASSIUM CHLORIDE 100 ML/HR: 50; 4.5; 1.49 INJECTION, SOLUTION INTRAVENOUS at 10:00

## 2019-07-10 RX ADMIN — GABAPENTIN 300 MG: 300 CAPSULE ORAL at 08:51

## 2019-07-10 RX ADMIN — GABAPENTIN 300 MG: 300 CAPSULE ORAL at 21:01

## 2019-07-10 NOTE — DIABETES MGMT
Glycemic Control: Noted pt having multiple episodes of hypoglycemia. Dextrose added to IVF. Pt is currently NPO. Continue frequent glucose monitoring and treatment per hypoglycemia protocol.     Edwin Tirado RD, CDE

## 2019-07-10 NOTE — PROGRESS NOTES
[facility]  [address] [facility] [address]  [phone]  Colon and Rectal Surgery Progress Note      Patient: Justino Ramos MRN: [id number]  SSN: [id number]    YOB: 1950  Age: 80 y.o. Sex: male      Admit Date: 7/5/2019    LOS: 5 days     Subjective:   No acute events overnight. Continues to have flatulence and small, clear BMs overnight. Denies N/V, CP, or SOB. Abdominal pain is tolerable. Patient is walking around the unit. Objective:     Vitals:    07/09/19 1441 07/09/19 1833 07/09/19 2324 07/10/19 0619   BP: 160/82 132/82 127/73 151/80   Pulse: 88 84 78 78   Resp: 17 18 16 14   Temp: 98.5 °F (36.9 °C) 98.5 °F (36.9 °C) 97.8 °F (36.6 °C) 97.2 °F (36.2 °C)   SpO2: 99% 98% 97% 98%   Weight:       Height:            Intake and Output:  Current Shift: No intake/output data recorded. Last three shifts: 07/08 1901 - 07/10 0700  In: 4355 [I.V.:4355]  Out: 2766 [Urine:1475]    Physical Exam:   Physical Exam   Constitutional: He is oriented to person, place, and time. He appears well-developed and well-nourished. HENT:   Head: Normocephalic and atraumatic. Eyes: Conjunctivae and EOM are normal.   Neck: Normal range of motion. Neck supple. Musculoskeletal: Normal range of motion. Lymphadenopathy:        Right: No inguinal adenopathy present. Left: No inguinal adenopathy present. Neurological: He is alert and oriented to person, place, and time. Skin: Skin is warm and dry. No erythema.    Abdomen: Soft, non-tender, moderately distended (improved from yesterday); post-procedure site wrapped in gauze and is c/d/i     Lab/Data Review:  Recent Results (from the past 24 hour(s))   GLUCOSE, POC    Collection Time: 07/09/19 11:34 AM   Result Value Ref Range    Glucose (POC) 67 (L) 70 - 110 mg/dL   GLUCOSE, POC    Collection Time: 07/09/19 12:39 PM   Result Value Ref Range    Glucose (POC) 65 (L) 70 - 110 mg/dL   GLUCOSE, POC    Collection Time: 07/09/19  1:23 PM Result Value Ref Range    Glucose (POC) 106 70 - 110 mg/dL   GLUCOSE, POC    Collection Time: 07/09/19  4:01 PM   Result Value Ref Range    Glucose (POC) 70 70 - 110 mg/dL   GLUCOSE, POC    Collection Time: 07/09/19 11:50 PM   Result Value Ref Range    Glucose (POC) 59 (L) 70 - 110 mg/dL   GLUCOSE, POC    Collection Time: 07/10/19 12:52 AM   Result Value Ref Range    Glucose (POC) 141 (H) 70 - 129 mg/dL   METABOLIC PANEL, BASIC    Collection Time: 07/10/19  4:57 AM   Result Value Ref Range    Sodium 138 136 - 145 mmol/L    Potassium 3.4 (L) 3.5 - 5.5 mmol/L    Chloride 110 (H) 100 - 108 mmol/L    CO2 19 (L) 21 - 32 mmol/L    Anion gap 9 3.0 - 18 mmol/L    Glucose 61 (L) 74 - 99 mg/dL    BUN 15 7.0 - 18 MG/DL    Creatinine 1.07 0.6 - 1.3 MG/DL    BUN/Creatinine ratio 14 12 - 20      GFR est AA >60 >60 ml/min/1.73m2    GFR est non-AA >60 >60 ml/min/1.73m2    Calcium 8.5 8.5 - 10.1 MG/DL   CBC W/O DIFF    Collection Time: 07/10/19  4:57 AM   Result Value Ref Range    WBC 8.8 4.6 - 13.2 K/uL    RBC 3.32 (L) 4.70 - 5.50 M/uL    HGB 9.4 (L) 13.0 - 16.0 g/dL    HCT 28.3 (L) 36.0 - 48.0 %    MCV 85.2 74.0 - 97.0 FL    MCH 28.3 24.0 - 34.0 PG    MCHC 33.2 31.0 - 37.0 g/dL    RDW 13.2 11.6 - 14.5 %    PLATELET 304 567 - 264 K/uL    MPV 9.1 (L) 9.2 - 11.8 FL   MAGNESIUM    Collection Time: 07/10/19  4:57 AM   Result Value Ref Range    Magnesium 1.6 1.6 - 2.6 mg/dL   PHOSPHORUS    Collection Time: 07/10/19  4:57 AM   Result Value Ref Range    Phosphorus 3.0 2.5 - 4.9 MG/DL   GLUCOSE, POC    Collection Time: 07/10/19  6:46 AM   Result Value Ref Range    Glucose (POC) 111 (H) 70 - 110 mg/dL       Assessment:   Mr. Tora Skiff is a 79 yo black male w/PMHx of sigmoid colon cancer who presented for ileostomy reversal.      Sigmoid Colon Cancer s/p Loop Ileostomy Reversal: Continues to have abdominal distention, but without N/V or abdominal pain. +Flactulance and BM.      Plan:   - Advance diet to clears today  - Change fluids from 12NS+ 20mEQ KCl to D5-1/2NS +20mEQ given patient's frequent slightly low BGs  - Continue ambulation   - Continue daily dressing changes  - Monitor for changes in bowel function  - Daily CBC, BMP, Mag, and Phos  Hypoglycemia - D5 PRN to treat    Jasmyn Nichols MD, PGY-2  [address] [facility]  07/10/19 8:21 AM

## 2019-07-10 NOTE — ROUTINE PROCESS
Bedside and Verbal shift change report given to Wilmar Alegria RN (oncoming nurse) by Wilma Gardiner RN (offgoing nurse).  Report included the following information Kardex and MAR.     0720 - Kimberly DOSHI assumes care of pt

## 2019-07-10 NOTE — PROGRESS NOTES
NUTRITION    Nutrition Screen      RECOMMENDATIONS / PLAN:     - Monitor GI symptoms and readiness for diet initiation.  - Continue IV fluids until oral diet started and intake adequate. - Once diet is started, recommend adding Ensure Clear BID to optimize nutrition intake. - Continue RD inpatient monitoring and evaluation. NUTRITION INTERVENTIONS & DIAGNOSIS:     [x] Meals/snacks: NPO, modify composition  [x] Medical food supplement therapy: initiate pending diet   [x] IV Fluids: continue    Nutrition Diagnosis: Inadequate oral intake related to altered GI function as evidenced by pt s/p GI surgery, awaiting proper bowel function. ASSESSMENT:     S/p loop ileostomy reversal on 7/5. +BMs. Clear liquid diet started 7/7, discontinued 7/8; pt did not consume anything of the meals. Diet to be resumed today per MD note. Pt consumes Ensure at home, 3/day after wt loss, then decreased to 1-2/day. Agreeable to starting in house, prefers vanilla/chocolate. Low K noted, IVF contain K, dextrose added for lower BG levels.      Average po intake adequate to meet patients estimated nutritional needs:   [] Yes     [x] No   [] Unable to determine at this time    Diet: DIET NPO Except Meds      Food Allergies: KNFA  Current Appetite:   [] Good     [] Fair     [] Poor     [x] Other: NPO  Appetite/meal intake prior to admission:   [x] Good     [] Fair     [] Poor     [] Other:  Feeding Limitations:  [] Swallowing difficulty    [] Chewing difficulty    [] Other:  Current Meal Intake:   Patient Vitals for the past 100 hrs:   % Diet Eaten   07/09/19 1004 0 %       BM: 7/8  Skin Integrity: surgical incision to abdomen  Edema:   [x] No     [] Yes   Pertinent Medications: Reviewed: D5 1/2NS with KCl 20 mEq/L at 100 mL/hr (120 gm dextrose, 408 kcal), pepcid, SSI    Recent Labs     07/10/19  0457 07/09/19  0444 07/08/19  0501    138 136   K 3.4* 3.7 3.7   * 108 105   CO2 19* 20* 24   GLU 61* 68* 86   BUN 15 19* 16 CREA 1.07 1.25 1.17   CA 8.5 8.4* 8.6   MG 1.6 1.5* 1.5*   PHOS 3.0 3.4 2.8       Intake/Output Summary (Last 24 hours) at 7/10/2019 1108  Last data filed at 7/10/2019 0400  Gross per 24 hour   Intake 4355 ml   Output 825 ml   Net 3530 ml       Anthropometrics:  Ht Readings from Last 1 Encounters:   07/05/19 5' 10\" (1.778 m)     Last 3 Recorded Weights in this Encounter    06/25/19 1409 07/05/19 0834   Weight: 68.9 kg (152 lb) 67.2 kg (148 lb 3.2 oz)     Body mass index is 21.26 kg/m². Weight History: -15# (9%) x 8 months. Pt reports weight loss down to 130# around Dec-Jan, with weight gain since. Weight Metrics 7/5/2019 6/10/2019 5/8/2019 4/22/2019 1/24/2019 1/17/2019 1/10/2019   Weight 148 lb 3.2 oz 152 lb 144 lb 150 lb 150 lb 144 lb 8 oz 142 lb   BMI 21.26 kg/m2 21.5 kg/m2 20.66 kg/m2 20.92 kg/m2 20.92 kg/m2 20.73 kg/m2 20.37 kg/m2        Admitting Diagnosis: Diverticulitis [K57.92]  Pertinent PMHx: throat cancer, colon cancer, DM, HTN, GERD    Education Needs:        [x] None identified  [] Identified - Not appropriate at this time  []  Identified and addressed - refer to education log  Learning Limitations:   [x] None identified  [] Identified    Cultural, Denominational & ethnic food preferences:  [x] None identified    [] Identified and addressed     ESTIMATED NUTRITION NEEDS:     Calories: 9804-8551 kcal (MSJx1.2-1.3) based on  [x] Actual BW 67 kg     [] IBW   Protein: 54-81 gm (0.8-1.2 gm/kg) based on  [x] Actual BW      [] IBW   Fluid: 1 mL/kcal     MONITORING & EVALUATION:     Nutrition Goal(s):   1. Po intake of meals will meet >75% of patient estimated nutritional needs within the next 7 days.   Outcome:  [] Met/Ongoing    [] Progressing towards goal    [] Not progressing towards goal    [x] New/Initial goal     Monitoring:   [x] Food and beverage intake   [x] Diet order   [x] Nutrition-focused physical findings   [x] Treatment/therapy   [] Weight   [] Enteral nutrition intake        Previous Recommendations (for follow-up assessments only):     []   Implemented       []   Not Implemented (RD to address)      [] No Longer Appropriate     [] No Recommendation Made     Discharge Planning: pending diet tolerance  [x] Participated in care planning, discharge planning, & interdisciplinary rounds as appropriate      David Orlando RD, [address] [place]   Pager: [phone]

## 2019-07-10 NOTE — PROGRESS NOTES
Bedside and Verbal shift change report given to Leidy Lange RN (oncoming nurse) by Lizandro payne RN (offgoing nurse). Report included the following information SBAR and Kardex.

## 2019-07-10 NOTE — PROGRESS NOTES
Met with pt at bedside. No change is discharge needs. No orders for home health noted. Current Plan is home after discharge. Case management will continue to follow.     Ryan Reynolds RN  Care Manager  [phone]

## 2019-07-11 LAB
ANION GAP SERPL CALC-SCNC: 8 MMOL/L (ref 3–18)
BUN SERPL-MCNC: 10 MG/DL (ref 7–18)
BUN/CREAT SERPL: 10 (ref 12–20)
CALCIUM SERPL-MCNC: 8.5 MG/DL (ref 8.5–10.1)
CHLORIDE SERPL-SCNC: 110 MMOL/L (ref 100–108)
CO2 SERPL-SCNC: 22 MMOL/L (ref 21–32)
CREAT SERPL-MCNC: 0.99 MG/DL (ref 0.6–1.3)
ERYTHROCYTE [DISTWIDTH] IN BLOOD BY AUTOMATED COUNT: 13.3 % (ref 11.6–14.5)
GLUCOSE BLD STRIP.AUTO-MCNC: 100 MG/DL (ref 70–110)
GLUCOSE BLD STRIP.AUTO-MCNC: 102 MG/DL (ref 70–110)
GLUCOSE BLD STRIP.AUTO-MCNC: 107 MG/DL (ref 70–110)
GLUCOSE BLD STRIP.AUTO-MCNC: 87 MG/DL (ref 70–110)
GLUCOSE SERPL-MCNC: 95 MG/DL (ref 74–99)
HCT VFR BLD AUTO: 28.6 % (ref 36–48)
HGB BLD-MCNC: 9.6 G/DL (ref 13–16)
MAGNESIUM SERPL-MCNC: 1.7 MG/DL (ref 1.6–2.6)
MCH RBC QN AUTO: 28.7 PG (ref 24–34)
MCHC RBC AUTO-ENTMCNC: 33.6 G/DL (ref 31–37)
MCV RBC AUTO: 85.4 FL (ref 74–97)
PHOSPHATE SERPL-MCNC: 3.3 MG/DL (ref 2.5–4.9)
PLATELET # BLD AUTO: 258 K/UL (ref 135–420)
PMV BLD AUTO: 9 FL (ref 9.2–11.8)
POTASSIUM SERPL-SCNC: 3.4 MMOL/L (ref 3.5–5.5)
RBC # BLD AUTO: 3.35 M/UL (ref 4.7–5.5)
SODIUM SERPL-SCNC: 140 MMOL/L (ref 136–145)
WBC # BLD AUTO: 8.1 K/UL (ref 4.6–13.2)

## 2019-07-11 PROCEDURE — 74011250637 HC RX REV CODE- 250/637: Performed by: COLON & RECTAL SURGERY

## 2019-07-11 PROCEDURE — 65270000029 HC RM PRIVATE

## 2019-07-11 PROCEDURE — 74011250636 HC RX REV CODE- 250/636: Performed by: STUDENT IN AN ORGANIZED HEALTH CARE EDUCATION/TRAINING PROGRAM

## 2019-07-11 PROCEDURE — 74011250636 HC RX REV CODE- 250/636: Performed by: COLON & RECTAL SURGERY

## 2019-07-11 PROCEDURE — 84100 ASSAY OF PHOSPHORUS: CPT

## 2019-07-11 PROCEDURE — 85027 COMPLETE CBC AUTOMATED: CPT

## 2019-07-11 PROCEDURE — 83735 ASSAY OF MAGNESIUM: CPT

## 2019-07-11 PROCEDURE — 74011000250 HC RX REV CODE- 250: Performed by: COLON & RECTAL SURGERY

## 2019-07-11 PROCEDURE — 82962 GLUCOSE BLOOD TEST: CPT

## 2019-07-11 PROCEDURE — 80048 BASIC METABOLIC PNL TOTAL CA: CPT

## 2019-07-11 PROCEDURE — 36415 COLL VENOUS BLD VENIPUNCTURE: CPT

## 2019-07-11 RX ADMIN — ACETAMINOPHEN 1000 MG: 500 TABLET ORAL at 09:10

## 2019-07-11 RX ADMIN — FAMOTIDINE 20 MG: 10 INJECTION INTRAVENOUS at 20:38

## 2019-07-11 RX ADMIN — HEPARIN SODIUM 5000 UNITS: 5000 INJECTION INTRAVENOUS; SUBCUTANEOUS at 09:10

## 2019-07-11 RX ADMIN — HEPARIN SODIUM 5000 UNITS: 5000 INJECTION INTRAVENOUS; SUBCUTANEOUS at 16:25

## 2019-07-11 RX ADMIN — HEPARIN SODIUM 5000 UNITS: 5000 INJECTION INTRAVENOUS; SUBCUTANEOUS at 23:56

## 2019-07-11 RX ADMIN — GABAPENTIN 300 MG: 300 CAPSULE ORAL at 21:30

## 2019-07-11 RX ADMIN — ACETAMINOPHEN 1000 MG: 500 TABLET ORAL at 20:46

## 2019-07-11 RX ADMIN — MORPHINE SULFATE 2 MG: 2 INJECTION, SOLUTION INTRAMUSCULAR; INTRAVENOUS at 14:45

## 2019-07-11 RX ADMIN — GABAPENTIN 300 MG: 300 CAPSULE ORAL at 16:25

## 2019-07-11 RX ADMIN — GABAPENTIN 300 MG: 300 CAPSULE ORAL at 09:10

## 2019-07-11 RX ADMIN — ACETAMINOPHEN 1000 MG: 500 TABLET ORAL at 13:29

## 2019-07-11 RX ADMIN — ACETAMINOPHEN 1000 MG: 500 TABLET ORAL at 01:11

## 2019-07-11 RX ADMIN — DEXTROSE MONOHYDRATE, SODIUM CHLORIDE, AND POTASSIUM CHLORIDE 100 ML/HR: 50; 4.5; 1.49 INJECTION, SOLUTION INTRAVENOUS at 18:50

## 2019-07-11 RX ADMIN — LATANOPROST 1 DROP: 50 SOLUTION OPHTHALMIC at 20:48

## 2019-07-11 RX ADMIN — DEXTROSE MONOHYDRATE, SODIUM CHLORIDE, AND POTASSIUM CHLORIDE 100 ML/HR: 50; 4.5; 1.49 INJECTION, SOLUTION INTRAVENOUS at 06:19

## 2019-07-11 RX ADMIN — FAMOTIDINE 20 MG: 10 INJECTION INTRAVENOUS at 09:10

## 2019-07-11 NOTE — ROUTINE PROCESS
Bedside and Verbal shift change report given to Ken Padilla RN (oncoming nurse) by Yoni Palafox RN (offgoing nurse).  Report included the following information Karmary carmen and MAR.     Gilbert Fairchild RN assumes care of pt

## 2019-07-11 NOTE — PROGRESS NOTES
Bedside shift change report given to Amira Rivera (oncoming nurse) by Jaqueline and Herzackary (offgoing nurse). Report included the following information SBAR, Kardex, Intake/Output and MAR.

## 2019-07-11 NOTE — CDMP QUERY
Patient admitted s/p ileostomy reversal, noted to have episodes of low blood glocose. If possible, please document in progress notes and d/c summary if you are evaluating and/or treating any of the following: ? Hypoglycemia  
? Other Explanation of clinical findings ? Clinically Undetermined (no explanation for clinical findings) The medical record reflects the following: 
 
   Risk Factors: 81 yo male s/p ileostomy reversal on 7/5 Clinical Indicators: Per NN blood glucose 7/9 67  7/10 70 
 
7/10 Per Dietician Glycemic Control Noted pt having multiple episodes of hypoglycemia. Dextrose added to IVF. Pt is currently NPOContinue frequent glucose monitoring and treatment per hypoglycemia protocol. 7/10 Per Dietician Nutrition Diagnosis: Inadequate oral intake related to altered GI function as evidenced by pt s/p GI surgery Treatment: Protocol for low blood sugar, IV changed to D5 1/2NS +20 K from 1/2 NS + 20 K, Dietician consult Thank you, 
Emiliano Lee RN CDI   [phone]

## 2019-07-11 NOTE — PROGRESS NOTES
[facility]  [address] [place] [id number]  [phone]  Colon and Rectal Surgery Progress Note      Patient: Melanie Montoya MRN: [id number]  SSN: [id number]    YOB: 1950  Age: 80 y.o. Sex: male      Admit Date: 7/5/2019    LOS: 6 days     Subjective:   No acute events overnight. Continues to have flatulence and small, clear BMs overnight. Denies N/V, CP, or SOB. Abdominal pain is tolerable. Patient is walking around the unit. Objective:     Vitals:    07/10/19 1521 07/10/19 1915 07/10/19 2156 07/11/19 0527   BP: 109/67 124/67 125/75 146/82   Pulse: 78 75 71 73   Resp: 16 16 16 16   Temp: 97.4 °F (36.3 °C) 97.6 °F (36.4 °C) 98.5 °F (36.9 °C) 98 °F (36.7 °C)   SpO2: 96% 97% 96% 98%   Weight:       Height:            Intake and Output:  Current Shift: No intake/output data recorded. Last three shifts: 07/09 1901 - 07/11 0700  In: 6155 [I.V.:6155]  Out: 1225 [Urine:1225]    Physical Exam:   Physical Exam   Constitutional: He is oriented to person, place, and time. He appears well-developed and well-nourished. HENT:   Head: Normocephalic and atraumatic. Eyes: Conjunctivae and EOM are normal.   Neck: Normal range of motion. Neck supple. Musculoskeletal: Normal range of motion. Lymphadenopathy:        Right: No inguinal adenopathy present. Left: No inguinal adenopathy present. Neurological: He is alert and oriented to person, place, and time. Skin: Skin is warm and dry. No erythema.    Abdomen: Soft, non-tender, moderately distended (improved from yesterday); post-procedure site wrapped in gauze and is c/d/i     Lab/Data Review:  Recent Results (from the past 24 hour(s))   GLUCOSE, POC    Collection Time: 07/10/19 11:33 AM   Result Value Ref Range    Glucose (POC) 70 70 - 110 mg/dL   GLUCOSE, POC    Collection Time: 07/10/19 12:47 PM   Result Value Ref Range    Glucose (POC) 104 70 - 110 mg/dL   GLUCOSE, POC    Collection Time: 07/10/19  5:46 PM   Result Value Ref Range    Glucose (POC) 92 70 - 110 mg/dL   GLUCOSE, POC    Collection Time: 07/10/19 11:19 PM   Result Value Ref Range    Glucose (POC) 92 70 - 085 mg/dL   METABOLIC PANEL, BASIC    Collection Time: 07/11/19  3:54 AM   Result Value Ref Range    Sodium 140 136 - 145 mmol/L    Potassium 3.4 (L) 3.5 - 5.5 mmol/L    Chloride 110 (H) 100 - 108 mmol/L    CO2 22 21 - 32 mmol/L    Anion gap 8 3.0 - 18 mmol/L    Glucose 95 74 - 99 mg/dL    BUN 10 7.0 - 18 MG/DL    Creatinine 0.99 0.6 - 1.3 MG/DL    BUN/Creatinine ratio 10 (L) 12 - 20      GFR est AA >60 >60 ml/min/1.73m2    GFR est non-AA >60 >60 ml/min/1.73m2    Calcium 8.5 8.5 - 10.1 MG/DL   CBC W/O DIFF    Collection Time: 07/11/19  3:54 AM   Result Value Ref Range    WBC 8.1 4.6 - 13.2 K/uL    RBC 3.35 (L) 4.70 - 5.50 M/uL    HGB 9.6 (L) 13.0 - 16.0 g/dL    HCT 28.6 (L) 36.0 - 48.0 %    MCV 85.4 74.0 - 97.0 FL    MCH 28.7 24.0 - 34.0 PG    MCHC 33.6 31.0 - 37.0 g/dL    RDW 13.3 11.6 - 14.5 %    PLATELET 195 414 - 518 K/uL    MPV 9.0 (L) 9.2 - 11.8 FL   MAGNESIUM    Collection Time: 07/11/19  3:54 AM   Result Value Ref Range    Magnesium 1.7 1.6 - 2.6 mg/dL   PHOSPHORUS    Collection Time: 07/11/19  3:54 AM   Result Value Ref Range    Phosphorus 3.3 2.5 - 4.9 MG/DL   GLUCOSE, POC    Collection Time: 07/11/19  6:23 AM   Result Value Ref Range    Glucose (POC) 102 70 - 110 mg/dL       Assessment:   Mr. Tora Skiff is a 81 yo black male w/PMHx of sigmoid colon cancer who presented for ileostomy reversal.      Sigmoid Colon Cancer s/p Loop Ileostomy Reversal: Continues to have abdominal distention, but without N/V or abdominal pain. +Flactulance and BM.      Plan:   - Advance to clear liquid diet  - Consider TPN if patient does not tolerate clear liquids  - Nutrition consult tomorrow, pending diet trial  - Continue fluids  - Continue ambulation   - Continue daily dressing changes  - Monitor for changes in bowel function  - Daily CBC, BMP, Mag, and García Viera MD, PGY-2  [facility]  07/11/19 7:23 AM

## 2019-07-11 NOTE — PROGRESS NOTES
conducted an initial consultation and Spiritual Assessment for Kiah Barajas, who is a 80 y.o.,male. Patient's Primary Language is: [place]. According to the patient's EMR Restorationist Affiliation is: [facility].     The reason the Patient came to the hospital is:   Patient Active Problem List    Diagnosis Date Noted    Diverticulitis 07/05/2019    Colon cancer ([address] 12/04/2018    Colon polyps 11/12/2018    Hemorrhoids 11/12/2018    Debility 11/05/2018    Leukocytosis 11/05/2018    Intractable low back pain 11/05/2018         conducted a Follow up consultation and Spiritual Assessment for Kiah Barajas, who is a 80 y.o.,male. The  provided the following Interventions:  Continued the relationship of care and support with this pleasant 80year old male patient. Listened empathically as the patient shared the reason for and progress since his hopsitalization. Offered prayer and assurance of continued prayer on patients behalf. The following outcomes were achieved:  Patient expressed gratitude for 's visit. Assessment:  There are no further spiritual or Hoahaoism issues which require Spiritual Care Services interventions at this time. Plan:  Chaplains will continue to follow and will provide pastoral care on an as needed/requested basis.  recommends bedside caregivers page  on duty if patient shows signs of acute spiritual or emotional distress.        [address] [place] [address]   [phone]

## 2019-07-11 NOTE — PROGRESS NOTES
Problem: General Infection Care Plan (Adult and Pediatric)  Goal: Improvement in signs and symptoms of infection  7/11/2019 1535 by Dago Aguilera RN  Outcome: Progressing Towards Goal  7/11/2019 1535 by Dago Aguilera RN  Outcome: Progressing Towards Goal  Goal: *Optimize nutritional status  7/11/2019 1535 by Dago Aguilera RN  Outcome: Progressing Towards Goal  7/11/2019 1535 by Dago Aguilera RN  Outcome: Progressing Towards Goal     Problem: Pain  Goal: *Control of Pain  7/11/2019 1535 by Dago Aguilera RN  Outcome: Progressing Towards Goal  7/11/2019 1535 by Dago Aguilera RN  Outcome: Progressing Towards Goal  Goal: *PALLIATIVE CARE:  Alleviation of Pain  7/11/2019 1535 by Dago Aguilera RN  Outcome: Progressing Towards Goal  7/11/2019 1535 by Dago Aguilera RN  Outcome: Progressing Towards Goal     Problem: General Medical Care Plan  Goal: *Vital signs within specified parameters  7/11/2019 1535 by Dago Aguilera RN  Outcome: Progressing Towards Goal  7/11/2019 1535 by Dago Aguilera RN  Outcome: Progressing Towards Goal  Goal: *Labs within defined limits  7/11/2019 1535 by Dago Aguilera RN  Outcome: Progressing Towards Goal  7/11/2019 1535 by Dago Aguilera RN  Outcome: Progressing Towards Goal  Goal: *Absence of infection signs and symptoms  7/11/2019 1535 by Dago Aguilera RN  Outcome: Progressing Towards Goal  7/11/2019 1535 by Dago Aguilera RN  Outcome: Progressing Towards Goal  Goal: *Optimal pain control at patient's stated goal  7/11/2019 1535 by Dago Aguilera RN  Outcome: Progressing Towards Goal  7/11/2019 1535 by Dago Aguilera RN  Outcome: Progressing Towards Goal  Goal: *Skin integrity maintained  7/11/2019 1535 by Dago Aguilera RN  Outcome: Progressing Towards Goal  7/11/2019 1535 by Dago Aguilera RN  Outcome: Progressing Towards Goal  Goal: *Fluid volume balance  7/11/2019 1535 by Dago Aguilera RN  Outcome: Progressing Towards Goal  7/11/2019 1535 by Oksana Singh RN  Outcome: Progressing Towards Goal  Goal: *Optimize nutritional status  7/11/2019 1535 by Oksana Singh RN  Outcome: Progressing Towards Goal  7/11/2019 1535 by Oksana Singh RN  Outcome: Progressing Towards Goal  Goal: *Anxiety reduced or absent  7/11/2019 1535 by Oksana Singh RN  Outcome: Progressing Towards Goal  7/11/2019 1535 by Oksana Singh RN  Outcome: Progressing Towards Goal  Goal: *Progressive mobility and function (eg: ADL's)  7/11/2019 1535 by Oksana Singh RN  Outcome: Progressing Towards Goal  7/11/2019 1535 by Oksana Singh RN  Outcome: Progressing Towards Goal     Problem: Falls - Risk of  Goal: *Absence of Falls  Description  Document Randee Girt Fall Risk and appropriate interventions in the flowsheet. 7/11/2019 1535 by Oksana Singh RN  Outcome: Progressing Towards Goal  7/11/2019 1535 by Oksana Singh RN  Outcome: Progressing Towards Goal     Problem: Patient Education: Go to Patient Education Activity  Goal: Patient/Family Education  7/11/2019 1535 by Oksana Singh RN  Outcome: Progressing Towards Goal  7/11/2019 1535 by Oksana Singh RN  Outcome: Progressing Towards Goal     Problem: Pressure Injury - Risk of  Goal: *Prevention of pressure injury  Description  Document Edin Scale and appropriate interventions in the flowsheet.   7/11/2019 1535 by Oksana Singh RN  Outcome: Progressing Towards Goal  7/11/2019 1535 by Oksana Singh RN  Outcome: Progressing Towards Goal     Problem: Patient Education: Go to Patient Education Activity  Goal: Patient/Family Education  7/11/2019 1535 by Oksana Singh RN  Outcome: Progressing Towards Goal  7/11/2019 1535 by Oksana Singh RN  Outcome: Progressing Towards Goal     Problem: Nutrition Deficit  Goal: *Optimize nutritional status  7/11/2019 1535 by Oksana Singh RN  Outcome: Progressing Towards Goal  7/11/2019 1535 by Oksana Singh RN  Outcome: Progressing Towards Goal

## 2019-07-12 LAB
ANION GAP SERPL CALC-SCNC: 3 MMOL/L (ref 3–18)
BUN SERPL-MCNC: 8 MG/DL (ref 7–18)
BUN/CREAT SERPL: 8 (ref 12–20)
CALCIUM SERPL-MCNC: 8.3 MG/DL (ref 8.5–10.1)
CHLORIDE SERPL-SCNC: 111 MMOL/L (ref 100–108)
CO2 SERPL-SCNC: 25 MMOL/L (ref 21–32)
CREAT SERPL-MCNC: 0.97 MG/DL (ref 0.6–1.3)
ERYTHROCYTE [DISTWIDTH] IN BLOOD BY AUTOMATED COUNT: 13.2 % (ref 11.6–14.5)
GLUCOSE BLD STRIP.AUTO-MCNC: 132 MG/DL (ref 70–110)
GLUCOSE BLD STRIP.AUTO-MCNC: 84 MG/DL (ref 70–110)
GLUCOSE BLD STRIP.AUTO-MCNC: 95 MG/DL (ref 70–110)
GLUCOSE BLD STRIP.AUTO-MCNC: 97 MG/DL (ref 70–110)
GLUCOSE SERPL-MCNC: 95 MG/DL (ref 74–99)
HCT VFR BLD AUTO: 27.8 % (ref 36–48)
HGB BLD-MCNC: 9.6 G/DL (ref 13–16)
MAGNESIUM SERPL-MCNC: 1.3 MG/DL (ref 1.6–2.6)
MCH RBC QN AUTO: 29.3 PG (ref 24–34)
MCHC RBC AUTO-ENTMCNC: 34.5 G/DL (ref 31–37)
MCV RBC AUTO: 84.8 FL (ref 74–97)
PHOSPHATE SERPL-MCNC: 3.3 MG/DL (ref 2.5–4.9)
PLATELET # BLD AUTO: 257 K/UL (ref 135–420)
PMV BLD AUTO: 9.3 FL (ref 9.2–11.8)
POTASSIUM SERPL-SCNC: 3.4 MMOL/L (ref 3.5–5.5)
RBC # BLD AUTO: 3.28 M/UL (ref 4.7–5.5)
SODIUM SERPL-SCNC: 139 MMOL/L (ref 136–145)
WBC # BLD AUTO: 8.1 K/UL (ref 4.6–13.2)

## 2019-07-12 PROCEDURE — 82962 GLUCOSE BLOOD TEST: CPT

## 2019-07-12 PROCEDURE — 80048 BASIC METABOLIC PNL TOTAL CA: CPT

## 2019-07-12 PROCEDURE — 83735 ASSAY OF MAGNESIUM: CPT

## 2019-07-12 PROCEDURE — 74011250636 HC RX REV CODE- 250/636: Performed by: STUDENT IN AN ORGANIZED HEALTH CARE EDUCATION/TRAINING PROGRAM

## 2019-07-12 PROCEDURE — 74011250637 HC RX REV CODE- 250/637: Performed by: COLON & RECTAL SURGERY

## 2019-07-12 PROCEDURE — 85027 COMPLETE CBC AUTOMATED: CPT

## 2019-07-12 PROCEDURE — 84100 ASSAY OF PHOSPHORUS: CPT

## 2019-07-12 PROCEDURE — 74011250636 HC RX REV CODE- 250/636: Performed by: COLON & RECTAL SURGERY

## 2019-07-12 PROCEDURE — 74011000250 HC RX REV CODE- 250: Performed by: COLON & RECTAL SURGERY

## 2019-07-12 PROCEDURE — 36415 COLL VENOUS BLD VENIPUNCTURE: CPT

## 2019-07-12 PROCEDURE — 65270000029 HC RM PRIVATE

## 2019-07-12 RX ADMIN — GABAPENTIN 300 MG: 300 CAPSULE ORAL at 16:32

## 2019-07-12 RX ADMIN — FAMOTIDINE 20 MG: 10 INJECTION INTRAVENOUS at 08:48

## 2019-07-12 RX ADMIN — GABAPENTIN 300 MG: 300 CAPSULE ORAL at 22:52

## 2019-07-12 RX ADMIN — FAMOTIDINE 20 MG: 10 INJECTION INTRAVENOUS at 22:52

## 2019-07-12 RX ADMIN — NEBIVOLOL HYDROCHLORIDE 5 MG: 5 TABLET ORAL at 08:49

## 2019-07-12 RX ADMIN — DEXTROSE MONOHYDRATE, SODIUM CHLORIDE, AND POTASSIUM CHLORIDE 100 ML/HR: 50; 4.5; 1.49 INJECTION, SOLUTION INTRAVENOUS at 04:41

## 2019-07-12 RX ADMIN — HEPARIN SODIUM 5000 UNITS: 5000 INJECTION INTRAVENOUS; SUBCUTANEOUS at 08:48

## 2019-07-12 RX ADMIN — MORPHINE SULFATE 2 MG: 2 INJECTION, SOLUTION INTRAMUSCULAR; INTRAVENOUS at 16:32

## 2019-07-12 RX ADMIN — MORPHINE SULFATE 2 MG: 2 INJECTION, SOLUTION INTRAMUSCULAR; INTRAVENOUS at 00:01

## 2019-07-12 RX ADMIN — LATANOPROST 1 DROP: 50 SOLUTION OPHTHALMIC at 22:53

## 2019-07-12 RX ADMIN — GABAPENTIN 300 MG: 300 CAPSULE ORAL at 08:48

## 2019-07-12 RX ADMIN — ACETAMINOPHEN 1000 MG: 500 TABLET ORAL at 08:49

## 2019-07-12 RX ADMIN — ACETAMINOPHEN 1000 MG: 500 TABLET ORAL at 01:14

## 2019-07-12 RX ADMIN — ACETAMINOPHEN 1000 MG: 500 TABLET ORAL at 14:35

## 2019-07-12 RX ADMIN — HEPARIN SODIUM 5000 UNITS: 5000 INJECTION INTRAVENOUS; SUBCUTANEOUS at 16:32

## 2019-07-12 RX ADMIN — ACETAMINOPHEN 1000 MG: 500 TABLET ORAL at 22:51

## 2019-07-12 NOTE — PROGRESS NOTES
Problem: General Infection Care Plan (Adult and Pediatric)  Goal: Improvement in signs and symptoms of infection  Outcome: Progressing Towards Goal  Goal: *Optimize nutritional status  Outcome: Progressing Towards Goal     Problem: Pain  Goal: *Control of Pain  Outcome: Progressing Towards Goal  Goal: *PALLIATIVE CARE:  Alleviation of Pain  Outcome: Progressing Towards Goal     Problem: General Medical Care Plan  Goal: *Vital signs within specified parameters  Outcome: Progressing Towards Goal  Goal: *Labs within defined limits  Outcome: Progressing Towards Goal  Goal: *Absence of infection signs and symptoms  Outcome: Progressing Towards Goal  Goal: *Optimal pain control at patient's stated goal  Outcome: Progressing Towards Goal  Goal: *Skin integrity maintained  Outcome: Progressing Towards Goal  Goal: *Fluid volume balance  Outcome: Progressing Towards Goal  Goal: *Optimize nutritional status  Outcome: Progressing Towards Goal  Goal: *Anxiety reduced or absent  Outcome: Progressing Towards Goal  Goal: *Progressive mobility and function (eg: ADL's)  Outcome: Progressing Towards Goal     Problem: Falls - Risk of  Goal: *Absence of Falls  Description  Document Tawanda Fall Risk and appropriate interventions in the flowsheet. Outcome: Progressing Towards Goal     Problem: Patient Education: Go to Patient Education Activity  Goal: Patient/Family Education  Outcome: Progressing Towards Goal     Problem: Pressure Injury - Risk of  Goal: *Prevention of pressure injury  Description  Document Edin Scale and appropriate interventions in the flowsheet.   Outcome: Progressing Towards Goal     Problem: Patient Education: Go to Patient Education Activity  Goal: Patient/Family Education  Outcome: Progressing Towards Goal     Problem: Nutrition Deficit  Goal: *Optimize nutritional status  Outcome: Progressing Towards Goal

## 2019-07-12 NOTE — PROGRESS NOTES
Met with pt at bedside. Pt's discharge plan remains the same. Home with spouse. Do not anticipate any needs at this time. CM will continue to follow.  Rita Gay, [phone]

## 2019-07-12 NOTE — PROGRESS NOTES
Bedside shift change report given to Jodee (oncoming nurse) by Jeffery Glynn (offgoing nurse). Report included the following information SBAR, Kardex, Intake/Output and MAR.

## 2019-07-12 NOTE — PROGRESS NOTES
NUTRITION    Nutrition Screen      RECOMMENDATIONS / PLAN:     - Monitor GI symptoms and readiness for diet advancement. - Add nutritional supplements: Ensure Clear BID to optimize nutrition intake. - Continue RD inpatient monitoring and evaluation. NUTRITION INTERVENTIONS & DIAGNOSIS:     [x] Meals/snacks: modified composition  [x] Medical food supplement therapy: initiate     Nutrition Diagnosis: Inadequate oral intake related to altered GI function as evidenced by pt s/p GI surgery, awaiting proper bowel function. ASSESSMENT:     7/12: Tolerating diet, consuming about 50% of meals. +BMs. No diet advancement today, but pt abdominal picture improving per discussion with surgery. K just below normal, IVF with KCl stopped, but supplements will provide additional K.  7/10: S/p loop ileostomy reversal on 7/5. +BMs. Clear liquid diet started 7/7, discontinued 7/8; pt did not consume anything of the meals. Diet to be resumed today per MD note. Pt consumes Ensure at home, 3/day after wt loss, then decreased to 1-2/day. Agreeable to starting in house, prefers vanilla/chocolate. Low K noted, IVF contain K, dextrose added for lower BG levels.      Average po intake adequate to meet patients estimated nutritional needs:   [] Yes     [x] No   [] Unable to determine at this time    Diet: DIET CLEAR LIQUID      Food Allergies: KNFA  Current Appetite:   [] Good     [x] Fair     [] Poor     [] Other:   Appetite/meal intake prior to admission:   [x] Good     [] Fair     [] Poor     [] Other:  Feeding Limitations:  [] Swallowing difficulty    [] Chewing difficulty    [] Other:  Current Meal Intake: Patient Vitals for the past 100 hrs:   % Diet Eaten   07/09/19 1004 0 %       BM: 7/12  Skin Integrity: surgical incision to abdomen  Edema:   [x] No     [] Yes   Pertinent Medications: Reviewed: pepcid, SSI, zofran    Recent Labs     07/12/19  0326 07/11/19  0354 07/10/19  0457    140 138   K 3.4* 3.4* 3.4*   * 110* 110*   CO2 25 22 19*   GLU 95 95 61*   BUN 8 10 15   CREA 0.97 0.99 1.07   CA 8.3* 8.5 8.5   MG 1.3* 1.7 1.6   PHOS 3.3 3.3 3.0       Intake/Output Summary (Last 24 hours) at 7/12/2019 1237  Last data filed at 7/12/2019 0600  Gross per 24 hour   Intake --   Output 725 ml   Net -725 ml       Anthropometrics:  Ht Readings from Last 1 Encounters:   07/05/19 5' 10\" (1.778 m)     Last 3 Recorded Weights in this Encounter    06/25/19 1409 07/05/19 0834   Weight: 68.9 kg (152 lb) 67.2 kg (148 lb 3.2 oz)     Body mass index is 21.26 kg/m². Weight History: -15# (9%) x 8 months. Pt reports weight loss down to 130# around Dec-Jan, with weight gain since. Weight Metrics 7/5/2019 6/10/2019 5/8/2019 4/22/2019 1/24/2019 1/17/2019 1/10/2019   Weight 148 lb 3.2 oz 152 lb 144 lb 150 lb 150 lb 144 lb 8 oz 142 lb   BMI 21.26 kg/m2 21.5 kg/m2 20.66 kg/m2 20.92 kg/m2 20.92 kg/m2 20.73 kg/m2 20.37 kg/m2        Admitting Diagnosis: Diverticulitis [K57.92]  Pertinent PMHx: throat cancer, colon cancer, DM, HTN, GERD    Education Needs:        [x] None identified  [] Identified - Not appropriate at this time  []  Identified and addressed - refer to education log  Learning Limitations:   [x] None identified  [] Identified    Cultural, Adventism & ethnic food preferences:  [x] None identified    [] Identified and addressed     ESTIMATED NUTRITION NEEDS:     Calories: 1485-5580 kcal (MSJx1.2-1.3) based on  [x] Actual BW 67 kg     [] IBW   Protein: 54-81 gm (0.8-1.2 gm/kg) based on  [x] Actual BW      [] IBW   Fluid: 1 mL/kcal     MONITORING & EVALUATION:     Nutrition Goal(s):   1. Po intake of meals will meet >75% of patient estimated nutritional needs within the next 7 days.   Outcome:  [] Met/Ongoing    [x] Progressing towards goal    [] Not progressing towards goal    [x] New/Initial goal     Monitoring:   [x] Food and beverage intake   [x] Diet order   [x] Nutrition-focused physical findings   [x] Treatment/therapy   [] Weight [] Enteral nutrition intake        Previous Recommendations (for follow-up assessments only):     []   Implemented       [x]   Not Implemented (RD to address)      [] No Longer Appropriate     [] No Recommendation Made     Discharge Planning: pending diet tolerance  [x] Participated in care planning, discharge planning, & interdisciplinary rounds as appropriate      Colonel Margaret RD, [address] [place]   Pager: [phone]

## 2019-07-12 NOTE — PROGRESS NOTES
[facility]  [address] [place] [address]  [phone]  Colon and Rectal Surgery Progress Note      Patient: Curvin Bamberger MRN: [id number]  SSN: [id number]    YOB: 1950  Age: 80 y.o. Sex: male      Admit Date: 7/5/2019    LOS: 7 days     Subjective:   No acute events overnight. Continues to have flatulence and multiple BMs overnight. Denies N/V, CP, or SOB. Abdominal pain is tolerable. Patient is walking around the unit. Objective:     Vitals:    07/11/19 1826 07/11/19 2301 07/12/19 0220 07/12/19 0640   BP: 151/87 130/74 156/75 144/78   Pulse: 76 80 78 70   Resp: 16 14 14 14   Temp: 97.8 °F (36.6 °C) 97 °F (36.1 °C) 97 °F (36.1 °C) 97.8 °F (36.6 °C)   SpO2: 99% 96% 95% 98%   Weight:       Height:            Intake and Output:  Current Shift: No intake/output data recorded. Last three shifts: 07/10 1901 - 07/12 0700  In: 1800 [I.V.:1800]  Out: 1075 [Urine:1075]    Physical Exam:   Physical Exam   Constitutional: He is oriented to person, place, and time. He appears well-developed and well-nourished. HENT:   Head: Normocephalic and atraumatic. Eyes: Conjunctivae and EOM are normal.   Neck: Normal range of motion. Neck supple. Musculoskeletal: Normal range of motion. Lymphadenopathy:        Right: No inguinal adenopathy present. Left: No inguinal adenopathy present. Neurological: He is alert and oriented to person, place, and time. Skin: Skin is warm and dry. No erythema.    Abdomen: Soft, non-tender, mildly distended (improved from yesterday); post-procedure site wrapped in gauze and is c/d/i     Lab/Data Review:  Recent Results (from the past 24 hour(s))   GLUCOSE, POC    Collection Time: 07/11/19 12:27 PM   Result Value Ref Range    Glucose (POC) 87 70 - 110 mg/dL   GLUCOSE, POC    Collection Time: 07/11/19  6:30 PM   Result Value Ref Range    Glucose (POC) 100 70 - 110 mg/dL   GLUCOSE, POC    Collection Time: 07/11/19 11:52 PM   Result Value Ref Range    Glucose (POC) 107 70 - 098 mg/dL   METABOLIC PANEL, BASIC    Collection Time: 07/12/19  3:26 AM   Result Value Ref Range    Sodium 139 136 - 145 mmol/L    Potassium 3.4 (L) 3.5 - 5.5 mmol/L    Chloride 111 (H) 100 - 108 mmol/L    CO2 25 21 - 32 mmol/L    Anion gap 3 3.0 - 18 mmol/L    Glucose 95 74 - 99 mg/dL    BUN 8 7.0 - 18 MG/DL    Creatinine 0.97 0.6 - 1.3 MG/DL    BUN/Creatinine ratio 8 (L) 12 - 20      GFR est AA >60 >60 ml/min/1.73m2    GFR est non-AA >60 >60 ml/min/1.73m2    Calcium 8.3 (L) 8.5 - 10.1 MG/DL   CBC W/O DIFF    Collection Time: 07/12/19  3:26 AM   Result Value Ref Range    WBC 8.1 4.6 - 13.2 K/uL    RBC 3.28 (L) 4.70 - 5.50 M/uL    HGB 9.6 (L) 13.0 - 16.0 g/dL    HCT 27.8 (L) 36.0 - 48.0 %    MCV 84.8 74.0 - 97.0 FL    MCH 29.3 24.0 - 34.0 PG    MCHC 34.5 31.0 - 37.0 g/dL    RDW 13.2 11.6 - 14.5 %    PLATELET 599 940 - 087 K/uL    MPV 9.3 9.2 - 11.8 FL   MAGNESIUM    Collection Time: 07/12/19  3:26 AM   Result Value Ref Range    Magnesium 1.3 (L) 1.6 - 2.6 mg/dL   PHOSPHORUS    Collection Time: 07/12/19  3:26 AM   Result Value Ref Range    Phosphorus 3.3 2.5 - 4.9 MG/DL   GLUCOSE, POC    Collection Time: 07/12/19  6:16 AM   Result Value Ref Range    Glucose (POC) 95 70 - 110 mg/dL       Assessment:   Mr. Matthew Bassett is a 81 yo black male w/PMHx of sigmoid colon cancer who presented for ileostomy reversal.      Sigmoid Colon Cancer s/p Loop Ileostomy Reversal: Continues to have abdominal distention, but without N/V or abdominal pain. +Flactulance and BM. Plan:   - Continue clear liquid diet  - Continue fluids  - Continue ambulation   - Continue daily dressing changes  - Monitor for changes in bowel function  - Daily CBC, BMP, Mag, and Phos    Jasmyn Vargas MD, PGY-2  [facility]  07/12/19 7:13 AM

## 2019-07-13 LAB
ANION GAP SERPL CALC-SCNC: 7 MMOL/L (ref 3–18)
BUN SERPL-MCNC: 7 MG/DL (ref 7–18)
BUN/CREAT SERPL: 7 (ref 12–20)
CALCIUM SERPL-MCNC: 8.3 MG/DL (ref 8.5–10.1)
CHLORIDE SERPL-SCNC: 109 MMOL/L (ref 100–108)
CO2 SERPL-SCNC: 22 MMOL/L (ref 21–32)
CREAT SERPL-MCNC: 1.02 MG/DL (ref 0.6–1.3)
ERYTHROCYTE [DISTWIDTH] IN BLOOD BY AUTOMATED COUNT: 13.4 % (ref 11.6–14.5)
GLUCOSE BLD STRIP.AUTO-MCNC: 103 MG/DL (ref 70–110)
GLUCOSE BLD STRIP.AUTO-MCNC: 132 MG/DL (ref 70–110)
GLUCOSE BLD STRIP.AUTO-MCNC: 84 MG/DL (ref 70–110)
GLUCOSE SERPL-MCNC: 71 MG/DL (ref 74–99)
HCT VFR BLD AUTO: 29.3 % (ref 36–48)
HGB BLD-MCNC: 9.7 G/DL (ref 13–16)
MAGNESIUM SERPL-MCNC: 1.4 MG/DL (ref 1.6–2.6)
MCH RBC QN AUTO: 28.3 PG (ref 24–34)
MCHC RBC AUTO-ENTMCNC: 33.1 G/DL (ref 31–37)
MCV RBC AUTO: 85.4 FL (ref 74–97)
PHOSPHATE SERPL-MCNC: 3.9 MG/DL (ref 2.5–4.9)
PLATELET # BLD AUTO: 278 K/UL (ref 135–420)
PMV BLD AUTO: 9.2 FL (ref 9.2–11.8)
POTASSIUM SERPL-SCNC: 3.2 MMOL/L (ref 3.5–5.5)
RBC # BLD AUTO: 3.43 M/UL (ref 4.7–5.5)
SODIUM SERPL-SCNC: 138 MMOL/L (ref 136–145)
WBC # BLD AUTO: 8.5 K/UL (ref 4.6–13.2)

## 2019-07-13 PROCEDURE — 36415 COLL VENOUS BLD VENIPUNCTURE: CPT

## 2019-07-13 PROCEDURE — 74011250636 HC RX REV CODE- 250/636: Performed by: COLON & RECTAL SURGERY

## 2019-07-13 PROCEDURE — 83735 ASSAY OF MAGNESIUM: CPT

## 2019-07-13 PROCEDURE — 74011000258 HC RX REV CODE- 258: Performed by: COLON & RECTAL SURGERY

## 2019-07-13 PROCEDURE — 82962 GLUCOSE BLOOD TEST: CPT

## 2019-07-13 PROCEDURE — 80048 BASIC METABOLIC PNL TOTAL CA: CPT

## 2019-07-13 PROCEDURE — 74011000250 HC RX REV CODE- 250: Performed by: COLON & RECTAL SURGERY

## 2019-07-13 PROCEDURE — 65270000029 HC RM PRIVATE

## 2019-07-13 PROCEDURE — 85027 COMPLETE CBC AUTOMATED: CPT

## 2019-07-13 PROCEDURE — 74011250637 HC RX REV CODE- 250/637: Performed by: COLON & RECTAL SURGERY

## 2019-07-13 PROCEDURE — 84100 ASSAY OF PHOSPHORUS: CPT

## 2019-07-13 RX ORDER — MAGNESIUM SULFATE HEPTAHYDRATE 40 MG/ML
4 INJECTION, SOLUTION INTRAVENOUS ONCE
Status: COMPLETED | OUTPATIENT
Start: 2019-07-13 | End: 2019-07-13

## 2019-07-13 RX ADMIN — POTASSIUM CHLORIDE: 2 INJECTION, SOLUTION, CONCENTRATE INTRAVENOUS at 14:12

## 2019-07-13 RX ADMIN — ACETAMINOPHEN 1000 MG: 500 TABLET ORAL at 16:46

## 2019-07-13 RX ADMIN — POTASSIUM CHLORIDE: 2 INJECTION, SOLUTION, CONCENTRATE INTRAVENOUS at 16:46

## 2019-07-13 RX ADMIN — GABAPENTIN 300 MG: 300 CAPSULE ORAL at 15:04

## 2019-07-13 RX ADMIN — MORPHINE SULFATE 2 MG: 2 INJECTION, SOLUTION INTRAMUSCULAR; INTRAVENOUS at 04:54

## 2019-07-13 RX ADMIN — HEPARIN SODIUM 5000 UNITS: 5000 INJECTION INTRAVENOUS; SUBCUTANEOUS at 02:04

## 2019-07-13 RX ADMIN — POTASSIUM CHLORIDE: 2 INJECTION, SOLUTION, CONCENTRATE INTRAVENOUS at 14:59

## 2019-07-13 RX ADMIN — GABAPENTIN 300 MG: 300 CAPSULE ORAL at 08:28

## 2019-07-13 RX ADMIN — ACETAMINOPHEN 1000 MG: 500 TABLET ORAL at 22:36

## 2019-07-13 RX ADMIN — FAMOTIDINE 20 MG: 10 INJECTION INTRAVENOUS at 22:37

## 2019-07-13 RX ADMIN — HEPARIN SODIUM 5000 UNITS: 5000 INJECTION INTRAVENOUS; SUBCUTANEOUS at 08:27

## 2019-07-13 RX ADMIN — ACETAMINOPHEN 1000 MG: 500 TABLET ORAL at 10:42

## 2019-07-13 RX ADMIN — MORPHINE SULFATE 2 MG: 2 INJECTION, SOLUTION INTRAMUSCULAR; INTRAVENOUS at 22:48

## 2019-07-13 RX ADMIN — LATANOPROST 1 DROP: 50 SOLUTION OPHTHALMIC at 22:37

## 2019-07-13 RX ADMIN — MORPHINE SULFATE 2 MG: 2 INJECTION, SOLUTION INTRAMUSCULAR; INTRAVENOUS at 14:59

## 2019-07-13 RX ADMIN — GABAPENTIN 300 MG: 300 CAPSULE ORAL at 22:36

## 2019-07-13 RX ADMIN — MAGNESIUM SULFATE HEPTAHYDRATE 4 G: 40 INJECTION, SOLUTION INTRAVENOUS at 10:43

## 2019-07-13 RX ADMIN — ACETAMINOPHEN 1000 MG: 500 TABLET ORAL at 04:50

## 2019-07-13 RX ADMIN — HEPARIN SODIUM 5000 UNITS: 5000 INJECTION INTRAVENOUS; SUBCUTANEOUS at 15:04

## 2019-07-13 RX ADMIN — FAMOTIDINE 20 MG: 10 INJECTION INTRAVENOUS at 08:27

## 2019-07-13 RX ADMIN — POTASSIUM CHLORIDE: 2 INJECTION, SOLUTION, CONCENTRATE INTRAVENOUS at 11:48

## 2019-07-13 NOTE — PROGRESS NOTES
Shift progress note:  Assumed care of patient ambulating in hallway with wife in attendance. Uneventful night. medicated x 1 for pain with relief noted. VSS, call ell within reach. Tolerating liquid diet.     Patient Vitals for the past 12 hrs:   Temp Pulse Resp BP SpO2   07/13/19 0624 97 °F (36.1 °C) 76 19 152/86 97 %   07/12/19 2223 98.4 °F (36.9 °C) 75 17 129/76 98 %

## 2019-07-13 NOTE — ROUTINE PROCESS
Bedside and Verbal shift change report given to Lizandro DOSHI RN (oncoming nurse) by Ge Hernandez RN   (offgoing nurse). Report included the following information SBAR, Kardex, Intake/Output, MAR and Recent Results.

## 2019-07-13 NOTE — ROUTINE PROCESS
7/13/2019  07:30 PM    Orthopedic End of Shift Note    Bedside and Verbal shift change report given to DELL Griffith (oncoming nurse) by Andre Ramires RN (offgoing nurse). Report included the following information SBAR, Kardex, OR Summary, Procedure Summary, Intake/Output, MAR, Accordion, Recent Results and Med Rec Status. POD# 5    Significant issues during shift:     Issues for Physician to address:      Activity This Shift  (check all that apply) [x] chair  [] dangle   [x] bathroom  [] bedside commode [x] hallway  [] bedrest   Nausea/Vomiting [] yes [x] no     Voiding Status [x] void [] Mckeon [] I&O Cath   Bowel Movements [x] yes [] no     Foot Pumps or SCD [x] yes [] no    Ice Pack [] yes    [x] no    Incentive Spirometer [x] yes [] no Volume:   1750   Telemetry Monitoring   [] yes [x] no Rhythm:   Supplemental O2 [] yes [x] no Sat off O2:   96%

## 2019-07-13 NOTE — PROGRESS NOTES
[facility]  [address]  [place] [address] [place] [address]  [phone]  Colon and Rectal Surgery Progress Note      Patient: Zoraida Wise MRN: [id number]  SSN: [id number]    YOB: 1950  Age: 80 y.o. Sex: male      Admit Date: 7/5/2019    LOS: 8 days     Subjective: Tolerating clears. Less bloated. +BM and flatus. Objective:     Vitals:    07/12/19 1556 07/12/19 1817 07/12/19 2223 07/13/19 0624   BP: 123/75 129/79 129/76 152/86   Pulse: 75 76 75 76   Resp: 16 16 17 19   Temp: 97 °F (36.1 °C) 97.2 °F (36.2 °C) 98.4 °F (36.9 °C) 97 °F (36.1 °C)   SpO2: 99% 98% 98% 97%   Weight:       Height:            Intake and Output:  Current Shift: No intake/output data recorded.   Last three shifts: 07/11 1901 - 07/13 0700  In: 480 [P.O.:480]  Out: 1175 [Urine:1175]    Physical Exam:     abd soft, mildly distended, NT    Lab/Data Review:  Recent Results (from the past 24 hour(s))   GLUCOSE, POC    Collection Time: 07/12/19 11:27 AM   Result Value Ref Range    Glucose (POC) 84 70 - 110 mg/dL   GLUCOSE, POC    Collection Time: 07/12/19  6:22 PM   Result Value Ref Range    Glucose (POC) 132 (H) 70 - 110 mg/dL   GLUCOSE, POC    Collection Time: 07/12/19 11:45 PM   Result Value Ref Range    Glucose (POC) 97 70 - 396 mg/dL   METABOLIC PANEL, BASIC    Collection Time: 07/13/19  3:39 AM   Result Value Ref Range    Sodium 138 136 - 145 mmol/L    Potassium 3.2 (L) 3.5 - 5.5 mmol/L    Chloride 109 (H) 100 - 108 mmol/L    CO2 22 21 - 32 mmol/L    Anion gap 7 3.0 - 18 mmol/L    Glucose 71 (L) 74 - 99 mg/dL    BUN 7 7.0 - 18 MG/DL    Creatinine 1.02 0.6 - 1.3 MG/DL    BUN/Creatinine ratio 7 (L) 12 - 20      GFR est AA >60 >60 ml/min/1.73m2    GFR est non-AA >60 >60 ml/min/1.73m2    Calcium 8.3 (L) 8.5 - 10.1 MG/DL   CBC W/O DIFF    Collection Time: 07/13/19  3:39 AM   Result Value Ref Range    WBC 8.5 4.6 - 13.2 K/uL    RBC 3.43 (L) 4.70 - 5.50 M/uL    HGB 9.7 (L) 13.0 - 16.0 g/dL    HCT 29.3 (L) 36.0 - 48.0 % MCV 85.4 74.0 - 97.0 FL    MCH 28.3 24.0 - 34.0 PG    MCHC 33.1 31.0 - 37.0 g/dL    RDW 13.4 11.6 - 14.5 %    PLATELET 158 362 - 202 K/uL    MPV 9.2 9.2 - 11.8 FL   MAGNESIUM    Collection Time: 07/13/19  3:39 AM   Result Value Ref Range    Magnesium 1.4 (L) 1.6 - 2.6 mg/dL   PHOSPHORUS    Collection Time: 07/13/19  3:39 AM   Result Value Ref Range    Phosphorus 3.9 2.5 - 4.9 MG/DL   GLUCOSE, POC    Collection Time: 07/13/19  6:27 AM   Result Value Ref Range    Glucose (POC) 84 70 - 110 mg/dL       Assessment:   Mr. Adenike Juan is a 81 yo black male w/PMHx of sigmoid colon cancer who presented for ileostomy reversal.          Plan:   -advance to fulls  Solids tomorrow  Home tomorrow/monday

## 2019-07-14 LAB
ANION GAP SERPL CALC-SCNC: 6 MMOL/L (ref 3–18)
BUN SERPL-MCNC: 5 MG/DL (ref 7–18)
BUN/CREAT SERPL: 5 (ref 12–20)
CALCIUM SERPL-MCNC: 8.1 MG/DL (ref 8.5–10.1)
CHLORIDE SERPL-SCNC: 110 MMOL/L (ref 100–108)
CO2 SERPL-SCNC: 24 MMOL/L (ref 21–32)
CREAT SERPL-MCNC: 1.08 MG/DL (ref 0.6–1.3)
GLUCOSE BLD STRIP.AUTO-MCNC: 100 MG/DL (ref 70–110)
GLUCOSE BLD STRIP.AUTO-MCNC: 102 MG/DL (ref 70–110)
GLUCOSE BLD STRIP.AUTO-MCNC: 112 MG/DL (ref 70–110)
GLUCOSE BLD STRIP.AUTO-MCNC: 88 MG/DL (ref 70–110)
GLUCOSE BLD STRIP.AUTO-MCNC: 90 MG/DL (ref 70–110)
GLUCOSE SERPL-MCNC: 81 MG/DL (ref 74–99)
MAGNESIUM SERPL-MCNC: 2 MG/DL (ref 1.6–2.6)
PHOSPHATE SERPL-MCNC: 3.7 MG/DL (ref 2.5–4.9)
POTASSIUM SERPL-SCNC: 3.3 MMOL/L (ref 3.5–5.5)
SODIUM SERPL-SCNC: 140 MMOL/L (ref 136–145)

## 2019-07-14 PROCEDURE — 83735 ASSAY OF MAGNESIUM: CPT

## 2019-07-14 PROCEDURE — 82962 GLUCOSE BLOOD TEST: CPT

## 2019-07-14 PROCEDURE — 80048 BASIC METABOLIC PNL TOTAL CA: CPT

## 2019-07-14 PROCEDURE — 74011000250 HC RX REV CODE- 250: Performed by: COLON & RECTAL SURGERY

## 2019-07-14 PROCEDURE — 36415 COLL VENOUS BLD VENIPUNCTURE: CPT

## 2019-07-14 PROCEDURE — 74011000258 HC RX REV CODE- 258: Performed by: COLON & RECTAL SURGERY

## 2019-07-14 PROCEDURE — 65270000029 HC RM PRIVATE

## 2019-07-14 PROCEDURE — 74011250636 HC RX REV CODE- 250/636: Performed by: COLON & RECTAL SURGERY

## 2019-07-14 PROCEDURE — 74011250637 HC RX REV CODE- 250/637: Performed by: COLON & RECTAL SURGERY

## 2019-07-14 PROCEDURE — 84100 ASSAY OF PHOSPHORUS: CPT

## 2019-07-14 RX ADMIN — POTASSIUM CHLORIDE: 2 INJECTION, SOLUTION, CONCENTRATE INTRAVENOUS at 18:07

## 2019-07-14 RX ADMIN — ACETAMINOPHEN 1000 MG: 500 TABLET ORAL at 04:59

## 2019-07-14 RX ADMIN — MORPHINE SULFATE 2 MG: 2 INJECTION, SOLUTION INTRAMUSCULAR; INTRAVENOUS at 23:48

## 2019-07-14 RX ADMIN — LATANOPROST 1 DROP: 50 SOLUTION OPHTHALMIC at 20:34

## 2019-07-14 RX ADMIN — HEPARIN SODIUM 5000 UNITS: 5000 INJECTION INTRAVENOUS; SUBCUTANEOUS at 09:07

## 2019-07-14 RX ADMIN — FAMOTIDINE 20 MG: 10 INJECTION INTRAVENOUS at 09:07

## 2019-07-14 RX ADMIN — HEPARIN SODIUM 5000 UNITS: 5000 INJECTION INTRAVENOUS; SUBCUTANEOUS at 17:22

## 2019-07-14 RX ADMIN — GABAPENTIN 300 MG: 300 CAPSULE ORAL at 09:07

## 2019-07-14 RX ADMIN — POTASSIUM CHLORIDE: 2 INJECTION, SOLUTION, CONCENTRATE INTRAVENOUS at 13:00

## 2019-07-14 RX ADMIN — POTASSIUM CHLORIDE: 2 INJECTION, SOLUTION, CONCENTRATE INTRAVENOUS at 15:35

## 2019-07-14 RX ADMIN — MORPHINE SULFATE 2 MG: 2 INJECTION, SOLUTION INTRAMUSCULAR; INTRAVENOUS at 18:07

## 2019-07-14 RX ADMIN — ACETAMINOPHEN 1000 MG: 500 TABLET ORAL at 14:05

## 2019-07-14 RX ADMIN — HEPARIN SODIUM 5000 UNITS: 5000 INJECTION INTRAVENOUS; SUBCUTANEOUS at 00:21

## 2019-07-14 RX ADMIN — GABAPENTIN 300 MG: 300 CAPSULE ORAL at 17:23

## 2019-07-14 RX ADMIN — NEBIVOLOL HYDROCHLORIDE 5 MG: 5 TABLET ORAL at 09:07

## 2019-07-14 RX ADMIN — HEPARIN SODIUM 5000 UNITS: 5000 INJECTION INTRAVENOUS; SUBCUTANEOUS at 23:48

## 2019-07-14 RX ADMIN — FAMOTIDINE 20 MG: 10 INJECTION INTRAVENOUS at 20:34

## 2019-07-14 RX ADMIN — GABAPENTIN 300 MG: 300 CAPSULE ORAL at 21:54

## 2019-07-14 NOTE — PROGRESS NOTES
Problem: Pain  Goal: *Control of Pain  Outcome: Progressing Towards Goal     Problem: General Infection Care Plan (Adult and Pediatric)  Goal: Improvement in signs and symptoms of infection  Outcome: Progressing Towards Goal     Problem: Pressure Injury - Risk of  Goal: *Prevention of pressure injury  Description  Document Edin Scale and appropriate interventions in the flowsheet.   Outcome: Progressing Towards Goal

## 2019-07-14 NOTE — PROGRESS NOTES
[facility]  [address] [place] [address] [place] [address]  [phone]  Colon and Rectal Surgery Progress Note      Patient: Eladio Meza MRN: [id number]  SSN: [id number]    YOB: 1950  Age: 80 y.o. Sex: male      Admit Date: 7/5/2019    LOS: 9 days     Subjective: Tolerating fulls. +BM.     Objective:     Vitals:    07/13/19 1904 07/13/19 2248 07/14/19 0204 07/14/19 0655   BP: 150/90 137/83 169/86 165/81   Pulse: 82 74 70 81   Resp: 16 18 16 16   Temp: 97.7 °F (36.5 °C) 98.1 °F (36.7 °C) 97.9 °F (36.6 °C) 98 °F (36.7 °C)   SpO2: 99% 98% 98% 95%   Weight:       Height:            Intake and Output:  Current Shift: 07/14 0701 - 07/14 1900  In: 240 [P.O.:240]  Out: -   Last three shifts: 07/12 1901 - 07/14 0700  In: 1420 [P.O.:1420]  Out: 1725 [Urine:1725]    Physical Exam:     abd soft, mildly distended, NT    Lab/Data Review:  Recent Results (from the past 24 hour(s))   GLUCOSE, POC    Collection Time: 07/13/19 12:21 PM   Result Value Ref Range    Glucose (POC) 132 (H) 70 - 110 mg/dL   GLUCOSE, POC    Collection Time: 07/13/19  6:04 PM   Result Value Ref Range    Glucose (POC) 103 70 - 110 mg/dL   GLUCOSE, POC    Collection Time: 07/14/19 12:09 AM   Result Value Ref Range    Glucose (POC) 100 70 - 988 mg/dL   METABOLIC PANEL, BASIC    Collection Time: 07/14/19  5:44 AM   Result Value Ref Range    Sodium 140 136 - 145 mmol/L    Potassium 3.3 (L) 3.5 - 5.5 mmol/L    Chloride 110 (H) 100 - 108 mmol/L    CO2 24 21 - 32 mmol/L    Anion gap 6 3.0 - 18 mmol/L    Glucose 81 74 - 99 mg/dL    BUN 5 (L) 7.0 - 18 MG/DL    Creatinine 1.08 0.6 - 1.3 MG/DL    BUN/Creatinine ratio 5 (L) 12 - 20      GFR est AA >60 >60 ml/min/1.73m2    GFR est non-AA >60 >60 ml/min/1.73m2    Calcium 8.1 (L) 8.5 - 10.1 MG/DL   MAGNESIUM    Collection Time: 07/14/19  5:44 AM   Result Value Ref Range    Magnesium 2.0 1.6 - 2.6 mg/dL   PHOSPHORUS    Collection Time: 07/14/19  5:44 AM   Result Value Ref Range    Phosphorus 3.7 2.5 - 4.9 MG/DL   GLUCOSE, POC    Collection Time: 07/14/19  5:54 AM   Result Value Ref Range    Glucose (POC) 88 70 - 110 mg/dL       Assessment:   Mr. Bess Quiñonez is a 81 yo black male w/PMHx of sigmoid colon cancer who presented for ileostomy reversal.          Plan:   Advance to solids  Home tomorrow if tolerates  Hypokalemia = replete K

## 2019-07-14 NOTE — ROUTINE PROCESS
Assumed patient care. Received report from Yobany Au. Patient in bed, asleep in no distress with family at the bedside. Denies any needs at this time. Call light placed within reach. Bed in low position. 7:27 AM -Bedside shift change report given to Deyanira Elaine (oncoming nurse) by Tressa Selby RN (offgoing nurse). Report given with SBAR, Kardex, Intake/Output, MAR and Recent Results.

## 2019-07-14 NOTE — ROUTINE PROCESS
Bedside shift change report given to [facility] (oncoming nurse) by Chauncey Berkowitz (offgoing nurse). Report included the following information SBAR.

## 2019-07-15 ENCOUNTER — HOME HEALTH ADMISSION (OUTPATIENT)
Dept: HOME HEALTH SERVICES | Facility: HOME HEALTH | Age: 69
End: 2019-07-15
Payer: MEDICARE

## 2019-07-15 VITALS
RESPIRATION RATE: 16 BRPM | SYSTOLIC BLOOD PRESSURE: 126 MMHG | DIASTOLIC BLOOD PRESSURE: 71 MMHG | BODY MASS INDEX: 21.22 KG/M2 | HEIGHT: 70 IN | WEIGHT: 148.2 LBS | OXYGEN SATURATION: 99 % | TEMPERATURE: 97.7 F | HEART RATE: 81 BPM

## 2019-07-15 LAB
ANION GAP SERPL CALC-SCNC: 6 MMOL/L (ref 3–18)
BUN SERPL-MCNC: 5 MG/DL (ref 7–18)
BUN/CREAT SERPL: 5 (ref 12–20)
CALCIUM SERPL-MCNC: 8.2 MG/DL (ref 8.5–10.1)
CHLORIDE SERPL-SCNC: 112 MMOL/L (ref 100–108)
CO2 SERPL-SCNC: 23 MMOL/L (ref 21–32)
CREAT SERPL-MCNC: 1.02 MG/DL (ref 0.6–1.3)
GLUCOSE BLD STRIP.AUTO-MCNC: 74 MG/DL (ref 70–110)
GLUCOSE BLD STRIP.AUTO-MCNC: 80 MG/DL (ref 70–110)
GLUCOSE BLD STRIP.AUTO-MCNC: 90 MG/DL (ref 70–110)
GLUCOSE SERPL-MCNC: 75 MG/DL (ref 74–99)
MAGNESIUM SERPL-MCNC: 1.8 MG/DL (ref 1.6–2.6)
PHOSPHATE SERPL-MCNC: 3.1 MG/DL (ref 2.5–4.9)
POTASSIUM SERPL-SCNC: 3.3 MMOL/L (ref 3.5–5.5)
SODIUM SERPL-SCNC: 141 MMOL/L (ref 136–145)

## 2019-07-15 PROCEDURE — 84100 ASSAY OF PHOSPHORUS: CPT

## 2019-07-15 PROCEDURE — 82962 GLUCOSE BLOOD TEST: CPT

## 2019-07-15 PROCEDURE — 74011250637 HC RX REV CODE- 250/637: Performed by: COLON & RECTAL SURGERY

## 2019-07-15 PROCEDURE — 36415 COLL VENOUS BLD VENIPUNCTURE: CPT

## 2019-07-15 PROCEDURE — 80048 BASIC METABOLIC PNL TOTAL CA: CPT

## 2019-07-15 PROCEDURE — 74011250636 HC RX REV CODE- 250/636: Performed by: COLON & RECTAL SURGERY

## 2019-07-15 PROCEDURE — 83735 ASSAY OF MAGNESIUM: CPT

## 2019-07-15 RX ORDER — FAMOTIDINE 20 MG/1
20 TABLET, FILM COATED ORAL EVERY 12 HOURS
Status: DISCONTINUED | OUTPATIENT
Start: 2019-07-15 | End: 2019-07-15 | Stop reason: HOSPADM

## 2019-07-15 RX ADMIN — GABAPENTIN 300 MG: 300 CAPSULE ORAL at 09:34

## 2019-07-15 RX ADMIN — ACETAMINOPHEN 1000 MG: 500 TABLET ORAL at 12:17

## 2019-07-15 RX ADMIN — HEPARIN SODIUM 5000 UNITS: 5000 INJECTION INTRAVENOUS; SUBCUTANEOUS at 09:38

## 2019-07-15 RX ADMIN — FAMOTIDINE 20 MG: 10 INJECTION INTRAVENOUS at 09:34

## 2019-07-15 NOTE — DISCHARGE INSTRUCTIONS
Patient Instructions:  - Please follow-up in the office in 2 weeks. - Take Percocet for moderate to severe pain. Take over-the-counter Tylenol for mild pain. - Eat a low fiber diet. - Avoid raw fruits and vegetables. - No heavy lifting for 6 weeks. - Resume home medications.

## 2019-07-15 NOTE — PROGRESS NOTES
Discharge order noted for today. Pt has been accepted to [facility] agency. Met with patient and is  agreeable to the transition plan today. Transport has been arranged through spouse. Patient's discharge summary and home health  orders have been forwarded to [address] [place] [address] via [place]. Pt put in QUE. Updated bedside RN, Inna Mejia,  to the transition plan.   Discharge information has been documented on the AVS.         [place], [address] [facility].  [phone]

## 2019-07-15 NOTE — PROGRESS NOTES
NUTRITION    Nutrition Screen      RECOMMENDATIONS / PLAN:     - Change nutritional supplements: Ensure Enlive BID to optimize nutrition intake. - Continue RD inpatient monitoring and evaluation. NUTRITION INTERVENTIONS & DIAGNOSIS:     [x] Meals/snacks: modified composition  [x] Medical food supplement therapy: initiate   [x] Nutrition Counseling: diet and supplements on discharge    Nutrition Diagnosis: Inadequate oral intake related to altered GI function as evidenced by pt s/p GI surgery, awaiting proper bowel function. ASSESSMENT:     7/15: Tolerating diet with good appetite. Discharging home today. Discussed diet on discharge and continuing Ensure, as pt was consuming PTA.   7/12: Tolerating diet, consuming about 50% of meals. +BMs. No diet advancement today, but pt abdominal picture improving per discussion with surgery. K just below normal, IVF with KCl stopped, but supplements will provide additional K.  7/10: S/p loop ileostomy reversal on 7/5. +BMs. Clear liquid diet started 7/7, discontinued 7/8; pt did not consume anything of the meals. Diet to be resumed today per MD note. Pt consumes Ensure at home, 3/day after wt loss, then decreased to 1-2/day. Agreeable to starting in house, prefers vanilla/chocolate. Low K noted, IVF contain K, dextrose added for lower BG levels.      Average po intake adequate to meet patients estimated nutritional needs:   [x] Yes     [] No   [] Unable to determine at this time    Diet: DIET NUTRITIONAL SUPPLEMENTS Breakfast, Dinner; ENSURE CLEAR  DIET REGULAR Low Fiber      Food Allergies: KNFA  Current Appetite:   [x] Good     [] Fair     [] Poor     [] Other:   Appetite/meal intake prior to admission:   [x] Good     [] Fair     [] Poor     [] Other:  Feeding Limitations:  [] Swallowing difficulty    [] Chewing difficulty    [] Other:  Current Meal Intake: Patient Vitals for the past 100 hrs:   % Diet Eaten   07/15/19 0932 100 %   07/14/19 1905 50 %   07/14/19 1425 100 %   07/14/19 0841 50 %   07/12/19 1322 100 %       BM: 7/14  Skin Integrity: surgical incision to abdomen  Edema:   [x] No     [] Yes   Pertinent Medications: Reviewed: pepcid, SSI, zofran    Recent Labs     07/15/19  0459 07/14/19  0544 07/13/19  0339    140 138   K 3.3* 3.3* 3.2*   * 110* 109*   CO2 23 24 22   GLU 75 81 71*   BUN 5* 5* 7   CREA 1.02 1.08 1.02   CA 8.2* 8.1* 8.3*   MG 1.8 2.0 1.4*   PHOS 3.1 3.7 3.9       Intake/Output Summary (Last 24 hours) at 7/15/2019 1126  Last data filed at 7/15/2019 0932  Gross per 24 hour   Intake 1080 ml   Output --   Net 1080 ml       Anthropometrics:  Ht Readings from Last 1 Encounters:   07/05/19 5' 10\" (1.778 m)     Last 3 Recorded Weights in this Encounter    06/25/19 1409 07/05/19 0834   Weight: 68.9 kg (152 lb) 67.2 kg (148 lb 3.2 oz)     Body mass index is 21.26 kg/m². Weight History: -15# (9%) x 8 months. Pt reports weight loss down to 130# around Dec-Jan, with weight gain since. Weight Metrics 7/5/2019 6/10/2019 5/8/2019 4/22/2019 1/24/2019 1/17/2019 1/10/2019   Weight 148 lb 3.2 oz 152 lb 144 lb 150 lb 150 lb 144 lb 8 oz 142 lb   BMI 21.26 kg/m2 21.5 kg/m2 20.66 kg/m2 20.92 kg/m2 20.92 kg/m2 20.73 kg/m2 20.37 kg/m2        Admitting Diagnosis: Diverticulitis [K57.92]  Pertinent PMHx: throat cancer, colon cancer, DM, HTN, GERD    Education Needs:        [x] None identified  [] Identified - Not appropriate at this time  []  Identified and addressed - refer to education log  Learning Limitations:   [x] None identified  [] Identified    Cultural, Baptist & ethnic food preferences:  [x] None identified    [] Identified and addressed     ESTIMATED NUTRITION NEEDS:     Calories: 6535-5969 kcal (MSJx1.2-1.3) based on  [x] Actual BW 67 kg     [] IBW   Protein: 54-81 gm (0.8-1.2 gm/kg) based on  [x] Actual BW      [] IBW   Fluid: 1 mL/kcal     MONITORING & EVALUATION:     Nutrition Goal(s):   1.  Po intake of meals will meet >75% of patient estimated nutritional needs within the next 7 days.   Outcome:  [] Met/Ongoing    [x] Progressing towards goal    [] Not progressing towards goal    [] New/Initial goal     Monitoring:   [x] Food and beverage intake   [x] Diet order   [x] Nutrition-focused physical findings   [x] Treatment/therapy   [] Weight   [] Enteral nutrition intake        Previous Recommendations (for follow-up assessments only):     []   Implemented       [x]   Not Implemented (RD to address)      [] No Longer Appropriate     [] No Recommendation Made     Discharge Planning: low fiber diet, transitioning to high fiber + Ensure Plus BID  [x] Participated in care planning, discharge planning, & interdisciplinary rounds as appropriate      Sugey Fernandes RD, [address] [place]   Pager: [phone]

## 2019-07-15 NOTE — PROGRESS NOTES
[facility]  [address] [place] [id number] [phone]  Colon and Rectal Surgery Progress Note      Patient: Andreas Evans MRN: [id number]  SSN: [id number]    YOB: 1950  Age: 80 y.o. Sex: male      Admit Date: 7/5/2019    LOS: 10 days     Subjective: Tolerating fulls. +BM and flatulence. Mild abdominal pain, better than yesterday. Denies N/V. Objective:     Vitals:    07/14/19 1425 07/14/19 1820 07/14/19 2209 07/15/19 0526   BP: 158/89 145/86 153/80 175/89   Pulse: 75 87 78 80   Resp: 18 18 14 14   Temp: 97.1 °F (36.2 °C) 97.4 °F (36.3 °C) 98 °F (36.7 °C) 97.4 °F (36.3 °C)   SpO2: 97% 96% 98% 97%   Weight:       Height:            Intake and Output:  Current Shift: No intake/output data recorded.   Last three shifts: 07/13 1901 - 07/15 0700  In: 1080 [P.O.:1080]  Out: 275 [Urine:275]    Physical Exam:   Abd soft, mildly distended, NT    Lab/Data Review:  Recent Results (from the past 24 hour(s))   GLUCOSE, POC    Collection Time: 07/14/19 10:59 AM   Result Value Ref Range    Glucose (POC) 102 70 - 110 mg/dL   GLUCOSE, POC    Collection Time: 07/14/19  6:26 PM   Result Value Ref Range    Glucose (POC) 112 (H) 70 - 110 mg/dL   GLUCOSE, POC    Collection Time: 07/14/19 11:54 PM   Result Value Ref Range    Glucose (POC) 90 70 - 844 mg/dL   METABOLIC PANEL, BASIC    Collection Time: 07/15/19  4:59 AM   Result Value Ref Range    Sodium 141 136 - 145 mmol/L    Potassium 3.3 (L) 3.5 - 5.5 mmol/L    Chloride 112 (H) 100 - 108 mmol/L    CO2 23 21 - 32 mmol/L    Anion gap 6 3.0 - 18 mmol/L    Glucose 75 74 - 99 mg/dL    BUN 5 (L) 7.0 - 18 MG/DL    Creatinine 1.02 0.6 - 1.3 MG/DL    BUN/Creatinine ratio 5 (L) 12 - 20      GFR est AA >60 >60 ml/min/1.73m2    GFR est non-AA >60 >60 ml/min/1.73m2    Calcium 8.2 (L) 8.5 - 10.1 MG/DL   MAGNESIUM    Collection Time: 07/15/19  4:59 AM   Result Value Ref Range    Magnesium 1.8 1.6 - 2.6 mg/dL   PHOSPHORUS    Collection Time: 07/15/19  4:59 AM   Result Value Ref Range    Phosphorus 3.1 2.5 - 4.9 MG/DL   GLUCOSE, POC    Collection Time: 07/15/19  5:45 AM   Result Value Ref Range    Glucose (POC) 74 70 - 110 mg/dL   GLUCOSE, POC    Collection Time: 07/15/19  6:22 AM   Result Value Ref Range    Glucose (POC) 80 70 - 110 mg/dL       Assessment:   Mr. Babatunde Amin is a 81 yo black male w/PMHx of sigmoid colon cancer who presented for ileostomy reversal.       Plan:   - Home today  - FU in office in 2 weeks    Jasmyn Gallegos MD, PGY-2  [address] [facility]  07/15/19 8:00 AM

## 2019-07-15 NOTE — ROUTINE PROCESS
1930 Bedside and Verbal shift change  Received from Aure Lugo RN (outgoing nurse), to DARIN Apodaca (oncoming)  Pt. Is AOX 4. IV Sl, Pt. denies  pain at this time. Report included the following information SBAR, Kardex, Procedure Summary, Intake/Output, MAR, Recent Lab Results. Will resume care and monitor Pt. Condition. Pt. Educated on call bell when in need of help and assistance. Pt. verbalized understanding. Pt. Head to toe Assessment Done and documented. 2030  Pt. Not in distress. 2145  Pt. Made no complaints. 2300  Pt. OOB to hallway independently. 0000  Pt. Given pain med per MAR.    0100  Pt. Resting comfortably. 0300  Pt. Denies discomfort. 0500  Pt. Able to rest and sleep well throughout the shift. 0545  BS 74 given 1 cup orange juice. 0622 BS 80         Verbal and bedside Shift changed report given to Amber Forrester RN (oncoming RN) on Pt. Condition. Report consisted of patients Situation, History, Activities, intake/output,  Background, Assessment and Recommendations(SBAR). Information from the following report(s) Kardex, order Summary, Lab results and MAR was reviewed with the receiving nurse. Opportunity for questions and clarification was provided.

## 2019-07-15 NOTE — DISCHARGE SUMMARY
Colon and Rectal Surgery Discharge Summary     Patient: Cleophus Cabot MRN: [id number]  SSN: [id number]    YOB: 1950  Age: 80 y.o. Sex: male       Admit Date: 7/5/2019    Discharge Date: 7/15/2019      Admission Diagnoses: Sigmoid Colon Cancer  Discharge Diagnoses: Sigmoid Colon Cancer     Procedure: Ileostomy Reversal     Problem List as of 7/15/2019 Date Reviewed: 6/10/2019          Codes Class Noted - Resolved    Diverticulitis ICD-10-CM: H33.60  ICD-9-CM: 562.11  7/5/2019 - Present        Colon cancer ([facility] [address] ICD-10-CM: C18.9  ICD-9-CM: 153.9  12/4/2018 - Present        Colon polyps ICD-10-CM: K63.5  ICD-9-CM: 211.3  11/12/2018 - Present        Hemorrhoids ICD-10-CM: K64.9  ICD-9-CM: 455.6  11/12/2018 - Present        Debility ICD-10-CM: R53.81  ICD-9-CM: 799.3  11/5/2018 - Present        Leukocytosis ICD-10-CM: D72.829  ICD-9-CM: 288.60  11/5/2018 - Present        Intractable low back pain ICD-10-CM: M54.5  ICD-9-CM: 724.2  11/5/2018 - Present               Discharge Condition: Good    Hospital Course: Patient presented for ileostomy reversal. His hospitalization was complicated by abdominal bloating after the surgery despite active bowel function. He eventually improved with slowly advancing diet and he was able to go home with less abdominal distention. His pain was well controlled and he was back to baseline. He was then discharged home with home health for wound dressing care. Consults: None    Significant Diagnostic Studies: None    Disposition: home    Discharge Medications:   Current Discharge Medication List      CONTINUE these medications which have NOT CHANGED    Details   !! sodium bicarbonate 650 mg tablet Take 650 mg by mouth two (2) times a day. loperamide (IMODIUM) 2 mg capsule Take 2 mg by mouth four (4) times daily. 3 times daily      oxyCODONE-acetaminophen (PERCOCET) 5-325 mg per tablet Take 1-2 Tabs by mouth every four (4) hours as needed.  Max Daily Amount: 12 Tabs. Qty: 40 Tab, Refills: 0    Associated Diagnoses: Intractable back pain; Malignant neoplasm of sigmoid colon (HCC)      multivitamin, tx-iron-ca-min (THERA-M W/ IRON) 9 mg iron-400 mcg tab tablet Take 1 Tab by mouth daily. cyclobenzaprine (FLEXERIL) 5 mg tablet Take 1 Tab by mouth three (3) times daily as needed for Muscle Spasm(s). Qty: 20 Tab, Refills: 0      latanoprost (XALATAN) 0.005 % ophthalmic solution Administer 1 Drop to both eyes nightly. beclomethasone dipropionate (QNASL) 80 mcg/actuation HFAA 80 mcg by Nasal route daily. Directions on at home label are as follows: Use 2 sprays in each Nostril Daily      nebivolol (BYSTOLIC) 10 mg tablet Take 10 mg by mouth daily. 1/2 tab every other day  Indications: Sinus Tachycardia      omeprazole (PRILOSEC) 20 mg capsule Take 20 mg by mouth daily. Dexlansoprazole 60 mg CpDB Take 1 Cap by mouth daily. calcium-cholecalciferol, d3, 600-125 mg-unit tab Take 1 Tab by mouth daily. omega-3 fatty acids-vitamin e 1,000 mg cap Take 1 Cap by mouth daily. IRON, FERROUS SULFATE, PO Take  by mouth. 3 times a week      !! sodium bicarbonate 325 mg tablet Take 325 mg by mouth two (2) times a day. metFORMIN (GLUCOPHAGE) 1,000 mg tablet Take 1,000 mg by mouth two (2) times daily (with meals). do not take morning of surgery (12/4/180. losartan (COZAAR) 50 mg tablet Take 50 mg by mouth daily. !! - Potential duplicate medications found. Please discuss with provider. Activity: No heavy lifting (nothing more than 10-15 lbs)     Diet: Low fiber diet.     Wound Care: Showers over your wounds are ok, but avoid submerging wounds in pools or baths. The strips over your wound will fall off on their own or I will remove them in the office    No orders of the defined types were placed in this encounter. Sanjuana Gum.  Janay Garcia MD, PGY-2  [facility]  07/15/19 8:27 AM

## 2019-07-15 NOTE — PROGRESS NOTES
Important Message from [employer]" reviewed and explained with the patient and/or representative at bedside and signature was obtained. A signed copy provided to patient/representative. Original signed document placed in patient's chart.      Uriel Lake RN  Care Manager  [phone]

## 2019-07-15 NOTE — HOME CARE
Discharge noted for today. Received home health referral for [facility] for SN - wound care. Order processed and called to Anne-Marie Amaya in intake.   Madeline Velazco LPN

## 2019-07-15 NOTE — ROUTINE PROCESS
Discharge instructions given to patient and patient verbalized understanding and had no further questions. Arm bands removed and shredded and patient a=sgin page placed in chart.

## 2019-07-16 ENCOUNTER — HOME CARE VISIT (OUTPATIENT)
Dept: HOME HEALTH SERVICES | Facility: HOME HEALTH | Age: 69
End: 2019-07-16

## 2019-07-16 ENCOUNTER — HOME CARE VISIT (OUTPATIENT)
Dept: SCHEDULING | Facility: HOME HEALTH | Age: 69
End: 2019-07-16
Payer: MEDICARE

## 2019-07-16 VITALS
RESPIRATION RATE: 18 BRPM | SYSTOLIC BLOOD PRESSURE: 120 MMHG | HEART RATE: 86 BPM | OXYGEN SATURATION: 96 % | DIASTOLIC BLOOD PRESSURE: 60 MMHG | TEMPERATURE: 97.9 F

## 2019-07-16 PROCEDURE — 400013 HH SOC

## 2019-07-16 PROCEDURE — G0299 HHS/HOSPICE OF RN EA 15 MIN: HCPCS

## 2019-07-17 ENCOUNTER — HOME CARE VISIT (OUTPATIENT)
Dept: HOME HEALTH SERVICES | Facility: HOME HEALTH | Age: 69
End: 2019-07-17
Payer: MEDICARE

## 2019-07-18 ENCOUNTER — HOME CARE VISIT (OUTPATIENT)
Dept: SCHEDULING | Facility: HOME HEALTH | Age: 69
End: 2019-07-18
Payer: MEDICARE

## 2019-07-18 PROCEDURE — G0299 HHS/HOSPICE OF RN EA 15 MIN: HCPCS

## 2019-07-19 VITALS
DIASTOLIC BLOOD PRESSURE: 66 MMHG | SYSTOLIC BLOOD PRESSURE: 116 MMHG | RESPIRATION RATE: 20 BRPM | HEART RATE: 85 BPM | TEMPERATURE: 97.9 F | OXYGEN SATURATION: 96 %

## 2019-07-23 ENCOUNTER — HOME CARE VISIT (OUTPATIENT)
Dept: SCHEDULING | Facility: HOME HEALTH | Age: 69
End: 2019-07-23
Payer: MEDICARE

## 2019-07-23 PROCEDURE — G0299 HHS/HOSPICE OF RN EA 15 MIN: HCPCS

## 2019-07-24 VITALS
DIASTOLIC BLOOD PRESSURE: 84 MMHG | SYSTOLIC BLOOD PRESSURE: 148 MMHG | OXYGEN SATURATION: 98 % | TEMPERATURE: 97.2 F | RESPIRATION RATE: 18 BRPM | HEART RATE: 90 BPM

## 2019-07-26 ENCOUNTER — HOME CARE VISIT (OUTPATIENT)
Dept: SCHEDULING | Facility: HOME HEALTH | Age: 69
End: 2019-07-26
Payer: MEDICARE

## 2019-07-26 PROCEDURE — G0299 HHS/HOSPICE OF RN EA 15 MIN: HCPCS

## 2019-07-29 ENCOUNTER — OFFICE VISIT (OUTPATIENT)
Dept: SURGERY | Age: 69
End: 2019-07-29

## 2019-07-29 VITALS
TEMPERATURE: 97.2 F | DIASTOLIC BLOOD PRESSURE: 81 MMHG | OXYGEN SATURATION: 100 % | HEART RATE: 80 BPM | BODY MASS INDEX: 22.9 KG/M2 | HEIGHT: 70 IN | RESPIRATION RATE: 17 BRPM | WEIGHT: 160 LBS | SYSTOLIC BLOOD PRESSURE: 138 MMHG

## 2019-07-29 VITALS
SYSTOLIC BLOOD PRESSURE: 132 MMHG | TEMPERATURE: 97.9 F | HEART RATE: 84 BPM | DIASTOLIC BLOOD PRESSURE: 80 MMHG | OXYGEN SATURATION: 94 % | RESPIRATION RATE: 18 BRPM

## 2019-07-29 DIAGNOSIS — C18.7 MALIGNANT NEOPLASM OF SIGMOID COLON (HCC): Primary | ICD-10-CM

## 2019-07-29 NOTE — LETTER
7/29/19 Patient: Yenifer Wilkins YOB: 1950 Date of Visit: 7/29/2019 Julia King MD 
[address] [place] [address] [address] [id number] [address] [address] VIA In Basket Antoine Rice MD 
[address] [place] [address] VIA Facsimile: [phone] Dear Marlyn Kingston is seen in the office after loop ileostomy reversal.  As you recall he had an anastomotic leak after robotic sigmoid colectomy for a stage I sigmoid cancer at the end of last year. He did very well after his ileostomy reversal aside from a mild ileus which resolved conservatively. In the office today he is tolerating diet moving his bowels. I will see him again in 3 weeks time for wound care. His initial colonoscopy by you at [facility] was last November, and I will be sure he follows up with you for colonoscopy later this winter. If you have questions, please do not hesitate to call me. I look forward to following your patient along with you. Sincerely, Cedric Thomas MD

## 2019-07-29 NOTE — PROGRESS NOTES
Subjective: Tolerating a diet and moving his bowels 3-4 times per day. Getting more formed. Taking a small amount of Imodium. Past medical history and ROS were reviewed and unchanged. Abdomen: Soft, nontender nondistended  3 to 4 cm defect anterior midline hernia from midline incision  Colostomy site nearly healed, suture removed and wound repacked    Assessment / Plan    Status post ileostomy reversal after leak from a robotic sigmoid colectomy for stage I colon cancer  Continue dressing changes  Follow-up in 3 weeks  We will need to have his hernia addressed in 6 months  We will need to begin surveillance for his cancer as well    The diagnoses and plan were discussed with patient. All questions answered. Plan of care agreed to by all concerned.

## 2019-07-29 NOTE — PROGRESS NOTES
Prince Oliver presents today for   Chief Complaint   Patient presents with    Post OP Follow Up     s/p ileostoy reversal, hx colon cancr       Is someone accompanying this pt? wife    Is the patient using any DME equipment during OV? walker    Coordination of Care:  1. Have you been to the ER, urgent care clinic since your last visit? Hospitalized since your last visit? no    2. Have you seen or consulted any other health care providers outside of the [address] [place] [address] since your last visit? Include any pap smears or colon screening. Dr Dewey Aldana.

## 2019-07-30 ENCOUNTER — HOME CARE VISIT (OUTPATIENT)
Dept: SCHEDULING | Facility: HOME HEALTH | Age: 69
End: 2019-07-30
Payer: MEDICARE

## 2019-07-30 PROCEDURE — G0299 HHS/HOSPICE OF RN EA 15 MIN: HCPCS

## 2019-07-31 VITALS
HEART RATE: 98 BPM | RESPIRATION RATE: 16 BRPM | OXYGEN SATURATION: 98 % | TEMPERATURE: 98.6 F | SYSTOLIC BLOOD PRESSURE: 122 MMHG | DIASTOLIC BLOOD PRESSURE: 84 MMHG

## 2019-08-02 ENCOUNTER — HOME CARE VISIT (OUTPATIENT)
Dept: SCHEDULING | Facility: HOME HEALTH | Age: 69
End: 2019-08-02
Payer: MEDICARE

## 2019-08-02 PROCEDURE — G0299 HHS/HOSPICE OF RN EA 15 MIN: HCPCS

## 2019-08-04 VITALS
SYSTOLIC BLOOD PRESSURE: 136 MMHG | RESPIRATION RATE: 20 BRPM | TEMPERATURE: 98.2 F | OXYGEN SATURATION: 98 % | HEART RATE: 94 BPM | DIASTOLIC BLOOD PRESSURE: 78 MMHG

## 2019-08-06 ENCOUNTER — HOME CARE VISIT (OUTPATIENT)
Dept: SCHEDULING | Facility: HOME HEALTH | Age: 69
End: 2019-08-06
Payer: MEDICARE

## 2019-08-06 PROCEDURE — G0299 HHS/HOSPICE OF RN EA 15 MIN: HCPCS

## 2019-08-09 ENCOUNTER — HOME CARE VISIT (OUTPATIENT)
Dept: SCHEDULING | Facility: HOME HEALTH | Age: 69
End: 2019-08-09
Payer: MEDICARE

## 2019-08-09 VITALS
HEART RATE: 88 BPM | OXYGEN SATURATION: 98 % | RESPIRATION RATE: 20 BRPM | TEMPERATURE: 97.7 F | SYSTOLIC BLOOD PRESSURE: 148 MMHG | DIASTOLIC BLOOD PRESSURE: 76 MMHG

## 2019-08-09 PROCEDURE — G0299 HHS/HOSPICE OF RN EA 15 MIN: HCPCS

## 2019-08-10 VITALS
OXYGEN SATURATION: 98 % | HEART RATE: 82 BPM | TEMPERATURE: 97 F | SYSTOLIC BLOOD PRESSURE: 134 MMHG | DIASTOLIC BLOOD PRESSURE: 68 MMHG | RESPIRATION RATE: 20 BRPM

## 2019-08-26 ENCOUNTER — OFFICE VISIT (OUTPATIENT)
Dept: SURGERY | Age: 69
End: 2019-08-26

## 2019-08-26 VITALS
HEART RATE: 84 BPM | HEIGHT: 70 IN | WEIGHT: 154 LBS | OXYGEN SATURATION: 98 % | SYSTOLIC BLOOD PRESSURE: 143 MMHG | BODY MASS INDEX: 22.05 KG/M2 | DIASTOLIC BLOOD PRESSURE: 76 MMHG | RESPIRATION RATE: 18 BRPM | TEMPERATURE: 97.4 F

## 2019-08-26 DIAGNOSIS — Z85.038 PERSONAL HISTORY OF COLON CANCER: Primary | ICD-10-CM

## 2019-08-26 DIAGNOSIS — Z08 ENCOUNTER FOR ROUTINE CANCER FOLLOW-UP: ICD-10-CM

## 2019-08-26 PROBLEM — C18.9 COLON CANCER (HCC): Status: RESOLVED | Noted: 2018-12-04 | Resolved: 2019-08-26

## 2019-08-26 NOTE — PROGRESS NOTES
Subjective: tolerating diet, moving bowels. Past medical history and ROS were reviewed and unchanged. abd soft, NTND  Wounds healed  3 cm incisional hernia in midline, reducible    CT C/A/P 11/2018 negative for mets    Assessment / Plan    S/p Robotic sigmoid colectomy for T1N0 malignant polyp of the sigmoid colon 79/3874 complicated by anastomotic leak  S/P Loop ileostomy reversal 7/2019  Check CEA  F/U December, repeat CT's after that  Should have colo by GI end of year as well    The diagnoses and plan were discussed with patient. All questions answered. Plan of care agreed to by all concerned.

## 2019-09-11 ENCOUNTER — HOSPITAL ENCOUNTER (OUTPATIENT)
Dept: LAB | Age: 69
Discharge: HOME | End: 2019-09-11
Payer: MEDICARE

## 2019-09-11 DIAGNOSIS — Z85.038 PERSONAL HISTORY OF COLON CANCER: ICD-10-CM

## 2019-09-11 PROCEDURE — 82378 CARCINOEMBRYONIC ANTIGEN: CPT

## 2019-09-11 PROCEDURE — 36415 COLL VENOUS BLD VENIPUNCTURE: CPT

## 2019-09-12 LAB — CEA SERPL-MCNC: 3 NG/ML

## 2019-09-23 ENCOUNTER — HOSPITAL ENCOUNTER (OUTPATIENT)
Dept: LAB | Age: 69
Discharge: HOME | End: 2019-09-23
Payer: MEDICARE

## 2019-09-23 LAB
ALBUMIN SERPL-MCNC: 3.5 G/DL (ref 3.4–5)
ANION GAP SERPL CALC-SCNC: 5 MMOL/L (ref 3–18)
BUN SERPL-MCNC: 11 MG/DL (ref 7–18)
BUN/CREAT SERPL: 9 (ref 12–20)
CALCIUM SERPL-MCNC: 8.7 MG/DL (ref 8.5–10.1)
CHLORIDE SERPL-SCNC: 109 MMOL/L (ref 100–111)
CO2 SERPL-SCNC: 28 MMOL/L (ref 21–32)
CREAT SERPL-MCNC: 1.26 MG/DL (ref 0.6–1.3)
CREAT UR-MCNC: 212 MG/DL (ref 30–125)
GLUCOSE SERPL-MCNC: 115 MG/DL (ref 74–99)
HCT VFR BLD AUTO: 39.2 % (ref 36–48)
HGB BLD-MCNC: 12.7 G/DL (ref 13–16)
MAGNESIUM SERPL-MCNC: 1.8 MG/DL (ref 1.6–2.6)
MICROALBUMIN UR-MCNC: 253 MG/DL (ref 0–3)
MICROALBUMIN/CREAT UR-RTO: 1193 MG/G (ref 0–30)
PHOSPHATE SERPL-MCNC: 3.2 MG/DL (ref 2.5–4.9)
POTASSIUM SERPL-SCNC: 4.2 MMOL/L (ref 3.5–5.5)
SODIUM SERPL-SCNC: 142 MMOL/L (ref 136–145)

## 2019-09-23 PROCEDURE — 82043 UR ALBUMIN QUANTITATIVE: CPT

## 2019-09-23 PROCEDURE — 80069 RENAL FUNCTION PANEL: CPT

## 2019-09-23 PROCEDURE — 85018 HEMOGLOBIN: CPT

## 2019-09-23 PROCEDURE — 83735 ASSAY OF MAGNESIUM: CPT

## 2019-09-23 PROCEDURE — 36415 COLL VENOUS BLD VENIPUNCTURE: CPT

## 2019-12-19 ENCOUNTER — OFFICE VISIT (OUTPATIENT)
Dept: SURGERY | Age: 69
End: 2019-12-19

## 2019-12-19 VITALS
BODY MASS INDEX: 25.34 KG/M2 | TEMPERATURE: 98 F | SYSTOLIC BLOOD PRESSURE: 175 MMHG | RESPIRATION RATE: 17 BRPM | WEIGHT: 177 LBS | HEART RATE: 89 BPM | HEIGHT: 70 IN | DIASTOLIC BLOOD PRESSURE: 96 MMHG

## 2019-12-19 DIAGNOSIS — Z08 ENCOUNTER FOR ROUTINE CANCER FOLLOW-UP: ICD-10-CM

## 2019-12-19 DIAGNOSIS — Z85.038 PERSONAL HISTORY OF COLON CANCER: Primary | ICD-10-CM

## 2019-12-19 NOTE — PROGRESS NOTES
Subjective: Tolerating diet moving his bowels. Past medical history and ROS were reviewed and unchanged. Abdomen: Soft, nontender nondistended  Protuberant lower midline incisional hernia, smaller defect in the superior pole    CT C/A/P 11/2018 negative for mets  CEA in 9/2019 is 3    Assessment / Plan     S/p Robotic sigmoid colectomy for T1N0 malignant polyp of the sigmoid colon 18/0583 complicated by anastomotic leak  S/P Loop ileostomy reversal 7/2019  Check CEA  CT C/A/P  Refer for hernia repair with Michael  Colonoscopy with GI planned for February    A total of 15 minutes was spent with the patient, with >50% of time spent on counseling and coordination of care. The diagnoses and plan were discussed with patient. All questions answered. Plan of care agreed to by all concerned.

## 2019-12-23 ENCOUNTER — HOSPITAL ENCOUNTER (OUTPATIENT)
Dept: LAB | Age: 69
Discharge: HOME | End: 2019-12-23

## 2019-12-23 LAB — XX-LABCORP SPECIMEN COL,LCBCF: NORMAL

## 2019-12-23 PROCEDURE — 99001 SPECIMEN HANDLING PT-LAB: CPT

## 2019-12-24 LAB — CEA SERPL-MCNC: 4.6 NG/ML (ref 0–4.7)

## 2019-12-30 ENCOUNTER — HOSPITAL ENCOUNTER (OUTPATIENT)
Dept: CT IMAGING | Age: 69
Discharge: HOME | End: 2019-12-30
Attending: COLON & RECTAL SURGERY
Payer: MEDICARE

## 2019-12-30 DIAGNOSIS — Z85.038 PERSONAL HISTORY OF COLON CANCER: ICD-10-CM

## 2019-12-30 LAB — CREAT UR-MCNC: 1.4 MG/DL (ref 0.6–1.3)

## 2019-12-30 PROCEDURE — 74011636320 HC RX REV CODE- 636/320: Performed by: COLON & RECTAL SURGERY

## 2019-12-30 PROCEDURE — 82565 ASSAY OF CREATININE: CPT

## 2019-12-30 PROCEDURE — 74177 CT ABD & PELVIS W/CONTRAST: CPT

## 2019-12-30 RX ADMIN — IOPAMIDOL 100 ML: 612 INJECTION, SOLUTION INTRAVENOUS at 16:40

## 2020-01-07 ENCOUNTER — DOCUMENTATION ONLY (OUTPATIENT)
Dept: SURGERY | Age: 70
End: 2020-01-07

## 2020-01-24 ENCOUNTER — OFFICE VISIT (OUTPATIENT)
Dept: SURGERY | Age: 70
End: 2020-01-24

## 2020-01-24 VITALS
HEART RATE: 86 BPM | SYSTOLIC BLOOD PRESSURE: 140 MMHG | DIASTOLIC BLOOD PRESSURE: 80 MMHG | BODY MASS INDEX: 26.2 KG/M2 | RESPIRATION RATE: 18 BRPM | WEIGHT: 183 LBS | HEIGHT: 70 IN | TEMPERATURE: 97 F

## 2020-01-24 DIAGNOSIS — Z01.818 PRE-OP EXAM: ICD-10-CM

## 2020-01-24 DIAGNOSIS — K43.2 INCISIONAL HERNIA, WITHOUT OBSTRUCTION OR GANGRENE: Primary | ICD-10-CM

## 2020-02-04 ENCOUNTER — HOSPITAL ENCOUNTER (OUTPATIENT)
Dept: GENERAL RADIOLOGY | Age: 70
Discharge: HOME | End: 2020-02-04
Payer: MEDICARE

## 2020-02-04 ENCOUNTER — HOSPITAL ENCOUNTER (OUTPATIENT)
Dept: PREADMISSION TESTING | Age: 70
Discharge: HOME | End: 2020-02-04
Payer: MEDICARE

## 2020-02-04 DIAGNOSIS — Z01.818 PRE-OP EXAM: ICD-10-CM

## 2020-02-04 LAB
ALBUMIN SERPL-MCNC: 3.5 G/DL (ref 3.4–5)
ALBUMIN/GLOB SERPL: 1 {RATIO} (ref 0.8–1.7)
ALP SERPL-CCNC: 94 U/L (ref 45–117)
ALT SERPL-CCNC: 17 U/L (ref 16–61)
ANION GAP SERPL CALC-SCNC: 10 MMOL/L (ref 3–18)
AST SERPL-CCNC: 18 U/L (ref 10–38)
BASOPHILS # BLD: 0 K/UL (ref 0–0.1)
BASOPHILS NFR BLD: 0 % (ref 0–2)
BILIRUB SERPL-MCNC: 0.4 MG/DL (ref 0.2–1)
BUN SERPL-MCNC: 13 MG/DL (ref 7–18)
BUN/CREAT SERPL: 9 (ref 12–20)
CALCIUM SERPL-MCNC: 9 MG/DL (ref 8.5–10.1)
CHLORIDE SERPL-SCNC: 104 MMOL/L (ref 100–111)
CO2 SERPL-SCNC: 24 MMOL/L (ref 21–32)
CREAT SERPL-MCNC: 1.48 MG/DL (ref 0.6–1.3)
DIFFERENTIAL METHOD BLD: ABNORMAL
EOSINOPHIL # BLD: 0.3 K/UL (ref 0–0.4)
EOSINOPHIL NFR BLD: 3 % (ref 0–5)
ERYTHROCYTE [DISTWIDTH] IN BLOOD BY AUTOMATED COUNT: 14.8 % (ref 11.6–14.5)
GLOBULIN SER CALC-MCNC: 3.6 G/DL (ref 2–4)
GLUCOSE SERPL-MCNC: 131 MG/DL (ref 74–99)
HCT VFR BLD AUTO: 40.6 % (ref 36–48)
HGB BLD-MCNC: 13.6 G/DL (ref 13–16)
LYMPHOCYTES # BLD: 2.3 K/UL (ref 0.9–3.6)
LYMPHOCYTES NFR BLD: 20 % (ref 21–52)
MCH RBC QN AUTO: 29.7 PG (ref 24–34)
MCHC RBC AUTO-ENTMCNC: 33.5 G/DL (ref 31–37)
MCV RBC AUTO: 88.6 FL (ref 74–97)
MONOCYTES # BLD: 0.6 K/UL (ref 0.05–1.2)
MONOCYTES NFR BLD: 6 % (ref 3–10)
NEUTS SEG # BLD: 8.1 K/UL (ref 1.8–8)
NEUTS SEG NFR BLD: 71 % (ref 40–73)
PLATELET # BLD AUTO: 209 K/UL (ref 135–420)
PMV BLD AUTO: 10.6 FL (ref 9.2–11.8)
POTASSIUM SERPL-SCNC: 4.8 MMOL/L (ref 3.5–5.5)
PROT SERPL-MCNC: 7.1 G/DL (ref 6.4–8.2)
RBC # BLD AUTO: 4.58 M/UL (ref 4.7–5.5)
SODIUM SERPL-SCNC: 138 MMOL/L (ref 136–145)
WBC # BLD AUTO: 11.3 K/UL (ref 4.6–13.2)

## 2020-02-04 PROCEDURE — 36415 COLL VENOUS BLD VENIPUNCTURE: CPT

## 2020-02-04 PROCEDURE — 80053 COMPREHEN METABOLIC PANEL: CPT

## 2020-02-04 PROCEDURE — 93005 ELECTROCARDIOGRAM TRACING: CPT

## 2020-02-04 PROCEDURE — 71046 X-RAY EXAM CHEST 2 VIEWS: CPT

## 2020-02-04 PROCEDURE — 85025 COMPLETE CBC W/AUTO DIFF WBC: CPT

## 2020-02-05 LAB
ATRIAL RATE: 84 BPM
CALCULATED P AXIS, ECG09: 56 DEGREES
CALCULATED R AXIS, ECG10: 24 DEGREES
CALCULATED T AXIS, ECG11: 24 DEGREES
DIAGNOSIS, 93000: NORMAL
P-R INTERVAL, ECG05: 166 MS
Q-T INTERVAL, ECG07: 358 MS
QRS DURATION, ECG06: 80 MS
QTC CALCULATION (BEZET), ECG08: 423 MS
VENTRICULAR RATE, ECG03: 84 BPM

## 2020-02-10 NOTE — PROGRESS NOTES
[facility]  General Surgery    Subjective:      HPI: Patient is a very pleasant 46-year-old male with a past medical history remarkable for hypertension, diabetes mellitus, diverticulitis, cancer of the throat and intractable low back pain. He is referred to me by Dr. Russell Byrne for evaluation and management of a hernia of the midline incision. Patient does have pain and discomfort in his hernia. He would like to have it repaired.     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 Marty Hicks    COLONOSCOPY N/A 11/12/2018    COLONOSCOPY with Polypectomies, Bx's, Injection, Tattooing & Clip Placement x 3 performed by Nadiya Butler MD at [address] [place] [address]  11/12/2018         [facility]  11/12/2018         ENDOSCOPIC MUCOSAL RESECT  11/12/2018         EXPLORATORY OF ABDOMEN N/A 12/11/2018    Dr. Marizol Escalante N/A 5/8/2019    SIGMOIDOSCOPY FLEXIBLE with polypectomy performed by Vicky Hope MD at [facility] ENDOSCOPY    HX COLONOSCOPY      HX GI      robotic sigmoid colectomy complicated by small anastomotic leak, status post diverting ileostomy    HX GI      reversal ileostomy    HX HEENT  2008    Throat Ca - 1 vocal chord removed     HX HEENT Bilateral 1970    eye surgery muscles    HX TRACHEOSTOMY      LAP,SURG,COLECTOMY, PARTIAL, W/ANAST N/A 2018    Dr. Ana Laura Lazo      No family history on file. Social History     Tobacco Use    Smoking status: Former Smoker     Last attempt to quit: 2008     Years since quittin.2    Smokeless tobacco: Never Used   Substance Use Topics    Alcohol use: No      No Known Allergies    Prior to Admission medications    Medication Sig Start Date End Date Taking? Authorizing Provider   omega 3-DHA-EPA-fish oil (FISH OIL) 1,000 mg (120 mg-180 mg) capsule Take 1 Cap by mouth daily. Yes Provider, Historical   oxyCODONE-acetaminophen (PERCOCET) 5-325 mg per tablet Take 1-2 Tabs by mouth every four (4) hours as needed. Max Daily Amount: 12 Tabs. Patient taking differently: Take 1-2 Tabs by mouth every four (4) hours as needed for Pain. 1/10/19  Yes Darcie Henley MD   multivitamin, tx-iron-ca-min (THERA-M W/ IRON) 9 mg iron-400 mcg tab tablet Take 1 Tab by mouth daily. Yes Provider, Historical   latanoprost (XALATAN) 0.005 % ophthalmic solution Administer 1 Drop to both eyes nightly. Yes Provider, Historical   beclomethasone dipropionate (QNASL) 80 mcg/actuation HFAA 80 mcg by Nasal route daily. Directions on at home label are as follows: Use 2 sprays in each Nostril Daily   Yes Otilio Chase MD   nebivolol (BYSTOLIC) 10 mg tablet Take 10 mg by mouth daily. 1/2 tab every other day  Indications: Sinus Tachycardia   Yes Provider, Historical   Dexlansoprazole 60 mg CpDB Take 1 Cap by mouth daily. Yes Provider, Historical   calcium-cholecalciferol, d3, 600-125 mg-unit tab Take 1 Tab by mouth daily. Yes Provider, Historical   IRON, FERROUS SULFATE, PO Take 325 mg by mouth every Monday, Wednesday, Friday. 3 times a week    Yes Provider, Historical   sodium bicarbonate 650 mg tablet Take 650 mg by mouth two (2) times a day.     Provider, Historical   loperamide (IMODIUM) 2 mg capsule Take 2 mg by mouth four (4) times daily. 3 times daily    Provider, Historical   sodium bicarbonate 325 mg tablet Take 325 mg by mouth two (2) times a day. Provider, Historical   cyclobenzaprine (FLEXERIL) 5 mg tablet Take 1 Tab by mouth three (3) times daily as needed for Muscle Spasm(s). Patient taking differently: Take 5 mg by mouth three (3) times daily as needed for Muscle Spasm(s) (as needed). 11/13/18   Regulo Duarte MD   metFORMIN (GLUCOPHAGE) 1,000 mg tablet Take 1,000 mg by mouth two (2) times daily (with meals). do not take morning of surgery (12/4/180. Provider, Historical   losartan (COZAAR) 50 mg tablet Take 50 mg by mouth daily. Provider, Historical   omeprazole (PRILOSEC) 20 mg capsule Take 20 mg by mouth daily. Provider, Historical   omega-3 fatty acids-vitamin e 1,000 mg cap Take 1 Cap by mouth daily. Provider, Historical       Review of Systems:    14 systems were reviewed. The results are as above in the HPI and otherwise negative. Objective:     Vitals:    01/24/20 1456   BP: 140/80   Pulse: 86   Resp: 18   Temp: 97 °F (36.1 °C)   Weight: 83 kg (183 lb)   Height: 5' 10\" (1.778 m)       Physical Exam:  GENERAL: alert, cooperative, no distress, appears stated age,   EYE: conjunctivae/corneas clear. PERRL, EOM's intact. THROAT & NECK: normal and no erythema or exudates noted. ,    LYMPHATIC: Cervical, supraclavicular, and axillary nodes normal. ,   LUNG: clear to auscultation bilaterally,   HEART: regular rate and rhythm, S1, S2 normal, no murmur, click, rub or gallop,   ABDOMEN: soft, non-tender. Large lower midline incisional hernia. Bowel sounds normal. No masses,  no organomegaly,   EXTREMITIES:  extremities normal, atraumatic, no cyanosis or edema,   SKIN: Normal.,   NEUROLOGIC: AOx3. Cranial nerves 2-12 and sensation grossly intact. ,     Data Review:  to be done    Mr. Rhiannon Mccracken has a reminder for a \"due or due soon\" health maintenance.  I have asked that he contact his primary care provider for follow-up on this health maintenance. Impression:     · Patient with a large midline incisional hernia.     Plan:     · Open reconstruction of the abdominal wall with triple mesh closure  · Consent on chart  · Preoperative orders written    Signed By: Aby Scherer MD     February 10, 2020        2

## 2020-02-10 NOTE — H&P (VIEW-ONLY)
[facility] General Surgery Subjective: HPI: Patient is a very pleasant 61-year-old male with a past medical history remarkable for hypertension, diabetes mellitus, diverticulitis, cancer of the throat and intractable low back pain. He is referred to me by Dr. Roxy Puga for evaluation and management of a hernia of the midline incision. Patient does have pain and discomfort in his hernia. He would like to have it repaired. Patient Active Problem List  
 Diagnosis Date Noted  Diverticulitis 07/05/2019  Colon polyps 11/12/2018  Hemorrhoids 11/12/2018  Debility 11/05/2018  Leukocytosis 11/05/2018  Intractable low back pain 11/05/2018 Past Medical History:  
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   
 under control, weight controlled  Essential hypertension, benign   
 no more meds  GERD (gastroesophageal reflux disease)  Glaucoma  Nonspecific abnormal electrocardiogram (ECG) (EKG) Past Surgical History:  
Procedure Laterality Date  CLOSE ENTEROSTOMY,RESEC+ANAST N/A 07/05/2019 Dr. Ana Laura Lazo  COLONOSCOPY N/A 11/12/2018 COLONOSCOPY with Polypectomies, Bx's, Injection, Tattooing & Clip Placement x 3 performed by Laurel Cortez MD at [facility] ENDOSCOPY  COLONOSCOPY,DIAGNOSTIC  11/12/2018  COLONOSCOPY,NATALIA BEDOYA,SNARE  11/12/2018  ENDOSCOPIC MUCOSAL RESECT  11/12/2018  EXPLORATORY OF ABDOMEN N/A 12/11/2018 Dr. Ana Laura Lazo 2021 Wesley Muñoz N/A 5/8/2019 SIGMOIDOSCOPY FLEXIBLE with polypectomy performed by Darcie Henley MD at [facility] ENDOSCOPY  
 HX COLONOSCOPY    
 HX GI    
 robotic sigmoid colectomy complicated by small anastomotic leak, status post diverting ileostomy  HX GI    
 reversal ileostomy  HX HEENT  2008 Throat Ca - 1 vocal chord removed  HX HEENT Bilateral 1970 eye surgery muscles  HX TRACHEOSTOMY    LAP,SURG,COLECTOMY, PARTIAL, W/ANAST N/A 2018 Dr. Laura Mcneill No family history on file. Social History Tobacco Use  Smoking status: Former Smoker Last attempt to quit: 2008 Years since quittin.2  Smokeless tobacco: Never Used Substance Use Topics  Alcohol use: No  
  
No Known Allergies Prior to Admission medications Medication Sig Start Date End Date Taking? Authorizing Provider  
omega 3-DHA-EPA-fish oil (FISH OIL) 1,000 mg (120 mg-180 mg) capsule Take 1 Cap by mouth daily. Yes Provider, Historical  
oxyCODONE-acetaminophen (PERCOCET) 5-325 mg per tablet Take 1-2 Tabs by mouth every four (4) hours as needed. Max Daily Amount: 12 Tabs. Patient taking differently: Take 1-2 Tabs by mouth every four (4) hours as needed for Pain. 1/10/19  Yes Herberth Wells MD  
multivitamin, tx-iron-ca-min (THERA-M W/ IRON) 9 mg iron-400 mcg tab tablet Take 1 Tab by mouth daily. Yes Provider, Historical  
latanoprost (XALATAN) 0.005 % ophthalmic solution Administer 1 Drop to both eyes nightly. Yes Provider, Historical  
beclomethasone dipropionate (QNASL) 80 mcg/actuation HFAA 80 mcg by Nasal route daily. Directions on at home label are as follows: Use 2 sprays in each Nostril Daily   Yes Anjelica Quiñonez MD  
nebivolol (BYSTOLIC) 10 mg tablet Take 10 mg by mouth daily. 1/2 tab every other day  Indications: Sinus Tachycardia   Yes Provider, Historical  
Dexlansoprazole 60 mg CpDB Take 1 Cap by mouth daily. Yes Provider, Historical  
calcium-cholecalciferol, d3, 600-125 mg-unit tab Take 1 Tab by mouth daily. Yes Provider, Historical  
IRON, FERROUS SULFATE, PO Take 325 mg by mouth every Monday, Wednesday, Friday. 3 times a week    Yes Provider, Historical  
sodium bicarbonate 650 mg tablet Take 650 mg by mouth two (2) times a day.     Provider, Historical  
 loperamide (IMODIUM) 2 mg capsule Take 2 mg by mouth four (4) times daily. 3 times daily    Provider, Historical  
sodium bicarbonate 325 mg tablet Take 325 mg by mouth two (2) times a day. Provider, Historical  
cyclobenzaprine (FLEXERIL) 5 mg tablet Take 1 Tab by mouth three (3) times daily as needed for Muscle Spasm(s). Patient taking differently: Take 5 mg by mouth three (3) times daily as needed for Muscle Spasm(s) (as needed). 11/13/18   Jimmie Collazo MD  
metFORMIN (GLUCOPHAGE) 1,000 mg tablet Take 1,000 mg by mouth two (2) times daily (with meals). do not take morning of surgery (12/4/180. Provider, Historical  
losartan (COZAAR) 50 mg tablet Take 50 mg by mouth daily. Provider, Historical  
omeprazole (PRILOSEC) 20 mg capsule Take 20 mg by mouth daily. Provider, Historical  
omega-3 fatty acids-vitamin e 1,000 mg cap Take 1 Cap by mouth daily. Provider, Historical  
 
 
Review of Systems:   
14 systems were reviewed. The results are as above in the HPI and otherwise negative. Objective:  
 
Vitals:  
 01/24/20 1456 BP: 140/80 Pulse: 86 Resp: 18 Temp: 97 °F (36.1 °C) Weight: 83 kg (183 lb) Height: 5' 10\" (1.778 m) Physical Exam: 
GENERAL: alert, cooperative, no distress, appears stated age, EYE: conjunctivae/corneas clear. PERRL, EOM's intact. THROAT & NECK: normal and no erythema or exudates noted. ,   
LYMPHATIC: Cervical, supraclavicular, and axillary nodes normal. ,  
LUNG: clear to auscultation bilaterally, HEART: regular rate and rhythm, S1, S2 normal, no murmur, click, rub or gallop, ABDOMEN: soft, non-tender. Large lower midline incisional hernia. Bowel sounds normal. No masses,  no organomegaly, EXTREMITIES:  extremities normal, atraumatic, no cyanosis or edema, SKIN: Normal., NEUROLOGIC: AOx3. Cranial nerves 2-12 and sensation grossly intact. ,  
 
Data Review:  to be done Mr. Cary Martin has a reminder for a \"due or due soon\" health maintenance. I have asked that he contact his primary care provider for follow-up on this health maintenance. Impression: · Patient with a large midline incisional hernia. Plan:  
 
· Open reconstruction of the abdominal wall with triple mesh closure · Consent on chart · Preoperative orders written Signed By: Shira Nicholas MD   
 February 10, 2020   
 
 
2

## 2020-02-12 ENCOUNTER — ANESTHESIA EVENT (OUTPATIENT)
Dept: SURGERY | Age: 70
DRG: 353 | End: 2020-02-12
Payer: MEDICARE

## 2020-02-13 ENCOUNTER — HOSPITAL ENCOUNTER (INPATIENT)
Age: 70
LOS: 15 days | Discharge: HOME HEALTH | DRG: 353 | End: 2020-02-28
Attending: SURGERY | Admitting: SURGERY
Payer: MEDICARE

## 2020-02-13 ENCOUNTER — ANESTHESIA (OUTPATIENT)
Dept: SURGERY | Age: 70
DRG: 353 | End: 2020-02-13
Payer: MEDICARE

## 2020-02-13 DIAGNOSIS — G89.18 POSTOPERATIVE PAIN: Primary | ICD-10-CM

## 2020-02-13 DIAGNOSIS — M54.9 INTRACTABLE BACK PAIN: ICD-10-CM

## 2020-02-13 DIAGNOSIS — C18.7 MALIGNANT NEOPLASM OF SIGMOID COLON (HCC): ICD-10-CM

## 2020-02-13 PROBLEM — K43.2 INCISIONAL HERNIA: Status: ACTIVE | Noted: 2020-02-13

## 2020-02-13 LAB
ANION GAP SERPL CALC-SCNC: 5 MMOL/L (ref 3–18)
BASOPHILS # BLD: 0 K/UL (ref 0–0.1)
BASOPHILS NFR BLD: 0 % (ref 0–3)
BUN SERPL-MCNC: 18 MG/DL (ref 7–18)
BUN/CREAT SERPL: 9 (ref 12–20)
CALCIUM SERPL-MCNC: 8.1 MG/DL (ref 8.5–10.1)
CHLORIDE SERPL-SCNC: 108 MMOL/L (ref 100–111)
CO2 SERPL-SCNC: 23 MMOL/L (ref 21–32)
CREAT SERPL-MCNC: 1.99 MG/DL (ref 0.6–1.3)
DIFFERENTIAL METHOD BLD: ABNORMAL
EOSINOPHIL # BLD: 0 K/UL (ref 0–0.4)
EOSINOPHIL NFR BLD: 0 % (ref 0–5)
ERYTHROCYTE [DISTWIDTH] IN BLOOD BY AUTOMATED COUNT: 14.7 % (ref 11.6–14.5)
GLUCOSE BLD STRIP.AUTO-MCNC: 110 MG/DL (ref 70–110)
GLUCOSE BLD STRIP.AUTO-MCNC: 201 MG/DL (ref 70–110)
GLUCOSE SERPL-MCNC: 147 MG/DL (ref 74–99)
HCT VFR BLD AUTO: 39.9 % (ref 36–48)
HGB BLD-MCNC: 13.2 G/DL (ref 13–16)
LYMPHOCYTES # BLD: 0.8 K/UL (ref 0.8–3.5)
LYMPHOCYTES NFR BLD: 4 % (ref 20–51)
MCH RBC QN AUTO: 29.7 PG (ref 24–34)
MCHC RBC AUTO-ENTMCNC: 33.1 G/DL (ref 31–37)
MCV RBC AUTO: 89.9 FL (ref 74–97)
MONOCYTES # BLD: 1.4 K/UL (ref 0–1)
MONOCYTES NFR BLD: 7 % (ref 2–9)
NEUTS BAND NFR BLD MANUAL: 14 % (ref 0–5)
NEUTS SEG # BLD: 15.5 K/UL (ref 1.8–8)
NEUTS SEG NFR BLD: 75 % (ref 42–75)
PLATELET # BLD AUTO: 233 K/UL (ref 135–420)
PLATELET COMMENTS,PCOM: ABNORMAL
PMV BLD AUTO: 9.9 FL (ref 9.2–11.8)
POTASSIUM SERPL-SCNC: 6.6 MMOL/L (ref 3.5–5.5)
RBC # BLD AUTO: 4.44 M/UL (ref 4.7–5.5)
RBC MORPH BLD: ABNORMAL
SODIUM SERPL-SCNC: 136 MMOL/L (ref 136–145)
WBC # BLD AUTO: 20.7 K/UL (ref 4.6–13.2)

## 2020-02-13 PROCEDURE — 74011250636 HC RX REV CODE- 250/636: Performed by: NURSE PRACTITIONER

## 2020-02-13 PROCEDURE — 88332 PATH CONSLTJ SURG EA ADD BLK: CPT

## 2020-02-13 PROCEDURE — 77030026438 HC STYL ET INTUB CARD -A: Performed by: ANESTHESIOLOGY

## 2020-02-13 PROCEDURE — 82962 GLUCOSE BLOOD TEST: CPT

## 2020-02-13 PROCEDURE — 77030011278 HC ELECTRD LIG IMPT COVD -F: Performed by: SURGERY

## 2020-02-13 PROCEDURE — 77030027138 HC INCENT SPIROMETER -A

## 2020-02-13 PROCEDURE — 74011250636 HC RX REV CODE- 250/636: Performed by: INTERNAL MEDICINE

## 2020-02-13 PROCEDURE — 76010000139 HC OR TIME 5.5 TO 6 HR: Performed by: SURGERY

## 2020-02-13 PROCEDURE — 77030002966 HC SUT PDS J&J -A: Performed by: SURGERY

## 2020-02-13 PROCEDURE — 65270000029 HC RM PRIVATE

## 2020-02-13 PROCEDURE — 77030003028 HC SUT VCRL J&J -A: Performed by: SURGERY

## 2020-02-13 PROCEDURE — 77030008683 HC TU ET CUF COVD -A: Performed by: ANESTHESIOLOGY

## 2020-02-13 PROCEDURE — 85025 COMPLETE CBC W/AUTO DIFF WBC: CPT

## 2020-02-13 PROCEDURE — 76210000017 HC OR PH I REC 1.5 TO 2 HR: Performed by: SURGERY

## 2020-02-13 PROCEDURE — 74011250637 HC RX REV CODE- 250/637: Performed by: NURSE PRACTITIONER

## 2020-02-13 PROCEDURE — 74011000250 HC RX REV CODE- 250: Performed by: INTERNAL MEDICINE

## 2020-02-13 PROCEDURE — 77030031139 HC SUT VCRL2 J&J -A: Performed by: SURGERY

## 2020-02-13 PROCEDURE — C1781 MESH (IMPLANTABLE): HCPCS | Performed by: SURGERY

## 2020-02-13 PROCEDURE — 77030040922 HC BLNKT HYPOTHRM STRY -A: Performed by: SURGERY

## 2020-02-13 PROCEDURE — 77030002996 HC SUT SLK J&J -A: Performed by: SURGERY

## 2020-02-13 PROCEDURE — 88331 PATH CONSLTJ SURG 1 BLK 1SPC: CPT

## 2020-02-13 PROCEDURE — 76060000042 HC ANESTHESIA 5.5 TO 6 HR: Performed by: SURGERY

## 2020-02-13 PROCEDURE — 77030018673: Performed by: SURGERY

## 2020-02-13 PROCEDURE — 74011250636 HC RX REV CODE- 250/636: Performed by: NURSE ANESTHETIST, CERTIFIED REGISTERED

## 2020-02-13 PROCEDURE — 74011000250 HC RX REV CODE- 250: Performed by: NURSE ANESTHETIST, CERTIFIED REGISTERED

## 2020-02-13 PROCEDURE — 80048 BASIC METABOLIC PNL TOTAL CA: CPT

## 2020-02-13 PROCEDURE — 77030002933 HC SUT MCRYL J&J -A: Performed by: SURGERY

## 2020-02-13 PROCEDURE — 77030012407 HC DRN WND BARD -B: Performed by: SURGERY

## 2020-02-13 PROCEDURE — 77030018836 HC SOL IRR NACL ICUM -A: Performed by: SURGERY

## 2020-02-13 PROCEDURE — 74011250637 HC RX REV CODE- 250/637: Performed by: SURGERY

## 2020-02-13 PROCEDURE — 77030009848 HC PASSR SUT SET COOP -C: Performed by: SURGERY

## 2020-02-13 PROCEDURE — 77030036554: Performed by: SURGERY

## 2020-02-13 PROCEDURE — 0WUF0JZ SUPPLEMENT ABDOMINAL WALL WITH SYNTHETIC SUBSTITUTE, OPEN APPROACH: ICD-10-PCS | Performed by: SURGERY

## 2020-02-13 PROCEDURE — 77030013079 HC BLNKT BAIR HGGR 3M -A: Performed by: ANESTHESIOLOGY

## 2020-02-13 PROCEDURE — 74011250636 HC RX REV CODE- 250/636: Performed by: SURGERY

## 2020-02-13 PROCEDURE — 77030020269 HC MISC IMPL: Performed by: SURGERY

## 2020-02-13 PROCEDURE — 77030040361 HC SLV COMPR DVT MDII -B: Performed by: SURGERY

## 2020-02-13 PROCEDURE — 74011000250 HC RX REV CODE- 250: Performed by: SURGERY

## 2020-02-13 PROCEDURE — 74011636637 HC RX REV CODE- 636/637: Performed by: NURSE ANESTHETIST, CERTIFIED REGISTERED

## 2020-02-13 PROCEDURE — 74011250637 HC RX REV CODE- 250/637: Performed by: INTERNAL MEDICINE

## 2020-02-13 PROCEDURE — 77030025869: Performed by: SURGERY

## 2020-02-13 PROCEDURE — 36415 COLL VENOUS BLD VENIPUNCTURE: CPT

## 2020-02-13 PROCEDURE — 77030033065 HC RET VISC GLSMN DISP CARF -B: Performed by: SURGERY

## 2020-02-13 PROCEDURE — 74011250637 HC RX REV CODE- 250/637: Performed by: NURSE ANESTHETIST, CERTIFIED REGISTERED

## 2020-02-13 PROCEDURE — 88305 TISSUE EXAM BY PATHOLOGIST: CPT

## 2020-02-13 PROCEDURE — 77030013567 HC DRN WND RESERV BARD -A: Performed by: SURGERY

## 2020-02-13 PROCEDURE — 77030019605: Performed by: SURGERY

## 2020-02-13 DEVICE — MESH HERN W20.3XL25.4CM POLY 4 HYDROXYBUTYRATE SYN RECTANG: Type: IMPLANTABLE DEVICE | Site: ABDOMEN | Status: FUNCTIONAL

## 2020-02-13 DEVICE — MESH HERN W12XL14IN MFIL RESRB RECT W/ HYDRGEL BARR SCFLD: Type: IMPLANTABLE DEVICE | Site: ABDOMEN | Status: FUNCTIONAL

## 2020-02-13 RX ORDER — LABETALOL HCL 20 MG/4 ML
SYRINGE (ML) INTRAVENOUS AS NEEDED
Status: DISCONTINUED | OUTPATIENT
Start: 2020-02-13 | End: 2020-02-13 | Stop reason: HOSPADM

## 2020-02-13 RX ORDER — INSULIN LISPRO 100 [IU]/ML
INJECTION, SOLUTION INTRAVENOUS; SUBCUTANEOUS ONCE
Status: COMPLETED | OUTPATIENT
Start: 2020-02-13 | End: 2020-02-13

## 2020-02-13 RX ORDER — LEVOFLOXACIN 5 MG/ML
500 INJECTION, SOLUTION INTRAVENOUS EVERY 24 HOURS
Status: DISCONTINUED | OUTPATIENT
Start: 2020-02-14 | End: 2020-02-15

## 2020-02-13 RX ORDER — DOCUSATE SODIUM 100 MG/1
100 CAPSULE, LIQUID FILLED ORAL DAILY
Status: DISCONTINUED | OUTPATIENT
Start: 2020-02-14 | End: 2020-02-28 | Stop reason: HOSPADM

## 2020-02-13 RX ORDER — LEVOFLOXACIN 5 MG/ML
500 INJECTION, SOLUTION INTRAVENOUS
Status: COMPLETED | OUTPATIENT
Start: 2020-02-13 | End: 2020-02-13

## 2020-02-13 RX ORDER — ONDANSETRON 2 MG/ML
INJECTION INTRAMUSCULAR; INTRAVENOUS AS NEEDED
Status: DISCONTINUED | OUTPATIENT
Start: 2020-02-13 | End: 2020-02-13 | Stop reason: HOSPADM

## 2020-02-13 RX ORDER — ROCURONIUM BROMIDE 10 MG/ML
INJECTION, SOLUTION INTRAVENOUS AS NEEDED
Status: DISCONTINUED | OUTPATIENT
Start: 2020-02-13 | End: 2020-02-13 | Stop reason: HOSPADM

## 2020-02-13 RX ORDER — FAMOTIDINE 20 MG/1
20 TABLET, FILM COATED ORAL ONCE
Status: COMPLETED | OUTPATIENT
Start: 2020-02-13 | End: 2020-02-13

## 2020-02-13 RX ORDER — HYDROMORPHONE HYDROCHLORIDE 1 MG/ML
1 INJECTION, SOLUTION INTRAMUSCULAR; INTRAVENOUS; SUBCUTANEOUS
Status: DISCONTINUED | OUTPATIENT
Start: 2020-02-13 | End: 2020-02-24

## 2020-02-13 RX ORDER — GABAPENTIN 100 MG/1
100 CAPSULE ORAL 3 TIMES DAILY
Status: DISCONTINUED | OUTPATIENT
Start: 2020-02-13 | End: 2020-02-24

## 2020-02-13 RX ORDER — ONDANSETRON 2 MG/ML
4 INJECTION INTRAMUSCULAR; INTRAVENOUS ONCE
Status: COMPLETED | OUTPATIENT
Start: 2020-02-13 | End: 2020-02-13

## 2020-02-13 RX ORDER — SODIUM CHLORIDE, SODIUM LACTATE, POTASSIUM CHLORIDE, CALCIUM CHLORIDE 600; 310; 30; 20 MG/100ML; MG/100ML; MG/100ML; MG/100ML
75 INJECTION, SOLUTION INTRAVENOUS CONTINUOUS
Status: DISCONTINUED | OUTPATIENT
Start: 2020-02-13 | End: 2020-02-13 | Stop reason: HOSPADM

## 2020-02-13 RX ORDER — POLYETHYLENE GLYCOL 3350 17 G/17G
17 POWDER, FOR SOLUTION ORAL DAILY
Status: DISCONTINUED | OUTPATIENT
Start: 2020-02-14 | End: 2020-02-28 | Stop reason: HOSPADM

## 2020-02-13 RX ORDER — FENTANYL CITRATE 50 UG/ML
INJECTION, SOLUTION INTRAMUSCULAR; INTRAVENOUS AS NEEDED
Status: DISCONTINUED | OUTPATIENT
Start: 2020-02-13 | End: 2020-02-13 | Stop reason: HOSPADM

## 2020-02-13 RX ORDER — OXYCODONE AND ACETAMINOPHEN 5; 325 MG/1; MG/1
1 TABLET ORAL
Status: DISCONTINUED | OUTPATIENT
Start: 2020-02-13 | End: 2020-02-18

## 2020-02-13 RX ORDER — SODIUM CHLORIDE 9 MG/ML
999 INJECTION, SOLUTION INTRAVENOUS ONCE
Status: ACTIVE | OUTPATIENT
Start: 2020-02-13 | End: 2020-02-14

## 2020-02-13 RX ORDER — LIDOCAINE HYDROCHLORIDE 10 MG/ML
0.1 INJECTION, SOLUTION EPIDURAL; INFILTRATION; INTRACAUDAL; PERINEURAL AS NEEDED
Status: DISCONTINUED | OUTPATIENT
Start: 2020-02-13 | End: 2020-02-13 | Stop reason: HOSPADM

## 2020-02-13 RX ORDER — DEXAMETHASONE SODIUM PHOSPHATE 4 MG/ML
INJECTION, SOLUTION INTRA-ARTICULAR; INTRALESIONAL; INTRAMUSCULAR; INTRAVENOUS; SOFT TISSUE AS NEEDED
Status: DISCONTINUED | OUTPATIENT
Start: 2020-02-13 | End: 2020-02-13 | Stop reason: HOSPADM

## 2020-02-13 RX ORDER — HYDROMORPHONE HYDROCHLORIDE 2 MG/ML
0.5 INJECTION, SOLUTION INTRAMUSCULAR; INTRAVENOUS; SUBCUTANEOUS
Status: COMPLETED | OUTPATIENT
Start: 2020-02-13 | End: 2020-02-13

## 2020-02-13 RX ORDER — SODIUM CHLORIDE 0.9 % (FLUSH) 0.9 %
5-40 SYRINGE (ML) INJECTION EVERY 8 HOURS
Status: DISCONTINUED | OUTPATIENT
Start: 2020-02-13 | End: 2020-02-13 | Stop reason: HOSPADM

## 2020-02-13 RX ORDER — SODIUM POLYSTYRENE SULFONATE 15 G/60ML
30 SUSPENSION ORAL; RECTAL
Status: COMPLETED | OUTPATIENT
Start: 2020-02-13 | End: 2020-02-13

## 2020-02-13 RX ORDER — HYDROMORPHONE HYDROCHLORIDE 2 MG/ML
INJECTION, SOLUTION INTRAMUSCULAR; INTRAVENOUS; SUBCUTANEOUS AS NEEDED
Status: DISCONTINUED | OUTPATIENT
Start: 2020-02-13 | End: 2020-02-13 | Stop reason: HOSPADM

## 2020-02-13 RX ORDER — NEBIVOLOL 5 MG/1
5 TABLET ORAL DAILY
Status: DISCONTINUED | OUTPATIENT
Start: 2020-02-14 | End: 2020-02-14

## 2020-02-13 RX ORDER — DEXTROSE 50 % IN WATER (D50W) INTRAVENOUS SYRINGE
25-50 AS NEEDED
Status: DISCONTINUED | OUTPATIENT
Start: 2020-02-13 | End: 2020-02-13 | Stop reason: HOSPADM

## 2020-02-13 RX ORDER — SODIUM CHLORIDE 0.9 % (FLUSH) 0.9 %
5-40 SYRINGE (ML) INJECTION AS NEEDED
Status: DISCONTINUED | OUTPATIENT
Start: 2020-02-13 | End: 2020-02-13 | Stop reason: HOSPADM

## 2020-02-13 RX ORDER — METRONIDAZOLE 500 MG/100ML
500 INJECTION, SOLUTION INTRAVENOUS ONCE
Status: COMPLETED | OUTPATIENT
Start: 2020-02-13 | End: 2020-02-13

## 2020-02-13 RX ORDER — SUCCINYLCHOLINE CHLORIDE 20 MG/ML
INJECTION INTRAMUSCULAR; INTRAVENOUS AS NEEDED
Status: DISCONTINUED | OUTPATIENT
Start: 2020-02-13 | End: 2020-02-13 | Stop reason: HOSPADM

## 2020-02-13 RX ORDER — FAMOTIDINE 20 MG/1
20 TABLET, FILM COATED ORAL EVERY 12 HOURS
Status: DISCONTINUED | OUTPATIENT
Start: 2020-02-13 | End: 2020-02-13 | Stop reason: SDUPTHER

## 2020-02-13 RX ORDER — HYDROMORPHONE HYDROCHLORIDE 1 MG/ML
0.5 INJECTION, SOLUTION INTRAMUSCULAR; INTRAVENOUS; SUBCUTANEOUS
Status: DISCONTINUED | OUTPATIENT
Start: 2020-02-13 | End: 2020-02-18 | Stop reason: SDUPTHER

## 2020-02-13 RX ORDER — PHENYLEPHRINE HCL IN 0.9% NACL 1 MG/10 ML
SYRINGE (ML) INTRAVENOUS AS NEEDED
Status: DISCONTINUED | OUTPATIENT
Start: 2020-02-13 | End: 2020-02-13 | Stop reason: HOSPADM

## 2020-02-13 RX ORDER — MAGNESIUM SULFATE 100 %
4 CRYSTALS MISCELLANEOUS AS NEEDED
Status: DISCONTINUED | OUTPATIENT
Start: 2020-02-13 | End: 2020-02-13 | Stop reason: HOSPADM

## 2020-02-13 RX ORDER — ALBUTEROL SULFATE 1.25 MG/3ML
2.5 SOLUTION RESPIRATORY (INHALATION)
Status: COMPLETED | OUTPATIENT
Start: 2020-02-13 | End: 2020-02-13

## 2020-02-13 RX ORDER — PROPOFOL 10 MG/ML
INJECTION, EMULSION INTRAVENOUS AS NEEDED
Status: DISCONTINUED | OUTPATIENT
Start: 2020-02-13 | End: 2020-02-13 | Stop reason: HOSPADM

## 2020-02-13 RX ORDER — OXYCODONE AND ACETAMINOPHEN 5; 325 MG/1; MG/1
1 TABLET ORAL
Status: DISCONTINUED | OUTPATIENT
Start: 2020-02-13 | End: 2020-02-14

## 2020-02-13 RX ORDER — PANTOPRAZOLE SODIUM 40 MG/1
40 TABLET, DELAYED RELEASE ORAL 2 TIMES DAILY
Status: DISCONTINUED | OUTPATIENT
Start: 2020-02-13 | End: 2020-02-15

## 2020-02-13 RX ORDER — INSULIN LISPRO 100 [IU]/ML
INJECTION, SOLUTION INTRAVENOUS; SUBCUTANEOUS ONCE
Status: DISCONTINUED | OUTPATIENT
Start: 2020-02-13 | End: 2020-02-13 | Stop reason: HOSPADM

## 2020-02-13 RX ORDER — SODIUM CHLORIDE 9 MG/ML
100 INJECTION, SOLUTION INTRAVENOUS CONTINUOUS
Status: DISCONTINUED | OUTPATIENT
Start: 2020-02-13 | End: 2020-02-14

## 2020-02-13 RX ORDER — SODIUM CHLORIDE, SODIUM LACTATE, POTASSIUM CHLORIDE, CALCIUM CHLORIDE 600; 310; 30; 20 MG/100ML; MG/100ML; MG/100ML; MG/100ML
125 INJECTION, SOLUTION INTRAVENOUS CONTINUOUS
Status: DISCONTINUED | OUTPATIENT
Start: 2020-02-13 | End: 2020-02-13

## 2020-02-13 RX ORDER — ACETAMINOPHEN 325 MG/1
650 TABLET ORAL
Status: DISCONTINUED | OUTPATIENT
Start: 2020-02-13 | End: 2020-02-28 | Stop reason: HOSPADM

## 2020-02-13 RX ORDER — BUPIVACAINE HYDROCHLORIDE AND EPINEPHRINE 2.5; 5 MG/ML; UG/ML
INJECTION, SOLUTION EPIDURAL; INFILTRATION; INTRACAUDAL; PERINEURAL AS NEEDED
Status: DISCONTINUED | OUTPATIENT
Start: 2020-02-13 | End: 2020-02-13 | Stop reason: HOSPADM

## 2020-02-13 RX ORDER — SODIUM CHLORIDE, SODIUM LACTATE, POTASSIUM CHLORIDE, CALCIUM CHLORIDE 600; 310; 30; 20 MG/100ML; MG/100ML; MG/100ML; MG/100ML
25 INJECTION, SOLUTION INTRAVENOUS CONTINUOUS
Status: DISCONTINUED | OUTPATIENT
Start: 2020-02-13 | End: 2020-02-13 | Stop reason: HOSPADM

## 2020-02-13 RX ORDER — LOSARTAN POTASSIUM 50 MG/1
50 TABLET ORAL DAILY
Status: DISCONTINUED | OUTPATIENT
Start: 2020-02-14 | End: 2020-02-13

## 2020-02-13 RX ORDER — LIDOCAINE HYDROCHLORIDE 20 MG/ML
INJECTION, SOLUTION EPIDURAL; INFILTRATION; INTRACAUDAL; PERINEURAL AS NEEDED
Status: DISCONTINUED | OUTPATIENT
Start: 2020-02-13 | End: 2020-02-13 | Stop reason: HOSPADM

## 2020-02-13 RX ORDER — FUROSEMIDE 10 MG/ML
20 INJECTION INTRAMUSCULAR; INTRAVENOUS ONCE
Status: COMPLETED | OUTPATIENT
Start: 2020-02-13 | End: 2020-02-13

## 2020-02-13 RX ORDER — LANOLIN ALCOHOL/MO/W.PET/CERES
325 CREAM (GRAM) TOPICAL
Status: DISCONTINUED | OUTPATIENT
Start: 2020-02-14 | End: 2020-02-28 | Stop reason: HOSPADM

## 2020-02-13 RX ORDER — MIDAZOLAM HYDROCHLORIDE 1 MG/ML
INJECTION, SOLUTION INTRAMUSCULAR; INTRAVENOUS AS NEEDED
Status: DISCONTINUED | OUTPATIENT
Start: 2020-02-13 | End: 2020-02-13 | Stop reason: HOSPADM

## 2020-02-13 RX ORDER — HEPARIN SODIUM 5000 [USP'U]/ML
5000 INJECTION, SOLUTION INTRAVENOUS; SUBCUTANEOUS EVERY 8 HOURS
Status: DISCONTINUED | OUTPATIENT
Start: 2020-02-13 | End: 2020-02-28 | Stop reason: HOSPADM

## 2020-02-13 RX ORDER — LATANOPROST 50 UG/ML
1 SOLUTION/ DROPS OPHTHALMIC
Status: DISCONTINUED | OUTPATIENT
Start: 2020-02-13 | End: 2020-02-28 | Stop reason: HOSPADM

## 2020-02-13 RX ORDER — METRONIDAZOLE 500 MG/100ML
500 INJECTION, SOLUTION INTRAVENOUS EVERY 8 HOURS
Status: DISCONTINUED | OUTPATIENT
Start: 2020-02-13 | End: 2020-02-15

## 2020-02-13 RX ORDER — SODIUM CHLORIDE 0.9 % (FLUSH) 0.9 %
5-40 SYRINGE (ML) INJECTION EVERY 8 HOURS
Status: DISCONTINUED | OUTPATIENT
Start: 2020-02-13 | End: 2020-02-28 | Stop reason: HOSPADM

## 2020-02-13 RX ORDER — SODIUM CHLORIDE 0.9 % (FLUSH) 0.9 %
5-40 SYRINGE (ML) INJECTION AS NEEDED
Status: DISCONTINUED | OUTPATIENT
Start: 2020-02-13 | End: 2020-02-28 | Stop reason: HOSPADM

## 2020-02-13 RX ADMIN — OXYCODONE HYDROCHLORIDE AND ACETAMINOPHEN 1 TABLET: 5; 325 TABLET ORAL at 16:24

## 2020-02-13 RX ADMIN — ROCURONIUM BROMIDE 30 MG: 10 INJECTION, SOLUTION INTRAVENOUS at 08:12

## 2020-02-13 RX ADMIN — SODIUM CHLORIDE, SODIUM LACTATE, POTASSIUM CHLORIDE, AND CALCIUM CHLORIDE: 600; 310; 30; 20 INJECTION, SOLUTION INTRAVENOUS at 13:12

## 2020-02-13 RX ADMIN — LIDOCAINE HYDROCHLORIDE 20 MG: 20 INJECTION, SOLUTION EPIDURAL; INFILTRATION; INTRACAUDAL; PERINEURAL at 07:37

## 2020-02-13 RX ADMIN — HYDROMORPHONE HYDROCHLORIDE 0.5 MG: 2 INJECTION, SOLUTION INTRAMUSCULAR; INTRAVENOUS; SUBCUTANEOUS at 09:21

## 2020-02-13 RX ADMIN — HYDROMORPHONE HYDROCHLORIDE 0.5 MG: 2 INJECTION, SOLUTION INTRAMUSCULAR; INTRAVENOUS; SUBCUTANEOUS at 09:24

## 2020-02-13 RX ADMIN — OXYCODONE HYDROCHLORIDE AND ACETAMINOPHEN 1 TABLET: 5; 325 TABLET ORAL at 19:51

## 2020-02-13 RX ADMIN — Medication 100 MCG: at 10:00

## 2020-02-13 RX ADMIN — FENTANYL CITRATE 50 MCG: 50 INJECTION, SOLUTION INTRAMUSCULAR; INTRAVENOUS at 09:05

## 2020-02-13 RX ADMIN — ROCURONIUM BROMIDE 10 MG: 10 INJECTION, SOLUTION INTRAVENOUS at 07:37

## 2020-02-13 RX ADMIN — ROCURONIUM BROMIDE 10 MG: 10 INJECTION, SOLUTION INTRAVENOUS at 08:56

## 2020-02-13 RX ADMIN — LATANOPROST 1 DROP: 50 SOLUTION OPHTHALMIC at 22:46

## 2020-02-13 RX ADMIN — SODIUM CHLORIDE, SODIUM LACTATE, POTASSIUM CHLORIDE, AND CALCIUM CHLORIDE 25 ML/HR: 600; 310; 30; 20 INJECTION, SOLUTION INTRAVENOUS at 06:34

## 2020-02-13 RX ADMIN — HEPARIN SODIUM 5000 UNITS: 5000 INJECTION INTRAVENOUS; SUBCUTANEOUS at 18:10

## 2020-02-13 RX ADMIN — INSULIN LISPRO 6 UNITS: 100 INJECTION, SOLUTION INTRAVENOUS; SUBCUTANEOUS at 13:36

## 2020-02-13 RX ADMIN — ROCURONIUM BROMIDE 10 MG: 10 INJECTION, SOLUTION INTRAVENOUS at 11:18

## 2020-02-13 RX ADMIN — GABAPENTIN 100 MG: 100 CAPSULE ORAL at 21:28

## 2020-02-13 RX ADMIN — FAMOTIDINE 20 MG: 20 TABLET, FILM COATED ORAL at 06:34

## 2020-02-13 RX ADMIN — FENTANYL CITRATE 100 MCG: 50 INJECTION, SOLUTION INTRAMUSCULAR; INTRAVENOUS at 07:37

## 2020-02-13 RX ADMIN — SODIUM CHLORIDE, SODIUM LACTATE, POTASSIUM CHLORIDE, AND CALCIUM CHLORIDE: 600; 310; 30; 20 INJECTION, SOLUTION INTRAVENOUS at 11:39

## 2020-02-13 RX ADMIN — SODIUM CHLORIDE, SODIUM LACTATE, POTASSIUM CHLORIDE, AND CALCIUM CHLORIDE: 600; 310; 30; 20 INJECTION, SOLUTION INTRAVENOUS at 07:30

## 2020-02-13 RX ADMIN — METRONIDAZOLE 500 MG: 500 INJECTION, SOLUTION INTRAVENOUS at 22:20

## 2020-02-13 RX ADMIN — HYDROMORPHONE HYDROCHLORIDE 0.5 MG: 2 INJECTION, SOLUTION INTRAMUSCULAR; INTRAVENOUS; SUBCUTANEOUS at 09:31

## 2020-02-13 RX ADMIN — HYDROMORPHONE HYDROCHLORIDE 1 MG: 1 INJECTION, SOLUTION INTRAMUSCULAR; INTRAVENOUS; SUBCUTANEOUS at 18:10

## 2020-02-13 RX ADMIN — DEXAMETHASONE SODIUM PHOSPHATE 4 MG: 4 INJECTION, SOLUTION INTRAMUSCULAR; INTRAVENOUS at 07:37

## 2020-02-13 RX ADMIN — FUROSEMIDE 20 MG: 10 INJECTION, SOLUTION INTRAMUSCULAR; INTRAVENOUS at 21:24

## 2020-02-13 RX ADMIN — SUCCINYLCHOLINE CHLORIDE 100 MG: 20 INJECTION, SOLUTION INTRAMUSCULAR; INTRAVENOUS at 07:37

## 2020-02-13 RX ADMIN — LEVOFLOXACIN 500 MG: 5 INJECTION, SOLUTION INTRAVENOUS at 07:30

## 2020-02-13 RX ADMIN — SODIUM CHLORIDE, SODIUM LACTATE, POTASSIUM CHLORIDE, AND CALCIUM CHLORIDE: 600; 310; 30; 20 INJECTION, SOLUTION INTRAVENOUS at 09:23

## 2020-02-13 RX ADMIN — HYDROMORPHONE HYDROCHLORIDE 0.5 MG: 2 INJECTION, SOLUTION INTRAMUSCULAR; INTRAVENOUS; SUBCUTANEOUS at 14:00

## 2020-02-13 RX ADMIN — SODIUM CHLORIDE 100 ML/HR: 900 INJECTION, SOLUTION INTRAVENOUS at 22:47

## 2020-02-13 RX ADMIN — Medication 100 MCG: at 12:11

## 2020-02-13 RX ADMIN — PROPOFOL 120 MG: 10 INJECTION, EMULSION INTRAVENOUS at 07:37

## 2020-02-13 RX ADMIN — METRONIDAZOLE 500 MG: 500 INJECTION, SOLUTION INTRAVENOUS at 11:58

## 2020-02-13 RX ADMIN — Medication 100 MCG: at 10:56

## 2020-02-13 RX ADMIN — ONDANSETRON 4 MG: 2 INJECTION INTRAMUSCULAR; INTRAVENOUS at 07:37

## 2020-02-13 RX ADMIN — PANTOPRAZOLE SODIUM 40 MG: 40 TABLET, DELAYED RELEASE ORAL at 18:10

## 2020-02-13 RX ADMIN — GABAPENTIN 100 MG: 100 CAPSULE ORAL at 18:10

## 2020-02-13 RX ADMIN — HYDROMORPHONE HYDROCHLORIDE 0.5 MG: 2 INJECTION, SOLUTION INTRAMUSCULAR; INTRAVENOUS; SUBCUTANEOUS at 14:35

## 2020-02-13 RX ADMIN — HYDROMORPHONE HYDROCHLORIDE 0.5 MG: 2 INJECTION, SOLUTION INTRAMUSCULAR; INTRAVENOUS; SUBCUTANEOUS at 13:36

## 2020-02-13 RX ADMIN — SODIUM POLYSTYRENE SULFONATE 30 G: 15 SUSPENSION ORAL; RECTAL at 22:45

## 2020-02-13 RX ADMIN — Medication 100 MCG: at 07:55

## 2020-02-13 RX ADMIN — FENTANYL CITRATE 100 MCG: 50 INJECTION, SOLUTION INTRAMUSCULAR; INTRAVENOUS at 08:12

## 2020-02-13 RX ADMIN — SODIUM CHLORIDE, SODIUM LACTATE, POTASSIUM CHLORIDE, AND CALCIUM CHLORIDE 125 ML/HR: 600; 310; 30; 20 INJECTION, SOLUTION INTRAVENOUS at 17:00

## 2020-02-13 RX ADMIN — Medication 10 ML: at 22:23

## 2020-02-13 RX ADMIN — Medication 100 MCG: at 10:35

## 2020-02-13 RX ADMIN — ONDANSETRON 4 MG: 2 INJECTION INTRAMUSCULAR; INTRAVENOUS at 13:36

## 2020-02-13 RX ADMIN — METRONIDAZOLE 500 MG: 500 INJECTION, SOLUTION INTRAVENOUS at 07:41

## 2020-02-13 RX ADMIN — Medication 100 MCG: at 11:39

## 2020-02-13 RX ADMIN — MIDAZOLAM HYDROCHLORIDE 2 MG: 2 INJECTION, SOLUTION INTRAMUSCULAR; INTRAVENOUS at 07:30

## 2020-02-13 RX ADMIN — LABETALOL 20 MG/4 ML (5 MG/ML) INTRAVENOUS SYRINGE 5 MG: at 09:05

## 2020-02-13 RX ADMIN — HYDROMORPHONE HYDROCHLORIDE 0.5 MG: 2 INJECTION, SOLUTION INTRAMUSCULAR; INTRAVENOUS; SUBCUTANEOUS at 13:46

## 2020-02-13 RX ADMIN — ALBUTEROL SULFATE 2.5 MG: 1.25 SOLUTION RESPIRATORY (INHALATION) at 22:23

## 2020-02-13 RX ADMIN — HYDROMORPHONE HYDROCHLORIDE 0.5 MG: 2 INJECTION, SOLUTION INTRAMUSCULAR; INTRAVENOUS; SUBCUTANEOUS at 09:28

## 2020-02-13 NOTE — PROGRESS NOTES
Bedside shift change report given to sasha (oncoming nurse) by Colten Acosta (offgoing nurse). Report included the following information SBAR, Kardex, Procedure Summary, Intake/Output and MAR.

## 2020-02-13 NOTE — CONSULTS
[facility] Hospital[facility]  Progress Note    Patient: Bossman Ro Age: 77 y.o. : 1950 MR#: [id number] SSN: [id number]  Date: 2020     Subjective:   Post-surgical consult for medical management. Patient seen in PACU  Complains of dry mouth and abdominal surgical pain. Sleepy but oriented post surgery. Assessment/Plan:   1. Incisional hernia s/p open repair of incisional hernia with mesh  2. Post op pain  3. HTN  4. DMT3  5. GERD  6. History of throat cancer  7. History of diverticulitis  8. History of intractable low back pain  9. Glaucoma  10. CKD? Plan  1. Continue with recommendations s/p incisional hernia with mesh by general surgery, Dr. Marlen Choudhury. Levaquin x 2 doses, metronidazole x 8 doses. Follow cbc  2. Monitor BP. Restart home BP medications in the am  3. POC glucose, SSI. Metformin on hold  4. Continue home eye drops for glaucoma and chronic anemia  5. Protonix and SCD  6. Monitor metabolic and renal function. 7. IS while awake  8.  Bowel regimen daily    Additional Notes:      Case discussed with:  [x]Patient  []Family  [x]Nursing  []Case Management  DVT Prophylaxis:  []Lovenox  []Hep SQ  [x]SCDs  []Coumadin   []On Heparin gtt    Objective:   VS:   Visit Vitals  /63   Pulse 97   Temp 98.1 °F (36.7 °C)   Resp 15   Ht 5' 10\" (1.778 m)   Wt 81.6 kg (180 lb)   SpO2 99%   BMI 25.83 kg/m²      Tmax/24hrs: Temp (24hrs), Av.1 °F (36.7 °C), Min:97.6 °F (36.4 °C), Max:98.5 °F (36.9 °C)      Intake/Output Summary (Last 24 hours) at 2020 1355  Last data filed at 2020 1325  Gross per 24 hour   Intake 3000 ml   Output 200 ml   Net 2800 ml       General:  NAD  Cardiovascular:  RRR  Pulmonary:  LSC throughout; respiratory effort WNL  GI:  Abdominal binder, abdominal incision and surgical drain  Extremities:  No edema; 2+ dorsalis pedis pulses bilaterally  Neuro: sleepy and oriented  [place]        Labs:    Recent Results (from the past 24 hour(s)) GLUCOSE, POC    Collection Time: 02/13/20  6:19 AM   Result Value Ref Range    Glucose (POC) 110 70 - 110 mg/dL   GLUCOSE, POC    Collection Time: 02/13/20  1:32 PM   Result Value Ref Range    Glucose (POC) 201 (H) 70 - 110 mg/dL       Signed By: Woodard Aschoff, NP     February 13, 2020

## 2020-02-13 NOTE — PROGRESS NOTES
Charles P. Verlin Koyanagi, M.D. FACS  PROGRESS NOTE    Name: Deanna Muñoz MRN: [id number]   : 1950 [facility]   Date: 2020 Admission Date: 2020  5:32 AM     Hospital Day: 1  Day of Surgery  Subjective:  Patient with 9 out of 10 pain. He states that the Percocet 5/325 is not helping. Objective:  Vitals:    20 1500 20 1501 20 1620 20 1730   BP:   135/81 129/84   Pulse: (!) 106  (!) 101 98   Resp: 12  15 15   Temp:  99.4 °F (37.4 °C) 97.8 °F (36.6 °C) 97.6 °F (36.4 °C)   SpO2: 97%  96% 97%   Weight:       Height:         Date 20 0700 - 20 0659(Not Admitted) 20 07 - 20 0659   Shift 7714-4909 9162-3872 24 Hour Total 8049-9981 4312-1451 24 Hour Total   INTAKE   I.V.(mL/kg/hr)    3000  3000     Volume (lactated Ringers infusion)    3000  3000   Shift Total(mL/kg)    3000(36.7)  3000(36.7)   OUTPUT   Urine(mL/kg/hr)    300  300     Urine Occurrence(s)  1 x 1 x        Urine Output    100  100     Urine Output (mL) (Urinary Catheter 20 Mckeon)    200  200   Drains    120  120     Output (ml) (Matthew-Garcia Drain 20 Abdomen)    90  90     Output (ml) (Matthew-Garcia Drain 20 Abdomen)    30  30   Blood    100  100     Estimated Blood Loss    100  100   Shift Total(mL/kg)    520(6.4)  520(6.4)   NET    2480  2480   Weight (kg) 79.4 81.6 81.6 81.6 81.6 81.6         Physical Exam:    General: Awake and alert, uncomfortable but no apparent distress   Abdomen: abdomen is soft with midline incisional tenderness. Raynette Isle in place. ANGELI drains with serosanguineous drainage.   No masses, organomegaly or guarding    Labs:  Recent Results (from the past 24 hour(s))   GLUCOSE, POC    Collection Time: 20  6:19 AM   Result Value Ref Range    Glucose (POC) 110 70 - 110 mg/dL   GLUCOSE, POC    Collection Time: 20  1:32 PM   Result Value Ref Range    Glucose (POC) 201 (H) 70 - 110 mg/dL     All Micro Results     None Current Medications:  Current Facility-Administered Medications   Medication Dose Route Frequency Provider Last Rate Last Dose    sodium chloride (NS) flush 5-40 mL  5-40 mL IntraVENous Q8H Sandie Medina MD        sodium chloride (NS) flush 5-40 mL  5-40 mL IntraVENous PRN Sandie Medina MD        lactated Ringers infusion  125 mL/hr IntraVENous CONTINUOUS Sandie Medina  mL/hr at 02/13/20 1700 125 mL/hr at 02/13/20 1700    oxyCODONE-acetaminophen (PERCOCET) 5-325 mg per tablet 1 Tab  1 Tab Oral Q4H PRN Sandie Medina MD        heparin (porcine) injection 5,000 Units  5,000 Units SubCUTAneous Q8H Sandie Medina MD   5,000 Units at 02/13/20 1810    gabapentin (NEURONTIN) capsule 100 mg  100 mg Oral TID Sandie Medina MD   100 mg at 02/13/20 1810    metroNIDAZOLE (FLAGYL) IVPB premix 500 mg  500 mg IntraVENous Q8H Sandie Medina MD        [START ON 2/14/2020] levoFLOXacin (LEVAQUIN) 500 mg in D5W IVPB  500 mg IntraVENous Q24H Sandie Medina MD        0.9% sodium chloride infusion  999 mL/hr IntraVENous ONCE Sandie Medina MD        pantoprazole (PROTONIX) tablet 40 mg  40 mg Oral BID Sandie Medina MD   40 mg at 02/13/20 1810    [START ON 2/14/2020] ferrous sulfate tablet 325 mg  325 mg Oral Q MON, WED & Justen Morrow MD        latanoprost (XALATAN) 0.005 % ophthalmic solution 1 Drop  1 Drop Both Eyes QHS MD Tacos Tapia ON 2/14/2020] losartan (COZAAR) tablet 50 mg  50 mg Oral DAILY MD Tacos Tapia ON 2/14/2020] nebivolol (BYSTOLIC) tablet 5 mg  5 mg Oral DAILY Sandie Medina MD        acetaminophen (TYLENOL) tablet 650 mg  650 mg Oral Q4H PRN Reece Gonzalez NP        HYDROmorphone (DILAUDID) injection 0.5 mg  0.5 mg IntraVENous Q4H PRN Italo-Jonelle, Zo, NP        0.9% sodium chloride infusion  100 mL/hr IntraVENous CONTINUOUS Carlos, Carmenza Mckay NP        oxyCODONE-acetaminophen (PERCOCET) 5-325 mg per tablet 1 Tab  1 Tab Oral Q4H PRN Teddy Marques NP   1 Tab at 02/13/20 1624    [START ON 2/14/2020] polyethylene glycol (MIRALAX) packet 17 g  17 g Oral DAILY Carmenza Gonzalez NP        [START ON 2/14/2020] docusate sodium (COLACE) capsule 100 mg  100 mg Oral DAILY Carmenza Gonzalez NP        [START ON 2/14/2020] lactobacillus sp. 50 billion cpu (BIO-K PLUS) capsule 1 Cap  1 Cap Oral DAILY Zo Gonzalez NP        HYDROmorphone (DILAUDID) injection 1 mg  1 mg IntraVENous Q3H PRN Pascual Morrow MD   1 mg at 02/13/20 1810       Chart and notes reviewed. Data reviewed. I have evaluated and examined the patient.         IMPRESSION:   · Patient is postop day 0 from transversus abdominis release open incisional hernia repair with 3 layers of biologic mesh      PLAN:/DISCUSION:   · Dilaudid 1 mg every 3 hours as needed pain  · Toradol 15 mg every 8 hours as needed pain  · Neurontin 100 mg p.o. 3 times daily as needed pain  · Hospitalist consult for medical care  · ANGELI care as written  · Maintain Mckeon catheter        Jose Fuentes MD

## 2020-02-13 NOTE — ANESTHESIA PREPROCEDURE EVALUATION
Relevant Problems   No relevant active problems       Anesthetic History   No history of anesthetic complications            Review of Systems / Medical History  Patient summary reviewed and pertinent labs reviewed    Pulmonary  Within defined limits                 Neuro/Psych   Within defined limits           Cardiovascular    Hypertension: well controlled              Exercise tolerance: >4 METS     GI/Hepatic/Renal     GERD: well controlled           Endo/Other    Diabetes: well controlled, type 2    Cancer     Other Findings              Physical Exam    Airway  Mallampati: III  TM Distance: 4 - 6 cm  Neck ROM: normal range of motion   Mouth opening: Diminished (comment)     Cardiovascular    Rhythm: regular  Rate: normal         Dental    Dentition: Full lower dentures and Full upper dentures     Pulmonary  Breath sounds clear to auscultation               Abdominal  GI exam deferred       Other Findings            Anesthetic Plan    ASA: 3  Anesthesia type: general          Induction: Intravenous  Anesthetic plan and risks discussed with: Patient

## 2020-02-13 NOTE — BRIEF OP NOTE
BRIEF OPERATIVE NOTE    Date of Procedure: 2/13/2020   Preoperative Diagnosis: K43.2 INCISIONAL HERNIA WITHOUT OBSTRUCTION OR GANGRENE  Postoperative Diagnosis: K43.2 INCISIONAL HERNIA WITHOUT OBSTRUCTION OR GANGRENE    Procedure(s):  OPEN REPAIR OF INCISIONAL HERNIA WITH MESH  Surgeon(s) and Role:     * Monet Ayala MD - Primary         Surgical Assistant: None    Surgical Staff:  Circ-1: Merline Eriksson  Circ-Relief: Vaishali Galan RN  Scrub Tech-1: Liliana Clemons  Scrub Tech-Relief: Bernnenita Coventry  Surg Asst-1: Arthuro Nichol  Event Time In Time Out   Incision Start 0778    Incision Close 1308      Anesthesia: General   Estimated Blood Loss: 100 mL  Specimens:   ID Type Source Tests Collected by Time Destination   1 : Mesenteric Nodule Frozen Section Abdomen  Monet Ayala MD 2/13/2020 9908 Pathology      Findings: large incisional hernia in the lower midline   Complications: None  Implants:   Implant Name Type Inv.  Item Serial No.  Lot No. LRB No. Used Action   MESH JAYLIN RECT POLY [id number] LF -- PHASIX ST MESH - [id number]  MESH JAYLIN RECT POLY [id number] LF -- PHASIX ST MESH  BARD DAVOL [id number] N/A 1 Implanted   [facility] SURGICAL GRAFT [id number] [id number]    BARD [id number] N/A 1 Implanted   MESH RECON RECT [id number] -- PHASIX - [id number]  MESH RECON RECT [id number] -- PHASIX  BARD DAVOL [id number] N/A 1 Implanted

## 2020-02-13 NOTE — INTERVAL H&P NOTE
H&P Update:  Yuliana Lewis was seen and examined. History and physical has been reviewed. The patient has been examined.  There have been no significant clinical changes since the completion of the originally dated History and Physical.

## 2020-02-13 NOTE — OP NOTES
[address] [place] [address]    [address] [facility],  [place]   OPERATIVE REPORT   PATIENT: Mehul Wang  MRN: [id number]  DATE: 2/13/2020  ROOM: OR/PL  ATTENDING: Yadira Mancera MD   DICTATING: Yadira Mancera MD   PREOPERATIVE DIAGNOSIS: Incisional hernia. POSTOPERATIVE DIAGNOSIS: Same  PROCEDURES PERFORMED: Open repair of incisional hernia with mesh - transversus abdominal release. ESTIMATED BLOOD LOSS: 100 mL. SPECIMENS REMOVED: Hernia sac   SURGEON: Yifan Gutierrez MD.   ASSISTANT: Peng Cai SA.   ANESTHESIA: General  FINDINGS:  Incisional hernia  IMPLANTS: Phasix ST, XenMatrix AB, Phasix  DRAINS: 19 Fr. ANGELI x  2  FLUIDS GIVEN:  3000 mL  INDICATION: Patient with symptomatic incisional hernia    DESCRIPTION OF PROCEDURE: The patient was identified in the holding area   with her , where consent for open incisional hernia repair with mesh - transversus abdominus release  was verified. In the operating room, the patient was placed under general   anesthesia. The abdomen was prepped and draped in sterile fashion using   Iodine povidine solution and sterile drapes. The time-out was performed to   ensure correct procedure. The local anesthetic was infiltrated into the skin   and deep dermal tissues along the old incision. This was carried down   through skin and subcutaneous tissue. The peritoneal cavity was   then entered in the virgin portion of the abdominal incision. The electrocautery was used to open the fascia. The Mariangel clamps were used to grab the peritoneum, Metzenbaum were used to enter the peritoneal cavity. Surgeon's fingers were inserted and used to guide the opening of the fascia for the full length of the incision. The transversus abdominis release was performed first on the patient's left side down to the flank up to the rib cage and down to the inguinal ligament and pubic bone. This was then repeated on the right side.   The hernia was mostly in the lower right abdominal wall. The hernia sac was taken down as part of the transversus abdominis release. Again this was taken to the inguinal ligament flank and rib cage. The small bowel was run from the ligament of Treitz to the ileocecal valve twice to ensure that no thermal damage was created during cauterization of the transversus abdominis. The transversus abdominis layer was closed with omentum tacked to the transversus abdominis for protection. The 35 x 30 cm Phasix ST mesh was then fixed transabdominally using the [employer] and stab incision was created with a 15 blade and #1 single-stranded PDS suture to create new stitches with 2 points of fixation in the upper abdomen 2 points of fixation in the lower abdomen and 2 points of fixation on each side. The 40 x 25 cm Xenmatrix AB mesh was then fitted into the space on top of the Xenmatrix AB mesh. It was cut to fit appropriately. The subcutaneous tissue on top of the rectus muscle and oblique muscles was dissected away using electrocautery. This dissection again was carried to the flank to the rib cage into the inguinal ligament on each side. The mesh was fixed using U stitches of #1 single-stranded PDS again with 2 points of fixation cephalad caudal right and left. The external oblique was released as needed on each side to allow closure of the linea alba to the midline. The #1 single-stranded PDS was used with a stitch started in the cephalad apex and sewn to meet a stitch which was started in the caudal apex in the center of the abdomen. The uncoated Phasix mesh was then cut in half and placed on top of the fascial closure and tacked into place with 2 stitches cephalad, 2 stitches caudal, 2 stitches right and 2 stitches left. A 19 round ANGELI drain was placed into the space below the Xenmatrix AB mesh and above the Phasix ST mesh. A 19 round ANGELI mesh was placed in the subcutaneous space on top of the fascial closure.   These drains were both secured using 2-0 silk suture. The 3-0 Vicryl suture was used to create running closure of the   Kristen's layer. 4-0 Monocryl suture was used to reapproximate the skin with a   stitch started in the caudal apex and a stitch started in the cephalad apex   and tied in the center. Mastisol, Steri-Strips, and 4 x 4 gauze drain   sponges were used for dressings. The Tomlinson Relic was placed on top of the incision. Bio patches and Tegaderms were placed at the dressed with drains. The patient tolerated the procedure very well. The Mckeon catheter   was inserted at the end of the case and left in place. DISPOSITION: The patient was extubated and stable upon transport from the   recovery room.    Gustav Lesch, MD

## 2020-02-13 NOTE — PROGRESS NOTES
Problem: Infection - Risk of, Surgical Site Infection  Goal: *Absence of surgical site infection signs and symptoms  Outcome: Progressing Towards Goal     Problem: Patient Education: Go to Patient Education Activity  Goal: Patient/Family Education  Outcome: Progressing Towards Goal     Problem: Deep Venous Thrombosis - Risk of  Goal: *Absence of deep venous thrombosis signs and symptoms(Stroke Metric)  Outcome: Progressing Towards Goal  Goal: *Absence of impaired coagulation signs and symptoms  Outcome: Progressing Towards Goal  Goal: *Knowledge of prescribed medications  Outcome: Progressing Towards Goal  Goal: *Absence of bleeding  Outcome: Progressing Towards Goal     Problem: Patient Education: Go to Patient Education Activity  Goal: Patient/Family Education  Outcome: Progressing Towards Goal     Problem: Falls - Risk of  Goal: *Absence of Falls  Description  Document Hemanth Burgos Fall Risk and appropriate interventions in the flowsheet.   Outcome: Progressing Towards Goal  Note: Fall Risk Interventions:            Medication Interventions: Patient to call before getting OOB, Teach patient to arise slowly    Elimination Interventions: Call light in reach, Patient to call for help with toileting needs              Problem: Patient Education: Go to Patient Education Activity  Goal: Patient/Family Education  Outcome: Progressing Towards Goal

## 2020-02-13 NOTE — PERIOP NOTES
TRANSFER - OUT REPORT:    Verbal report given to Sara Rubin RN on Rafal Jones  being transferred to (unit) for routine post - op       Report consisted of patients Situation, Background, Assessment and   Recommendations(SBAR). Information from the following report(s) SBAR, Kardex, Procedure Summary, Intake/Output, MAR and Recent Results was reviewed with the receiving nurse. Lines:   Peripheral IV 02/13/20 Left Hand (Active)   Site Assessment Clean, dry, & intact 2/13/2020  1:19 PM   Phlebitis Assessment 0 2/13/2020  1:19 PM   Infiltration Assessment 0 2/13/2020  1:19 PM   Dressing Status Clean, dry, & intact 2/13/2020  1:19 PM   Dressing Type Tape;Transparent 2/13/2020  1:19 PM   Hub Color/Line Status Pink; Infusing 2/13/2020  1:19 PM      Visit Vitals  /64   Pulse (!) 106   Temp 99.4 °F (37.4 °C)   Resp 12   Ht 5' 10\" (1.778 m)   Wt 81.6 kg (180 lb)   SpO2 97%   BMI 25.83 kg/m²       Opportunity for questions and clarification was provided.       Patient transported with:   O2 @ 3 liters  Tech

## 2020-02-14 ENCOUNTER — APPOINTMENT (OUTPATIENT)
Dept: GENERAL RADIOLOGY | Age: 70
DRG: 353 | End: 2020-02-14
Attending: NURSE PRACTITIONER
Payer: MEDICARE

## 2020-02-14 ENCOUNTER — APPOINTMENT (OUTPATIENT)
Dept: GENERAL RADIOLOGY | Age: 70
DRG: 353 | End: 2020-02-14
Attending: HOSPITALIST
Payer: MEDICARE

## 2020-02-14 LAB
ANION GAP SERPL CALC-SCNC: 7 MMOL/L (ref 3–18)
ANION GAP SERPL CALC-SCNC: 8 MMOL/L (ref 3–18)
APPEARANCE UR: CLEAR
BACTERIA URNS QL MICRO: ABNORMAL /HPF
BASOPHILS # BLD: 0 K/UL (ref 0–0.06)
BASOPHILS NFR BLD: 0 % (ref 0–3)
BILIRUB UR QL: NEGATIVE
BUN SERPL-MCNC: 20 MG/DL (ref 7–18)
BUN SERPL-MCNC: 21 MG/DL (ref 7–18)
BUN/CREAT SERPL: 10 (ref 12–20)
BUN/CREAT SERPL: 9 (ref 12–20)
CALCIUM SERPL-MCNC: 7.2 MG/DL (ref 8.5–10.1)
CALCIUM SERPL-MCNC: 7.6 MG/DL (ref 8.5–10.1)
CHLORIDE SERPL-SCNC: 107 MMOL/L (ref 100–111)
CHLORIDE SERPL-SCNC: 107 MMOL/L (ref 100–111)
CO2 SERPL-SCNC: 19 MMOL/L (ref 21–32)
CO2 SERPL-SCNC: 21 MMOL/L (ref 21–32)
COLOR UR: YELLOW
CREAT SERPL-MCNC: 1.94 MG/DL (ref 0.6–1.3)
CREAT SERPL-MCNC: 2.31 MG/DL (ref 0.6–1.3)
CREAT UR-MCNC: 262 MG/DL (ref 30–125)
DIFFERENTIAL METHOD BLD: ABNORMAL
EOSINOPHIL # BLD: 0 K/UL (ref 0–0.4)
EOSINOPHIL NFR BLD: 0 % (ref 0–5)
EPITH CASTS URNS QL MICRO: ABNORMAL /LPF (ref 0–5)
ERYTHROCYTE [DISTWIDTH] IN BLOOD BY AUTOMATED COUNT: 14.7 % (ref 11.6–14.5)
EST. AVERAGE GLUCOSE BLD GHB EST-MCNC: 174 MG/DL
GLUCOSE BLD STRIP.AUTO-MCNC: 157 MG/DL (ref 70–110)
GLUCOSE BLD STRIP.AUTO-MCNC: 172 MG/DL (ref 70–110)
GLUCOSE BLD STRIP.AUTO-MCNC: 207 MG/DL (ref 70–110)
GLUCOSE SERPL-MCNC: 151 MG/DL (ref 74–99)
GLUCOSE SERPL-MCNC: 160 MG/DL (ref 74–99)
GLUCOSE UR STRIP.AUTO-MCNC: NEGATIVE MG/DL
HBA1C MFR BLD: 7.7 % (ref 4.2–5.6)
HCT VFR BLD AUTO: 40.8 % (ref 36–48)
HGB BLD-MCNC: 13.3 G/DL (ref 13–16)
HGB UR QL STRIP: ABNORMAL
KETONES UR QL STRIP.AUTO: ABNORMAL MG/DL
LACTATE SERPL-SCNC: 2.8 MMOL/L (ref 0.4–2)
LEUKOCYTE ESTERASE UR QL STRIP.AUTO: ABNORMAL
LYMPHOCYTES # BLD: 1.4 K/UL (ref 0.8–3.5)
LYMPHOCYTES NFR BLD: 8 % (ref 20–51)
MAGNESIUM SERPL-MCNC: 1.2 MG/DL (ref 1.6–2.6)
MAGNESIUM SERPL-MCNC: 1.6 MG/DL (ref 1.6–2.6)
MCH RBC QN AUTO: 28.9 PG (ref 24–34)
MCHC RBC AUTO-ENTMCNC: 32.6 G/DL (ref 31–37)
MCV RBC AUTO: 88.7 FL (ref 74–97)
MONOCYTES # BLD: 1.4 K/UL (ref 0–1)
MONOCYTES NFR BLD: 8 % (ref 2–9)
NEUTS BAND NFR BLD MANUAL: 1 % (ref 0–5)
NEUTS SEG # BLD: 15.3 K/UL (ref 1.8–8)
NEUTS SEG NFR BLD: 83 % (ref 42–75)
NITRITE UR QL STRIP.AUTO: NEGATIVE
PH UR STRIP: 5 [PH] (ref 5–8)
PHOSPHATE SERPL-MCNC: 2.3 MG/DL (ref 2.5–4.9)
PLATELET # BLD AUTO: 217 K/UL (ref 135–420)
PLATELET COMMENTS,PCOM: ABNORMAL
PMV BLD AUTO: 9.9 FL (ref 9.2–11.8)
POTASSIUM SERPL-SCNC: 5.3 MMOL/L (ref 3.5–5.5)
POTASSIUM SERPL-SCNC: 5.8 MMOL/L (ref 3.5–5.5)
POTASSIUM SERPL-SCNC: 6.1 MMOL/L (ref 3.5–5.5)
PROT UR STRIP-MCNC: 100 MG/DL
PROT UR-MCNC: 101 MG/DL
RBC # BLD AUTO: 4.6 M/UL (ref 4.7–5.5)
RBC #/AREA URNS HPF: ABNORMAL /HPF (ref 0–5)
RBC MORPH BLD: ABNORMAL
SODIUM SERPL-SCNC: 134 MMOL/L (ref 136–145)
SODIUM SERPL-SCNC: 135 MMOL/L (ref 136–145)
SODIUM UR-SCNC: 42 MMOL/L (ref 20–110)
SP GR UR REFRACTOMETRY: 1.02 (ref 1–1.03)
UROBILINOGEN UR QL STRIP.AUTO: 0.2 EU/DL (ref 0.2–1)
WBC # BLD AUTO: 18.1 K/UL (ref 4.6–13.2)
WBC URNS QL MICRO: ABNORMAL /HPF (ref 0–4)

## 2020-02-14 PROCEDURE — 87086 URINE CULTURE/COLONY COUNT: CPT

## 2020-02-14 PROCEDURE — 36600 WITHDRAWAL OF ARTERIAL BLOOD: CPT

## 2020-02-14 PROCEDURE — 83036 HEMOGLOBIN GLYCOSYLATED A1C: CPT

## 2020-02-14 PROCEDURE — 82962 GLUCOSE BLOOD TEST: CPT

## 2020-02-14 PROCEDURE — 74011000250 HC RX REV CODE- 250: Performed by: HOSPITALIST

## 2020-02-14 PROCEDURE — 74011250636 HC RX REV CODE- 250/636: Performed by: SURGERY

## 2020-02-14 PROCEDURE — 82803 BLOOD GASES ANY COMBINATION: CPT

## 2020-02-14 PROCEDURE — 71045 X-RAY EXAM CHEST 1 VIEW: CPT

## 2020-02-14 PROCEDURE — 36415 COLL VENOUS BLD VENIPUNCTURE: CPT

## 2020-02-14 PROCEDURE — 84132 ASSAY OF SERUM POTASSIUM: CPT

## 2020-02-14 PROCEDURE — 83605 ASSAY OF LACTIC ACID: CPT

## 2020-02-14 PROCEDURE — 84156 ASSAY OF PROTEIN URINE: CPT

## 2020-02-14 PROCEDURE — 74011636637 HC RX REV CODE- 636/637: Performed by: NURSE PRACTITIONER

## 2020-02-14 PROCEDURE — 82570 ASSAY OF URINE CREATININE: CPT

## 2020-02-14 PROCEDURE — 74011250637 HC RX REV CODE- 250/637: Performed by: SURGERY

## 2020-02-14 PROCEDURE — 65270000029 HC RM PRIVATE

## 2020-02-14 PROCEDURE — 80048 BASIC METABOLIC PNL TOTAL CA: CPT

## 2020-02-14 PROCEDURE — 83735 ASSAY OF MAGNESIUM: CPT

## 2020-02-14 PROCEDURE — 84100 ASSAY OF PHOSPHORUS: CPT

## 2020-02-14 PROCEDURE — 94640 AIRWAY INHALATION TREATMENT: CPT

## 2020-02-14 PROCEDURE — 92610 EVALUATE SWALLOWING FUNCTION: CPT

## 2020-02-14 PROCEDURE — 74011250636 HC RX REV CODE- 250/636: Performed by: HOSPITALIST

## 2020-02-14 PROCEDURE — 74011636637 HC RX REV CODE- 636/637: Performed by: HOSPITALIST

## 2020-02-14 PROCEDURE — 74011250637 HC RX REV CODE- 250/637: Performed by: HOSPITALIST

## 2020-02-14 PROCEDURE — 84300 ASSAY OF URINE SODIUM: CPT

## 2020-02-14 PROCEDURE — 81001 URINALYSIS AUTO W/SCOPE: CPT

## 2020-02-14 PROCEDURE — 92526 ORAL FUNCTION THERAPY: CPT

## 2020-02-14 PROCEDURE — 74011250637 HC RX REV CODE- 250/637: Performed by: NURSE PRACTITIONER

## 2020-02-14 PROCEDURE — 85025 COMPLETE CBC W/AUTO DIFF WBC: CPT

## 2020-02-14 RX ORDER — NYSTATIN 100000 [USP'U]/ML
500000 SUSPENSION ORAL 4 TIMES DAILY
Status: DISCONTINUED | OUTPATIENT
Start: 2020-02-14 | End: 2020-02-28 | Stop reason: HOSPADM

## 2020-02-14 RX ORDER — IPRATROPIUM BROMIDE AND ALBUTEROL SULFATE 2.5; .5 MG/3ML; MG/3ML
3 SOLUTION RESPIRATORY (INHALATION)
Status: DISCONTINUED | OUTPATIENT
Start: 2020-02-14 | End: 2020-02-19

## 2020-02-14 RX ORDER — ALBUTEROL SULFATE 0.83 MG/ML
2.5 SOLUTION RESPIRATORY (INHALATION)
Status: DISCONTINUED | OUTPATIENT
Start: 2020-02-14 | End: 2020-02-28 | Stop reason: HOSPADM

## 2020-02-14 RX ORDER — INSULIN LISPRO 100 [IU]/ML
INJECTION, SOLUTION INTRAVENOUS; SUBCUTANEOUS
Status: DISCONTINUED | OUTPATIENT
Start: 2020-02-14 | End: 2020-02-15

## 2020-02-14 RX ORDER — SODIUM CHLORIDE 9 MG/ML
999 INJECTION, SOLUTION INTRAVENOUS ONCE
Status: ACTIVE | OUTPATIENT
Start: 2020-02-14 | End: 2020-02-15

## 2020-02-14 RX ORDER — SODIUM CHLORIDE 9 MG/ML
75 INJECTION, SOLUTION INTRAVENOUS CONTINUOUS
Status: DISCONTINUED | OUTPATIENT
Start: 2020-02-14 | End: 2020-02-18

## 2020-02-14 RX ORDER — MAGNESIUM SULFATE 100 %
4 CRYSTALS MISCELLANEOUS AS NEEDED
Status: DISCONTINUED | OUTPATIENT
Start: 2020-02-14 | End: 2020-02-28 | Stop reason: HOSPADM

## 2020-02-14 RX ORDER — SODIUM CHLORIDE, SODIUM LACTATE, POTASSIUM CHLORIDE, CALCIUM CHLORIDE 600; 310; 30; 20 MG/100ML; MG/100ML; MG/100ML; MG/100ML
150 INJECTION, SOLUTION INTRAVENOUS CONTINUOUS
Status: DISCONTINUED | OUTPATIENT
Start: 2020-02-14 | End: 2020-02-14

## 2020-02-14 RX ORDER — IPRATROPIUM BROMIDE AND ALBUTEROL SULFATE 2.5; .5 MG/3ML; MG/3ML
3 SOLUTION RESPIRATORY (INHALATION)
Status: DISCONTINUED | OUTPATIENT
Start: 2020-02-14 | End: 2020-02-28 | Stop reason: HOSPADM

## 2020-02-14 RX ORDER — OXYCODONE AND ACETAMINOPHEN 5; 325 MG/1; MG/1
2 TABLET ORAL
Status: DISCONTINUED | OUTPATIENT
Start: 2020-02-14 | End: 2020-02-28 | Stop reason: HOSPADM

## 2020-02-14 RX ORDER — MAGNESIUM SULFATE 1 G/100ML
1 INJECTION INTRAVENOUS ONCE
Status: COMPLETED | OUTPATIENT
Start: 2020-02-14 | End: 2020-02-14

## 2020-02-14 RX ORDER — SODIUM POLYSTYRENE SULFONATE 15 G/60ML
45 SUSPENSION ORAL; RECTAL EVERY 4 HOURS
Status: DISCONTINUED | OUTPATIENT
Start: 2020-02-14 | End: 2020-02-14

## 2020-02-14 RX ORDER — FUROSEMIDE 10 MG/ML
20 INJECTION INTRAMUSCULAR; INTRAVENOUS ONCE
Status: DISPENSED | OUTPATIENT
Start: 2020-02-14 | End: 2020-02-15

## 2020-02-14 RX ORDER — SODIUM POLYSTYRENE SULFONATE 15 G/60ML
30 SUSPENSION ORAL; RECTAL
Status: COMPLETED | OUTPATIENT
Start: 2020-02-14 | End: 2020-02-14

## 2020-02-14 RX ORDER — DEXTROSE 50 % IN WATER (D50W) INTRAVENOUS SYRINGE
25-50 AS NEEDED
Status: DISCONTINUED | OUTPATIENT
Start: 2020-02-14 | End: 2020-02-28 | Stop reason: HOSPADM

## 2020-02-14 RX ORDER — NEBIVOLOL 5 MG/1
10 TABLET ORAL DAILY
Status: DISCONTINUED | OUTPATIENT
Start: 2020-02-15 | End: 2020-02-28 | Stop reason: HOSPADM

## 2020-02-14 RX ORDER — SODIUM CHLORIDE, SODIUM LACTATE, POTASSIUM CHLORIDE, CALCIUM CHLORIDE 600; 310; 30; 20 MG/100ML; MG/100ML; MG/100ML; MG/100ML
100 INJECTION, SOLUTION INTRAVENOUS CONTINUOUS
Status: DISCONTINUED | OUTPATIENT
Start: 2020-02-14 | End: 2020-02-14

## 2020-02-14 RX ORDER — NEBIVOLOL 5 MG/1
5 TABLET ORAL DAILY
Status: COMPLETED | OUTPATIENT
Start: 2020-02-14 | End: 2020-02-14

## 2020-02-14 RX ORDER — DIAZEPAM 10 MG/2ML
5 INJECTION INTRAMUSCULAR ONCE
Status: COMPLETED | OUTPATIENT
Start: 2020-02-14 | End: 2020-02-14

## 2020-02-14 RX ORDER — SODIUM CHLORIDE 9 MG/ML
999 INJECTION, SOLUTION INTRAVENOUS CONTINUOUS
Status: DISCONTINUED | OUTPATIENT
Start: 2020-02-14 | End: 2020-02-14

## 2020-02-14 RX ADMIN — PHENOL 1 SPRAY: 1.4 SPRAY ORAL at 10:55

## 2020-02-14 RX ADMIN — HEPARIN SODIUM 5000 UNITS: 5000 INJECTION INTRAVENOUS; SUBCUTANEOUS at 09:08

## 2020-02-14 RX ADMIN — DIAZEPAM 5 MG: 5 INJECTION, SOLUTION INTRAMUSCULAR; INTRAVENOUS at 13:42

## 2020-02-14 RX ADMIN — METHYLPREDNISOLONE SODIUM SUCCINATE 40 MG: 125 INJECTION, POWDER, FOR SOLUTION INTRAMUSCULAR; INTRAVENOUS at 15:36

## 2020-02-14 RX ADMIN — NYSTATIN 500000 UNITS: 500000 SUSPENSION ORAL at 17:57

## 2020-02-14 RX ADMIN — ALBUTEROL SULFATE 2.5 MG: 2.5 SOLUTION RESPIRATORY (INHALATION) at 03:41

## 2020-02-14 RX ADMIN — HYDROMORPHONE HYDROCHLORIDE 1 MG: 1 INJECTION, SOLUTION INTRAMUSCULAR; INTRAVENOUS; SUBCUTANEOUS at 09:10

## 2020-02-14 RX ADMIN — OXYCODONE HYDROCHLORIDE AND ACETAMINOPHEN 1 TABLET: 5; 325 TABLET ORAL at 00:55

## 2020-02-14 RX ADMIN — METRONIDAZOLE 500 MG: 500 INJECTION, SOLUTION INTRAVENOUS at 04:03

## 2020-02-14 RX ADMIN — IPRATROPIUM BROMIDE AND ALBUTEROL SULFATE 3 ML: .5; 3 SOLUTION RESPIRATORY (INHALATION) at 15:35

## 2020-02-14 RX ADMIN — NYSTATIN 500000 UNITS: 500000 SUSPENSION ORAL at 21:18

## 2020-02-14 RX ADMIN — GABAPENTIN 100 MG: 100 CAPSULE ORAL at 09:08

## 2020-02-14 RX ADMIN — INSULIN LISPRO 3 UNITS: 100 INJECTION, SOLUTION INTRAVENOUS; SUBCUTANEOUS at 18:03

## 2020-02-14 RX ADMIN — FERROUS SULFATE TAB 325 MG (65 MG ELEMENTAL FE) 325 MG: 325 (65 FE) TAB at 21:17

## 2020-02-14 RX ADMIN — OXYCODONE HYDROCHLORIDE AND ACETAMINOPHEN 2 TABLET: 5; 325 TABLET ORAL at 15:36

## 2020-02-14 RX ADMIN — PANTOPRAZOLE SODIUM 40 MG: 40 TABLET, DELAYED RELEASE ORAL at 09:08

## 2020-02-14 RX ADMIN — GABAPENTIN 100 MG: 100 CAPSULE ORAL at 15:36

## 2020-02-14 RX ADMIN — Medication 1 CAPSULE: at 09:08

## 2020-02-14 RX ADMIN — MAGNESIUM SULFATE 1 G: 1 INJECTION INTRAVENOUS at 10:56

## 2020-02-14 RX ADMIN — LATANOPROST 1 DROP: 50 SOLUTION OPHTHALMIC at 22:16

## 2020-02-14 RX ADMIN — OXYCODONE HYDROCHLORIDE AND ACETAMINOPHEN 1 TABLET: 5; 325 TABLET ORAL at 06:29

## 2020-02-14 RX ADMIN — NYSTATIN 500000 UNITS: 500000 SUSPENSION ORAL at 12:33

## 2020-02-14 RX ADMIN — METRONIDAZOLE 500 MG: 500 INJECTION, SOLUTION INTRAVENOUS at 21:22

## 2020-02-14 RX ADMIN — NEBIVOLOL HYDROCHLORIDE 5 MG: 5 TABLET ORAL at 09:08

## 2020-02-14 RX ADMIN — IPRATROPIUM BROMIDE AND ALBUTEROL SULFATE 3 ML: .5; 3 SOLUTION RESPIRATORY (INHALATION) at 20:26

## 2020-02-14 RX ADMIN — SODIUM CHLORIDE 150 ML/HR: 900 INJECTION, SOLUTION INTRAVENOUS at 19:12

## 2020-02-14 RX ADMIN — HEPARIN SODIUM 5000 UNITS: 5000 INJECTION INTRAVENOUS; SUBCUTANEOUS at 01:01

## 2020-02-14 RX ADMIN — Medication 10 ML: at 03:43

## 2020-02-14 RX ADMIN — METRONIDAZOLE 500 MG: 500 INJECTION, SOLUTION INTRAVENOUS at 12:33

## 2020-02-14 RX ADMIN — INSULIN LISPRO 4 UNITS: 100 INJECTION, SOLUTION INTRAVENOUS; SUBCUTANEOUS at 12:34

## 2020-02-14 RX ADMIN — PANTOPRAZOLE SODIUM 40 MG: 40 TABLET, DELAYED RELEASE ORAL at 17:57

## 2020-02-14 RX ADMIN — SODIUM CHLORIDE 999 ML/HR: 900 INJECTION, SOLUTION INTRAVENOUS at 14:00

## 2020-02-14 RX ADMIN — LEVOFLOXACIN 500 MG: 5 INJECTION, SOLUTION INTRAVENOUS at 09:07

## 2020-02-14 RX ADMIN — Medication 10 ML: at 13:44

## 2020-02-14 RX ADMIN — NEBIVOLOL HYDROCHLORIDE 5 MG: 5 TABLET ORAL at 10:56

## 2020-02-14 RX ADMIN — HYDROMORPHONE HYDROCHLORIDE 1 MG: 1 INJECTION, SOLUTION INTRAMUSCULAR; INTRAVENOUS; SUBCUTANEOUS at 17:57

## 2020-02-14 RX ADMIN — GABAPENTIN 100 MG: 100 CAPSULE ORAL at 21:17

## 2020-02-14 RX ADMIN — SODIUM POLYSTYRENE SULFONATE 45 G: 15 SUSPENSION ORAL; RECTAL at 09:05

## 2020-02-14 RX ADMIN — OXYCODONE HYDROCHLORIDE AND ACETAMINOPHEN 2 TABLET: 5; 325 TABLET ORAL at 10:56

## 2020-02-14 RX ADMIN — SODIUM POLYSTYRENE SULFONATE 45 G: 15 SUSPENSION ORAL; RECTAL at 03:42

## 2020-02-14 RX ADMIN — METHYLPREDNISOLONE SODIUM SUCCINATE 40 MG: 125 INJECTION, POWDER, FOR SOLUTION INTRAMUSCULAR; INTRAVENOUS at 21:20

## 2020-02-14 RX ADMIN — SODIUM POLYSTYRENE SULFONATE 30 G: 15 SUSPENSION ORAL; RECTAL at 18:04

## 2020-02-14 RX ADMIN — INSULIN LISPRO 3 UNITS: 100 INJECTION, SOLUTION INTRAVENOUS; SUBCUTANEOUS at 21:19

## 2020-02-14 NOTE — DIABETES MGMT
Glycemic Control Plan of Care    T2DM with A1c of 7.7% (2/14/2020). Home diabetes medications: Patient reported on 02/14/2020:  Metformin 1000 mg BID with meals. POC BG range on 02/13/2020: 110-201. POC BG report on 02/14/2020 at time of review: 207. Current hospital diabetes medications:  Correctional lispro insulin ACHS. Very resistant dose. Total daily dose insulin requirement previous day: 02/13/2020:  Lispro: 6 units    Recommendation(s):   1.) BG is above target range. Consider adding 5 units basal lantus insulin daily. Assessment:  Patient is 77year old with past medical history including type 2 diabetes mellitus, hypertension, diverticulitis, cancer of the throat and GERD - was admitted on 02/13/2020 post op. Noted: Incisional hernia. Status post open repair incision hernia with mesh - transversus abdominal release on 2/13/2020. T2DM. Most recent blood glucose values:    Results for Michael Galaviz (MRN [id number]) as of 2/14/2020 15:26   Ref. Range 2/13/2020 06:19 2/13/2020 13:32   GLUCOSE,FAST - POC Latest Ref Range: 70 - 110 mg/dL 110 201 (H)     Results for Michael Galaviz (MRN [id number]) as of 2/14/2020 15:26   Ref. Range 2/14/2020 12:04   GLUCOSE,FAST - POC Latest Ref Range: 70 - 110 mg/dL 207 (H)     Current A1C: 7.7% (2/14/2020) which is equivalent to estimated average blood glucose of 174 mg/dL during the past 2-3 months. Diet: Clear liquid. Goals:  Blood glucose will be within target range of  mg/dL by 02/17/2020.     Education:  ___  Refer to Diabetes Education Record             _X__  Education not indicated at this time    Naveed Nowak RN [facility]  Pager: [phone]

## 2020-02-14 NOTE — PROGRESS NOTES
Patient feels okay, denies any shortness of breath or chest pain. Has some pain in his belly. Passing flatus. Exam  General:  Alert, cooperative, no acute distress  Very hoarse voice   Pulmonary:  CTA Bilaterally. No Wheezing/Rales. Cardiovascular: Regular rate and Rhythm. GI: Binder noted, distended and slightly firm, + Bowel sounds. 2 drains present  Extremities:  No edema. No calf tenderness. Psych: Good insight. Not anxious or agitated. Neurologic: Alert and oriented X 3. No acute neuro deficits. Status post incisional hernia repair: Abdomen slightly distended and firm, patient slightly tachycardic. discussed with surgeon Dr. Rahul Dueñas, due to abdominal wall construction is expected to have some abdominal distention. He also thinks tachycardia could be from pain and his pain medicines have been increased. Given leukocytosis: UA and chest x-ray were ordered. ARF on CKD stage II: Continue IV fluids, nephrology consulted  Hyperkalemia: Status post Kayexalate, potassium is improved now. Agree with discontinuing Cozaar      Addendum 2 PM  RN called me that patient is still tachycardic and slightly tachypneic. Came to see the patient, patient feels good, states pain is good controlled  Denies any shortness of breath. Did receive IV fluid bolus per surgeon  Chest x-ray reviewed, atelectasis noted  UA reviewed, no signs of infection    On exam:  RS: Bilateral expiratory wheezing noted, no accessory muscle use or respiratory distress noted. Cardiovascular: Tachycardia present  Abdomen: Distended, slightly firm, bowel sounds present    Visit Vitals  /80   Pulse (!) 118   Temp 98.2 °F (36.8 °C)   Resp 24   Ht 5' 10\" (1.778 m)   Wt 81.6 kg (180 lb)   SpO2 94%   BMI 25.83 kg/m²       Acute COPD exacerbation will start bronchodilators and IV steroids: Continue O2 supplement. No signs of fluid overload. We will get repeat BMP and lactic acid.   We will continue telemetry monitoring  We will closely monitor patient  RN notified Dr. Sherin Stone. Full note to follow by NP      Disclaimer: Sections of this note are dictated using utilizing voice recognition software. Minor typographical errors may be present. If questions arise, please do not hesitate to contact me or call our department.

## 2020-02-14 NOTE — PROGRESS NOTES
Problem: Dysphagia (Adult)  Goal: *Acute Goals and Plan of Care (Insert Text)  Description  Patient will:  1. Tolerate PO trials with 0 s/s overt distress in 4/5 trials  2. Utilize compensatory swallow strategies/maneuvers (decrease bite/sip, size/rate, alt. liq/sol) with min cues in 4/5 trials    Rec:     Reg solid with thin liquids  Aspiration precautions  HOB >45 during po intake, remain >30 for 30-45 minutes after po   Small bites/sips; alternate liquid/solid with slow feeding rate   Oral care TID  Meds per pt preference     Outcome: Progressing Towards Goal    SPEECH LANGUAGE PATHOLOGY BEDSIDE SWALLOW EVALUATION/TREATMENT    Patient: Shira Hsieh (21 y.o. male)  Date: 2/14/2020  Primary Diagnosis: Incisional hernia [K43.2]  Procedure(s) (LRB):  OPEN REPAIR OF INCISIONAL HERNIA WITH MESH (N/A) 1 Day Post-Op   Precautions: aspiration     PLOF: As per H&P    ASSESSMENT :  Based on the objective data described below, the patient presents with mild pharyngeal dysphagia s/p partial supraglottic laryngectomy in 2008. Pt reports that he has been eating reg solids with thin liquids since surgery with no breathing difficulties/PNAs. Stoma closed. He reports that he has one working vocal fold attributing to breathy/hoarse vocal quality. Pt also reported completion of SLP services post surgery with DC of reg solids and thin liquids. Pt completed a MBS at this facility in 2009 with no airway compromise noted with any consistency. This AM, pt tolerated reg solid and thin liquids with no overt s/sx of aspiration and positive oral clearance. Laryngeal elevation appeared weak to palpation. CXR during this admission with no infiltrates/effusions. Suspect pt is at baseline for oropharyngeal swallow fxn. Rec reg solid diet with thin liquids, aspiration precautions, oral care TID, and meds as tolerated. Pt also concerned that he may have a yeast infection.  He follows up with Dr. Gwen Madison, ENT regularly and stated that she often prescribes Diflucan to treat symptoms. D/w Dr. Viki Branch. SLP available for MBS per MD discretion. Otherwise, will f/u x 1-2 more visits. TREATMENT :  Skilled therapy initiated; Educated pt on aspiration precautions and importance of compensatory swallow techniques to decrease aspiration risk (decrease rate of intake & sip/bite size, upright @HOB for all po intake and ~30 minutes after po); verbalized comprehension. Patient will benefit from skilled intervention to address the above impairments. Patient's rehabilitation potential is considered to be Good  Factors which may influence rehabilitation potential include:   [x]            Medical condition     PLAN :  Recommendations and Planned Interventions: See above  Frequency/Duration: Patient will be followed by speech-language pathology x 1-2 more visits to address goals. Discharge Recommendations: None     SUBJECTIVE:   Patient stated I've been through this whole song and dance with speech therapy.     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 Allegra Figueredo    COLONOSCOPY N/A 11/12/2018    COLONOSCOPY with Polypectomies, Bx's, Injection, Tattooing & Clip Placement x 3 performed by Bennie Vega MD at [facility]  11/12/2018         [facility]  11/12/2018         ENDOSCOPIC MUCOSAL RESECT  11/12/2018         EXPLORATORY OF ABDOMEN N/A 12/11/2018    Dr. Xiomara Pride N/A 5/8/2019    SIGMOIDOSCOPY FLEXIBLE with polypectomy performed by Allegra Figueredo, Sal Williamson MD at [facility] ENDOSCOPY    HX COLONOSCOPY      HX GI      robotic sigmoid colectomy complicated by small anastomotic leak, status post diverting ileostomy    HX GI      reversal ileostomy    HX HEENT  2008    Throat Ca - 1 vocal chord removed     HX HEENT Bilateral 1970    eye surgery muscles    HX TRACHEOSTOMY  2008    LAP,SURG,COLECTOMY, PARTIAL, W/ANAST N/A 12/04/2018    Dr. Kailyn Mcmullen     Prior Level of Function/Home Situation: see below  Home Situation  Home Environment: Private residence  # Steps to Enter: 3  One/Two Story Residence: One story  Living Alone: No  Support Systems: Spouse/Significant Other/Partner  Patient Expects to be Discharged to[de-identified] Private residence  Current DME Used/Available at Home: None    Diet prior to admission: reg solid with thin  Current Diet:  reg solid with thin      Cognitive and Communication Status:  Neurologic State: Alert  Orientation Level: Oriented X4  Cognition: Follows commands  Oral Assessment:  Oral Assessment  Labial: No impairment  Dentition: Natural;Intact  Oral Hygiene: adequate  Lingual: No impairment  Velum: No impairment  Mandible: No impairment  P.O. Trials:  Patient Position: [facility] at [facility]  Vocal quality prior to P.O.: No impairment  Consistency Presented: Thin liquid; Solid;Puree  How Presented: Self-fed/presented;Cup/sip;Spoon;Straw  Bolus Acceptance: No impairment  Bolus Formation/Control: No impairment  Propulsion: No impairment  Oral Residue: None  Initiation of Swallow: No impairment  Laryngeal Elevation: Functional  Aspiration Signs/Symptoms: None  Pharyngeal Phase Characteristics: No impairment, issues, or problems   Effective Modifications: None  Cues for Modifications: None     Oral Phase Severity: No impairment  Pharyngeal Phase Severity : Mild    PAIN:  Start of Eval: 0  End of Eval: 0     After treatment:   []            Patient left in no apparent distress sitting up in chair  [x]            Patient left in no apparent distress in bed  [x] Call bell left within reach  [x]            Nursing notified  []            Family present  []            Caregiver present  []            Bed alarm activated    COMMUNICATION/EDUCATION:   [x]            Aspiration precautions; swallow safety; compensatory techniques. [x]            Patient/family have participated as able in goal setting and plan of care. [x]         Posted safety precautions in patient's room.     Thank you for this referral.    Abhishek Keyes M.S. CCC-SLP/L  Speech-Language Pathologist

## 2020-02-14 NOTE — PROGRESS NOTES
Pagejac[employer] BARBER regarding critical level K+ 6.1 (decreased from 6.6). New order will be placed by Dr. Emily Lang.

## 2020-02-14 NOTE — PROGRESS NOTES
conducted an initial consultation and Spiritual Assessment for Duc Chicas, who is a 77 y. o.,male. Patient's Primary Language is: [place]. According to the patient's EMR Evangelical Affiliation is: [facility].     The reason the Patient came to the hospital is:   Patient Active Problem List    Diagnosis Date Noted    Incisional hernia 02/13/2020    Diverticulitis 07/05/2019    Colon polyps 11/12/2018    Hemorrhoids 11/12/2018    Debility 11/05/2018    Leukocytosis 11/05/2018    Intractable low back pain 11/05/2018        The  provided the following Interventions:  Initiated a relationship of care and support. Listened empathically. Provided chaplaincy education. Provided information about Spiritual Care Services. Offered prayer and assurance of continued prayers on patient's behalf. Chart reviewed. The following outcomes where achieved:  Patient shared limited information about both their medical narrative and spiritual journey/beliefs.  confirmed Patient's Evangelical Affiliation. Patient processed feeling about current hospitalization. Patient expressed gratitude for 's visit. Assessment:  Patient does not have any Christian/cultural needs that will affect patient's preferences in health care. There are no spiritual or Christian issues which require intervention at this time. Plan:  Chaplains will continue to follow and will provide pastoral care on an as needed/requested basis.  recommends bedside caregivers page  on duty if patient shows signs of acute spiritual or emotional distress.     [id number] Kip Cohen   [phone]

## 2020-02-14 NOTE — PROGRESS NOTES
2233  AOx3, no apparent distress, voiced no complaints at this time. Educated about the importance of incentive spirometer and properly use it. Teaching given about benefits of ambulation and the risk of non compliance. Patient verbalized understanding. Patient stated will walk tomorrow. 0245  AOx4, no apparent distress, encourage to walk but not yet ready.  Patient stated he will walk later this AM.

## 2020-02-14 NOTE — PROGRESS NOTES
Patient was seen again. Patient remains slightly tachypneic, tachycardic and having gurgling upper airways now. In mild distress. On exam:  Respiratory bilateral coarse breath sounds with some crackles, no wheezing now. No accessory muscle use. Cardiovascular: S1-S2 tachycardia present  Abdomen remains distended, firm, bowel sounds+     Repeat BMP noted, potassium 5.8, creatinine trending up. Lactic acid 2.8  Acute resp distress: Concern for fluid overload vs compression from abd distention, will get chest x-ray and ABG  We will hold IV fluids. Kayexalate ordered. Discussed with Dr. Vaishali Norman, agree with Kayexalate. Patient condition continued to deteriorate, recommend close monitoring in ICU. D/w primary team Dr. John Montero  Discussed with patient and wife at the bedside    Addendum:  ABG noted, Ph 7.34, Pco2 35, Po2 68, Spo2 90% on 2 L O2  CXR my reading no clear fluid overload, ? abd distention   Will resume IVF  D/w Dr. John Montero, he will see pt and decide on transfer to ICU    Addendum:  D/w Dr. John Montero, ICU consulted for transfer    Change Abx to [address] [place] [address].

## 2020-02-14 NOTE — CONSULTS
Consult Note  Consult requested by: dr Chapincito Tavares is a 77 y.o. male [address] who is being seen on consult for renal failure  No chief complaint on file. Admission diagnosis: <principal problem not specified>     HPI: 77 y o  [place] male admitted for repair of incisional hernia. asked to evaluate for renal failure. pt is followed by dr Jackie Marin for crf,proteinuria,hx of dm,htn.was on cozaar prior to admission,denies nsaids or other nephrotoxic meds. 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 [address]     under control, weight controlled    Essential hypertension, benign     no more meds    GERD (gastroesophageal reflux disease)     Glaucoma     Nonspecific abnormal electrocardiogram (ECG) (EKG)       Past Surgical History:   Procedure Laterality Date    CLOSE ENTEROSTOMY,RESEC+ANAST N/A 07/05/2019    Dr. Kerri Vyas COLONOSCOPY N/A 11/12/2018    COLONOSCOPY with Polypectomies, Bx's, Injection, Tattooing & Clip Placement x 3 performed by Krystal Salazar [facility] at [facility]  11/12/2018         [facility]  11/12/2018         ENDOSCOPIC MUCOSAL RESECT  11/12/2018         EXPLORATORY OF ABDOMEN N/A 12/11/2018    Dr. Cm Camacho N/A 5/8/2019    SIGMOIDOSCOPY FLEXIBLE with polypectomy performed by Reid Jiménez MD at [facility] ENDOSCOPY    HX COLONOSCOPY      HX GI      robotic sigmoid colectomy complicated by small anastomotic leak, status post diverting ileostomy    HX GI      reversal ileostomy    HX HEENT  2008    Throat Ca - 1 vocal chord removed     HX HEENT Bilateral 1970    eye surgery muscles    HX TRACHEOSTOMY  2008    LAP,SURG,COLECTOMY, PARTIAL, W/ANAST N/A 12/04/2018    Dr. Alex Gonzalez History     Socioeconomic History    Marital status:      Spouse name: Not on file    Number of children: Not on file    Years of education: Not on file    Highest education level: Not on file   Occupational History    Not on file   Social Needs    Financial resource strain: Not on file    Food insecurity:     Worry: Not on file     Inability: Not on file    Transportation needs:     Medical: Not on file     Non-medical: Not on file   Tobacco Use    Smoking status: Former Smoker     Last attempt to quit: 2008     Years since quittin.2    Smokeless tobacco: Never Used   Substance and Sexual Activity    Alcohol use: No    Drug use: No    Sexual activity: Not on file   Lifestyle    Physical activity:     Days per week: Not on file     Minutes per session: Not on file    Stress: Not on file   Relationships    Social connections:     Talks on phone: Not on file     Gets together: Not on file     Attends [employer] service: Not on file     Active member of club or organization: Not on file     Attends meetings of clubs or organizations: Not on file     Relationship status: Not on file    Intimate partner violence:     Fear of current or ex partner: Not on file     Emotionally abused: Not on file     Physically abused: Not on file     Forced sexual activity: Not on file   Other Topics Concern    Not on file   Social History Narrative    Not on file       History reviewed. No pertinent family history. No Known Allergies     Home Medications:     Prior to Admission Medications   Prescriptions Last Dose Informant Patient Reported? Taking? Dexlansoprazole 60 mg CpDB 2020 at pm  Yes Yes   Sig: Take 1 Cap by mouth every evening. IRON, FERROUS SULFATE, PO 2020 at Unknown time  Yes Yes   Sig: Take 325 mg by mouth every Monday, Wednesday, Friday. 3 times a week    beclomethasone dipropionate (QNASL) 80 mcg/actuation HFAA 2020 at pm Self Yes Yes   Si mcg by Nasal route daily.  Directions on at home label are as follows: Use 2 sprays in each Nostril Daily   calcium-cholecalciferol, d3, 600-125 mg-unit tab 2/12/2020 at pm  Yes Yes   Sig: Take 1 Tab by mouth daily. latanoprost (XALATAN) 0.005 % ophthalmic solution 2/12/2020 at pm Self Yes Yes   Sig: Administer 1 Drop to both eyes nightly. losartan (COZAAR) 50 mg tablet 2/13/2020 at 0430  Yes Yes   Sig: Take 50 mg by mouth daily. metFORMIN (GLUCOPHAGE) 1,000 mg tablet 2/12/2020 at pm  Yes Yes   Sig: Take 1,000 mg by mouth two (2) times daily (with meals). do not take morning of surgery (12/4/180. multivitamin, tx-iron-ca-min (THERA-M W/ IRON) 9 mg iron-400 mcg tab tablet 2/12/2020 at Unknown time  Yes Yes   Sig: Take 1 Tab by mouth daily. nebivolol (BYSTOLIC) 5 mg tablet 4/56/1334 at am  Yes Yes   Sig: Take 5 mg by mouth daily. Indications: Sinus Tachycardia   omega 3-DHA-EPA-fish oil (FISH OIL) 1,000 mg (120 mg-180 mg) capsule 2/12/2020 at Unknown time  Yes Yes   Sig: Take 1 Cap by mouth daily. omeprazole (PRILOSEC) 20 mg capsule 2/12/2020 at pm  Yes Yes   Sig: Take 20 mg by mouth daily. oxyCODONE-acetaminophen (PERCOCET) 5-325 mg per tablet not recently  No Yes   Sig: Take 1-2 Tabs by mouth every four (4) hours as needed. Max Daily Amount: 12 Tabs. Patient taking differently: Take 1-2 Tabs by mouth every four (4) hours as needed for Pain.       Facility-Administered Medications: None       Current Facility-Administered Medications   Medication Dose Route Frequency    albuterol (PROVENTIL VENTOLIN) nebulizer solution 2.5 mg  2.5 mg Nebulization Q4H PRN    sodium chloride (OCEAN) 0.65 % nasal squeeze bottle 2 Spray  2 Spray Both Nostrils Q2H PRN    insulin lispro (HUMALOG) injection   SubCUTAneous AC&HS    glucose chewable tablet 16 g  4 Tab Oral PRN    glucagon (GLUCAGEN) injection 1 mg  1 mg IntraMUSCular PRN    dextrose (D50W) injection syrg 12.5-25 g  25-50 mL IntraVENous PRN    oxyCODONE-acetaminophen (PERCOCET) 5-325 mg per tablet 2 Tab  2 Tab Oral Q6H PRN    [START ON 2/15/2020] nebivolol (BYSTOLIC) tablet 10 mg  10 mg Oral DAILY    benzocaine-menthol (CEPACOL) lozenge 1 Lozenge  1 Lozenge Mucous Membrane PRN    nystatin (MYCOSTATIN) 100,000 unit/mL oral suspension 500,000 Units  500,000 Units Oral QID    phenol throat spray (CHLORASEPTIC) 1 Spray  1 Spray Oral PRN    0.9% sodium chloride infusion  999 mL/hr IntraVENous CONTINUOUS    albuterol-ipratropium (DUO-NEB) 2.5 MG-0.5 MG/3 ML  3 mL Nebulization Q4H RT    albuterol-ipratropium (DUO-NEB) 2.5 MG-0.5 MG/3 ML  3 mL Nebulization Q4H PRN    methylPREDNISolone (PF) (Solu-MEDROL) injection 40 mg  40 mg IntraVENous Q8H    sodium chloride (NS) flush 5-40 mL  5-40 mL IntraVENous Q8H    sodium chloride (NS) flush 5-40 mL  5-40 mL IntraVENous PRN    heparin (porcine) injection 5,000 Units  5,000 Units SubCUTAneous Q8H    gabapentin (NEURONTIN) capsule 100 mg  100 mg Oral TID    metroNIDAZOLE (FLAGYL) IVPB premix 500 mg  500 mg IntraVENous Q8H    levoFLOXacin (LEVAQUIN) 500 mg in D5W IVPB  500 mg IntraVENous Q24H    pantoprazole (PROTONIX) tablet 40 mg  40 mg Oral BID    ferrous sulfate tablet 325 mg  325 mg Oral Q MON, WED & FRI    latanoprost (XALATAN) 0.005 % ophthalmic solution 1 Drop  1 Drop Both Eyes QHS    acetaminophen (TYLENOL) tablet 650 mg  650 mg Oral Q4H PRN    HYDROmorphone (DILAUDID) injection 0.5 mg  0.5 mg IntraVENous Q4H PRN    0.9% sodium chloride infusion  100 mL/hr IntraVENous CONTINUOUS    oxyCODONE-acetaminophen (PERCOCET) 5-325 mg per tablet 1 Tab  1 Tab Oral Q4H PRN    polyethylene glycol (MIRALAX) packet 17 g  17 g Oral DAILY    docusate sodium (COLACE) capsule 100 mg  100 mg Oral DAILY    lactobacillus sp. 50 billion cpu (BIO-K PLUS) capsule 1 Cap  1 Cap Oral DAILY    HYDROmorphone (DILAUDID) injection 1 mg  1 mg IntraVENous Q3H PRN       Review of Systems:   A comprehensive review of systems was negative except for that written in the HPI.   Data Review:    Labs: Results: Chemistry Recent Labs     02/14/20  0508 02/14/20  0022 02/13/20  1844   *  --  147*   *  --  136   K 5.3 6.1* 6.6*     --  108   CO2 21  --  23   BUN 20*  --  18   CREA 1.94*  --  1.99*   CA 7.6*  --  8.1*   AGAP 7  --  5   BUCR 10*  --  9*      PTH  Lab Results   Component Value Date/Time    Calcium 7.6 (L) 02/14/2020 05:08 AM    Phosphorus 2.3 (L) 02/14/2020 05:08 AM      CBC w/Diff Recent Labs     02/14/20  0508 02/13/20  1844   WBC 18.1* 20.7*   RBC 4.60* 4.44*   HGB 13.3 13.2   HCT 40.8 39.9    233   GRANS 83* 75   LYMPH 8* 4*   EOS 0 0      Coagulation No results for input(s): PTP, INR, APTT, INREXT in the last 72 hours. Iron/Ferritin No results for input(s): IRON in the last 72 hours. No lab exists for component: TIBCCALC   BNP No results for input(s): BNPP in the last 72 hours. Cardiac Enzymes No results for input(s): CPK, CKND1, CHLOE in the last 72 hours. No lab exists for component: CKRMB, TROIP   Liver Enzymes No results for input(s): TP, ALB, TBIL, AP, SGOT, GPT in the last 72 hours. No lab exists for component: DBIL   Thyroid Studies No results found for: T4, T3U, TSH, TSHEXT     Urinalysis Lab Results   Component Value Date/Time    Color YELLOW 02/14/2020 12:30 PM    Appearance CLEAR 02/14/2020 12:30 PM    Specific gravity 1.020 02/14/2020 12:30 PM    pH (UA) 5.0 02/14/2020 12:30 PM    Protein 100 (A) 02/14/2020 12:30 PM    Glucose NEGATIVE  02/14/2020 12:30 PM    Ketone TRACE (A) 02/14/2020 12:30 PM    Bilirubin NEGATIVE  02/14/2020 12:30 PM    Urobilinogen 0.2 02/14/2020 12:30 PM    Nitrites NEGATIVE  02/14/2020 12:30 PM    Leukocyte Esterase TRACE (A) 02/14/2020 12:30 PM    Epithelial cells FEW 02/14/2020 12:30 PM    Bacteria FEW (A) 02/14/2020 12:30 PM    WBC 1 to 3 02/14/2020 12:30 PM    RBC 10 to 12 02/14/2020 12:30 PM         IMAGES:   XR Results (maximum last 3):   Results from [place] encounter on 02/13/20   XR CHEST PORT    Narrative EXAM: CHEST  CPT CODE: 94732    CLINICAL INDICATION/HISTORY: Leukocytosis. COMPARISON: 4 February 2020. TECHNIQUE: Single AP portable view of chest at 1009. FINDINGS: There is suboptimal inspiratory effort. Somewhat platelike opacities  are seen in both lungs suggesting subsegmental atelectasis. No suggestions of  consolidation; however, the retrodiaphragmatic lung bases are not well seen. The cardiomediastinal silhouette is normal.  The bones and soft tissues are  unremarkable. Impression IMPRESSION:    Suboptimal inspiration; probable bilateral subsegmental atelectasis. No gross  consolidations but retrodiaphragmatic lung bases are not well seen. Results from [place] encounter on 02/04/20   XR CHEST PA LAT    Narrative CHEST PA AND LATERAL:    CPT CODE: 91188    COMPARISON: 12/17/2018    INDICATION: Preop    FINDINGS: Heart and mediastinum are normal. The aorta is calcified. Lungs are  clear with no infiltrates. No pleural effusions are seen. No significant osseous  abnormalities are seen. Mild gaseous distention is present involving large and  small bowel loops in the abdomen. Impression Impression:      1. No acute cardiopulmonary abnormalities. 2. There is mild gaseous distention of large and small bowel loops in the  abdomen. Results from [place] encounter on 04/25/19   XR GASTROGRAFFIN ENEMA    Narrative EXAM:  Gastrografin Enema. CLINICAL INDICATION:  Status post robotic sigmoid colectomy Dec 2018 for stage I  sigmoid cancer complicated by anastomotic leak requiring diverting ileostomy. Evaluation for anastomotic leak prior to ileostomy takedown. COMPARISON:  CT (unenhanced) 12/17/18. FINDINGS:      View:  [employer] demonstrates nonspecific mild to moderate increased  small bowel gas without convincing evidence of acute small bowel obstruction.       Technique Description:  A 12 [place] Mckeon catheter was inserted into the distal  limb of the ileostomy. Gastrografin was instilled antegrade via gravity drip  filling a loop of distal ileum and eventually into the cecum. Continued filling  of the colon demonstrates floating debris throughout the colon. Contrast was  followed to the distal anastomosis. - Total Fluoroscopy Duration:  2 minutes 36 seconds  - Exposures:  26 spot fluoroscopic images. Distal Ileum/ Terminal Ileum:  No abnormal mucosal pattern. There is unimpeded  flow of contrast through the distal ileum/terminal ileum into the cecum. No  abnormal filling defects. Colon:  Because this study is performed with bowel prep, there are multiple  filling defects in the colon from retained fecal debris. The colon follows  normal course, without obvious readily identifiable obstructing structures. The  contrast passes readily through the rectum and anal canal.      Impression IMPRESSION:     1. No focal morphologically abnormal findings along the distal ileum and colon. 2.  Multiple filling defects from retained fecal debris. CT Results (maximum last 3): Results from [place] encounter on 12/30/19   CT CHEST ABD PELV W CONT    Narrative CT CHEST, ABDOMEN AND PELVIS WITH ENHANCEMENT    INDICATION: Follow-up colon cancer with reversal surgery July 2019. TECHNIQUE: Axial images obtained of the chest, abdomen and pelvis following the  uneventful administration of 100 cc  Isovue-300 nonionic intravenous contrast.   Coronal and sagittal reformatted images of the abdomen and pelvis were obtained. All CT scans at this facility are performed using dose optimization technique as  appropriate to a performed exam, to include automated exposure control,  adjustment of the mA and/or kV according to patient size (including appropriate  matching first site-specific examinations), or use of iterative reconstruction  technique. COMPARISON: 12/17/2018; 11/9/2018. CHEST FINDINGS:    Thyroid: Unremarkable.   Heart: No effusion. Intermittent regions of severe coronary arteriosclerosis,  most significant in the right coronary artery. Vessels: Mild atherosclerosis. Lymph Nodes: Unremarkable. Lung: Mild paraseptal and centrilobular upper lobe predominant emphysematous  changes. Pleura: Unremarkable. ABDOMEN FINDINGS:     Liver: Unremarkable. Spleen: Unremarkable. Pancreas: Pancreatic tail is relatively atrophic with calcific densities. Biliary: Unremarkable. Bowel: Moderate colonic gas and stool burden. There is a new small bowel  containing suprapubic hernia containing a segment of ileum which measures up to  4.2 cm. Small bowel was previously dilated throughout but is now otherwise  normal in caliber. No pneumatosis. No wall thickening. Peritoneum/ Retroperitoneum: There is mild perinephric stranding which is  improved. It is throughout the abdomen and pelvis is improved. Resolved  presacral trace fluid. No free air. Lymph Nodes: Unremarkable. Adrenal Glands: Unremarkable. Kidneys: There are bilateral renal cysts, parapelvic on the left measuring up to  5 cm. The largest cyst in the right kidney is 3 cm in the lower pole. There are  a few too small to characterize lesions. Vessels: Moderate atherosclerosis. Coarse calcification at the renal ostia. Stenosis bilateral common, internal iliac and femoral artery branches. PELVIS FINDINGS:     Bladder/ Pelvic Organs: Unremarkable. Bones/Soft tissues: Narrowing bilateral glenohumeral joints. Degenerative  changes sternoclavicular joints. Lumbar facet hypertrophy and arthropathy. Narrowing bilateral hip joint spaces. Osteopenia. Impression IMPRESSION:    1. No findings of metastatic disease chest, abdomen or pelvis. 2.  New small bowel containing suprapubic hernia at surgical site containing a  dilated segment of small bowel, though there is contrast distally; Partial small  bowel obstruction is possible.  No obstruction upstream to the hernia. 3.  Resolved diffuse small bowel dilation. 4.  Improved edema. 5. Intermittent severe coronary arteriosclerosis. Moderate abdominal  atherosclerosis with multifocal regions of stenosis, likely renal ostia, common,  internal iliac and femoral arteries. 6.  Mild emphysema. 7.  Mild sequela of chronic pancreatitis. 8.  Moderate colonic stool burden. Results from [place] encounter on 12/04/18   CT ABD PELV WO CONT    Narrative EXAM:  CT ABD PELV WO CONT    INDICATION: 60-year-old male s/p leak after sig colectomy; evalaute for abscess    COMPARISON: 11/9/2018. CONTRAST:  None. TECHNIQUE:   Unenhanced multislice helical CT was performed from the diaphragm to the  symphysis pubis without intravenous contrast administration. Contiguous 5 mm  axial images were reconstructed and lung and soft tissue windows were generated. Coronal and sagittal reformations were generated. CT dose reduction was  achieved through use of a standardized protocol tailored for this examination  and automatic exposure control for dose modulation. FINDINGS:  The visualized portions of the lung bases are clear. The absence of intravenous contrast material reduces the sensitivity for  evaluation of the solid parenchymal organs of the abdomen. No focal  abnormalities are seen within the liver, spleen, pancreas or adrenal glands. No  renal or ureteral calculus or obstruction is identified. Bilateral renal cysts. The aorta demonstrates mild atheromatous changes. .  There is no retroperitoneal  adenopathy or mass. Multiple gas/fluid distended small bowel loops are noted in the upper abdomen. Duodenum is dilated, measuring up to 4.6 cm in diameter. Jejunum is dilated measuring 5.1 cm in diameter. Post surgical changes in the sigmoid colon. Ileostomy is noted. No focal fluid collection is identified. Fatty stranding is noted throughout the mesentery. Small amount of free fluid is  present.   Urinary bladder appears normal.      Impression IMPRESSION:  Postsurgical changes in the sigmoid colon. No evidence of abscess. Multiple dilated small bowel loops including duodenum and jejunum. Ileus versus  small bowel obstruction. Results from [place] encounter on 11/05/18   CT CHEST ABD PELV W CONT    Narrative CT CHEST ABDOMEN AND PELVIS WITH CONTRAST        COMPARISON:  August 12, 2008 . INDICATIONS:    Oroesophageal carcinoma with cough, GI bleeding, abnormal  retroperitoneum on MR. Following the uneventful administration of  oral contrast and  100 cc of Isovue  300 scanning of the chest, abdomen and pelvis is performed with a multislice  scanner. Coronal sagittal and axial reconstructions were created from the 3 D  data set. FINDINGS:     CT CHEST FINDINGS:    Thyroid/Base Of Neck: Normal.    Lungs: There is some bleb formation at the pulmonary apices. No nodule mass or acute  infiltrate is evident     . Pleural Spaces:  Unremarkable. Chest Wall:   No breast mass is evident. No axillary lymphadenopathy is evident. Mediastinum, Nidia, Great Vessels, Heart:  Unremarkable. .    CT ABDOMEN FINDINGS:     Liver/Biliary: Normal.    Spleen: Normal.    Adrenal Glands: Normal.    Kidneys: There is perinephric edema bilaterally. A cyst is seen in the anterior  right kidney measuring 0.9 cm. A cystic lesion in the left kidney extends into  the pelvic region measuring 6.3 x 4 cm. Punctate nonobstructive intrarenal  calculi are present. There are a few sub centimeter low attenuation foci present  most likely representing small cysts but too small to confidently characterize. There is no hydronephrosis. Negative otherwise. .    Pancreas: Normal.    Stomach, Small Bowel, appendix, and Colon: The stomach is unremarkable. There is  dilation predominantly with gas of small bowel loops throughout the abdomen. This is diffuse.  The appendix is normal. There is some distention of the colon  contains a moderate but unremarkable burden of stool. No obstructing process is  evident. .    There is no significant adenopathy. The abdominal aorta is unremarkable. The IVC is unremarkable. Peritoneal Spaces: There is no free fluid or free air. Abdominal Wall: No hernia or mass is evident. CT PELVIS FINDINGS:     Bladder: The bladder is unremarkable in appearance. Pelvic Small Bowel And Colon: Unremarkable. Lymph Nodes, Soft Tissues: There is no adenopathy. Inflammatory character  stranding extending from the kidneys does extend into the pelvic sidewalls along  the anterior pararenal spaces. This is relatively mild in severity. There is no  fluid collection or abscess. .    Free Fluid: Not present. Osseous Structures Of The Chest, Abdomen And Pelvis: The lumbar spinal canal is  relatively narrowed due to short pedicles. . Senescent changes noted consistent  with age. Impression IMPRESSION:     There is predominantly perinephric and retroperitoneal edema bilaterally. Infrequent causes for this are renal inflammation-most frequently either  infection or nephritis or pancreatitis. Some nonspecific stranding can be seen in the elderly and the finding here is  not definitely beyond the range. Diffuse distention of bowel loops predominantly with gas in a nonobstructive  pattern-the appearance favors adynamic ileus of indeterminate etiology. .    All CT scans at this facility are performed using dose optimization technique as  appropriate to the performed exam, to include automated exposure control,  adjustment of the mA and/or kV according to patient's size (Including  appropriate matching for site-specific examinations), or use of iterative  reconstruction technique. MRI Results (maximum last 3):   Results from [place] encounter on 11/05/18   MRI LUMB SPINE WO CONT    Narrative EXAMINATION: MRI lumbar spine without contrast    INDICATION: Lumbar pain, bacteremia    COMPARISON: MRI 2 days prior. TECHNIQUE: Multiplanar multiecho MRI sequences of the lumbar spine performed  without contrast, targeting the psoas musculature. FINDINGS:    General: Vertebral body heights preserved. L5-S1 mild disc space loss. Multilevel minimal chronic appearing endplate changes. No focal listhesis. Lumbar lordosis maintained. Conus ends at roughly L1 level, not well delineated. No obvious distal cord signal abnormality. Marked epidural lipomatosis from  levels L2 through sacral canal. Right greater than left L4 pedicle and facet  edema, and right L5 pedicle and facet edema. Mild right greater than left lower  lumbar paraspinous muscle edema. No suspicious disc signal or endplate changes. Miscellaneous: Extensive retroperitoneal edema/stranding, left greater than  right, with probable bilateral renal cysts. There is edema about the psoas  muscle, without evidence of intramuscular abscess. Mild right greater than left  gluteal musculature edema. Levels: Moderate to severe thecal sac stenoses from L3 through S1 level largely  due to epidural lipomatosis, partly due to degenerative changes and congenital  narrowing, similar in appearance to MRI from 2 days earlier. Multilevel mild to  moderate foraminal stenoses again demonstrated. Moderate to severe facet  arthropathy at all lumbar levels, most notably L2-L3, L3-L4, L4-L5, including  multilevel small facet effusions. Posterior facet synovial cysts at right L4-L5. Impression IMPRESSION:    1. Extensive retroperitoneal edema/stranding, nonspecific. Correlate clinically  for UTI/pyelonephritis. Bilateral probable renal cysts. -There is edema about the psoas musculature, but no evidence of intramuscular  abscess. 2. Multilevel severe facet arthropathy with associated small facet effusions,  and right L4-L5 posterior facet synovial cysts. Also at L4 to left pedicle and  facet marrow edema, and perifacet edema.  Findings may all be due to active facet  degeneration. Early infection less likely but may be considered in the  appropriate clinical setting  -No evidence of discitis/osteomyelitis. 3. Advanced spinal canal and thecal sac stenosis largely due to epidural  lipomatosis and degenerative changes are not significantly changed since MRI  from 2 days prior. See prior MRI report for further detail. Of note contrast enhanced MRI may be more sensitive in identifying small  abscesses. See additional details above. MRI LUMB SPINE W WO CONT    Narrative EXAM:  MRI LUMB SPINE W WO CONT    INDICATION:   Back pain, unspecified; has leukocytosis, DM, cannot walk,  evaluate for cord compression vs spinal infection    COMPARISON: CT lumbar spine 11/5/2018    TECHNIQUE:   MR imaging of the lumbar spine was performed with sagittal T1, T2, STIR;  axial  T1, T2. Patient received 14 mL Dotarem intravenously. FINDINGS:    Motion degraded examination. Trace grade 1 anterolisthesis L4 on L5. Normal vertebral body heights. No  fracture. Mild disc desiccation and disc height loss L4/5 and L5/S1. No  suspicious bone marrow lesion or infiltrative bone marrow process. Incidentally noted left mid and lower kidney lobular nonenhancing cystic  structure 5 x 5.5 cm. There is overall developmentally small spinal canal likely due to short  pedicles. There is prominent epidural fat from L2/3 level caudally through the  sacrum. L2-L5 facet arthropathy with facet effusions and multifocal small areas  of the subchondral periarticular enhancement suggesting inflammation. Correlation of sagittal and axial images at the level of the discs demonstrates  the following:    T11/T12: No significant disc pathology. No central canal or foraminal stenosis. T12/L1: No significant disc pathology. No central canal or foraminal stenosis. L1/2:  No significant disc pathology. Abundant epidural fat and mild to moderate  compromise of the thecal sac. Mild facet and ligamentum hypertrophy. Mild  foraminal stenosis. L2/3:  Overall abundant epidural fat throughout the length of the L2 vertebral  body with moderate compromise of the thecal sac. Mild disc bulge. Moderate facet and ligamentum hypertrophy. Abundant epidural  fat. Severe central canal stenosis at the disc level. Moderate right and mild  left foraminal stenosis. L3/4:  Overall abundant epidural fat throughout the length of the L3 vertebral  body with moderate to severe compromise of the thecal sac. Mild disc bulge. Moderate to severe facet and ligamentum hypertrophy. Facet  effusions. Abundant epidural fat. Severe central canal stenosis at the disc  level. Moderate foraminal stenosis. L4/5:  Overall abundant epidural fat throughout the length of the L4 vertebral  body with severe compromise of the thecal sac. Grade 1 anterolisthesis and mild disc bulge. Severe bilateral facet hypertrophy  with facet effusions. Ligamentum flavum hypertrophy. Severe central canal  stenosis at the disc level. Moderate to severe right and moderate left foraminal  stenosis. L5/S1:  Overall abundant epidural fat throughout the length of the L5 vertebral  body with severe compromise of the thecal sac. Mild posterior disc bulge with osteophytic ridging. Severe central canal  stenosis at the disc level. Mild to moderate foraminal stenosis. Impression IMPRESSION:      Motion degraded examination. 1. Multifactorial moderate and severe degrees of foraminal stenosis from L1/2  disc level through the sacrum primarily due to epidural lipomatosis with  superimposed degenerative changes at the level of the discs, there is likely  underlying developmentally small spinal canal.    2. Significant facet arthropathy at L2/3, L3/4 and L4/5 with effusions and  multifocal areas of enhancing edema suggesting inflammation, not convincing for  infection.   -Grade 1 anterolisthesis at L4/5.  -Less pronounced mild degenerative disc disease at multiple levels. -Multilevel neural foraminal stenosis as discussed. 3. Poorly evaluated large left renal cyst, predominantly nonenhancing but not  well evaluated due to motion artifact and consider evaluation with ultrasound on  a nonemergent basis. Nuclear Medicine Results (maximum last 3): No results found for this or any previous visit. US Results (maximum last 3): Results from Hospital Encounter encounter on 12/04/18   US RETROPERITONEUM COMP    Narrative Retroperitoneal Ultrasound Complete    CPT CODE: 80714    INDICATION: Renal failure. TECHNIQUE: Select images from retroperitoneal ultrasound provided for  interpretation. COMPARISON: No prior studies for comparison. FINDINGS: Limited study due to suboptimal acoustic windows and body habitus. Right Kidney: The right kidney measures 10.2 x 5.0 x 4.7 cm in size. The  echotexture is increased. There is no collecting system dilatation or evidence  of obstruction. No renal calculi evident. No contour deforming mass. Left Kidney: The left kidney measures 10.9 x 5.2 x 4.9 cm in size. The  echotexture is increased. There is no collecting system dilatation or evidence  of obstruction. No renal calculi evident. There is a 5.1 x 4.3 x 3.1 cm cyst  on the lower pole. .      Bladder: The bladder is under distended with estimated volume of 177 mL. The spleen is not identified. No free fluid noted in the pelvis. Impression IMPRESSION:    No hydronephrosis. Echogenic bilateral kidneys consistent with underlying medical renal disease. Left renal cyst.       DEXA Results (maximum last 3): No results found for this or any previous visit. SEKOU Results (maximum last 3): No results found for this or any previous visit. IR Results (maximum last 3):   Results from [place] encounter on 12/04/18   IR PICC INSERT WO PORT OVER 5 YEARS    Narrative DUAL LUMEN PICC    :  Jostin Menard Ho    INDICATION:   Long-term venous access. Assistant: None    Complications: None    Estimated Blood loss: Minimal    TECHNIQUE: Risks of the procedure were explained and written informed consent  was obtained. Maximum sterile technique was followed including: Cap and mask, sterile gown,  sterile gloves, large sterile drape, hand hygiene, and 2% chlorhexidine for  cutaneous antisepsis or acceptable alternative antiseptics per current  guidelines. Ultrasound guidance: Ultrasound guidance was needed to localize a potential  vessel for access. Ultrasound probe was placed in a sterile sleeve. Sterile gel  was applied to the right arm. Ultrasound examination of the arm veins was  performed. The vein was noted to be compressible and patent. Image was saved to  patient's medical record. Under ultrasound guidance, access into the vein was  achieved using a micropuncture needle. Needle was seen entering the vain. Image  was saved to patient's medical record. Subsequently, the 5 [place] dual lumen power injectable PICC line was cut to 38  cm and inserted via the peel away sheath, and manipulated under fluoroscopic  guidance, to the level of the cavoatrial junction  The catheter was flushed and  secured and is ready for use. No immediate complications were observed. A spot radiograph of catheter tip position and US image of access vein were  obtained for documentation purposes. Radiation exposure:  Fluoroscopy 0.4 minutes. 3 MGy. Disposition: Patient was transfer back to nursing unit. VAS/US Results (maximum last 3): No results found for this or any previous visit. PET Results (maximum last 3): No results found for this or any previous visit. No results found for this or any previous visit. @LASTPROCAMB([id number])    CULTURE:   )No results for input(s): SDES, CULT in the last 72 hours. No results for input(s): CULT in the last 72 hours.     Physical Assessment:     Visit Vitals  /80 Pulse (!) 118   Temp 98.2 °F (36.8 °C)   Resp 24   Ht 5' 10\" (1.778 m)   Wt 81.6 kg (180 lb)   SpO2 94%   BMI 25.83 kg/m²     Last 3 Recorded Weights in this Encounter    02/11/20 1226 02/13/20 0631   Weight: 79.4 kg (175 lb) 81.6 kg (180 lb)       Intake/Output Summary (Last 24 hours) at 2/14/2020 1557  Last data filed at 2/14/2020 1433  Gross per 24 hour   Intake 805 ml   Output 1120 ml   Net -315 ml       Physial Exam:  General appearance: alert, cooperative, no distress, appears stated age  Skin: normal coloration and turgor, no rashes, no suspicious skin lesions noted. HEENT: Head; normocephalic, atraumatic. OZIEL. ENT- ENT exam normal, no neck nodes or sinus tenderness. Lungs: clear to auscultation bilaterally  Heart: regular rate and rhythm, S1, S2 normal, no murmur, click, rub or gallop  Abdomen: soft, non-tender. Bowel sounds normal. No masses,  no organomegaly  Extremities: extremities normal, atraumatic, no cyanosis or edema    PLAN / RECOMMENDATION:    Acute/crf stage 3.agree with stopping cozar metformin. continue ivf. check urine lytes  Hyperkalemia,improved. continue low k diet  Adjust all meds for renal failure     Thank you for the consultation to participate in patient's care. I have personally discussed my plan with the referring physician.      Siomara Winston MD  February 14, 2020

## 2020-02-14 NOTE — PROGRESS NOTES
Problem: Infection - Risk of, Surgical Site Infection  Goal: *Absence of surgical site infection signs and symptoms  Outcome: Progressing Towards Goal     Problem: Patient Education: Go to Patient Education Activity  Goal: Patient/Family Education  Outcome: Progressing Towards Goal     Problem: Falls - Risk of  Goal: *Absence of Falls  Description  Document Lincoln Moraes Fall Risk and appropriate interventions in the flowsheet.   Outcome: Progressing Towards Goal  Note: Fall Risk Interventions:  Mobility Interventions: Patient to call before getting OOB         Medication Interventions: Patient to call before getting OOB, Teach patient to arise slowly    Elimination Interventions: Call light in reach, Patient to call for help with toileting needs              Problem: Patient Education: Go to Patient Education Activity  Goal: Patient/Family Education  Outcome: Progressing Towards Goal     Problem: Pain  Goal: *Control of Pain  Outcome: Progressing Towards Goal     Problem: Patient Education: Go to Patient Education Activity  Goal: Patient/Family Education  Outcome: Progressing Towards Goal

## 2020-02-14 NOTE — PROGRESS NOTES
Yifan Duke M.D. FACS  PROGRESS NOTE    Name: Ana Maria Sheth MRN: [id number]   : 1950 [facility]   Date: 2020 Admission Date: 2020  5:32 AM     Hospital Day: 2  1 Day Post-Op  Subjective:  Patient without acute overnight events. Hyperkalemic at 5.3 which is decreased at present. Creatinine is 1.94. Urine output overnight is 750. Objective:  Vitals:    20 2248 20 0229 20 0317 20 0432   BP: 103/65 103/67  140/80   Pulse: 98 (!) 110 (!) 107 (!) 111   Resp: 18 18  14   Temp: 98 °F (36.7 °C) 98.7 °F (37.1 °C)  98.5 °F (36.9 °C)   SpO2: 95% 94%  93%   Weight:       Height:         Date 20 0700 - 20 0659 20 0700 - 02/15/20 0659   Shift 2544-9198 7024-4433 24 Hour Total 3831-9328 4529-4571 24 Hour Total   INTAKE   P.O.  560 560        P. O.  560 560      I. V.(mL/kg/hr) 3000(3.1) 245(0.3) 3245(1.7)        Volume (0.9% sodium chloride infusion)  245 245        Volume (lactated Ringers infusion) 3000  3000      Shift Total(mL/kg) 3000(36.7) 805(9.9) 3805(46.6)      OUTPUT   Urine(mL/kg/hr) 300(0.3) 550(0.6) 850(0.4)        Urine Output 100  100        Urine Output (mL) (Urinary Catheter 20 Mckeon) 200 550 750      Drains 230 150 380        Output (ml) (Matthew-Garcai Drain 20 Abdomen) 170 100 270        Output (ml) (Matthew-Garcia Drain 20 Abdomen) 60 50 110      Blood 100  100        Estimated Blood Loss 100  100      Shift Total(mL/kg) 630(7.7) 700(8.6) 1330(16.3)      NET 2370 105 2475      Weight (kg) 81.6 81.6 81.6 81.6 81.6 81.6         Physical Exam:    General: Awake and alert, oriented x4, no apparent distress   Abdomen: abdomen is soft with distended with generalized incisional tenderness. JPs with serosanguineous drainage. ANGELI sites are clean and clear. Rojean Fells in place over the midline incision.   No masses, organomegaly or guarding    Labs:  Recent Results (from the past 24 hour(s))   GLUCOSE, POC    Collection Time: 02/13/20  1:32 PM   Result Value Ref Range    Glucose (POC) 201 (H) 70 - 110 mg/dL   CBC WITH AUTOMATED DIFF    Collection Time: 02/13/20  6:44 PM   Result Value Ref Range    WBC 20.7 (H) 4.6 - 13.2 K/uL    RBC 4.44 (L) 4.70 - 5.50 M/uL    HGB 13.2 13.0 - 16.0 g/dL    HCT 39.9 36.0 - 48.0 %    MCV 89.9 74.0 - 97.0 FL    MCH 29.7 24.0 - 34.0 PG    MCHC 33.1 31.0 - 37.0 g/dL    RDW 14.7 (H) 11.6 - 14.5 %    PLATELET 432 011 - 550 K/uL    MPV 9.9 9.2 - 11.8 FL    NEUTROPHILS 75 42 - 75 %    BAND NEUTROPHILS 14 (H) 0 - 5 %    LYMPHOCYTES 4 (L) 20 - 51 %    MONOCYTES 7 2 - 9 %    EOSINOPHILS 0 0 - 5 %    BASOPHILS 0 0 - 3 %    ABS. NEUTROPHILS 15.5 (H) 1.8 - 8.0 K/UL    ABS. LYMPHOCYTES 0.8 0.8 - 3.5 K/UL    ABS. MONOCYTES 1.4 (H) 0 - 1.0 K/UL    ABS. EOSINOPHILS 0.0 0.0 - 0.4 K/UL    ABS.  BASOPHILS 0.0 0.0 - 0.1 K/UL    PLATELET COMMENTS ADEQUATE PLATELETS      RBC COMMENTS ANISOCYTOSIS  1+        DF MANUAL     METABOLIC PANEL, BASIC    Collection Time: 02/13/20  6:44 PM   Result Value Ref Range    Sodium 136 136 - 145 mmol/L    Potassium 6.6 (HH) 3.5 - 5.5 mmol/L    Chloride 108 100 - 111 mmol/L    CO2 23 21 - 32 mmol/L    Anion gap 5 3.0 - 18 mmol/L    Glucose 147 (H) 74 - 99 mg/dL    BUN 18 7.0 - 18 MG/DL    Creatinine 1.99 (H) 0.6 - 1.3 MG/DL    BUN/Creatinine ratio 9 (L) 12 - 20      GFR est AA 41 (L) >60 ml/min/1.73m2    GFR est non-AA 34 (L) >60 ml/min/1.73m2    Calcium 8.1 (L) 8.5 - 10.1 MG/DL   POTASSIUM    Collection Time: 02/14/20 12:22 AM   Result Value Ref Range    Potassium 6.1 (HH) 3.5 - 5.5 mmol/L   METABOLIC PANEL, BASIC    Collection Time: 02/14/20  5:08 AM   Result Value Ref Range    Sodium 135 (L) 136 - 145 mmol/L    Potassium 5.3 3.5 - 5.5 mmol/L    Chloride 107 100 - 111 mmol/L    CO2 21 21 - 32 mmol/L    Anion gap 7 3.0 - 18 mmol/L    Glucose 151 (H) 74 - 99 mg/dL    BUN 20 (H) 7.0 - 18 MG/DL    Creatinine 1.94 (H) 0.6 - 1.3 MG/DL    BUN/Creatinine ratio 10 (L) 12 - 20 GFR est AA 42 (L) >60 ml/min/1.73m2    GFR est non-AA 35 (L) >60 ml/min/1.73m2    Calcium 7.6 (L) 8.5 - 10.1 MG/DL   MAGNESIUM    Collection Time: 02/14/20  5:08 AM   Result Value Ref Range    Magnesium 1.2 (L) 1.6 - 2.6 mg/dL   PHOSPHORUS    Collection Time: 02/14/20  5:08 AM   Result Value Ref Range    Phosphorus 2.3 (L) 2.5 - 4.9 MG/DL   CBC WITH AUTOMATED DIFF    Collection Time: 02/14/20  5:08 AM   Result Value Ref Range    WBC 18.1 (H) 4.6 - 13.2 K/uL    RBC 4.60 (L) 4.70 - 5.50 M/uL    HGB 13.3 13.0 - 16.0 g/dL    HCT 40.8 36.0 - 48.0 %    MCV 88.7 74.0 - 97.0 FL    MCH 28.9 24.0 - 34.0 PG    MCHC 32.6 31.0 - 37.0 g/dL    RDW 14.7 (H) 11.6 - 14.5 %    PLATELET 319 521 - 734 K/uL    MPV 9.9 9.2 - 11.8 FL    NEUTROPHILS 83 (H) 42 - 75 %    BAND NEUTROPHILS 1 0 - 5 %    LYMPHOCYTES 8 (L) 20 - 51 %    MONOCYTES 8 2 - 9 %    EOSINOPHILS 0 0 - 5 %    BASOPHILS 0 0 - 3 %    ABS. NEUTROPHILS 15.3 (H) 1.8 - 8.0 K/UL    ABS. LYMPHOCYTES 1.4 0.8 - 3.5 K/UL    ABS. MONOCYTES 1.4 (H) 0 - 1.0 K/UL    ABS. EOSINOPHILS 0.0 0.0 - 0.4 K/UL    ABS.  BASOPHILS 0.0 0.0 - 0.06 K/UL    DF MANUAL      PLATELET COMMENTS ADEQUATE PLATELETS      RBC COMMENTS NORMOCYTIC, NORMOCHROMIC       All Micro Results     None          Current Medications:  Current Facility-Administered Medications   Medication Dose Route Frequency Provider Last Rate Last Dose    sodium polystyrene (KAYEXALATE) 15 gram/60 mL oral suspension 45 g  45 g Oral Q4H Kalli Infante DO   45 g at 02/14/20 0342    albuterol (PROVENTIL VENTOLIN) nebulizer solution 2.5 mg  2.5 mg Nebulization Q4H PRN Dimple Turk A, DO   2.5 mg at 02/14/20 0341    sodium chloride (OCEAN) 0.65 % nasal squeeze bottle 2 Spray  2 Spray Both Nostrils Q2H PRN Dimple Turk A, DO        sodium chloride (NS) flush 5-40 mL  5-40 mL IntraVENous Q8H Kristen Corona MD        sodium chloride (NS) flush 5-40 mL  5-40 mL IntraVENous PRN Kristen Corona MD   10 mL at 02/14/20 0343    oxyCODONE-acetaminophen (PERCOCET) 5-325 mg per tablet 1 Tab  1 Tab Oral Q4H PRN Rene Mann MD   1 Tab at 02/14/20 0055    heparin (porcine) injection 5,000 Units  5,000 Units SubCUTAneous Q8H Rene Mann MD   5,000 Units at 02/14/20 0101    gabapentin (NEURONTIN) capsule 100 mg  100 mg Oral TID Rene Mann MD   100 mg at 02/13/20 2128    metroNIDAZOLE (FLAGYL) IVPB premix 500 mg  500 mg IntraVENous Q8H Rene Mann  mL/hr at 02/14/20 0403 500 mg at 02/14/20 0403    levoFLOXacin (LEVAQUIN) 500 mg in D5W IVPB  500 mg IntraVENous Q24H Rene Mann MD        pantoprazole (PROTONIX) tablet 40 mg  40 mg Oral BID Rene Mann MD   40 mg at 02/13/20 1810    ferrous sulfate tablet 325 mg  325 mg Oral Q MON, WED & Lili MD [place]        latanoprost (XALATAN) 0.005 % ophthalmic solution 1 Drop  1 Drop Both Eyes QHS Rene Mann MD   1 Drop at 02/13/20 2246    nebivolol (BYSTOLIC) tablet 5 mg  5 mg Oral DAILY Rene Mann MD        acetaminophen (TYLENOL) tablet 650 mg  650 mg Oral Q4H PRN René Gonzalez NP        HYDROmorphone (DILAUDID) injection 0.5 mg  0.5 mg IntraVENous Q4H PRN René Gonzalez NP        0.9% sodium chloride infusion  100 mL/hr IntraVENous CONTINUOUS Zo Gonzalez  mL/hr at 02/13/20 2247 100 mL/hr at 02/13/20 2247    oxyCODONE-acetaminophen (PERCOCET) 5-325 mg per tablet 1 Tab  1 Tab Oral Q4H PRN Caitlin Brown NP   1 Tab at 02/14/20 0967    polyethylene glycol (MIRALAX) packet 17 g  17 g Oral DAILY René Gonzalez NP        docusate sodium (COLACE) capsule 100 mg  100 mg Oral DAILY Zo Gonzalez NP        lactobacillus sp. 50 billion cpu (BIO-K PLUS) capsule 1 Cap  1 Cap Oral DAILY René Gonzalez NP        HYDROmorphone (DILAUDID) injection 1 mg  1 mg IntraVENous Q3H PRN Rene Mann MD   1 mg at 02/13/20 1810 Chart and notes reviewed. Data reviewed. I have evaluated and examined the patient. IMPRESSION:   · Patient is postop day 1 from transversus abdominis release to repair a large incisional hernia with abdominal wall reconstruction.       PLAN:/DISCUSION:   · Monitor urine output  · Pain control  · IV hydration  · Clear liquid diet until the patient has adequate flatus and bowel movement  · Appreciate hospitalist management of blood pressure and blood sugar  · Dr. Darron Husain is covering this weekend        Justina Jefferson MD

## 2020-02-14 NOTE — PROGRESS NOTES
Bedside and Verbal shift change report given to DELL Dunn (oncoming nurse) by Nely Don RN (offgoing nurse). Report included the following information SBAR, Kardex, OR Summary, Procedure Summary, Intake/Output and MAR.

## 2020-02-14 NOTE — ROUTINE PROCESS
Received report from [facility]. Pt AAOx3, NAD, breathing non labored, on room air, HOB up. IV site clean, dry and intact. IVF going per order. Family members at the bedside. 2 ANGELI drains in place. Abdominal midline incision noted. Abdominal binder on. Bed at the lowest level on lock position, call bell w/i reach. Bedside and Verbal  report given to DELL Collado  by me. Report included the following information SBAR, Kardex, Procedure Summary, Intake/Output, MAR and Recent Results.

## 2020-02-14 NOTE — ROUTINE PROCESS
MEW score of 5 noted and short of breath. Hospitalists paged. Surgeon notified. Bolus and valium given see MAR. RT notified. Hospitalist and surgeon notified of MEW score of 5 and low urine output. Mew score of 4 noted. Cody Foot notified. Pt refused to transfer to chair. Education provided    Mew score of 4 noted and respiratory diffiucty. HOB elevated. Hospitalist notified. Nebulizer and solu-medrol given. Pt urine output of 10 noted and lactate of 2.8. Reported to Jennifer Pandya. NS bolus ordered. Order clarified. RT paged for consult. Crackles auscultated  Bolus not given. Maintaine fluid turned off  NP notified. Lasix ordered. Tim 2484 notified. Chest xray ordered. MD ordered to hold lasix ordered by koby. Tim 2484 stated he \"updated Dr. Jennifer Abdullahi and that he would come in later. \"     MEW score of 4 and urine output of 25. Jennifer Abdullahi notified. No new orders received. Order for bolus to be started and for maintence fluid to be bumped up to 150.  Respiratory notified to assess patient/

## 2020-02-14 NOTE — ROUTINE PROCESS
Bedside shift change report given to Italo Peña (oncoming nurse) by Yaquelin De Jesus (offgoing nurse). Report included the following information SBAR, Kardex, Intake/Output, MAR and Recent Results.

## 2020-02-14 NOTE — PROGRESS NOTES
Respiratory IP Consult done:    Patient with enlarged belly. Post hernia repair and open reconstruction of the abdominal wall. - CPT and Peep order canceled  - Metaneb ordered Q4 while awake with Duoneb treatments = Deal with atelectisis, PEEPing open lungs, assist with lung volumes.        (Duoneb = wheezing)  - Bronchial Hygiene Assessment ordered to reassess in 3 days, per protocol.  - Patient needs to be sitting up = breath better, take in deeper breaths

## 2020-02-14 NOTE — PROGRESS NOTES
[facility]  [facility]    Patient: Morena Robertson Age: 77 y.o. : 1950 MR#: [id number] SSN: [id number]  Date: 2020     Addendum: patient seen again. Tachycarida, tachypnea and coarse crackles auditory and auscultation. Abdomen more distended compared to earlier assessment. Chest xray STAT, ABG STAT, one time dose lasix 20 mg. Dr. Dumont 1156 notified and Dr. Jennifer Abdullahi notified. Plan for transfer for acute care management. General:  Alert, NAD  Cardiovascular:  RRR  Pulmonary: crackles, coarse  GI:  Abdominal distention  Extremities:  No edema; 2+ dorsalis pedis pulses bilaterally  Neuro: alert and oriented x3    Subjective:   Abdominal incisional surgical pain. Back pain which is chronic and takes 2/5-325 mg every 4 hours at home for pain management. 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 Denies N/V, fever or chills. Sore throat. Difficulty with taking in a deep breathe from the pain. Assessment/Plan:   1. Incisional hernia s/p open repair of incisional hernia with mesh  2. Post-op pain  3. Tachycardia, leukocytosis  4. Low urine output  5. Elevated BNP  6. Mild hyponatremia  7. Hypomagnesemia  8. Hypocalcemia  9. Hypophosphatemia  10. DMT2. a1c 7.7  11. HTN  12. GERD  13. Glaucoma  14. History of throat cancer  15. History of intractable low back pain    Plan  1. Chest xray showed atelectasis. Continue with IS, deep breathing exercises, splinting. Urinalysis abnormal and urine culture pending. 2. Continue general surgery recommendations, wound vac, abdominal binder, ANGELI drains, IV abx, probiotics. Afebrile, leukocytosis. Bowel regimen. Clear liquid diet. Pain management for surgical and intractable back pain  3. Increased dose of bystolic. Telemetry, monitor BP. 4. duoneb scheduled treatments for atelectasis, solumedrol scheduled doses  5. Nephrology consulted. Monitor renal function. Mckeon. I and O's  6.  POC glucose, SSI, diabetes management  7. Electrolyte replacements. Monitor metabolic panel and replete  8. Heparin, SCD, Protonix            Additional Notes:      Case discussed with:  []Patient  []Family  [x]Nursing  []Case Management  DVT Prophylaxis:  []Lovenox  [x]Hep SQ  []SCDs  []Coumadin   []On Heparin gtt    Objective:   VS:   Visit Vitals  /80   Pulse (!) 111   Temp 98.5 °F (36.9 °C)   Resp 14   Ht 5' 10\" (1.778 m)   Wt 81.6 kg (180 lb)   SpO2 93%   BMI 25.83 kg/m²      Tmax/24hrs: Temp (24hrs), Av.3 °F (36.8 °C), Min:97.6 °F (36.4 °C), Max:99.4 °F (37.4 °C)      Intake/Output Summary (Last 24 hours) at 2020 0947  Last data filed at 2020 9568  Gross per 24 hour   Intake 2805 ml   Output 1330 ml   Net 1475 ml       General:  Alert, NAD  Cardiovascular:  RRR  Pulmonary: bilateral diminished bases  GI: abdominal distention, abdominal binder, ANGELI drain, wound vac  Extremities:  No edema; 2+ dorsalis pedis pulses bilaterally  Neuro: alert and oriented. Voice hoarse        Labs:    Recent Results (from the past 24 hour(s))   GLUCOSE, POC    Collection Time: 20  1:32 PM   Result Value Ref Range    Glucose (POC) 201 (H) 70 - 110 mg/dL   CBC WITH AUTOMATED DIFF    Collection Time: 20  6:44 PM   Result Value Ref Range    WBC 20.7 (H) 4.6 - 13.2 K/uL    RBC 4.44 (L) 4.70 - 5.50 M/uL    HGB 13.2 13.0 - 16.0 g/dL    HCT 39.9 36.0 - 48.0 %    MCV 89.9 74.0 - 97.0 FL    MCH 29.7 24.0 - 34.0 PG    MCHC 33.1 31.0 - 37.0 g/dL    RDW 14.7 (H) 11.6 - 14.5 %    PLATELET 579 108 - 454 K/uL    MPV 9.9 9.2 - 11.8 FL    NEUTROPHILS 75 42 - 75 %    BAND NEUTROPHILS 14 (H) 0 - 5 %    LYMPHOCYTES 4 (L) 20 - 51 %    MONOCYTES 7 2 - 9 %    EOSINOPHILS 0 0 - 5 %    BASOPHILS 0 0 - 3 %    ABS. NEUTROPHILS 15.5 (H) 1.8 - 8.0 K/UL    ABS. LYMPHOCYTES 0.8 0.8 - 3.5 K/UL    ABS. MONOCYTES 1.4 (H) 0 - 1.0 K/UL    ABS. EOSINOPHILS 0.0 0.0 - 0.4 K/UL    ABS.  BASOPHILS 0.0 0.0 - 0.1 K/UL    PLATELET COMMENTS ADEQUATE PLATELETS RBC COMMENTS ANISOCYTOSIS  1+        DF MANUAL     METABOLIC PANEL, BASIC    Collection Time: 02/13/20  6:44 PM   Result Value Ref Range    Sodium 136 136 - 145 mmol/L    Potassium 6.6 (HH) 3.5 - 5.5 mmol/L    Chloride 108 100 - 111 mmol/L    CO2 23 21 - 32 mmol/L    Anion gap 5 3.0 - 18 mmol/L    Glucose 147 (H) 74 - 99 mg/dL    BUN 18 7.0 - 18 MG/DL    Creatinine 1.99 (H) 0.6 - 1.3 MG/DL    BUN/Creatinine ratio 9 (L) 12 - 20      GFR est AA 41 (L) >60 ml/min/1.73m2    GFR est non-AA 34 (L) >60 ml/min/1.73m2    Calcium 8.1 (L) 8.5 - 10.1 MG/DL   POTASSIUM    Collection Time: 02/14/20 12:22 AM   Result Value Ref Range    Potassium 6.1 (HH) 3.5 - 5.5 mmol/L   METABOLIC PANEL, BASIC    Collection Time: 02/14/20  5:08 AM   Result Value Ref Range    Sodium 135 (L) 136 - 145 mmol/L    Potassium 5.3 3.5 - 5.5 mmol/L    Chloride 107 100 - 111 mmol/L    CO2 21 21 - 32 mmol/L    Anion gap 7 3.0 - 18 mmol/L    Glucose 151 (H) 74 - 99 mg/dL    BUN 20 (H) 7.0 - 18 MG/DL    Creatinine 1.94 (H) 0.6 - 1.3 MG/DL    BUN/Creatinine ratio 10 (L) 12 - 20      GFR est AA 42 (L) >60 ml/min/1.73m2    GFR est non-AA 35 (L) >60 ml/min/1.73m2    Calcium 7.6 (L) 8.5 - 10.1 MG/DL   MAGNESIUM    Collection Time: 02/14/20  5:08 AM   Result Value Ref Range    Magnesium 1.2 (L) 1.6 - 2.6 mg/dL   PHOSPHORUS    Collection Time: 02/14/20  5:08 AM   Result Value Ref Range    Phosphorus 2.3 (L) 2.5 - 4.9 MG/DL   CBC WITH AUTOMATED DIFF    Collection Time: 02/14/20  5:08 AM   Result Value Ref Range    WBC 18.1 (H) 4.6 - 13.2 K/uL    RBC 4.60 (L) 4.70 - 5.50 M/uL    HGB 13.3 13.0 - 16.0 g/dL    HCT 40.8 36.0 - 48.0 %    MCV 88.7 74.0 - 97.0 FL    MCH 28.9 24.0 - 34.0 PG    MCHC 32.6 31.0 - 37.0 g/dL    RDW 14.7 (H) 11.6 - 14.5 %    PLATELET 760 611 - 047 K/uL    MPV 9.9 9.2 - 11.8 FL    NEUTROPHILS 83 (H) 42 - 75 %    BAND NEUTROPHILS 1 0 - 5 %    LYMPHOCYTES 8 (L) 20 - 51 %    MONOCYTES 8 2 - 9 %    EOSINOPHILS 0 0 - 5 %    BASOPHILS 0 0 - 3 % ABS. NEUTROPHILS 15.3 (H) 1.8 - 8.0 K/UL    ABS. LYMPHOCYTES 1.4 0.8 - 3.5 K/UL    ABS. MONOCYTES 1.4 (H) 0 - 1.0 K/UL    ABS. EOSINOPHILS 0.0 0.0 - 0.4 K/UL    ABS.  BASOPHILS 0.0 0.0 - 0.06 K/UL    DF MANUAL      PLATELET COMMENTS ADEQUATE PLATELETS      RBC COMMENTS NORMOCYTIC, NORMOCHROMIC         Signed By: Halima Cordova NP     February 14, 2020

## 2020-02-14 NOTE — PROGRESS NOTES
Reason for Admission:  Incisional hernia [K43.2]                 RRAT Score:   18%            Plan for utilizing home health:    Yes, with [address]                      Likelihood of Readmission:   LOW                         Transition of Care Plan:              Initial assessment completed with patient. Cognitive status of patient: oriented to time, place, person and situation. Face sheet information confirmed:  yes. The patient designates spouse, Germain Mauro ([phone]) to participate in his discharge plan and to receive any needed information. This patient lives in a single family home with patient, son and spouse. Patient is able to navigate steps as needed. Prior to hospitalization, patient was considered to be independent with ADLs/IADLS : no . If not independent,  patient needs assist with : dressing, bathing, food preparation and cooking    Patient has a current ACP document on file: no  The patient and wife will be available to transport patient home upon discharge. The patient already has Yunior Slim, W/C, Shower chair, and  medical equipment available in the home. Patient is not currently active with home health. Patient has not stayed in a skilled nursing facility or rehab. This patient is on dialysis :no    List of available Home Health agencies were provided and reviewed with the patient prior to discharge. Freedom of choice signed: yes, for [address]. Currently, the discharge plan is Home with [address] [place] [address] Yifan Stone. The patient states that he can obtain his medications from the pharmacy, and take his medications as directed. Patient's current insurance is [employer]. Pt lives at home with wife and son. Pt needs minimal assist with some adls and iadls. Pt has needed DME from when he had colon cancer last year. Pt still drives but wife will provide transport home. Pt has used [facility] in the past and is willing to use them again.       Care Management Interventions  PCP Verified by CM:  Yes  Mode of Transport at Discharge: Self  Transition of Care Consult (CM Consult): [address]: Yes  Discharge Durable Medical Equipment: No  Physical Therapy Consult: No  Occupational Therapy Consult: No  Current Support Network: Lives with Spouse  The Plan for Transition of Care is Related to the Following Treatment Goals : home with home health  The Patient and/or Patient Representative was Provided with a Choice of Provider and Agrees with the Discharge Plan?: Yes  Freedom of Choice List was Provided with Basic Dialogue that Supports the Patient's Individualized Plan of Care/Goals, Treatment Preferences and Shares the Quality Data Associated with the Providers?: Yes  Discharge Location  Discharge Placement: Home with home health        Shelia Thomas RN - Outcomes Manager  [phone]

## 2020-02-14 NOTE — ANESTHESIA POSTPROCEDURE EVALUATION
Procedure(s):  OPEN REPAIR OF INCISIONAL HERNIA WITH MESH. general    Anesthesia Post Evaluation      Multimodal analgesia: multimodal analgesia used between 6 hours prior to anesthesia start to PACU discharge  Patient location during evaluation: bedside  Patient participation: complete - patient participated  Level of consciousness: awake  Pain management: adequate  Airway patency: patent  Anesthetic complications: no  Cardiovascular status: stable  Respiratory status: acceptable  Hydration status: acceptable  Post anesthesia nausea and vomiting:  none      Vitals Value Taken Time   /64 2/13/2020  2:59 PM   Temp 37.4 °C (99.4 °F) 2/13/2020  3:01 PM   Pulse 106 2/13/2020  3:02 PM   Resp 9 2/13/2020  3:02 PM   SpO2 97 % 2/13/2020  3:02 PM   Vitals shown include unvalidated device data.

## 2020-02-14 NOTE — PROGRESS NOTES
2200  Bedside and Verbal shift change [facility]. Report included the following information SBAR, Kardex, OR Summary, Procedure Summary and Intake/Output.

## 2020-02-14 NOTE — PROGRESS NOTES
I reviewed the chest xrays performed today. Colonic and small bowel ileus. Lungs with bilateral atelectasis. I discussed the patient's care in consultation with Dr. Kamilah Best of ICU.   He will evaluate the patient for appropriate level of care

## 2020-02-15 ENCOUNTER — APPOINTMENT (OUTPATIENT)
Dept: GENERAL RADIOLOGY | Age: 70
DRG: 353 | End: 2020-02-15
Attending: PHYSICIAN ASSISTANT
Payer: MEDICARE

## 2020-02-15 ENCOUNTER — APPOINTMENT (OUTPATIENT)
Dept: GENERAL RADIOLOGY | Age: 70
DRG: 353 | End: 2020-02-15
Attending: FAMILY MEDICINE
Payer: MEDICARE

## 2020-02-15 LAB
ALBUMIN SERPL-MCNC: 3 G/DL (ref 3.4–5)
ALBUMIN/GLOB SERPL: 0.7 {RATIO} (ref 0.8–1.7)
ALP SERPL-CCNC: 70 U/L (ref 45–117)
ALT SERPL-CCNC: 33 U/L (ref 16–61)
ANION GAP SERPL CALC-SCNC: 10 MMOL/L (ref 3–18)
ANION GAP SERPL CALC-SCNC: 16 MMOL/L (ref 3–18)
ARTERIAL PATENCY WRIST A: YES
ARTERIAL PATENCY WRIST A: YES
AST SERPL-CCNC: 83 U/L (ref 10–38)
BACTERIA SPEC CULT: NORMAL
BASE DEFICIT BLD-SCNC: 10 MMOL/L
BASE DEFICIT BLD-SCNC: 7 MMOL/L
BASOPHILS # BLD: 0 K/UL (ref 0–0.06)
BASOPHILS NFR BLD: 0 % (ref 0–3)
BDY SITE: ABNORMAL
BDY SITE: ABNORMAL
BILIRUB DIRECT SERPL-MCNC: 0.1 MG/DL (ref 0–0.2)
BILIRUB SERPL-MCNC: 0.4 MG/DL (ref 0.2–1)
BODY TEMPERATURE: 98
BUN SERPL-MCNC: 25 MG/DL (ref 7–18)
BUN SERPL-MCNC: 32 MG/DL (ref 7–18)
BUN/CREAT SERPL: 13 (ref 12–20)
BUN/CREAT SERPL: 9 (ref 12–20)
CA-I SERPL-SCNC: 0.84 MMOL/L (ref 1.12–1.32)
CA-I SERPL-SCNC: 0.94 MMOL/L (ref 1.12–1.32)
CALCIUM SERPL-MCNC: 7 MG/DL (ref 8.5–10.1)
CALCIUM SERPL-MCNC: 7.2 MG/DL (ref 8.5–10.1)
CHLORIDE SERPL-SCNC: 108 MMOL/L (ref 100–111)
CHLORIDE SERPL-SCNC: 111 MMOL/L (ref 100–111)
CO2 SERPL-SCNC: 20 MMOL/L (ref 21–32)
CO2 SERPL-SCNC: 20 MMOL/L (ref 21–32)
CREAT SERPL-MCNC: 2.45 MG/DL (ref 0.6–1.3)
CREAT SERPL-MCNC: 2.7 MG/DL (ref 0.6–1.3)
DIFFERENTIAL METHOD BLD: ABNORMAL
EOSINOPHIL # BLD: 0 K/UL (ref 0–0.4)
EOSINOPHIL NFR BLD: 0 % (ref 0–5)
ERYTHROCYTE [DISTWIDTH] IN BLOOD BY AUTOMATED COUNT: 15 % (ref 11.6–14.5)
GAS FLOW.O2 O2 DELIVERY SYS: ABNORMAL L/MIN
GAS FLOW.O2 O2 DELIVERY SYS: ABNORMAL L/MIN
GAS FLOW.O2 SETTING OXYMISER: 2 L/M
GLOBULIN SER CALC-MCNC: 4.2 G/DL (ref 2–4)
GLUCOSE BLD STRIP.AUTO-MCNC: 163 MG/DL (ref 70–110)
GLUCOSE BLD STRIP.AUTO-MCNC: 165 MG/DL (ref 70–110)
GLUCOSE BLD STRIP.AUTO-MCNC: 191 MG/DL (ref 70–110)
GLUCOSE SERPL-MCNC: 150 MG/DL (ref 74–99)
GLUCOSE SERPL-MCNC: 170 MG/DL (ref 74–99)
HCO3 BLD-SCNC: 16.8 MMOL/L (ref 22–26)
HCO3 BLD-SCNC: 18.6 MMOL/L (ref 22–26)
HCT VFR BLD AUTO: 41.8 % (ref 36–48)
HGB BLD-MCNC: 13.8 G/DL (ref 13–16)
LACTATE SERPL-SCNC: 1.4 MMOL/L (ref 0.4–2)
LACTATE SERPL-SCNC: 1.5 MMOL/L (ref 0.4–2)
LACTATE SERPL-SCNC: 2.1 MMOL/L (ref 0.4–2)
LACTATE SERPL-SCNC: 2.4 MMOL/L (ref 0.4–2)
LYMPHOCYTES # BLD: 0.8 K/UL (ref 0.8–3.5)
LYMPHOCYTES NFR BLD: 3 % (ref 20–51)
MAGNESIUM SERPL-MCNC: 1.5 MG/DL (ref 1.6–2.6)
MCH RBC QN AUTO: 30.3 PG (ref 24–34)
MCHC RBC AUTO-ENTMCNC: 33 G/DL (ref 31–37)
MCV RBC AUTO: 91.7 FL (ref 74–97)
MONOCYTES # BLD: 0.5 K/UL (ref 0–1)
MONOCYTES NFR BLD: 2 % (ref 2–9)
NEUTS BAND NFR BLD MANUAL: 11 % (ref 0–5)
NEUTS SEG # BLD: 25.1 K/UL (ref 1.8–8)
NEUTS SEG NFR BLD: 84 % (ref 42–75)
O2/TOTAL GAS SETTING VFR VENT: 28 %
O2/TOTAL GAS SETTING VFR VENT: 40 %
PCO2 BLD: 33.7 MMHG (ref 35–45)
PCO2 BLD: 35.1 MMHG (ref 35–45)
PEEP RESPIRATORY: 4 CMH2O
PH BLD: 7.3 [PH] (ref 7.35–7.45)
PH BLD: 7.33 [PH] (ref 7.35–7.45)
PHOSPHATE SERPL-MCNC: 3.2 MG/DL (ref 2.5–4.9)
PIP ISTAT,IPIP: 8
PLATELET # BLD AUTO: 229 K/UL (ref 135–420)
PLATELET COMMENTS,PCOM: ABNORMAL
PMV BLD AUTO: 10.6 FL (ref 9.2–11.8)
PO2 BLD: 62 MMHG (ref 80–100)
PO2 BLD: 73 MMHG (ref 80–100)
POTASSIUM SERPL-SCNC: 4.9 MMOL/L (ref 3.5–5.5)
POTASSIUM SERPL-SCNC: 5.5 MMOL/L (ref 3.5–5.5)
PRESSURE SUPPORT SETTING VENT: 4 CMH2O
PROT SERPL-MCNC: 7.2 G/DL (ref 6.4–8.2)
RBC # BLD AUTO: 4.56 M/UL (ref 4.7–5.5)
RBC MORPH BLD: ABNORMAL
SAO2 % BLD: 90 % (ref 92–97)
SAO2 % BLD: 93 % (ref 92–97)
SERVICE CMNT-IMP: ABNORMAL
SERVICE CMNT-IMP: ABNORMAL
SERVICE CMNT-IMP: NORMAL
SODIUM SERPL-SCNC: 138 MMOL/L (ref 136–145)
SODIUM SERPL-SCNC: 147 MMOL/L (ref 136–145)
SPECIMEN TYPE: ABNORMAL
SPECIMEN TYPE: ABNORMAL
SPONTANEOUS TIMED, IST: YES
TOTAL RESP. RATE, ITRR: 20
TOTAL RESP. RATE, ITRR: 33
WBC # BLD AUTO: 26.4 K/UL (ref 4.6–13.2)

## 2020-02-15 PROCEDURE — 74011636637 HC RX REV CODE- 636/637: Performed by: HOSPITALIST

## 2020-02-15 PROCEDURE — 83605 ASSAY OF LACTIC ACID: CPT

## 2020-02-15 PROCEDURE — 74011000258 HC RX REV CODE- 258: Performed by: PHYSICIAN ASSISTANT

## 2020-02-15 PROCEDURE — 74011000250 HC RX REV CODE- 250: Performed by: NURSE PRACTITIONER

## 2020-02-15 PROCEDURE — 82330 ASSAY OF CALCIUM: CPT

## 2020-02-15 PROCEDURE — 74011250636 HC RX REV CODE- 250/636: Performed by: PHYSICIAN ASSISTANT

## 2020-02-15 PROCEDURE — 71045 X-RAY EXAM CHEST 1 VIEW: CPT

## 2020-02-15 PROCEDURE — 77030040392 HC DRSG OPTIFOAM MDII -A

## 2020-02-15 PROCEDURE — 74011250636 HC RX REV CODE- 250/636: Performed by: SURGERY

## 2020-02-15 PROCEDURE — 74011250636 HC RX REV CODE- 250/636: Performed by: HOSPITALIST

## 2020-02-15 PROCEDURE — P9045 ALBUMIN (HUMAN), 5%, 250 ML: HCPCS | Performed by: PHYSICIAN ASSISTANT

## 2020-02-15 PROCEDURE — 84100 ASSAY OF PHOSPHORUS: CPT

## 2020-02-15 PROCEDURE — 36600 WITHDRAWAL OF ARTERIAL BLOOD: CPT

## 2020-02-15 PROCEDURE — 80048 BASIC METABOLIC PNL TOTAL CA: CPT

## 2020-02-15 PROCEDURE — 80076 HEPATIC FUNCTION PANEL: CPT

## 2020-02-15 PROCEDURE — 74011250637 HC RX REV CODE- 250/637: Performed by: SURGERY

## 2020-02-15 PROCEDURE — 0D9670Z DRAINAGE OF STOMACH WITH DRAINAGE DEVICE, VIA NATURAL OR ARTIFICIAL OPENING: ICD-10-PCS | Performed by: SURGERY

## 2020-02-15 PROCEDURE — 94669 MECHANICAL CHEST WALL OSCILL: CPT

## 2020-02-15 PROCEDURE — 77010033678 HC OXYGEN DAILY

## 2020-02-15 PROCEDURE — 77030041174 HC STOOL COL SYS DIGNSHLD BARD -C

## 2020-02-15 PROCEDURE — 94660 CPAP INITIATION&MGMT: CPT

## 2020-02-15 PROCEDURE — 94640 AIRWAY INHALATION TREATMENT: CPT

## 2020-02-15 PROCEDURE — 82962 GLUCOSE BLOOD TEST: CPT

## 2020-02-15 PROCEDURE — 74019 RADEX ABDOMEN 2 VIEWS: CPT

## 2020-02-15 PROCEDURE — 94667 MNPJ CHEST WALL 1ST: CPT

## 2020-02-15 PROCEDURE — 65610000006 HC RM INTENSIVE CARE

## 2020-02-15 PROCEDURE — 94762 N-INVAS EAR/PLS OXIMTRY CONT: CPT

## 2020-02-15 PROCEDURE — 77030018846 HC SOL IRR STRL H20 ICUM -A

## 2020-02-15 PROCEDURE — 74011636637 HC RX REV CODE- 636/637: Performed by: PHYSICIAN ASSISTANT

## 2020-02-15 PROCEDURE — 74011250636 HC RX REV CODE- 250/636: Performed by: NURSE PRACTITIONER

## 2020-02-15 PROCEDURE — 77030008771 HC TU NG SALEM SUMP -A

## 2020-02-15 PROCEDURE — C9113 INJ PANTOPRAZOLE SODIUM, VIA: HCPCS | Performed by: NURSE PRACTITIONER

## 2020-02-15 PROCEDURE — 36415 COLL VENOUS BLD VENIPUNCTURE: CPT

## 2020-02-15 PROCEDURE — 74011000250 HC RX REV CODE- 250: Performed by: HOSPITALIST

## 2020-02-15 PROCEDURE — 83735 ASSAY OF MAGNESIUM: CPT

## 2020-02-15 PROCEDURE — 87070 CULTURE OTHR SPECIMN AEROBIC: CPT

## 2020-02-15 PROCEDURE — 74011250636 HC RX REV CODE- 250/636: Performed by: FAMILY MEDICINE

## 2020-02-15 PROCEDURE — 74011250637 HC RX REV CODE- 250/637: Performed by: NURSE PRACTITIONER

## 2020-02-15 PROCEDURE — 85025 COMPLETE CBC W/AUTO DIFF WBC: CPT

## 2020-02-15 RX ORDER — MAGNESIUM SULFATE 1 G/100ML
1 INJECTION INTRAVENOUS ONCE
Status: COMPLETED | OUTPATIENT
Start: 2020-02-15 | End: 2020-02-15

## 2020-02-15 RX ORDER — ONDANSETRON 2 MG/ML
4 INJECTION INTRAMUSCULAR; INTRAVENOUS
Status: DISCONTINUED | OUTPATIENT
Start: 2020-02-15 | End: 2020-02-28 | Stop reason: HOSPADM

## 2020-02-15 RX ORDER — ALBUMIN HUMAN 50 G/1000ML
25 SOLUTION INTRAVENOUS ONCE
Status: COMPLETED | OUTPATIENT
Start: 2020-02-15 | End: 2020-02-15

## 2020-02-15 RX ORDER — METRONIDAZOLE 500 MG/100ML
500 INJECTION, SOLUTION INTRAVENOUS EVERY 12 HOURS
Status: DISCONTINUED | OUTPATIENT
Start: 2020-02-15 | End: 2020-02-21

## 2020-02-15 RX ORDER — INSULIN LISPRO 100 [IU]/ML
INJECTION, SOLUTION INTRAVENOUS; SUBCUTANEOUS EVERY 6 HOURS
Status: DISCONTINUED | OUTPATIENT
Start: 2020-02-15 | End: 2020-02-21

## 2020-02-15 RX ADMIN — MAGNESIUM SULFATE 1 G: 1 INJECTION INTRAVENOUS at 05:52

## 2020-02-15 RX ADMIN — HEPARIN SODIUM 5000 UNITS: 5000 INJECTION INTRAVENOUS; SUBCUTANEOUS at 09:49

## 2020-02-15 RX ADMIN — SODIUM CHLORIDE 75 ML/HR: 900 INJECTION, SOLUTION INTRAVENOUS at 17:54

## 2020-02-15 RX ADMIN — WATER 2 G: 1 INJECTION INTRAMUSCULAR; INTRAVENOUS; SUBCUTANEOUS at 20:59

## 2020-02-15 RX ADMIN — IPRATROPIUM BROMIDE AND ALBUTEROL SULFATE 3 ML: .5; 3 SOLUTION RESPIRATORY (INHALATION) at 17:01

## 2020-02-15 RX ADMIN — PANTOPRAZOLE SODIUM 40 MG: 40 INJECTION, POWDER, FOR SOLUTION INTRAVENOUS at 18:11

## 2020-02-15 RX ADMIN — GABAPENTIN 100 MG: 100 CAPSULE ORAL at 22:25

## 2020-02-15 RX ADMIN — IPRATROPIUM BROMIDE AND ALBUTEROL SULFATE 3 ML: .5; 3 SOLUTION RESPIRATORY (INHALATION) at 13:50

## 2020-02-15 RX ADMIN — HYDROMORPHONE HYDROCHLORIDE 1 MG: 1 INJECTION, SOLUTION INTRAMUSCULAR; INTRAVENOUS; SUBCUTANEOUS at 10:40

## 2020-02-15 RX ADMIN — Medication 10 ML: at 12:46

## 2020-02-15 RX ADMIN — INSULIN LISPRO 3 UNITS: 100 INJECTION, SOLUTION INTRAVENOUS; SUBCUTANEOUS at 07:01

## 2020-02-15 RX ADMIN — OXYCODONE HYDROCHLORIDE AND ACETAMINOPHEN 1 TABLET: 5; 325 TABLET ORAL at 22:24

## 2020-02-15 RX ADMIN — CALCIUM GLUCONATE 2 G: 98 INJECTION, SOLUTION INTRAVENOUS at 23:22

## 2020-02-15 RX ADMIN — IPRATROPIUM BROMIDE AND ALBUTEROL SULFATE 3 ML: .5; 3 SOLUTION RESPIRATORY (INHALATION) at 23:20

## 2020-02-15 RX ADMIN — HYDROMORPHONE HYDROCHLORIDE 0.5 MG: 1 INJECTION, SOLUTION INTRAMUSCULAR; INTRAVENOUS; SUBCUTANEOUS at 03:48

## 2020-02-15 RX ADMIN — IPRATROPIUM BROMIDE AND ALBUTEROL SULFATE 3 ML: .5; 3 SOLUTION RESPIRATORY (INHALATION) at 03:03

## 2020-02-15 RX ADMIN — CALCIUM GLUCONATE 3 G: 98 INJECTION, SOLUTION INTRAVENOUS at 13:01

## 2020-02-15 RX ADMIN — HYDROMORPHONE HYDROCHLORIDE 1 MG: 1 INJECTION, SOLUTION INTRAMUSCULAR; INTRAVENOUS; SUBCUTANEOUS at 22:24

## 2020-02-15 RX ADMIN — Medication 10 ML: at 13:03

## 2020-02-15 RX ADMIN — IPRATROPIUM BROMIDE AND ALBUTEROL SULFATE 3 ML: .5; 3 SOLUTION RESPIRATORY (INHALATION) at 00:02

## 2020-02-15 RX ADMIN — Medication 10 ML: at 22:26

## 2020-02-15 RX ADMIN — LATANOPROST 1 DROP: 50 SOLUTION OPHTHALMIC at 23:22

## 2020-02-15 RX ADMIN — METRONIDAZOLE 500 MG: 500 INJECTION, SOLUTION INTRAVENOUS at 04:36

## 2020-02-15 RX ADMIN — IPRATROPIUM BROMIDE AND ALBUTEROL SULFATE 3 ML: .5; 3 SOLUTION RESPIRATORY (INHALATION) at 19:30

## 2020-02-15 RX ADMIN — INSULIN LISPRO 3 UNITS: 100 INJECTION, SOLUTION INTRAVENOUS; SUBCUTANEOUS at 18:15

## 2020-02-15 RX ADMIN — METRONIDAZOLE 500 MG: 500 INJECTION, SOLUTION INTRAVENOUS at 18:11

## 2020-02-15 RX ADMIN — HEPARIN SODIUM 5000 UNITS: 5000 INJECTION INTRAVENOUS; SUBCUTANEOUS at 18:13

## 2020-02-15 RX ADMIN — LEVOFLOXACIN 500 MG: 5 INJECTION, SOLUTION INTRAVENOUS at 07:01

## 2020-02-15 RX ADMIN — ALBUMIN (HUMAN) 25 G: 12.5 INJECTION, SOLUTION INTRAVENOUS at 03:51

## 2020-02-15 RX ADMIN — HEPARIN SODIUM 5000 UNITS: 5000 INJECTION INTRAVENOUS; SUBCUTANEOUS at 01:40

## 2020-02-15 RX ADMIN — METHYLPREDNISOLONE SODIUM SUCCINATE 40 MG: 125 INJECTION, POWDER, FOR SOLUTION INTRAMUSCULAR; INTRAVENOUS at 05:53

## 2020-02-15 RX ADMIN — HYDROMORPHONE HYDROCHLORIDE 1 MG: 1 INJECTION, SOLUTION INTRAMUSCULAR; INTRAVENOUS; SUBCUTANEOUS at 18:41

## 2020-02-15 RX ADMIN — PANTOPRAZOLE SODIUM 40 MG: 40 INJECTION, POWDER, FOR SOLUTION INTRAVENOUS at 12:45

## 2020-02-15 RX ADMIN — NYSTATIN 500000 UNITS: 500000 SUSPENSION ORAL at 22:26

## 2020-02-15 NOTE — PROGRESS NOTES
2130-MEWS score elevated adriano BARBER.   Hospitalist in contact with Nursing supervisor and Sandro Willingham is to evaluate patient. 2200-Dr. Sandra Doing surgery informed of pt condition and possible transfer off floor.

## 2020-02-15 NOTE — CONSULTS
[email or web address]  [phone]    GASTROENTEROLOGY CONSULT      Impression:   1. Post op ileus - POD 3 ex lap for open repair of incisional hernia with mesh 2/13/20, NGT in place, abd pressure 26, KUB dilated loops small bowel, moderately diffuse gas in colon/stomach  2. 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 Tracie Hawk. Post op leak that required diverting ileostomy - was reversed 7/2019.   3. Acute respiratory failure - on 6L NC  4. JOSE ANTONIO decreased UOP, nephrology following  5. SIRS with leukocytosis - multifactorial   6. Hx of throat CA s/p 1 vocal cord removed in 2008      Plan:     1. Suspect post op ileus - conservative measures  2. Place rectal tube  3. Turn every 2 hours  4. Correct electrolytes  5. Interval KUB tomorrow or for any acute change      Chief Complaint: abdominal distention      HPI:  Romelia Huerta is a 77 y.o. male who I am being asked to see in consultation for an opinion regarding abdominal distention with KUB notable for dilated loops of small bowel and moderately diffuse gas in the colon and stomach. He is POD 3 from incisional hernia repair. NGT placed for decompression, passed flatus yesterday but nothing today, abd pressure 26, JOSE ANTONIO with worsening urine output, was transferred to ICU last night on Bipap for worsening respiratory distress but has improved and now on 6L NC, WBC 26.4. Reports abdominal pain with distention but no nausea/vomiting at this time. No GI bleeding noted. History of invasive adenocarcinoma in the sigmoid colon-noted on colonoscopy done as an inpatient November 2018; status post colon resection with Dr. Tracie Hawk. Postoperatively, pathology was negative, lymph nodes were negative. He reports similar post op ileus at that time. He did have a leak that required further surgery and a diverting ileostomy-this was reversed 7/2019.  Was due for screening colonoscopy with Dr. Jim Escobar 2/17 rescheduled due to current condition.  Scheduled 4/30/2020      PMH:   Past Medical History:   Diagnosis Date    Cancer [facility]) 2008     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 and colon cancer in polyp    Chest pain, unspecified     Diabetes ([facility] [address]     under control, weight controlled    Essential hypertension, benign     no more meds    GERD (gastroesophageal reflux disease)     Glaucoma     Nonspecific abnormal electrocardiogram (ECG) (EKG)        PSH:   Past Surgical History:   Procedure Laterality Date    CLOSE ENTEROSTOMY,RESEC+ANAST N/A 07/05/2019    Dr. Susan Huerta    COLONOSCOPY N/A 11/12/2018    COLONOSCOPY with Polypectomies, Bx's, Injection, Tattooing & Clip Placement x 3 performed [facility] Eva Bernal [facility]  11/12/2018         [address]  11/12/2018         ENDOSCOPIC MUCOSAL RESECT  11/12/2018         EXPLORATORY OF ABDOMEN N/A 12/11/2018    Dr. Christy Mulligan N/A 5/8/2019    SIGMOIDOSCOPY FLEXIBLE with polypectomy performed by Eliseo Nunes MD at [facility] ENDOSCOPY    HX COLONOSCOPY      HX GI      robotic sigmoid colectomy complicated by small anastomotic leak, status post diverting ileostomy    HX GI      reversal ileostomy    HX HEENT  2008    Throat Ca - 1 vocal chord removed     HX HEENT Bilateral 1970    eye surgery muscles    HX TRACHEOSTOMY  2008    LAP,SURG,COLECTOMY, PARTIAL, W/ANAST N/A 12/04/2018    Dr. Susan Huerta       Social HX:   Social History     Socioeconomic History    Marital status:      Spouse name: Not on file    Number of children: Not on file    Years of education: Not on file    Highest education level: Not on file   Occupational History    Not on file   Social Needs    Financial resource strain: Not on file    Food insecurity:     Worry: Not on file     Inability: Not on file    Transportation needs:     Medical: Not on file     Non-medical: Not on file   Tobacco Use    Smoking status: Former Smoker Last attempt to quit: 2008     Years since quittin.2    Smokeless tobacco: Never Used   Substance and Sexual Activity    Alcohol use: No    Drug use: No    Sexual activity: Not on file   Lifestyle    Physical activity:     Days per week: Not on file     Minutes per session: Not on file    Stress: Not on file   Relationships    Social connections:     Talks on phone: Not on file     Gets together: Not on file     Attends [employer] service: Not on file     Active member of club or organization: Not on file     Attends meetings of clubs or organizations: Not on file     Relationship status: Not on file    Intimate partner violence:     Fear of current or ex partner: Not on file     Emotionally abused: Not on file     Physically abused: Not on file     Forced sexual activity: Not on file   Other Topics Concern    Not on file   Social History Narrative    Not on file       FHX:   History reviewed. No pertinent family history. Allergy:   No Known Allergies    Patient Active Problem List   Diagnosis Code    Debility R53.81    Leukocytosis D72.829    Intractable low back pain M54.5    Colon polyps K63.5    Hemorrhoids K64.9    Diverticulitis K57.92    Incisional hernia K43.2       Home Medications:     Medications Prior to Admission   Medication Sig    omega 3-DHA-EPA-fish oil (FISH OIL) 1,000 mg (120 mg-180 mg) capsule Take 1 Cap by mouth daily.  oxyCODONE-acetaminophen (PERCOCET) 5-325 mg per tablet Take 1-2 Tabs by mouth every four (4) hours as needed. Max Daily Amount: 12 Tabs. (Patient taking differently: Take 1-2 Tabs by mouth every four (4) hours as needed for Pain.)    multivitamin, tx-iron-ca-min (THERA-M W/ IRON) 9 mg iron-400 mcg tab tablet Take 1 Tab by mouth daily.  latanoprost (XALATAN) 0.005 % ophthalmic solution Administer 1 Drop to both eyes nightly.  beclomethasone dipropionate (QNASL) 80 mcg/actuation HFAA 80 mcg by Nasal route daily.  Directions on at home label are as follows: Use 2 sprays in each Nostril Daily    metFORMIN (GLUCOPHAGE) 1,000 mg tablet Take 1,000 mg by mouth two (2) times daily (with meals). do not take morning of surgery (12/4/180.  losartan (COZAAR) 50 mg tablet Take 50 mg by mouth daily.  nebivolol (BYSTOLIC) 5 mg tablet Take 5 mg by mouth daily. Indications: Sinus Tachycardia    omeprazole (PRILOSEC) 20 mg capsule Take 20 mg by mouth daily.  Dexlansoprazole 60 mg CpDB Take 1 Cap by mouth every evening.  calcium-cholecalciferol, d3, 600-125 mg-unit tab Take 1 Tab by mouth daily.  IRON, FERROUS SULFATE, PO Take 325 mg by mouth every Monday, Wednesday, Friday. 3 times a week        Review of Systems:     Constitutional: No fevers, chills, weight loss, fatigue. Skin: No rashes, pruritis, jaundice, ulcerations, erythema. HENT: No headaches, nosebleeds, sinus pressure, rhinorrhea, sore throat. Eyes: No visual changes, blurred vision, eye pain, photophobia, jaundice. Cardiovascular: No chest pain, heart palpitations. Respiratory: No cough, SOB, wheezing, chest discomfort, orthopnea. Gastrointestinal: Abdominal distention and generalized pain   Genitourinary: No dysuria, bleeding, discharge, pyuria. Musculoskeletal: No weakness, arthralgias, wasting. Endo: No sweats. Heme: No bruising, easy bleeding. Allergies: As noted. Neurological: Cranial nerves intact. Alert and oriented. Gait not assessed. Psychiatric:  No anxiety, depression, hallucinations. Visit Vitals  /82   Pulse (!) 121   Temp 98.1 °F (36.7 °C)   Resp 26   Ht 5' 10\" (1.778 m)   Wt 91 kg (200 lb 9.9 oz)   SpO2 92%   BMI 28.79 kg/m²       Physical Assessment:     constitutional: appearance: well developed, well nourished, normal habitus, no deformities, in no acute distress. skin: inspection: no rashes, ulcers, icterus or other lesions; no clubbing or telangiectasias.   eyes: inspection: normal conjunctivae and lids; no jaundice pupils: normal  ENMT: mouth: normal oral mucosa,lips and gums; good dentition. NGT in place  respiratory: effort: normal chest excursion; no intercostal retraction or accessory muscle use. On 6L NC  cardiovascular: abdominal aorta: normal size and position; no bruits. abdominal: abdomen: distended, hypoactive bowel sounds. Incision intact  musculoskeletal: normal range of motion; no pain, deformity or contracture. neurologic: cranial nerves: II-XII normal. Pupils intact. psychiatric: judgement/insight: within normal limits. memory: within normal limits for recent and remote events. mood and affect: no evidence of depression, anxiety or agitation. orientation: oriented to time, space and person. Basic Metabolic Profile   Recent Labs     02/15/20  0220      K 5.5      CO2 20*   BUN 25*   *   CA 7.0*   MG 1.5*   PHOS 3.2         CBC w/Diff    Recent Labs     02/15/20  0220   WBC 26.4*   RBC 4.56*   HGB 13.8   HCT 41.8   MCV 91.7   MCH 30.3   MCHC 33.0   RDW 15.0*       Recent Labs     02/15/20  0220   GRANS 84*   LYMPH 3*   EOS 0        Hepatic Function   Recent Labs     02/15/20  0454   ALB 3.0*   TP 7.2   TBILI 0.4   SGOT 83*   AP 70        Coags   No results for input(s): PTP, INR, APTT, INREXT in the last 72 hours. Arti Coleman NP. [facility] [address]  Cell: [phone]  [employer]

## 2020-02-15 NOTE — PROGRESS NOTES
Progress Note    Patient: Kat Flores MRN: [id number]  SSN: [id number]    YOB: 1950  Age: 77 y.o. Sex: male      Admit Date: 2/13/2020    LOS: 2 days       Appreciate management by Pulm/Intensivist  Appreciate GI Consult  Subjective:   Events of night reviewed    Pt with some SOB due to distended abdomen.   No flatus or BM    Objective:     Vitals:    02/15/20 0700 02/15/20 0800 02/15/20 0900 02/15/20 1000   BP: 133/82 (!) 131/94 144/90 148/89   Pulse: (!) 121 (!) 119 (!) 123 (!) 122   Resp: 26 25 26 26   Temp:  98.1 °F (36.7 °C)     SpO2: 92% 92% 93% 93%   Weight:       Height:            Intake and Output:  Current Shift: 02/15 0701 - 02/15 1900  In: 200 [I.V.:200]  Out: 0   Last three shifts: 02/13 1901 - 02/15 0700  In: 2957.5 [P.O.:680; I.V.:2257.5]  Out: 9141 [Urine:1010; Drains:439]    Physical Exam:   A&Ox3  NG  Tachy  Abd distended, typanitic    Lab/Data Review:  WBC 26.4  BUN/CR 25/2.7   K 55.5    Assessment:     Active Problems:    Incisional hernia (2/13/2020)      Postop ileus  Plan:     Cont NG til bowel function, add rectal tube  May still be dry from surgery, IV fluids  Cont present Rx  Elevated WBC postop: on Maxipime,flagyl (also on steroids)      Signed By: Anastacio Okeefe MD     February 15, 2020

## 2020-02-15 NOTE — PROGRESS NOTES
[facility] UPDATE:    00:45: Chart and all available data reviewed. Asked to evaluate patient for possible ICU upgrade, given slow decompensation of respiratory status and increased abdominal distention over the past several hours. Patient currently has increased abdominal distention from this afternoon, and respiratory status seems to be slowly worsening. Upon initial exam patient is audibly wheezing and has mild increase of work of breathing vitals are stable. Patient is able to converse without difficulty. Wife is currently at bedside. Patient denies any current smoking, but does admit to a remote smoking history. Patient admits to having S OB, and some mild abdominal pain/discomfort, appropriate for postop. Patient denies any other pain or complaint. Denies any chest pain, palpitations,N/V/D, dysuria, headache, fever or chills. Patient does note that he is no longer passing gas for the past several hours. Of note patient was also given Kayexalate this afternoon for hyperkalemia, but has not since had a bowel movement. Pt is currently on 5 LPM NC via salter. Patient currently has Mckeon, and has noted decreased urine output over the course of today. GEN: AOx4; mild increase in WOB, otherwise NAD  RESP: Symmetrical chest rise; AE mod bilat; audible wheezes, +diffuse wheezes on auscultation, +rales bibasilar  CVS: RRR, no m/r/g  ABD: Abdomen severely distended and firm, notably pushing up onto diaphragm. Tympanic. Mildly tender to palpitation. Exam limited 2/2 abdominal binder in place. Midline incision noted with wound vac in place with minimal output. ANGELI drain x2 (bilateral pericolic gutters) with moderate serosanguineous drainage.      PLAN:  - Discuss case with On call surgeon regarding need for NGT decompression  - Place on BiPAP; obtain ABG 30-60mins after BiPAP has been in place.   - Continue duo-nebs  - CXR STAT  - Repeat CMP, Mg & phos STAT - monitor for hyperkalemia  - Hold IVF in setting of worsening respiratory distress - suspect mild fluid overload given clinical exam findings      **Discussed with On call general surgeon. Dr. Aram Grigsby, agrees with NGT for decompression and paez placement, 2/2 concern for urinary retention. ** Discussed above update with Dr. Sosa Hernandez - initial plan was to upgrade patient to [facility] on third floor, but given patient's overall clinical status, current hyperkalemia, increasing abdominal distention, and worsening respiratory status, decision was made to upgrade patient to ICU for close observation and high risk of decompensation. Discussed case with nursing supervisor who was made aware of patient's upgraded status to ICU.        02:30: KUB results at bedside shows multiple loops of distended bowel. XUB performed while NGT was set to suction for NG decompression. Discussed findings with Dr. Sosa Hernandez, who advises to notify the general surgeon of these findings. 02:35: Bedside RN called to inform me that pt's UOP has continued to decline - currently down to 50cc in the past 4 hrs.     02:40: Discussed case with Dr. Janay Atkins - notified him of findings of my wet read of KUB - multiple loops of distended bowel. Agrees with current plan of continued NG decompression, followed by NGT to LIWS once decompression has been achieved. Notified him as well of the low UOP, agrees that patient is volume down from surgery. Agrees with decision to give albumin at this time, given patient's current respiratory status.     - Give Albumin 5% 25g, hold IVF for now      Full consult note to follow    Brendan Green PA-C    Pulmonary [address] [facility]

## 2020-02-15 NOTE — ROUTINE PROCESS
GWENDOLYN Walden in to see and examine patient. Dr Jayant Chambers on call. Examine patient independently with notice of tachypnia  At 29, crackles and expirartory wheezing, with some productive cough. Abd distended and firm with Absent BS. NGT in left nare with connection to LIS at 40 mmhg. Patient performing self suctioning orally of thick tan secretions. Mckeon intact of ailin urine. 2 ANGELI drains intact to abd with drainage. Prevena intact to abd wound with suctioning. Patient c/o back pain as a 5- 6 pain. 945 Dr Michaela Russ was in to see and examine patient. 1015 sputum culture collected and sent  1040 Dilaudid 1 mg IVP given for back pain. 1415 flexiseal inserted without incident. 1500 Dr Sarah Beth Somers in to see patient  36 Abd pressures/ bladder pressure performed: 28 reading. Dr Nicole Rock made aware. Call placed to surgeon Shi Diaz to inform. MD also aware. No new orders.

## 2020-02-15 NOTE — PROGRESS NOTES
RENAL DAILY PROGRESS NOTE    Patient: Christopher Ellis               Sex: male          DOA: 2/13/2020  5:32 AM        YOB: 1950      Age:  77 y.o.        LOS:  LOS: 2 days     Subjective:     Christopher Ellis is a 77 y.o.  who presents with Incisional hernia [K43.2].    Asked to evaluate for arf,s/p incisional hernia surgery  Chief complains:  - Reviewed last 24 hrs events     Current Facility-Administered Medications   Medication Dose Route Frequency    ondansetron (ZOFRAN) injection 4 mg  4 mg IntraVENous Q6H PRN    insulin lispro (HUMALOG) injection   SubCUTAneous Q6H    metroNIDAZOLE (FLAGYL) IVPB premix 500 mg  500 mg IntraVENous Q12H    cefepime (MAXIPIME) 2 g in sterile water (preservative free) 10 mL IV syringe  2 g IntraVENous Q12H    pantoprazole (PROTONIX) 40 mg in 0.9% sodium chloride 10 mL injection  40 mg IntraVENous BID    albuterol (PROVENTIL VENTOLIN) nebulizer solution 2.5 mg  2.5 mg Nebulization Q4H PRN    sodium chloride (OCEAN) 0.65 % nasal squeeze bottle 2 Spray  2 Spray Both Nostrils Q2H PRN    glucose chewable tablet 16 g  4 Tab Oral PRN    glucagon (GLUCAGEN) injection 1 mg  1 mg IntraMUSCular PRN    dextrose (D50W) injection syrg 12.5-25 g  25-50 mL IntraVENous PRN    oxyCODONE-acetaminophen (PERCOCET) 5-325 mg per tablet 2 Tab  2 Tab Oral Q6H PRN    nebivolol (BYSTOLIC) tablet 10 mg  10 mg Oral DAILY    benzocaine-menthol (CEPACOL) lozenge 1 Lozenge  1 Lozenge Mucous Membrane PRN    nystatin (MYCOSTATIN) 100,000 unit/mL oral suspension 500,000 Units  500,000 Units Oral QID    phenol throat spray (CHLORASEPTIC) 1 Spray  1 Spray Oral PRN    albuterol-ipratropium (DUO-NEB) 2.5 MG-0.5 MG/3 ML  3 mL Nebulization Q4H RT    albuterol-ipratropium (DUO-NEB) 2.5 MG-0.5 MG/3 ML  3 mL Nebulization Q4H PRN    0.9% sodium chloride infusion  75 mL/hr IntraVENous CONTINUOUS    sodium chloride (NS) flush 5-40 mL  5-40 mL IntraVENous Q8H    sodium chloride (NS) flush 5-40 mL  5-40 mL IntraVENous PRN    heparin (porcine) injection 5,000 Units  5,000 Units SubCUTAneous Q8H    gabapentin (NEURONTIN) capsule 100 mg  100 mg Oral TID    ferrous sulfate tablet 325 mg  325 mg Oral Q MON, WED & FRI    latanoprost (XALATAN) 0.005 % ophthalmic solution 1 Drop  1 Drop Both Eyes QHS    acetaminophen (TYLENOL) tablet 650 mg  650 mg Oral Q4H PRN    HYDROmorphone (DILAUDID) injection 0.5 mg  0.5 mg IntraVENous Q4H PRN    oxyCODONE-acetaminophen (PERCOCET) 5-325 mg per tablet 1 Tab  1 Tab Oral Q4H PRN    polyethylene glycol (MIRALAX) packet 17 g  17 g Oral DAILY    docusate sodium (COLACE) capsule 100 mg  100 mg Oral DAILY    lactobacillus sp. 50 billion cpu (BIO-K PLUS) capsule 1 Cap  1 Cap Oral DAILY    HYDROmorphone (DILAUDID) injection 1 mg  1 mg IntraVENous Q3H PRN       Objective:     Visit Vitals  /76   Pulse (!) 117   Temp 98.2 °F (36.8 °C)   Resp 18   Ht 5' 10\" (1.778 m)   Wt 91 kg (200 lb 9.9 oz)   SpO2 93%   BMI 28.79 kg/m²       Intake/Output Summary (Last 24 hours) at 2/15/2020 1524  Last data filed at 2/15/2020 0800  Gross per 24 hour   Intake 2352.5 ml   Output 639 ml   Net 1713.5 ml       Physical Examination:       RS: lungs lis rales  CVS: S1-S2 heard, RRR, No S3 / murmur  Abdomen: distended  Extremities: No edema, no cyanosis, skin is warm on touch  CNS: Awake   HEENT: Head is atraumatic, PERRLA, conjunctiva pink & non icteric.  No JVD or carotid bruit       Data Review:      Labs:     Hematology:   Recent Labs     02/15/20  0220 02/14/20  0508 02/13/20  1844   WBC 26.4* 18.1* 20.7*   HGB 13.8 13.3 13.2   HCT 41.8 40.8 39.9     Chemistry:   Recent Labs     02/15/20  0454 02/15/20  0220 02/14/20  1505 02/14/20  0508 02/14/20  0022 02/13/20  1844   BUN  --  25* 21* 20*  --  18   CREA  --  2.70* 2.31* 1.94*  --  1.99*   CA  --  7.0* 7.2* 7.6*  --  8.1*   ALB 3.0*  --   --   --   --   --    K  --  5.5 5.8* 5.3 6.1* 6.6*   NA  --  138 134* 135* --  136   CL  --  108 107 107  --  108   CO2  --  20* 19* 21  --  23   PHOS  --  3.2  --  2.3*  --   --    GLU  --  150* 160* 151*  --  147*        Images:    XR (Most Recent). CXR reviewed by me and compared with previous CXR Results from Hospital Encounter encounter on 02/13/20   XR ABD (AP AND ERECT OR DECUB)    Narrative ABDOMEN, TWO VIEWS    CPT CODE: 90939    INDICATION: Above. Increasing abdominal distention status post exploratory  laparoscopy    COMPARISON: 12/14/2018    TECHNIQUE: A.P. supine and upright radiographs of the abdomen are reviewed. FINDINGS:    Multiple dilated gas-filled loops of small bowel. Moderately abundant gas  diffusely in the colon and stomach. Suspect for postoperative ileus given recent  surgery, though early/partial bowel obstruction cannot be excluded  radiographically. Clinical and radiographic follow-up might be helpful. Grossly  similar appearance on prior CT of 12/30/2019 and radiographs of 12/14/2018. Catheter tubing resembling surgical drains is seen overlying the abdomen with  tips projected over the upper abdomen close to midline and the left lower  quadrant. Esophagogastric tube tip and distal side-port project over the stomach  proximally. EKG leads/wires overlie the patient. Impression Impression:     Dilated loops of gas-filled small bowel in addition to moderate diffuse  prominence of gas in the colon and stomach. Suspect for postoperative ileus  given recent surgery, though early/partial bowel obstruction cannot be excluded  radiographically, clinical and radiographic follow-up might be helpful. CT (Most Recent) Results from Hospital Encounter encounter on 12/30/19   CT CHEST ABD PELV W CONT    Narrative CT CHEST, ABDOMEN AND PELVIS WITH ENHANCEMENT    INDICATION: Follow-up colon cancer with reversal surgery July 2019.     TECHNIQUE: Axial images obtained of the chest, abdomen and pelvis following the  uneventful administration of 100 cc Isovue-300 nonionic intravenous contrast.   Coronal and sagittal reformatted images of the abdomen and pelvis were obtained. All CT scans at this facility are performed using dose optimization technique as  appropriate to a performed exam, to include automated exposure control,  adjustment of the mA and/or kV according to patient size (including appropriate  matching first site-specific examinations), or use of iterative reconstruction  technique. COMPARISON: 12/17/2018; 11/9/2018. CHEST FINDINGS:    Thyroid: Unremarkable. Heart: No effusion. Intermittent regions of severe coronary arteriosclerosis,  most significant in the right coronary artery. Vessels: Mild atherosclerosis. Lymph Nodes: Unremarkable. Lung: Mild paraseptal and centrilobular upper lobe predominant emphysematous  changes. Pleura: Unremarkable. ABDOMEN FINDINGS:     Liver: Unremarkable. Spleen: Unremarkable. Pancreas: Pancreatic tail is relatively atrophic with calcific densities. Biliary: Unremarkable. Bowel: Moderate colonic gas and stool burden. There is a new small bowel  containing suprapubic hernia containing a segment of ileum which measures up to  4.2 cm. Small bowel was previously dilated throughout but is now otherwise  normal in caliber. No pneumatosis. No wall thickening. Peritoneum/ Retroperitoneum: There is mild perinephric stranding which is  improved. It is throughout the abdomen and pelvis is improved. Resolved  presacral trace fluid. No free air. Lymph Nodes: Unremarkable. Adrenal Glands: Unremarkable. Kidneys: There are bilateral renal cysts, parapelvic on the left measuring up to  5 cm. The largest cyst in the right kidney is 3 cm in the lower pole. There are  a few too small to characterize lesions. Vessels: Moderate atherosclerosis. Coarse calcification at the renal ostia. Stenosis bilateral common, internal iliac and femoral artery branches.       PELVIS FINDINGS:     Bladder/ Pelvic Organs: Unremarkable. Bones/Soft tissues: Narrowing bilateral glenohumeral joints. Degenerative  changes sternoclavicular joints. Lumbar facet hypertrophy and arthropathy. Narrowing bilateral hip joint spaces. Osteopenia. Impression IMPRESSION:    1. No findings of metastatic disease chest, abdomen or pelvis. 2.  New small bowel containing suprapubic hernia at surgical site containing a  dilated segment of small bowel, though there is contrast distally; Partial small  bowel obstruction is possible. No obstruction upstream to the hernia. 3.  Resolved diffuse small bowel dilation. 4.  Improved edema. 5. Intermittent severe coronary arteriosclerosis. Moderate abdominal  atherosclerosis with multifocal regions of stenosis, likely renal ostia, common,  internal iliac and femoral arteries. 6.  Mild emphysema. 7.  Mild sequela of chronic pancreatitis. 8.  Moderate colonic stool burden. EKG No results found for this or any previous visit. I have personally reviewed the old medical records and labs    Plan / Recommendation:      1. Acute/crf ,related to abd compartment syndrome.high bladder pressure. ng and rectal tubes placed to decompress. discussed with his nurse,recheck bladder pressure. continue ivf  2.adjust antibiotics for renal failure    D/w icu team    Lesly Kaminski MD  Nephrology  2/15/2020

## 2020-02-15 NOTE — ROUTINE PROCESS
0201 verbal report received from luis on 5n. 6207 received patient into room 305, patient slide with aid four nurses onto icu bed, connected to cardiac monitor, pulse ox, bp cuff. Bed bath given. Explained to patient that his safety is very important to use and to please call for any need, able to demo use call light. 0400 admission assessment to icu completed. Daksha lacey notified low uop and bladder pressure reading of 26, no new orders received. , gill monzon in assessing patient. 0600 johnna lacey in notified of uop 5 cc/hour,  Temp oral 98.9 and patient c/o nausea. Asked for prn med,  0700 aRegine lacey aware uop remains 5 cc/hour, and pateient coughing up white sputum, order to place on saulter. 0715 Bedside shift change report given to chasity rn (oncoming nurse) by jag rn (offgoing nurse). Report included the following information SBAR, Kardex, Recent Results and Cardiac Rhythm st dual abd check, and piv check completed. 0830 harry ROSARIO hogan low uop., states he aware,.

## 2020-02-15 NOTE — PROGRESS NOTES
Patient with high MEW score     - per nursing , surgery note - ICU consulted but patient not seen yet   Called ICU and talk to [facility] - per APC on call no one has told her to go and see patient   Called ICU MD dr Jones Sotomayor - he is requesting ICU APC name - called back ICU but no one picking up phone   - called nursing supervisor to help with this communication   - patient needs ICU admission - I wrote transfer order   - awaiting final - approval by ICU team   - called back and inform Floor

## 2020-02-15 NOTE — ROUTINE PROCESS
TRANSFER - OUT REPORT:    Verbal report given to ICU(name) on Alivia Ingram  being transferred to 305 ICU(unit) for change in patient condition(resperatory distress)       Report consisted of patients Situation, Background, Assessment and   Recommendations(SBAR). Information from the following report(s) SBAR, Kardex, OR Summary, Procedure Summary, Intake/Output and Recent Results was reviewed with the receiving nurse. Lines:   Peripheral IV 02/13/20 Left Hand (Active)   Site Assessment Clean, dry, & intact 2/14/2020 10:33 PM   Phlebitis Assessment 0 2/14/2020 10:33 PM   Infiltration Assessment 0 2/14/2020 10:33 PM   Dressing Status Clean, dry, & intact 2/14/2020 10:33 PM   Dressing Type Transparent 2/14/2020 10:33 PM   Hub Color/Line Status Pink; Infusing 2/14/2020 10:33 PM   Alcohol Cap Used Yes 2/14/2020 10:33 PM        Opportunity for questions and clarification was provided.       Patient transported with:   Monitor  O2 @ bipap liters  Registered Nurse

## 2020-02-15 NOTE — PROGRESS NOTES
[facility]  Progress Note    Patient: Yuliana Lewis Age: 77 y.o. : 1950 MR#: [id number] SSN: [id number]  Date: 2/15/2020       Subjective:     Patient feels slightly better today after NG tube placement. Still has difficulty coughing up secretions. Feels abdomen is tight and has some discomfort. Not passing gas or no bowel movements. Overnight events noted, discussed with ICU ARIA Wyman    Assessment/Plan:   1. Incisional hernia s/p open repair of incisional hernia with mesh  2. Post-op ileus with abdominal distention  3. SIRS: Tachycardia, leukocytosis, unclear source  4. Dehydration with third spacing of fluids  5. ARF on CKD stage II  6. Lactic acidosis, improving  7. Hypomagnesemia  8. Hyper kalemia  9. DMT2. a1c 7.7  10 declines holding per. HTN  11. GERD  12. History of throat cancer  13. History of intractable low back pain    Plan  1. Continue NG tube to low wall suctioning, discussed with general surgeon Dr. Kim Lei, started on rectal tube, bowel rest and n.p.o. for now. 2. Will change antibiotic to cefepime and metronidazole to cover intra-abdominal sources and also possibility of aspiration. 3. Continue with IS, deep breathing exercises, splinting. 4. Continue wound vac, abdominal binder, ANGELI drains  5. Cont duoneb, d/c solumedrol since no wheezing now  6. Nephrology on case. Monitor renal function. Mckeon. I and O's  7. Cont SSI, diabetes management  8.  Cont Heparin, SCD, Protonix    I spent 40 minutes with the patient in face-to-face consultation, of which greater than 50% was spent in counseling and coordination of care as described above    Case discussed with:  [x]Patient  [x]Family  [x]Nursing  []Case Management  DVT Prophylaxis:  []Lovenox  [x]Hep SQ  []SCDs  []Coumadin   []On Heparin gtt    Objective:   VS:   Visit Vitals  /82   Pulse (!) 121   Temp 98.1 °F (36.7 °C)   Resp 26   Ht 5' 10\" (1.778 m)   Wt 91 kg (200 lb 9.9 oz) SpO2 92%   BMI 28.79 kg/m²      Tmax/24hrs: Temp (24hrs), Av.1 °F (36.7 °C), Min:97.6 °F (36.4 °C), Max:98.9 °F (37.2 °C)      Intake/Output Summary (Last 24 hours) at 2/15/2020 0934  Last data filed at 2/15/2020 0701  Gross per 24 hour   Intake 2352.5 ml   Output 949 ml   Net 1403.5 ml       General:  Alert, NAD, NG tube in place  Cardiovascular:  RRR, tachycardia  Pulmonary: bilateral coarse breath sounds, no wheezing  GI: abdominal distention, abdominal binder, ANGELI drain, wound vac, very sluggish bowel sounds  Extremities:  No edema  Neuro: AAOx4.  Voice hoarse, moves all extremities      Labs:    Recent Results (from the past 24 hour(s))   GLUCOSE, POC    Collection Time: 20 12:04 PM   Result Value Ref Range    Glucose (POC) 207 (H) 70 - 110 mg/dL   URINALYSIS W/ RFLX MICROSCOPIC    Collection Time: 20 12:30 PM   Result Value Ref Range    Color YELLOW      Appearance CLEAR      Specific gravity 1.020 1.005 - 1.030      pH (UA) 5.0 5.0 - 8.0      Protein 100 (A) NEG mg/dL    Glucose NEGATIVE  NEG mg/dL    Ketone TRACE (A) NEG mg/dL    Bilirubin NEGATIVE  NEG      Blood MODERATE (A) NEG      Urobilinogen 0.2 0.2 - 1.0 EU/dL    Nitrites NEGATIVE  NEG      Leukocyte Esterase TRACE (A) NEG     CULTURE, URINE    Collection Time: 20 12:30 PM   Result Value Ref Range    Special Requests: NO SPECIAL REQUESTS      Culture result: NO GROWTH 1 DAY     PROTEIN URINE, RANDOM    Collection Time: 20 12:30 PM   Result Value Ref Range    Protein, urine random 101 (H) <11.9 mg/dL   CREATININE, UR, RANDOM    Collection Time: 20 12:30 PM   Result Value Ref Range    Creatinine, urine 262.00 (H) 30 - 125 mg/dL   SODIUM, UR, RANDOM    Collection Time: 20 12:30 PM   Result Value Ref Range    Sodium,urine random 42 20 - 110 MMOL/L   URINE MICROSCOPIC ONLY    Collection Time: 20 12:30 PM   Result Value Ref Range    WBC 1 to 3 0 - 4 /hpf    RBC 10 to 12 0 - 5 /hpf    Epithelial cells FEW 0 - 5 /lpf    Bacteria FEW (A) NEG /hpf   METABOLIC PANEL, BASIC    Collection Time: 02/14/20  3:05 PM   Result Value Ref Range    Sodium 134 (L) 136 - 145 mmol/L    Potassium 5.8 (H) 3.5 - 5.5 mmol/L    Chloride 107 100 - 111 mmol/L    CO2 19 (L) 21 - 32 mmol/L    Anion gap 8 3.0 - 18 mmol/L    Glucose 160 (H) 74 - 99 mg/dL    BUN 21 (H) 7.0 - 18 MG/DL    Creatinine 2.31 (H) 0.6 - 1.3 MG/DL    BUN/Creatinine ratio 9 (L) 12 - 20      GFR est AA 34 (L) >60 ml/min/1.73m2    GFR est non-AA 28 (L) >60 ml/min/1.73m2    Calcium 7.2 (L) 8.5 - 10.1 MG/DL   LACTIC ACID    Collection Time: 02/14/20  3:05 PM   Result Value Ref Range    Lactic acid 2.8 (HH) 0.4 - 2.0 MMOL/L   MAGNESIUM    Collection Time: 02/14/20  3:05 PM   Result Value Ref Range    Magnesium 1.6 1.6 - 2.6 mg/dL   GLUCOSE, POC    Collection Time: 02/14/20  4:05 PM   Result Value Ref Range    Glucose (POC) 157 (H) 70 - 110 mg/dL   POC G3    Collection Time: 02/14/20  5:59 PM   Result Value Ref Range    Device: NASAL CANNULA      Flow rate (POC) 2 L/M    FIO2 (POC) 28 %    pH (POC) 7.331 (L) 7.35 - 7.45      pCO2 (POC) 35.1 35.0 - 45.0 MMHG    pO2 (POC) 62 (L) 80 - 100 MMHG    HCO3 (POC) 18.6 (L) 22 - 26 MMOL/L    sO2 (POC) 90 (L) 92 - 97 %    Base deficit (POC) 7 mmol/L    Allens test (POC) YES      Total resp. rate 20      Site RIGHT RADIAL      Patient temp.  98.0      Specimen type (POC) ARTERIAL      Performed by Tomás Huber, POC    Collection Time: 02/14/20  9:11 PM   Result Value Ref Range    Glucose (POC) 172 (H) 70 - 110 mg/dL   LACTIC ACID    Collection Time: 02/14/20 11:28 PM   Result Value Ref Range    Lactic acid 2.4 (HH) 0.4 - 2.0 MMOL/L   CBC WITH AUTOMATED DIFF    Collection Time: 02/15/20  2:20 AM   Result Value Ref Range    WBC 26.4 (H) 4.6 - 13.2 K/uL    RBC 4.56 (L) 4.70 - 5.50 M/uL    HGB 13.8 13.0 - 16.0 g/dL    HCT 41.8 36.0 - 48.0 %    MCV 91.7 74.0 - 97.0 FL    MCH 30.3 24.0 - 34.0 PG    MCHC 33.0 31.0 - 37.0 g/dL    RDW 15.0 (H) 11.6 - 14.5 %    PLATELET 766 252 - 867 K/uL    MPV 10.6 9.2 - 11.8 FL    NEUTROPHILS 84 (H) 42 - 75 %    BAND NEUTROPHILS 11 (H) 0 - 5 %    LYMPHOCYTES 3 (L) 20 - 51 %    MONOCYTES 2 2 - 9 %    EOSINOPHILS 0 0 - 5 %    BASOPHILS 0 0 - 3 %    ABS. NEUTROPHILS 25.1 (H) 1.8 - 8.0 K/UL    ABS. LYMPHOCYTES 0.8 0.8 - 3.5 K/UL    ABS. MONOCYTES 0.5 0 - 1.0 K/UL    ABS. EOSINOPHILS 0.0 0.0 - 0.4 K/UL    ABS. BASOPHILS 0.0 0.0 - 0.06 K/UL    DF AUTOMATED      PLATELET COMMENTS ADEQUATE PLATELETS      RBC COMMENTS NORMOCYTIC, NORMOCHROMIC     METABOLIC PANEL, BASIC    Collection Time: 02/15/20  2:20 AM   Result Value Ref Range    Sodium 138 136 - 145 mmol/L    Potassium 5.5 3.5 - 5.5 mmol/L    Chloride 108 100 - 111 mmol/L    CO2 20 (L) 21 - 32 mmol/L    Anion gap 10 3.0 - 18 mmol/L    Glucose 150 (H) 74 - 99 mg/dL    BUN 25 (H) 7.0 - 18 MG/DL    Creatinine 2.70 (H) 0.6 - 1.3 MG/DL    BUN/Creatinine ratio 9 (L) 12 - 20      GFR est AA 29 (L) >60 ml/min/1.73m2    GFR est non-AA 24 (L) >60 ml/min/1.73m2    Calcium 7.0 (L) 8.5 - 10.1 MG/DL   MAGNESIUM    Collection Time: 02/15/20  2:20 AM   Result Value Ref Range    Magnesium 1.5 (L) 1.6 - 2.6 mg/dL   PHOSPHORUS    Collection Time: 02/15/20  2:20 AM   Result Value Ref Range    Phosphorus 3.2 2.5 - 4.9 MG/DL   POC G3    Collection Time: 02/15/20  3:29 AM   Result Value Ref Range    Device: BIPAP      FIO2 (POC) 40 %    pH (POC) 7.305 (L) 7.35 - 7.45      pCO2 (POC) 33.7 (L) 35.0 - 45.0 MMHG    pO2 (POC) 73 (L) 80 - 100 MMHG    HCO3 (POC) 16.8 (L) 22 - 26 MMOL/L    sO2 (POC) 93 92 - 97 %    Base deficit (POC) 10 mmol/L    PEEP/CPAP (POC) 4 cmH2O    PIP (POC) 8      Pressure support 4 cmH2O    Allens test (POC) YES      Total resp.  rate 33      Site RIGHT RADIAL      Specimen type (POC) ARTERIAL      Performed by Nathalie Alonzo     Spontaneous timed YES     LACTIC ACID    Collection Time: 02/15/20  4:54 AM   Result Value Ref Range    Lactic acid 2.1 (HH) 0.4 - 2.0 MMOL/L   CALCIUM, IONIZED    Collection Time: 02/15/20  4:54 AM   Result Value Ref Range    Ionized Calcium 0.84 (L) 1.12 - 1.32 MMOL/L   HEPATIC FUNCTION PANEL    Collection Time: 02/15/20  4:54 AM   Result Value Ref Range    Protein, total 7.2 6.4 - 8.2 g/dL    Albumin 3.0 (L) 3.4 - 5.0 g/dL    Globulin 4.2 (H) 2.0 - 4.0 g/dL    A-G Ratio 0.7 (L) 0.8 - 1.7      Bilirubin, total 0.4 0.2 - 1.0 MG/DL    Bilirubin, direct 0.1 0.0 - 0.2 MG/DL    Alk.  phosphatase 70 45 - 117 U/L    AST (SGOT) 83 (H) 10 - 38 U/L    ALT (SGPT) 33 16 - 61 U/L   GLUCOSE, POC    Collection Time: 02/15/20  6:57 AM   Result Value Ref Range    Glucose (POC) 191 (H) 70 - 110 mg/dL       Signed By: Frantz Mann MD     February 15, 2020

## 2020-02-15 NOTE — PROGRESS NOTES
[facility]  Pulmonary, Critical Care, and Sleep Medicine    Name: Lola Beckett MRN: [id number]   : 1950 Hospital: [facility]   Date: 2/15/2020        Critical Care Consult      IMPRESSION:   · Acute Hypoxic Respiratory Failure - Currently requiring intermittent BiPAP, likely multifactorial, s/p ex lap for open repair of incisional hernia with mesh on 20 with Dr. Rodger Milan. Concern for developing ARDS vs CAP vs Atelectasis. CXR without evidence of fluid overload, however does appear to have diffuse groundglass opacities bilateral.   · Ileus - 2/2 below. Progressively worsening over last 24-48 hrs. Pt now without flatus; no BM and increasing abdominal distention and pain. NGT placed for decompression however without much output. KUB shows multiple loops of dilated bowel. · JOSE ANTONIO - Interval worsening over past 24 hrs. Decreasing UOP with Cr rising. Nephrology following. Suspect ATN, s/p hypovolemia in a post-op abdominal pt. · Hyperkalemia - Currently resolved, s/p Kayexalate x2 doses. · SIRS with Leukocytosis - Likely multifactorial in setting of post-op abdominal surgery. Interval worsening with increasing abdominal pain and distention. LA continues to trend down. · POD # 3 - s/p Open repair of incisional hernia with mesh on 20 with Dr. Rodger Milan. · DM II   · Hx of multiple abdominal surgeries in he past  · HX of Throat Ca, s/p 1 vocal cord removed in      Patient Active Problem List   Diagnosis Code    Debility R53.81    Leukocytosis D72.829    Intractable low back pain M54.5    Colon polyps K63.5    Hemorrhoids K64.9    Diverticulitis K57.92    Incisional hernia K43.2      RECOMMENDATIONS:   · Resp: Titrate FiO2/ supp O2 for SpO2 >93%; continue current BiPAP - okay to give breaks for Nausea. Closely monitor respiratory status for decompensation.  Low threshold to intubate - concern pt is going into ARDS, 2/2 post-op status from hernia repair. Trend CXR and ABG based on clinical status. Optimize bronchial hygiene. Con't Duo-nebs q4 and albuterol nebs PRN. IS when able. Consider D/C steroids as pt doesn't have hx of COPD and can prolong healing post-op. · I/D: Trend temp & WBC curve. Continue post-op ABX per surgery - Flagyl and Levaquin. Trend LA until normal. Closely monitor for s/s of evolving sepsis. · Hem/Onc: Daily CBC; H/H, and plts are stable. Monitor s/s for active bleeding. · CVS: HD stable, no pressors. Goal MAP >65mmHg. Monitor tachycardia - suspect 2/2 pain and current respiratory status and increasing abdominal distention. · Metabolic: Daily CMP, Mag and Phos; monitor e-lytes; replace PRN. Check Ionized Ca++ this AM.   · Renal: Trend Renal indices; paez in place. Closely monitor UOP as it continues to decline. Appreciate Nephrology recs'. · Endocrine: POC Glucose AC/HS. SSI. No active issues. · GI: SUP, Trend LFTs, Zofran PRN for N/V. NGT to LIWS, closely monitor output and abdominal distention. Check bladder pressures q shift for now. General Surgery following and aware of current abdominal status. Serial KUB to monitor ileus. NPO. Recommend CT ABD/PELV when pt is stable. Suggest large bowel decompression or trial of Reglan or pyridostigmine to aid in bowel decompression. Current clinical picture very concerning for risk of perforation. · Musc/Skin: No acute issues  · Neuro: Pain meds per Surgeon. Continue PRN Dilaudid IVP while on BiPAP. PRN Percocet if able to PO (when not on BiPAP). · Fluids: Albumin 5% 25g bolus; PRN IVF boluses thereafter - suspect pt is intravascularly dry however with worsening respiratory status and concern for development of fluid overload, judicious use of IVF.   · Code Status: Full     Best Practices/Safety Bundles:  · Sepsis Bundle per Hospital Protocol  · Glycemic control; avoid Hypoglycemia  · IHI ICU Bundles:  ·  Paez Bundle Followed    · Mech Vent patients/ Pulmonary pts:   · VAP bundle, Aim to keep peak plateau pressure 52-99AN H2O  · Aspiration Precautions - HOB >30'  · Daily sedation holiday as indicated  · SBT as tolerated/appropriate  · Stress ulcer prophylaxis: Protonix BID   · DVT prophylaxis: SQH  · Need for Lines, paez assessed. Subjective/History: This patient has been seen and evaluated at the request of Dr. Amena Elizabeth for worsening Respiratory Distress, POD #3 for open hernia repair with interval development of post-op Ileus    02/15/20    Patient is a 77 y.o. male with PMH of HTN, DM II, diverticulitis, cancer of the throat, status post single vocal cord resection (2008), who presented to ICU on 02/15/20 c/o worsening respiratory status, increasing abdominal distention, and transient hyperkalemia throughout the day. Per chart patient had an open hernia repair with Dr. Aemna Elizabeth on 2/13/2020, where ANGELI drain x2 and wound VAC were placed. Patient was originally admitted to surgical floor postop. Over the last couple days, patient has had increasing abdominal distention and pain, has not had a BM, and has now not passed gas x24 hours. Per chart, respiratory status has been slowly declining over the past 24 to 48 hours. Patient was started on duo nebs and Solu-Medrol for possible COPD exacerbation, however patient has no known smoking history. Earlier today (02/14/2020) patient was given Kayexalate x2 for hyperkalemia, without bowel movement. Dr. Amena Elizabeth had contacted ICU intensivist, Dr. Nicole Rock, regarding patient's worsening respiratory status, where BiPAP was suggested due to atelectasis from abdominal surgery and increased abdominal distention. At that time, plan was to have patient evaluated by ICU PA, and to upgrade patient to stepdown unit for intermittent BiPAP use and risk for decompensation. Upon patient evaluation, patient was found to have notable increased work of breathing and in mild respiratory distress, with tachypnea and 3-4 word sentences.   Patient is currently on 5LPM NC via salter. Wife is currently at bedside. Patient has notable abdominal distention which per patient has been increasing throughout the day. Also of note patient has had decreasing urine output throughout the day with Mckeon in place. Patient currently denies any chest pain, palpitations, nausea, vomiting, diarrhea, dysuria, headache, fever/chills. Patient does have some abdominal pain, appropriate for postop procedure. After discussions with on-call general surgeon, and ICU intensivist, decision was made to upgrade patient to ICU status for high risk of decompensation given current clinical picture. CXR was ordered, NG tube was placed for bowel decompression, and patient was placed on BiPAP. KUB was also ordered, and showed multiple dilated loops of bowel. General surgeon (Dr. Gabriela Ram again contacted and made aware, plan remain for NG decompression and to obtain abdominal pressures. Per Dr. Morena Mar, patient is too soon postop to have bowel obstruction, maintains this is an ileus. Upon arrival to ICU, patient continued to have abdominal distention, but did note subjective improvement while on BiPAP, regarding his respiratory status and improved SOB. Patient remains tachycardic in the 120s and throughout the early morning did begin to complain of increased pain to his left flank, and back area, which was relieved with PRN Dilaudid IVP. Subsequent ABG revealed metabolic acidosis without compensation while patient was on BiPAP. Of note lactic acid continues to trend down, most recent LA: 2.1 this a.m. Abdominal pressures were obtained overnight, which were 26. Patient did develop some nausea early this morning and was given a dose of Zofran and removed from BiPAP and placed on Vapotherm.           Past Medical History:   Diagnosis Date    Cancer [facility]) 2008     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 and colon cancer in polyp    Chest pain, unspecified     Diabetes ([facility] [address] under control, weight controlled    Essential hypertension, benign     no more meds    GERD (gastroesophageal reflux disease)     Glaucoma     Nonspecific abnormal electrocardiogram (ECG) (EKG)         Past Surgical History:   Procedure Laterality Date    CLOSE ENTEROSTOMY,RESEC+ANAST N/A 07/05/2019    Dr. Suzanne Mann COLONOSCOPY N/A 11/12/2018    COLONOSCOPY with Polypectomies, Bx's, Injection, Tattooing & Clip Placement x 3 performed by Cheryl Murillo MD at [address]  11/12/2018         Grow  11/12/2018         ENDOSCOPIC MUCOSAL RESECT  11/12/2018         EXPLORATORY OF ABDOMEN N/A 12/11/2018    Dr. Husain Aw N/A 5/8/2019    SIGMOIDOSCOPY FLEXIBLE with polypectomy performed by Jhoan Talbert MD at [facility] ENDOSCOPY    HX COLONOSCOPY      HX GI      robotic sigmoid colectomy complicated by small anastomotic leak, status post diverting ileostomy    HX GI      reversal ileostomy    HX HEENT  2008    Throat Ca - 1 vocal chord removed     HX HEENT Bilateral 1970    eye surgery muscles    HX TRACHEOSTOMY  2008    LAP,SURG,COLECTOMY, PARTIAL, W/ANAST N/A 12/04/2018    Dr. Laura Horta        Prior to Admission medications    Medication Sig Start Date End Date Taking? Authorizing Provider   omega 3-DHA-EPA-fish oil (FISH OIL) 1,000 mg (120 mg-180 mg) capsule Take 1 Cap by mouth daily. Yes Provider, Historical   oxyCODONE-acetaminophen (PERCOCET) 5-325 mg per tablet Take 1-2 Tabs by mouth every four (4) hours as needed. Max Daily Amount: 12 Tabs. Patient taking differently: Take 1-2 Tabs by mouth every four (4) hours as needed for Pain. 1/10/19  Yes Jhoan Talbert MD   multivitamin, tx-iron-ca-min (THERA-M W/ IRON) 9 mg iron-400 mcg tab tablet Take 1 Tab by mouth daily. Yes Provider, Historical   latanoprost (XALATAN) 0.005 % ophthalmic solution Administer 1 Drop to both eyes nightly.    Yes Provider, Historical beclomethasone dipropionate (QNASL) 80 mcg/actuation HFAA 80 mcg by Nasal route daily. Directions on at home label are as follows: Use 2 sprays in each Nostril Daily   Yes Sarah Bey MD   metFORMIN (GLUCOPHAGE) 1,000 mg tablet Take 1,000 mg by mouth two (2) times daily (with meals). do not take morning of surgery (12/4/180. Yes Provider, Historical   losartan (COZAAR) 50 mg tablet Take 50 mg by mouth daily. Yes Provider, Historical   nebivolol (BYSTOLIC) 5 mg tablet Take 5 mg by mouth daily. Indications: Sinus Tachycardia   Yes Provider, Historical   omeprazole (PRILOSEC) 20 mg capsule Take 20 mg by mouth daily. Yes Provider, Historical   Dexlansoprazole 60 mg CpDB Take 1 Cap by mouth every evening. Yes Provider, Historical   calcium-cholecalciferol, d3, 600-125 mg-unit tab Take 1 Tab by mouth daily. Yes Provider, Historical   IRON, FERROUS SULFATE, PO Take 325 mg by mouth every Monday, Wednesday, Friday.  3 times a week    Yes Provider, Historical       Current Facility-Administered Medications   Medication Dose Route Frequency    insulin lispro (HUMALOG) injection   SubCUTAneous AC&HS    nebivolol (BYSTOLIC) tablet 10 mg  10 mg Oral DAILY    nystatin (MYCOSTATIN) 100,000 unit/mL oral suspension 500,000 Units  500,000 Units Oral QID    albuterol-ipratropium (DUO-NEB) 2.5 MG-0.5 MG/3 ML  3 mL Nebulization Q4H RT    methylPREDNISolone (PF) (Solu-MEDROL) injection 40 mg  40 mg IntraVENous Q8H    0.9% sodium chloride infusion  999 mL/hr IntraVENous ONCE    furosemide (LASIX) injection 20 mg  20 mg IntraVENous ONCE    0.9% sodium chloride infusion  150 mL/hr IntraVENous CONTINUOUS    sodium chloride (NS) flush 5-40 mL  5-40 mL IntraVENous Q8H    heparin (porcine) injection 5,000 Units  5,000 Units SubCUTAneous Q8H    gabapentin (NEURONTIN) capsule 100 mg  100 mg Oral TID    metroNIDAZOLE (FLAGYL) IVPB premix 500 mg  500 mg IntraVENous Q8H    levoFLOXacin (LEVAQUIN) 500 mg in D5W IVPB 500 mg IntraVENous Q24H    pantoprazole (PROTONIX) tablet 40 mg  40 mg Oral BID    ferrous sulfate tablet 325 mg  325 mg Oral Q MON, WED & FRI    latanoprost (XALATAN) 0.005 % ophthalmic solution 1 Drop  1 Drop Both Eyes QHS    polyethylene glycol (MIRALAX) packet 17 g  17 g Oral DAILY    docusate sodium (COLACE) capsule 100 mg  100 mg Oral DAILY    lactobacillus sp. 50 billion cpu (BIO-K PLUS) capsule 1 Cap  1 Cap Oral DAILY       No Known Allergies     Social History     Tobacco Use    Smoking status: Former Smoker     Last attempt to quit: 2008     Years since quittin.2    Smokeless tobacco: Never Used   Substance Use Topics    Alcohol use: No        History reviewed. No pertinent family history. Review of Systems:  Pertinent items are noted in HPI. Objective:   Vital Signs:    Visit Vitals  BP (!) 144/93 (BP Patient Position: At rest)   Pulse (!) 121   Temp 97.9 °F (36.6 °C)   Resp 28   Ht 5' 10\" (1.778 m)   Wt 81.6 kg (180 lb)   SpO2 93%   BMI 25.83 kg/m²       O2 Device: Nasal cannula   O2 Flow Rate (L/min): 5 l/min   Temp (24hrs), Av.2 °F (36.8 °C), Min:97.6 °F (36.4 °C), Max:98.7 °F (37.1 °C)       Intake/Output:   Last shift:       1901 - 02/15 0700  In: 622.5 [P.O.:120; I.V.:502.5]  Out: 175 [Urine:125; Drains:50]  Last 3 shifts:  07 -  1900  In: 6125 [P. O.:560; I.V.:3245]  Out: 1680 [Urine:1085; Drains:495]    Intake/Output Summary (Last 24 hours) at 2/15/2020 0047  Last data filed at 2020 2233  Gross per 24 hour   Intake 1207.5 ml   Output 1185 ml   Net 22.5 ml       Physical Exam:     General:  AOx3, cooperative, mild respiratory distress with increased WOB   Head:  Normocephalic, without obvious abnormality, atraumatic. Eyes:  Conjunctivae/corneas clear. PERRL,   Nose: Nares normal. Septum midline. Mucosa normal. No drainage or sinus tenderness.    Throat: Lips, mucosa, and tongue normal. Teeth and gums normal.   Neck: Supple, symmetrical, trachea midline, no adenopathy, no carotid bruit and no JVD. Lungs:   Symmetrical chest rise; good AE bilat; CTAB; no wheezes/rhonchi/rales noted. Heart:  RRR, S1, S2 normal, no m/r/g   Abdomen:   Soft, non-tender. Bowel sounds normal. No masses,  No organomegaly. Extremities: Extremities normal, atraumatic, no cyanosis or edema. Pulses: 2+ and symmetric all extremities. Skin: Skin color, texture, turgor normal. No rashes or lesions   Neurologic: Grossly nonfocal   Devices: PIV's, Mckeon       Data:     Recent Results (from the past 24 hour(s))   METABOLIC PANEL, BASIC    Collection Time: 02/14/20  5:08 AM   Result Value Ref Range    Sodium 135 (L) 136 - 145 mmol/L    Potassium 5.3 3.5 - 5.5 mmol/L    Chloride 107 100 - 111 mmol/L    CO2 21 21 - 32 mmol/L    Anion gap 7 3.0 - 18 mmol/L    Glucose 151 (H) 74 - 99 mg/dL    BUN 20 (H) 7.0 - 18 MG/DL    Creatinine 1.94 (H) 0.6 - 1.3 MG/DL    BUN/Creatinine ratio 10 (L) 12 - 20      GFR est AA 42 (L) >60 ml/min/1.73m2    GFR est non-AA 35 (L) >60 ml/min/1.73m2    Calcium 7.6 (L) 8.5 - 10.1 MG/DL   MAGNESIUM    Collection Time: 02/14/20  5:08 AM   Result Value Ref Range    Magnesium 1.2 (L) 1.6 - 2.6 mg/dL   PHOSPHORUS    Collection Time: 02/14/20  5:08 AM   Result Value Ref Range    Phosphorus 2.3 (L) 2.5 - 4.9 MG/DL   CBC WITH AUTOMATED DIFF    Collection Time: 02/14/20  5:08 AM   Result Value Ref Range    WBC 18.1 (H) 4.6 - 13.2 K/uL    RBC 4.60 (L) 4.70 - 5.50 M/uL    HGB 13.3 13.0 - 16.0 g/dL    HCT 40.8 36.0 - 48.0 %    MCV 88.7 74.0 - 97.0 FL    MCH 28.9 24.0 - 34.0 PG    MCHC 32.6 31.0 - 37.0 g/dL    RDW 14.7 (H) 11.6 - 14.5 %    PLATELET 443 765 - 085 K/uL    MPV 9.9 9.2 - 11.8 FL    NEUTROPHILS 83 (H) 42 - 75 %    BAND NEUTROPHILS 1 0 - 5 %    LYMPHOCYTES 8 (L) 20 - 51 %    MONOCYTES 8 2 - 9 %    EOSINOPHILS 0 0 - 5 %    BASOPHILS 0 0 - 3 %    ABS. NEUTROPHILS 15.3 (H) 1.8 - 8.0 K/UL    ABS. LYMPHOCYTES 1.4 0.8 - 3.5 K/UL    ABS.  MONOCYTES 1.4 (H) 0 - 1.0 K/UL ABS. EOSINOPHILS 0.0 0.0 - 0.4 K/UL    ABS.  BASOPHILS 0.0 0.0 - 0.06 K/UL    DF MANUAL      PLATELET COMMENTS ADEQUATE PLATELETS      RBC COMMENTS NORMOCYTIC, NORMOCHROMIC     HEMOGLOBIN A1C WITH EAG    Collection Time: 02/14/20  5:08 AM   Result Value Ref Range    Hemoglobin A1c 7.7 (H) 4.2 - 5.6 %    Est. average glucose 174 mg/dL   GLUCOSE, POC    Collection Time: 02/14/20 12:04 PM   Result Value Ref Range    Glucose (POC) 207 (H) 70 - 110 mg/dL   URINALYSIS W/ RFLX MICROSCOPIC    Collection Time: 02/14/20 12:30 PM   Result Value Ref Range    Color YELLOW      Appearance CLEAR      Specific gravity 1.020 1.005 - 1.030      pH (UA) 5.0 5.0 - 8.0      Protein 100 (A) NEG mg/dL    Glucose NEGATIVE  NEG mg/dL    Ketone TRACE (A) NEG mg/dL    Bilirubin NEGATIVE  NEG      Blood MODERATE (A) NEG      Urobilinogen 0.2 0.2 - 1.0 EU/dL    Nitrites NEGATIVE  NEG      Leukocyte Esterase TRACE (A) NEG     PROTEIN URINE, RANDOM    Collection Time: 02/14/20 12:30 PM   Result Value Ref Range    Protein, urine random 101 (H) <11.9 mg/dL   CREATININE, UR, RANDOM    Collection Time: 02/14/20 12:30 PM   Result Value Ref Range    Creatinine, urine 262.00 (H) 30 - 125 mg/dL   SODIUM, UR, RANDOM    Collection Time: 02/14/20 12:30 PM   Result Value Ref Range    Sodium,urine random 42 20 - 110 MMOL/L   URINE MICROSCOPIC ONLY    Collection Time: 02/14/20 12:30 PM   Result Value Ref Range    WBC 1 to 3 0 - 4 /hpf    RBC 10 to 12 0 - 5 /hpf    Epithelial cells FEW 0 - 5 /lpf    Bacteria FEW (A) NEG /hpf   METABOLIC PANEL, BASIC    Collection Time: 02/14/20  3:05 PM   Result Value Ref Range    Sodium 134 (L) 136 - 145 mmol/L    Potassium 5.8 (H) 3.5 - 5.5 mmol/L    Chloride 107 100 - 111 mmol/L    CO2 19 (L) 21 - 32 mmol/L    Anion gap 8 3.0 - 18 mmol/L    Glucose 160 (H) 74 - 99 mg/dL    BUN 21 (H) 7.0 - 18 MG/DL    Creatinine 2.31 (H) 0.6 - 1.3 MG/DL    BUN/Creatinine ratio 9 (L) 12 - 20      GFR est AA 34 (L) >60 ml/min/1.73m2    GFR est non-AA 28 (L) >60 ml/min/1.73m2    Calcium 7.2 (L) 8.5 - 10.1 MG/DL   LACTIC ACID    Collection Time: 02/14/20  3:05 PM   Result Value Ref Range    Lactic acid 2.8 (HH) 0.4 - 2.0 MMOL/L   MAGNESIUM    Collection Time: 02/14/20  3:05 PM   Result Value Ref Range    Magnesium 1.6 1.6 - 2.6 mg/dL   GLUCOSE, POC    Collection Time: 02/14/20  4:05 PM   Result Value Ref Range    Glucose (POC) 157 (H) 70 - 110 mg/dL   GLUCOSE, POC    Collection Time: 02/14/20  9:11 PM   Result Value Ref Range    Glucose (POC) 172 (H) 70 - 110 mg/dL   LACTIC ACID    Collection Time: 02/14/20 11:28 PM   Result Value Ref Range    Lactic acid 2.4 (HH) 0.4 - 2.0 MMOL/L           No results for input(s): FIO2I, IFO2, HCO3I, IHCO3, HCOPOC, PCO2I, PCOPOC, IPHI, PHI, PHPOC, PO2I, PO2POC in the last 72 hours. No lab exists for component: IPOC2    Telemetry:normal sinus rhythm, Sinus tach    Imaging:  I have personally reviewed the patients radiographs and have reviewed the reports:    CXR [date]:  Pending    XR ABD [02/15/20]:  Pending          Total of  60   min critical care time spent at bedside during the course of care providing evaluation,management and care decisions and ordering appropriate treatment related to critical care problems exclusive of procedures. The reason for providing this level of medical care for this critically ill patient was due a critical illness that impaired one or more vital organ systems such that there was a high probability of imminent or life threatening deterioration in the patients condition. This care involved high complexity decision making to assess, manipulate, and support vital system functions, to treat this degree vital organ system failure and to prevent further life threatening deterioration of the patients condition.     Emma Ahmadi PA-C  02/15/20      Pulmonary Critical Care Medicine  [address] [place] [address] [facility]

## 2020-02-16 ENCOUNTER — APPOINTMENT (OUTPATIENT)
Dept: GENERAL RADIOLOGY | Age: 70
DRG: 353 | End: 2020-02-16
Attending: NURSE PRACTITIONER
Payer: MEDICARE

## 2020-02-16 LAB
ANION GAP SERPL CALC-SCNC: 7 MMOL/L (ref 3–18)
BASOPHILS # BLD: 0 K/UL (ref 0–0.06)
BASOPHILS NFR BLD: 0 % (ref 0–3)
BUN SERPL-MCNC: 35 MG/DL (ref 7–18)
BUN/CREAT SERPL: 15 (ref 12–20)
CALCIUM SERPL-MCNC: 7.8 MG/DL (ref 8.5–10.1)
CHLORIDE SERPL-SCNC: 114 MMOL/L (ref 100–111)
CO2 SERPL-SCNC: 21 MMOL/L (ref 21–32)
CREAT SERPL-MCNC: 2.41 MG/DL (ref 0.6–1.3)
DIFFERENTIAL METHOD BLD: ABNORMAL
EOSINOPHIL # BLD: 0 K/UL (ref 0–0.4)
EOSINOPHIL NFR BLD: 0 % (ref 0–5)
ERYTHROCYTE [DISTWIDTH] IN BLOOD BY AUTOMATED COUNT: 14.9 % (ref 11.6–14.5)
GLUCOSE BLD STRIP.AUTO-MCNC: 112 MG/DL (ref 70–110)
GLUCOSE BLD STRIP.AUTO-MCNC: 129 MG/DL (ref 70–110)
GLUCOSE BLD STRIP.AUTO-MCNC: 133 MG/DL (ref 70–110)
GLUCOSE BLD STRIP.AUTO-MCNC: 135 MG/DL (ref 70–110)
GLUCOSE BLD STRIP.AUTO-MCNC: 151 MG/DL (ref 70–110)
GLUCOSE SERPL-MCNC: 142 MG/DL (ref 74–99)
HCT VFR BLD AUTO: 36.1 % (ref 36–48)
HGB BLD-MCNC: 11.9 G/DL (ref 13–16)
LYMPHOCYTES # BLD: 1.5 K/UL (ref 0.8–3.5)
LYMPHOCYTES NFR BLD: 6 % (ref 20–51)
MCH RBC QN AUTO: 29.6 PG (ref 24–34)
MCHC RBC AUTO-ENTMCNC: 33 G/DL (ref 31–37)
MCV RBC AUTO: 89.8 FL (ref 74–97)
MONOCYTES # BLD: 2.2 K/UL (ref 0–1)
MONOCYTES NFR BLD: 9 % (ref 2–9)
NEUTS BAND NFR BLD MANUAL: 1 % (ref 0–5)
NEUTS SEG # BLD: 20.6 K/UL (ref 1.8–8)
NEUTS SEG NFR BLD: 84 % (ref 42–75)
PLATELET # BLD AUTO: 226 K/UL (ref 135–420)
PLATELET COMMENTS,PCOM: ABNORMAL
PMV BLD AUTO: 10.2 FL (ref 9.2–11.8)
POTASSIUM SERPL-SCNC: 5 MMOL/L (ref 3.5–5.5)
RBC # BLD AUTO: 4.02 M/UL (ref 4.7–5.5)
RBC MORPH BLD: ABNORMAL
RBC MORPH BLD: ABNORMAL
SODIUM SERPL-SCNC: 142 MMOL/L (ref 136–145)
WBC # BLD AUTO: 24.3 K/UL (ref 4.6–13.2)

## 2020-02-16 PROCEDURE — 74011250636 HC RX REV CODE- 250/636: Performed by: FAMILY MEDICINE

## 2020-02-16 PROCEDURE — 74011250636 HC RX REV CODE- 250/636

## 2020-02-16 PROCEDURE — 74018 RADEX ABDOMEN 1 VIEW: CPT

## 2020-02-16 PROCEDURE — 74011250636 HC RX REV CODE- 250/636: Performed by: NURSE PRACTITIONER

## 2020-02-16 PROCEDURE — C9113 INJ PANTOPRAZOLE SODIUM, VIA: HCPCS | Performed by: NURSE PRACTITIONER

## 2020-02-16 PROCEDURE — 36415 COLL VENOUS BLD VENIPUNCTURE: CPT

## 2020-02-16 PROCEDURE — 74011000250 HC RX REV CODE- 250: Performed by: HOSPITALIST

## 2020-02-16 PROCEDURE — 74011250636 HC RX REV CODE- 250/636: Performed by: SURGERY

## 2020-02-16 PROCEDURE — 94762 N-INVAS EAR/PLS OXIMTRY CONT: CPT

## 2020-02-16 PROCEDURE — 77010033711 HC HIGH FLOW OXYGEN

## 2020-02-16 PROCEDURE — 74011250637 HC RX REV CODE- 250/637: Performed by: NURSE PRACTITIONER

## 2020-02-16 PROCEDURE — 94668 MNPJ CHEST WALL SBSQ: CPT

## 2020-02-16 PROCEDURE — P9047 ALBUMIN (HUMAN), 25%, 50ML: HCPCS

## 2020-02-16 PROCEDURE — 85025 COMPLETE CBC W/AUTO DIFF WBC: CPT

## 2020-02-16 PROCEDURE — 80048 BASIC METABOLIC PNL TOTAL CA: CPT

## 2020-02-16 PROCEDURE — 74011000250 HC RX REV CODE- 250: Performed by: NURSE PRACTITIONER

## 2020-02-16 PROCEDURE — 94640 AIRWAY INHALATION TREATMENT: CPT

## 2020-02-16 PROCEDURE — 94669 MECHANICAL CHEST WALL OSCILL: CPT

## 2020-02-16 PROCEDURE — 74011636637 HC RX REV CODE- 636/637: Performed by: PHYSICIAN ASSISTANT

## 2020-02-16 PROCEDURE — 65660000000 HC RM CCU STEPDOWN

## 2020-02-16 PROCEDURE — 74011250636 HC RX REV CODE- 250/636: Performed by: HOSPITALIST

## 2020-02-16 PROCEDURE — P9047 ALBUMIN (HUMAN), 25%, 50ML: HCPCS | Performed by: FAMILY MEDICINE

## 2020-02-16 PROCEDURE — 82962 GLUCOSE BLOOD TEST: CPT

## 2020-02-16 RX ORDER — METOPROLOL TARTRATE 5 MG/5ML
2.5 INJECTION INTRAVENOUS EVERY 6 HOURS
Status: DISCONTINUED | OUTPATIENT
Start: 2020-02-16 | End: 2020-02-18

## 2020-02-16 RX ORDER — ALBUMIN HUMAN 250 G/1000ML
12.5 SOLUTION INTRAVENOUS ONCE
Status: COMPLETED | OUTPATIENT
Start: 2020-02-16 | End: 2020-02-16

## 2020-02-16 RX ADMIN — Medication 10 ML: at 23:09

## 2020-02-16 RX ADMIN — METOPROLOL TARTRATE 2.5 MG: 5 INJECTION INTRAVENOUS at 17:33

## 2020-02-16 RX ADMIN — OXYCODONE HYDROCHLORIDE AND ACETAMINOPHEN 1 TABLET: 5; 325 TABLET ORAL at 03:15

## 2020-02-16 RX ADMIN — HYDROMORPHONE HYDROCHLORIDE 1 MG: 1 INJECTION, SOLUTION INTRAMUSCULAR; INTRAVENOUS; SUBCUTANEOUS at 08:16

## 2020-02-16 RX ADMIN — HEPARIN SODIUM 5000 UNITS: 5000 INJECTION INTRAVENOUS; SUBCUTANEOUS at 03:11

## 2020-02-16 RX ADMIN — HYDROMORPHONE HYDROCHLORIDE 1 MG: 1 INJECTION, SOLUTION INTRAMUSCULAR; INTRAVENOUS; SUBCUTANEOUS at 03:15

## 2020-02-16 RX ADMIN — NYSTATIN 500000 UNITS: 500000 SUSPENSION ORAL at 23:08

## 2020-02-16 RX ADMIN — IPRATROPIUM BROMIDE AND ALBUTEROL SULFATE 3 ML: .5; 3 SOLUTION RESPIRATORY (INHALATION) at 23:35

## 2020-02-16 RX ADMIN — Medication 10 ML: at 14:47

## 2020-02-16 RX ADMIN — HYDROMORPHONE HYDROCHLORIDE 1 MG: 1 INJECTION, SOLUTION INTRAMUSCULAR; INTRAVENOUS; SUBCUTANEOUS at 23:17

## 2020-02-16 RX ADMIN — WATER 2 G: 1 INJECTION INTRAMUSCULAR; INTRAVENOUS; SUBCUTANEOUS at 08:16

## 2020-02-16 RX ADMIN — WATER 2 G: 1 INJECTION INTRAMUSCULAR; INTRAVENOUS; SUBCUTANEOUS at 23:08

## 2020-02-16 RX ADMIN — Medication 10 ML: at 07:03

## 2020-02-16 RX ADMIN — LATANOPROST 1 DROP: 50 SOLUTION OPHTHALMIC at 23:15

## 2020-02-16 RX ADMIN — OXYCODONE HYDROCHLORIDE AND ACETAMINOPHEN 1 TABLET: 5; 325 TABLET ORAL at 20:54

## 2020-02-16 RX ADMIN — IPRATROPIUM BROMIDE AND ALBUTEROL SULFATE 3 ML: .5; 3 SOLUTION RESPIRATORY (INHALATION) at 03:21

## 2020-02-16 RX ADMIN — HEPARIN SODIUM 5000 UNITS: 5000 INJECTION INTRAVENOUS; SUBCUTANEOUS at 08:15

## 2020-02-16 RX ADMIN — INSULIN LISPRO 3 UNITS: 100 INJECTION, SOLUTION INTRAVENOUS; SUBCUTANEOUS at 01:08

## 2020-02-16 RX ADMIN — METRONIDAZOLE 500 MG: 500 INJECTION, SOLUTION INTRAVENOUS at 03:12

## 2020-02-16 RX ADMIN — NYSTATIN 500000 UNITS: 500000 SUSPENSION ORAL at 17:33

## 2020-02-16 RX ADMIN — IPRATROPIUM BROMIDE AND ALBUTEROL SULFATE 3 ML: .5; 3 SOLUTION RESPIRATORY (INHALATION) at 07:46

## 2020-02-16 RX ADMIN — IPRATROPIUM BROMIDE AND ALBUTEROL SULFATE 3 ML: .5; 3 SOLUTION RESPIRATORY (INHALATION) at 00:00

## 2020-02-16 RX ADMIN — SODIUM CHLORIDE 75 ML/HR: 900 INJECTION, SOLUTION INTRAVENOUS at 07:00

## 2020-02-16 RX ADMIN — METOPROLOL TARTRATE 2.5 MG: 5 INJECTION INTRAVENOUS at 13:06

## 2020-02-16 RX ADMIN — PANTOPRAZOLE SODIUM 40 MG: 40 INJECTION, POWDER, FOR SOLUTION INTRAVENOUS at 08:15

## 2020-02-16 RX ADMIN — NYSTATIN 500000 UNITS: 500000 SUSPENSION ORAL at 13:05

## 2020-02-16 RX ADMIN — SODIUM CHLORIDE 75 ML/HR: 900 INJECTION, SOLUTION INTRAVENOUS at 20:53

## 2020-02-16 RX ADMIN — IPRATROPIUM BROMIDE AND ALBUTEROL SULFATE 3 ML: .5; 3 SOLUTION RESPIRATORY (INHALATION) at 19:29

## 2020-02-16 RX ADMIN — METOPROLOL TARTRATE 2.5 MG: 5 INJECTION INTRAVENOUS at 23:18

## 2020-02-16 RX ADMIN — HYDROMORPHONE HYDROCHLORIDE 1 MG: 1 INJECTION, SOLUTION INTRAMUSCULAR; INTRAVENOUS; SUBCUTANEOUS at 13:06

## 2020-02-16 RX ADMIN — HYDROMORPHONE HYDROCHLORIDE 1 MG: 1 INJECTION, SOLUTION INTRAMUSCULAR; INTRAVENOUS; SUBCUTANEOUS at 17:48

## 2020-02-16 RX ADMIN — IPRATROPIUM BROMIDE AND ALBUTEROL SULFATE 3 ML: .5; 3 SOLUTION RESPIRATORY (INHALATION) at 13:29

## 2020-02-16 RX ADMIN — PANTOPRAZOLE SODIUM 40 MG: 40 INJECTION, POWDER, FOR SOLUTION INTRAVENOUS at 17:34

## 2020-02-16 RX ADMIN — METRONIDAZOLE 500 MG: 500 INJECTION, SOLUTION INTRAVENOUS at 17:20

## 2020-02-16 RX ADMIN — ALBUMIN HUMAN 12.5 G: 250 SOLUTION INTRAVENOUS at 16:00

## 2020-02-16 RX ADMIN — HEPARIN SODIUM 5000 UNITS: 5000 INJECTION INTRAVENOUS; SUBCUTANEOUS at 23:16

## 2020-02-16 RX ADMIN — HEPARIN SODIUM 5000 UNITS: 5000 INJECTION INTRAVENOUS; SUBCUTANEOUS at 17:09

## 2020-02-16 RX ADMIN — IPRATROPIUM BROMIDE AND ALBUTEROL SULFATE 3 ML: .5; 3 SOLUTION RESPIRATORY (INHALATION) at 16:27

## 2020-02-16 NOTE — ROUTINE PROCESS
Bedside and Verbal shift change report given to Bridgette Briseno RN (oncoming nurse) by Toño Gallardo RN (offgoing nurse). Report included the following information SBAR, Intake/Output, MAR, Recent Results, Cardiac Rhythm ST/sr and Quality Measures.

## 2020-02-16 NOTE — PROGRESS NOTES
Physical therapy orders received, chart reviewed, nursing consulted. Pt's resting HR is 119 bpm and abdomen very distended. Pt is in a great deal of discomfort and was not willing to participate in physical therapy at this time. Will re-attempt as pt's medical status improves.         Thank you for this referral.      Gary Ashford, PT , DPT

## 2020-02-16 NOTE — PROGRESS NOTES
[address] [place] [address] [facility]  Pulmonary, Critical Care, and Sleep Medicine    Name: Romelia Huerta MRN: [id number]   : 1950 Hospital: [address] [place] [address]   Date: 2020        Critical Care Progress Note      IMPRESSION:   · Acute Hypoxic Respiratory Failure - Currently weaned to Vapotherm / Devin Donley. S/p intermittent BiPAP briefly. Likely multifactorial, s/p ex lap for open repair of incisional hernia with mesh on 20 with Dr. Blanche Meek. Concern for developing ARDS vs  Atelectasis vs CAP. CXR without evidence of fluid overload, however does appear to have diffuse groundglass opacities bilateral, likely 2/2 to extreme abdominal distention. · Ileus - 2/2 below. High risk for Perforation. Acute worsening over last 24-48 hrs. Pt now without flatus; no BM and increasing abdominal distention and pain. NGT placed for decompression however without much output. KUB shows multiple loops of dilated bowel. · JOSE ANTONIO - Interval worsening over past 24 hrs. Decreasing UOP with Cr rising. Nephrology following. Suspect ATN, s/p hypovolemia in a post-op abdominal pt. · Hyperkalemia - Currently resolved, s/p Kayexalate x2 doses. · SIRS with Leukocytosis - Likely multifactorial in setting of post-op abdominal surgery. Interval worsening with increasing abdominal pain and distention. LA continues to trend down. · POD # 3 - s/p Open repair of incisional hernia with mesh on 20 with Dr. Blanche Meek. · DM II   · Hx of multiple abdominal surgeries in he past  · HX of Throat Ca, s/p 1 vocal cord removed in      Patient Active Problem List   Diagnosis Code    Debility R53.81    Leukocytosis D72.829    Intractable low back pain M54.5    Colon polyps K63.5    Hemorrhoids K64.9    Diverticulitis K57.92    Incisional hernia K43.2      RECOMMENDATIONS:   · Resp: Titrate FiO2/ supp O2 for SpO2 >93%. Low threshold to intubate - Trend CXR and ABG based on clinical status. Optimize bronchial hygiene. Con't Duo-nebs q4 and albuterol nebs PRN. IS when able. D/C steroids as pt doesn't have hx of COPD and can prolong healing post-op. · I/D: Trend temp & WBC curve. Continue post-op ABX per surgery - Flagyl and Levaquin. Trend LA until normal. Closely monitor for s/s of evolving sepsis. · Hem/Onc: Daily CBC; H/H, and plts are stable. Monitor s/s for active bleeding. · CVS: HD stable, no pressors. Goal MAP >65mmHg. Monitor tachycardia - suspect 2/2 pain and current respiratory status and increasing abdominal distention. · Metabolic: Daily CMP, Mag and Phos; monitor e-lytes; replace PRN. · Renal: Trend Renal indices; paez in place. Closely monitor UOP as it continues to decline. Appreciate Nephrology recs'. · Endocrine: POC Glucose AC/HS. SSI. No active issues. · GI: SUP, Trend LFTs, Zofran PRN for N/V. NGT to LIWS, closely monitor output and abdominal distention. Check bladder pressures q shift for now. General Surgery & GI following. Serial KUB to monitor ileus. NPO. Recommend CT ABD/PELV when pt is stable. FMS placed for decompression. Consider trial of Reglan or pyridostigmine to aid in bowel decompression. Pt at high risk of perforation. · Musc/Skin: No acute issues  · Neuro: Pain meds per Surgeon. Continue PRN Dilaudid IVP while on BiPAP. PRN Percocet if able to PO (when not on BiPAP). · Fluids: Albumin 5% 25g bolus; NS at 75cc/hr  · Code Status: Full  · Discussed with Dr. Ngoc Gunter Practices/Safety Bundles:  · Sepsis Bundle per Hospital Protocol  · Glycemic control; avoid Hypoglycemia  · IHI ICU Bundles:  ·  Paez Bundle Followed    · Mech Vent patients/ Pulmonary pts:   · VAP bundle, Aim to keep peak plateau pressure 47-96DA H2O  · Aspiration Precautions - HOB >30'  · Daily sedation holiday as indicated  · SBT as tolerated/appropriate  · Stress ulcer prophylaxis: Protonix BID   · DVT prophylaxis: SQH  · Need for Lines, paez assessed. Subjective/History:      This patient has been seen and evaluated at the request of Dr. Aiden Moeller for worsening Respiratory Distress, POD #3 for open hernia repair with interval development of post-op Ileus    02/16/20    Patient is a 77 y.o. male with PMH of HTN, DM II, diverticulitis, cancer of the throat, status post single vocal cord resection (2008), who presented to ICU on 02/15/20 c/o worsening respiratory status, increasing abdominal distention, and transient hyperkalemia throughout the day. Per chart patient had an open hernia repair with Dr. Aiden Moeller on 2/13/2020, where ANGELI drain x2 and wound VAC were placed. Patient was originally admitted to surgical floor postop. Hospital course complicated with progressive worsening respiratory distress and increasing abdominal distention leading to need for BiPAP, NGT placement for decompression and upgrade to ICU status on 02/15. Interval Hyperkalemia was treated as well. General surgery has been following along. Abdominal distention continues to worsen despite FMS placement. Abd pressures were taken upon arrival to ICU on 02/15 and were 26. GI was subsequently consulted for additional recs' for bowel decompression. LA continues to downtrend. Over the last couple days, patient has had increasing abdominal distention and pain, has not had a BM, and has now not passed gas x24 hours. Per chart, respiratory status has been slowly declining over the past 24 to 48 hours. Patient was started on duo nebs and Solu-Medrol for possible COPD exacerbation, however patient has no known smoking history. Earlier today (02/14/2020) patient was given Kayexalate x2 for hyperkalemia, without bowel movement. Dr. Aiden Moeller had contacted ICU intensivist, Dr. Shakira Henriquez, regarding patient's worsening respiratory status, where BiPAP was suggested due to atelectasis from abdominal surgery and increased abdominal distention.   At that time, plan was to have patient evaluated by ICU PA, and to upgrade patient to stepdown unit for intermittent BiPAP use and risk for decompensation. 02/16/20:    - No new events overnight  - Vapotherm overnight, respiratory status stable  - Pt remains AOx3; pain is controlled; No BM or flatus  - HD stable  - Afebrile; leukocytosis - WBC 24.3  - Decreasing UOP, Nephrology aware      Past Medical History:   Diagnosis Date    Cancer [facility]) 2008     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 and colon cancer in polyp    Chest pain, unspecified     Diabetes ([address]     under control, weight controlled    Essential hypertension, benign     no more meds    GERD (gastroesophageal reflux disease)     Glaucoma     Nonspecific abnormal electrocardiogram (ECG) (EKG)         Past Surgical History:   Procedure Laterality Date    CLOSE ENTEROSTOMY,RESEC+ANAST N/A 07/05/2019    Dr. Naseem Lai COLONOSCOPY N/A 11/12/2018    COLONOSCOPY with Polypectomies, Bx's, Injection, Tattooing & Clip Placement x 3 performed by Mariya Andrade MD at [facility]  11/12/2018         Nick Lloyd  11/12/2018         ENDOSCOPIC MUCOSAL RESECT  11/12/2018         EXPLORATORY OF ABDOMEN N/A 12/11/2018    Dr. Reid [facility] N/A 5/8/2019    SIGMOIDOSCOPY FLEXIBLE with polypectomy performed by Shira Cobian MD at [facility] ENDOSCOPY    HX COLONOSCOPY      HX GI      robotic sigmoid colectomy complicated by small anastomotic leak, status post diverting ileostomy    HX GI      reversal ileostomy    HX HEENT  2008    Throat Ca - 1 vocal chord removed     HX HEENT Bilateral 1970    eye surgery muscles    HX TRACHEOSTOMY  2008    LAP,SURG,COLECTOMY, PARTIAL, W/ANAST N/A 12/04/2018    Dr. Shields Fails        Prior to Admission medications    Medication Sig Start Date End Date Taking? Authorizing Provider   omega 3-DHA-EPA-fish oil (FISH OIL) 1,000 mg (120 mg-180 mg) capsule Take 1 Cap by mouth daily.    Yes Provider, Historical   oxyCODONE-acetaminophen (PERCOCET) 5-325 mg per tablet Take 1-2 Tabs by mouth every four (4) hours as needed. Max Daily Amount: 12 Tabs. Patient taking differently: Take 1-2 Tabs by mouth every four (4) hours as needed for Pain. 1/10/19  Yes Darby Aguirre MD   multivitamin, tx-iron-ca-min (THERA-M W/ IRON) 9 mg iron-400 mcg tab tablet Take 1 Tab by mouth daily. Yes Provider, Historical   latanoprost (XALATAN) 0.005 % ophthalmic solution Administer 1 Drop to both eyes nightly. Yes Provider, Historical   beclomethasone dipropionate (QNASL) 80 mcg/actuation HFAA 80 mcg by Nasal route daily. Directions on at home label are as follows: Use 2 sprays in each Nostril Daily   Yes Valentino Velazco MD   metFORMIN (GLUCOPHAGE) 1,000 mg tablet Take 1,000 mg by mouth two (2) times daily (with meals). do not take morning of surgery (12/4/180. Yes Provider, Historical   losartan (COZAAR) 50 mg tablet Take 50 mg by mouth daily. Yes Provider, Historical   nebivolol (BYSTOLIC) 5 mg tablet Take 5 mg by mouth daily. Indications: Sinus Tachycardia   Yes Provider, Historical   omeprazole (PRILOSEC) 20 mg capsule Take 20 mg by mouth daily. Yes Provider, Historical   Dexlansoprazole 60 mg CpDB Take 1 Cap by mouth every evening. Yes Provider, Historical   calcium-cholecalciferol, d3, 600-125 mg-unit tab Take 1 Tab by mouth daily. Yes Provider, Historical   IRON, FERROUS SULFATE, PO Take 325 mg by mouth every Monday, Wednesday, Friday.  3 times a week    Yes Provider, Historical       Current Facility-Administered Medications   Medication Dose Route Frequency    insulin lispro (HUMALOG) injection   SubCUTAneous Q6H    metroNIDAZOLE (FLAGYL) IVPB premix 500 mg  500 mg IntraVENous Q12H    cefepime (MAXIPIME) 2 g in sterile water (preservative free) 10 mL IV syringe  2 g IntraVENous Q12H    pantoprazole (PROTONIX) 40 mg in 0.9% sodium chloride 10 mL injection  40 mg IntraVENous BID    nebivolol (BYSTOLIC) tablet 10 mg  10 mg Oral DAILY    nystatin (MYCOSTATIN) 100,000 unit/mL oral suspension 500,000 Units  500,000 Units Oral QID    albuterol-ipratropium (DUO-NEB) 2.5 MG-0.5 MG/3 ML  3 mL Nebulization Q4H RT    0.9% sodium chloride infusion  75 mL/hr IntraVENous CONTINUOUS    sodium chloride (NS) flush 5-40 mL  5-40 mL IntraVENous Q8H    heparin (porcine) injection 5,000 Units  5,000 Units SubCUTAneous Q8H    gabapentin (NEURONTIN) capsule 100 mg  100 mg Oral TID    ferrous sulfate tablet 325 mg  325 mg Oral Q MON, WED & FRI    latanoprost (XALATAN) 0.005 % ophthalmic solution 1 Drop  1 Drop Both Eyes QHS    polyethylene glycol (MIRALAX) packet 17 g  17 g Oral DAILY    docusate sodium (COLACE) capsule 100 mg  100 mg Oral DAILY    lactobacillus sp. 50 billion cpu (BIO-K PLUS) capsule 1 Cap  1 Cap Oral DAILY       No Known Allergies     Social History     Tobacco Use    Smoking status: Former Smoker     Last attempt to quit: 2008     Years since quittin.2    Smokeless tobacco: Never Used   Substance Use Topics    Alcohol use: No        History reviewed. No pertinent family history. Review of Systems:  Pertinent items are noted in HPI. Objective:   Vital Signs:    Visit Vitals  BP (!) 128/103   Pulse (!) 123   Temp 98.4 °F (36.9 °C)   Resp 26   Ht 5' 10\" (1.778 m)   Wt 90.9 kg (200 lb 6.4 oz)   SpO2 93%   BMI 28.75 kg/m²       O2 Device: Hi flow nasal cannula   O2 Flow Rate (L/min): 30 l/min   Temp (24hrs), Av.2 °F (36.8 °C), Min:97.8 °F (36.6 °C), Max:98.4 °F (36.9 °C)       Intake/Output:   Last shift:      No intake/output data recorded. Last 3 shifts:  1901 -  0700  In: 2733.8 [P.O.:120; I.V.:2593.8]  Out: 1204 [Urine:625; Drains:379]    Intake/Output Summary (Last 24 hours) at 2020 0709  Last data filed at 2020 0300  Gross per 24 hour   Intake 381.25 ml   Output 605 ml   Net -223.75 ml       Physical Exam:     General:  AOx3, cooperative, NA   Head:  Normocephalic, without obvious abnormality, atraumatic.    Eyes:  Conjunctivae/corneas clear. PERRL,   Nose: Nares normal. Septum midline. Mucosa normal. No drainage or sinus tenderness. Throat: Lips, mucosa, and tongue normal. Teeth and gums normal.   Neck: Supple, symmetrical, trachea midline, no adenopathy, no carotid bruit and no JVD. Lungs:   Symmetrical chest rise; good AE bilat; CTAB; course with mild diffuse wheezes,  No rhonchi/rales noted. Heart:  RRR, S1, S2 normal, no m/r/g   Abdomen:   Abdomen severely distended and firm, notably pushing up onto diaphragm. Tympanic. Mildly tender to palpitation. Exam limited 2/2 abdominal binder in place. Midline incision noted with wound vac in place. ANGELI drain x2   with moderate serosanguineous drainage. Extremities: Extremities normal, atraumatic, no cyanosis or edema. Pulses: 2+ and symmetric all extremities.    Skin: Skin color, texture, turgor normal. No rashes or lesions   Neurologic: Grossly nonfocal   Devices: PIV's, Mckeon       Data:     Recent Results (from the past 24 hour(s))   CULTURE, RESPIRATORY/SPUTUM/BRONCH W GRAM STAIN    Collection Time: 02/15/20 10:10 AM   Result Value Ref Range    Special Requests: NO SPECIAL REQUESTS      GRAM STAIN RARE WBCS SEEN      GRAM STAIN FEW EPITHELIAL CELLS SEEN      GRAM STAIN RARE GRAM POSITIVE COCCI IN PAIRS      Culture result: PENDING    GLUCOSE, POC    Collection Time: 02/15/20 11:32 AM   Result Value Ref Range    Glucose (POC) 165 (H) 70 - 110 mg/dL   LACTIC ACID    Collection Time: 02/15/20 12:33 PM   Result Value Ref Range    Lactic acid 1.5 0.4 - 2.0 MMOL/L   LACTIC ACID    Collection Time: 02/15/20  4:40 PM   Result Value Ref Range    Lactic acid 1.4 0.4 - 2.0 MMOL/L   GLUCOSE, POC    Collection Time: 02/15/20  5:48 PM   Result Value Ref Range    Glucose (POC) 163 (H) 70 - 110 mg/dL   CALCIUM, IONIZED    Collection Time: 02/15/20  7:04 PM   Result Value Ref Range    Ionized Calcium 0.94 (L) 1.12 - 7.60 MMOL/L   METABOLIC PANEL, BASIC    Collection Time: 02/15/20  7:04 PM   Result Value Ref Range    Sodium 147 (H) 136 - 145 mmol/L    Potassium 4.9 3.5 - 5.5 mmol/L    Chloride 111 100 - 111 mmol/L    CO2 20 (L) 21 - 32 mmol/L    Anion gap 16 3.0 - 18 mmol/L    Glucose 170 (H) 74 - 99 mg/dL    BUN 32 (H) 7.0 - 18 MG/DL    Creatinine 2.45 (H) 0.6 - 1.3 MG/DL    BUN/Creatinine ratio 13 12 - 20      GFR est AA 32 (L) >60 ml/min/1.73m2    GFR est non-AA 26 (L) >60 ml/min/1.73m2    Calcium 7.2 (L) 8.5 - 10.1 MG/DL   GLUCOSE, POC    Collection Time: 02/16/20 12:37 AM   Result Value Ref Range    Glucose (POC) 151 (H) 70 - 110 mg/dL   CBC WITH AUTOMATED DIFF    Collection Time: 02/16/20 12:56 AM   Result Value Ref Range    WBC 24.3 (H) 4.6 - 13.2 K/uL    RBC 4.02 (L) 4.70 - 5.50 M/uL    HGB 11.9 (L) 13.0 - 16.0 g/dL    HCT 36.1 36.0 - 48.0 %    MCV 89.8 74.0 - 97.0 FL    MCH 29.6 24.0 - 34.0 PG    MCHC 33.0 31.0 - 37.0 g/dL    RDW 14.9 (H) 11.6 - 14.5 %    PLATELET 945 118 - 114 K/uL    MPV 10.2 9.2 - 11.8 FL    NEUTROPHILS 84 (H) 42 - 75 %    BAND NEUTROPHILS 1 0 - 5 %    LYMPHOCYTES 6 (L) 20 - 51 %    MONOCYTES 9 2 - 9 %    EOSINOPHILS 0 0 - 5 %    BASOPHILS 0 0 - 3 %    ABS. NEUTROPHILS 20.6 (H) 1.8 - 8.0 K/UL    ABS. LYMPHOCYTES 1.5 0.8 - 3.5 K/UL    ABS. MONOCYTES 2.2 (H) 0 - 1.0 K/UL    ABS. EOSINOPHILS 0.0 0.0 - 0.4 K/UL    ABS.  BASOPHILS 0.0 0.0 - 0.06 K/UL    DF MANUAL      PLATELET COMMENTS ADEQUATE PLATELETS      RBC COMMENTS ANISOCYTOSIS  1+        RBC COMMENTS HYPOCHROMIA  1+       METABOLIC PANEL, BASIC    Collection Time: 02/16/20 12:56 AM   Result Value Ref Range    Sodium 142 136 - 145 mmol/L    Potassium 5.0 3.5 - 5.5 mmol/L    Chloride 114 (H) 100 - 111 mmol/L    CO2 21 21 - 32 mmol/L    Anion gap 7 3.0 - 18 mmol/L    Glucose 142 (H) 74 - 99 mg/dL    BUN 35 (H) 7.0 - 18 MG/DL    Creatinine 2.41 (H) 0.6 - 1.3 MG/DL    BUN/Creatinine ratio 15 12 - 20      GFR est AA 33 (L) >60 ml/min/1.73m2    GFR est non-AA 27 (L) >60 ml/min/1.73m2    Calcium 7.8 (L) 8.5 - 10.1 MG/DL   GLUCOSE, POC Collection Time: 02/16/20  6:54 AM   Result Value Ref Range    Glucose (POC) 135 (H) 70 - 110 mg/dL           Recent Labs     02/15/20  0329 02/14/20  1759   FIO2I 40 28   HCO3I 16.8* 18.6*   PCO2I 33.7* 35.1   PHI 7.305* 7.331*   PO2I 73* 62*       Telemetry:normal sinus rhythm, Sinus tach    Imaging:  I have personally reviewed the patients radiographs and have reviewed the reports:    CXR [02/15/20]: FINDINGS:  Lung volumes are again low with crowding of the perihilar bronchovascular structures. Perihilar/infrahilar streaky and band shaped opacities similar to prior, likely atelectasis, infiltrate could not be excluded. No evidence of pneumothorax. The costophrenic sulci appear sharp.       The cardiac silhouette is within normal limits in size.     Esophagogastric tube is now present with tip and distal side-port projected over the stomach proximally. Tip of catheter resembling surgical drain is again noted projected slightly left of midline over the upper abdomen. Mild diffuse prominence of bowel gas is again noted in the upper abdomen.     EKG leads/wires and other catheter tubing overlie the patient. IMPRESSION:      No significant interval change in aeration compared to the preceding radiograph as described. Low lung volumes. Streaky and band shaped perihilar/infrahilar opacities, likely atelectasis, infiltrate could not be excluded. Otherwise as above. XR ABD [02/15/20]: FINDINGS:     Multiple dilated gas-filled loops of small bowel. Moderately abundant gas diffusely in the colon and stomach. Suspect for postoperative ileus given recent surgery, though early/partial bowel obstruction cannot be excluded radiographically. Clinical and radiographic follow-up might be helpful.  Grossly similar appearance on prior CT of 12/30/2019 and radiographs of 12/14/2018.       Catheter tubing resembling surgical drains is seen overlying the abdomen with tips projected over the upper abdomen close to midline and the left lower quadrant. Esophagogastric tube tip and distal side-port project over the stomach proximally. EKG leads/wires overlie the patient.     IMPRESSION:  Dilated loops of gas-filled small bowel in addition to moderate diffuse prominence of gas in the colon and stomach. Suspect for postoperative ileus given recent surgery, though early/partial bowel obstruction cannot be excluded radiographically, clinical and radiographic follow-up might be helpful          Total of  60   min critical care time spent at bedside during the course of care providing evaluation,management and care decisions and ordering appropriate treatment related to critical care problems exclusive of procedures. The reason for providing this level of medical care for this critically ill patient was due a critical illness that impaired one or more vital organ systems such that there was a high probability of imminent or life threatening deterioration in the patients condition. This care involved high complexity decision making to assess, manipulate, and support vital system functions, to treat this degree vital organ system failure and to prevent further life threatening deterioration of the patients condition.     Cherelle Patterson PA-C  02/16/20      Pulmonary Critical Care Medicine  [facility]

## 2020-02-16 NOTE — PROGRESS NOTES
[facility]  Progress Note    Patient: Eleni Allison Age: 77 y.o. : 1950 MR#: [id number] SSN: [id number]  Date: 2020       Subjective:     Patient feels slightly better. Still feels abdomen is tight and discomfort. passing flatus but no bowel movements. Overnight events noted    Assessment/Plan:   1. Incisional hernia s/p open repair of incisional hernia with mesh  2. Post-op ileus with abdominal distention  3. SIRS: Tachycardia, leukocytosis, unclear source ? Intra abd source vs aspiration   4. Dehydration with third spacing of fluids  5. ARF on CKD stage II  6. Lactic acidosis, improved  7. Hypomagnesemia  8. Hyper kalemia  9. DMT2. a1c 7.7  10 declines holding per. HTN  11. GERD  12. History of throat cancer  13. History of intractable low back pain    Plan  1. Continue NG tube to low wall suctioning, plan per general surgeon Dr. Sandra Cantrell, cont rectal tube, bowel rest and n.p.o. for now. 2. Will cont cefepime and metronidazole to cover intra-abdominal sources and also possibility of aspiration. 3. Continue with IS, deep breathing exercises, splinting. 4. Continue wound vac, abdominal binder, ANGELI drains  5. Cont duoneb and high flow O2 per PCCM  6. Nephrology on case. Monitor renal function. Cont Mckeon. I and O's  7. Cont SSI, diabetes management  8. Cont Heparin, SCD, Protonix  D/w pt  D/w RN  Patient's primary attending is Dr. Chito Chacon, ICU took over the patient now. We will sign off on this patient, please call us when patient will be transferred out of ICU.       Case discussed with:  [x]Patient  [x]Family  [x]Nursing  []Case Management  DVT Prophylaxis:  []Lovenox  [x]Hep SQ  []SCDs  []Coumadin   []On Heparin gtt    Objective:   VS:   Visit Vitals  BP (!) 128/103   Pulse (!) 123   Temp 98.4 °F (36.9 °C)   Resp 26   Ht 5' 10\" (1.778 m)   Wt 90.9 kg (200 lb 6.4 oz)   SpO2 95%   BMI 28.75 kg/m²      Tmax/24hrs: Temp (24hrs), Av.2 °F (36.8 °C), Min:97.8 °F (36.6 °C), Max:98.4 °F (36.9 °C)      Intake/Output Summary (Last 24 hours) at 2/16/2020 0940  Last data filed at 2/16/2020 0300  Gross per 24 hour   Intake 381.25 ml   Output 605 ml   Net -223.75 ml       General:  Alert, NAD, NG tube in place  Cardiovascular:  RRR, tachycardia  Pulmonary: bilateral coarse breath sounds, no wheezing  GI: abdominal distention, abdominal binder, ANGELI drain, wound vac, very sluggish bowel sounds  Extremities:  No edema  Neuro: AAOx4.  Voice hoarse, moves all extremities      Labs:    Recent Results (from the past 24 hour(s))   CULTURE, RESPIRATORY/SPUTUM/BRONCH W GRAM STAIN    Collection Time: 02/15/20 10:10 AM   Result Value Ref Range    Special Requests: NO SPECIAL REQUESTS      GRAM STAIN RARE WBCS SEEN      GRAM STAIN FEW EPITHELIAL CELLS SEEN      GRAM STAIN RARE GRAM POSITIVE COCCI IN PAIRS      Culture result: PENDING    GLUCOSE, POC    Collection Time: 02/15/20 11:32 AM   Result Value Ref Range    Glucose (POC) 165 (H) 70 - 110 mg/dL   LACTIC ACID    Collection Time: 02/15/20 12:33 PM   Result Value Ref Range    Lactic acid 1.5 0.4 - 2.0 MMOL/L   LACTIC ACID    Collection Time: 02/15/20  4:40 PM   Result Value Ref Range    Lactic acid 1.4 0.4 - 2.0 MMOL/L   GLUCOSE, POC    Collection Time: 02/15/20  5:48 PM   Result Value Ref Range    Glucose (POC) 163 (H) 70 - 110 mg/dL   CALCIUM, IONIZED    Collection Time: 02/15/20  7:04 PM   Result Value Ref Range    Ionized Calcium 0.94 (L) 1.12 - 4.75 MMOL/L   METABOLIC PANEL, BASIC    Collection Time: 02/15/20  7:04 PM   Result Value Ref Range    Sodium 147 (H) 136 - 145 mmol/L    Potassium 4.9 3.5 - 5.5 mmol/L    Chloride 111 100 - 111 mmol/L    CO2 20 (L) 21 - 32 mmol/L    Anion gap 16 3.0 - 18 mmol/L    Glucose 170 (H) 74 - 99 mg/dL    BUN 32 (H) 7.0 - 18 MG/DL    Creatinine 2.45 (H) 0.6 - 1.3 MG/DL    BUN/Creatinine ratio 13 12 - 20      GFR est AA 32 (L) >60 ml/min/1.73m2    GFR est non-AA 26 (L) >60 ml/min/1.73m2 Calcium 7.2 (L) 8.5 - 10.1 MG/DL   GLUCOSE, POC    Collection Time: 02/16/20 12:37 AM   Result Value Ref Range    Glucose (POC) 151 (H) 70 - 110 mg/dL   CBC WITH AUTOMATED DIFF    Collection Time: 02/16/20 12:56 AM   Result Value Ref Range    WBC 24.3 (H) 4.6 - 13.2 K/uL    RBC 4.02 (L) 4.70 - 5.50 M/uL    HGB 11.9 (L) 13.0 - 16.0 g/dL    HCT 36.1 36.0 - 48.0 %    MCV 89.8 74.0 - 97.0 FL    MCH 29.6 24.0 - 34.0 PG    MCHC 33.0 31.0 - 37.0 g/dL    RDW 14.9 (H) 11.6 - 14.5 %    PLATELET 500 325 - 023 K/uL    MPV 10.2 9.2 - 11.8 FL    NEUTROPHILS 84 (H) 42 - 75 %    BAND NEUTROPHILS 1 0 - 5 %    LYMPHOCYTES 6 (L) 20 - 51 %    MONOCYTES 9 2 - 9 %    EOSINOPHILS 0 0 - 5 %    BASOPHILS 0 0 - 3 %    ABS. NEUTROPHILS 20.6 (H) 1.8 - 8.0 K/UL    ABS. LYMPHOCYTES 1.5 0.8 - 3.5 K/UL    ABS. MONOCYTES 2.2 (H) 0 - 1.0 K/UL    ABS. EOSINOPHILS 0.0 0.0 - 0.4 K/UL    ABS.  BASOPHILS 0.0 0.0 - 0.06 K/UL    DF MANUAL      PLATELET COMMENTS ADEQUATE PLATELETS      RBC COMMENTS ANISOCYTOSIS  1+        RBC COMMENTS HYPOCHROMIA  1+       METABOLIC PANEL, BASIC    Collection Time: 02/16/20 12:56 AM   Result Value Ref Range    Sodium 142 136 - 145 mmol/L    Potassium 5.0 3.5 - 5.5 mmol/L    Chloride 114 (H) 100 - 111 mmol/L    CO2 21 21 - 32 mmol/L    Anion gap 7 3.0 - 18 mmol/L    Glucose 142 (H) 74 - 99 mg/dL    BUN 35 (H) 7.0 - 18 MG/DL    Creatinine 2.41 (H) 0.6 - 1.3 MG/DL    BUN/Creatinine ratio 15 12 - 20      GFR est AA 33 (L) >60 ml/min/1.73m2    GFR est non-AA 27 (L) >60 ml/min/1.73m2    Calcium 7.8 (L) 8.5 - 10.1 MG/DL   GLUCOSE, POC    Collection Time: 02/16/20  6:54 AM   Result Value Ref Range    Glucose (POC) 135 (H) 70 - 110 mg/dL       Signed By: Chelsey Schaeffer MD     February 16, 2020

## 2020-02-16 NOTE — PROGRESS NOTES
Progress Note    Patient: Romelia Huerta MRN: [id number]  SSN: [id number]    YOB: 1950  Age: 77 y.o. Sex: male      Admit Date: 2/13/2020    LOS: 3 days     Subjective:     Pt reports occas flatus,not much  About the same  No specific complaints    Objective:     Vitals:    02/16/20 1100 02/16/20 1200 02/16/20 1300 02/16/20 1329   BP: 140/88 138/85 156/90    Pulse: (!) 114 (!) 112 (!) 120    Resp: 14 14 24    Temp:  98.1 °F (36.7 °C)     SpO2: 95% 96% 95% 96%   Weight:       Height:            Intake and Output:  Current Shift: 02/16 0701 - 02/16 1900  In: 910 [I.V.:910]  Out: 400 [Urine:400]  Last three shifts: 02/14 1901 - 02/16 0700  In: 2733.8 [P.O.:120;  I.V.:2593.8]  Out: 6696 [Urine:625; Drains:379]    Physical Exam:   Abd remains distended and tympanitic        Lab/Data Review:  Reviewed  BUN/CR 35/2.41    WBC 24.3 (slightly down)  Assessment:     Active Problems:    Incisional hernia (2/13/2020)      Postop ileus  Plan:   Appreciate intensivist management and Medical notes  Planning to go to stepdown  Will give albumin for urine output and tachycardia  Dr Yi Villasenor to resume management in the AM    Signed By: Rosa Bradley MD     February 16, 2020

## 2020-02-16 NOTE — ROUTINE PROCESS
Received pt in bed watching tV. Voiced no complaints. Abdominal  in place with prevena wound vac to abdomen  on and functioning. pt on 6 liter NC. Call bell within reach. Bed low and locked. ll continue to monitor. 0400 bladder pressure 26  \  Bedside and Verbal shift change report given to [place] (oncoming nurse) by Carlyle Armando RN   (offgoing nurse). Report included the following information SBAR, Kardex, Intake/Output and MAR.

## 2020-02-16 NOTE — PROGRESS NOTES
[email or web address]  [phone]    Gastroenterology follow up-Progress note    Impression:  1. Post op ileus - POD 3 ex lap for open repair of incisional hernia with mesh 2/13/20, NGT in place, abd pressure 26, KUB dilated loops small bowel, moderately diffuse gas in colon/stomach  2. 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 Lucila Olsen. Post op leak that required diverting ileostomy - was reversed 7/2019.   3. Acute respiratory failure - on 6L NC  4. JOSE ANTONIO decreased UOP, nephrology following  5. SIRS with leukocytosis - multifactorial   6. 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 Suspect post op ileus - conservative measures  2. Continue rectal tube  3. Turn every 2 hours  4. Interval KUB for any acute change    Addendum: KUB little if any improvement from prior study. Chief Complaint: abdominal distention      Subjective:  Denies nausea or vomiting, abdominal pain improved, NGT in place, no BM but had flatus last night    ROS: Denies any fevers, chills, rash.      Eyes: conjunctiva normal, EOM normal   Neck: ROM normal, supple and trachea normal   Cardiovascular: heart normal, normal rate and regular rhythm   Pulmonary/Chest Wall: breath sounds normal and effort normal   Abdominal: appearance distended, hypoactive bowel sounds non-tender     Patient Active Problem List   Diagnosis Code    Debility R53.81    Leukocytosis D72.829    Intractable low back pain M54.5    Colon polyps K63.5    Hemorrhoids K64.9    Diverticulitis K57.92    Incisional hernia K43.2         Visit Vitals  BP (!) 128/103   Pulse (!) 123   Temp 98.4 °F (36.9 °C)   Resp 26   Ht 5' 10\" (1.778 m)   Wt 90.9 kg (200 lb 6.4 oz)   SpO2 95%   BMI 28.75 kg/m²           Intake/Output Summary (Last 24 hours) at 2/16/2020 0831  Last data filed at 2/16/2020 0300  Gross per 24 hour   Intake 381.25 ml   Output 605 ml   Net -223.75 ml       CBC w/Diff    Lab Results   Component Value Date/Time    WBC 24.3 (H) 02/16/2020 12:56 AM    RBC 4.02 (L) 02/16/2020 12:56 AM    HGB 11.9 (L) 02/16/2020 12:56 AM    HCT 36.1 02/16/2020 12:56 AM    MCV 89.8 02/16/2020 12:56 AM    MCH 29.6 02/16/2020 12:56 AM    MCHC 33.0 02/16/2020 12:56 AM    RDW 14.9 (H) 02/16/2020 12:56 AM     02/16/2020 12:56 AM    Lab Results   Component Value Date/Time    GRANS 84 (H) 02/16/2020 12:56 AM    LYMPH 6 (L) 02/16/2020 12:56 AM    EOS 0 02/16/2020 12:56 AM    BANDS 1 02/16/2020 12:56 AM    BASOS 0 02/16/2020 12:56 AM    METAS 1 (H) 11/06/2018 03:57 AM      Basic Metabolic Profile   Recent Labs     02/16/20  0056  02/15/20  0220      < > 138   K 5.0   < > 5.5   *   < > 108   CO2 21   < > 20*   BUN 35*   < > 25*   CA 7.8*   < > 7.0*   MG  --   --  1.5*   PHOS  --   --  3.2    < > = values in this interval not displayed. Hepatic Function    Lab Results   Component Value Date/Time    ALB 3.0 (L) 02/15/2020 04:54 AM    TP 7.2 02/15/2020 04:54 AM    AP 70 02/15/2020 04:54 AM    Lab Results   Component Value Date/Time    SGOT 83 (H) 02/15/2020 04:54 AM          Coags   No results for input(s): PTP, INR, APTT, INREXT in the last 72 hours. Luisana James NP    [facility]. [email or web address]  Phone: [phone]  Pager: [phone]

## 2020-02-16 NOTE — ROUTINE PROCESS
Upon assessment . Patient C/O intermitted abd pain mid section and back pain as constent as a 7 out of 10 scale. Patient decrribes also that his belching and passing flatus has decrease over the couple of days. Abd distented and firm. KCL prevena intact. Patient on Hi Flow O2 at 30/40, states his breathing is just the same. 22 rate noted.  and B/P 152/93. O 2 sats at 94-96 %FMS intact and NS infusing at 75 ML/HR in left hand . No sign of infiltration noted.

## 2020-02-16 NOTE — PROGRESS NOTES
RENAL DAILY PROGRESS NOTE    Patient: Mehul Wang               Sex: male          DOA: 2/13/2020  5:32 AM        YOB: 1950      Age:  77 y.o.        LOS:  LOS: 3 days     Subjective:     Mehul Wang is a 77 y.o.  who presents with Incisional hernia [K43.2].    Asked to evaluate for arf,s/p incisional hernia surgery  Chief complains:  - Reviewed last 24 hrs events     Current Facility-Administered Medications   Medication Dose Route Frequency    metoprolol (LOPRESSOR) injection 2.5 mg  2.5 mg IntraVENous Q6H    ondansetron (ZOFRAN) injection 4 mg  4 mg IntraVENous Q6H PRN    insulin lispro (HUMALOG) injection   SubCUTAneous Q6H    metroNIDAZOLE (FLAGYL) IVPB premix 500 mg  500 mg IntraVENous Q12H    cefepime (MAXIPIME) 2 g in sterile water (preservative free) 10 mL IV syringe  2 g IntraVENous Q12H    pantoprazole (PROTONIX) 40 mg in 0.9% sodium chloride 10 mL injection  40 mg IntraVENous BID    albuterol (PROVENTIL VENTOLIN) nebulizer solution 2.5 mg  2.5 mg Nebulization Q4H PRN    sodium chloride (OCEAN) 0.65 % nasal squeeze bottle 2 Spray  2 Spray Both Nostrils Q2H PRN    glucose chewable tablet 16 g  4 Tab Oral PRN    glucagon (GLUCAGEN) injection 1 mg  1 mg IntraMUSCular PRN    dextrose (D50W) injection syrg 12.5-25 g  25-50 mL IntraVENous PRN    oxyCODONE-acetaminophen (PERCOCET) 5-325 mg per tablet 2 Tab  2 Tab Oral Q6H PRN    [Held by provider] nebivolol (BYSTOLIC) tablet 10 mg  10 mg Oral DAILY    benzocaine-menthol (CEPACOL) lozenge 1 Lozenge  1 Lozenge Mucous Membrane PRN    nystatin (MYCOSTATIN) 100,000 unit/mL oral suspension 500,000 Units  500,000 Units Oral QID    phenol throat spray (CHLORASEPTIC) 1 Spray  1 Spray Oral PRN    albuterol-ipratropium (DUO-NEB) 2.5 MG-0.5 MG/3 ML  3 mL Nebulization Q4H RT    albuterol-ipratropium (DUO-NEB) 2.5 MG-0.5 MG/3 ML  3 mL Nebulization Q4H PRN    0.9% sodium chloride infusion  75 mL/hr IntraVENous CONTINUOUS    sodium chloride (NS) flush 5-40 mL  5-40 mL IntraVENous Q8H    sodium chloride (NS) flush 5-40 mL  5-40 mL IntraVENous PRN    heparin (porcine) injection 5,000 Units  5,000 Units SubCUTAneous Q8H    [Held by provider] gabapentin (NEURONTIN) capsule 100 mg  100 mg Oral TID    [Held by provider] ferrous sulfate tablet 325 mg  325 mg Oral Q MON, WED & FRI    latanoprost (XALATAN) 0.005 % ophthalmic solution 1 Drop  1 Drop Both Eyes QHS    acetaminophen (TYLENOL) tablet 650 mg  650 mg Oral Q4H PRN    HYDROmorphone (DILAUDID) injection 0.5 mg  0.5 mg IntraVENous Q4H PRN    oxyCODONE-acetaminophen (PERCOCET) 5-325 mg per tablet 1 Tab  1 Tab Oral Q4H PRN    [Held by provider] polyethylene glycol (MIRALAX) packet 17 g  17 g Oral DAILY    [Held by provider] docusate sodium (COLACE) capsule 100 mg  100 mg Oral DAILY    [Held by provider] lactobacillus sp. 50 billion cpu (BIO-K PLUS) capsule 1 Cap  1 Cap Oral DAILY    HYDROmorphone (DILAUDID) injection 1 mg  1 mg IntraVENous Q3H PRN       Objective:     Visit Vitals  /90   Pulse (!) 120   Temp 98.1 °F (36.7 °C)   Resp 24   Ht 5' 10\" (1.778 m)   Wt 90.9 kg (200 lb 6.4 oz)   SpO2 96%   BMI 28.75 kg/m²       Intake/Output Summary (Last 24 hours) at 2/16/2020 1517  Last data filed at 2/16/2020 1452  Gross per 24 hour   Intake 1291.25 ml   Output 1005 ml   Net 286.25 ml       Physical Examination:       RS: lungs lis rales  CVS: S1-S2 heard, RRR, No S3 / murmur  Abdomen: distended  Extremities: No edema, no cyanosis, skin is warm on touch  CNS: Awake   HEENT: Head is atraumatic, PERRLA, conjunctiva pink & non icteric.  No JVD or carotid bruit       Data Review:      Labs:     Hematology:   Recent Labs     02/16/20  0056 02/15/20  0220 02/14/20  0508 02/13/20  1844   WBC 24.3* 26.4* 18.1* 20.7*   HGB 11.9* 13.8 13.3 13.2   HCT 36.1 41.8 40.8 39.9     Chemistry:   Recent Labs     02/16/20  0056 02/15/20  1904 02/15/20  0454 02/15/20  0220 02/14/20  1505 02/14/20  0508 02/14/20  0022 02/13/20  1844   BUN 35* 32*  --  25* 21* 20*  --  18   CREA 2.41* 2.45*  --  2.70* 2.31* 1.94*  --  1.99*   CA 7.8* 7.2*  --  7.0* 7.2* 7.6*  --  8.1*   ALB  --   --  3.0*  --   --   --   --   --    K 5.0 4.9  --  5.5 5.8* 5.3 6.1* 6.6*    147*  --  138 134* 135*  --  136   * 111  --  108 107 107  --  108   CO2 21 20*  --  20* 19* 21  --  23   PHOS  --   --   --  3.2  --  2.3*  --   --    * 170*  --  150* 160* 151*  --  147*        Images:    XR (Most Recent). CXR reviewed by me and compared with previous CXR Results from Hospital Encounter encounter on 02/13/20   XR ABD (KUB)    Narrative AP abdomen/KUB    CPT CODE: 26155    INDICATION: Above. Colonic distention and ileus. COMPARISON: 02/15/2020    TECHNIQUE: Portable AP supine radiograph of the abdomen is reviewed. FINDINGS:    The upper abdomen/diaphragm is cephalad to the field-of-view bilaterally. Surgical drains remain present. Esophagogastric tube is noted, tip cephalad to  the field-of-view, likely similar to prior based on position of distal side-port  project over the left upper quadrant. Again seen are multiple dilated gas-filled loops of small bowel in addition to  moderately abundant gas diffusely in the colon, similar appearing compared to  prior, suspect for postoperative ileus, cannot rule out obstruction  radiographically, as discussed previously. Impression Impression:     Little, if any, interval change compared to the preceding radiograph. CT (Most Recent) Results from Hospital Encounter encounter on 12/30/19   CT CHEST ABD PELV W CONT    Narrative CT CHEST, ABDOMEN AND PELVIS WITH ENHANCEMENT    INDICATION: Follow-up colon cancer with reversal surgery July 2019.     TECHNIQUE: Axial images obtained of the chest, abdomen and pelvis following the  uneventful administration of 100 cc  Isovue-300 nonionic intravenous contrast.   Coronal and sagittal reformatted images of the abdomen and pelvis were obtained. All CT scans at this facility are performed using dose optimization technique as  appropriate to a performed exam, to include automated exposure control,  adjustment of the mA and/or kV according to patient size (including appropriate  matching first site-specific examinations), or use of iterative reconstruction  technique. COMPARISON: 12/17/2018; 11/9/2018. CHEST FINDINGS:    Thyroid: Unremarkable. Heart: No effusion. Intermittent regions of severe coronary arteriosclerosis,  most significant in the right coronary artery. Vessels: Mild atherosclerosis. Lymph Nodes: Unremarkable. Lung: Mild paraseptal and centrilobular upper lobe predominant emphysematous  changes. Pleura: Unremarkable. ABDOMEN FINDINGS:     Liver: Unremarkable. Spleen: Unremarkable. Pancreas: Pancreatic tail is relatively atrophic with calcific densities. Biliary: Unremarkable. Bowel: Moderate colonic gas and stool burden. There is a new small bowel  containing suprapubic hernia containing a segment of ileum which measures up to  4.2 cm. Small bowel was previously dilated throughout but is now otherwise  normal in caliber. No pneumatosis. No wall thickening. Peritoneum/ Retroperitoneum: There is mild perinephric stranding which is  improved. It is throughout the abdomen and pelvis is improved. Resolved  presacral trace fluid. No free air. Lymph Nodes: Unremarkable. Adrenal Glands: Unremarkable. Kidneys: There are bilateral renal cysts, parapelvic on the left measuring up to  5 cm. The largest cyst in the right kidney is 3 cm in the lower pole. There are  a few too small to characterize lesions. Vessels: Moderate atherosclerosis. Coarse calcification at the renal ostia. Stenosis bilateral common, internal iliac and femoral artery branches. PELVIS FINDINGS:     Bladder/ Pelvic Organs: Unremarkable. Bones/Soft tissues: Narrowing bilateral glenohumeral joints. Degenerative  changes sternoclavicular joints. Lumbar facet hypertrophy and arthropathy. Narrowing bilateral hip joint spaces. Osteopenia. Impression IMPRESSION:    1. No findings of metastatic disease chest, abdomen or pelvis. 2.  New small bowel containing suprapubic hernia at surgical site containing a  dilated segment of small bowel, though there is contrast distally; Partial small  bowel obstruction is possible. No obstruction upstream to the hernia. 3.  Resolved diffuse small bowel dilation. 4.  Improved edema. 5. Intermittent severe coronary arteriosclerosis. Moderate abdominal  atherosclerosis with multifocal regions of stenosis, likely renal ostia, common,  internal iliac and femoral arteries. 6.  Mild emphysema. 7.  Mild sequela of chronic pancreatitis. 8.  Moderate colonic stool burden. EKG No results found for this or any previous visit. I have personally reviewed the old medical records and labs    Plan / Recommendation:      1. Acute/crf ,related to abd compartment syndrome.high bladder pressure. ng and rectal tubes placed to decompress. discussed with his nurse,recheck bladder pressure. continue ivf.renal function no significant change since yesterday  2.adjust antibiotics for renal failure    D/w icu team    Jerry Magdaleno MD  Nephrology  2/16/2020

## 2020-02-16 NOTE — ROUTINE PROCESS
Dr Garcia Bar in to see and examine patient. Plan of care discussed. Patient for possible down grade to stepdown status. No change in condition. Surgeon aware of ABD/bladder pressure reading. No sign of any acute distress noted.

## 2020-02-17 ENCOUNTER — APPOINTMENT (OUTPATIENT)
Dept: GENERAL RADIOLOGY | Age: 70
DRG: 353 | End: 2020-02-17
Attending: NURSE PRACTITIONER
Payer: MEDICARE

## 2020-02-17 PROBLEM — N17.9 ACUTE KIDNEY INJURY (HCC): Status: ACTIVE | Noted: 2020-02-17

## 2020-02-17 PROBLEM — E87.20 METABOLIC ACIDOSIS: Status: ACTIVE | Noted: 2020-02-17

## 2020-02-17 LAB
ANION GAP SERPL CALC-SCNC: 11 MMOL/L (ref 3–18)
BACTERIA SPEC CULT: NORMAL
BASOPHILS # BLD: 0 K/UL (ref 0–0.06)
BASOPHILS NFR BLD: 0 % (ref 0–3)
BUN SERPL-MCNC: 36 MG/DL (ref 7–18)
BUN/CREAT SERPL: 23 (ref 12–20)
CALCIUM SERPL-MCNC: 7.5 MG/DL (ref 8.5–10.1)
CHLORIDE SERPL-SCNC: 117 MMOL/L (ref 100–111)
CO2 SERPL-SCNC: 17 MMOL/L (ref 21–32)
CREAT SERPL-MCNC: 1.58 MG/DL (ref 0.6–1.3)
DIFFERENTIAL METHOD BLD: ABNORMAL
EOSINOPHIL # BLD: 0 K/UL (ref 0–0.4)
EOSINOPHIL NFR BLD: 0 % (ref 0–5)
ERYTHROCYTE [DISTWIDTH] IN BLOOD BY AUTOMATED COUNT: 15 % (ref 11.6–14.5)
GLUCOSE BLD STRIP.AUTO-MCNC: 108 MG/DL (ref 70–110)
GLUCOSE BLD STRIP.AUTO-MCNC: 116 MG/DL (ref 70–110)
GLUCOSE BLD STRIP.AUTO-MCNC: 127 MG/DL (ref 70–110)
GLUCOSE SERPL-MCNC: 110 MG/DL (ref 74–99)
GRAM STN SPEC: NORMAL
HCT VFR BLD AUTO: 35.2 % (ref 36–48)
HGB BLD-MCNC: 11.3 G/DL (ref 13–16)
LYMPHOCYTES # BLD: 1.8 K/UL (ref 0.8–3.5)
LYMPHOCYTES NFR BLD: 9 % (ref 20–51)
MCH RBC QN AUTO: 29.4 PG (ref 24–34)
MCHC RBC AUTO-ENTMCNC: 32.1 G/DL (ref 31–37)
MCV RBC AUTO: 91.4 FL (ref 74–97)
MONOCYTES # BLD: 0.2 K/UL (ref 0–1)
MONOCYTES NFR BLD: 1 % (ref 2–9)
NEUTS BAND NFR BLD MANUAL: 6 % (ref 0–5)
NEUTS SEG # BLD: 17.9 K/UL (ref 1.8–8)
NEUTS SEG NFR BLD: 84 % (ref 42–75)
PHOSPHATE SERPL-MCNC: 2.5 MG/DL (ref 2.5–4.9)
PLATELET # BLD AUTO: 213 K/UL (ref 135–420)
PLATELET COMMENTS,PCOM: ABNORMAL
PMV BLD AUTO: 9.5 FL (ref 9.2–11.8)
POTASSIUM SERPL-SCNC: 4.5 MMOL/L (ref 3.5–5.5)
RBC # BLD AUTO: 3.85 M/UL (ref 4.7–5.5)
RBC MORPH BLD: ABNORMAL
SERVICE CMNT-IMP: NORMAL
SODIUM SERPL-SCNC: 145 MMOL/L (ref 136–145)
WBC # BLD AUTO: 19.9 K/UL (ref 4.6–13.2)

## 2020-02-17 PROCEDURE — 77030013797 HC KT TRNSDUC PRSSR EDWD -A

## 2020-02-17 PROCEDURE — 94668 MNPJ CHEST WALL SBSQ: CPT

## 2020-02-17 PROCEDURE — 94669 MECHANICAL CHEST WALL OSCILL: CPT

## 2020-02-17 PROCEDURE — 97535 SELF CARE MNGMENT TRAINING: CPT

## 2020-02-17 PROCEDURE — 97165 OT EVAL LOW COMPLEX 30 MIN: CPT

## 2020-02-17 PROCEDURE — 74011250637 HC RX REV CODE- 250/637: Performed by: NURSE PRACTITIONER

## 2020-02-17 PROCEDURE — 36415 COLL VENOUS BLD VENIPUNCTURE: CPT

## 2020-02-17 PROCEDURE — 74011250636 HC RX REV CODE- 250/636: Performed by: NURSE PRACTITIONER

## 2020-02-17 PROCEDURE — 82962 GLUCOSE BLOOD TEST: CPT

## 2020-02-17 PROCEDURE — 77010033711 HC HIGH FLOW OXYGEN

## 2020-02-17 PROCEDURE — 85025 COMPLETE CBC W/AUTO DIFF WBC: CPT

## 2020-02-17 PROCEDURE — 74018 RADEX ABDOMEN 1 VIEW: CPT

## 2020-02-17 PROCEDURE — 97162 PT EVAL MOD COMPLEX 30 MIN: CPT

## 2020-02-17 PROCEDURE — 74011000250 HC RX REV CODE- 250: Performed by: NURSE PRACTITIONER

## 2020-02-17 PROCEDURE — 80048 BASIC METABOLIC PNL TOTAL CA: CPT

## 2020-02-17 PROCEDURE — 84100 ASSAY OF PHOSPHORUS: CPT

## 2020-02-17 PROCEDURE — 94640 AIRWAY INHALATION TREATMENT: CPT

## 2020-02-17 PROCEDURE — 74011250636 HC RX REV CODE- 250/636: Performed by: HOSPITALIST

## 2020-02-17 PROCEDURE — 94762 N-INVAS EAR/PLS OXIMTRY CONT: CPT

## 2020-02-17 PROCEDURE — 97530 THERAPEUTIC ACTIVITIES: CPT

## 2020-02-17 PROCEDURE — C9113 INJ PANTOPRAZOLE SODIUM, VIA: HCPCS | Performed by: NURSE PRACTITIONER

## 2020-02-17 PROCEDURE — 74011250636 HC RX REV CODE- 250/636: Performed by: SURGERY

## 2020-02-17 PROCEDURE — 74011000250 HC RX REV CODE- 250: Performed by: HOSPITALIST

## 2020-02-17 PROCEDURE — 65660000000 HC RM CCU STEPDOWN

## 2020-02-17 RX ADMIN — METRONIDAZOLE 500 MG: 500 INJECTION, SOLUTION INTRAVENOUS at 05:00

## 2020-02-17 RX ADMIN — METOPROLOL TARTRATE 2.5 MG: 5 INJECTION INTRAVENOUS at 06:25

## 2020-02-17 RX ADMIN — Medication 10 ML: at 16:24

## 2020-02-17 RX ADMIN — METOPROLOL TARTRATE 2.5 MG: 5 INJECTION INTRAVENOUS at 13:00

## 2020-02-17 RX ADMIN — PANTOPRAZOLE SODIUM 40 MG: 40 INJECTION, POWDER, FOR SOLUTION INTRAVENOUS at 18:36

## 2020-02-17 RX ADMIN — NYSTATIN 500000 UNITS: 500000 SUSPENSION ORAL at 13:04

## 2020-02-17 RX ADMIN — WATER 2 G: 1 INJECTION INTRAMUSCULAR; INTRAVENOUS; SUBCUTANEOUS at 09:03

## 2020-02-17 RX ADMIN — HEPARIN SODIUM 5000 UNITS: 5000 INJECTION INTRAVENOUS; SUBCUTANEOUS at 09:04

## 2020-02-17 RX ADMIN — OXYCODONE HYDROCHLORIDE AND ACETAMINOPHEN 2 TABLET: 5; 325 TABLET ORAL at 05:42

## 2020-02-17 RX ADMIN — METRONIDAZOLE 500 MG: 500 INJECTION, SOLUTION INTRAVENOUS at 16:23

## 2020-02-17 RX ADMIN — IPRATROPIUM BROMIDE AND ALBUTEROL SULFATE 3 ML: .5; 3 SOLUTION RESPIRATORY (INHALATION) at 19:26

## 2020-02-17 RX ADMIN — HEPARIN SODIUM 5000 UNITS: 5000 INJECTION INTRAVENOUS; SUBCUTANEOUS at 16:21

## 2020-02-17 RX ADMIN — Medication 10 ML: at 22:11

## 2020-02-17 RX ADMIN — HYDROMORPHONE HYDROCHLORIDE 1 MG: 1 INJECTION, SOLUTION INTRAMUSCULAR; INTRAVENOUS; SUBCUTANEOUS at 09:26

## 2020-02-17 RX ADMIN — METOPROLOL TARTRATE 2.5 MG: 5 INJECTION INTRAVENOUS at 18:35

## 2020-02-17 RX ADMIN — NYSTATIN 500000 UNITS: 500000 SUSPENSION ORAL at 22:05

## 2020-02-17 RX ADMIN — NYSTATIN 500000 UNITS: 500000 SUSPENSION ORAL at 18:34

## 2020-02-17 RX ADMIN — NYSTATIN 500000 UNITS: 500000 SUSPENSION ORAL at 08:59

## 2020-02-17 RX ADMIN — IPRATROPIUM BROMIDE AND ALBUTEROL SULFATE 3 ML: .5; 3 SOLUTION RESPIRATORY (INHALATION) at 17:10

## 2020-02-17 RX ADMIN — LATANOPROST 1 DROP: 50 SOLUTION OPHTHALMIC at 22:10

## 2020-02-17 RX ADMIN — IPRATROPIUM BROMIDE AND ALBUTEROL SULFATE 3 ML: .5; 3 SOLUTION RESPIRATORY (INHALATION) at 08:59

## 2020-02-17 RX ADMIN — HYDROMORPHONE HYDROCHLORIDE 1 MG: 1 INJECTION, SOLUTION INTRAMUSCULAR; INTRAVENOUS; SUBCUTANEOUS at 13:07

## 2020-02-17 RX ADMIN — HYDROMORPHONE HYDROCHLORIDE 1 MG: 1 INJECTION, SOLUTION INTRAMUSCULAR; INTRAVENOUS; SUBCUTANEOUS at 22:00

## 2020-02-17 RX ADMIN — HYDROMORPHONE HYDROCHLORIDE 1 MG: 1 INJECTION, SOLUTION INTRAMUSCULAR; INTRAVENOUS; SUBCUTANEOUS at 16:20

## 2020-02-17 RX ADMIN — IPRATROPIUM BROMIDE AND ALBUTEROL SULFATE 3 ML: .5; 3 SOLUTION RESPIRATORY (INHALATION) at 23:10

## 2020-02-17 RX ADMIN — WATER 2 G: 1 INJECTION INTRAMUSCULAR; INTRAVENOUS; SUBCUTANEOUS at 22:05

## 2020-02-17 RX ADMIN — HYDROMORPHONE HYDROCHLORIDE 0.5 MG: 1 INJECTION, SOLUTION INTRAMUSCULAR; INTRAVENOUS; SUBCUTANEOUS at 02:57

## 2020-02-17 RX ADMIN — PANTOPRAZOLE SODIUM 40 MG: 40 INJECTION, POWDER, FOR SOLUTION INTRAVENOUS at 09:04

## 2020-02-17 RX ADMIN — Medication 10 ML: at 06:28

## 2020-02-17 NOTE — PROGRESS NOTES
Problem: Deep Venous Thrombosis - Risk of  Goal: *Absence of deep venous thrombosis signs and symptoms(Stroke Metric)  Outcome: Progressing Towards Goal  Goal: *Absence of impaired coagulation signs and symptoms  Outcome: Progressing Towards Goal  Goal: *Knowledge of prescribed medications  Outcome: Progressing Towards Goal  Goal: *Absence of bleeding  Outcome: Progressing Towards Goal     Problem: Patient Education: Go to Patient Education Activity  Goal: Patient/Family Education  Outcome: Progressing Towards Goal     Problem: Falls - Risk of  Goal: *Absence of Falls  Description  Document Hemanth Burgos Fall Risk and appropriate interventions in the flowsheet. Outcome: Progressing Towards Goal  Note: Fall Risk Interventions:  Mobility Interventions: Bed/chair exit alarm, Communicate number of staff needed for ambulation/transfer, OT consult for ADLs, Patient to call before getting OOB, Strengthening exercises (ROM-active/passive)         Medication Interventions: Assess postural VS orthostatic hypotension, Evaluate medications/consider consulting pharmacy, Patient to call before getting OOB, Teach patient to arise slowly    Elimination Interventions: Call light in reach, Toileting schedule/hourly rounds              Problem: Patient Education: Go to Patient Education Activity  Goal: Patient/Family Education  Outcome: Progressing Towards Goal     Problem: Pressure Injury - Risk of  Goal: *Prevention of pressure injury  Description  Document Edin Scale and appropriate interventions in the flowsheet. Outcome: Progressing Towards Goal  Note: Pressure Injury Interventions:  Sensory Interventions: Assess need for specialty bed, Discuss PT/OT consult with provider, Keep linens dry and wrinkle-free, Maintain/enhance activity level, Minimize linen layers, Monitor skin under medical devices, Pressure redistribution bed/mattress (bed type), Turn and reposition approx.  every two hours (pillows and wedges if needed)    Moisture Interventions: Absorbent underpads, Apply protective barrier, creams and emollients, Assess need for specialty bed, Internal/External urinary devices, Internal/External fecal devices, Maintain skin hydration (lotion/cream), Minimize layers, Moisture barrier    Activity Interventions: Assess need for specialty bed, Increase time out of bed, Pressure redistribution bed/mattress(bed type), PT/OT evaluation    Mobility Interventions: Assess need for specialty bed, HOB 30 degrees or less, Pressure redistribution bed/mattress (bed type), PT/OT evaluation, Turn and reposition approx.  every two hours(pillow and wedges)    Nutrition Interventions: Document food/fluid/supplement intake, Discuss nutritional consult with provider    Friction and Shear Interventions: Apply protective barrier, creams and emollients, Foam dressings/transparent film/skin sealants, Minimize layers, Transferring/repositioning devices                Problem: Patient Education: Go to Patient Education Activity  Goal: Patient/Family Education  Outcome: Progressing Towards Goal     Problem: Pain  Goal: *Control of Pain  Outcome: Progressing Towards Goal     Problem: Patient Education: Go to Patient Education Activity  Goal: Patient/Family Education  Outcome: Progressing Towards Goal     Problem: Patient Education: Go to Patient Education Activity  Goal: Patient/Family Education  Outcome: Progressing Towards Goal

## 2020-02-17 NOTE — PROGRESS NOTES
[email or web address]  [phone]    Gastroenterology follow up-Progress note    Impression:  1. Post op ileus - POD 4 ex lap for open repair of incisional hernia with mesh 2/13/20, NGT in place, abd pressure initially 26, KUB dilated loops small bowel, moderately diffuse gas in colon/stomach  2.  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 Sherron Velazco that required diverting ileostomy - was reversed 7/2019.   3. Acute respiratory failure - on 6L NC  4. JOSE ANTONIO decreased UOP, nephrology following  5. SIRS with leukocytosis - multifactorial   6. 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 Suspect post operative ileus; Dr. Erwin Joseph d/w Dr. Og Heading- no need for decompression at this time. 2. Can trial Mestinon or neostigmine if needed  3. Ensure electrolytes like K and Mg are ideal  4. Continue NGT and rectal tube for now  5. KUB pending; await results  6. Turn at least every two hours  7. Medical management per primary team    Chief Complaint: abdominal distension      Subjective:  Reports his abdomen feels tighter than previously. Has rectal tube in place. NGT in place. No N/V.      ROS: Denies any fevers, chills, rash. Eyes: conjunctiva normal, EOM normal   Neck: ROM normal, supple and trachea normal   Cardiovascular: heart normal, intact distal pulses, tachycardia   Pulmonary/Chest Wall: breath sounds normal and effort normal   Abdominal: Distended; hyoactive bowel sounds; non tender. Midline dressing C/D/I.        Patient Active Problem List   Diagnosis Code    Debility R53.81    Leukocytosis D72.829    Intractable low back pain M54.5    Colon polyps K63.5    Hemorrhoids K64.9    Diverticulitis K57.92    Incisional hernia K43.2         Visit Vitals  /87   Pulse (!) 120   Temp 98 °F (36.7 °C)   Resp 21   Ht 5' 10\" (1.778 m)   Wt 90.9 kg (200 lb 6.4 oz)   SpO2 97%   BMI 28.75 kg/m²           Intake/Output Summary (Last 24 hours) at 2/17/2020 1046  Last data filed at 2/17/2020 3517  Gross per 24 hour   Intake 1223.75 ml   Output 1535 ml   Net -311.25 ml       CBC w/Diff    Lab Results   Component Value Date/Time    WBC 19.9 (H) 02/17/2020 02:15 AM    RBC 3.85 (L) 02/17/2020 02:15 AM    HGB 11.3 (L) 02/17/2020 02:15 AM    HCT 35.2 (L) 02/17/2020 02:15 AM    MCV 91.4 02/17/2020 02:15 AM    MCH 29.4 02/17/2020 02:15 AM    MCHC 32.1 02/17/2020 02:15 AM    RDW 15.0 (H) 02/17/2020 02:15 AM     02/17/2020 02:15 AM    Lab Results   Component Value Date/Time    GRANS 84 (H) 02/17/2020 02:15 AM    LYMPH 9 (L) 02/17/2020 02:15 AM    EOS 0 02/17/2020 02:15 AM    BANDS 6 (H) 02/17/2020 02:15 AM    BASOS 0 02/17/2020 02:15 AM    METAS 1 (H) 11/06/2018 03:57 AM      Basic Metabolic Profile   Recent Labs     02/17/20  0215  02/15/20  0220      < > 138   K 4.5   < > 5.5   *   < > 108   CO2 17*   < > 20*   BUN 36*   < > 25*   CA 7.5*   < > 7.0*   MG  --   --  1.5*   PHOS 2.5  --  3.2    < > = values in this interval not displayed. Hepatic Function    Lab Results   Component Value Date/Time    ALB 3.0 (L) 02/15/2020 04:54 AM    TP 7.2 02/15/2020 04:54 AM    AP 70 02/15/2020 04:54 AM    Lab Results   Component Value Date/Time    SGOT 83 (H) 02/15/2020 04:54 AM          Coags   No results for input(s): PTP, INR, APTT, INREXT in the last 72 hours. Sheyla Quigley NP    [facility]. [email or web address]  Phone: [phone]  Pager: [phone]

## 2020-02-17 NOTE — PROGRESS NOTES
Speech Pathology:     Pt currently on NG to suction and inappropriate for PO. Will continue to monitor for SLP follow up.      Thank you for this referral.    Yassine Ochoa M.S. CCC-SLP/L  Speech-Language Pathologist

## 2020-02-17 NOTE — PROGRESS NOTES
RENAL DAILY PROGRESS NOTE            71y M with PMH colon Ca, s/p resection, post insictional hernia, s/p hernia repair with [facility], developed post operative JOSE ANTONIO    Subjective:       Complaint:   Overnight events noted  Admit his belly is getting more distended  Last 24hrs his urine output about 1.2L   Not on any vasopressure    IMPRESSION:   JOSE ANTONIO, post operative , higher abdominal pressure suggestive of compartment syndrome,   Hypernatremia   Metabolic acidosis   Leucocytosis   S/p open repair incisional hernia repair,  Post operative ileus   Post operative respiratory distress. PLAN:    Mr. Bruce Matthews is very higher risk for renal ischemia from abdominal compartment syndrome. Please monitor serial bladder pressure. If bladder pressure more than 20 then need surgery colleague input. Monitor urine output closely. Avoid hypotension. Following closely. Discussed with GI colleague and ICU nursing staff. Addendum;   Distended abdomen, urine output about 450cc since 7am .   Higher bladder pressure this morning. CaseDiscussed with  Surgery colleague Dr. Amelia Ruiz,   Plan to monitor urine output and serial bladder pressure. Following very closely. Discussed with nursing staff.        Current Facility-Administered Medications   Medication Dose Route Frequency    metoprolol (LOPRESSOR) injection 2.5 mg  2.5 mg IntraVENous Q6H    ondansetron (ZOFRAN) injection 4 mg  4 mg IntraVENous Q6H PRN    insulin lispro (HUMALOG) injection   SubCUTAneous Q6H    metroNIDAZOLE (FLAGYL) IVPB premix 500 mg  500 mg IntraVENous Q12H    cefepime (MAXIPIME) 2 g in sterile water (preservative free) 10 mL IV syringe  2 g IntraVENous Q12H    pantoprazole (PROTONIX) 40 mg in 0.9% sodium chloride 10 mL injection  40 mg IntraVENous BID    albuterol (PROVENTIL VENTOLIN) nebulizer solution 2.5 mg  2.5 mg Nebulization Q4H PRN    sodium chloride (OCEAN) 0.65 % nasal squeeze bottle 2 Spray  2 Spray Both Nostrils Q2H PRN  glucose chewable tablet 16 g  4 Tab Oral PRN    glucagon (GLUCAGEN) injection 1 mg  1 mg IntraMUSCular PRN    dextrose (D50W) injection syrg 12.5-25 g  25-50 mL IntraVENous PRN    oxyCODONE-acetaminophen (PERCOCET) 5-325 mg per tablet 2 Tab  2 Tab Oral Q6H PRN    [Held by provider] nebivolol (BYSTOLIC) tablet 10 mg  10 mg Oral DAILY    benzocaine-menthol (CEPACOL) lozenge 1 Lozenge  1 Lozenge Mucous Membrane PRN    nystatin (MYCOSTATIN) 100,000 unit/mL oral suspension 500,000 Units  500,000 Units Oral QID    phenol throat spray (CHLORASEPTIC) 1 Spray  1 Spray Oral PRN    albuterol-ipratropium (DUO-NEB) 2.5 MG-0.5 MG/3 ML  3 mL Nebulization Q4H RT    albuterol-ipratropium (DUO-NEB) 2.5 MG-0.5 MG/3 ML  3 mL Nebulization Q4H PRN    0.9% sodium chloride infusion  75 mL/hr IntraVENous CONTINUOUS    sodium chloride (NS) flush 5-40 mL  5-40 mL IntraVENous Q8H    sodium chloride (NS) flush 5-40 mL  5-40 mL IntraVENous PRN    heparin (porcine) injection 5,000 Units  5,000 Units SubCUTAneous Q8H    [Held by provider] gabapentin (NEURONTIN) capsule 100 mg  100 mg Oral TID    [Held by provider] ferrous sulfate tablet 325 mg  325 mg Oral Q MON, WED & FRI    latanoprost (XALATAN) 0.005 % ophthalmic solution 1 Drop  1 Drop Both Eyes QHS    acetaminophen (TYLENOL) tablet 650 mg  650 mg Oral Q4H PRN    HYDROmorphone (DILAUDID) injection 0.5 mg  0.5 mg IntraVENous Q4H PRN    oxyCODONE-acetaminophen (PERCOCET) 5-325 mg per tablet 1 Tab  1 Tab Oral Q4H PRN    [Held by provider] polyethylene glycol (MIRALAX) packet 17 g  17 g Oral DAILY    [Held by provider] docusate sodium (COLACE) capsule 100 mg  100 mg Oral DAILY    [Held by provider] lactobacillus sp. 50 billion cpu (BIO-K PLUS) capsule 1 Cap  1 Cap Oral DAILY    HYDROmorphone (DILAUDID) injection 1 mg  1 mg IntraVENous Q3H PRN       Review of Symptoms: comprehensive ROS negative except above.    Objective:     Patient Vitals for the past 24 hrs:   Temp Pulse Resp BP SpO2   02/17/20 1200 -- (!) 113 26 107/72 96 %   02/17/20 1100 -- (!) 117 22 (!) 130/107 96 %   02/17/20 1000 -- (!) 115 17 123/74 95 %   02/17/20 0903 -- (!) 113 26 146/83 98 %   02/17/20 0900 -- (!) 117 26 -- 97 %   02/17/20 0859 -- -- -- -- 97 %   02/17/20 0800 98 °F (36.7 °C) (!) 114 28 -- 98 %   02/17/20 0700 -- (!) 115 20 154/90 98 %   02/17/20 0625 -- (!) 120 -- 151/87 --   02/17/20 0445 -- (!) 116 21 146/88 98 %   02/17/20 0400 98.6 °F (37 °C) (!) 116 17 (!) 199/110 98 %   02/17/20 0100 -- (!) 113 (!) 32 152/84 96 %   02/17/20 0000 98.3 °F (36.8 °C) (!) 114 16 151/89 100 %   02/16/20 2337 -- -- -- -- 97 %   02/16/20 2335 -- -- -- -- 99 %   02/16/20 2318 -- (!) 118 -- (!) 152/91 --   02/16/20 2300 -- (!) 122 16 (!) 166/97 96 %   02/16/20 2200 -- (!) 115 25 (!) 166/106 97 %   02/16/20 2100 -- (!) 115 24 141/90 96 %   02/16/20 2000 98.5 °F (36.9 °C) (!) 115 21 156/90 96 %   02/16/20 1929 -- -- -- -- 100 %   02/16/20 1900 -- (!) 117 20 (!) 161/91 96 %   02/16/20 1733 -- (!) 114 -- 139/78 --   02/16/20 1627 -- -- -- -- 96 %   02/16/20 1600 98 °F (36.7 °C) (!) 110 14 126/66 95 %   02/16/20 1329 -- -- -- -- 96 %   02/16/20 1300 -- (!) 120 24 156/90 95 %        Weight change:      02/15 1901 - 02/17 0700  In: 3035 [I.V.:3005]  Out: 5279 [Urine:1425; Drains:340]    Intake/Output Summary (Last 24 hours) at 2/17/2020 1239  Last data filed at 2/17/2020 1200  Gross per 24 hour   Intake 2500 ml   Output 1820 ml   Net 680 ml     Physical Exam:   General: comfortable, no acute distress   HEENT sclera anicteric, supple neck, no thyromegaly  CVS: S1S2 heard,  no rub  RS: + air entry b/l,   Abd: distended abdomen, tense   Neuro: non focal, awake, alert , CN II-XII are grossly intact  Extrm: no  edema, no cyanosis, clubbing   Skin: no visible  Rash  Musculoskeletal: No gross joints or bone deformities         Data Review:     LABS:   Hematology:   Recent Labs     02/17/20  0215 02/16/20  0056 02/15/20  0220   WBC 19.9* 24.3* 26.4*   HGB 11.3* 11.9* 13.8   HCT 35.2* 36.1 41.8     Chemistry:   Recent Labs     02/17/20  0215 02/16/20  0056 02/15/20  1904 02/15/20  0454 02/15/20  0220 02/14/20  1505   BUN 36* 35* 32*  --  25* 21*   CREA 1.58* 2.41* 2.45*  --  2.70* 2.31*   CA 7.5* 7.8* 7.2*  --  7.0* 7.2*   ALB  --   --   --  3.0*  --   --    K 4.5 5.0 4.9  --  5.5 5.8*    142 147*  --  138 134*   * 114* 111  --  108 107   CO2 17* 21 20*  --  20* 19*   PHOS 2.5  --   --   --  3.2  --    * 142* 170*  --  150* 160*            Procedures/imaging: see electronic medical records for all procedures, Xrays and details which were not copied into this note but were reviewed prior to creation of Plan          Assessment & Plan:     As above         Adeline Marti MD  2/17/2020  12:39 PM

## 2020-02-17 NOTE — PROGRESS NOTES
Per Dr. Alda Laird, bladder pressure checked. 1200 readings: 27, 25.    Recheck @ 8680: 50, 25.     ____    1600 recheck:  25

## 2020-02-17 NOTE — ROUTINE PROCESS
Bedside and Verbal shift change report given to Reynold Roper RN (oncoming nurse) by Kirk Feliciano RN (offgoing nurse). Report included the following information SBAR, Intake/Output, Cardiac Rhythm ST and Quality Measures.

## 2020-02-17 NOTE — PROGRESS NOTES
Respiratory Care Assessment for Bronchial hygiene or Lung Expansion Therapy  Patient  Danyelle Cam     77 y.o.   male     2/17/2020  9:04 AM  Patient Active Problem List   Diagnosis Code    Debility R53.81    Leukocytosis D72.829    Intractable low back pain M54.5    Colon polyps K63.5    Hemorrhoids K64.9    Diverticulitis K57.92    Incisional hernia K43.2       ABG:  Date:2/17/2020  No results found for: PH, PHI, PCO2, PCO2I, PO2, PO2I, HCO3, HCO3I, FIO2, FIO2I    Chest X-ray:  Date:2/17/2020  Results from Hospital Encounter encounter on 02/13/20   XR ABD (KUB)    Narrative AP abdomen/KUB    CPT CODE: 46314    INDICATION: Above. Colonic distention and ileus. COMPARISON: 02/15/2020    TECHNIQUE: Portable AP supine radiograph of the abdomen is reviewed. FINDINGS:    The upper abdomen/diaphragm is cephalad to the field-of-view bilaterally. Surgical drains remain present. Esophagogastric tube is noted, tip cephalad to  the field-of-view, likely similar to prior based on position of distal side-port  project over the left upper quadrant. Again seen are multiple dilated gas-filled loops of small bowel in addition to  moderately abundant gas diffusely in the colon, similar appearing compared to  prior, suspect for postoperative ileus, cannot rule out obstruction  radiographically, as discussed previously. Impression Impression:     Little, if any, interval change compared to the preceding radiograph.          Lab Test:  Date:2/17/2020  WBC:   Lab Results   Component Value Date/Time    WBC 19.9 (H) 02/17/2020 02:15 AM   HGB:   Lab Results   Component Value Date/Time    HGB 11.3 (L) 02/17/2020 02:15 AM    PLTS:   Lab Results   Component Value Date/Time    PLATELET 650 92/32/7923 02:15 AM       SaO2%/flow: @FKZWOHM3(7)@    Vital Signs:     Patient Vitals for the past 8 hrs:   Temp Pulse Resp BP SpO2   02/17/20 0859 -- -- -- -- 97 %   02/17/20 0625 -- (!) 120 -- 151/87 --   02/17/20 0445 -- (!) 116 21 146/88 98 %   02/17/20 0400 98.6 °F (37 °C) (!) 116 17 (!) 199/110 98 %         RA/O2 flow/device: Vapotherm HFNC         First Inital Assesment:     [x]Yes []No   Reevaluation/Reassessment:    []Yes [x]No     CHART REVIEW   Points 0 X 1 X 2 X 3 X 4 X Points   Pulmonary History Smoking History (1) none  Recent Smoking History <1 PPD  Recent Smoking History >1 PPD  Pulmonary Disease or Impairment  Severe Pulmonary Disease  3   Surgical History No Surgery  General Surgery  Lower Abdominal  Thoracic or Upper Abdominal  Thoracic & Pulmonary Disease  3   CXR Clear or not indicated  Chronic changes or CXR Pending  Infiltrate, atelectasis or pleural effusions  Infiltrates in more than 1lobe  Infiltrates +atelectasis  +/or pleural effusions  4     PATIENT ASSESSMENT    0 X 1 X 2 X 3 X 4 X Points   Respiratory Pattern Regular pattern RR 12-20  Increased RR 21-27   Mild Dyspnea at rest, irregular pattern RR 28-32  Moderate Dyspnea at rest, Use of accessory muscles, RR 33-36  Severe Dyspnea, Use of accessory muscles RR >36  1   Mental Status Alert Oriented cooperative  Confused, Follows commands  Lethargic, Does not follow commands  Obtunded  Unresponsive  0   Breath Sounds Clear  Decreased Unilaterally  Decreased Bilaterally  Mild Scattered wheezing or Crackles in bases  Severe Wheezing or rhonchi  2   Cough Strong dry NPC  Strong Productive  Weak NPC  Weak productive or weak with rhonchi  No cough or may require suctioning  2   Level of Activity Ambulatory  Ambulatory with assistance  Temporarily Non-ambulatory  Non-Ambulatory, able to position self  Unable to position self, confined to bed  2   Total Points/Score:   17     Specific Intervention Chart(Metaneb)    Bronchial Hygiene/Secretion Clearance:    []EZPAP []Rotation bed with vibration    []CPT with percussor []CPT via vest   []Oscillastiang positive pressure expiratory device      Lung Expansion:    []Incentive Spirometer w/RT visits []Incentive Spirometer w/nursing    []EZPAP      *Suctioning:    []Nasal Tracheal []Tracheal     *suctioning will be ordered and done PRN with an associated frequency such as QID/PRN based on score       Other:    Care Plan   Level # Score Modality Frequency Comment   Level 1 >17 Metaneb Q4    Level 2 14-17 - -    Level 3 10-13 - -    Level 4 1-9 - -      BRONCHIAL HYGIENE SCORING AND FREQUENCY GUIDELINES   Frequency Indications/Findings Level #   Q4 ATC Copious secretions, SOB, unable to sleep 1   QID & PRN at night Moderate amounts of secretions 2   TID or Q6 wa Small amounts of secretions and poor cough: recent history of secretions 3   BID or Q8 wa Unable to deep breathe and cough effectively 4        Comments:  -    Respiratory Therapist: Opal Lu, RT

## 2020-02-17 NOTE — PROGRESS NOTES
Hospitalist Progress Note    Patient: Lola Beckett Age: 77 y.o. : 1950 MR#: [id number] SSN: [id number]  Date/Time: 2020 3:47 PM    Subjective:     Mr. Cheyenne Ramos is postop day 4 exploratory lap for open repair of incisional hernia with mesh he has an NG in place for a postop ileus. He has a past medical history of invasive adenocarcinoma of the sigmoid colon with a colon resection by Dr. Ana Laura Lazo in 2018. His postop course was complicated by acute respiratory failure, JOSE ANTONIO and SIRS. His condition has improved and he is stable for transfer from the ICU.       Objective:   VS:   Visit Vitals  /89   Pulse (!) 118   Temp 98.5 °F (36.9 °C)   Resp 26   Ht 5' 10\" (1.778 m)   Wt 90.9 kg (200 lb 6.4 oz)   SpO2 96%   BMI 28.75 kg/m²      Tmax/24hrs: Temp (24hrs), Av.3 °F (36.8 °C), Min:98 °F (36.7 °C), Max:98.6 °F (37 °C)       Intake/Output Summary (Last 24 hours) at 2020 1547  Last data filed at 2020 1200  Gross per 24 hour   Intake 2500 ml   Output 1420 ml   Net 1080 ml       General: Alert  HEENT: Speech clear, NG in place, neck supple  Cardiovascular: Heart rate regular, tachycardic 118  Pulmonary: Coarse breath sounds decreased at bases  GI: Softly distended, hypoactive bowel sounds  Extremities: Moving all 4 extremities, + edema  Additional:      Labs:    Recent Results (from the past 24 hour(s))   GLUCOSE, POC    Collection Time: 20  6:02 PM   Result Value Ref Range    Glucose (POC) 133 (H) 70 - 110 mg/dL   GLUCOSE, POC    Collection Time: 20 11:45 PM   Result Value Ref Range    Glucose (POC) 112 (H) 70 - 110 mg/dL   CBC WITH AUTOMATED DIFF    Collection Time: 20  2:15 AM   Result Value Ref Range    WBC 19.9 (H) 4.6 - 13.2 K/uL    RBC 3.85 (L) 4.70 - 5.50 M/uL    HGB 11.3 (L) 13.0 - 16.0 g/dL    HCT 35.2 (L) 36.0 - 48.0 %    MCV 91.4 74.0 - 97.0 FL    MCH 29.4 24.0 - 34.0 PG    MCHC 32.1 31.0 - 37.0 g/dL    RDW 15.0 (H) 11.6 - 14.5 %    PLATELET 018 332 - 581 K/uL    MPV 9.5 9.2 - 11.8 FL    NEUTROPHILS 84 (H) 42 - 75 %    BAND NEUTROPHILS 6 (H) 0 - 5 %    LYMPHOCYTES 9 (L) 20 - 51 %    MONOCYTES 1 (L) 2 - 9 %    EOSINOPHILS 0 0 - 5 %    BASOPHILS 0 0 - 3 %    ABS. NEUTROPHILS 17.9 (H) 1.8 - 8.0 K/UL    ABS. LYMPHOCYTES 1.8 0.8 - 3.5 K/UL    ABS. MONOCYTES 0.2 0 - 1.0 K/UL    ABS. EOSINOPHILS 0.0 0.0 - 0.4 K/UL    ABS. BASOPHILS 0.0 0.0 - 0.06 K/UL    DF AUTOMATED      PLATELET COMMENTS ADEQUATE PLATELETS      RBC COMMENTS ANISOCYTOSIS  1+       METABOLIC PANEL, BASIC    Collection Time: 02/17/20  2:15 AM   Result Value Ref Range    Sodium 145 136 - 145 mmol/L    Potassium 4.5 3.5 - 5.5 mmol/L    Chloride 117 (H) 100 - 111 mmol/L    CO2 17 (L) 21 - 32 mmol/L    Anion gap 11 3.0 - 18 mmol/L    Glucose 110 (H) 74 - 99 mg/dL    BUN 36 (H) 7.0 - 18 MG/DL    Creatinine 1.58 (H) 0.6 - 1.3 MG/DL    BUN/Creatinine ratio 23 (H) 12 - 20      GFR est AA 53 (L) >60 ml/min/1.73m2    GFR est non-AA 44 (L) >60 ml/min/1.73m2    Calcium 7.5 (L) 8.5 - 10.1 MG/DL   PHOSPHORUS    Collection Time: 02/17/20  2:15 AM   Result Value Ref Range    Phosphorus 2.5 2.5 - 4.9 MG/DL   GLUCOSE, POC    Collection Time: 02/17/20  6:02 AM   Result Value Ref Range    Glucose (POC) 116 (H) 70 - 110 mg/dL   GLUCOSE, POC    Collection Time: 02/17/20 11:39 AM   Result Value Ref Range    Glucose (POC) 127 (H) 70 - 110 mg/dL       Imaging:  Xr Abd Port  1 V    Result Date: 2/17/2020  Abdomen: Comparison 2/16/2020 Persistent small bowel and colon distention throughout. Probably stable. Gastric distention also noted. NG tube tip in the stomach. Draining catheter is in the lower abdomen, stable. Rectal air seen.      IMPRESSION: No significant change, likely postoperative ileus      Assessment/Plan:     Principal Problem:    Acute kidney injury ([address] (2/17/2020)    Active Problems:    Incisional hernia (8/61/1932)      Metabolic acidosis (0/51/6275)    Continue to monitor electrolytes and correct  Lactic acidosis improved  Monitor electrolytes    Additional Notes:      Full Code    Disposition:   TBD    Case discussed with:  [x]Patient   []Family  [x]Nursing  []Case Management   X Intensivist    DVT Prophylaxis:  []Lovenox  []Hep SQ  []SCDs  []Coumadin   []On Heparin gtt    Signed By: Darling Bailey DO     February 17, 2020 3:47 PM

## 2020-02-17 NOTE — PROGRESS NOTES
Problem: Self Care Deficits Care Plan (Adult)  Goal: *Acute Goals and Plan of Care (Insert Text)  Description  Occupational Therapy Goals  Initiated 2/17/2020 within 7 day(s). 1.  Patient will perform functional activity standing for 2-4 minutes with modified independence and F+ balance. 2.  Patient will perform lower body dressing with supervision/set-up. 3.  Patient will perform toilet transfers with supervision/set-up. 4.  Patient will perform all aspects of toileting with supervision/set-up. 5.  Patient will participate in upper extremity therapeutic exercise/activities with modified independence for 8 minutes to increase ROM/strength for selfcare. 6.  Patient will utilize energy conservation techniques during functional activities with verbal cues. Prior Level of Function: Patient was independent with self-care (exception to bathing his back) and used a RW for functional mobility PTA. Outcome: Progressing Towards Goal     OCCUPATIONAL THERAPY EVALUATION    Patient: Kristine Pascual (54 y.o. male)  Date: 2/17/2020  Primary Diagnosis: Incisional hernia [K43.2]  Procedure(s) (LRB):  OPEN REPAIR OF INCISIONAL HERNIA WITH MESH (N/A) 4 Days Post-Op   Precautions:  Skin    ASSESSMENT :  Pt cleared to participate in OT evaluation by RN. Upon entering the room, pt was supine in bed, alert, and agreeable to participate in OT evaluation. Pt was seen with PT to maximize pt safety and participation. Patient performed supine to sit with min assist and was able to sit EOB for approx. 15 minutes, CGA for balance. Patient contact guard assist for LBD, stand by assist for grooming, and contact guard assist for sit to supine. Pt educated on energy conservation techniques, pursed lip breathing, and self pacing during daily activities. Patient /89 which decreased from initial BP of 166/89. HR observed at 122 highest, 113 while resting.  SPO2 remained above 98% this session and no c/o SOB or abdominal pain noted while EOB. Based on the objective data described below, the patient presented with decreased endurance, decreased strength, decreased functional balance, and decreased functional mobility, which impede pt's function with basic self-care/ADL tasks. Patient will benefit from skilled intervention to address the above impairments. Patient's rehabilitation potential is considered to be Good  Factors which may influence rehabilitation potential include:   []             None noted  [x]             Mental ability/status  [x]             Medical condition  [x]             Home/family situation and support systems  [x]             Safety awareness  [x]             Pain tolerance/management  []             Other:      PLAN :  Recommendations and Planned Interventions:   [x]               Self Care Training                  [x]      Therapeutic Activities  [x]               Functional Mobility Training   []      Cognitive Retraining  [x]               Therapeutic Exercises           [x]      Endurance Activities  [x]               Balance Training                    [x]      Neuromuscular Re-Education  []               Visual/Perceptual Training     [x]      Home Safety Training  [x]               Patient Education                   [x]      Family Training/Education  []               Other (comment):    Frequency/Duration: Patient will be followed by occupational therapy 1-2 times per day/4-7 days per week to address goals.   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 Adriana Roque COLONOSCOPY N/A 11/12/2018    COLONOSCOPY with Polypectomies, Bx's, Injection, Tattooing & Clip Placement x 3 performed by Naresh Thomas MD at [address] [place] [address]  11/12/2018         Huong Patient  11/12/2018         ENDOSCOPIC MUCOSAL RESECT  11/12/2018         EXPLORATORY OF ABDOMEN N/A 12/11/2018    Dr. Bolanos [facility] N/A 5/8/2019    SIGMOIDOSCOPY FLEXIBLE with polypectomy performed by Chidi Winston MD at [facility] HX COLONOSCOPY      HX GI      robotic sigmoid colectomy complicated by small anastomotic leak, status post diverting ileostomy    HX GI      reversal ileostomy    HX HEENT  2008    Throat Ca - 1 vocal chord removed     HX HEENT Bilateral 1970    eye surgery muscles    HX TRACHEOSTOMY  2008    LAP,SURG,COLECTOMY, PARTIAL, W/ANAST N/A 12/04/2018    Dr. Jihan Eng     Barriers to Learning/Limitations: None  Compensate with: visual, verbal, tactile, kinesthetic cues/model    Home Situation:   Home Situation  Home Environment: Private residence  # Steps to Enter: 3  Rails to Enter: Yes  Wheelchair Ramp: Yes(in garage)  One/Two Story Residence: One story  Living Alone: No  Support Systems: Spouse/Significant Other/Partner  Patient Expects to be Discharged to[de-identified] Private residence  Current DME Used/Available at Home: Margarita Brownlee, rolling, [address], [place], straight, Grab bars, Shower chair, Commode, bedside  Tub or Shower Type: Tub/Shower combination  [x]  Right hand dominant   []  Left hand dominant    Cognitive/Behavioral Status:  Neurologic State: Alert  Orientation Level: Oriented X4  Cognition: Follows commands  Safety/Judgement: Fall prevention    Skin: Intact  Edema: None noted    Vision/Perceptual:    Acuity: Within Defined Limits;Able to read employee name badge without difficulty; Able to read clock/calendar on wall without difficulty    Corrective Lenses: (glasses not with patient)    Coordination: BUE  Fine Motor Skills-Upper: Left Intact; Right Intact    Gross Motor Skills-Upper: Left Intact; Right Intact    Balance:  Sitting: Impaired; Without support  Sitting - Static: Good (unsupported)  Sitting - Dynamic: Fair (occasional)    Strength: BUE  Strength: Generally decreased, functional     Tone & Sensation: BUE  Tone: Normal  Sensation: Intact    Range of Motion: BUE  AROM: Within functional limits    Functional Mobility and Transfers for ADLs:  Bed Mobility:  Supine to Sit: Minimum assistance  Sit to Supine: Contact guard assistance  Scooting: Stand-by assistance(towards HOB for repositioning using hand rails)     Transfers:  Sit to Stand: (pt declined this session)    ADL Assessment:   Feeding: Setup;Stand-by assistance    Oral Facial Hygiene/Grooming: Setup;Stand-by assistance    Bathing: Contact guard assistance    Upper Body Dressing: Stand-by assistance    Lower Body Dressing: Contact guard assistance    Toileting: Contact guard assistance    ADL Intervention:  Feeding  Feeding Assistance: Set-up; Stand-by assistance  Drink to Mouth: Stand-by assistance    Grooming  Grooming Assistance: Stand-by assistance  Position Performed: Seated edge of bed  Washing Face: Stand-by assistance    Lower Body Dressing Assistance  Dressing Assistance: Contact guard assistance  Socks: Contact guard assistance  Leg Crossed Method Used: Yes  Position Performed: Seated edge of bed    Cognitive Retraining  Safety/Judgement: Fall prevention    Pain:  Pain level pre-treatment: 5/10, abdomen resting  Pain Intervention(s): Medication (see MAR); Response to intervention:See doc flow    Activity Tolerance:   Good    Please refer to the flowsheet for vital signs taken during this treatment.   After treatment:   [] Patient left in no apparent distress sitting up in chair  [x] Patient left in no apparent distress in bed  [x] Call bell left within reach  [x] Nursing notified  [] Caregiver present  [] Bed alarm activated    COMMUNICATION/EDUCATION:   [x] Role of Occupational Therapy in the acute care setting  [x] Home safety education was provided and the patient/caregiver indicated understanding. [x] Patient/family have participated as able in goal setting and plan of care. [x] Patient/family agree to work toward stated goals and plan of care. [] Patient understands intent and goals of therapy, but is neutral about his/her participation. [] Patient is unable to participate in goal setting and plan of care. Thank you for this referral.  Aquiles Epstein OTR/L  Time Calculation: 28 mins    Eval Complexity: History: HIGH Complexity : Extensive review of history including physical, cognitive and psychosocial history ; Examination: MEDIUM Complexity : 3-5 performance deficits relating to physical, cognitive , or psychosocial skils that result in activity limitations and / or participation restrictions; Decision Making:MEDIUM Complexity : Patient may present with comorbidities that affect occupational performnce.  Miniml to moderate modification of tasks or assistance (eg, physical or verbal ) with assesment(s) is necessary to enable patient to complete evaluation

## 2020-02-17 NOTE — ROUTINE PROCESS
Bedside and Verbal shift change report given to Edson Herbert RN (oncoming nurse) by Andrea Rosas RN (offgoing nurse). Report included the following information SBAR, OR Summary, Intake/Output, MAR and Recent Results.

## 2020-02-17 NOTE — PROGRESS NOTES
Yifan Royal M.D. FACS  PROGRESS NOTE    Name: Jak Stallworth MRN: [id number]   : 1950 [facility]   Date: 2020 Admission Date: 2020  5:32 AM     Hospital Day: 5  4 Days Post-Op  Subjective:  Patient without acute overnight events. He is awake and alert this morning interactive speaking no apparent distress. Nasogastric tube and fecal management systems are in place. Objective:  Vitals:    20 2335 20 2337 20 0000 20 0625   BP:   151/89 151/87   Pulse:   (!) 114 (!) 120   Resp:   16    Temp:   98.3 °F (36.8 °C)    SpO2: 99% 97% 100%    Weight:       Height:         Date 20 0700 - 20 0659 20 0700 - 20 0659   Shift 8397-4179 4489-6047 24 Hour Total 9589-1341 2706-8100 24 Hour Total   INTAKE   P.O.  0 0        P. O.  0 0      I. V.(mL/kg/hr) 1926.3(1.8) 177.5(0.2) 2103.8(1)        I.V.  0 0        Volume (0.9% sodium chloride infusion) 1666.3 177.5 1843.8        Volume (albumin human 25% (BUMINATE) solution 12.5 g) 50  50        Volume (metroNIDAZOLE (FLAGYL) IVPB premix 500 mg) 200 0 200        Volume (cefepime (MAXIPIME) 2 g in sterile water (preservative free) 10 mL IV syringe) 10 0 10      Blood  0 0        Autotransfused  0 0      Other  0 0        Other  0 0      NG/GT 0 30 30        Water Flush Volume (mL) (Nasogastric Tube 02/15/20)  20 20        Medication Volume (Nasogastric Tube 02/15/20) 0 10 10        Intake (ml) (Nasogastric Tube 02/15/20) 0 0 0      Shift Total(mL/kg) 1926.3(21.2) 207.5(2.3) 9490.7(24.2)      OUTPUT   Urine(mL/kg/hr) 600(0.6) 675(0.6) 1275(0.6)        Urine Voided  0 0        Urine Output (mL) (Urinary Catheter 20 Mckeon)       Emesis/NG output  0 0        Emesis  0 0      Drains 130 130 260        Output (ml) (Matthew-Garcia Drain 20 Abdomen) 100 90 190        Output (ml) (Matthew-Garcia Drain 20 Abdomen) 30 40 70        Output (ml) (Fecal Management) 0 0 0 Other  0 0        Other Output  0 0      Stool  0 0        Stool  0 0      Blood  0 0        Estimated Blood Loss  0 0        Quantitative Blood Loss  0 0        Blood  0 0      Shift Total(mL/kg) 730(8) 805(8.9) 1535(16.9)      NET 1196.3 -597.5 598.8      Weight (kg) 90.9 90.9 90.9 90.9 90.9 90.9         Physical Exam:    General: Awake and alert, oriented x4, no apparent distress   Abdomen: abdomen is soft with generalized abdominal tenderness. Molinda Camera in place. ANGELI sites clean and clear with serosanguineous drainage in the drains. The abdomen is distended and tympanitic. No   masses, organomegaly or guarding    Labs:  Recent Results (from the past 24 hour(s))   GLUCOSE, POC    Collection Time: 02/16/20 11:48 AM   Result Value Ref Range    Glucose (POC) 129 (H) 70 - 110 mg/dL   GLUCOSE, POC    Collection Time: 02/16/20  6:02 PM   Result Value Ref Range    Glucose (POC) 133 (H) 70 - 110 mg/dL   GLUCOSE, POC    Collection Time: 02/16/20 11:45 PM   Result Value Ref Range    Glucose (POC) 112 (H) 70 - 110 mg/dL   CBC WITH AUTOMATED DIFF    Collection Time: 02/17/20  2:15 AM   Result Value Ref Range    WBC 19.9 (H) 4.6 - 13.2 K/uL    RBC 3.85 (L) 4.70 - 5.50 M/uL    HGB 11.3 (L) 13.0 - 16.0 g/dL    HCT 35.2 (L) 36.0 - 48.0 %    MCV 91.4 74.0 - 97.0 FL    MCH 29.4 24.0 - 34.0 PG    MCHC 32.1 31.0 - 37.0 g/dL    RDW 15.0 (H) 11.6 - 14.5 %    PLATELET 670 043 - 048 K/uL    MPV 9.5 9.2 - 11.8 FL    NEUTROPHILS 84 (H) 42 - 75 %    BAND NEUTROPHILS 6 (H) 0 - 5 %    LYMPHOCYTES 9 (L) 20 - 51 %    MONOCYTES 1 (L) 2 - 9 %    EOSINOPHILS 0 0 - 5 %    BASOPHILS 0 0 - 3 %    ABS. NEUTROPHILS 17.9 (H) 1.8 - 8.0 K/UL    ABS. LYMPHOCYTES 1.8 0.8 - 3.5 K/UL    ABS. MONOCYTES 0.2 0 - 1.0 K/UL    ABS. EOSINOPHILS 0.0 0.0 - 0.4 K/UL    ABS.  BASOPHILS 0.0 0.0 - 0.06 K/UL    DF AUTOMATED      PLATELET COMMENTS ADEQUATE PLATELETS      RBC COMMENTS ANISOCYTOSIS  1+       METABOLIC PANEL, BASIC    Collection Time: 02/17/20  2:15 AM Result Value Ref Range    Sodium 145 136 - 145 mmol/L    Potassium 4.5 3.5 - 5.5 mmol/L    Chloride 117 (H) 100 - 111 mmol/L    CO2 17 (L) 21 - 32 mmol/L    Anion gap 11 3.0 - 18 mmol/L    Glucose 110 (H) 74 - 99 mg/dL    BUN 36 (H) 7.0 - 18 MG/DL    Creatinine 1.58 (H) 0.6 - 1.3 MG/DL    BUN/Creatinine ratio 23 (H) 12 - 20      GFR est AA 53 (L) >60 ml/min/1.73m2    GFR est non-AA 44 (L) >60 ml/min/1.73m2    Calcium 7.5 (L) 8.5 - 10.1 MG/DL   PHOSPHORUS    Collection Time: 02/17/20  2:15 AM   Result Value Ref Range    Phosphorus 2.5 2.5 - 4.9 MG/DL   GLUCOSE, POC    Collection Time: 02/17/20  6:02 AM   Result Value Ref Range    Glucose (POC) 116 (H) 70 - 110 mg/dL     All Micro Results     Procedure Component Value Units Date/Time    CULTURE, RESPIRATORY/SPUTUM/BRONCH Carolyn Hedges STAIN [[id number]] Collected:  02/15/20 1010    Order Status:  Completed Specimen:  Sputum Updated:  02/16/20 1033     Special Requests: NO SPECIAL REQUESTS        GRAM STAIN RARE WBCS SEEN         FEW EPITHELIAL CELLS SEEN               RARE GRAM POSITIVE COCCI IN PAIRS           Culture result: NO GROWTH AFTER 13 HOURS       CULTURE, URINE [[id number]] Collected:  02/14/20 1230    Order Status:  Completed Specimen:  Clean catch Updated:  02/15/20 0908     Special Requests: NO SPECIAL REQUESTS        Culture result: NO GROWTH 1 DAY             Current Medications:  Current Facility-Administered Medications   Medication Dose Route Frequency Provider Last Rate Last Dose    metoprolol (LOPRESSOR) injection 2.5 mg  2.5 mg IntraVENous Q6H Juan Diego Nair MD   2.5 mg at 02/17/20 0625    ondansetron (ZOFRAN) injection 4 mg  4 mg IntraVENous Q6H PRN Justin [place], PA-C        insulin lispro (HUMALOG) injection   SubCUTAneous Q6H Justin [place], PA-C   Stopped at 02/16/20 0600    metroNIDAZOLE (FLAGYL) IVPB premix 500 mg  500 mg IntraVENous Q12H Gloria Sepulveda MD   Stopped at 02/17/20 0625    cefepime (MAXIPIME) 2 g in sterile water (preservative free) 10 mL IV syringe  2 g IntraVENous Q12H Talur-Matadha, Haven Dandy, MD   2 g at 02/16/20 2308    pantoprazole (PROTONIX) 40 mg in 0.9% sodium chloride 10 mL injection  40 mg IntraVENous BID Arnold DOSHI NP   40 mg at 02/16/20 1734    albuterol (PROVENTIL VENTOLIN) nebulizer solution 2.5 mg  2.5 mg Nebulization Q4H PRN Shelly Mohan A DO   2.5 mg at 02/14/20 0341    sodium chloride (OCEAN) 0.65 % nasal squeeze bottle 2 Spray  2 Spray Both Nostrils Q2H PRN [place], DO        glucose chewable tablet 16 g  4 Tab Oral PRN Rashaun Gonzalez NP        glucagon (GLUCAGEN) injection 1 mg  1 mg IntraMUSCular PRN Rashaun Gonzalez NP        dextrose (D50W) injection syrg 12.5-25 g  25-50 mL IntraVENous PRN Rashaun Gonzalez NP        oxyCODONE-acetaminophen (PERCOCET) 5-325 mg per tablet 2 Tab  2 Tab Oral Q6H PRN Christianne Scott NP   2 Tab at 02/17/20 0542    [Held by provider] nebivolol (BYSTOLIC) tablet 10 mg  10 mg Oral DAILY Talur-Matadha, Haven Dandy, MD   Stopped at 02/15/20 0900    benzocaine-menthol (CEPACOL) lozenge 1 Lozenge  1 Lozenge Mucous Membrane PRN Talur-Matadha, Haven Dandy, MD        nystatin (MYCOSTATIN) 100,000 unit/mL oral suspension 500,000 Units  500,000 Units Oral QID Rashaun Gonzalez NP   500,000 Units at 02/16/20 2308    phenol throat spray (CHLORASEPTIC) 1 Spray  1 [place] Oral PRN Rashaun Gonzalez NP   1 Spray at 02/14/20 1055    albuterol-ipratropium (DUO-NEB) 2.5 MG-0.5 MG/3 ML  3 mL Nebulization Q4H RT Clinton Underwood MD   Stopped at 02/17/20 0413    albuterol-ipratropium (DUO-NEB) 2.5 MG-0.5 MG/3 ML  3 mL Nebulization Q4H PRN Clinton Underwood MD   3 mL at 02/15/20 2320    0.9% sodium chloride infusion  75 mL/hr IntraVENous CONTINUOUS Major MD Adelaida 75 mL/hr at 02/16/20 2053 75 mL/hr at 02/16/20 2053    sodium chloride (NS) flush 5-40 mL 5-40 mL IntraVENous Q8H Beryl Wells MD   10 mL at 02/17/20 4015    sodium chloride (NS) flush 5-40 mL  5-40 mL IntraVENous PRN Beryl Wells MD   10 mL at 02/15/20 1246    heparin (porcine) injection 5,000 Units  5,000 Units SubCUTAneous Q8H Beryl Wells MD   5,000 Units at 02/16/20 2316    [Held by provider] gabapentin (NEURONTIN) capsule 100 mg  100 mg Oral TID Beryl Wells MD   Stopped at 02/16/20 0900    [Held by provider] ferrous sulfate tablet 325 mg  325 mg Oral Q MON, WED & Guy Lai MD   325 mg at 02/14/20 2117    latanoprost (XALATAN) 0.005 % ophthalmic solution 1 Drop  1 Drop Both Eyes QHS Beryl Wells MD   1 Drop at 02/16/20 2315    acetaminophen (TYLENOL) tablet 650 mg  650 mg Oral Q4H PRN Jac Gonzalez NP        HYDROmorphone (DILAUDID) injection 0.5 mg  0.5 mg IntraVENous Q4H PRN Jac Gonzalez NP   0.5 mg at 02/17/20 0257    oxyCODONE-acetaminophen (PERCOCET) 5-325 mg per tablet 1 Tab  1 Tab Oral Q4H PRN Felicity Ruelas NP   1 Tab at 02/16/20 2054    [Held by provider] polyethylene glycol (MIRALAX) packet 17 g  17 g Oral DAILY Felicity Ruelas NP   Stopped at 02/14/20 6996    [Held by provider] docusate sodium (COLACE) capsule 100 mg  100 mg Oral DAILY Jac Gonzalez NP   Stopped at 02/14/20 0908    [Held by provider] lactobacillus sp. 50 billion cpu (BIO-K PLUS) capsule 1 Cap  1 Cap Oral DAILY Zo Gonzalez NP   Stopped at 02/15/20 0900    HYDROmorphone (DILAUDID) injection 1 mg  1 mg IntraVENous Q3H PRN Beryl Wells MD   1 mg at 02/16/20 2317       Chart and notes reviewed. Data reviewed. I have evaluated and examined the patient. IMPRESSION:   · Patient is postop day 4 from abdominal wall reconstruction with transversus abdominis release repair of large incisional hernia with postoperative ileus. PLAN:/DISCUSION:   · Continue present care. Continue NG tube management. · Continue fecal tube management. · We will DC fecal to an NG tube in the morning.   · Patient may need PICC line tomorrow for Jaylene Velez MD

## 2020-02-17 NOTE — PROGRESS NOTES
Problem: Infection - Risk of, Surgical Site Infection  Goal: *Absence of surgical site infection signs and symptoms  Outcome: Progressing Towards Goal     Problem: Patient Education: Go to Patient Education Activity  Goal: Patient/Family Education  Outcome: Progressing Towards Goal     Problem: Deep Venous Thrombosis - Risk of  Goal: *Absence of deep venous thrombosis signs and symptoms(Stroke Metric)  2/17/2020 0828 by Gwenevere Shaver  Outcome: Progressing Towards Goal  2/17/2020 0107 by Gwenevere Shaver  Outcome: Progressing Towards Goal  Goal: *Absence of impaired coagulation signs and symptoms  2/17/2020 0828 by Gwenevere Shaver  Outcome: Progressing Towards Goal  2/17/2020 0107 by Gwenevere Shaver  Outcome: Progressing Towards Goal  Goal: *Knowledge of prescribed medications  2/17/2020 0828 by Gwenevere Shaver  Outcome: Progressing Towards Goal  2/17/2020 0107 by Gwenevere Shaver  Outcome: Progressing Towards Goal  Goal: *Absence of bleeding  2/17/2020 0828 by Gwenevere Shaver  Outcome: Progressing Towards Goal  2/17/2020 0107 by Gwenevere Shaver  Outcome: Progressing Towards Goal     Problem: Patient Education: Go to Patient Education Activity  Goal: Patient/Family Education  2/17/2020 0828 by Gwenevere Shaver  Outcome: Progressing Towards Goal  2/17/2020 0107 by Gwenevere Shaver  Outcome: Progressing Towards Goal     Problem: Falls - Risk of  Goal: *Absence of Falls  Description  Document Tien Coleman Fall Risk and appropriate interventions in the flowsheet.   2/17/2020 0828 by Gwenevere Shaver  Outcome: Progressing Towards Goal  Note: Fall Risk Interventions:  Mobility Interventions: Bed/chair exit alarm, Communicate number of staff needed for ambulation/transfer, OT consult for ADLs, Patient to call before getting OOB, Strengthening exercises (ROM-active/passive)         Medication Interventions: Assess postural VS orthostatic hypotension, Evaluate medications/consider consulting pharmacy, Patient to call before getting OOB, Teach patient to arise slowly    Elimination Interventions: Call light in reach, Toileting schedule/hourly rounds           2/17/2020 0107 by Yosef Perera  Outcome: Progressing Towards Goal  Note: Fall Risk Interventions:  Mobility Interventions: Bed/chair exit alarm, Communicate number of staff needed for ambulation/transfer, OT consult for ADLs, Patient to call before getting OOB, Strengthening exercises (ROM-active/passive)         Medication Interventions: Assess postural VS orthostatic hypotension, Evaluate medications/consider consulting pharmacy, Patient to call before getting OOB, Teach patient to arise slowly    Elimination Interventions: Call light in reach, Toileting schedule/hourly rounds              Problem: Patient Education: Go to Patient Education Activity  Goal: Patient/Family Education  2/17/2020 0828 by Yosef Perera  Outcome: Progressing Towards Goal  2/17/2020 0107 by Yosef Perera  Outcome: Progressing Towards Goal     Problem: Pressure Injury - Risk of  Goal: *Prevention of pressure injury  Description  Document Edin Scale and appropriate interventions in the flowsheet. Outcome: Progressing Towards Goal  Note: Pressure Injury Interventions:  Sensory Interventions: Assess need for specialty bed, Discuss PT/OT consult with provider, Keep linens dry and wrinkle-free, Maintain/enhance activity level, Minimize linen layers, Monitor skin under medical devices, Pressure redistribution bed/mattress (bed type), Turn and reposition approx.  every two hours (pillows and wedges if needed)    Moisture Interventions: Absorbent underpads, Apply protective barrier, creams and emollients, Assess need for specialty bed, Internal/External urinary devices, Internal/External fecal devices, Maintain skin hydration (lotion/cream), Minimize layers, Moisture barrier    Activity Interventions: Assess need for specialty bed, Increase time out of bed, Pressure redistribution bed/mattress(bed type), PT/OT evaluation    Mobility Interventions: Assess need for specialty bed, HOB 30 degrees or less, Pressure redistribution bed/mattress (bed type), PT/OT evaluation, Turn and reposition approx.  every two hours(pillow and wedges)    Nutrition Interventions: Document food/fluid/supplement intake, Discuss nutritional consult with provider    Friction and Shear Interventions: Apply protective barrier, creams and emollients, Foam dressings/transparent film/skin sealants, Minimize layers, Transferring/repositioning devices                Problem: Patient Education: Go to Patient Education Activity  Goal: Patient/Family Education  Outcome: Progressing Towards Goal     Problem: Patient Education: Go to Patient Education Activity  Goal: Patient/Family Education  2/17/2020 0828 by Olivia Kwon  Outcome: Progressing Towards Goal  2/17/2020 0107 by Olivia Kwon  Outcome: Progressing Towards Goal

## 2020-02-17 NOTE — PROGRESS NOTES
1900 Bedside and Verbal shift change report given to Hanna Kelley RN (oncoming nurse) by Carter Gonzalez RN (offgoing nurse). Report included the following information SBAR, Kardex, Intake/Output, MAR and Cardiac Rhythm sinus tach. 2030 Patient assessment performed at this time. Patient does not have any c/o SOB or chest pain at this time but does have a c/o back pain 5/10 at this time. PRN percocet given at this time. Will continue to monitor. 200 Patient's wife called at this time wanting to check on patient. I explained he was in bed, watching television and that he had complaints of back pain upon arrival on shift but that he was in good spirits; also told her that I was going to give evening medications and check on his pain and that I would let him know she called. Patient had complaint of pain in back and stomach; 1 tab percocet given at this time. Will continue to monitor. 2317 Patient complaining of pain in back and abdoment at this time, dilaudid (1 mg)  given. Will continue to monitor. 0045 Patient in bed resting quietly at this time will continue to monitor. 3336 Patient complained of pain in back and abdomen at this time; prn dilaudid (0.5 mg) given. Will continue to follow. 3290 Patient has complaint of back pain at this time, given 2 percocet tabs. Will continue to monitor. 0600 Patient resting comfortably at this time; will continue to monitor. 1818 Patient called and stated he needed help. Went to assess patient and R forearm is swollen. IV fluids stopped at this time and arm was elevated. Turned over situation to oncoming nurse. 0700 Bedside and Verbal shift change report given to Tony Rios RN (oncoming nurse) by Hanna Kelley RN (offgoing nurse). Report included the following information SBAR, Kardex, Intake/Output, MAR and Cardiac Rhythm sinus rhythm.

## 2020-02-18 ENCOUNTER — APPOINTMENT (OUTPATIENT)
Dept: GENERAL RADIOLOGY | Age: 70
DRG: 353 | End: 2020-02-18
Attending: SURGERY
Payer: MEDICARE

## 2020-02-18 LAB
ALBUMIN SERPL-MCNC: 2.9 G/DL (ref 3.4–5)
ALBUMIN/GLOB SERPL: 0.7 {RATIO} (ref 0.8–1.7)
ALP SERPL-CCNC: 59 U/L (ref 45–117)
ALT SERPL-CCNC: 45 U/L (ref 16–61)
ANION GAP SERPL CALC-SCNC: 15 MMOL/L (ref 3–18)
AST SERPL-CCNC: 47 U/L (ref 10–38)
BILIRUB SERPL-MCNC: 0.4 MG/DL (ref 0.2–1)
BUN SERPL-MCNC: 41 MG/DL (ref 7–18)
BUN/CREAT SERPL: 27 (ref 12–20)
CA-I SERPL-SCNC: 1.09 MMOL/L (ref 1.12–1.32)
CALCIUM SERPL-MCNC: 7.6 MG/DL (ref 8.5–10.1)
CHLORIDE SERPL-SCNC: 117 MMOL/L (ref 100–111)
CO2 SERPL-SCNC: 13 MMOL/L (ref 21–32)
CREAT SERPL-MCNC: 1.5 MG/DL (ref 0.6–1.3)
GLOBULIN SER CALC-MCNC: 4.2 G/DL (ref 2–4)
GLUCOSE BLD STRIP.AUTO-MCNC: 111 MG/DL (ref 70–110)
GLUCOSE BLD STRIP.AUTO-MCNC: 140 MG/DL (ref 70–110)
GLUCOSE BLD STRIP.AUTO-MCNC: 159 MG/DL (ref 70–110)
GLUCOSE BLD STRIP.AUTO-MCNC: 171 MG/DL (ref 70–110)
GLUCOSE SERPL-MCNC: 175 MG/DL (ref 74–99)
MAGNESIUM SERPL-MCNC: 2.1 MG/DL (ref 1.6–2.6)
PHOSPHATE SERPL-MCNC: 3.2 MG/DL (ref 2.5–4.9)
POTASSIUM SERPL-SCNC: 4.6 MMOL/L (ref 3.5–5.5)
PREALB SERPL-MCNC: 12.2 MG/DL (ref 20–40)
PROT SERPL-MCNC: 7.1 G/DL (ref 6.4–8.2)
SODIUM SERPL-SCNC: 145 MMOL/L (ref 136–145)
TRIGL SERPL-MCNC: 185 MG/DL (ref ?–150)

## 2020-02-18 PROCEDURE — 94668 MNPJ CHEST WALL SBSQ: CPT

## 2020-02-18 PROCEDURE — C9113 INJ PANTOPRAZOLE SODIUM, VIA: HCPCS | Performed by: NURSE PRACTITIONER

## 2020-02-18 PROCEDURE — 82330 ASSAY OF CALCIUM: CPT

## 2020-02-18 PROCEDURE — 94762 N-INVAS EAR/PLS OXIMTRY CONT: CPT

## 2020-02-18 PROCEDURE — 74011000250 HC RX REV CODE- 250: Performed by: SURGERY

## 2020-02-18 PROCEDURE — 74011636637 HC RX REV CODE- 636/637: Performed by: PHYSICIAN ASSISTANT

## 2020-02-18 PROCEDURE — 74011250636 HC RX REV CODE- 250/636: Performed by: HOSPITALIST

## 2020-02-18 PROCEDURE — 65660000000 HC RM CCU STEPDOWN

## 2020-02-18 PROCEDURE — 74011250636 HC RX REV CODE- 250/636: Performed by: SURGERY

## 2020-02-18 PROCEDURE — 36415 COLL VENOUS BLD VENIPUNCTURE: CPT

## 2020-02-18 PROCEDURE — 83735 ASSAY OF MAGNESIUM: CPT

## 2020-02-18 PROCEDURE — 84100 ASSAY OF PHOSPHORUS: CPT

## 2020-02-18 PROCEDURE — 74011250636 HC RX REV CODE- 250/636: Performed by: NURSE PRACTITIONER

## 2020-02-18 PROCEDURE — 97530 THERAPEUTIC ACTIVITIES: CPT

## 2020-02-18 PROCEDURE — 80053 COMPREHEN METABOLIC PANEL: CPT

## 2020-02-18 PROCEDURE — 84478 ASSAY OF TRIGLYCERIDES: CPT

## 2020-02-18 PROCEDURE — 84134 ASSAY OF PREALBUMIN: CPT

## 2020-02-18 PROCEDURE — 82962 GLUCOSE BLOOD TEST: CPT

## 2020-02-18 PROCEDURE — 71045 X-RAY EXAM CHEST 1 VIEW: CPT

## 2020-02-18 PROCEDURE — 74011000250 HC RX REV CODE- 250: Performed by: INTERNAL MEDICINE

## 2020-02-18 PROCEDURE — 74011000250 HC RX REV CODE- 250: Performed by: NURSE PRACTITIONER

## 2020-02-18 PROCEDURE — 94640 AIRWAY INHALATION TREATMENT: CPT

## 2020-02-18 PROCEDURE — 74011250636 HC RX REV CODE- 250/636: Performed by: FAMILY MEDICINE

## 2020-02-18 PROCEDURE — 74011250637 HC RX REV CODE- 250/637: Performed by: INTERNAL MEDICINE

## 2020-02-18 PROCEDURE — 77010033711 HC HIGH FLOW OXYGEN

## 2020-02-18 PROCEDURE — 74011250637 HC RX REV CODE- 250/637: Performed by: NURSE PRACTITIONER

## 2020-02-18 PROCEDURE — 74011000258 HC RX REV CODE- 258: Performed by: SURGERY

## 2020-02-18 PROCEDURE — 74011000250 HC RX REV CODE- 250: Performed by: HOSPITALIST

## 2020-02-18 RX ORDER — OXYCODONE AND ACETAMINOPHEN 5; 325 MG/1; MG/1
1 TABLET ORAL
Status: DISCONTINUED | OUTPATIENT
Start: 2020-02-18 | End: 2020-02-28 | Stop reason: HOSPADM

## 2020-02-18 RX ORDER — CLONIDINE 0.1 MG/24H
1 PATCH, EXTENDED RELEASE TRANSDERMAL
Status: DISCONTINUED | OUTPATIENT
Start: 2020-02-18 | End: 2020-02-28 | Stop reason: HOSPADM

## 2020-02-18 RX ORDER — METOPROLOL TARTRATE 5 MG/5ML
2.5 INJECTION INTRAVENOUS EVERY 4 HOURS
Status: DISCONTINUED | OUTPATIENT
Start: 2020-02-18 | End: 2020-02-21

## 2020-02-18 RX ORDER — SODIUM CHLORIDE 9 MG/ML
40 INJECTION, SOLUTION INTRAVENOUS CONTINUOUS
Status: DISCONTINUED | OUTPATIENT
Start: 2020-02-18 | End: 2020-02-19

## 2020-02-18 RX ADMIN — HYDROMORPHONE HYDROCHLORIDE 1 MG: 1 INJECTION, SOLUTION INTRAMUSCULAR; INTRAVENOUS; SUBCUTANEOUS at 13:13

## 2020-02-18 RX ADMIN — SODIUM CHLORIDE 40 ML/HR: 900 INJECTION, SOLUTION INTRAVENOUS at 21:39

## 2020-02-18 RX ADMIN — METOPROLOL TARTRATE 2.5 MG: 5 INJECTION INTRAVENOUS at 17:10

## 2020-02-18 RX ADMIN — METOPROLOL TARTRATE 2.5 MG: 5 INJECTION INTRAVENOUS at 21:39

## 2020-02-18 RX ADMIN — INSULIN LISPRO 3 UNITS: 100 INJECTION, SOLUTION INTRAVENOUS; SUBCUTANEOUS at 17:24

## 2020-02-18 RX ADMIN — IPRATROPIUM BROMIDE AND ALBUTEROL SULFATE 3 ML: .5; 3 SOLUTION RESPIRATORY (INHALATION) at 13:10

## 2020-02-18 RX ADMIN — OXYCODONE HYDROCHLORIDE AND ACETAMINOPHEN 2 TABLET: 5; 325 TABLET ORAL at 05:18

## 2020-02-18 RX ADMIN — METRONIDAZOLE 500 MG: 500 INJECTION, SOLUTION INTRAVENOUS at 17:10

## 2020-02-18 RX ADMIN — SODIUM CHLORIDE 75 ML/HR: 900 INJECTION, SOLUTION INTRAVENOUS at 01:05

## 2020-02-18 RX ADMIN — METOPROLOL TARTRATE 2.5 MG: 5 INJECTION INTRAVENOUS at 05:18

## 2020-02-18 RX ADMIN — NYSTATIN 500000 UNITS: 500000 SUSPENSION ORAL at 08:48

## 2020-02-18 RX ADMIN — WATER 2 G: 1 INJECTION INTRAMUSCULAR; INTRAVENOUS; SUBCUTANEOUS at 08:48

## 2020-02-18 RX ADMIN — OXYCODONE HYDROCHLORIDE AND ACETAMINOPHEN 2 TABLET: 5; 325 TABLET ORAL at 08:48

## 2020-02-18 RX ADMIN — IPRATROPIUM BROMIDE AND ALBUTEROL SULFATE 3 ML: .5; 3 SOLUTION RESPIRATORY (INHALATION) at 09:17

## 2020-02-18 RX ADMIN — HEPARIN SODIUM 5000 UNITS: 5000 INJECTION INTRAVENOUS; SUBCUTANEOUS at 00:36

## 2020-02-18 RX ADMIN — NYSTATIN 500000 UNITS: 500000 SUSPENSION ORAL at 13:13

## 2020-02-18 RX ADMIN — METOPROLOL TARTRATE 2.5 MG: 5 INJECTION INTRAVENOUS at 13:13

## 2020-02-18 RX ADMIN — LATANOPROST 1 DROP: 50 SOLUTION OPHTHALMIC at 21:51

## 2020-02-18 RX ADMIN — DEXTROSE, SOYBEAN OIL, ELECTROLYTES, LYSINE, PHENYLALANINE, LEUCINE, VALINE, THREONINE, METHIONINE, ISOLEUCINE, TRYPTOPHAN, ALANINE, ARGININE, GLYCINE, PROLINE, HISTIDINE, GLUTAMIC ACID, SERINE, ASPARTIC ACID AND TYROSINE
6.8; 3.5; 170; 174; 105; 68; 20; 187; 164; 164; 152; 116; 116; 116; 40; 333; 235; 164; 141; 141; 116; 94; 71; 4.8 INJECTION, EMULSION INTRAVENOUS at 21:56

## 2020-02-18 RX ADMIN — Medication 10 ML: at 05:28

## 2020-02-18 RX ADMIN — INSULIN LISPRO 3 UNITS: 100 INJECTION, SOLUTION INTRAVENOUS; SUBCUTANEOUS at 12:00

## 2020-02-18 RX ADMIN — HEPARIN SODIUM 5000 UNITS: 5000 INJECTION INTRAVENOUS; SUBCUTANEOUS at 17:11

## 2020-02-18 RX ADMIN — Medication 10 ML: at 21:51

## 2020-02-18 RX ADMIN — PANTOPRAZOLE SODIUM 40 MG: 40 INJECTION, POWDER, FOR SOLUTION INTRAVENOUS at 08:48

## 2020-02-18 RX ADMIN — Medication 10 ML: at 15:00

## 2020-02-18 RX ADMIN — SODIUM CHLORIDE 75 ML/HR: 900 INJECTION, SOLUTION INTRAVENOUS at 16:04

## 2020-02-18 RX ADMIN — HEPARIN SODIUM 5000 UNITS: 5000 INJECTION INTRAVENOUS; SUBCUTANEOUS at 08:48

## 2020-02-18 RX ADMIN — IPRATROPIUM BROMIDE AND ALBUTEROL SULFATE 3 ML: .5; 3 SOLUTION RESPIRATORY (INHALATION) at 03:21

## 2020-02-18 RX ADMIN — IPRATROPIUM BROMIDE AND ALBUTEROL SULFATE 3 ML: .5; 3 SOLUTION RESPIRATORY (INHALATION) at 19:35

## 2020-02-18 RX ADMIN — FAMOTIDINE 20 MG: 10 INJECTION, SOLUTION INTRAVENOUS at 21:40

## 2020-02-18 RX ADMIN — HYDROMORPHONE HYDROCHLORIDE 1 MG: 1 INJECTION, SOLUTION INTRAMUSCULAR; INTRAVENOUS; SUBCUTANEOUS at 17:11

## 2020-02-18 RX ADMIN — METOPROLOL TARTRATE 2.5 MG: 5 INJECTION INTRAVENOUS at 00:36

## 2020-02-18 RX ADMIN — METRONIDAZOLE 500 MG: 500 INJECTION, SOLUTION INTRAVENOUS at 05:13

## 2020-02-18 RX ADMIN — NYSTATIN 500000 UNITS: 500000 SUSPENSION ORAL at 17:11

## 2020-02-18 RX ADMIN — WATER 2 G: 1 INJECTION INTRAMUSCULAR; INTRAVENOUS; SUBCUTANEOUS at 21:39

## 2020-02-18 RX ADMIN — NYSTATIN 500000 UNITS: 500000 SUSPENSION ORAL at 21:40

## 2020-02-18 RX ADMIN — HYDROMORPHONE HYDROCHLORIDE 1 MG: 1 INJECTION, SOLUTION INTRAMUSCULAR; INTRAVENOUS; SUBCUTANEOUS at 21:40

## 2020-02-18 RX ADMIN — IPRATROPIUM BROMIDE AND ALBUTEROL SULFATE 3 ML: .5; 3 SOLUTION RESPIRATORY (INHALATION) at 16:10

## 2020-02-18 RX ADMIN — IPRATROPIUM BROMIDE AND ALBUTEROL SULFATE 3 ML: .5; 3 SOLUTION RESPIRATORY (INHALATION) at 23:59

## 2020-02-18 RX ADMIN — HYDROMORPHONE HYDROCHLORIDE 1 MG: 1 INJECTION, SOLUTION INTRAMUSCULAR; INTRAVENOUS; SUBCUTANEOUS at 01:19

## 2020-02-18 NOTE — CDMP QUERY
Pt admitted with incisional hernia. Pt noted to have postoperative ileus. If possible, please document in progress notes and d/c summary: 
 
? Post op ileus as a post-operative complication ? Post op ileus as an expected/inherent condition that occurred post-operatively and not a complication ? Post op ileus related to other underlying condition or incidental risk factor (please specify) occurring after surgery and not a complication ? Other, please specify ? Unable to determine The medical record reflects the following: 
               Risk Factors: compartment syndrome of the abdomen, acute respiratory failure Clinical indicators:   
\"very sluggish bowel sounds\"  Per Dr. Tucker Rdz, Progress Note, 2/16/2020 \"Again seen are multiple dilated gas-filled loops of small bowel in addition to 
moderately abundant gas diffusely in the colon, similar appearing compared to 
Prior, . . \"  Per [facility] report, 2/16/2020 
\"abdomen is soft with pronounced distention and generalized tenderness without rebound or guarding. \"  And  
\" He can feel flatus passing. \"  Per Dr. Wicho Pollard, progress note, 2/18/2020 Treatment:  NG tube, rectal tube, TPN Thank you, [address], [address] [facility], [phone]

## 2020-02-18 NOTE — ROUTINE PROCESS
0730 Bedside and Verbal shift change report given to Mina Briseno RN (oncoming nurse) by Pj Pretty. Josefina Cavazos (offgoing nurse). Report included the following information SBAR, Kardex, MAR and Recent Results.

## 2020-02-18 NOTE — PROGRESS NOTES
Yifan Pacheco M.D. FACS  PROGRESS NOTE    Name: Yuliana Lewis MRN: [id number]   : 1950 [facility]   Date: 2020 Admission Date: 2020  5:32 AM     Hospital Day: 6  5 Days Post-Op  Subjective:  Patient without acute overnight events. NG tube output is low. He can feel flatus passing. Objective:  Vitals:    20 0400 20 0500 20 0600 20 0605   BP: 131/44 (!) 164/91 (!) 169/102 (!) 179/91   Pulse: (!) 110 (!) 112 (!) 104 (!) 107   Resp:    Temp:       SpO2: 95% 95% 96% 97%   Weight:       Height:         Date 20 0700 - 20 0659 20 0700 - 20 0659   Shift 2860-6018 3357-1201 24 Hour Total 4976-1421 3097-0179 24 Hour Total   INTAKE   P.O. 0  0        P. O. 0  0      I. V.(mL/kg/hr) 1826.3(1.6) 610(0.5) 2436.3(1.1)        Volume (0.9% sodium chloride infusion) 1606.3 600 2206.3        Volume (metroNIDAZOLE (FLAGYL) IVPB premix 500 mg) 200  200        Volume (cefepime (MAXIPIME) 2 g in sterile water (preservative free) 10 mL IV syringe) 20 10 30      Other 125 25 150        Irrigation Volume Input (mL) (Urinary Catheter 20 Mckeon) 125 25 150      NG/GT 0 30 30        Water Flush Volume (mL) (Nasogastric Tube 02/15/20)  30 30        Intake (ml) (Nasogastric Tube 02/15/20) 0  0      Shift Total(mL/kg) 1951.3(21.1) 665(7.2) 3602.7(40.8)      OUTPUT   Urine(mL/kg/hr) 870(0.8) 600(0.5) 1470(0.7)        Urine Output (mL) (Urinary Catheter 02/13/20 Mckeon)       Emesis/NG output 900  900        Output (ml) (Nasogastric Tube 02/15/20) 900  900      Drains 197 120 317        Output (ml) (Matthew-Garcia Drain 20 Abdomen) 85 90 175        Output (ml) (Matthew-Garcia Drain 20 Abdomen) 12 30 42        Output (ml) (Fecal Management) 100 0 100      Shift Total(mL/kg) 3514(61.4) 720(7.8) 8877(61)      NET -15.8 -55 -70.8      Weight (kg) 92.5 92.5 92.5 92.5 92.5 92.5         Physical Exam:    General: Awake and alert, oriented x4, no apparent distress   Abdomen: abdomen is soft with pronounced distention and generalized tenderness without rebound or guarding. Incision with Edy Ok in place. Bilateral ANGELI sites with Tegaderm and Biopatch is in place clean and clear. ANGELI drains have serosanguineous drainage.   No masses, organomegaly or guarding    Labs:  Recent Results (from the past 24 hour(s))   GLUCOSE, POC    Collection Time: 02/17/20 11:39 AM   Result Value Ref Range    Glucose (POC) 127 (H) 70 - 110 mg/dL   GLUCOSE, POC    Collection Time: 02/17/20  5:01 PM   Result Value Ref Range    Glucose (POC) 108 70 - 110 mg/dL   GLUCOSE, POC    Collection Time: 02/18/20 12:35 AM   Result Value Ref Range    Glucose (POC) 111 (H) 70 - 110 mg/dL   GLUCOSE, POC    Collection Time: 02/18/20  5:30 AM   Result Value Ref Range    Glucose (POC) 140 (H) 70 - 110 mg/dL     All Micro Results     Procedure Component Value Units Date/Time    CULTURE, RESPIRATORY/SPUTUM/BRONCH Jose Alejandro Flowersam STAIN [[id number]] Collected:  02/15/20 1010    Order Status:  Completed Specimen:  Sputum Updated:  02/17/20 1009     Special Requests: NO SPECIAL REQUESTS        GRAM STAIN RARE WBCS SEEN         FEW EPITHELIAL CELLS SEEN               RARE GRAM POSITIVE COCCI IN PAIRS           Culture result:       RARE NORMAL RESPIRATORY GALE          CULTURE, URINE [[id number]] Collected:  02/14/20 1230    Order Status:  Completed Specimen:  Clean catch Updated:  02/15/20 0908     Special Requests: NO SPECIAL REQUESTS        Culture result: NO GROWTH 1 DAY             Current Medications:  Current Facility-Administered Medications   Medication Dose Route Frequency Provider Last Rate Last Dose    metoprolol (LOPRESSOR) injection 2.5 mg  2.5 mg IntraVENous Q6H Juan Diego Nair MD   2.5 mg at 02/18/20 0518    ondansetron (ZOFRAN) injection 4 mg  4 mg IntraVENous Q6H PRN Bayron Sr PA-C        insulin lispro (HUMALOG) injection   SubCUTAneous Q6H Carolina Araujo PA-C   Stopped at 02/16/20 0600    metroNIDAZOLE (FLAGYL) IVPB premix 500 mg  500 mg IntraVENous Q12H Abiola Cruz  mL/hr at 02/18/20 0513 500 mg at 02/18/20 0513    cefepime (MAXIPIME) 2 g in sterile water (preservative free) 10 mL IV syringe  2 g IntraVENous Q12H Abiola Cruz MD   2 g at 02/17/20 2205    pantoprazole (PROTONIX) 40 mg in 0.9% sodium chloride 10 mL injection  40 mg IntraVENous BID Yadira Joshi NP   40 mg at 02/17/20 1836    albuterol (PROVENTIL VENTOLIN) nebulizer solution 2.5 mg  2.5 mg Nebulization Q4H PRN Trisha Arts A, DO   2.5 mg at 02/14/20 0341    sodium chloride (OCEAN) 0.65 % nasal squeeze bottle 2 Spray  2 Spray Both Nostrils Q2H PRN Luisqueenie Mcclelland, DO        glucose chewable tablet 16 g  4 Tab Oral PRN [facility] Mire[facility], NP        glucagon (GLUCAGEN) injection 1 mg  1 mg IntraMUSCular PRN Jayla Gonzalez, NP        dextrose (D50W) injection syrg 12.5-25 g  25-50 mL IntraVENous PRN Carlos [facility] Mire[facility], NP        oxyCODONE-acetaminophen (PERCOCET) 5-325 mg per tablet 2 Tab  2 Tab Oral Q6H PRN Carlos [facility] Chi, NP   2 Tab at 02/18/20 0518    [Held by provider] nebivolol (BYSTOLIC) tablet 10 mg  10 mg Oral DAILY Abiola Cruz MD   Stopped at 02/15/20 0900    benzocaine-menthol (CEPACOL) lozenge 1 Lozenge  1 Lozenge Mucous Membrane PRN Brooke Nair MD        nystatin (MYCOSTATIN) 100,000 unit/mL oral suspension 500,000 Units  500,000 Units Oral QID [facility], NP   500,000 Units at 02/17/20 2205    phenol throat spray (CHLORASEPTIC) 1 Spray  1 [place] Oral PRN Carlos [facility] Chi, NP   1 Spray at 02/14/20 1055    albuterol-ipratropium (DUO-NEB) 2.5 MG-0.5 MG/3 ML  3 mL Nebulization Q4H RT Juan Diego Nair MD   3 mL at 02/18/20 0321    albuterol-ipratropium (DUO-NEB) 2.5 MG-0.5 MG/3 ML  3 mL Nebulization Q4H PRN Theta Najjar, MD   3 mL at 02/15/20 2320    0.9% sodium chloride infusion  75 mL/hr IntraVENous CONTINUOUS Vinay Wells MD 75 mL/hr at 02/18/20 0105 75 mL/hr at 02/18/20 0105    sodium chloride (NS) flush 5-40 mL  5-40 mL IntraVENous Q8H Darlene Bonds MD   10 mL at 02/18/20 0528    sodium chloride (NS) flush 5-40 mL  5-40 mL IntraVENous PRN Darlene Bonds MD   10 mL at 02/15/20 1246    heparin (porcine) injection 5,000 Units  5,000 Units SubCUTAneous Q8H Darlene Bonds MD   5,000 Units at 02/18/20 0036    [Held by provider] gabapentin (NEURONTIN) capsule 100 mg  100 mg Oral TID Darlene Bonds MD   Stopped at 02/16/20 0900    [Held by provider] ferrous sulfate tablet 325 mg  325 mg Oral Q MON, WED & Daris Gilford, MD   325 mg at 02/14/20 2117    latanoprost (XALATAN) 0.005 % ophthalmic solution 1 Drop  1 Drop Both Eyes QHS Darlene Bonds MD   1 Drop at 02/17/20 2210    acetaminophen (TYLENOL) tablet 650 mg  650 mg Oral Q4H PRN Escobar Gonzalez, NP        HYDROmorphone (DILAUDID) injection 0.5 mg  0.5 mg IntraVENous Q4H PRN Escobar Gonzalez, NP   0.5 mg at 02/17/20 0257    oxyCODONE-acetaminophen (PERCOCET) 5-325 mg per tablet 1 Tab  1 Tab Oral Q4H PRN Wili Ek, NP   1 Tab at 02/16/20 2054    [Held by provider] polyethylene glycol (MIRALAX) packet 17 g  17 g Oral DAILY Wili Ek, NP   Stopped at 02/14/20 4975    [Held by provider] docusate sodium (COLACE) capsule 100 mg  100 mg Oral DAILY Escobar Gonzalez, NP   Stopped at 02/14/20 0908    [Held by provider] lactobacillus sp. 50 billion cpu (BIO-K PLUS) capsule 1 Cap  1 Cap Oral DAILY Zo Gonzalez NP   Stopped at 02/15/20 0900    HYDROmorphone (DILAUDID) injection 1 mg  1 mg IntraVENous Q3H PRN Darlene Bonds MD   1 mg at 02/18/20 0119       Chart and notes reviewed. Data reviewed.  I have evaluated and examined the patient. IMPRESSION:   · Patient is postop day 5 from abdominal wall reconstruction with transversus abdominis release and postoperative abdominal distention and oliguria from prerenal ATN which is resolving. PLAN:/DISCUSION:   · DC Mckeon catheter with accurate I's and O's  · DC fecal management system  · Clamp nasogastric tube for 4 hours. Unclamp the tube at 4 hours. If residual is less than 200 mL's remove nasogastric tube. · Encourage incentive spirometry usage.   · Out of bed to chair, ambulate in lindsey  · Tamiko Aldridge MD

## 2020-02-18 NOTE — PROGRESS NOTES
Speech Pathology:     Per GI: continue NGT for now. Will continue to monitor for follow up.      Thank you for this referral.    Moni Mariscal M.S. CCC-SLP/L  Speech-Language Pathologist

## 2020-02-18 NOTE — PROGRESS NOTES
RENAL DAILY PROGRESS NOTE            71y M with PMH colon Ca, s/p resection, post insictional hernia, s/p hernia repair with [facility], developed post operative JOSE ANTONIO    Subjective:       Complaint:   Overnight events noted  Hemodynamic stable   His lab is pending this morning   Urine output about 1.4L in last 24hrs. IMPRESSION:   JOSE ANTONIO, post operative , higher abdominal pressure suggestive of compartment syndrome,   Hypernatremia   Metabolic acidosis   Leucocytosis   S/p open repair incisional hernia repair,  Post operative ileus   Post operative respiratory distress. PLAN:   DC iv fluid once TPN started  Adjust electrolyte in TPN . Will follow lab today. Please monitor serial bladder pressure. If bladder pressure more than 20 then need surgery colleague input. Monitor urine output closely. Avoid hypotension. Following closely.             Current Facility-Administered Medications   Medication Dose Route Frequency    metoprolol (LOPRESSOR) injection 2.5 mg  2.5 mg IntraVENous Q6H    ondansetron (ZOFRAN) injection 4 mg  4 mg IntraVENous Q6H PRN    insulin lispro (HUMALOG) injection   SubCUTAneous Q6H    metroNIDAZOLE (FLAGYL) IVPB premix 500 mg  500 mg IntraVENous Q12H    cefepime (MAXIPIME) 2 g in sterile water (preservative free) 10 mL IV syringe  2 g IntraVENous Q12H    pantoprazole (PROTONIX) 40 mg in 0.9% sodium chloride 10 mL injection  40 mg IntraVENous BID    albuterol (PROVENTIL VENTOLIN) nebulizer solution 2.5 mg  2.5 mg Nebulization Q4H PRN    sodium chloride (OCEAN) 0.65 % nasal squeeze bottle 2 Spray  2 Spray Both Nostrils Q2H PRN    glucose chewable tablet 16 g  4 Tab Oral PRN    glucagon (GLUCAGEN) injection 1 mg  1 mg IntraMUSCular PRN    dextrose (D50W) injection syrg 12.5-25 g  25-50 mL IntraVENous PRN    oxyCODONE-acetaminophen (PERCOCET) 5-325 mg per tablet 2 Tab  2 Tab Oral Q6H PRN    [Held by provider] nebivolol (BYSTOLIC) tablet 10 mg  10 mg Oral DAILY    benzocaine-menthol (CEPACOL) lozenge 1 Lozenge  1 Lozenge Mucous Membrane PRN    nystatin (MYCOSTATIN) 100,000 unit/mL oral suspension 500,000 Units  500,000 Units Oral QID    phenol throat spray (CHLORASEPTIC) 1 Spray  1 Spray Oral PRN    albuterol-ipratropium (DUO-NEB) 2.5 MG-0.5 MG/3 ML  3 mL Nebulization Q4H RT    albuterol-ipratropium (DUO-NEB) 2.5 MG-0.5 MG/3 ML  3 mL Nebulization Q4H PRN    0.9% sodium chloride infusion  75 mL/hr IntraVENous CONTINUOUS    sodium chloride (NS) flush 5-40 mL  5-40 mL IntraVENous Q8H    sodium chloride (NS) flush 5-40 mL  5-40 mL IntraVENous PRN    heparin (porcine) injection 5,000 Units  5,000 Units SubCUTAneous Q8H    [Held by provider] gabapentin (NEURONTIN) capsule 100 mg  100 mg Oral TID    [Held by provider] ferrous sulfate tablet 325 mg  325 mg Oral Q MON, WED & FRI    latanoprost (XALATAN) 0.005 % ophthalmic solution 1 Drop  1 Drop Both Eyes QHS    acetaminophen (TYLENOL) tablet 650 mg  650 mg Oral Q4H PRN    HYDROmorphone (DILAUDID) injection 0.5 mg  0.5 mg IntraVENous Q4H PRN    oxyCODONE-acetaminophen (PERCOCET) 5-325 mg per tablet 1 Tab  1 Tab Oral Q4H PRN    [Held by provider] polyethylene glycol (MIRALAX) packet 17 g  17 g Oral DAILY    [Held by provider] docusate sodium (COLACE) capsule 100 mg  100 mg Oral DAILY    [Held by provider] lactobacillus sp. 50 billion cpu (BIO-K PLUS) capsule 1 Cap  1 Cap Oral DAILY    HYDROmorphone (DILAUDID) injection 1 mg  1 mg IntraVENous Q3H PRN       Review of Symptoms: comprehensive ROS negative except above.    Objective:     Patient Vitals for the past 24 hrs:   Temp Pulse Resp BP SpO2   02/18/20 0921 -- -- -- -- 97 %   02/18/20 0800 98.8 °F (37.1 °C) -- -- -- --   02/18/20 0700 -- (!) 111 16 (!) 172/91 97 %   02/18/20 0605 -- (!) 107 25 (!) 179/91 97 %   02/18/20 0600 -- (!) 104 20 (!) 169/102 96 %   02/18/20 0500 -- (!) 112 22 (!) 164/91 95 %   02/18/20 0400 98.8 °F (37.1 °C) (!) 110 19 131/44 95 %   02/18/20 7901 -- -- -- -- 99 %   02/18/20 0321 -- -- -- -- 97 %   02/18/20 0300 -- (!) 107 15 155/90 95 %   02/18/20 0200 -- (!) 108 17 (!) 154/129 97 %   02/18/20 0124 -- (!) 109 14 (!) 144/96 94 %   02/18/20 0100 98.6 °F (37 °C) (!) 130 27 (!) 187/95 (!) 83 %   02/17/20 2312 -- -- -- -- 100 %   02/17/20 2310 -- -- -- -- 100 %   02/17/20 2300 -- (!) 123 18 164/85 96 %   02/17/20 2200 -- (!) 116 21 (!) 174/142 97 %   02/17/20 2100 -- (!) 115 23 154/78 96 %   02/17/20 2000 98.6 °F (37 °C) (!) 111 21 152/89 98 %   02/17/20 1929 -- -- -- -- 100 %   02/17/20 1926 -- -- -- -- 100 %   02/17/20 1900 -- (!) 108 19 140/76 97 %   02/17/20 1800 -- (!) 126 26 (!) 132/101 95 %   02/17/20 1712 -- -- -- -- 97 %   02/17/20 1700 -- (!) 118 17 (!) 136/100 98 %   02/17/20 1600 98.5 °F (36.9 °C) (!) 112 18 (!) 155/119 97 %   02/17/20 1500 -- (!) 113 21 178/87 98 %   02/17/20 1300 -- (!) 116 23 166/89 98 %   02/17/20 1200 98.5 °F (36.9 °C) (!) 113 26 107/72 96 %   02/17/20 1100 -- (!) 117 22 (!) 130/107 96 %        Weight change:      02/16 1901 - 02/18 0700  In: 3328.8 [I.V.:3088.8]  Out: 2667 [Urine:2145; Drains:447]    Intake/Output Summary (Last 24 hours) at 2/18/2020 1000  Last data filed at 2/18/2020 0700  Gross per 24 hour   Intake 2230 ml   Output 2672 ml   Net -442 ml     Physical Exam:   General: comfortable, no acute distress   HEENT sclera anicteric, supple neck, no thyromegaly  CVS: S1S2 heard,  no rub  RS: + air entry b/l,   Abd: distended abdomen, tense   Neuro: non focal, awake, alert , CN II-XII are grossly intact  Extrm: no  edema, no cyanosis, clubbing   Skin: no visible  Rash  Musculoskeletal: No gross joints or bone deformities         Data Review:     LABS:   Hematology:   Recent Labs     02/17/20 0215 02/16/20 0056   WBC 19.9* 24.3*   HGB 11.3* 11.9*   HCT 35.2* 36.1     Chemistry:   Recent Labs     02/17/20 0215 02/16/20 0056 02/15/20  1904   BUN 36* 35* 32*   CREA 1.58* 2.41* 2.45*   CA 7.5* 7.8* 7.2*   K 4.5 5.0 4.9  142 147*   * 114* 111   CO2 17* 21 20*   PHOS 2.5  --   --    * 142* 170*            Procedures/imaging: see electronic medical records for all procedures, Xrays and details which were not copied into this note but were reviewed prior to creation of Plan          Assessment & Plan:     As above         Pura Owen MD  2/18/2020  12:39 PM

## 2020-02-18 NOTE — PROGRESS NOTES
[email or web address]  [phone]    Gastroenterology follow up-Progress note    Impression:  1. Post op ileus - POD 5 ex lap for open repair of incisional hernia with mesh 2/13/20, NGT in place, abd pressure initially 26- still with elevated bladder pressure, KUB dilated loops small bowel, moderately diffuse gas in colon/stomach  2.  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 Valerie Martinez op leak that required diverting ileostomy - was reversed 7/2019.   3. Acute respiratory failure - improved; on supplemental O2  4. JOSE ANTONIO decreased UOP, nephrology following- BMP today is pending; fairly low urine output  5. SIRS with leukocytosis - multifactorial   6. 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 Suspect post operative ileus; conservative measures at this time; no plans for decompression. 2. Can trial Mestinon or neostigmine if needed  3. Ensure electrolytes like K and Mg are ideal  4. Continue NGT and rectal tube for now  5. Turn at least every two hours  6. Medical management per primary team    Chief Complaint: abdominal distension      Subjective:  Reports he feels better today; abdomen is still distended- states this has happened to him before and took about 5 days to start to improve. No nausea/vomiting. ROS: Denies any fevers, chills, rash. Eyes: conjunctiva normal, EOM normal   Neck: ROM normal, supple and trachea normal   Cardiovascular: heart normal, intact distal pulses, tachycardia   Pulmonary/Chest Wall: breath sounds normal and effort normal   Abdominal: Distended; hyoactive bowel sounds; non tender. Midline dressing C/D/I.        Patient Active Problem List   Diagnosis Code    Debility R53.81    Leukocytosis D72.829    Intractable low back pain M54.5    Colon polyps K63.5    Hemorrhoids K64.9    Diverticulitis K57.92    Incisional hernia K43.2    Acute kidney injury ([address] P61.7    Metabolic acidosis F85.7         Visit Vitals  BP (!) 172/91   Pulse (!) 111   Temp 98.8 °F (37.1 °C)   Resp 16   Ht 5' 10\" (1.778 m)   Wt 92.5 kg (203 lb 14.8 oz)   SpO2 97%   BMI 29.26 kg/m²           Intake/Output Summary (Last 24 hours) at 2/18/2020 0917  Last data filed at 2/18/2020 0700  Gross per 24 hour   Intake 2230 ml   Output 2672 ml   Net -442 ml       CBC w/Diff    Lab Results   Component Value Date/Time    WBC 19.9 (H) 02/17/2020 02:15 AM    RBC 3.85 (L) 02/17/2020 02:15 AM    HGB 11.3 (L) 02/17/2020 02:15 AM    HCT 35.2 (L) 02/17/2020 02:15 AM    MCV 91.4 02/17/2020 02:15 AM    MCH 29.4 02/17/2020 02:15 AM    MCHC 32.1 02/17/2020 02:15 AM    RDW 15.0 (H) 02/17/2020 02:15 AM     02/17/2020 02:15 AM    Lab Results   Component Value Date/Time    GRANS 84 (H) 02/17/2020 02:15 AM    LYMPH 9 (L) 02/17/2020 02:15 AM    EOS 0 02/17/2020 02:15 AM    BANDS 6 (H) 02/17/2020 02:15 AM    BASOS 0 02/17/2020 02:15 AM    METAS 1 (H) 11/06/2018 03:57 AM      Basic Metabolic Profile   Recent Labs     02/17/20  0215      K 4.5   *   CO2 17*   BUN 36*   CA 7.5*   PHOS 2.5        Hepatic Function    Lab Results   Component Value Date/Time    ALB 3.0 (L) 02/15/2020 04:54 AM    TP 7.2 02/15/2020 04:54 AM    AP 70 02/15/2020 04:54 AM    Lab Results   Component Value Date/Time    SGOT 83 (H) 02/15/2020 04:54 AM          Coags   No results for input(s): PTP, INR, APTT, INREXT, INREXT in the last 72 hours. Sheyla Quigley NP    [facility]. [email or web address]  Phone: [phone]  Pager: [phone]

## 2020-02-18 NOTE — PROGRESS NOTES
RESPIRATORY CARE ASSESSMENT FOR BRONCHIAL HYGIENE OR LUNG EXPANSION THERAPY  Patient  Lola Beckett     77 y.o.   male     2/18/2020  2:40 PM  Patient Active Problem List   Diagnosis Code    Debility R53.81    Leukocytosis D72.829    Intractable low back pain M54.5    Colon polyps K63.5    Hemorrhoids K64.9    Diverticulitis K57.92    Incisional hernia K43.2    Acute kidney injury ([address] N70.8    Metabolic acidosis N83.8       ABG:  Date:2/18/2020  No results found for: PH, PHI, PCO2, PCO2I, PO2, PO2I, HCO3, HCO3I, FIO2, FIO2I    Chest X-ray:  Date:2/18/2020  Results from Hospital Encounter encounter on 02/13/20   XR CHEST PORT    Narrative EXAM:  XR CHEST PORT    INDICATION:   Incisional hernia, TPN    COMPARISON: 2/15/2020 and priors. FINDINGS:  Hypoinflation. Enteric tube extends below the diaphragm terminates in the left  upper quadrant. Gaseous distention of the stomach, colon and some other bowel  loops. Cardiomediastinal silhouette is stable and mildly enlarged. Diffuse increased  interstitial prominence. Bilateral infrahilar opacities. No pneumothorax or  significant pleural effusion. Intact osseous structures. Impression IMPRESSION:    Diffuse increased interstitial prominence may represent mild edema or  infiltrate. Bilateral infrahilar atelectasis or infiltrate. Partially imaged gaseous distention of stomach and bowel loops.        Lab Test:  Date:2/18/2020  WBC:   Lab Results   Component Value Date/Time    WBC 19.9 (H) 02/17/2020 02:15 AM   HGB:   Lab Results   Component Value Date/Time    HGB 11.3 (L) 02/17/2020 02:15 AM    PLTS:   Lab Results   Component Value Date/Time    PLATELET 012 62/79/2960 02:15 AM         Vital Signs:     Patient Vitals for the past 8 hrs:   Temp Pulse Resp BP SpO2   02/18/20 1400 -- (!) 118 20 (!) 166/107 95 %   02/18/20 1313 -- -- -- -- 98 %   02/18/20 1300 -- (!) 127 -- -- 95 %   02/18/20 1200 98.8 °F (37.1 °C) (!) 123 -- (!) 168/104 97 %   02/18/20 1100 -- (!) 120 22 (!) 163/97 95 %   02/18/20 1000 -- (!) 125 21 (!) 162/98 96 %   02/18/20 0921 -- -- -- -- 97 %   02/18/20 0800 98.8 °F (37.1 °C) (!) 114 23 (!) 167/93 95 %   02/18/20 0700 -- (!) 111 16 (!) 172/91 97 %               First Inital Assesment:     []Yes []No   Reevaluation/Reassessment:    []Yes [x]No     CHART REVIEW   Points 0 X 1 X 2 X 3 X 4 X Points   Pulmonary History Smoking History (1) none  Recent Smoking History <1 PPD  Recent Smoking History >1 PPD  Pulmonary Disease or Impairment  Severe Pulmonary Disease  2   Surgical History No Surgery  General Surgery  Lower Abdominal  Thoracic or Upper Abdominal  Thoracic & Pulmonary Disease  2   CXR Clear or not indicated  Chronic changes or CXR Pending  Infiltrate, atelectasis or pleural effusions  Infiltrates in more than 1lobe  Infiltrates +atelectasis  +/or pleural effusions  2     PATIENT ASSESSMENT    0 X 1 X 2 X 3 X 4 X Points   Respiratory Pattern Regular pattern RR 12-20  Increased RR 21-27   Mild Dyspnea at rest, irregular pattern RR 28-32  Moderate Dyspnea at rest, Use of accessory muscles, RR 33-36  Severe Dyspnea, Use of accessory muscles RR >36  1   Mental Status Alert Oriented cooperative  Confused, Follows commands  Lethargic, Does not follow commands  Obtunded  Unresponsive  0   Breath Sounds Clear  Decreased Unilaterally  Decreased Bilaterally  Mild Scattered wheezing or Crackles in bases  Severe Wheezing or rhonchi  3   Cough Strong dry NPC  Strong Productive  Weak NPC  Weak productive or weak with rhonchi  No cough or may require suctioning  1   Level of Activity Ambulatory  Ambulatory with assistance  Temporarily Non-ambulatory  Non-Ambulatory, able to position self  Unable to position self, confined to bed  2   Total Points/Score:   13         Bronchial Hygiene/Secretion Clearance:    []EZPAP []Rotation bed with vibration    []CPT with percussor []CPT via vest   [x]Oscillastiang positive pressure expiratory device      Lung Expansion:    []Incentive Spirometer w/RT visits []Incentive Spirometer w/nursing    []EZPAP [x] Metaneb     Suctioning:    []Nasal Tracheal []Tracheal     suctioning will be ordered and done PRN with an associated frequency such as QID/PRN based on score       Other:    Care Plan   Level # Score Modality Frequency Comment   Level 1 >17      Level 2 14-17      Level 3 10-13 Pep and Metaneb QID    Level 4 1-9        BRONCHIAL HYGIENE SCORING AND FREQUENCY GUIDELINES   Frequency Indications/Findings Level #   Q4 ATC Copious secretions, SOB, unable to sleep 1   QID & PRN at night Moderate amounts of secretions 2   TID or Q6 wa Small amounts of secretions and poor cough: recent history of secretions 3   BID or Q8 wa Unable to deep breathe and cough effectively 4        Comments:  Pt unable to do Metaneb today due to severe abdominal distention. Pt started on Lynnetta Marchi and is able to do frequently on own.     Respiratory Therapist: Laith Payne, RT

## 2020-02-18 NOTE — PROGRESS NOTES
[facility]  Progress Note    Patient: Willis Novak Age: 77 y.o. : 1950 MR#: [id number] SSN: [id number]  Date: 2020       Subjective:     Continues to have abdominal pain. He says he had a small bowel movement. No nausea vomiting currently. NG tube is in situ. Denies any chest pain shortness of breath or cough. No headaches or dizziness currently. Objective:     BP (!) 172/91   Pulse (!) 111   Temp 98.8 °F (37.1 °C)   Resp 16   Ht 5' 10\" (1.778 m)   Wt 92.5 kg (203 lb 14.8 oz)   SpO2 97%   BMI 29.26 kg/m²      General:  Alert, NAD, NG tube in place  Cardiovascular:  RRR, tachycardia  Pulmonary: bilateral coarse breath sounds, no wheezing  GI: abdominal distention, ANGELI drain, wound vac, very sluggish bowel sounds  Extremities:  + edema  Neuro: AAOx4. moves all extremities      Assessment:     1. Incisional hernia s/p open repair of incisional hernia with mesh  2. Post-op ileus with abdominal distention  3. SIRS, better  4. Acute hypoxic respiratory failure due to ileus, improving currently  5. ARF on CKD stage II due to dehydration  6. Lactic acidosis, improved  7. Hypomagnesemia  8. Hyper kalemia  9. DMT2. a1c 7.7  10. HTN  11. GERD  12. History of throat cancer  13.  History of intractable low back pain    Plan:    Continue current management  We will repeat CBC, BMP in the morning as they showed improvement from yesterday lab draw  We will increase the frequency of metoprolol and also add clonidine patch and monitor blood pressure and tachycardia   PT, OT and surgeon to follow this patient     Case discussed with:  [x]Patient  [x]Family  [x]Nursing  []Case Management  DVT Prophylaxis:  []Lovenox  [x]Hep SQ  []SCDs  []Coumadin   []On Heparin gtt    Labs:    Recent Results (from the past 24 hour(s))   GLUCOSE, POC    Collection Time: 20 11:39 AM   Result Value Ref Range    Glucose (POC) 127 (H) 70 - 110 mg/dL   GLUCOSE, POC Collection Time: 02/17/20  5:01 PM   Result Value Ref Range    Glucose (POC) 108 70 - 110 mg/dL   GLUCOSE, POC    Collection Time: 02/18/20 12:35 AM   Result Value Ref Range    Glucose (POC) 111 (H) 70 - 110 mg/dL   GLUCOSE, POC    Collection Time: 02/18/20  5:30 AM   Result Value Ref Range    Glucose (POC) 140 (H) 70 - 110 mg/dL       Signed By: Keron Strickland MD     February 18, 2020

## 2020-02-18 NOTE — PROGRESS NOTES
New OT orders received with patient already on caseload. Will follow up as appropriate. Acknowledged new orders.       Thank you,   Mauricio Márquez MS, OTR/L

## 2020-02-18 NOTE — PROGRESS NOTES
Problem: Infection - Risk of, Surgical Site Infection  Goal: *Absence of surgical site infection signs and symptoms  Outcome: Progressing Towards Goal     Problem: Patient Education: Go to Patient Education Activity  Goal: Patient/Family Education  Outcome: Progressing Towards Goal     Problem: Deep Venous Thrombosis - Risk of  Goal: *Absence of deep venous thrombosis signs and symptoms(Stroke Metric)  Outcome: Progressing Towards Goal  Goal: *Absence of impaired coagulation signs and symptoms  Outcome: Progressing Towards Goal  Goal: *Knowledge of prescribed medications  Outcome: Progressing Towards Goal  Goal: *Absence of bleeding  Outcome: Progressing Towards Goal     Problem: Patient Education: Go to Patient Education Activity  Goal: Patient/Family Education  Outcome: Progressing Towards Goal     Problem: Falls - Risk of  Goal: *Absence of Falls  Description  Document Tia Manus Fall Risk and appropriate interventions in the flowsheet. Outcome: Progressing Towards Goal  Note: Fall Risk Interventions:  Mobility Interventions: Assess mobility with egress test, OT consult for ADLs, PT Consult for mobility concerns, Strengthening exercises (ROM-active/passive)         Medication Interventions: Bed/chair exit alarm, Evaluate medications/consider consulting pharmacy, Patient to call before getting OOB    Elimination Interventions: Bed/chair exit alarm, Elevated toilet seat, Toilet paper/wipes in reach              Problem: Patient Education: Go to Patient Education Activity  Goal: Patient/Family Education  Outcome: Progressing Towards Goal     Problem: Pressure Injury - Risk of  Goal: *Prevention of pressure injury  Description  Document Edin Scale and appropriate interventions in the flowsheet.   Outcome: Progressing Towards Goal  Note: Pressure Injury Interventions:  Sensory Interventions: Assess changes in LOC, Chair cushion, Minimize linen layers, Pad between skin to skin, Sit a 90-degree angle/use footstool if needed    Moisture Interventions: Absorbent underpads, Check for incontinence Q2 hours and as needed, Internal/External urinary devices, Moisture barrier    Activity Interventions: Assess need for specialty bed, Increase time out of bed, PT/OT evaluation    Mobility Interventions: Assess need for specialty bed, Pressure redistribution bed/mattress (bed type), Suspension boots    Nutrition Interventions: Document food/fluid/supplement intake    Friction and Shear Interventions: Apply protective barrier, creams and emollients, HOB 30 degrees or less, Minimize layers                Problem: Patient Education: Go to Patient Education Activity  Goal: Patient/Family Education  Outcome: Progressing Towards Goal     Problem: Pain  Goal: *Control of Pain  Outcome: Progressing Towards Goal     Problem: Patient Education: Go to Patient Education Activity  Goal: Patient/Family Education  Outcome: Progressing Towards Goal     Problem: Patient Education: Go to Patient Education Activity  Goal: Patient/Family Education  Outcome: Progressing Towards Goal     Problem: Diabetes Self-Management  Goal: *Disease process and treatment process  Description  Define diabetes and identify own type of diabetes; list 3 options for treating diabetes. Outcome: Progressing Towards Goal  Goal: *Incorporating nutritional management into lifestyle  Description  Describe effect of type, amount and timing of food on blood glucose; list 3 methods for planning meals. Outcome: Progressing Towards Goal  Goal: *Incorporating physical activity into lifestyle  Description  State effect of exercise on blood glucose levels. Outcome: Progressing Towards Goal  Goal: *Developing strategies to promote health/change behavior  Description  Define the ABC's of diabetes; identify appropriate screenings, schedule and personal plan for screenings.   Outcome: Progressing Towards Goal  Goal: *Using medications safely  Description  State effect of diabetes medications on diabetes; name diabetes medication taking, action and side effects. Outcome: Progressing Towards Goal  Goal: *Monitoring blood glucose, interpreting and using results  Description  Identify recommended blood glucose targets  and personal targets. Outcome: Progressing Towards Goal  Goal: *Prevention, detection, treatment of acute complications  Description  List symptoms of hyper- and hypoglycemia; describe how to treat low blood sugar and actions for lowering  high blood glucose level. Outcome: Progressing Towards Goal  Goal: *Prevention, detection and treatment of chronic complications  Description  Define the natural course of diabetes and describe the relationship of blood glucose levels to long term complications of diabetes.   Outcome: Progressing Towards Goal  Goal: *Developing strategies to address psychosocial issues  Description  Describe feelings about living with diabetes; identify support needed and support network  Outcome: Progressing Towards Goal  Goal: *Sick day guidelines  Outcome: Progressing Towards Goal     Problem: Patient Education: Go to Patient Education Activity  Goal: Patient/Family Education  Outcome: Progressing Towards Goal     Problem: Injury - Risk of, Adverse Drug Event  Goal: *Absence of adverse drug events  Outcome: Progressing Towards Goal  Goal: *Absence of medication errors  Outcome: Progressing Towards Goal  Goal: *Knowledge of prescribed medications  Outcome: Progressing Towards Goal     Problem: Patient Education: Go to Patient Education Activity  Goal: Patient/Family Education  Outcome: Progressing Towards Goal     Problem: Gas Exchange - Impaired  Goal: *Absence of hypoxia  Outcome: Progressing Towards Goal     Problem: Patient Education: Go to Patient Education Activity  Goal: Patient/Family Education  Outcome: Progressing Towards Goal     Problem: Patient Education: Go to Patient Education Activity  Goal: Patient/Family Education  Outcome: Progressing Towards Goal Problem: Patient Education: Go to Patient Education Activity  Goal: Patient/Family Education  Outcome: Progressing Towards Goal     Problem: Nutrition Deficit  Goal: *Optimize nutritional status  Outcome: Progressing Towards Goal

## 2020-02-18 NOTE — PROGRESS NOTES
Problem: Mobility Impaired (Adult and Pediatric)  Goal: *Acute Goals and Plan of Care (Insert Text)  Description  Physical Therapy Goals  Initiated 2/17/2020 and to be accomplished within 7 day(s)  1. Patient will move from supine to sit and sit to supine  and scoot up and down in bed with supervision/set-up. 2.  Patient will transfer from bed to chair and chair to bed with supervision/set-up using the least restrictive device. 3.  Patient will perform sit to stand with supervision/set-up. 4.  Patient will ambulate with supervision/set-up for 50 feet with the least restrictive device. PLOF: Pt reports he was ambulating with RW and independent with self care. 2/18/2020 1443 by Sukhwinder Kwon  Outcome: Progressing Towards Goal     PHYSICAL THERAPY TREATMENT    Patient: Rafal Jones (12 y.o. male)  Date: 2/18/2020  Diagnosis: Incisional hernia [K43.2]   <principal problem not specified>  Procedure(s) (LRB):  OPEN REPAIR OF INCISIONAL HERNIA WITH MESH (N/A) 5 Days Post-Op  Precautions: Fall    ASSESSMENT:  Patient is cleared by nursing for PT, and patient consents to therapy. Patient received supine in bed, many lines attached including hiflow O2. At rest , /99, SpO2 98%. Patient performs supine to sit with SBA, excluding maintenance of lines. Patient with good sitting balance at EOB; agrees to stand today to get to chair. One episode pf desat while EOB to 89%; improved with one verbal cue for pursed lip breathing. Patient performs sit to stand with Alicia; pericare performed in standing with fair dynamic standing balance with support of RW. Patient ambulated 5 ft to chair, shortened step length, decreased graham with Alicia, also A for management of lines. Patient performs stand to sit with SBA; cuing for hand placement. Patient able to scoot backward in chair once seated with SBA. Patient education regarding OOB mobility to chair with nursing, position changes.  Session ended with patient seated upright in recliner, hiflow O2, all lines reattached, all needs in reach. Progression toward goals:   [x]      Improving appropriately and progressing toward goals  []      Improving slowly and progressing toward goals  []      Not making progress toward goals and plan of care will be adjusted     PLAN:  Patient continues to benefit from skilled intervention to address the above impairments. Continue treatment per established plan of care. Discharge Recommendations:  Skilled Nursing Facility  Further Equipment Recommendations for Discharge:  TBD     SUBJECTIVE:   Patient stated I'm so happy to be sitting in the chair.      OBJECTIVE DATA SUMMARY:   Critical Behavior:  Neurologic State: Alert  Orientation Level: Oriented X4  Cognition: Appropriate decision making, Appropriate for age attention/concentration, Appropriate safety awareness  Safety/Judgement: Fall prevention  Functional Mobility Training:  Bed Mobility:     Supine to Sit: Stand-by assistance  Scooting: Stand-by assistance(backward in chair)      Transfers:  Sit to Stand: Minimum assistance  Stand to Sit: Stand-by assistance    Balance:  Sitting: Impaired; Without support  Sitting - Static: Good (unsupported)  Sitting - Dynamic: Fair (occasional)  Standing: Impaired; With support  Standing - Static: Fair  Standing - Dynamic : Occasional            Ambulation/Gait Training:  Distance (ft): 5 Feet (ft)  Assistive Device: Walker, rolling  Ambulation - Level of Assistance: Minimal assistance  Base of Support: Center of gravity altered  Speed/Geovanna: Pace decreased (<100 feet/min)  Step Length: Left shortened;Right shortened      Pain:  Pain level pre-treatment: none  Pain level post-treatment: none    Activity Tolerance:   Fair  Please refer to the flowsheet for vital signs taken during this treatment.     After treatment:   [x] Patient left in no apparent distress sitting up in chair  [] Patient left in no apparent distress in bed  [x] Call bell left within reach  [x] Nursing notified [facility]  [x] Personal items in reach  [] Caregiver present  [] Bed/chair alarm activated  [] SCDs applied      COMMUNICATION/EDUCATION:   [x]         Role of Physical Therapy in the acute care setting. [x]         Fall prevention education was provided and the patient/caregiver indicated understanding. [x]         Patient/family have participated as able in working toward goals and plan of care. [x]         Patient/family agree to work toward stated goals and plan of care. []         Patient understands intent and goals of therapy, but is neutral about his/her participation. []         Patient is unable to participate in stated goals/plan of care: ongoing with therapy staff. [x]         Out of bed at least 3-5 times a day with nursing assistance. []         Other:        Nicolas Forbes DPT   Time Calculation: 30 mins     A student participated in this treatment session. Per CMS Medicare statements and guidelines I certify that the following was true:   1. I was present and directly observed the entire session. 2. I made all skilled judgments and clinical decisions regarding care. 3. I am the practitioner responsible for assessment, treatment, and documentation.     Thank you,   Zena Powers, PT, DPT

## 2020-02-18 NOTE — PROGRESS NOTES
Problem: Mobility Impaired (Adult and Pediatric)  Goal: *Acute Goals and Plan of Care (Insert Text)  Description  Physical Therapy Goals  Initiated 2/17/2020 and to be accomplished within 7 day(s)  1. Patient will move from supine to sit and sit to supine  and scoot up and down in bed with supervision/set-up. 2.  Patient will transfer from bed to chair and chair to bed with supervision/set-up using the least restrictive device. 3.  Patient will perform sit to stand with supervision/set-up. 4.  Patient will ambulate with supervision/set-up for 50 feet with the least restrictive device. PLOF: Pt reports he was ambulating with RW and independent with self care. Outcome: Progressing Towards Goal       PHYSICAL THERAPY EVALUATION    Patient: Duc Chicas (40 y.o. male)  Date: 2/18/2020  Primary Diagnosis: Incisional hernia [K43.2]  Procedure(s) (LRB):  OPEN REPAIR OF INCISIONAL HERNIA WITH MESH (N/A) 5 Days Post-Op   Precautions:   Skin      ASSESSMENT :  Based on the objective data described below, the patient presents with decreased strength, impaired balance, decreased activity tolerance, and decreased independence with mobility. Patient seen in conjunction with OT to maximize pt safety and mobility. Pt demos fair dynamic balance sitting EOB for ~15 minutes, needing occasional support with one hand on bed and CGA. His heart rate ranging from 113 at rest to 122 bpm with activity. Patient declines sit to stand transfer, states this is his first time sitting EOB and is not quite ready. Educated pt on role of PT, goals, activity pacing, pursed lip breathing, and LE there-ex. Patient returned back to bed with SBA, increased time to complete and left with all needs met. Patient will benefit from skilled intervention to address the above impairments.   Patient's rehabilitation potential is considered to be Good  Factors which may influence rehabilitation potential include:   []         None noted  []         Mental ability/status  [x]         Medical condition  [x]         Home/family situation and support systems  []         Safety awareness  [x]         Pain tolerance/management  []         Other:      PLAN :  Recommendations and Planned Interventions:   [x]           Bed Mobility Training             [x]    Neuromuscular Re-Education  [x]           Transfer Training                   []    Orthotic/Prosthetic Training  [x]           Gait Training                          []    Modalities  [x]           Therapeutic Exercises           []    Edema Management/Control  [x]           Therapeutic Activities            [x]    Family Training/Education  [x]           Patient Education  []           Other (comment):    Frequency/Duration: Patient will be followed by physical therapy 1-2 times per day/4-7 days per week to address goals. Discharge Recommendations: Home Health with family support  Further Equipment Recommendations for Discharge: shower chair and rolling walker     SUBJECTIVE:   Patient stated I'm not ready to stand up yet. 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 Marshall Fraser    COLONOSCOPY N/A 11/12/2018    COLONOSCOPY with Polypectomies, Bx's, Injection, Tattooing & Clip Placement x 3 performed by Earl Salazar MD at [facility]  11/12/2018         Homer Watson  11/12/2018         ENDOSCOPIC MUCOSAL RESECT  11/12/2018         EXPLORATORY OF ABDOMEN N/A 12/11/2018    Dr. Priya Kumar N/A 5/8/2019    SIGMOIDOSCOPY FLEXIBLE with polypectomy performed by Olivia Garnica MD at [facility]    HX COLONOSCOPY      HX GI      robotic sigmoid colectomy complicated by small anastomotic leak, status post diverting ileostomy    HX GI      reversal ileostomy    HX HEENT  2008    Throat Ca - 1 vocal chord removed     HX HEENT Bilateral 1970    eye surgery muscles    HX TRACHEOSTOMY  2008    LAP,SURG,COLECTOMY, PARTIAL, W/ANAST N/A 12/04/2018    Dr. Sathya Nixon     Barriers to Learning/Limitations: None  Compensate with: N/A  Home Situation:  Home Situation  Home Environment: Private residence  # Steps to Enter: 3  Rails to Enter: Yes  Wheelchair Ramp: Yes(in garage)  One/Two Story Residence: One story  Living Alone: No  Support Systems: Spouse/Significant Other/Partner  Patient Expects to be Discharged to[de-identified] Private residence  Current DME Used/Available at Home: Pasha Victor, rolling, Goree Benne, straight, Grab bars, Shower chair, Commode, bedside  Tub or Shower Type: Tub/Shower combination    Strength:    Strength: Generally decreased, functional    Tone & Sensation:   Tone: Normal  Sensation: Intact       Range Of Motion:  AROM: Within functional limits     Functional Mobility:  Bed Mobility:  Supine to Sit: Minimum assistance  Sit to Supine: Contact guard assistance  Scooting: Stand-by assistance    Balance:   Sitting: Impaired; Without support  Sitting - Static: Good (unsupported)  Sitting - Dynamic: Fair (occasional)    Therapeutic Exercises:   Educated pt on BLE exercises to improve strength. Pain:  Pain level pre-treatment: 5/10 abdomen   Pain level post-treatment: 5/10   Pain Intervention(s) : Medication (see MAR); Rest, Repositioning  Response to intervention: Nurse notified, See doc flow    Activity Tolerance:   Fair  Please refer to the flowsheet for vital signs taken during this treatment.   After treatment:   []         Patient left in no apparent distress sitting up in chair  [x]         Patient left in no apparent distress in bed  [x]         Call bell left within reach  [x]         Nursing notified  []         Caregiver present  []         Bed alarm activated  []         SCDs applied    COMMUNICATION/EDUCATION:   [x]         Role of Physical Therapy in the acute care setting. [x]         Fall prevention education was provided and the patient/caregiver indicated understanding. [x]         Patient/family have participated as able in goal setting and plan of care. [x]         Patient/family agree to work toward stated goals and plan of care. []         Patient understands intent and goals of therapy, but is neutral about his/her participation. []         Patient is unable to participate in goal setting/plan of care: ongoing with therapy staff.  []         Other:     Thank you for this referral.  Margret Iglesias, PT         [place] Complexity: History: MEDIUM  Complexity : 1-2 comorbidities / personal factors will impact the outcome/ POC Exam:MEDIUM Complexity : 3 Standardized tests and measures addressing body structure, function, activity limitation and / or participation in recreation  Presentation: MEDIUM Complexity : Evolving with changing characteristics  Clinical Decision Making:Medium Complexity    Overall Complexity:MEDIUM

## 2020-02-18 NOTE — PROGRESS NOTES
New PT orders received with pt on caseload. Will follow up as appropriate. Acknowledged new orders.      Thank you,   Margie Bey, PT, DPT

## 2020-02-18 NOTE — PROGRESS NOTES
NUTRITION    Nutrition Consult: TPN: RD to Manage      RECOMMENDATIONS / PLAN:     - Plan to provide peripheral parenteral nutrition using standard premixed product until patient able to tolerate oral diet. - Modify rate of IV fluid once PPN starts at 8 pm tonight, decrease to NS at 40 mL/hr.     - Continue RD inpatient monitoring and evaluation. Please Note: Standard premixed multi-chamber bag to provide standard amount of electrolytes. Additional micronutrient and electrolyte requirements must be supplemented outside of parenteral nutrition order. Trace elements included in PN on MWF only due to product shortage. PARENTERAL NUTRITION ORDER:     Day 1 PPN at 60 mL/hr to provide: 970 kcal, 34 gm amino acids, 97 gm dextrose, 51 gm lipids, 32 mEq Na, 24 mEq K, 8 mEq Mg, 4 mEq Ca, 11 mmol Phos    Additional micronutrients included in PN:   - 10 mL MVI (ascorbic acid, vitamin A, vitamin D, thiamine, riboflavin, pyridoxine, niacinamide, dexpanthenol, vitamin E, vitamin K1, folic acid, biotin, vitamin B12)    Osmolarity: 750 mOsm   Glucose Infusion Rate: 0.73 mg/kg/min    Parenteral Nutrition Access Device: peripheral IV  Indication for PN: bowel rest for postoperative ileus after open hernia repair   Refeeding Risk:      [x]  Yes  [] No      NUTRITION INTERVENTIONS & DIAGNOSIS:     - Parenteral nutrition: initiate   - IV fluid: NS at 75 mL/hr   - Collaboration and referral of nutrition care: MD to review PN order and note daily; will not call to verify content and changes, per MD request. Discussed IV fluid with Dr Jackeline Monae     Nutrition Diagnosis: Inadequate oral intake related to diet intolerance due to altered GI function as evidenced by NPO with postoperative ileus s/p hernia repair.      ASSESSMENT:     S/p open repair of incisional hernia with mesh on 2/13/20; developed postoperative ileus and acute kidney injury with elevated abdominal pressures and remains NPO with NGT for decompression, increased abdominal distension and loose stool via FMS. Plan for PICC placement per surgery, not on the schedule today per discussion with IR.       Nutritional intake adequate to meet patients estimated nutritional needs:  No    Diet: DIET NPO With Ice Chips      Food Allergies: cooked carrots cause regurgitation per pt report (able to tolerate raw carrots)  Current Appetite: Not Applicable - NPO  Appetite/meal intake prior to admission: Good  Feeding Limitations:  [] Swallowing difficulty    [] Chewing difficulty    [] Other:  Current Meal Intake:   Patient Vitals for the past 100 hrs:   % Diet Eaten   02/17/20 1600 0 %   02/17/20 0800 0 %   02/16/20 2035 0 %   02/15/20 2000 0 %     NGT to suction, output x last 24 hours: 1000 mL  BM: 2/17, loose; FMS (output x last 24 hours: 100 mL)  Skin Integrity: abdominal surgical incision; abdominal ANGELI drains x 2  Edema:   [] No     [x] Yes   Pertinent Medications: Reviewed: SSI, ondansetron, pantoprazole; oral medications being held (colace, ferrous sulfate, lactobacillus, miralax)    Labs:   Recent Labs     02/18/20  1021 02/17/20  0215 02/16/20  0056    145 142   K 4.6 4.5 5.0   * 117* 114*   CO2 13* 17* 21   * 110* 142*   BUN 41* 36* 35*   CREA 1.50* 1.58* 2.41*   CA 7.6* 7.5* 7.8*   MG 2.1  --   --    PHOS 3.2 2.5  --    ALB 2.9*  --   --    SGOT 47*  --   --    ALT 45  --   --      Lab Results   Component Value Date/Time    Triglyceride 185 (H) 02/18/2020 10:21 AM     Prealbumin: 12.2 mg/dL (2/18/20)  Ionized Calcium: 1.09 mmol/L (2/18/20)       Intake/Output Summary (Last 24 hours) at 2/18/2020 1321  Last data filed at 2/18/2020 0700  Gross per 24 hour   Intake 1795 ml   Output 2057 ml   Net -262 ml       Anthropometrics:  Ht Readings from Last 1 Encounters:   02/13/20 5' 10\" (1.778 m)     Last 3 Recorded Weights in this Encounter    02/15/20 0359 02/16/20 0659 02/17/20 1600   Weight: 91 kg (200 lb 9.9 oz) 90.9 kg (200 lb 6.4 oz) 92.5 kg (203 lb 14.8 oz) Standing, estimated dry weight of 81.6 kg on 2/13/20    Body mass index is 29.26 kg/m². Weight History: patient reports usual weight of 175-180 lb and weight loss down to 130 lb from December 2018 to January 2019 then gained weight back up to 170 lb in July 2019 and reports weight of 180 on admission; 29 lb, 17% weight loss x 2-3 months per chart hx review and has since gained weight back with additional weight gain since admission due to fluid accumulation     Weight Metrics 2/17/2020 2/13/2020 1/24/2020 12/19/2019 8/26/2019 7/29/2019 7/5/2019   Weight 203 lb 14.8 oz - 183 lb 177 lb 154 lb 160 lb 148 lb 3.2 oz   BMI - 29.26 kg/m2 26.26 kg/m2 25.4 kg/m2 22.1 kg/m2 22.96 kg/m2 21.26 kg/m2        Admitting Diagnosis: Incisional hernia [K43.2]  Pertinent PMHx: HTN, DM, GERD, diverticulitis, 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) with PEG placement (9/9/18), colon cancer s/p sigmoid colectomy (85/0/50) complicated by small anastomotic leak s/p diverting ileostomy (12/11/18) and received parenteral nutrition perioperatively from 12/9 until 12/19/18, s/p loop ileostomy reversal (7/5/19)     Education Needs:        [x] None identified  [] Identified - Not appropriate at this time  []  Identified and addressed - refer to education log  Learning Limitations:   [x] None identified  [] Identified    Cultural, Evangelical & ethnic food preferences:  [x] None identified    [] Identified and addressed     ESTIMATED NUTRITION NEEDS:     Calories: 9062-7664 kcal (20-30 kcal/kg) based on  [x] Actual BW 82 kg (dry weight)     [] IBW   Protein:  gm (1-1.5 gm/kg) based on  [x] Actual BW      [] IBW   Fluid: 1 mL/kcal     MONITORING & EVALUATION:     Nutrition Goal(s):   - Parenteral nutrition intake will meet >75% of patient estimated nutritional needs within the next 7 days.    Outcome: New/Initial goal     Monitoring:   [x] Food and nutrient intake   [x] Food and nutrient administration  [x] Comparative standards   [x] Nutrition-focused physical findings   [x] Anthropometric Measurements   [x] Treatment/therapy   [x] Biochemical data, medical tests, and procedures        Previous Recommendations (for follow-up assessments only):  Not Applicable     Discharge Planning: Nutritional discharge needs unknown at this time. Participated in care planning, discharge planning, & interdisciplinary rounds as appropriate.       Ayse Arora RD, [address] [place]   Pager: [phone]

## 2020-02-19 LAB
ANION GAP SERPL CALC-SCNC: 9 MMOL/L (ref 3–18)
BASOPHILS # BLD: 0 K/UL (ref 0–0.06)
BASOPHILS NFR BLD: 0 % (ref 0–3)
BUN SERPL-MCNC: 37 MG/DL (ref 7–18)
BUN/CREAT SERPL: 27 (ref 12–20)
CALCIUM SERPL-MCNC: 8.2 MG/DL (ref 8.5–10.1)
CHLORIDE SERPL-SCNC: 118 MMOL/L (ref 100–111)
CO2 SERPL-SCNC: 18 MMOL/L (ref 21–32)
CREAT SERPL-MCNC: 1.39 MG/DL (ref 0.6–1.3)
DIFFERENTIAL METHOD BLD: ABNORMAL
EOSINOPHIL # BLD: 0 K/UL (ref 0–0.4)
EOSINOPHIL NFR BLD: 0 % (ref 0–5)
ERYTHROCYTE [DISTWIDTH] IN BLOOD BY AUTOMATED COUNT: 15.1 % (ref 11.6–14.5)
GLUCOSE BLD STRIP.AUTO-MCNC: 132 MG/DL (ref 70–110)
GLUCOSE BLD STRIP.AUTO-MCNC: 180 MG/DL (ref 70–110)
GLUCOSE BLD STRIP.AUTO-MCNC: 193 MG/DL (ref 70–110)
GLUCOSE SERPL-MCNC: 160 MG/DL (ref 74–99)
HCT VFR BLD AUTO: 34.4 % (ref 36–48)
HGB BLD-MCNC: 11.3 G/DL (ref 13–16)
LYMPHOCYTES # BLD: 1.7 K/UL (ref 0.8–3.5)
LYMPHOCYTES NFR BLD: 9 % (ref 20–51)
MAGNESIUM SERPL-MCNC: 2.1 MG/DL (ref 1.6–2.6)
MCH RBC QN AUTO: 30.1 PG (ref 24–34)
MCHC RBC AUTO-ENTMCNC: 32.8 G/DL (ref 31–37)
MCV RBC AUTO: 91.5 FL (ref 74–97)
MONOCYTES # BLD: 0.4 K/UL (ref 0–1)
MONOCYTES NFR BLD: 2 % (ref 2–9)
NEUTS BAND NFR BLD MANUAL: 2 % (ref 0–5)
NEUTS SEG # BLD: 16.3 K/UL (ref 1.8–8)
NEUTS SEG NFR BLD: 87 % (ref 42–75)
PHOSPHATE SERPL-MCNC: 1.8 MG/DL (ref 2.5–4.9)
PLATELET # BLD AUTO: 245 K/UL (ref 135–420)
PLATELET COMMENTS,PCOM: ABNORMAL
PMV BLD AUTO: 9.6 FL (ref 9.2–11.8)
POTASSIUM SERPL-SCNC: 4.6 MMOL/L (ref 3.5–5.5)
RBC # BLD AUTO: 3.76 M/UL (ref 4.7–5.5)
RBC MORPH BLD: ABNORMAL
SODIUM SERPL-SCNC: 145 MMOL/L (ref 136–145)
WBC # BLD AUTO: 18.4 K/UL (ref 4.6–13.2)

## 2020-02-19 PROCEDURE — 83735 ASSAY OF MAGNESIUM: CPT

## 2020-02-19 PROCEDURE — 84145 PROCALCITONIN (PCT): CPT

## 2020-02-19 PROCEDURE — 74011250636 HC RX REV CODE- 250/636: Performed by: NURSE PRACTITIONER

## 2020-02-19 PROCEDURE — 84100 ASSAY OF PHOSPHORUS: CPT

## 2020-02-19 PROCEDURE — 82962 GLUCOSE BLOOD TEST: CPT

## 2020-02-19 PROCEDURE — 74011636637 HC RX REV CODE- 636/637: Performed by: PHYSICIAN ASSISTANT

## 2020-02-19 PROCEDURE — 94640 AIRWAY INHALATION TREATMENT: CPT

## 2020-02-19 PROCEDURE — 36415 COLL VENOUS BLD VENIPUNCTURE: CPT

## 2020-02-19 PROCEDURE — 97535 SELF CARE MNGMENT TRAINING: CPT

## 2020-02-19 PROCEDURE — 74011000250 HC RX REV CODE- 250: Performed by: NURSE PRACTITIONER

## 2020-02-19 PROCEDURE — 74011250637 HC RX REV CODE- 250/637: Performed by: SURGERY

## 2020-02-19 PROCEDURE — 74011000258 HC RX REV CODE- 258: Performed by: SURGERY

## 2020-02-19 PROCEDURE — 74011250636 HC RX REV CODE- 250/636: Performed by: HOSPITALIST

## 2020-02-19 PROCEDURE — 85025 COMPLETE CBC W/AUTO DIFF WBC: CPT

## 2020-02-19 PROCEDURE — 74011000250 HC RX REV CODE- 250: Performed by: HOSPITALIST

## 2020-02-19 PROCEDURE — 74011250636 HC RX REV CODE- 250/636: Performed by: SURGERY

## 2020-02-19 PROCEDURE — 74011250637 HC RX REV CODE- 250/637: Performed by: NURSE PRACTITIONER

## 2020-02-19 PROCEDURE — 74011000250 HC RX REV CODE- 250: Performed by: INTERNAL MEDICINE

## 2020-02-19 PROCEDURE — 97530 THERAPEUTIC ACTIVITIES: CPT

## 2020-02-19 PROCEDURE — 94668 MNPJ CHEST WALL SBSQ: CPT

## 2020-02-19 PROCEDURE — 77010033711 HC HIGH FLOW OXYGEN

## 2020-02-19 PROCEDURE — 80048 BASIC METABOLIC PNL TOTAL CA: CPT

## 2020-02-19 PROCEDURE — 92526 ORAL FUNCTION THERAPY: CPT

## 2020-02-19 PROCEDURE — 65660000000 HC RM CCU STEPDOWN

## 2020-02-19 PROCEDURE — 94762 N-INVAS EAR/PLS OXIMTRY CONT: CPT

## 2020-02-19 RX ORDER — THERA TABS 400 MCG
1 TAB ORAL DAILY
Status: DISCONTINUED | OUTPATIENT
Start: 2020-02-20 | End: 2020-02-28 | Stop reason: HOSPADM

## 2020-02-19 RX ORDER — MAGNESIUM CITRATE
296 SOLUTION, ORAL ORAL
Status: COMPLETED | OUTPATIENT
Start: 2020-02-19 | End: 2020-02-19

## 2020-02-19 RX ADMIN — OXYCODONE HYDROCHLORIDE AND ACETAMINOPHEN 2 TABLET: 5; 325 TABLET ORAL at 23:48

## 2020-02-19 RX ADMIN — MAGNESIUM CITRATE 296 ML: 1.75 LIQUID ORAL at 13:25

## 2020-02-19 RX ADMIN — HEPARIN SODIUM 5000 UNITS: 5000 INJECTION INTRAVENOUS; SUBCUTANEOUS at 23:48

## 2020-02-19 RX ADMIN — NYSTATIN 500000 UNITS: 500000 SUSPENSION ORAL at 21:26

## 2020-02-19 RX ADMIN — NYSTATIN 500000 UNITS: 500000 SUSPENSION ORAL at 19:11

## 2020-02-19 RX ADMIN — INSULIN LISPRO 3 UNITS: 100 INJECTION, SOLUTION INTRAVENOUS; SUBCUTANEOUS at 19:11

## 2020-02-19 RX ADMIN — MAGNESIUM CITRATE 296 ML: 1.75 LIQUID ORAL at 21:26

## 2020-02-19 RX ADMIN — HEPARIN SODIUM 5000 UNITS: 5000 INJECTION INTRAVENOUS; SUBCUTANEOUS at 01:20

## 2020-02-19 RX ADMIN — CEFEPIME 2 G: 2 INJECTION, POWDER, FOR SOLUTION INTRAVENOUS at 19:11

## 2020-02-19 RX ADMIN — LATANOPROST 1 DROP: 50 SOLUTION OPHTHALMIC at 21:29

## 2020-02-19 RX ADMIN — Medication 10 ML: at 22:31

## 2020-02-19 RX ADMIN — METRONIDAZOLE 500 MG: 500 INJECTION, SOLUTION INTRAVENOUS at 03:40

## 2020-02-19 RX ADMIN — INSULIN LISPRO 3 UNITS: 100 INJECTION, SOLUTION INTRAVENOUS; SUBCUTANEOUS at 13:24

## 2020-02-19 RX ADMIN — DEXTROSE, SOYBEAN OIL, ELECTROLYTES, LYSINE, PHENYLALANINE, LEUCINE, VALINE, THREONINE, METHIONINE, ISOLEUCINE, TRYPTOPHAN, ALANINE, ARGININE, GLYCINE, PROLINE, HISTIDINE, GLUTAMIC ACID, SERINE, ASPARTIC ACID AND TYROSINE
6.8; 3.5; 170; 174; 105; 68; 20; 187; 164; 164; 152; 116; 116; 116; 40; 333; 235; 164; 141; 141; 116; 94; 71; 4.8 INJECTION, EMULSION INTRAVENOUS at 22:24

## 2020-02-19 RX ADMIN — METOPROLOL TARTRATE 2.5 MG: 5 INJECTION INTRAVENOUS at 15:39

## 2020-02-19 RX ADMIN — Medication 10 ML: at 13:18

## 2020-02-19 RX ADMIN — HYDROMORPHONE HYDROCHLORIDE 1 MG: 1 INJECTION, SOLUTION INTRAMUSCULAR; INTRAVENOUS; SUBCUTANEOUS at 19:11

## 2020-02-19 RX ADMIN — METOPROLOL TARTRATE 2.5 MG: 5 INJECTION INTRAVENOUS at 03:40

## 2020-02-19 RX ADMIN — HYDROMORPHONE HYDROCHLORIDE 1 MG: 1 INJECTION, SOLUTION INTRAMUSCULAR; INTRAVENOUS; SUBCUTANEOUS at 02:13

## 2020-02-19 RX ADMIN — NYSTATIN 500000 UNITS: 500000 SUSPENSION ORAL at 13:18

## 2020-02-19 RX ADMIN — METOPROLOL TARTRATE 2.5 MG: 5 INJECTION INTRAVENOUS at 10:48

## 2020-02-19 RX ADMIN — METRONIDAZOLE 500 MG: 500 INJECTION, SOLUTION INTRAVENOUS at 15:39

## 2020-02-19 RX ADMIN — HEPARIN SODIUM 5000 UNITS: 5000 INJECTION INTRAVENOUS; SUBCUTANEOUS at 15:39

## 2020-02-19 RX ADMIN — METOPROLOL TARTRATE 2.5 MG: 5 INJECTION INTRAVENOUS at 13:18

## 2020-02-19 RX ADMIN — METOPROLOL TARTRATE 2.5 MG: 5 INJECTION INTRAVENOUS at 23:47

## 2020-02-19 RX ADMIN — INSULIN LISPRO 3 UNITS: 100 INJECTION, SOLUTION INTRAVENOUS; SUBCUTANEOUS at 01:40

## 2020-02-19 RX ADMIN — METOPROLOL TARTRATE 2.5 MG: 5 INJECTION INTRAVENOUS at 21:26

## 2020-02-19 RX ADMIN — FAMOTIDINE 20 MG: 10 INJECTION, SOLUTION INTRAVENOUS at 21:26

## 2020-02-19 RX ADMIN — Medication 10 ML: at 05:53

## 2020-02-19 RX ADMIN — METOPROLOL TARTRATE 2.5 MG: 5 INJECTION INTRAVENOUS at 00:53

## 2020-02-19 RX ADMIN — FAMOTIDINE 20 MG: 10 INJECTION, SOLUTION INTRAVENOUS at 10:48

## 2020-02-19 RX ADMIN — HYDROMORPHONE HYDROCHLORIDE 1 MG: 1 INJECTION, SOLUTION INTRAMUSCULAR; INTRAVENOUS; SUBCUTANEOUS at 10:52

## 2020-02-19 RX ADMIN — IPRATROPIUM BROMIDE AND ALBUTEROL SULFATE 3 ML: .5; 3 SOLUTION RESPIRATORY (INHALATION) at 09:21

## 2020-02-19 RX ADMIN — IPRATROPIUM BROMIDE AND ALBUTEROL SULFATE 3 ML: .5; 3 SOLUTION RESPIRATORY (INHALATION) at 03:43

## 2020-02-19 RX ADMIN — HEPARIN SODIUM 5000 UNITS: 5000 INJECTION INTRAVENOUS; SUBCUTANEOUS at 10:49

## 2020-02-19 RX ADMIN — WATER 2 G: 1 INJECTION INTRAMUSCULAR; INTRAVENOUS; SUBCUTANEOUS at 10:48

## 2020-02-19 RX ADMIN — NYSTATIN 500000 UNITS: 500000 SUSPENSION ORAL at 10:49

## 2020-02-19 NOTE — PROGRESS NOTES
NUTRITION    Nutrition Consult: TPN: RD to Manage      RECOMMENDATIONS / PLAN:     - Plan to provide peripheral parenteral nutrition using standard premixed product until patient able to tolerate adequate intake of oral diet. - Recommend providing additional phosphorus replacement. - Monitor diet tolerance and ability to advance. - Add oral multivitamin and remove from next PPN. - Continue RD inpatient monitoring and evaluation. Please Note: Standard premixed multi-chamber bag to provide standard amount of electrolytes. Additional micronutrient and electrolyte requirements must be supplemented outside of parenteral nutrition order. PARENTERAL NUTRITION ORDER:     Day 2 PPN at 60 mL/hr to provide: 970 kcal, 34 gm amino acids, 97 gm dextrose, 51 gm lipids, 32 mEq Na, 24 mEq K, 8 mEq Mg, 4 mEq Ca, 11 mmol Phos    Osmolarity: 750 mOsm   Glucose Infusion Rate: 0.73 mg/kg/min    Parenteral Nutrition Access Device: peripheral IV  Indication for PN: bowel rest for postoperative ileus after open hernia repair   Refeeding Risk:      [x]  Yes  [] No      NUTRITION INTERVENTIONS & DIAGNOSIS:     - Parenteral nutrition: continue   - Meals/snacks: modified composition, started on clears    - Vitamin and mineral supplement therapy: add theragran   - Collaboration and referral of nutrition care: MD to review PN order and note daily; will not call to verify content and changes, per MD request.     Nutrition Diagnosis: Inadequate oral intake related to diet intolerance due to altered GI function as evidenced by NPO with postoperative ileus s/p hernia repair. ASSESSMENT:     2/19: Renal function improving, IV fluid stopped and NGT removed after pt tolerated clamping trial yesterday. Started on clear liquids. Pt c/o abdominal distension, episode of bilious spit up this morning, nausea now resolved and pt hungry/thirsty and asking to eat.    2/18: S/p open repair of incisional hernia with mesh on 2/13/20; developed postoperative ileus and acute kidney injury with elevated abdominal pressures and remains NPO with NGT for decompression, increased abdominal distension and loose stool via FMS. Plan for PICC placement per surgery, not on the schedule today per discussion with IR.       Nutritional intake adequate to meet patients estimated nutritional needs:  No    Diet: TPN ADULT PERIKABIVEN W/ ADDITIVES  DIET CLEAR LIQUID  TPN ADULT PERIKABIVEN W/ ADDITIVES      Food Allergies: cooked carrots cause regurgitation per pt report (able to tolerate raw carrots)  Current Appetite: Fair  Appetite/meal intake prior to admission: Good  Feeding Limitations:  [] Swallowing difficulty    [] Chewing difficulty    [x] Other: SLP following   Current Meal Intake:   Patient Vitals for the past 100 hrs:   % Diet Eaten   02/17/20 1600 0 %   02/17/20 0800 0 %   02/16/20 2035 0 %   02/15/20 2000 0 %     NGT removed 2/18   BM: 2/18, loose; FMS removed   Skin Integrity: abdominal surgical incision; abdominal ANGELI drains x 2  Edema:   [] No     [x] Yes   Pertinent Medications: Reviewed: SSI, ondansetron, pantoprazole, magnesium citrate; oral medications being held (colace, ferrous sulfate, lactobacillus, miralax)    Labs:   Recent Labs     02/19/20  0119 02/18/20  1021 02/17/20  0215    145 145   K 4.6 4.6 4.5   * 117* 117*   CO2 18* 13* 17*   * 175* 110*   BUN 37* 41* 36*   CREA 1.39* 1.50* 1.58*   CA 8.2* 7.6* 7.5*   MG 2.1 2.1  --    PHOS 1.8* 3.2 2.5   ALB  --  2.9*  --    SGOT  --  47*  --    ALT  --  45  --      Lab Results   Component Value Date/Time    Triglyceride 185 (H) 02/18/2020 10:21 AM     Prealbumin: 12.2 mg/dL (2/18/20)  Ionized Calcium: 1.09 mmol/L (2/18/20)       Intake/Output Summary (Last 24 hours) at 2/19/2020 1221  Last data filed at 2/19/2020 0847  Gross per 24 hour   Intake 1124 ml   Output 1790 ml   Net -666 ml       Anthropometrics:  Ht Readings from Last 1 Encounters:   02/13/20 5' 10\" (1.778 m)     Last 3 Recorded Weights in this Encounter    02/15/20 0359 02/16/20 0659 02/17/20 1600   Weight: 91 kg (200 lb 9.9 oz) 90.9 kg (200 lb 6.4 oz) 92.5 kg (203 lb 14.8 oz)     Standing, estimated dry weight of 81.6 kg on 2/13/20    Body mass index is 29.26 kg/m².        Weight History: patient reports usual weight of 175-180 lb and weight loss down to 130 lb from December 2018 to January 2019 then gained weight back up to 170 lb in July 2019 and reports weight of 180 on admission; 29 lb, 17% weight loss x 2-3 months per chart hx review and has since gained weight back with additional weight gain since admission due to fluid accumulation     Weight Metrics 2/17/2020 2/13/2020 1/24/2020 12/19/2019 8/26/2019 7/29/2019 7/5/2019   Weight 203 lb 14.8 oz - 183 lb 177 lb 154 lb 160 lb 148 lb 3.2 oz   BMI - 29.26 kg/m2 26.26 kg/m2 25.4 kg/m2 22.1 kg/m2 22.96 kg/m2 21.26 kg/m2        Admitting Diagnosis: Incisional hernia [K43.2]  Pertinent PMHx: HTN, DM, GERD, diverticulitis, 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) with PEG placement (9/9/18), colon cancer s/p sigmoid colectomy (73/8/82) complicated by small anastomotic leak s/p diverting ileostomy (12/11/18) and received parenteral nutrition perioperatively from 12/9 until 12/19/18, s/p loop ileostomy reversal (7/5/19)     Education Needs:        [x] None identified  [] Identified - Not appropriate at this time  []  Identified and addressed - refer to education log  Learning Limitations:   [x] None identified  [] Identified    Cultural, Mormon & ethnic food preferences:  [x] None identified    [] Identified and addressed     ESTIMATED NUTRITION NEEDS:     Calories: 3079-8828 kcal (20-30 kcal/kg) based on  [x] Actual BW 82 kg (dry weight)     [] IBW   Protein:  gm (1-1.5 gm/kg) based on  [x] Actual BW      [] IBW   Fluid: 1 mL/kcal     MONITORING & EVALUATION:     Nutrition Goal(s):   - Parenteral nutrition intake will meet >75% of patient estimated nutritional needs within the next 7 days. Outcome: Progressing towards goal    - PO nutrition intake will meet >75% of patient estimated nutritional needs within the next 7 days. Outcome: New/Initial goal      Monitoring:   [x] Food and nutrient intake   [x] Food and nutrient administration  [x] Comparative standards   [x] Nutrition-focused physical findings   [x] Anthropometric Measurements   [x] Treatment/therapy   [x] Biochemical data, medical tests, and procedures        Previous Recommendations (for follow-up assessments only): Implemented      Discharge Planning: Nutritional discharge needs unknown at this time. Participated in care planning, discharge planning, & interdisciplinary rounds as appropriate.       Nazario Dave RD, [address] [place]   Pager: [phone]

## 2020-02-19 NOTE — PROGRESS NOTES
RESPIRATORY MEDICATION PROTOCOL ASSESSMENT      Patient  Manuel Chun     77 y.o.   male     2/19/2020  2:45 PM    Breath Sounds Pre Procedure:  Diminished                                           Breath Sounds Post Procedure: Diminished                                               Breathing pattern: Pre procedure  Breathing Pattern: Regular          Post procedure  Breathing Pattern: Regular    Cough: Pre procedure  Cough: Non-productive               Post procedure Cough: Non-productive    Heart Rate: Pre procedure Pulse: 107           Post procedure Pulse: 110    Resp Rate: Pre procedure  Respirations: 16           Post procedure          Nebulizer Therapy: Current medications Aerosolized Medications: DuoNeb       Problem List:   Patient Active Problem List   Diagnosis Code    Debility R53.81    Leukocytosis D72.829    Intractable low back pain M54.5    Colon polyps K63.5    Hemorrhoids K64.9    Diverticulitis K57.92    Incisional hernia K43.2    Acute kidney injury ([address] A88.7    Metabolic acidosis G26.0       Patient alert and cooperative to use MDI: {YES    Home Respiratory Therapy Regimen/Frequency:  NO  Medication   Device  Frequency     SEVERITY INDEX:    ITEM 0 1 2 3 4 Score   Respiratory Pattern and or Rate Regular  10-19 Regular  20-24   24-30    30-34 Severe SOB or   Greater than 35 0   Breath Sounds Clear Occasional Wheeze Mild Wheezing Moderate Wheezing  wheezing/Absent breath sounds 0   Shortness of Breath None Dyspnea on Exertion Dyspnea at Rest Moderate Shortness of Breath at Rest Severe Shortness of Breath - Limited Speech 0       Total Score:  0     Scoring Guidelines  0-4 pts:  PRN-BID   5-7 pts:  BID, TID, QID  8-9 pts:  TID, QID, Q6  10-12 pts:  Q4-Q6   Guidelines used with clinical judgement. PRN Treatments can be ordered to supplement scheduled treatments. Regardless of score, frequency should not be less than normal home regimen.     Recommended Order/Frequency:  PRN as needed. ..     Comments:          Respiratory Therapist: Mando Villarreal, RT

## 2020-02-19 NOTE — PROGRESS NOTES
RENAL DAILY PROGRESS NOTE            71y M with PMH colon Ca, s/p resection, post insictional hernia, s/p hernia repair with mas, developed post operative JOSE ANTONIO    Subjective:       Complaint:   Overnight events noted  Mckeon catheter is out, admit passing urine without any difficulty. Now On TPN. IMPRESSION:   JOSE ANTONIO, post operative , higher abdominal pressure suggestive of compartment syndrome,   Hypernatremia , improving   Metabolic acidosis , renal failure   Leucocytosis   S/p open repair incisional hernia repair,  Post operative ileus   Post operative respiratory distress. PLAN:   His renal function continue to improve , good urine output in last 24hrs. Monitor for now as higher risk for IAC. Monitor acidosis. Monitor urine output closely. Adjust lytes in TPN, okay to go up on volume , DC IV fluid. Avoid hypotension.              Current Facility-Administered Medications   Medication Dose Route Frequency    magnesium citrate solution 296 mL  296 mL Oral NOW    cloNIDine (CATAPRES) 0.1 mg/24 hr patch 1 Patch  1 Patch TransDERmal Q7D    metoprolol (LOPRESSOR) injection 2.5 mg  2.5 mg IntraVENous Q4H    TPN ADULT PERIKABIVEN W/ ADDITIVES   IntraVENous CONTINUOUS    0.9% sodium chloride infusion  40 mL/hr IntraVENous CONTINUOUS    oxyCODONE-acetaminophen (PERCOCET) 5-325 mg per tablet 1 Tab  1 Tab Oral Q4H PRN    famotidine (PF) (PEPCID) 20 mg in 0.9% sodium chloride 10 mL injection  20 mg IntraVENous Q12H    ondansetron (ZOFRAN) injection 4 mg  4 mg IntraVENous Q6H PRN    insulin lispro (HUMALOG) injection   SubCUTAneous Q6H    metroNIDAZOLE (FLAGYL) IVPB premix 500 mg  500 mg IntraVENous Q12H    cefepime (MAXIPIME) 2 g in sterile water (preservative free) 10 mL IV syringe  2 g IntraVENous Q12H    albuterol (PROVENTIL VENTOLIN) nebulizer solution 2.5 mg  2.5 mg Nebulization Q4H PRN    sodium chloride (OCEAN) 0.65 % nasal squeeze bottle 2 Spray  2 Spray Both Nostrils Q2H PRN    glucose chewable tablet 16 g  4 Tab Oral PRN    glucagon (GLUCAGEN) injection 1 mg  1 mg IntraMUSCular PRN    dextrose (D50W) injection syrg 12.5-25 g  25-50 mL IntraVENous PRN    oxyCODONE-acetaminophen (PERCOCET) 5-325 mg per tablet 2 Tab  2 Tab Oral Q6H PRN    [Held by provider] nebivolol (BYSTOLIC) tablet 10 mg  10 mg Oral DAILY    benzocaine-menthol (CEPACOL) lozenge 1 Lozenge  1 Lozenge Mucous Membrane PRN    nystatin (MYCOSTATIN) 100,000 unit/mL oral suspension 500,000 Units  500,000 Units Oral QID    phenol throat spray (CHLORASEPTIC) 1 Spray  1 Spray Oral PRN    albuterol-ipratropium (DUO-NEB) 2.5 MG-0.5 MG/3 ML  3 mL Nebulization Q4H RT    albuterol-ipratropium (DUO-NEB) 2.5 MG-0.5 MG/3 ML  3 mL Nebulization Q4H PRN    sodium chloride (NS) flush 5-40 mL  5-40 mL IntraVENous Q8H    sodium chloride (NS) flush 5-40 mL  5-40 mL IntraVENous PRN    heparin (porcine) injection 5,000 Units  5,000 Units SubCUTAneous Q8H    [Held by provider] gabapentin (NEURONTIN) capsule 100 mg  100 mg Oral TID    [Held by provider] ferrous sulfate tablet 325 mg  325 mg Oral Q MON, WED & FRI    latanoprost (XALATAN) 0.005 % ophthalmic solution 1 Drop  1 Drop Both Eyes QHS    acetaminophen (TYLENOL) tablet 650 mg  650 mg Oral Q4H PRN    [Held by provider] polyethylene glycol (MIRALAX) packet 17 g  17 g Oral DAILY    [Held by provider] docusate sodium (COLACE) capsule 100 mg  100 mg Oral DAILY    [Held by provider] lactobacillus sp. 50 billion cpu (BIO-K PLUS) capsule 1 Cap  1 Cap Oral DAILY    HYDROmorphone (DILAUDID) injection 1 mg  1 mg IntraVENous Q3H PRN       Review of Symptoms: comprehensive ROS negative except above.    Objective:     Patient Vitals for the past 24 hrs:   Temp Pulse Resp BP SpO2   02/19/20 0925 -- -- -- -- 99 %   02/19/20 0837 -- (!) 108 20 162/89 95 %   02/19/20 0800 97.7 °F (36.5 °C) (!) 117 14 -- 94 %   02/19/20 0700 -- (!) 111 20 -- 96 %   02/19/20 0400 -- 100 17 175/82 97 %   02/19/20 0000 98.8 °F (37.1 °C) (!) 115 19 (!) 153/97 98 %   02/18/20 2000 98.8 °F (37.1 °C) (!) 115 19 (!) 172/92 97 %   02/18/20 1900 -- (!) 112 21 95/71 97 %   02/18/20 1800 -- (!) 110 21 149/89 97 %   02/18/20 1700 -- (!) 126 20 (!) 175/96 96 %   02/18/20 1613 -- -- -- -- 100 %   02/18/20 1600 98.7 °F (37.1 °C) (!) 125 18 (!) 162/94 96 %   02/18/20 1500 -- (!) 119 25 (!) 151/97 95 %   02/18/20 1400 -- (!) 118 20 (!) 166/107 95 %   02/18/20 1313 -- -- -- -- 98 %   02/18/20 1300 -- (!) 127 -- -- 95 %   02/18/20 1200 98.8 °F (37.1 °C) (!) 123 20 (!) 168/104 97 %   02/18/20 1100 -- (!) 120 22 (!) 163/97 95 %        Weight change:      02/17 1901 - 02/19 0700  In: 2534 [I.V.:2449]  Out: 2370 [Urine:2075; Drains:195]    Intake/Output Summary (Last 24 hours) at 2/19/2020 1005  Last data filed at 2/19/2020 0847  Gross per 24 hour   Intake 1274 ml   Output 1790 ml   Net -516 ml     Physical Exam:   General: comfortable, no acute distress   HEENT sclera anicteric, supple neck, no thyromegaly  CVS: S1S2 heard,  no rub  RS: + air entry b/l,   Abd: distended abdomen, tense   Neuro: non focal, awake, alert , CN II-XII are grossly intact  Extrm: no  edema, no cyanosis, clubbing   Skin: no visible  Rash  Musculoskeletal: No gross joints or bone deformities         Data Review:     LABS:   Hematology:   Recent Labs     02/19/20  0119 02/17/20  0215   WBC 18.4* 19.9*   HGB 11.3* 11.3*   HCT 34.4* 35.2*     Chemistry:   Recent Labs     02/19/20  0119 02/18/20  1021 02/17/20  0215   BUN 37* 41* 36*   CREA 1.39* 1.50* 1.58*   CA 8.2* 7.6* 7.5*   ALB  --  2.9*  --    K 4.6 4.6 4.5    145 145   * 117* 117*   CO2 18* 13* 17*   PHOS 1.8* 3.2 2.5   * 175* 110*            Procedures/imaging: see electronic medical records for all procedures, Xrays and details which were not copied into this note but were reviewed prior to creation of Plan          Assessment & Plan:     As above         Gautam Tucker MD  2/19/2020  12:39 PM

## 2020-02-19 NOTE — PROGRESS NOTES
Problem: Dysphagia (Adult)  Goal: *Acute Goals and Plan of Care (Insert Text)  Description  Patient will:  1. Tolerate PO trials with 0 s/s overt distress in 4/5 trials  2. Utilize compensatory swallow strategies/maneuvers (decrease bite/sip, size/rate, alt. liq/sol) with min cues in 4/5 trials    Rec:     Full clear liquids  Aspiration precautions  HOB >45 during po intake, remain >30 for 30-45 minutes after po   Small bites/sips; alternate liquid/solid with slow feeding rate   Oral care TID  Meds per pt preference      Outcome: Progressing Towards Goal    SPEECH LANGUAGE PATHOLOGY DYSPHAGIA TREATMENT    Patient: Gurdeep Henry (21 y.o. male)  Date: 2/19/2020  Diagnosis: Incisional hernia [K43.2]   <principal problem not specified>  Procedure(s) (LRB):  OPEN REPAIR OF INCISIONAL HERNIA WITH MESH (N/A) 6 Days Post-Op  Precautions: aspiration Fall  PLOF: As per H&P      ASSESSMENT:  Pt was seen at bedside for follow dysphagia management. Pt tolerating thin liquids with no overt s/sx of aspiration. Laryngeal elevation was weak to palpation. He is s/p ileus with NG to suction. CXR with possible bilateral infiltrates; however, do not suspect this is secondary to aspiration. Rec to initiate full clear liquid diet, aspiration precautions, oral care TID, and meds as tolerated. ST will continue to follow and is available for MBS, per MD discretion, if silent aspiration is a concern. Progression toward goals:  []         Improving appropriately and progressing toward goals  [x]         Improving slowly and progressing toward goals  []         Not making progress toward goals and plan of care will be adjusted     PLAN:  Recommendations and Planned Interventions: See above  Patient continues to benefit from skilled intervention to address the above impairments. Continue treatment per established plan of care. Discharge Recommendations:  None     SUBJECTIVE:   Patient stated Thank you so much.     OBJECTIVE: Cognitive and Communication Status:  Neurologic State: Alert  Orientation Level: Oriented X4  Cognition: Appropriate decision making, Appropriate for age attention/concentration, Appropriate safety awareness  Perception: Appears intact  Perseveration: No perseveration noted  Safety/Judgement: Fall prevention  Dysphagia Treatment:  Oral Assessment:  Oral Assessment  Labial: No impairment  Dentition: Natural, Intact  Oral Hygiene: adequate  Lingual: No impairment  Velum: No impairment  Mandible: No impairment  P.O. Trials:   Patient Position: [facility] at [facility]   Vocal quality prior to P.O.: No impairment   Consistency Presented:  Thin liquid   How Presented: Self-fed/presented, Cup/sip, Spoon, Straw   Bolus Acceptance: No impairment   Bolus Formation/Control: No impairment   Propulsion: No impairment   Oral Residue: None   Initiation of Swallow: No impairment   Laryngeal Elevation: weak   Aspiration Signs/Symptoms: None   Pharyngeal Phase Characteristics: No impairment, issues, or problems    Effective Modifications: None   Cues for Modifications: None       Oral Phase Severity: No impairment   Pharyngeal Phase Severity : Mild     PAIN:  Start of Tx: 0  End of Tx: 0     After treatment:   []              Patient left in no apparent distress sitting up in chair  [x]              Patient left in no apparent distress in bed  [x]              Call bell left within reach  [x]              Nursing notified  []              Family present  []              Caregiver present  []              Bed alarm activated      COMMUNICATION/EDUCATION:   [x] Aspiration precautions; swallow safety; compensatory techniques  [x]        Patient/family able to participate in training and education     Thank you for this referral.    Anthony Logan M.S. CCC-SLP/L  Speech-Language Pathologist

## 2020-02-19 NOTE — PROGRESS NOTES
RESPIRATORY CARE ASSESSMENT FOR BRONCHIAL HYGIENE OR LUNG EXPANSION THERAPY  Patient  Vicky Morfin     77 y.o.   male     2/19/2020  2:20 PM  Patient Active Problem List   Diagnosis Code    Debility R53.81    Leukocytosis D72.829    Intractable low back pain M54.5    Colon polyps K63.5    Hemorrhoids K64.9    Diverticulitis K57.92    Incisional hernia K43.2    Acute kidney injury ([address] O77.0    Metabolic acidosis W83.7       ABG:  Date:2/19/2020  No results found for: PH, PHI, PCO2, PCO2I, PO2, PO2I, HCO3, HCO3I, FIO2, FIO2I    Chest X-ray:  Date:2/19/2020  Results from Hospital Encounter encounter on 02/13/20   XR CHEST PORT    Narrative EXAM:  XR CHEST PORT    INDICATION:   Incisional hernia, TPN    COMPARISON: 2/15/2020 and priors. FINDINGS:  Hypoinflation. Enteric tube extends below the diaphragm terminates in the left  upper quadrant. Gaseous distention of the stomach, colon and some other bowel  loops. Cardiomediastinal silhouette is stable and mildly enlarged. Diffuse increased  interstitial prominence. Bilateral infrahilar opacities. No pneumothorax or  significant pleural effusion. Intact osseous structures. Impression IMPRESSION:    Diffuse increased interstitial prominence may represent mild edema or  infiltrate. Bilateral infrahilar atelectasis or infiltrate. Partially imaged gaseous distention of stomach and bowel loops.        Lab Test:  Date:2/19/2020  WBC:   Lab Results   Component Value Date/Time    WBC 18.4 (H) 02/19/2020 01:19 AM   HGB:   Lab Results   Component Value Date/Time    HGB 11.3 (L) 02/19/2020 01:19 AM    PLTS:   Lab Results   Component Value Date/Time    PLATELET 712 81/72/3098 01:19 AM       Vital Signs:     Patient Vitals for the past 8 hrs:   Temp Pulse Resp BP SpO2   02/19/20 1200 97.5 °F (36.4 °C) 93 15 (!) 160/91 94 %   02/19/20 1000 -- (!) 110 16 171/87 93 %   02/19/20 0925 -- -- -- -- 99 %   02/19/20 0837 -- (!) 108 20 162/89 95 %   02/19/20 0800 97.7 °F (36.5 °C) (!) 117 14 -- 94 %   02/19/20 0700 -- (!) 111 20 -- 96 %              First Inital Assesment:     []Yes []No   Reevaluation/Reassessment:    [x]Yes []No     CHART REVIEW   Points 0 X 1 X 2 X 3 X 4 X Points   Pulmonary History Smoking History (1) none  Recent Smoking History <1 PPD  Recent Smoking History >1 PPD  Pulmonary Disease or Impairment  Severe Pulmonary Disease  0   Surgical History No Surgery  General Surgery  Lower Abdominal  Thoracic or Upper Abdominal  Thoracic & Pulmonary Disease  2   CXR Clear or not indicated  Chronic changes or CXR Pending  Infiltrate, atelectasis or pleural effusions  Infiltrates in more than 1lobe  Infiltrates +atelectasis  +/or pleural effusions  2     PATIENT ASSESSMENT    0 X 1 X 2 X 3 X 4 X Points   Respiratory Pattern Regular pattern RR 12-20  Increased RR 21-27   Mild Dyspnea at rest, irregular pattern RR 28-32  Moderate Dyspnea at rest, Use of accessory muscles, RR 33-36  Severe Dyspnea, Use of accessory muscles RR >36  0   Mental Status Alert Oriented cooperative  Confused, Follows commands  Lethargic, Does not follow commands  Obtunded  Unresponsive  0   Breath Sounds Clear  Decreased Unilaterally  Decreased Bilaterally  Mild Scattered wheezing or Crackles in bases  Severe Wheezing or rhonchi  1   Cough Strong dry NPC  Strong Productive  Weak NPC  Weak productive or weak with rhonchi  No cough or may require suctioning  1   Level of Activity Ambulatory  Ambulatory with assistance  Temporarily Non-ambulatory  Non-Ambulatory, able to position self  Unable to position self, confined to bed  0   Total Points/Score:   6         Bronchial Hygiene/Secretion Clearance:    []EZPAP []Rotation bed with vibration    []CPT with percussor []CPT via vest   [x]Oscillastiang positive pressure expiratory device      Lung Expansion:    []Incentive Spirometer w/RT visits []Incentive Spirometer w/nursing    []EZPAP [] Metaneb     *Suctioning:    []Nasal Tracheal []Tracheal *suctioning will be ordered and done PRN with an associated frequency such as QID/PRN based on score       Other:    Care Plan   Level # Score Modality Frequency Comment   Level 1 >17      Level 2 14-17      Level 3 10-13      Level 4 1-9 Aerobika Qid and PRN      BRONCHIAL HYGIENE SCORING AND FREQUENCY GUIDELINES   Frequency Indications/Findings Level #   Q4 ATC Copious secretions, SOB, unable to sleep 1   QID & PRN at night Moderate amounts of secretions 2   TID or Q6 wa Small amounts of secretions and poor cough: recent history of secretions 3   BID or Q8 wa Unable to deep breathe and cough effectively 4        Comments:  Pt improved jacobo Padilla DC changed to [facility]    Respiratory Therapist: Ha Bey, RT

## 2020-02-19 NOTE — PROGRESS NOTES
[email or web address]  [phone]    Gastroenterology follow up-Progress note    Impression:  1. Post op ileus - POD 6 ex lap for open repair of incisional hernia with mesh 2/13/20, doing better- passing flatus; no BM. 2.  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 Sherron Velazco that required diverting ileostomy - was reversed 7/2019.   3. Acute respiratory failure - improved; on supplemental O2  4. JOSE ANTONIO decreased UOP, nephrology following- U/O is improving. 5. SIRS with leukocytosis - multifactorial   6. 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 Suspect post operative ileus; conservative measures at this time; no plans for decompression. 2. Can trial Mestinon or neostigmine if needed  3. Ensure electrolytes like K and Mg are ideal  4. Turn at least every two hours  6. Medical management per primary team    Chief Complaint: abdominal distension      Subjective:  Reports he feels even better today; still with distended abdomen. No N/V. Wants ice chips. ROS: Denies any fevers, chills, rash. Eyes: conjunctiva normal, EOM normal   Neck: ROM normal, supple and trachea normal   Cardiovascular: heart normal, intact distal pulses, tachycardia   Pulmonary/Chest Wall: breath sounds normal and effort normal   Abdominal: Distended with mild improvement; hyoactive bowel sounds; non tender. Midline dressing C/D/I.        Patient Active Problem List   Diagnosis Code    Debility R53.81    Leukocytosis D72.829    Intractable low back pain M54.5    Colon polyps K63.5    Hemorrhoids K64.9    Diverticulitis K57.92    Incisional hernia K43.2    Acute kidney injury ([facility] [address] G55.9    Metabolic acidosis Y71.2         Visit Vitals  /87   Pulse (!) 110   Temp 97.7 °F (36.5 °C)   Resp 16   Ht 5' 10\" (1.778 m)   Wt 92.5 kg (203 lb 14.8 oz)   SpO2 93%   BMI 29.26 kg/m²           Intake/Output Summary (Last 24 hours) at 2/19/2020 1102  Last data filed at 2/19/2020 0847  Gross per 24 hour   Intake 1199 ml   Output 1790 ml   Net -591 ml       CBC w/Diff    Lab Results   Component Value Date/Time    WBC 18.4 (H) 02/19/2020 01:19 AM    RBC 3.76 (L) 02/19/2020 01:19 AM    HGB 11.3 (L) 02/19/2020 01:19 AM    HCT 34.4 (L) 02/19/2020 01:19 AM    MCV 91.5 02/19/2020 01:19 AM    MCH 30.1 02/19/2020 01:19 AM    MCHC 32.8 02/19/2020 01:19 AM    RDW 15.1 (H) 02/19/2020 01:19 AM     02/19/2020 01:19 AM    Lab Results   Component Value Date/Time    GRANS 87 (H) 02/19/2020 01:19 AM    LYMPH 9 (L) 02/19/2020 01:19 AM    EOS 0 02/19/2020 01:19 AM    BANDS 2 02/19/2020 01:19 AM    BASOS 0 02/19/2020 01:19 AM    METAS 1 (H) 11/06/2018 03:57 AM      Basic Metabolic Profile   Recent Labs     02/19/20  0119      K 4.6   *   CO2 18*   BUN 37*   CA 8.2*   MG 2.1   PHOS 1.8*        Hepatic Function    Lab Results   Component Value Date/Time    ALB 2.9 (L) 02/18/2020 10:21 AM    TP 7.1 02/18/2020 10:21 AM    AP 59 02/18/2020 10:21 AM    Lab Results   Component Value Date/Time    SGOT 47 (H) 02/18/2020 10:21 AM          Coags   No results for input(s): PTP, INR, APTT, INREXT, INREXT in the last 72 hours. Marc Puente NP    [facility]. [email or web address]  Phone: [phone]  Pager: [phone]

## 2020-02-19 NOTE — PROGRESS NOTES
[facility] Hospitalist Group  Progress Note    Patient: Gurdeep Henry Age: 77 y.o. : 1950 MR#: [id number] SSN: [id number]  Date: 2020       Subjective:     Patient is sitting up in chair. Abdominal discomfort present. Passing flatus as well as having burping. No bowel movement per nursing. Denies for chest pain or cough. Objective:     BP (!) 172/91   Pulse (!) 111   Temp 98.8 °F (37.1 °C)   Resp 16   Ht 5' 10\" (1.778 m)   Wt 92.5 kg (203 lb 14.8 oz)   SpO2 97%   BMI 29.26 kg/m²      General:  Alert, NAD, follows commands appropriately  Cardiovascular:  RRR, tachycardia  Pulmonary: bilateral coarse breath sounds, no wheezing  GI: abdominal distention wound vac, tense, very sluggish bowel sounds  Extremities:  + edema  Neuro: AAOx4. moves all extremities      Assessment:     1. Incisional hernia s/p open repair of incisional hernia with mesh  2. Post-op ileus with abdominal distention  3. SIRS, better  4. Acute hypoxic respiratory failure due to ileus, improving currently  5. ARF on CKD stage II due to dehydration  6. Lactic acidosis, improved  7. Hypomagnesemia  8. Hyper kalemia  9. DMT2. a1c 7.7  10. HTN  11. GERD  12. History of throat cancer  13.  History of intractable low back pain    Plan:    Continue current management per surgery team  Continue clonidine patch and IV Lopressor for hypertension/tachycardia  Nephrology to follow  PT and OT to follow    Case discussed with:  [x]Patient  [x]Family  [x]Nursing  []Case Management  DVT Prophylaxis:  []Lovenox  [x]Hep SQ  []SCDs  []Coumadin   []On Heparin gtt    Labs:    Recent Results (from the past 24 hour(s))   GLUCOSE, POC    Collection Time: 20  5:24 PM   Result Value Ref Range    Glucose (POC) 159 (H) 70 - 110 mg/dL   CBC WITH AUTOMATED DIFF    Collection Time: 20  1:19 AM   Result Value Ref Range    WBC 18.4 (H) 4.6 - 13.2 K/uL    RBC 3.76 (L) 4.70 - 5.50 M/uL    HGB 11.3 (L) 13.0 - 16.0 g/dL    HCT 34.4 (L) 36.0 - 48.0 %    MCV 91.5 74.0 - 97.0 FL    MCH 30.1 24.0 - 34.0 PG    MCHC 32.8 31.0 - 37.0 g/dL    RDW 15.1 (H) 11.6 - 14.5 %    PLATELET 722 795 - 951 K/uL    MPV 9.6 9.2 - 11.8 FL    NEUTROPHILS 87 (H) 42 - 75 %    BAND NEUTROPHILS 2 0 - 5 %    LYMPHOCYTES 9 (L) 20 - 51 %    MONOCYTES 2 2 - 9 %    EOSINOPHILS 0 0 - 5 %    BASOPHILS 0 0 - 3 %    ABS. NEUTROPHILS 16.3 (H) 1.8 - 8.0 K/UL    ABS. LYMPHOCYTES 1.7 0.8 - 3.5 K/UL    ABS. MONOCYTES 0.4 0 - 1.0 K/UL    ABS. EOSINOPHILS 0.0 0.0 - 0.4 K/UL    ABS.  BASOPHILS 0.0 0.0 - 0.06 K/UL    DF AUTOMATED      PLATELET COMMENTS ADEQUATE PLATELETS      RBC COMMENTS ANISOCYTOSIS  1+       METABOLIC PANEL, BASIC    Collection Time: 02/19/20  1:19 AM   Result Value Ref Range    Sodium 145 136 - 145 mmol/L    Potassium 4.6 3.5 - 5.5 mmol/L    Chloride 118 (H) 100 - 111 mmol/L    CO2 18 (L) 21 - 32 mmol/L    Anion gap 9 3.0 - 18 mmol/L    Glucose 160 (H) 74 - 99 mg/dL    BUN 37 (H) 7.0 - 18 MG/DL    Creatinine 1.39 (H) 0.6 - 1.3 MG/DL    BUN/Creatinine ratio 27 (H) 12 - 20      GFR est AA >60 >60 ml/min/1.73m2    GFR est non-AA 51 (L) >60 ml/min/1.73m2    Calcium 8.2 (L) 8.5 - 10.1 MG/DL   PHOSPHORUS    Collection Time: 02/19/20  1:19 AM   Result Value Ref Range    Phosphorus 1.8 (L) 2.5 - 4.9 MG/DL   MAGNESIUM    Collection Time: 02/19/20  1:19 AM   Result Value Ref Range    Magnesium 2.1 1.6 - 2.6 mg/dL   GLUCOSE, POC    Collection Time: 02/19/20  5:33 AM   Result Value Ref Range    Glucose (POC) 132 (H) 70 - 110 mg/dL   GLUCOSE, POC    Collection Time: 02/19/20 10:56 AM   Result Value Ref Range    Glucose (POC) 180 (H) 70 - 110 mg/dL       Signed By: Son Zamora MD     February 19, 2020

## 2020-02-19 NOTE — ROUTINE PROCESS
Bedside and Verbal shift change report given to Apple Lemons RN (oncoming nurse) by Linus Martinez RN (offgoing nurse). Report included the following information SBAR, ED Summary, OR Summary, Intake/Output, MAR, Recent Results, Med Rec Status, Cardiac Rhythm (NSR/ST) and Alarm Parameters .

## 2020-02-19 NOTE — ROUTINE PROCESS
Bedside and Verbal shift change report given to [address] [place] [address] (oncoming nurse) by Mariaelena Dsouza   (offgoing nurse). Report included the following information SBAR, Kardex, MAR and Recent Results.

## 2020-02-19 NOTE — PROGRESS NOTES
Problem: Self Care Deficits Care Plan (Adult)  Goal: *Acute Goals and Plan of Care (Insert Text)  Description  Occupational Therapy Goals  Initiated 2/17/2020 within 7 day(s). 1.  Patient will perform functional activity standing for 2-4 minutes with modified independence and F+ balance. 2.  Patient will perform lower body dressing with supervision/set-up. 3.  Patient will perform toilet transfers with supervision/set-up. 4.  Patient will perform all aspects of toileting with supervision/set-up. 5.  Patient will participate in upper extremity therapeutic exercise/activities with modified independence for 8 minutes to increase ROM/strength for selfcare. 6.  Patient will utilize energy conservation techniques during functional activities with verbal cues. Prior Level of Function: Patient was independent with self-care (exception to bathing his back) and used a RW for functional mobility PTA. Outcome: Progressing Towards Goal   OCCUPATIONAL THERAPY TREATMENT    Patient: Mehul Wang (50 y.o. male)  Date: 2/19/2020  Diagnosis: Incisional hernia [K43.2]   <principal problem not specified>  Procedure(s) (LRB):  OPEN REPAIR OF INCISIONAL HERNIA WITH MESH (N/A) 6 Days Post-Op  Precautions: Fall    Chart, occupational therapy assessment, plan of care, and goals were reviewed. ASSESSMENT:  Pt seated on BSC upon entry. Educated pt on stand pivot technique w/functional transfer training for energy conservation. Pt requires Min Assist 2/2 multiple line mgt. Pt educated on energy conservation techniques w/ADLs, demonstrating w/ADL grooming tasks at chair level w/set-up. Pt tolerates standing ~ 1 minute, weight shifting L/R using RW for support in prep for functional mobility training to bathroom.  Pt tachycardic (120s) w/standing activity, HR 100s at rest  Progression toward goals:  []          Improving appropriately and progressing toward goals  [x]          Improving slowly and progressing toward goals  []          Not making progress toward goals and plan of care will be adjusted     PLAN:  Patient continues to benefit from skilled intervention to address the above impairments. Continue treatment per established plan of care. Discharge Recommendations:  Inpatient Rehab  Further Equipment Recommendations for Discharge:  TBD as pt progresses     SUBJECTIVE:   Patient stated My PCP said that I should go to something like the [facility].     OBJECTIVE DATA SUMMARY:   Cognitive/Behavioral Status:  Neurologic State: Alert  Orientation Level: Oriented X4  Cognition: Follows commands  Safety/Judgement: Fall prevention    Functional Mobility and Transfers for ADLs:   Transfers:  Sit to Stand: Minimum assistance  Bed to Chair: Minimum assistance(requires assist 2/2 multiple line mgt)   Toilet Transfer : Minimum assistance(BSC <--> chair xfer w/SPT)    Balance:  Sitting: Impaired  Sitting - Static: Good (unsupported)  Sitting - Dynamic: Fair (occasional)  Standing: Impaired  Standing - Static: Fair  Standing - Dynamic : Fair    ADL Intervention:  Grooming  Position Performed: Seated in chair  Washing Face: Set-up  Washing Hands: Set-up  Brushing Teeth: Set-up    Toileting  Toileting Assistance: Maximum assistance  Clothing Management: Maximum assistance    Neuro Re-Education:  Weight shifting L/R, standing w/RW for support    Pain:  Pain level pre-treatment: 0/10   Pain level post-treatment: 0/10    Activity Tolerance:    Fair    Please refer to the flowsheet for vital signs taken during this treatment.   After treatment:   []  Patient left in no apparent distress sitting up in chair  [x]  Patient left in no apparent distress on [facility]  [x]  Call bell left within reach  [x]  Erik Fearing, notified  []  Caregiver present  []  Bed alarm activated    COMMUNICATION/EDUCATION:   [] Role of Occupational Therapy in the acute care setting  [] Home safety education was provided and the patient/caregiver indicated understanding. [] Patient/family have participated as able in working towards goals and plan of care. [x] Patient/family agree to work toward stated goals and plan of care. [] Patient understands intent and goals of therapy, but is neutral about his/her participation. [] Patient is unable to participate in goal setting and plan of care.       Thank you for this referral.  REX Wilkins  Time Calculation: 28 mins

## 2020-02-19 NOTE — PROGRESS NOTES
Yifan Elizabeth M.D. FACS  PROGRESS NOTE    Name: Vicky Morfin MRN: [id number]   : 1950 [facility]   Date: 2020 Admission Date: 2020  5:32 AM     Hospital Day: 7  6 Days Post-Op  Subjective:  Patient on bedside commode this morning try to have a bowel movement. No nausea vomiting. Objective:  Vitals:    20 0800 20 0837 20 0925 20 1000   BP:  162/89  171/87   Pulse: (!) 117 (!) 108  (!) 110   Resp:    Temp: 97.7 °F (36.5 °C)      SpO2: 94% 95% 99% 93%   Weight:       Height:         Date 20 0700 - 20 0659 20 0700 - 20 0659   Shift 8923-1603 1688-2395 24 Hour Total 8688-4661 4242-1181 24 Hour Total   INTAKE   I.V.(mL/kg/hr) 750(0.7) 220(0.2) 970(0.4) 604  604     Volume (0.9% sodium chloride infusion) 750 220 970        Volume (TPN ADULT PERIKABIVEN W/ ADDITIVES)    604  604   Shift Total(mL/kg) 750(8.1) 220(2.4) 970(10.5) 604(6.5)  604(6.5)   OUTPUT   Urine(mL/kg/hr) 1000(0.9) 475(0.4) 1475(0.7) 200  200     Urine Voided 0691 967 5844 200  200   Drains 75  75 40  40     Output (ml) (Matthew-Garcia Drain 20 Abdomen) 70  70 30  30     Output (ml) (Matthew-Garcia Drain 20 Abdomen) 5  5 10  10   Shift Total(mL/kg) 1075(11.6) 475(5.1) 1550(16.8) 240(2.6)  240(2.6)   NET -918 -389 -958 510  364   Weight (kg) 92.5 92.5 92.5 92.5 92.5 92.5         Physical Exam:    General: Awake and alert, oriented x4, no apparent distress   Abdomen: abdomen is soft with midline incisional tenderness. Abdomen distended. Incision(s) are C/D/I. Usha VAC to be removed tomorrow. No masses, organomegaly or guarding ANGELI drains with serosanguineous drainage.     Labs:  Recent Results (from the past 24 hour(s))   GLUCOSE, POC    Collection Time: 20 11:50 AM   Result Value Ref Range    Glucose (POC) 171 (H) 70 - 110 mg/dL   GLUCOSE, POC    Collection Time: 20  5:24 PM   Result Value Ref Range    Glucose (POC) 159 (H) 70 - 110 mg/dL   CBC WITH AUTOMATED DIFF    Collection Time: 02/19/20  1:19 AM   Result Value Ref Range    WBC 18.4 (H) 4.6 - 13.2 K/uL    RBC 3.76 (L) 4.70 - 5.50 M/uL    HGB 11.3 (L) 13.0 - 16.0 g/dL    HCT 34.4 (L) 36.0 - 48.0 %    MCV 91.5 74.0 - 97.0 FL    MCH 30.1 24.0 - 34.0 PG    MCHC 32.8 31.0 - 37.0 g/dL    RDW 15.1 (H) 11.6 - 14.5 %    PLATELET 886 461 - 232 K/uL    MPV 9.6 9.2 - 11.8 FL    NEUTROPHILS 87 (H) 42 - 75 %    BAND NEUTROPHILS 2 0 - 5 %    LYMPHOCYTES 9 (L) 20 - 51 %    MONOCYTES 2 2 - 9 %    EOSINOPHILS 0 0 - 5 %    BASOPHILS 0 0 - 3 %    ABS. NEUTROPHILS 16.3 (H) 1.8 - 8.0 K/UL    ABS. LYMPHOCYTES 1.7 0.8 - 3.5 K/UL    ABS. MONOCYTES 0.4 0 - 1.0 K/UL    ABS. EOSINOPHILS 0.0 0.0 - 0.4 K/UL    ABS.  BASOPHILS 0.0 0.0 - 0.06 K/UL    DF AUTOMATED      PLATELET COMMENTS ADEQUATE PLATELETS      RBC COMMENTS ANISOCYTOSIS  1+       METABOLIC PANEL, BASIC    Collection Time: 02/19/20  1:19 AM   Result Value Ref Range    Sodium 145 136 - 145 mmol/L    Potassium 4.6 3.5 - 5.5 mmol/L    Chloride 118 (H) 100 - 111 mmol/L    CO2 18 (L) 21 - 32 mmol/L    Anion gap 9 3.0 - 18 mmol/L    Glucose 160 (H) 74 - 99 mg/dL    BUN 37 (H) 7.0 - 18 MG/DL    Creatinine 1.39 (H) 0.6 - 1.3 MG/DL    BUN/Creatinine ratio 27 (H) 12 - 20      GFR est AA >60 >60 ml/min/1.73m2    GFR est non-AA 51 (L) >60 ml/min/1.73m2    Calcium 8.2 (L) 8.5 - 10.1 MG/DL   PHOSPHORUS    Collection Time: 02/19/20  1:19 AM   Result Value Ref Range    Phosphorus 1.8 (L) 2.5 - 4.9 MG/DL   MAGNESIUM    Collection Time: 02/19/20  1:19 AM   Result Value Ref Range    Magnesium 2.1 1.6 - 2.6 mg/dL   GLUCOSE, POC    Collection Time: 02/19/20  5:33 AM   Result Value Ref Range    Glucose (POC) 132 (H) 70 - 110 mg/dL   GLUCOSE, POC    Collection Time: 02/19/20 10:56 AM   Result Value Ref Range    Glucose (POC) 180 (H) 70 - 110 mg/dL     All Micro Results     Procedure Component Value Units Date/Time    CULTURE, RESPIRATORY/SPUTUM/BRONCH W GRAM STAIN [[id number]] Collected:  02/15/20 1010    Order Status:  Completed Specimen:  Sputum Updated:  02/17/20 1009     Special Requests: NO SPECIAL REQUESTS        GRAM STAIN RARE WBCS SEEN         FEW EPITHELIAL CELLS SEEN               RARE GRAM POSITIVE COCCI IN PAIRS           Culture result:       RARE NORMAL RESPIRATORY GALE          CULTURE, URINE [[id number]] Collected:  02/14/20 1230    Order Status:  Completed Specimen:  Clean catch Updated:  02/15/20 0908     Special Requests: NO SPECIAL REQUESTS        Culture result: NO GROWTH 1 DAY             Current Medications:  Current Facility-Administered Medications   Medication Dose Route Frequency Provider Last Rate Last Dose    magnesium citrate solution 296 mL  296 mL Oral NOW Eve Link MD        cloNIDine (CATAPRES) 0.1 mg/24 hr patch 1 Patch  1 Patch TransDERmal Q7D Harriet Shultz MD   1 Patch at 02/18/20 1711    metoprolol (LOPRESSOR) injection 2.5 mg  2.5 mg IntraVENous Q4H Harriet Shultz MD   2.5 mg at 02/19/20 1048    TPN ADULT PERIKABIVEN W/ ADDITIVES   IntraVENous CONTINUOUS Eve Link MD 60 mL/hr at 02/18/20 2156      oxyCODONE-acetaminophen (PERCOCET) 5-325 mg per tablet 1 Tab  1 Tab Oral Q4H PRN Martin Gonzalez NP        famotidine (PF) (PEPCID) 20 mg in 0.9% sodium chloride 10 mL injection  20 mg IntraVENous Q12H Zo Gonzalez NP   20 mg at 02/19/20 1048    ondansetron (ZOFRAN) injection 4 mg  4 mg IntraVENous Q6H PRN Adriel Hardy PA-C        insulin lispro (HUMALOG) injection   SubCUTAneous Q6H Adriel Hardy PA-C   Stopped at 02/19/20 0600    metroNIDAZOLE (FLAGYL) IVPB premix 500 mg  500 mg IntraVENous Q12H Pati Nair  mL/hr at 02/19/20 0340 500 mg at 02/19/20 0340    cefepime (MAXIPIME) 2 g in sterile water (preservative free) 10 mL IV syringe  2 g IntraVENous Q12H Kimberly Saucedo MD   2 g at 02/19/20 1048    albuterol (PROVENTIL VENTOLIN) nebulizer solution 2.5 mg  2.5 mg Nebulization Q4H PRN Nohemi Russell A, DO   2.5 mg at 02/14/20 0341    sodium chloride (OCEAN) 0.65 % nasal squeeze bottle 2 Spray  2 Spray Both Nostrils Q2H PRN Lanora Brittney, DO        glucose chewable tablet 16 g  4 Tab Oral PRN Harsh Gonzalez NP        glucagon (GLUCAGEN) injection 1 mg  1 mg IntraMUSCular PRN Harsh Gonzalez NP        dextrose (D50W) injection syrg 12.5-25 g  25-50 mL IntraVENous PRN Harsh Gonzalez NP        oxyCODONE-acetaminophen (PERCOCET) 5-325 mg per tablet 2 Tab  2 Tab Oral Q6H PRN Mian Benjamin NP   2 Tab at 02/18/20 0848    [Held by provider] nebivolol (BYSTOLIC) tablet 10 mg  10 mg Oral DAILY Gloria Sepulveda MD   Stopped at 02/15/20 0900    benzocaine-menthol (CEPACOL) lozenge 1 Lozenge  1 Lozenge Mucous Membrane PRN Bradly Nair MD        nystatin (MYCOSTATIN) 100,000 unit/mL oral suspension 500,000 Units  500,000 Units Oral QID oZ Gonzalez NP   500,000 Units at 02/19/20 1049    phenol throat spray (CHLORASEPTIC) 1 Spray  1 [place] Oral PRN Harsh Gonzalez NP   1 [place] at 02/14/20 1055    albuterol-ipratropium (DUO-NEB) 2.5 MG-0.5 MG/3 ML  3 mL Nebulization Q4H RT Bradly Nair MD   3 mL at 02/19/20 0921    albuterol-ipratropium (DUO-NEB) 2.5 MG-0.5 MG/3 ML  3 mL Nebulization Q4H PRN Gloria Sepulveda MD   3 mL at 02/15/20 2320    sodium chloride (NS) flush 5-40 mL  5-40 mL IntraVENous Q8H Kevin Reilly MD   10 mL at 02/19/20 0553    sodium chloride (NS) flush 5-40 mL  5-40 mL IntraVENous PRN Kevin Reilly MD   10 mL at 02/15/20 1246    heparin (porcine) injection 5,000 Units  5,000 Units SubCUTAneous Q8H Kevin Reilly MD   5,000 Units at 02/19/20 1049    [Held by provider] gabapentin (NEURONTIN) capsule 100 mg  100 mg Oral TID Kevin Reilly MD   Stopped at 02/16/20 0900    [Held by provider] ferrous sulfate tablet 325 mg  325 mg Oral Q MON, WED & Avinash Cox MD   325 mg at 02/14/20 2117    latanoprost (XALATAN) 0.005 % ophthalmic solution 1 Drop  1 Drop Both Eyes QHS Chace Payne MD   1 Drop at 02/18/20 2151    acetaminophen (TYLENOL) tablet 650 mg  650 mg Oral Q4H PRN Allyson Gonzalez NP        [Held by provider] polyethylene glycol (MIRALAX) packet 17 g  17 g Oral DAILY Malgorzata Gonsalez NP   Stopped at 02/14/20 9050    [Held by provider] docusate sodium (COLACE) capsule 100 mg  100 mg Oral DAILY Allyson Gonzalez NP   Stopped at 02/14/20 0908    [Held by provider] lactobacillus sp. 50 billion cpu (BIO-K PLUS) capsule 1 Cap  1 Cap Oral DAILY Zo Gonzalez NP   Stopped at 02/15/20 0900    HYDROmorphone (DILAUDID) injection 1 mg  1 mg IntraVENous Q3H PRN Chace Payne MD   1 mg at 02/19/20 1052       Chart and notes reviewed. Data reviewed. I have evaluated and examined the patient. IMPRESSION:   · Patient is postop day 6 from abdominal wall reconstruction with transversus abdominis release with postoperative ileus which is inherent with any surgery of this type resolving and prerenal ATN resolving. PLAN:/DISCUSION:   · Inherent postoperative ileus resolving  · Magnesium citrate  · Speech therapy evaluation  · Out of bed to chair, ambulate, encourage incentive spirometry usage.   · Paola Bell MD

## 2020-02-20 ENCOUNTER — APPOINTMENT (OUTPATIENT)
Dept: GENERAL RADIOLOGY | Age: 70
DRG: 353 | End: 2020-02-20
Attending: HOSPITALIST
Payer: MEDICARE

## 2020-02-20 LAB
ANION GAP SERPL CALC-SCNC: 6 MMOL/L (ref 3–18)
BUN SERPL-MCNC: 30 MG/DL (ref 7–18)
BUN/CREAT SERPL: 26 (ref 12–20)
CALCIUM SERPL-MCNC: 8.3 MG/DL (ref 8.5–10.1)
CHLORIDE SERPL-SCNC: 117 MMOL/L (ref 100–111)
CO2 SERPL-SCNC: 23 MMOL/L (ref 21–32)
CREAT SERPL-MCNC: 1.17 MG/DL (ref 0.6–1.3)
GLUCOSE BLD STRIP.AUTO-MCNC: 154 MG/DL (ref 70–110)
GLUCOSE BLD STRIP.AUTO-MCNC: 158 MG/DL (ref 70–110)
GLUCOSE BLD STRIP.AUTO-MCNC: 162 MG/DL (ref 70–110)
GLUCOSE BLD STRIP.AUTO-MCNC: 189 MG/DL (ref 70–110)
GLUCOSE BLD STRIP.AUTO-MCNC: 213 MG/DL (ref 70–110)
GLUCOSE SERPL-MCNC: 174 MG/DL (ref 74–99)
MAGNESIUM SERPL-MCNC: 2.4 MG/DL (ref 1.6–2.6)
PHOSPHATE SERPL-MCNC: 1.4 MG/DL (ref 2.5–4.9)
POTASSIUM SERPL-SCNC: 3.8 MMOL/L (ref 3.5–5.5)
PROCALCITONIN SERPL-MCNC: 0.22 NG/ML
SODIUM SERPL-SCNC: 146 MMOL/L (ref 136–145)

## 2020-02-20 PROCEDURE — 74011000250 HC RX REV CODE- 250: Performed by: NURSE PRACTITIONER

## 2020-02-20 PROCEDURE — 83735 ASSAY OF MAGNESIUM: CPT

## 2020-02-20 PROCEDURE — 74011250636 HC RX REV CODE- 250/636: Performed by: INTERNAL MEDICINE

## 2020-02-20 PROCEDURE — 74011250636 HC RX REV CODE- 250/636: Performed by: SURGERY

## 2020-02-20 PROCEDURE — 77010033678 HC OXYGEN DAILY

## 2020-02-20 PROCEDURE — 74011000250 HC RX REV CODE- 250: Performed by: INTERNAL MEDICINE

## 2020-02-20 PROCEDURE — 74011250636 HC RX REV CODE- 250/636: Performed by: PHYSICIAN ASSISTANT

## 2020-02-20 PROCEDURE — 74011250637 HC RX REV CODE- 250/637: Performed by: SURGERY

## 2020-02-20 PROCEDURE — 92526 ORAL FUNCTION THERAPY: CPT

## 2020-02-20 PROCEDURE — 84100 ASSAY OF PHOSPHORUS: CPT

## 2020-02-20 PROCEDURE — 65660000000 HC RM CCU STEPDOWN

## 2020-02-20 PROCEDURE — 74011000250 HC RX REV CODE- 250: Performed by: HOSPITALIST

## 2020-02-20 PROCEDURE — 74011250637 HC RX REV CODE- 250/637: Performed by: NURSE PRACTITIONER

## 2020-02-20 PROCEDURE — 74011250636 HC RX REV CODE- 250/636: Performed by: HOSPITALIST

## 2020-02-20 PROCEDURE — 94762 N-INVAS EAR/PLS OXIMTRY CONT: CPT

## 2020-02-20 PROCEDURE — 82962 GLUCOSE BLOOD TEST: CPT

## 2020-02-20 PROCEDURE — 74011636637 HC RX REV CODE- 636/637: Performed by: PHYSICIAN ASSISTANT

## 2020-02-20 PROCEDURE — 80048 BASIC METABOLIC PNL TOTAL CA: CPT

## 2020-02-20 PROCEDURE — 74011250636 HC RX REV CODE- 250/636: Performed by: NURSE PRACTITIONER

## 2020-02-20 PROCEDURE — 36415 COLL VENOUS BLD VENIPUNCTURE: CPT

## 2020-02-20 PROCEDURE — 74018 RADEX ABDOMEN 1 VIEW: CPT

## 2020-02-20 RX ADMIN — METOPROLOL TARTRATE 2.5 MG: 5 INJECTION INTRAVENOUS at 21:42

## 2020-02-20 RX ADMIN — INSULIN LISPRO 6 UNITS: 100 INJECTION, SOLUTION INTRAVENOUS; SUBCUTANEOUS at 18:03

## 2020-02-20 RX ADMIN — METRONIDAZOLE 500 MG: 500 INJECTION, SOLUTION INTRAVENOUS at 16:35

## 2020-02-20 RX ADMIN — HEPARIN SODIUM 5000 UNITS: 5000 INJECTION INTRAVENOUS; SUBCUTANEOUS at 23:55

## 2020-02-20 RX ADMIN — METOPROLOL TARTRATE 2.5 MG: 5 INJECTION INTRAVENOUS at 06:05

## 2020-02-20 RX ADMIN — NYSTATIN 500000 UNITS: 500000 SUSPENSION ORAL at 21:44

## 2020-02-20 RX ADMIN — NYSTATIN 500000 UNITS: 500000 SUSPENSION ORAL at 19:01

## 2020-02-20 RX ADMIN — INSULIN LISPRO 3 UNITS: 100 INJECTION, SOLUTION INTRAVENOUS; SUBCUTANEOUS at 00:19

## 2020-02-20 RX ADMIN — METOPROLOL TARTRATE 2.5 MG: 5 INJECTION INTRAVENOUS at 16:35

## 2020-02-20 RX ADMIN — HEPARIN SODIUM 5000 UNITS: 5000 INJECTION INTRAVENOUS; SUBCUTANEOUS at 08:21

## 2020-02-20 RX ADMIN — INSULIN LISPRO 3 UNITS: 100 INJECTION, SOLUTION INTRAVENOUS; SUBCUTANEOUS at 23:54

## 2020-02-20 RX ADMIN — HYDROMORPHONE HYDROCHLORIDE 1 MG: 1 INJECTION, SOLUTION INTRAMUSCULAR; INTRAVENOUS; SUBCUTANEOUS at 16:35

## 2020-02-20 RX ADMIN — METOPROLOL TARTRATE 2.5 MG: 5 INJECTION INTRAVENOUS at 08:22

## 2020-02-20 RX ADMIN — ONDANSETRON 4 MG: 2 INJECTION INTRAMUSCULAR; INTRAVENOUS at 08:21

## 2020-02-20 RX ADMIN — HYDROMORPHONE HYDROCHLORIDE 1 MG: 1 INJECTION, SOLUTION INTRAMUSCULAR; INTRAVENOUS; SUBCUTANEOUS at 06:24

## 2020-02-20 RX ADMIN — CEFEPIME 2 G: 2 INJECTION, POWDER, FOR SOLUTION INTRAVENOUS at 18:03

## 2020-02-20 RX ADMIN — CEFEPIME 2 G: 2 INJECTION, POWDER, FOR SOLUTION INTRAVENOUS at 01:32

## 2020-02-20 RX ADMIN — Medication 10 ML: at 06:06

## 2020-02-20 RX ADMIN — CEFEPIME 2 G: 2 INJECTION, POWDER, FOR SOLUTION INTRAVENOUS at 10:00

## 2020-02-20 RX ADMIN — HYDROMORPHONE HYDROCHLORIDE 1 MG: 1 INJECTION, SOLUTION INTRAMUSCULAR; INTRAVENOUS; SUBCUTANEOUS at 09:30

## 2020-02-20 RX ADMIN — HEPARIN SODIUM 5000 UNITS: 5000 INJECTION INTRAVENOUS; SUBCUTANEOUS at 16:35

## 2020-02-20 RX ADMIN — Medication 10 ML: at 22:16

## 2020-02-20 RX ADMIN — INSULIN LISPRO 3 UNITS: 100 INJECTION, SOLUTION INTRAVENOUS; SUBCUTANEOUS at 06:20

## 2020-02-20 RX ADMIN — LATANOPROST 1 DROP: 50 SOLUTION OPHTHALMIC at 21:43

## 2020-02-20 RX ADMIN — METOPROLOL TARTRATE 2.5 MG: 5 INJECTION INTRAVENOUS at 13:08

## 2020-02-20 RX ADMIN — FAMOTIDINE 20 MG: 10 INJECTION, SOLUTION INTRAVENOUS at 21:43

## 2020-02-20 RX ADMIN — Medication 10 ML: at 13:13

## 2020-02-20 RX ADMIN — SODIUM CHLORIDE: 900 INJECTION, SOLUTION INTRAVENOUS at 13:07

## 2020-02-20 RX ADMIN — FAMOTIDINE 20 MG: 10 INJECTION, SOLUTION INTRAVENOUS at 08:21

## 2020-02-20 RX ADMIN — INSULIN LISPRO 3 UNITS: 100 INJECTION, SOLUTION INTRAVENOUS; SUBCUTANEOUS at 13:03

## 2020-02-20 RX ADMIN — HYDROMORPHONE HYDROCHLORIDE 1 MG: 1 INJECTION, SOLUTION INTRAMUSCULAR; INTRAVENOUS; SUBCUTANEOUS at 01:32

## 2020-02-20 RX ADMIN — METRONIDAZOLE 500 MG: 500 INJECTION, SOLUTION INTRAVENOUS at 06:04

## 2020-02-20 RX ADMIN — NYSTATIN 500000 UNITS: 500000 SUSPENSION ORAL at 08:21

## 2020-02-20 RX ADMIN — THERA TABS 1 TABLET: TAB at 08:35

## 2020-02-20 RX ADMIN — NYSTATIN 500000 UNITS: 500000 SUSPENSION ORAL at 13:08

## 2020-02-20 NOTE — DIABETES MGMT
GLYCEMIC CONTROL AND NUTRITION    Assessment/Recommendations:  Blood glucose this am 154 mg/dl  Continue corrective insulin coverage as needed  Will continue inpatient monitoring. Most recent blood glucose values:  Results for Tisha Pretty (MRN [id number]) as of 2/20/2020 11:04   Ref. Range 2/19/2020 05:33 2/19/2020 10:56 2/19/2020 17:26 2/20/2020 00:15 2/20/2020 06:10   GLUCOSE,FAST - POC Latest Ref Range: 70 - 110 mg/dL 132 (H) 180 (H) 193 (H) 158 (H) 154 (H)     Current A1C of 7.7 % is equivalent to average blood glucose of 174 mg/dl over the past 2-3 months.     Current hospital diabetes medications:   Lispro corrective insulin coverage every 6 hours  Previous day's insulin requirements:   Lispro 9 units corrective insulin  Home diabetes medications:  Per pta med list  Metformin 1000 mg bid  Diet:    Clear liquid with supplements (Ensure clear)  Education:  ____Refer to Diabetes Education Record             _x__Education not indicated at this time      Paola [facility] CDE  Ext [phone]

## 2020-02-20 NOTE — PROGRESS NOTES
Problem: Dysphagia (Adult)  Goal: *Acute Goals and Plan of Care (Insert Text)  Description  Patient will:  1. Tolerate PO trials with 0 s/s overt distress in 4/5 trials  2. Utilize compensatory swallow strategies/maneuvers (decrease bite/sip, size/rate, alt. liq/sol) with min cues in 4/5 trials    Rec:     Full clear liquids  Aspiration precautions  HOB >45 during po intake, remain >30 for 30-45 minutes after po   Small bites/sips; alternate liquid/solid with slow feeding rate   Oral care TID  Meds per pt preference    Outcome: Progressing Towards Goal    SPEECH LANGUAGE PATHOLOGY DYSPHAGIA TREATMENT    Patient: Manuel Chun (89 y.o. male)  Date: 2/20/2020  Diagnosis: Incisional hernia [K43.2]   <principal problem not specified>  Procedure(s) (LRB):  OPEN REPAIR OF INCISIONAL HERNIA WITH MESH (N/A) 7 Days Post-Op  Precautions: Aspiration Fall  PLOF: Independent     ASSESSMENT:  Pt was seen at bedside for dysphagia intervention. A&Ox4, wife at bedside. Pt observed self-feeding thin liquids with no overt s/sx of aspiration. Laryngeal elevation was weak to palpation. Patient safe to continue full clear liquid diet, aspiration precautions, oral care TID, and meds as tolerated. ST will continue to follow and is available for MBS, per MD discretion, if silent aspiration is a concern. Progression toward goals:  [x]         Improving appropriately and progressing toward goals  []         Improving slowly and progressing toward goals  []         Not making progress toward goals and plan of care will be adjusted     PLAN:  Recommendations and Planned Interventions:  Full liquid   Patient continues to benefit from skilled intervention to address the above impairments. Continue treatment per established plan of care. Discharge Recommendations: To Be Determined     SUBJECTIVE:   Patient stated I've been having these issues for 12 years, I don't even have to think about it anymore.     OBJECTIVE:   Cognitive and Communication Status:  Neurologic State: Alert  Orientation Level: Appropriate for age  Cognition: Appropriate decision making  Perception: Appears intact  Perseveration: No perseveration noted  Safety/Judgement: Fall prevention  Dysphagia Treatment:  Oral Assessment:  Oral Assessment  Labial: No impairment  Dentition: Natural, Intact  Oral Hygiene: adequate  Lingual: No impairment  Velum: No impairment  Mandible: No impairment  P.O. Trials:   Patient Position: 50 at [facility]   Vocal quality prior to P.O.: No impairment   Consistency Presented: Thin liquid, Solid, Puree   How Presented: Self-fed/presented, Cup/sip, Spoon, Straw       Bolus Acceptance: No impairment   Bolus Formation/Control: No impairment       Propulsion: No impairment   Oral Residue: None   Initiation of Swallow: No impairment   Laryngeal Elevation: Functional   Aspiration Signs/Symptoms: None   Pharyngeal Phase Characteristics: No impairment, issues, or problems    Effective Modifications: None   Cues for Modifications: None       Oral Phase Severity: No impairment   Pharyngeal Phase Severity : Mild     PAIN:  Pain level pre-treatment: 0/10   Pain level post-treatment: 0/10     After treatment:   []              Patient left in no apparent distress sitting up in chair  [x]              Patient left in no apparent distress in bed  [x]              Call bell left within reach  [x]              Nursing notified  [x]              Family present  []              Caregiver present  []              Bed alarm activated      COMMUNICATION/EDUCATION:   [x] Aspiration precautions; swallow safety; compensatory techniques  []        Patient unable to participate in education; education ongoing with staff  []  Posted safety precautions in patient's room.   [] Oral-motor/laryngeal strengthening exercises      HARRISON Jean  Time Calculation: 13 mins

## 2020-02-20 NOTE — ROUTINE PROCESS
Bedside verbal shift change report received from Danette Hines, [address] [facility]. Pt resting in bed. Pt in nad. 2248 2nd bottle of mag citrate 296ml given. Pt states that he does not have the urge to have a bm. Pt oob to bsc. Pt belching but denies passing gas. abd distended/ very tight. Pt feeling discomfort in abd. .   2325Notified Dr. Og Heading that pt is more distended and still no bm/new orders for soap suds enema  2330 Hospitalist paged awaiting return call  [phone] new orders for a soap suds enema  0035 Dr. Katie Larsen at bedside assessing patient. 0050 Pt had a large loose bowel movement/abd still distended but pt feels relief  0145 Dr. Katie Larsen called after looking at xray of abd/ New orders to hold soap suds enema for now. Hold KUB for now   0400 Pt oob to bsc.  Pt had a small brown loose bm

## 2020-02-20 NOTE — PROGRESS NOTES
[facility] Hospitalist Group  Progress Note    Patient: Nhung Sequeira Age: 77 y.o. : 1950 MR#: [id number] SSN: [id number]  Date: 2020       Subjective:     Patient is lying in bed in NAD. Continues to have abdominal discomfort. Burping present. Some shortness of breath present. No chest pain or cough. No headaches or dizziness. Objective:     /87 (BP 1 Location: Right arm, BP Patient Position: At rest)   Pulse 88   Temp 98 °F (36.7 °C)   Resp 25   Ht 5' 10\" (1.778 m)   Wt 91.4 kg (201 lb 8 oz)   SpO2 98%   BMI 28.91 kg/m²     General:  Alert, NAD, follows commands appropriately  Cardiovascular:  RRR  Pulmonary: bilateral coarse breath sounds, no wheezing  GI: Distended, tense, wound VAC and surgical dressing in situ, very sluggish bowel sounds  Extremities:  + edema  Neuro: AAOx4. moves all extremities    Assessment:     1. Incisional hernia s/p open repair of incisional hernia with mesh  2. Post-op ileus with abdominal distention  3. SIRS, better  4. Acute hypoxic respiratory failure due to ileus, improving currently  5. ARF on CKD stage II due to dehydration  6. Lactic acidosis, improved  7. Hypomagnesemia  8. Hyper kalemia  9. DMT2. a1c 7.7  10. HTN  11. GERD  12. History of throat cancer  13. History of intractable low back pain  14. Sinus tachycardia, improving    Plan:    Continue current management per surgery team  Continue clonidine patch and IV Lopressor for hypertension/tachycardia   Nephrology to follow  PT and OT to follow  I have nothing to offer from my side about his abdominal distention.     Case discussed with:  [x]Patient  []Family  [x]Nursing  []Case Management  DVT Prophylaxis:  []Lovenox  [x]Hep SQ  []SCDs  []Coumadin   []On Heparin gtt    Labs:    Recent Results (from the past 24 hour(s))   GLUCOSE, POC    Collection Time: 20 10:56 AM   Result Value Ref Range    Glucose (POC) 180 (H) 70 - 110 mg/dL   PROCALCITONIN Collection Time: 02/19/20  3:16 PM   Result Value Ref Range    Procalcitonin 0.22 ng/mL   GLUCOSE, POC    Collection Time: 02/19/20  5:26 PM   Result Value Ref Range    Glucose (POC) 193 (H) 70 - 110 mg/dL   GLUCOSE, POC    Collection Time: 02/20/20 12:15 AM   Result Value Ref Range    Glucose (POC) 158 (H) 70 - 636 mg/dL   METABOLIC PANEL, BASIC    Collection Time: 02/20/20 12:24 AM   Result Value Ref Range    Sodium 146 (H) 136 - 145 mmol/L    Potassium 3.8 3.5 - 5.5 mmol/L    Chloride 117 (H) 100 - 111 mmol/L    CO2 23 21 - 32 mmol/L    Anion gap 6 3.0 - 18 mmol/L    Glucose 174 (H) 74 - 99 mg/dL    BUN 30 (H) 7.0 - 18 MG/DL    Creatinine 1.17 0.6 - 1.3 MG/DL    BUN/Creatinine ratio 26 (H) 12 - 20      GFR est AA >60 >60 ml/min/1.73m2    GFR est non-AA >60 >60 ml/min/1.73m2    Calcium 8.3 (L) 8.5 - 10.1 MG/DL   PHOSPHORUS    Collection Time: 02/20/20 12:24 AM   Result Value Ref Range    Phosphorus 1.4 (L) 2.5 - 4.9 MG/DL   MAGNESIUM    Collection Time: 02/20/20 12:24 AM   Result Value Ref Range    Magnesium 2.4 1.6 - 2.6 mg/dL   GLUCOSE, POC    Collection Time: 02/20/20  6:10 AM   Result Value Ref Range    Glucose (POC) 154 (H) 70 - 110 mg/dL       Signed By: Marylee Balo, MD     February 20, 2020

## 2020-02-20 NOTE — ROUTINE PROCESS
Bedside and Verbal shift change report given to [address] (oncoming nurse) by Miranda Kaufman RN (offgoing nurse). Report included the following information SBAR, Kardex, Intake/Output, MAR, Recent Results and Cardiac Rhythm . Alex Jeffries

## 2020-02-20 NOTE — PROGRESS NOTES
Attempted to see patient for skilled OT x 2. Upon first attempt at [phone], patient on [facility] with RN requesting to check back on patient and [phone], patient with another discipline. Will continue to follow and see patient when available/as appropriate.           Thank you,   Robert Wesley MS, OTR/L

## 2020-02-20 NOTE — PROGRESS NOTES
Yifan Meek M.D. FACS  PROGRESS NOTE    Name: Romelia Huerta MRN: [id number]   : 1950 Hospital: [facility]   Date: 2020 Admission Date: 2020  5:32 AM     Hospital Day: 8  7 Days Post-Op  Subjective:  Pt without acute overnight events. + BM with enema. Feels better. Hungry. Objective:  Vitals:    20 0400 20 0605 20 0800 20 1200   BP: (!) 176/93 (!) 176/93 143/87    Pulse: 100 93 88    Resp: 19  25    Temp:   98 °F (36.7 °C) 98.3 °F (36.8 °C)   SpO2: 91%  98%    Weight:       Height:         Date 20 0700 - 20 0659 20 0700 - 20 0659   Shift 4635-0036 6449-2965 24 Hour Total 3880-9260 7310-1067 24 Hour Total   INTAKE   P.O.         P.O.       I.V.(mL/kg/hr) 1668(1.5)  1668(0.8)        Volume (0.9% sodium chloride infusion) 534  534        Volume (metroNIDAZOLE (FLAGYL) IVPB premix 500 mg) 100  100        Volume (cefepime (MAXIPIME) 2 g in sterile water (preservative free) 10 mL IV syringe) 10  10        Volume (TPN ADULT PERIKABIVEN W/ ADDITIVES) 1024  1024      Shift Total(mL/kg) 8312(72.0) 306(4.1) 7640(07.8)      OUTPUT   Urine(mL/kg/hr) 750(0.7)  750(0.3)        Urine Voided 750  750      Drains 100 135 235        Output (ml) (Matthew-Garcia Drain 20 Abdomen) 80 105 185        Output (ml) (Matthew-Garcia Drain 20 Abdomen) 20 30 50      Stool           Stool Occurrence(s) 1 x 1 x 2 x      Shift Total(mL/kg) 850(9.2) 135(1.5) 985(10.8)      NET 9602 161 2262      Weight (kg) 92.5 91.4 91.4 91.4 91.4 91.4         Physical Exam:    General: A&A, NAD, Ox4   Abdomen: abdomen is soft with incisional tenderness. Incision(s) are C/D/I. No masses, organomegaly or guarding. ANGELI drains with serosanguinous drainage.   ANGELI drain sites ok    Labs:  Recent Results (from the past 24 hour(s))   PROCALCITONIN    Collection Time: 20  3:16 PM   Result Value Ref Range    Procalcitonin 0.22 ng/mL GLUCOSE, POC    Collection Time: 02/19/20  5:26 PM   Result Value Ref Range    Glucose (POC) 193 (H) 70 - 110 mg/dL   GLUCOSE, POC    Collection Time: 02/20/20 12:15 AM   Result Value Ref Range    Glucose (POC) 158 (H) 70 - 638 mg/dL   METABOLIC PANEL, BASIC    Collection Time: 02/20/20 12:24 AM   Result Value Ref Range    Sodium 146 (H) 136 - 145 mmol/L    Potassium 3.8 3.5 - 5.5 mmol/L    Chloride 117 (H) 100 - 111 mmol/L    CO2 23 21 - 32 mmol/L    Anion gap 6 3.0 - 18 mmol/L    Glucose 174 (H) 74 - 99 mg/dL    BUN 30 (H) 7.0 - 18 MG/DL    Creatinine 1.17 0.6 - 1.3 MG/DL    BUN/Creatinine ratio 26 (H) 12 - 20      GFR est AA >60 >60 ml/min/1.73m2    GFR est non-AA >60 >60 ml/min/1.73m2    Calcium 8.3 (L) 8.5 - 10.1 MG/DL   PHOSPHORUS    Collection Time: 02/20/20 12:24 AM   Result Value Ref Range    Phosphorus 1.4 (L) 2.5 - 4.9 MG/DL   MAGNESIUM    Collection Time: 02/20/20 12:24 AM   Result Value Ref Range    Magnesium 2.4 1.6 - 2.6 mg/dL   GLUCOSE, POC    Collection Time: 02/20/20  6:10 AM   Result Value Ref Range    Glucose (POC) 154 (H) 70 - 110 mg/dL   GLUCOSE, POC    Collection Time: 02/20/20 11:47 AM   Result Value Ref Range    Glucose (POC) 189 (H) 70 - 110 mg/dL     All Micro Results     Procedure Component Value Units Date/Time    CULTURE, RESPIRATORY/SPUTUM/BRONCH Vallery Peed STAIN [[id number]] Collected:  02/15/20 1010    Order Status:  Completed Specimen:  Sputum Updated:  02/17/20 1009     Special Requests: NO SPECIAL REQUESTS        GRAM STAIN RARE WBCS SEEN         FEW EPITHELIAL CELLS SEEN               RARE GRAM POSITIVE COCCI IN PAIRS           Culture result:       RARE NORMAL RESPIRATORY GALE          CULTURE, URINE [[id number]] Collected:  02/14/20 1230    Order Status:  Completed Specimen:  Clean catch Updated:  02/15/20 0908     Special Requests: NO SPECIAL REQUESTS        Culture result: NO GROWTH 1 DAY             Current Medications:  Current Facility-Administered Medications   Medication Dose Route Frequency Provider Last Rate Last Dose    potassium phosphate 20 mmol in 0.9% sodium chloride 250 mL infusion   IntraVENous ONCE Gloria Blanca MD        TPN ADULT PERIKABIVEN W/ ADDITIVES   IntraVENous CONTINUOUS Yokasta Fatima MD 60 mL/hr at 02/19/20 2224      therapeutic multivitamin [facility]) tablet 1 Tab  1 Tab Oral DAILY Yokasta Fatima MD   1 Tab at 02/20/20 7499    cefepime (MAXIPIME) 2 g in sterile water (preservative free) 10 mL IV syringe  2 g IntraVENous Q8H Lorena Nair MD   2 g at 02/20/20 1000    cloNIDine (CATAPRES) 0.1 mg/24 hr patch 1 Patch  1 Patch TransDERmal Q7D Marshall Shipman MD   1 Patch at 02/18/20 1711    metoprolol (LOPRESSOR) injection 2.5 mg  2.5 mg IntraVENous Q4H Marshall Shipman MD   2.5 mg at 02/20/20 1308    oxyCODONE-acetaminophen (PERCOCET) 5-325 mg per tablet 1 Tab  1 Tab Oral Q4H PRN Mekhi Gonzalez NP        famotidine (PF) (PEPCID) 20 mg in 0.9% sodium chloride 10 mL injection  20 mg IntraVENous Q12H Zo Gonzalez NP   20 mg at 02/20/20 0821    ondansetron (ZOFRAN) injection 4 mg  4 mg IntraVENous Q6H PRN Wilfredo De La Vega PA-C   4 mg at 02/20/20 7987    insulin lispro (HUMALOG) injection   SubCUTAneous Q6H Wilfredo De La Vega PA-C   3 Units at 02/20/20 1303    metroNIDAZOLE (FLAGYL) IVPB premix 500 mg  500 mg IntraVENous Q12H Alan MD Corrine 100 mL/hr at 02/20/20 0604 500 mg at 02/20/20 0604    albuterol (PROVENTIL VENTOLIN) nebulizer solution 2.5 mg  2.5 mg Nebulization Q4H PRN Conchetta Eladio A DO   2.5 mg at 02/14/20 0341    sodium chloride (OCEAN) 0.65 % nasal squeeze bottle 2 Spray  2 Spray Both Nostrils Q2H PRN Baldemar Freitas DO        glucose chewable tablet 16 g  4 Tab Oral PRN Mekhi Gonzalez, MARLENE        glucagon (GLUCAGEN) injection 1 mg  1 mg IntraMUSCular PRN Mekhi Gonzalez, NP        dextrose (D50W) injection syrg 12.5-25 g  25-50 mL IntraVENous PRN Teddy Marques NP        oxyCODONE-acetaminophen (PERCOCET) 5-325 mg per tablet 2 Tab  2 Tab Oral Q6H PRN Teddy Marques NP   2 Tab at 02/19/20 2348    [Held by provider] nebivolol (BYSTOLIC) tablet 10 mg  10 mg Oral DAILY Nicho Nair MD   Stopped at 02/15/20 0900    benzocaine-menthol (CEPACOL) lozenge 1 Lozenge  1 Lozenge Mucous Membrane PRN Nicho Nair MD        nystatin (MYCOSTATIN) 100,000 unit/mL oral suspension 500,000 Units  500,000 Units Oral QID Carmenza Gonzalez NP   500,000 Units at 02/20/20 1308    phenol throat spray (CHLORASEPTIC) 1 Spray  1 [place] Oral PRN Carmenza Gonzalez NP   1 Spray at 02/14/20 1055    albuterol-ipratropium (DUO-NEB) 2.5 MG-0.5 MG/3 ML  3 mL Nebulization Q4H PRN [place] MD Zhanna   3 mL at 02/15/20 2320    sodium chloride (NS) flush 5-40 mL  5-40 mL IntraVENous Q8H Pascual Morrow MD   10 mL at 02/20/20 1313    sodium chloride (NS) flush 5-40 mL  5-40 mL IntraVENous PRN Pascual Morrow MD   10 mL at 02/15/20 1246    heparin (porcine) injection 5,000 Units  5,000 Units SubCUTAneous Q8H Pascual Morrow MD   5,000 Units at 02/20/20 2013    [Held by provider] gabapentin (NEURONTIN) capsule 100 mg  100 mg Oral TID Pascual Morrow MD   Stopped at 02/16/20 0900    [Held by provider] ferrous sulfate tablet 325 mg  325 mg Oral Q MON, WED & Germán Echeverria MD   325 mg at 02/14/20 2117    latanoprost (XALATAN) 0.005 % ophthalmic solution 1 Drop  1 Drop Both Eyes QHS Pascual Morrow MD   1 Drop at 02/19/20 2129    acetaminophen (TYLENOL) tablet 650 mg  650 mg Oral Q4H PRN Carmenza Gonzalez NP        [Held by provider] polyethylene glycol (MIRALAX) packet 17 g  17 g Oral DAILY Teddy Marques NP   Stopped at 02/14/20 0907    [Held by provider] docusate sodium (COLACE) capsule 100 mg  100 mg Oral DAILY Teddy Marques NP Stopped at 02/14/20 0908    [Held by provider] lactobacillus sp. 50 billion cpu (BIO-K PLUS) capsule 1 Cap  1 Cap Oral DAILY Zo Gonzalez NP   Stopped at 02/15/20 0900    HYDROmorphone (DILAUDID) injection 1 mg  1 mg IntraVENous Q3H PRN Manual MD Tong   1 mg at 02/20/20 0930       Chart and notes reviewed. Data reviewed. I have evaluated and examined the patient. IMPRESSION:   · Pt is POD 7 from abdominal wall reconstruction.        PLAN:/DISCUSION:   · Advance diet  · oob to chair  · Jessica Hopson MD

## 2020-02-20 NOTE — PROGRESS NOTES
RENAL DAILY PROGRESS NOTE            71y M with PMH colon Ca, s/p resection, post insictional hernia, s/p hernia repair with [facility], developed post operative JOSE ANTONIO    Subjective:       Complaint:   Overnight events noted  No difficulty in voiding urine   No accurate urine output documentation. IMPRESSION:   JOSE ANTONIO, post operative , higher abdominal pressure suggestive of compartment syndrome, renal function improve with conservative treatment,   Hypernatremia , improving   Metabolic acidosis , renal failure   Leucocytosis   S/p open repair incisional hernia repair,  Post operative ileus   Post operative respiratory distress. Hypophoshatemia;    PLAN:   His renal function continue to improve, acidosis is better today. Ordered K phos 20mmol today  For hypophosphatemia. No new rec from renal stand point, will be available if any question or concern. Avoid hypotension.              Current Facility-Administered Medications   Medication Dose Route Frequency    potassium phosphate 20 mmol in 0.9% sodium chloride 250 mL infusion   IntraVENous ONCE    TPN ADULT PERIKABIVEN W/ ADDITIVES   IntraVENous CONTINUOUS    therapeutic multivitamin (THERAGRAN) tablet 1 Tab  1 Tab Oral DAILY    cefepime (MAXIPIME) 2 g in sterile water (preservative free) 10 mL IV syringe  2 g IntraVENous Q8H    cloNIDine (CATAPRES) 0.1 mg/24 hr patch 1 Patch  1 Patch TransDERmal Q7D    metoprolol (LOPRESSOR) injection 2.5 mg  2.5 mg IntraVENous Q4H    oxyCODONE-acetaminophen (PERCOCET) 5-325 mg per tablet 1 Tab  1 Tab Oral Q4H PRN    famotidine (PF) (PEPCID) 20 mg in 0.9% sodium chloride 10 mL injection  20 mg IntraVENous Q12H    ondansetron (ZOFRAN) injection 4 mg  4 mg IntraVENous Q6H PRN    insulin lispro (HUMALOG) injection   SubCUTAneous Q6H    metroNIDAZOLE (FLAGYL) IVPB premix 500 mg  500 mg IntraVENous Q12H    albuterol (PROVENTIL VENTOLIN) nebulizer solution 2.5 mg  2.5 mg Nebulization Q4H PRN    sodium chloride (OCEAN) 0.65 % nasal squeeze bottle 2 Spray  2 Spray Both Nostrils Q2H PRN    glucose chewable tablet 16 g  4 Tab Oral PRN    glucagon (GLUCAGEN) injection 1 mg  1 mg IntraMUSCular PRN    dextrose (D50W) injection syrg 12.5-25 g  25-50 mL IntraVENous PRN    oxyCODONE-acetaminophen (PERCOCET) 5-325 mg per tablet 2 Tab  2 Tab Oral Q6H PRN    [Held by provider] nebivolol (BYSTOLIC) tablet 10 mg  10 mg Oral DAILY    benzocaine-menthol (CEPACOL) lozenge 1 Lozenge  1 Lozenge Mucous Membrane PRN    nystatin (MYCOSTATIN) 100,000 unit/mL oral suspension 500,000 Units  500,000 Units Oral QID    phenol throat spray (CHLORASEPTIC) 1 Spray  1 Spray Oral PRN    albuterol-ipratropium (DUO-NEB) 2.5 MG-0.5 MG/3 ML  3 mL Nebulization Q4H PRN    sodium chloride (NS) flush 5-40 mL  5-40 mL IntraVENous Q8H    sodium chloride (NS) flush 5-40 mL  5-40 mL IntraVENous PRN    heparin (porcine) injection 5,000 Units  5,000 Units SubCUTAneous Q8H    [Held by provider] gabapentin (NEURONTIN) capsule 100 mg  100 mg Oral TID    [Held by provider] ferrous sulfate tablet 325 mg  325 mg Oral Q MON, WED & FRI    latanoprost (XALATAN) 0.005 % ophthalmic solution 1 Drop  1 Drop Both Eyes QHS    acetaminophen (TYLENOL) tablet 650 mg  650 mg Oral Q4H PRN    [Held by provider] polyethylene glycol (MIRALAX) packet 17 g  17 g Oral DAILY    [Held by provider] docusate sodium (COLACE) capsule 100 mg  100 mg Oral DAILY    [Held by provider] lactobacillus sp. 50 billion cpu (BIO-K PLUS) capsule 1 Cap  1 Cap Oral DAILY    HYDROmorphone (DILAUDID) injection 1 mg  1 mg IntraVENous Q3H PRN       Review of Symptoms: comprehensive ROS negative except above.    Objective:     Patient Vitals for the past 24 hrs:   Temp Pulse Resp BP SpO2   02/20/20 0800 98 °F (36.7 °C) 88 25 143/87 98 %   02/20/20 0605 -- 93 -- (!) 176/93 --   02/20/20 0400 -- 100 19 (!) 176/93 91 %   02/20/20 0200 -- 93 12 127/86 98 %   02/20/20 0136 -- (!) 101 20 (!) 148/91 98 % 02/20/20 0000 98 °F (36.7 °C) -- -- -- --   02/19/20 2347 -- (!) 111 -- (!) 195/110 --   02/19/20 2200 -- (!) 112 22 (!) 195/110 98 %   02/19/20 2126 -- (!) 119 -- (!) 150/96 --   02/19/20 2011 98.1 °F (36.7 °C) (!) 110 21 (!) 150/96 97 %   02/19/20 1800 -- 100 24 185/89 94 %   02/19/20 1715 -- -- -- -- 98 %   02/19/20 1639 -- -- -- -- 98 %   02/19/20 1600 97.8 °F (36.6 °C) (!) 108 24 (!) 147/91 96 %   02/19/20 1400 -- (!) 106 20 -- 97 %   02/19/20 1200 97.5 °F (36.4 °C) 93 15 (!) 160/91 94 %        Weight change:      02/18 1901 - 02/20 0700  In: 2860 [P.O.:1192;  I.V.:1668]  Out: 1460 [Urine:1225; Drains:235]    Intake/Output Summary (Last 24 hours) at 2/20/2020 1037  Last data filed at 2/20/2020 0605  Gross per 24 hour   Intake 1642 ml   Output 645 ml   Net 997 ml     Physical Exam:   General: comfortable, no acute distress   HEENT sclera anicteric, supple neck, no thyromegaly  CVS: S1S2 heard,  no rub  RS: + air entry b/l,   Abd: distended abdomen, tense   Neuro: non focal, awake, alert , CN II-XII are grossly intact  Extrm: no  edema, no cyanosis, clubbing   Skin: no visible  Rash  Musculoskeletal: No gross joints or bone deformities         Data Review:     LABS:   Hematology:   Recent Labs     02/19/20  0119   WBC 18.4*   HGB 11.3*   HCT 34.4*     Chemistry:   Recent Labs     02/20/20  0024 02/19/20  0119 02/18/20  1021   BUN 30* 37* 41*   CREA 1.17 1.39* 1.50*   CA 8.3* 8.2* 7.6*   ALB  --   --  2.9*   K 3.8 4.6 4.6   * 145 145   * 118* 117*   CO2 23 18* 13*   PHOS 1.4* 1.8* 3.2   * 160* 175*            Procedures/imaging: see electronic medical records for all procedures, Xrays and details which were not copied into this note but were reviewed prior to creation of Plan          Assessment & Plan:     As above         Marvin Merlos MD  2/20/2020  12:39 PM

## 2020-02-20 NOTE — PROGRESS NOTES
7995 - Per nursing pt is on toilet at this time. Will follow up later in day. 12 - Pt with SLP at this time. Will follow up per pt's schedule.

## 2020-02-20 NOTE — PROGRESS NOTES
NUTRITION    Nutrition Consult: TPN: RD to Manage      RECOMMENDATIONS / PLAN:     - Discontinue peripheral parenteral nutrition once current bag ends at 8 pm tonight.  - Add supplements: Ensure Clear TID. - Monitor po intake and diet tolerance, recommend advancing once abdominal distension and gut motility improves. - Consider trial of promotility agents per GI recommendation- mestinon, neostigmine or metoclopramide.   - Continue RD inpatient monitoring and evaluation. NUTRITION INTERVENTIONS & DIAGNOSIS:     - Parenteral nutrition: discontinue   - Meals/snacks: started on clears 2/19, advancement per MD  - Medical food supplement therapy: initiate   - Vitamin and mineral supplement therapy: theragran, 20 mmol K Phos IV   - Collaboration and referral of nutrition care: discussed access, stopping PN and diet tolerance with Dr Amanda Gutierrez; phos replacement discussed with Nephrology      Nutrition Diagnosis: Inadequate oral intake related to diet intolerance due to altered GI function as evidenced by on bowel rest for postoperative ileus s/p hernia repair, started on liquids. ASSESSMENT:     2/20: BM after mag citrate and enema overnight, increased distension and discomfort today with no BM and did not eat breakfast, ate well for first clear meal at dinner yesterday. No orders placed for PICC placement and no longer indicated as PPN stopping tonight. Pt denies nausea/vomiting, states increased abdominal distension also occurred with previous abdominal surgeries and resolved after 4-5 days postop. 2/19: Renal function improving, IV fluid stopped and NGT removed after pt tolerated clamping trial yesterday. Started on clear liquids. Pt c/o abdominal distension, episode of bilious spit up this morning, nausea now resolved and pt hungry/thirsty and asking to eat.    2/18: S/p open repair of incisional hernia with mesh on 2/13/20; developed postoperative ileus and acute kidney injury with elevated abdominal pressures and remains NPO with NGT for decompression, increased abdominal distension and loose stool via FMS. Plan for PICC placement per surgery, not on the schedule today per discussion with IR.       Nutritional intake adequate to meet patients estimated nutritional needs:  No    Diet: DIET CLEAR LIQUID  TPN ADULT PERIKABIVEN W/ ADDITIVES      Food Allergies: cooked carrots cause regurgitation per pt report (able to tolerate raw carrots)  Current Appetite: Fair  Appetite/meal intake prior to admission: Good  Feeding Limitations:  [] Swallowing difficulty    [] Chewing difficulty    [x] Other: SLP following   Current Meal Intake:   Patient Vitals for the past 100 hrs:   % Diet Eaten   02/17/20 1600 0 %   02/17/20 0800 0 %   02/16/20 2035 0 %     NGT removed 2/18   BM: 2/20 (abdomen distended, rigid)   Skin Integrity: abdominal surgical incision; abdominal ANGELI drains x 2  Edema:   [] No     [x] Yes   Pertinent Medications: Reviewed: SSI, ondansetron, famotidine; oral medications being held (colace, ferrous sulfate, lactobacillus, miralax)    Labs:   Recent Labs     02/20/20  0024 02/19/20  0119 02/18/20  1021   * 145 145   K 3.8 4.6 4.6   * 118* 117*   CO2 23 18* 13*   * 160* 175*   BUN 30* 37* 41*   CREA 1.17 1.39* 1.50*   CA 8.3* 8.2* 7.6*   MG 2.4 2.1 2.1   PHOS 1.4* 1.8* 3.2   ALB  --   --  2.9*   SGOT  --   --  47*   ALT  --   --  45     Lab Results   Component Value Date/Time    Triglyceride 185 (H) 02/18/2020 10:21 AM     Prealbumin: 12.2 mg/dL (2/18/20)  Ionized Calcium: 1.09 mmol/L (2/18/20)       Intake/Output Summary (Last 24 hours) at 2/20/2020 1044  Last data filed at 2/20/2020 0605  Gross per 24 hour   Intake 1642 ml   Output 645 ml   Net 997 ml       Anthropometrics:  Ht Readings from Last 1 Encounters:   02/13/20 5' 10\" (1.778 m)     Last 3 Recorded Weights in this Encounter    02/16/20 0659 02/17/20 1600 02/19/20 2011   Weight: 90.9 kg (200 lb 6.4 oz) 92.5 kg (203 lb 14.8 oz) 91.4 kg (201 lb 8 oz)     Standing, estimated dry weight of 81.6 kg on 2/13/20    Body mass index is 28.91 kg/m². Weight History: patient reports usual weight of 175-180 lb and weight loss down to 130 lb from December 2018 to January 2019 then gained weight back up to 170 lb in July 2019 and reports weight of 180 on admission; 29 lb, 17% weight loss x 2-3 months per chart hx review and has since gained weight back with additional weight gain since admission due to fluid accumulation     Weight Metrics 2/19/2020 2/13/2020 1/24/2020 12/19/2019 8/26/2019 7/29/2019 7/5/2019   Weight 201 lb 8 oz - 183 lb 177 lb 154 lb 160 lb 148 lb 3.2 oz   BMI - 28.91 kg/m2 26.26 kg/m2 25.4 kg/m2 22.1 kg/m2 22.96 kg/m2 21.26 kg/m2        Admitting Diagnosis: Incisional hernia [K43.2]  Pertinent PMHx: HTN, DM, GERD, diverticulitis, 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) with PEG placement (9/9/18), colon cancer s/p sigmoid colectomy (24/5/35) complicated by small anastomotic leak s/p diverting ileostomy (12/11/18) and received parenteral nutrition perioperatively from 12/9 until 12/19/18, s/p loop ileostomy reversal (7/5/19)     Education Needs:        [x] None identified  [] Identified - Not appropriate at this time  []  Identified and addressed - refer to education log  Learning Limitations:   [x] None identified  [] Identified    Cultural, Methodist & ethnic food preferences:  [x] None identified    [] Identified and addressed     ESTIMATED NUTRITION NEEDS:     Calories: 9655-1764 kcal (20-30 kcal/kg) based on  [x] Actual BW 82 kg (dry weight)     [] IBW   Protein:  gm (1-1.5 gm/kg) based on  [x] Actual BW      [] IBW   Fluid: 1 mL/kcal     MONITORING & EVALUATION:     Nutrition Goal(s):   - Parenteral nutrition intake will meet >75% of patient estimated nutritional needs within the next 7 days.    Outcome: Not progressing towards goal   - PO nutrition intake will meet >75% of patient estimated nutritional needs within the next 7 days. Outcome: Progressing towards goal      Monitoring:   [x] Food and nutrient intake   [x] Food and nutrient administration  [x] Comparative standards   [x] Nutrition-focused physical findings   [x] Anthropometric Measurements   [x] Treatment/therapy   [x] Biochemical data, medical tests, and procedures        Previous Recommendations (for follow-up assessments only): Implemented      Discharge Planning: Nutritional discharge needs unknown at this time. Participated in care planning, discharge planning, & interdisciplinary rounds as appropriate.       Blaise Gowers, RD, [address]   Pager: [phone]

## 2020-02-21 LAB
ANION GAP SERPL CALC-SCNC: 3 MMOL/L (ref 3–18)
BASOPHILS # BLD: 0 K/UL (ref 0–0.1)
BASOPHILS NFR BLD: 0 % (ref 0–2)
BUN SERPL-MCNC: 24 MG/DL (ref 7–18)
BUN/CREAT SERPL: 22 (ref 12–20)
CALCIUM SERPL-MCNC: 7.9 MG/DL (ref 8.5–10.1)
CHLORIDE SERPL-SCNC: 114 MMOL/L (ref 100–111)
CO2 SERPL-SCNC: 27 MMOL/L (ref 21–32)
CREAT SERPL-MCNC: 1.1 MG/DL (ref 0.6–1.3)
DIFFERENTIAL METHOD BLD: ABNORMAL
EOSINOPHIL # BLD: 0.2 K/UL (ref 0–0.4)
EOSINOPHIL NFR BLD: 2 % (ref 0–5)
ERYTHROCYTE [DISTWIDTH] IN BLOOD BY AUTOMATED COUNT: 14.8 % (ref 11.6–14.5)
GLUCOSE BLD STRIP.AUTO-MCNC: 142 MG/DL (ref 70–110)
GLUCOSE BLD STRIP.AUTO-MCNC: 152 MG/DL (ref 70–110)
GLUCOSE BLD STRIP.AUTO-MCNC: 153 MG/DL (ref 70–110)
GLUCOSE BLD STRIP.AUTO-MCNC: 232 MG/DL (ref 70–110)
GLUCOSE SERPL-MCNC: 159 MG/DL (ref 74–99)
HCT VFR BLD AUTO: 33.9 % (ref 36–48)
HGB BLD-MCNC: 10.9 G/DL (ref 13–16)
LYMPHOCYTES # BLD: 1.4 K/UL (ref 0.9–3.6)
LYMPHOCYTES NFR BLD: 14 % (ref 21–52)
MAGNESIUM SERPL-MCNC: 2.4 MG/DL (ref 1.6–2.6)
MCH RBC QN AUTO: 29.1 PG (ref 24–34)
MCHC RBC AUTO-ENTMCNC: 32.2 G/DL (ref 31–37)
MCV RBC AUTO: 90.4 FL (ref 74–97)
MONOCYTES # BLD: 0.7 K/UL (ref 0.05–1.2)
MONOCYTES NFR BLD: 7 % (ref 3–10)
NEUTS SEG # BLD: 7.6 K/UL (ref 1.8–8)
NEUTS SEG NFR BLD: 77 % (ref 40–73)
PHOSPHATE SERPL-MCNC: 2.6 MG/DL (ref 2.5–4.9)
PLATELET # BLD AUTO: 234 K/UL (ref 135–420)
PMV BLD AUTO: 9.8 FL (ref 9.2–11.8)
POTASSIUM SERPL-SCNC: 3.8 MMOL/L (ref 3.5–5.5)
RBC # BLD AUTO: 3.75 M/UL (ref 4.7–5.5)
SODIUM SERPL-SCNC: 144 MMOL/L (ref 136–145)
WBC # BLD AUTO: 9.9 K/UL (ref 4.6–13.2)

## 2020-02-21 PROCEDURE — 74011250636 HC RX REV CODE- 250/636: Performed by: SURGERY

## 2020-02-21 PROCEDURE — 97116 GAIT TRAINING THERAPY: CPT

## 2020-02-21 PROCEDURE — 82962 GLUCOSE BLOOD TEST: CPT

## 2020-02-21 PROCEDURE — 84100 ASSAY OF PHOSPHORUS: CPT

## 2020-02-21 PROCEDURE — 80048 BASIC METABOLIC PNL TOTAL CA: CPT

## 2020-02-21 PROCEDURE — 74011000250 HC RX REV CODE- 250: Performed by: INTERNAL MEDICINE

## 2020-02-21 PROCEDURE — 74011636637 HC RX REV CODE- 636/637: Performed by: NURSE PRACTITIONER

## 2020-02-21 PROCEDURE — 65660000000 HC RM CCU STEPDOWN

## 2020-02-21 PROCEDURE — 74011250636 HC RX REV CODE- 250/636: Performed by: NURSE PRACTITIONER

## 2020-02-21 PROCEDURE — 74011250637 HC RX REV CODE- 250/637: Performed by: SURGERY

## 2020-02-21 PROCEDURE — 97530 THERAPEUTIC ACTIVITIES: CPT

## 2020-02-21 PROCEDURE — 94762 N-INVAS EAR/PLS OXIMTRY CONT: CPT

## 2020-02-21 PROCEDURE — 77030019952 HC CANSTR VAC ASST KCON -B

## 2020-02-21 PROCEDURE — 74011000250 HC RX REV CODE- 250: Performed by: NURSE PRACTITIONER

## 2020-02-21 PROCEDURE — 74011000250 HC RX REV CODE- 250: Performed by: HOSPITALIST

## 2020-02-21 PROCEDURE — 77030025414 HC DRSG VAC ASST SMPLC KCON -B

## 2020-02-21 PROCEDURE — 74011250637 HC RX REV CODE- 250/637: Performed by: NURSE PRACTITIONER

## 2020-02-21 PROCEDURE — 83735 ASSAY OF MAGNESIUM: CPT

## 2020-02-21 PROCEDURE — 74011250636 HC RX REV CODE- 250/636: Performed by: HOSPITALIST

## 2020-02-21 PROCEDURE — 85025 COMPLETE CBC W/AUTO DIFF WBC: CPT

## 2020-02-21 PROCEDURE — 36415 COLL VENOUS BLD VENIPUNCTURE: CPT

## 2020-02-21 RX ORDER — INSULIN LISPRO 100 [IU]/ML
INJECTION, SOLUTION INTRAVENOUS; SUBCUTANEOUS
Status: DISCONTINUED | OUTPATIENT
Start: 2020-02-21 | End: 2020-02-28 | Stop reason: HOSPADM

## 2020-02-21 RX ORDER — METOPROLOL TARTRATE 5 MG/5ML
2.5 INJECTION INTRAVENOUS EVERY 4 HOURS
Status: COMPLETED | OUTPATIENT
Start: 2020-02-21 | End: 2020-02-21

## 2020-02-21 RX ADMIN — HYDROMORPHONE HYDROCHLORIDE 1 MG: 1 INJECTION, SOLUTION INTRAMUSCULAR; INTRAVENOUS; SUBCUTANEOUS at 16:18

## 2020-02-21 RX ADMIN — METOPROLOL TARTRATE 2.5 MG: 5 INJECTION INTRAVENOUS at 05:38

## 2020-02-21 RX ADMIN — METOPROLOL TARTRATE 2.5 MG: 5 INJECTION INTRAVENOUS at 23:43

## 2020-02-21 RX ADMIN — METOPROLOL TARTRATE 2.5 MG: 5 INJECTION INTRAVENOUS at 01:00

## 2020-02-21 RX ADMIN — HYDROMORPHONE HYDROCHLORIDE 1 MG: 1 INJECTION, SOLUTION INTRAMUSCULAR; INTRAVENOUS; SUBCUTANEOUS at 10:25

## 2020-02-21 RX ADMIN — Medication 10 ML: at 05:39

## 2020-02-21 RX ADMIN — THERA TABS 1 TABLET: TAB at 09:00

## 2020-02-21 RX ADMIN — INSULIN LISPRO 3 UNITS: 100 INJECTION, SOLUTION INTRAVENOUS; SUBCUTANEOUS at 16:00

## 2020-02-21 RX ADMIN — METOPROLOL TARTRATE 2.5 MG: 5 INJECTION INTRAVENOUS at 16:00

## 2020-02-21 RX ADMIN — Medication 10 ML: at 23:43

## 2020-02-21 RX ADMIN — HEPARIN SODIUM 5000 UNITS: 5000 INJECTION INTRAVENOUS; SUBCUTANEOUS at 23:44

## 2020-02-21 RX ADMIN — CEFEPIME 2 G: 2 INJECTION, POWDER, FOR SOLUTION INTRAVENOUS at 10:00

## 2020-02-21 RX ADMIN — HEPARIN SODIUM 5000 UNITS: 5000 INJECTION INTRAVENOUS; SUBCUTANEOUS at 16:00

## 2020-02-21 RX ADMIN — NYSTATIN 500000 UNITS: 500000 SUSPENSION ORAL at 13:00

## 2020-02-21 RX ADMIN — METOPROLOL TARTRATE 2.5 MG: 5 INJECTION INTRAVENOUS at 20:04

## 2020-02-21 RX ADMIN — INSULIN LISPRO 3 UNITS: 100 INJECTION, SOLUTION INTRAVENOUS; SUBCUTANEOUS at 21:20

## 2020-02-21 RX ADMIN — Medication 10 ML: at 14:00

## 2020-02-21 RX ADMIN — NYSTATIN 500000 UNITS: 500000 SUSPENSION ORAL at 09:00

## 2020-02-21 RX ADMIN — GABAPENTIN 100 MG: 100 CAPSULE ORAL at 21:58

## 2020-02-21 RX ADMIN — OXYCODONE HYDROCHLORIDE AND ACETAMINOPHEN 2 TABLET: 5; 325 TABLET ORAL at 00:23

## 2020-02-21 RX ADMIN — NYSTATIN 500000 UNITS: 500000 SUSPENSION ORAL at 21:59

## 2020-02-21 RX ADMIN — CEFEPIME 2 G: 2 INJECTION, POWDER, FOR SOLUTION INTRAVENOUS at 02:11

## 2020-02-21 RX ADMIN — HYDROMORPHONE HYDROCHLORIDE 1 MG: 1 INJECTION, SOLUTION INTRAMUSCULAR; INTRAVENOUS; SUBCUTANEOUS at 00:23

## 2020-02-21 RX ADMIN — HYDROMORPHONE HYDROCHLORIDE 1 MG: 1 INJECTION, SOLUTION INTRAMUSCULAR; INTRAVENOUS; SUBCUTANEOUS at 22:15

## 2020-02-21 RX ADMIN — LATANOPROST 1 DROP: 50 SOLUTION OPHTHALMIC at 22:00

## 2020-02-21 RX ADMIN — GABAPENTIN 100 MG: 100 CAPSULE ORAL at 16:00

## 2020-02-21 RX ADMIN — INSULIN LISPRO 6 UNITS: 100 INJECTION, SOLUTION INTRAVENOUS; SUBCUTANEOUS at 13:00

## 2020-02-21 RX ADMIN — METOPROLOL TARTRATE 2.5 MG: 5 INJECTION INTRAVENOUS at 09:00

## 2020-02-21 RX ADMIN — FAMOTIDINE 20 MG: 10 INJECTION, SOLUTION INTRAVENOUS at 09:00

## 2020-02-21 RX ADMIN — NYSTATIN 500000 UNITS: 500000 SUSPENSION ORAL at 18:00

## 2020-02-21 RX ADMIN — METRONIDAZOLE 500 MG: 500 INJECTION, SOLUTION INTRAVENOUS at 05:38

## 2020-02-21 RX ADMIN — FAMOTIDINE 20 MG: 10 INJECTION, SOLUTION INTRAVENOUS at 20:05

## 2020-02-21 RX ADMIN — HEPARIN SODIUM 5000 UNITS: 5000 INJECTION INTRAVENOUS; SUBCUTANEOUS at 08:00

## 2020-02-21 NOTE — ROUTINE PROCESS
Received pt in bed watching tV. Abdomen grossly distented. BS  Sluggish. Voiced no complaints. Call bell within reach. Bed low and locked. Will continue to monitor. Bedside and Verbal shift change report given to Eric Ramirez (oncoming nurse) by Delmer Gaytan RN   (offgoing nurse). Report included the following information SBAR, Kardex, Intake/Output and MAR.

## 2020-02-21 NOTE — DIABETES MGMT
GLYCEMIC CONTROL AND NUTRITION    Assessment/Recommendations:  Blood glucose this am 159 mg/dl  Continue corrective insulin coverage as needed  Will continue inpatient monitoring. Most recent blood glucose values:  Results for Tisha Pretty (MRN [id number]) as of 2/21/2020 10:32   Ref. Range 2/20/2020 06:10 2/20/2020 11:47 2/20/2020 16:22 2/20/2020 22:46 2/21/2020 08:14   GLUCOSE,FAST - POC Latest Ref Range: 70 - 110 mg/dL 154 (H) 189 (H) 213 (H) 162 (H) 142 (H)     Current A1C of 7.7 % is equivalent to average blood glucose of 174 mg/dl over the past 2-3 months.     Current hospital diabetes medications:   Lispro corrective insulin coverage every 6 hours  Previous day's insulin requirements:   Lispro 9 units corrective insulin  Home diabetes medications:  Per pta med list  Metformin 1000 mg bid  Diet:    GI lite with supplements (Ensure clear)  Education:  ____Refer to Diabetes Education Record             _x__Education not indicated at this time      Paola Medina, [facility] CDE  Ext [phone]

## 2020-02-21 NOTE — PROGRESS NOTES
[address] [place] [address]  ANTIMICROBIAL STEWARDSHIP TEAM  PRESCRIBER COMMUNICATION FORM      We have briefly reviewed the antimicrobials  currently prescribed for your patient along with  the clinical indications and would like to Make the following recommendations:    DISCONTINUE ANTIBIOTICS      Rationale:    (  )  No evidence of bacterial infection    (  )  Microbiology report does not support use    (  )  Narrowing broad-spectrum empiric coverage    (  )  Therapeutic duplication: overlapping antimicrobial spectrum    (  )  Clinical situation dictates change    (  )  Prolonged duration of therapy not needed    (  )  Allergy/toxicity/drug interaction present    (  ) More clinically/cost effective therapy available  ADD ANTIBIOTICS  Rationale:        (  ) Microbiology report supports use    (  ) Expanded coverage needed: gm positive /negative / anaerobic /                               atypical    ( ) More clinicaly/cost effective therapy than current regimen    ( ) Needed for synergy    ( ) Double coverage needed    ( ) Less toxic therapy    ( ) Antifungal therapy needed  ADDITIONAL SUGGESTIONS    ( ) continue current therapy with the following change    ( ) additional laboratory testing    ( ) consider infectious disease consultation    ( ) consider outpatient IV therapy    ( ) other    COMMENTS: discontinue antibiotics if no clinical evidence of infection. thanks       This communication is not to be considered a consultation. You are under no obligation to follow these suggestions. A physician-patient relationship has not been established between the physician Completing this form and your patient. If a thorough analysis of the case is desired, please request an ID consultation.

## 2020-02-21 NOTE — PROGRESS NOTES
0715: Bedside and Verbal shift change report given to [facility], RN (oncoming nurse) by Jessenia Chicas RN (offgoing nurse). Report included the following information SBAR, Accordion, Recent Results and Med Rec Status. 1915: Bedside and Verbal shift change report given to Andrew Diaz (oncoming nurse) by [facility], RN (offgoing nurse). Report included the following information SBAR, Intake/Output, MAR, Recent Results and Med Rec Status.

## 2020-02-21 NOTE — PROGRESS NOTES
Yifan Quesada M.D. FACS  PROGRESS NOTE    Name: Maciej Rea MRN: [id number]   : 1950 [facility]   Date: 2020 Admission Date: 2020  5:32 AM     Hospital Day: 9  8 Days Post-Op  Subjective:  Patient without acute overnight events. Flatus and bowel movements. No nausea vomiting with solid food. Ambulating in lindsey. Objective:  Vitals:    20 0100 20 0200 20 0400 20 0600   BP: (!) 137/92 141/83 152/85 141/83   Pulse: 90 92 86 94   Resp:    Temp: 97.4 °F (36.3 °C)      SpO2: 100% 95% 97% 99%   Weight:       Height:         Date 20 0700 - 20 0659 20 0700 - 20 0659   Shift 4409-9501 0266-8926 24 Hour Total 5753-3815 0642-5135 24 Hour Total   INTAKE   Shift Total(mL/kg)         OUTPUT   Urine(mL/kg/hr)           Urine Occurrence(s)  1 x 1 x      Drains  160 160        Output (ml) (Matthew-Garcia Drain 20 Abdomen)  125 125        Output (ml) (Matthew-Garcia Drain 20 Abdomen)  35 35      Stool           Stool Occurrence(s)  2 x 2 x      Shift Total(mL/kg)  160(1.6) 160(1.6)      NET  -160 -160      Weight (kg) 91.4 98.7 98.7 98.7 98.7 98.7         Physical Exam:    General: Awake and alert, no apparent distress   Abdomen: abdomen is distended no erythema at the wound. The lower portion of the incision has slightly opened. The Marissa Leon was removed and the VAC was placed. Midline incisional tenderness. ANGELI drains with serosanguineous drainage.       Labs:  Recent Results (from the past 24 hour(s))   GLUCOSE, POC    Collection Time: 20 11:47 AM   Result Value Ref Range    Glucose (POC) 189 (H) 70 - 110 mg/dL   GLUCOSE, POC    Collection Time: 20  4:22 PM   Result Value Ref Range    Glucose (POC) 213 (H) 70 - 110 mg/dL   GLUCOSE, POC    Collection Time: 20 10:46 PM   Result Value Ref Range    Glucose (POC) 162 (H) 70 - 354 mg/dL   METABOLIC PANEL, BASIC    Collection Time: 20 2:47 AM   Result Value Ref Range    Sodium 144 136 - 145 mmol/L    Potassium 3.8 3.5 - 5.5 mmol/L    Chloride 114 (H) 100 - 111 mmol/L    CO2 27 21 - 32 mmol/L    Anion gap 3 3.0 - 18 mmol/L    Glucose 159 (H) 74 - 99 mg/dL    BUN 24 (H) 7.0 - 18 MG/DL    Creatinine 1.10 0.6 - 1.3 MG/DL    BUN/Creatinine ratio 22 (H) 12 - 20      GFR est AA >60 >60 ml/min/1.73m2    GFR est non-AA >60 >60 ml/min/1.73m2    Calcium 7.9 (L) 8.5 - 10.1 MG/DL   PHOSPHORUS    Collection Time: 02/21/20  2:47 AM   Result Value Ref Range    Phosphorus 2.6 2.5 - 4.9 MG/DL   MAGNESIUM    Collection Time: 02/21/20  2:47 AM   Result Value Ref Range    Magnesium 2.4 1.6 - 2.6 mg/dL     All Micro Results     Procedure Component Value Units Date/Time    CULTURE, RESPIRATORY/SPUTUM/BRONCH Lares Hedges STAIN [[id number]] Collected:  02/15/20 1010    Order Status:  Completed Specimen:  Sputum Updated:  02/17/20 1009     Special Requests: NO SPECIAL REQUESTS        GRAM STAIN RARE WBCS SEEN         FEW EPITHELIAL CELLS SEEN               RARE GRAM POSITIVE COCCI IN PAIRS           Culture result:       RARE NORMAL RESPIRATORY GALE          CULTURE, URINE [[id number]] Collected:  02/14/20 1230    Order Status:  Completed Specimen:  Clean catch Updated:  02/15/20 0908     Special Requests: NO SPECIAL REQUESTS        Culture result: NO GROWTH 1 DAY             Current Medications:  Current Facility-Administered Medications   Medication Dose Route Frequency Provider Last Rate Last Dose    insulin lispro (HUMALOG) injection   SubCUTAneous AC&HS Harsh Gonzalez NP        therapeutic multivitamin (THERAGRAN) tablet 1 Tab  1 Tab Oral DAILY Kevin Reilly MD   1 Tab at 02/20/20 0984    cefepime (MAXIPIME) 2 g in sterile water (preservative free) 10 mL IV syringe  2 g IntraVENous Q8H Bradly Nair MD   2 g at 02/21/20 0211    cloNIDine (CATAPRES) 0.1 mg/24 hr patch 1 Patch  1 Patch TransDERmal Q7D Liliana Wise MD   1 Patch at 02/18/20 1711    metoprolol (LOPRESSOR) injection 2.5 mg  2.5 mg IntraVENous Q4H Edith Redding MD   2.5 mg at 02/21/20 0538    oxyCODONE-acetaminophen (PERCOCET) 5-325 mg per tablet 1 Tab  1 Tab Oral Q4H PRN Rigoberto Lee NP        famotidine (PF) (PEPCID) 20 mg in 0.9% sodium chloride 10 mL injection  20 mg IntraVENous Q12H Zo Gonzalez NP   20 mg at 02/20/20 2143    ondansetron (ZOFRAN) injection 4 mg  4 mg IntraVENous Q6H PRN Lionel Gary PA-C   4 mg at 02/20/20 7394    metroNIDAZOLE (FLAGYL) IVPB premix 500 mg  500 mg IntraVENous Q12H Bishop Kelechi  mL/hr at 02/21/20 0538 500 mg at 02/21/20 0538    albuterol (PROVENTIL VENTOLIN) nebulizer solution 2.5 mg  2.5 mg Nebulization Q4H PRN Nnamdi ZARCO DO   2.5 mg at 02/14/20 0341    sodium chloride (OCEAN) 0.65 % nasal squeeze bottle 2 Spray  2 Spray Both Nostrils Q2H PRN Michael De La Torre DO        glucose chewable tablet 16 g  4 Tab Oral PRN Ryne Gonzalez NP        glucagon (GLUCAGEN) injection 1 mg  1 mg IntraMUSCular PRN Ryne Gonzalez NP        dextrose (D50W) injection syrg 12.5-25 g  25-50 mL IntraVENous PRN Ryne Gonzalez NP        oxyCODONE-acetaminophen (PERCOCET) 5-325 mg per tablet 2 Tab  2 Tab Oral Q6H PRN Rigoberto Lee NP   2 Tab at 02/21/20 0023    [Held by provider] nebivolol (BYSTOLIC) tablet 10 mg  10 mg Oral DAILY Bishop Kelechi MD   Stopped at 02/15/20 0900    benzocaine-menthol (CEPACOL) lozenge 1 Lozenge  1 Lozenge Mucous Membrane PRN Candy Nair MD        nystatin (MYCOSTATIN) 100,000 unit/mL oral suspension 500,000 Units  500,000 Units Oral QID Ryne Gonzalez NP   500,000 Units at 02/20/20 2144    phenol throat spray (CHLORASEPTIC) 1 Spray  1 [place] Oral PRN Ryne Gonzalez NP   1 Spray at 02/14/20 1055    albuterol-ipratropium (DUO-NEB) 2.5 MG-0.5 MG/3 ML  3 mL Nebulization Q4H PRN Ma Scheuermann, MD   3 mL at 02/15/20 2320    sodium chloride (NS) flush 5-40 mL  5-40 mL IntraVENous Q8H Oral MD Radha   10 mL at 02/21/20 0539    sodium chloride (NS) flush 5-40 mL  5-40 mL IntraVENous PRN Oral MD Radha   10 mL at 02/15/20 1246    heparin (porcine) injection 5,000 Units  5,000 Units SubCUTAneous Q8H Oral MD Radha   5,000 Units at 02/20/20 2355   [facility] by provider] gabapentin (NEURONTIN) capsule 100 mg  100 mg Oral TID Oral MD Radha   Stopped at 02/16/20 0900    [Held by provider] ferrous sulfate tablet 325 mg  325 mg Oral Q MON, WED & Morgan Infante MD   325 mg at 02/14/20 2117    latanoprost (XALATAN) 0.005 % ophthalmic solution 1 Drop  1 Drop Both Eyes QHS Oral MD Radha   1 Drop at 02/20/20 2143    acetaminophen (TYLENOL) tablet 650 mg  650 mg Oral Q4H PRN Alissa Gonzalez NP        [Held by provider] polyethylene glycol (MIRALAX) packet 17 g  17 g Oral DAILY Emmanuel Sandoval NP   Stopped at 02/14/20 9500    [Held by provider] docusate sodium (COLACE) capsule 100 mg  100 mg Oral DAILY Alissa Gonzalez NP   Stopped at 02/14/20 0908    [Held by provider] lactobacillus sp. 50 billion cpu (BIO-K PLUS) capsule 1 Cap  1 Cap Oral DAILY Zo Gonzalez NP   Stopped at 02/15/20 0900    HYDROmorphone (DILAUDID) injection 1 mg  1 mg IntraVENous Q3H PRN Leon Garcia MD   1 mg at 02/21/20 0023       Chart and notes reviewed. Data reviewed. I have evaluated and examined the patient. IMPRESSION:   · Patient is postop day 8 from abdominal wall repair with transversus abdominis release with postoperative ileus which is resolved and acute renal failure which is resolved. PLAN:/DISCUSION:   · Out of bed to chair, ambulate in hallway.   Incentive spirometry  · VAC care as written by wound care  · Encourage p.o. intake  · Continue present medications Joshua Hart MD

## 2020-02-21 NOTE — PROGRESS NOTES
[facility]  Progress Note    Patient: Alexsander Rodriguez Age: 77 y.o. : 1950 MR#: [id number] SSN: [id number]  Date: 2020       Subjective:     Patient was seen by me earlier today but late entry note. When I saw this patient, he was eating his breakfast.  Denies any chest pain or cough. Some shortness of breath is present. No nausea or vomiting. He says he has been having belching and passing flatus. Also had a bowel movement yesterday. Abdominal discomfort present. Objective:     BP (!) 150/97   Pulse 95   Temp 97.4 °F (36.3 °C)   Resp 18   Ht 5' 10\" (1.778 m)   Wt 98.7 kg (217 lb 9.5 oz)   SpO2 98%   BMI 31.22 kg/m²     General:  Alert, NAD, follows commands appropriately  Cardiovascular:  RRR  Pulmonary: bilateral coarse breath sounds, no wheezing  GI: Distended, tense, wound VAC and surgical dressing in situ, very sluggish bowel sounds  Extremities:  + edema  Neuro: AAOx4. moves all extremities    Assessment:     1. Incisional hernia s/p open repair of incisional hernia with mesh  2. Post-op ileus with abdominal distention  3. SIRS, better  4. Acute hypoxic respiratory failure due to ileus, improving currently  5. ARF on CKD stage II due to dehydration  6. Lactic acidosis, improved  7. Hypomagnesemia  8. Hyper kalemia  9. DMT2. a1c 7.7  10. HTN  11. GERD  12. History of throat cancer  13. History of intractable low back pain  14.   Sinus tachycardia, improving    Plan:    Continue current management per surgery team  Continue clonidine patch and IV Lopressor for improving hypertension/tachycardia   Nephrology to follow  We will have ID to see this patient decided selection and duration of antibiotic  PT and OT to follow  I have nothing to offer from my side about his abdominal distention and please continue management per surgery team.    Case discussed with:  [x]Patient  []Family  [x]Nursing  []Case Management  DVT Prophylaxis:  []Lovenox [x]Hep SQ  []SCDs  []Coumadin   []On Heparin gtt    Labs:    Recent Results (from the past 24 hour(s))   GLUCOSE, POC    Collection Time: 02/20/20  4:22 PM   Result Value Ref Range    Glucose (POC) 213 (H) 70 - 110 mg/dL   GLUCOSE, POC    Collection Time: 02/20/20 10:46 PM   Result Value Ref Range    Glucose (POC) 162 (H) 70 - 429 mg/dL   METABOLIC PANEL, BASIC    Collection Time: 02/21/20  2:47 AM   Result Value Ref Range    Sodium 144 136 - 145 mmol/L    Potassium 3.8 3.5 - 5.5 mmol/L    Chloride 114 (H) 100 - 111 mmol/L    CO2 27 21 - 32 mmol/L    Anion gap 3 3.0 - 18 mmol/L    Glucose 159 (H) 74 - 99 mg/dL    BUN 24 (H) 7.0 - 18 MG/DL    Creatinine 1.10 0.6 - 1.3 MG/DL    BUN/Creatinine ratio 22 (H) 12 - 20      GFR est AA >60 >60 ml/min/1.73m2    GFR est non-AA >60 >60 ml/min/1.73m2    Calcium 7.9 (L) 8.5 - 10.1 MG/DL   PHOSPHORUS    Collection Time: 02/21/20  2:47 AM   Result Value Ref Range    Phosphorus 2.6 2.5 - 4.9 MG/DL   MAGNESIUM    Collection Time: 02/21/20  2:47 AM   Result Value Ref Range    Magnesium 2.4 1.6 - 2.6 mg/dL   GLUCOSE, POC    Collection Time: 02/21/20  8:14 AM   Result Value Ref Range    Glucose (POC) 142 (H) 70 - 110 mg/dL   GLUCOSE, POC    Collection Time: 02/21/20 12:46 PM   Result Value Ref Range    Glucose (POC) 232 (H) 70 - 110 mg/dL       Signed By: Riky Harper MD     February 21, 2020

## 2020-02-21 NOTE — PROGRESS NOTES
NUTRITION    Nutrition Consult: TPN: RD to Manage      RECOMMENDATIONS / PLAN:     - Modify supplements: Glucerna Shake BID & Magic Cup, GLEN once daily.  - Monitor po intake and diet tolerance. - Continue RD inpatient monitoring and evaluation. NUTRITION INTERVENTIONS & DIAGNOSIS:     - Meals/snacks: continue  - Medical food supplement therapy: Ensure Clear, TID- modify  - Vitamin and mineral supplement therapy: theragran  - Collaboration and referral of nutrition care: discussed pro-motility agents with Dr. Payton Dandy, no plan to initiate at this time    Nutrition Diagnosis: Inadequate oral intake related to diet intolerance due to altered GI function as evidenced by on bowel rest for postoperative ileus s/p hernia repair, started on liquids. ASSESSMENT:     2/21: BM overnight. C/o abdominal distension, discomfort and burping asking for another enema. Pt reports consuming around 25%-50% of meals, likes all supplements. Hyperglycemia noted. 2/20: BM after mag citrate and enema overnight, increased distension and discomfort today with no BM and did not eat breakfast, ate well for first clear meal at dinner yesterday. No orders placed for PICC placement and no longer indicated as PPN stopping tonight. Pt denies nausea/vomiting, states increased abdominal distension also occurred with previous abdominal surgeries and resolved after 4-5 days postop. 2/19: Renal function improving, IV fluid stopped and NGT removed after pt tolerated clamping trial yesterday. Started on clear liquids. Pt c/o abdominal distension, episode of bilious spit up this morning, nausea now resolved and pt hungry/thirsty and asking to eat. 2/18: S/p open repair of incisional hernia with mesh on 2/13/20; developed postoperative ileus and acute kidney injury with elevated abdominal pressures and remains NPO with NGT for decompression, increased abdominal distension and loose stool via FMS.  Plan for PICC placement per surgery, not on the schedule today per discussion with IR. Nutritional intake adequate to meet patients estimated nutritional needs:  Unable to determine at this time    Diet: DIET NUTRITIONAL SUPPLEMENTS Breakfast, Lunch, Dinner; ENSURE CLEAR  DIET GI LITE (POST SURGICAL)      Food Allergies: cooked carrots cause regurgitation per pt report (able to tolerate raw carrots)  Current Appetite: Fair  Appetite/meal intake prior to admission: Good  Feeding Limitations:  [] Swallowing difficulty    [] Chewing difficulty    [x] Other: SLP following   Current Meal Intake:   Patient Vitals for the past 100 hrs:   % Diet Eaten   02/21/20 0800 100 %   02/17/20 1600 0 %     NGT removed 2/18   BM: 2/21- soft (abdomen distended, rigid)   Skin Integrity: abdominal surgical incision; abdominal ANGELI drains x 2  Edema:   [] No     [x] Yes   Pertinent Medications: Reviewed: colace, SSI, ondansetron, famotidine; oral medications being held (ferrous sulfate, lactobacillus, miralax)    Labs:   Recent Labs     02/21/20  0247 02/20/20  0024 02/19/20  0119    146* 145   K 3.8 3.8 4.6   * 117* 118*   CO2 27 23 18*   * 174* 160*   BUN 24* 30* 37*   CREA 1.10 1.17 1.39*   CA 7.9* 8.3* 8.2*   MG 2.4 2.4 2.1   PHOS 2.6 1.4* 1.8*     Lab Results   Component Value Date/Time    Triglyceride 185 (H) 02/18/2020 10:21 AM     Prealbumin: 12.2 mg/dL (2/18/20)  Ionized Calcium: 1.09 mmol/L (2/18/20)       Intake/Output Summary (Last 24 hours) at 2/21/2020 1430  Last data filed at 2/21/2020 0800  Gross per 24 hour   Intake 360 ml   Output 460 ml   Net -100 ml       Anthropometrics:  Ht Readings from Last 1 Encounters:   02/13/20 5' 10\" (1.778 m)     Last 3 Recorded Weights in this Encounter    02/17/20 1600 02/19/20 2011 02/20/20 2141   Weight: 92.5 kg (203 lb 14.8 oz) 91.4 kg (201 lb 8 oz) 98.7 kg (217 lb 9.5 oz)     Standing, estimated dry weight of 81.6 kg on 2/13/20    Body mass index is 31.22 kg/m².   Obese Class I     Weight History: patient reports usual weight of 175-180 lb and weight loss down to 130 lb from December 2018 to January 2019 then gained weight back up to 170 lb in July 2019 and reports weight of 180 on admission; 29 lb, 17% weight loss x 2-3 months per chart hx review and has since gained weight back with additional weight gain since admission due to fluid accumulation     Weight Metrics 2/20/2020 2/13/2020 1/24/2020 12/19/2019 8/26/2019 7/29/2019 7/5/2019   Weight 217 lb 9.5 oz - 183 lb 177 lb 154 lb 160 lb 148 lb 3.2 oz   BMI - 31.22 kg/m2 26.26 kg/m2 25.4 kg/m2 22.1 kg/m2 22.96 kg/m2 21.26 kg/m2        Admitting Diagnosis: Incisional hernia [K43.2]  Pertinent PMHx: HTN, DM, GERD, diverticulitis, 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) with PEG placement (9/9/18), colon cancer s/p sigmoid colectomy (90/4/71) complicated by small anastomotic leak s/p diverting ileostomy (12/11/18) and received parenteral nutrition perioperatively from 12/9 until 12/19/18, s/p loop ileostomy reversal (7/5/19)     Education Needs:        [x] None identified  [] Identified - Not appropriate at this time  []  Identified and addressed - refer to education log  Learning Limitations:   [x] None identified  [] Identified    Cultural, Baptist & ethnic food preferences:  [x] None identified    [] Identified and addressed     ESTIMATED NUTRITION NEEDS:     Calories: 7652-3117 kcal (20-30 kcal/kg) based on  [x] Actual BW 82 kg (dry weight)     [] IBW   Protein:  gm (1-1.5 gm/kg) based on  [x] Actual BW      [] IBW   Fluid: 1 mL/kcal     MONITORING & EVALUATION:     Nutrition Goal(s):   - PO nutrition intake will meet >75% of patient estimated nutritional needs within the next 7 days.    Outcome: Progressing towards goal      Monitoring:   [x] Food and nutrient intake   [x] Food and nutrient administration  [x] Comparative standards   [x] Nutrition-focused physical findings   [x] Anthropometric Measurements   [x] Treatment/therapy   [x] Biochemical data, medical tests, and procedures        Previous Recommendations (for follow-up assessments only): Implemented      Discharge Planning: Nutritional discharge needs unknown at this time. Participated in care planning, discharge planning, & interdisciplinary rounds as appropriate.       Heber Gonzales RD  Pager: [phone]

## 2020-02-21 NOTE — PROGRESS NOTES
0700 Bedside and Verbal shift change report given to Babs Ibarra RN (oncoming nurse) by Chloé Wagner (offgoing nurse). Report included the following information SBAR, Kardex and ED Summary. 1900 Bedside and Verbal shift change report given to [address] [place] [address] (oncoming nurse) by Babs Ibarra RN (offgoing nurse). Report included the following information SBAR, Kardex and ED Summary.

## 2020-02-21 NOTE — CONSULTS
Infectious Disease Consultation Note      Reason: SIRS, ?abdominal infection    Current abx Prior abx   Metronidazole since 2/13  Cefepime since 2/15 Levofloxacin 2/13-2/15; pip/tazo 2/15     Lines:       Assessment :    77 y. o. male with a past medical history of type 2 DM (last hgbA1C not known), throat cancer, and essential HTN benign who presented to ed   On 2/13/20 for hernia surgery. Now with persistent leukocytosis, abdominal distension. No fever/chills, no worsening abdominal pain    Highly complex clinical picture. After obtaining a detailed history and exam, clinical suspicion of sepsis is low. Leukocytosis likely due to SIRS from recent intra abdominal surgery/inflammation, gaseous distension of the abdomen. Risk of prolonged abx outweigh the benefit. Hence, will d/c abx and monitor clinically. D/w dr Clifton Hutchinson - agrees with this.      Recommendations:     1.  d/c cefepime, metronidazole. Monitor off abx  2.  mx of abdominal distension per surgery team  3. Obtain cbc today for baseline          Advance Care planning: full code: discussed  with patient/surrogate decision maker:Kalli Durbin: [phone]     Above plan was discussed in details with patient, , rn and dr Camila Last. Please call me if any further questions or concerns. Will continue to participate in the care of this patient.     Thank you for consultation request.     HPI:    77 y. o. male with a past medical history of type 2 DM (last hgbA1C not known), throat cancer, and essential HTN benign who presented to ed   On 2/13/20 for hernia surgery. Looking back at his history, he has history of invasive adenocarcinoma in the sigmoid colon-noted on colonoscopy done as an inpatient November 2018; status post colon resection with Dr. Fields Pod. Postoperatively, pathology was negative, lymph nodes were negative. He had post op ileus at that time.  He did have a leak that required further surgery and a diverting ileostomy-this was reversed 7/2019. Patient was admitted to [facility] on 2/13/20 for hernia repair. S/p Open repair of incisional hernia with mesh - transversus abdominal release on 2/13/20. Post operatively he was placed on levofloxacin, metronidazole. His wbc count started increasing. His abx was switched to cefepime,metronidazole on 2/15. GI was consulted regarding abdominal distention with KUB notable for dilated loops of small bowel and moderately diffuse gas in the colon and stomach. NGT placed for decompression. abd pressure 26, JOSE ANTONIO with worsening urine output, was transferred to ICU  on Bipap for worsening respiratory distress. GI recommended rectal tube. Patient has some improvement in ileus after this. WBC 26.4 on 2/15. 18k on 2/19. I have been consulted for further recommendations regarding abx, leukocytosis. Patient feels better. Tolerating feeds. Still c/o significant abdominal distension. Wants enema. No nausea, vomiting, abdominal pain. Patient denies headaches, visual disturbances, sore throat, runny nose, earaches, cp, sob, chills, cough,  burning micturition, pain or weakness in extremities.  He denies worsening back pain/flank pain.         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 Harjinder Teixeira    COLONOSCOPY N/A 11/12/2018    COLONOSCOPY with Polypectomies, Bx's, Injection, Tattooing & Clip Placement x 3 performed by Horace Siegel MD at [address]  11/12/2018         Emily Ardon 11/12/2018         ENDOSCOPIC MUCOSAL RESECT  11/12/2018         EXPLORATORY OF ABDOMEN N/A 12/11/2018    Dr. Bud Rodriguez N/A 5/8/2019    SIGMOIDOSCOPY FLEXIBLE with polypectomy performed by Chantell Vaughn MD at [facility]    HX COLONOSCOPY      HX GI      robotic sigmoid colectomy complicated by small anastomotic leak, status post diverting ileostomy    HX GI      reversal ileostomy    HX HEENT  2008    Throat Ca - 1 vocal chord removed     HX HEENT Bilateral 1970    eye surgery muscles    HX TRACHEOSTOMY  2008    LAP,SURG,COLECTOMY, PARTIAL, W/ANAST N/A 12/04/2018    Dr. Kailyn Mcmullen       home Medication List    Details   omega 3-DHA-EPA-fish oil (FISH OIL) 1,000 mg (120 mg-180 mg) capsule Take 1 Cap by mouth daily. oxyCODONE-acetaminophen (PERCOCET) 5-325 mg per tablet Take 1-2 Tabs by mouth every four (4) hours as needed. Max Daily Amount: 12 Tabs. Qty: 40 Tab, Refills: 0    Associated Diagnoses: Intractable back pain; Malignant neoplasm of sigmoid colon (HCC)      multivitamin, tx-iron-ca-min (THERA-M W/ IRON) 9 mg iron-400 mcg tab tablet Take 1 Tab by mouth daily. latanoprost (XALATAN) 0.005 % ophthalmic solution Administer 1 Drop to both eyes nightly. beclomethasone dipropionate (QNASL) 80 mcg/actuation HFAA 80 mcg by Nasal route daily. Directions on at home label are as follows: Use 2 sprays in each Nostril Daily      metFORMIN (GLUCOPHAGE) 1,000 mg tablet Take 1,000 mg by mouth two (2) times daily (with meals). do not take morning of surgery (12/4/180. losartan (COZAAR) 50 mg tablet Take 50 mg by mouth daily. nebivolol (BYSTOLIC) 5 mg tablet Take 5 mg by mouth daily. Indications: Sinus Tachycardia      omeprazole (PRILOSEC) 20 mg capsule Take 20 mg by mouth daily. Dexlansoprazole 60 mg CpDB Take 1 Cap by mouth every evening. calcium-cholecalciferol, d3, 600-125 mg-unit tab Take 1 Tab by mouth daily.       IRON, FERROUS SULFATE, PO Take 325 mg by mouth every Monday, Wednesday, Friday.  3 times a week              Current Facility-Administered Medications   Medication Dose Route Frequency    insulin lispro (HUMALOG) injection   SubCUTAneous AC&HS    therapeutic multivitamin (THERAGRAN) tablet 1 Tab  1 Tab Oral DAILY    cefepime (MAXIPIME) 2 g in sterile water (preservative free) 10 mL IV syringe  2 g IntraVENous Q8H    cloNIDine (CATAPRES) 0.1 mg/24 hr patch 1 Patch  1 Patch TransDERmal Q7D    metoprolol (LOPRESSOR) injection 2.5 mg  2.5 mg IntraVENous Q4H    oxyCODONE-acetaminophen (PERCOCET) 5-325 mg per tablet 1 Tab  1 Tab Oral Q4H PRN    famotidine (PF) (PEPCID) 20 mg in 0.9% sodium chloride 10 mL injection  20 mg IntraVENous Q12H    ondansetron (ZOFRAN) injection 4 mg  4 mg IntraVENous Q6H PRN    metroNIDAZOLE (FLAGYL) IVPB premix 500 mg  500 mg IntraVENous Q12H    albuterol (PROVENTIL VENTOLIN) nebulizer solution 2.5 mg  2.5 mg Nebulization Q4H PRN    sodium chloride (OCEAN) 0.65 % nasal squeeze bottle 2 Spray  2 Spray Both Nostrils Q2H PRN    glucose chewable tablet 16 g  4 Tab Oral PRN    glucagon (GLUCAGEN) injection 1 mg  1 mg IntraMUSCular PRN    dextrose (D50W) injection syrg 12.5-25 g  25-50 mL IntraVENous PRN    oxyCODONE-acetaminophen (PERCOCET) 5-325 mg per tablet 2 Tab  2 Tab Oral Q6H PRN    [Held by provider] nebivolol (BYSTOLIC) tablet 10 mg  10 mg Oral DAILY    benzocaine-menthol (CEPACOL) lozenge 1 Lozenge  1 Lozenge Mucous Membrane PRN    nystatin (MYCOSTATIN) 100,000 unit/mL oral suspension 500,000 Units  500,000 Units Oral QID    phenol throat spray (CHLORASEPTIC) 1 Spray  1 Spray Oral PRN    albuterol-ipratropium (DUO-NEB) 2.5 MG-0.5 MG/3 ML  3 mL Nebulization Q4H PRN    sodium chloride (NS) flush 5-40 mL  5-40 mL IntraVENous Q8H    sodium chloride (NS) flush 5-40 mL  5-40 mL IntraVENous PRN    heparin (porcine) injection 5,000 Units  5,000 Units SubCUTAneous Q8H    [Held by provider] gabapentin (NEURONTIN) capsule 100 mg  100 mg Oral TID    [Held by provider] ferrous sulfate tablet 325 mg  325 mg Oral Q MON, WED & FRI    latanoprost (XALATAN) 0.005 % ophthalmic solution 1 Drop  1 Drop Both Eyes QHS    acetaminophen (TYLENOL) tablet 650 mg  650 mg Oral Q4H PRN    [Held by provider] polyethylene glycol (MIRALAX) packet 17 g  17 g Oral DAILY    [Held by provider] docusate sodium (COLACE) capsule 100 mg  100 mg Oral DAILY    [Held by provider] lactobacillus sp. 50 billion cpu (BIO-K PLUS) capsule 1 Cap  1 Cap Oral DAILY    HYDROmorphone (DILAUDID) injection 1 mg  1 mg IntraVENous Q3H PRN       Allergies: Carrot    History reviewed. No pertinent family history.   Social History     Socioeconomic History    Marital status:      Spouse name: Not on file    Number of children: Not on file    Years of education: Not on file    Highest education level: Not on file   Occupational History    Not on file   Social Needs    Financial resource strain: Not on file    Food insecurity:     Worry: Not on file     Inability: Not on file    Transportation needs:     Medical: Not on file     Non-medical: Not on file   Tobacco Use    Smoking status: Former Smoker     Last attempt to quit: 2008     Years since quittin.2    Smokeless tobacco: Never Used   Substance and Sexual Activity    Alcohol use: No    Drug use: No    Sexual activity: Not on file   Lifestyle    Physical activity:     Days per week: Not on file     Minutes per session: Not on file    Stress: Not on file   Relationships    Social connections:     Talks on phone: Not on file     Gets together: Not on file     Attends Jain service: Not on file     Active member of club or organization: Not on file     Attends meetings of clubs or organizations: Not on file     Relationship status: Not on file    Intimate partner violence:     Fear of current or ex partner: Not on file     Emotionally abused: Not on file     Physically abused: Not on file     Forced sexual activity: Not on file   Other Topics Concern    Not on file   Social History Narrative    Not on file     Social History     Tobacco Use   Smoking Status Former Smoker    Last attempt to quit: 2008    Years since quittin.2   Smokeless Tobacco Never Used        Temp (24hrs), Av °F (36.7 °C), Min:97.4 °F (36.3 °C), Max:98.6 °F (37 °C)    Visit Vitals  BP (!) 150/97   Pulse 95   Temp 97.6 °F (36.4 °C)   Resp 18   Ht 5' 10\" (1.778 m)   Wt 98.7 kg (217 lb 9.5 oz)   SpO2 98%   BMI 31.22 kg/m²       ROS: 12 point ROS obtained in details. Pertinent positives as mentioned in HPI,   otherwise negative    Physical Exam:    GENERAL: alert, cooperative, no distress, appears stated age,   EYE: conjunctivae/corneas clear. PERRL, EOM's intact. THROAT & NECK: normal and no erythema or exudates noted. ,    LYMPHATIC: Cervical, supraclavicular, and axillary nodes normal. ,   LUNG: clear to auscultation bilaterally,   HEART: regular rate and rhythm, S1, S2 normal, no murmur, click, rub or gallop,   ABDOMEN: firm, distended non-tender. midline surgical site without erythema or drainage, 2 drains in lower abdomen with serous drainage. Bowel sounds normal. No masses,  no organomegaly,   EXTREMITIES:  extremities normal, atraumatic, no cyanosis or edema,   SKIN: Normal.,   NEUROLOGIC: AOx3. Cranial nerves 2-12 and sensation grossly intact. PSYCHIATRY: appropriate mood and affect      Labs: Results:   Chemistry Recent Labs     20  0247 20  0024 20  0119   * 174* 160*    146* 145   K 3.8 3.8 4.6   * 117* 118*   CO2 27 23 18*   BUN 24* 30* 37*   CREA 1.10 1.17 1.39*   CA 7.9* 8.3* 8.2*   AGAP 3 6 9   BUCR 22* 26* 27*      CBC w/Diff Recent Labs     20  0119   WBC 18.4*   RBC 3.76*   HGB 11.3*   HCT 34.4*      GRANS 87*   LYMPH 9*   EOS 0      Microbiology No results for input(s): CULT in the last 72 hours.        RADIOLOGY:    All available imaging studies/reports in [facility] for this admission were reviewed    Dr. Brigid Green, Infectious Disease Specialist  [phone]  February 21, 2020  12:07 PM

## 2020-02-21 NOTE — PROGRESS NOTES
Problem: Mobility Impaired (Adult and Pediatric)  Goal: *Acute Goals and Plan of Care (Insert Text)  Description  Physical Therapy Goals  Initiated 2/17/2020 and to be accomplished within 7 day(s)  1. Patient will move from supine to sit and sit to supine  and scoot up and down in bed with supervision/set-up. 2.  Patient will transfer from bed to chair and chair to bed with supervision/set-up using the least restrictive device. 3.  Patient will perform sit to stand with supervision/set-up. 4.  Patient will ambulate with supervision/set-up for 50 feet with the least restrictive device. PLOF: Pt reports he was ambulating with RW and independent with self care. Outcome: Progressing Towards Goal   PHYSICAL THERAPY TREATMENT    Patient: Shira Hsieh (69 y.o. male)  Date: 2/21/2020  Diagnosis: Incisional hernia [K43.2]   <principal problem not specified>  Procedure(s) (LRB):  OPEN REPAIR OF INCISIONAL HERNIA WITH MESH (N/A) 8 Days Post-Op  Precautions: Fall    ASSESSMENT:  Patient presents today alert and agreeable to therapy with abdominal binder donned, IR drains in place and portable wound vac intact. Patient performed bed mobility with educated on safety as hips initially shifted too far anterior to CoG. Patient VS WNL during session and patient stood for total 5mins and ambulated 5ft with RW to locked recliner. Patient performed TE as listed below and was educated on role and goals of therapy and encouraged to continue mobilization with nursing (including BSC or bathroom). Patient is motivated to continue participation and will continue to benefit from therapy. Progression toward goals:   [x]      Improving appropriately and progressing toward goals  []      Improving slowly and progressing toward goals  []      Not making progress toward goals and plan of care will be adjusted     PLAN:  Patient continues to benefit from skilled intervention to address the above impairments.   Continue treatment per established plan of care. Discharge Recommendations:  Skilled Nursing Facility  Further Equipment Recommendations for Discharge:  N/A     SUBJECTIVE:   Patient stated I'm so glad to be up.     OBJECTIVE DATA SUMMARY:   Critical Behavior:  Neurologic State: Alert  Orientation Level: Appropriate for age  Cognition: Appropriate decision making  Safety/Judgement: Fall prevention  Functional Mobility Training:  Bed Mobility:   Supine to Sit: Minimum assistance   Scooting: Stand-by assistance   Transfers:  Sit to Stand: Minimum assistance  Stand to Sit: Contact guard assistance; Additional time   Balance:  Sitting: Intact; With support  Standing: Impaired  Standing - Static: Fair  Standing - Dynamic : Fair   Ambulation/Gait Training:  Distance (ft): 5 Feet (ft)  Assistive Device: Walker, rolling  Ambulation - Level of Assistance: Contact guard assistance   Gait Abnormalities: Decreased step clearance   Base of Support: Widened   Speed/Geovanna: Slow  Step Length: Left shortened;Right shortened  Education:  [x]         Bed mobility  [x]         Transfers  [x]         Ambulation  [x]         Assistive device management  []         Stairs  [x]         Body mechanics  [x]         Position change  [x]         Activity pacing/energy conservation  []         Other:   Therapeutic Exercises:   Bilat, seated in recliner    EXERCISE   Sets   Reps   Active Active Assist   Passive Self ROM   Comments   Ankle Pumps 1 15  [x] [] [] []    Quad Sets/Glut Sets    [] [] [] [] Hold for 5 secs   Hamstring Sets   [] [] [] []    Short Arc Quads   [] [] [] []    Heel Slides   [] [] [] []    Straight Leg Raises   [] [] [] []    Hip Add   [] [] [] [] Hold for 5 secs, w/ pillow squeeze   Long Arc Quads 1 15 [x] [] [] []    Seated Marching 1 7 [x] [] [] []    Standing Marching   [] [] [] []       [] [] [] []        Pain:  Pain level pre-treatment: 0/10  Pain level post-treatment: 0/10     Activity Tolerance:   Patient tolerated activity fair with VS WNL and patient c/p minimal SOB s/p transfer to sitting near conclusion of session. Please refer to the flowsheet for vital signs taken during this treatment. After treatment:   [x] Patient left in no apparent distress sitting up in chair  [] Patient left in no apparent distress in bed  [x] Call bell left within reach  [x] Nursing notified  [] Caregiver present  [] Bed alarm activated  [] SCDs applied      COMMUNICATION/EDUCATION:   [x]         Role of Physical Therapy in the acute care setting. [x]         Fall prevention education was provided and the patient/caregiver indicated understanding. [x]         Patient/family have participated as able in working toward goals and plan of care. [x]         Patient/family agree to work toward stated goals and plan of care. []         Patient understands intent and goals of therapy, but is neutral about his/her participation.   []         Patient is unable to participate in stated goals/plan of care: ongoing with therapy staff.  []         Other:        Yomaira Castle, PT   Time Calculation: 25 mins

## 2020-02-21 NOTE — PROGRESS NOTES
Discharge planning update:    Patient remains in the ICU. His discharge plan remains that he will return home with help from his family and family will transport home, at the time of discharge. This writer will continue to monitor for discharge planning to ensure a safe discharge home from [place]. Also, will monitor for any discharge recommendations. Wu Alanis MSW  Care Manager  Pager#: [phone]

## 2020-02-22 LAB
ANION GAP SERPL CALC-SCNC: 5 MMOL/L (ref 3–18)
BASOPHILS # BLD: 0 K/UL (ref 0–0.1)
BASOPHILS NFR BLD: 0 % (ref 0–2)
BUN SERPL-MCNC: 23 MG/DL (ref 7–18)
BUN/CREAT SERPL: 20 (ref 12–20)
CALCIUM SERPL-MCNC: 8.4 MG/DL (ref 8.5–10.1)
CHLORIDE SERPL-SCNC: 110 MMOL/L (ref 100–111)
CO2 SERPL-SCNC: 27 MMOL/L (ref 21–32)
CREAT SERPL-MCNC: 1.15 MG/DL (ref 0.6–1.3)
DIFFERENTIAL METHOD BLD: ABNORMAL
EOSINOPHIL # BLD: 0.2 K/UL (ref 0–0.4)
EOSINOPHIL NFR BLD: 2 % (ref 0–5)
ERYTHROCYTE [DISTWIDTH] IN BLOOD BY AUTOMATED COUNT: 14.6 % (ref 11.6–14.5)
GLUCOSE BLD STRIP.AUTO-MCNC: 130 MG/DL (ref 70–110)
GLUCOSE BLD STRIP.AUTO-MCNC: 143 MG/DL (ref 70–110)
GLUCOSE BLD STRIP.AUTO-MCNC: 144 MG/DL (ref 70–110)
GLUCOSE BLD STRIP.AUTO-MCNC: 144 MG/DL (ref 70–110)
GLUCOSE SERPL-MCNC: 133 MG/DL (ref 74–99)
HCT VFR BLD AUTO: 35.1 % (ref 36–48)
HGB BLD-MCNC: 11.3 G/DL (ref 13–16)
LYMPHOCYTES # BLD: 1.8 K/UL (ref 0.9–3.6)
LYMPHOCYTES NFR BLD: 18 % (ref 21–52)
MAGNESIUM SERPL-MCNC: 2.5 MG/DL (ref 1.6–2.6)
MCH RBC QN AUTO: 29.2 PG (ref 24–34)
MCHC RBC AUTO-ENTMCNC: 32.2 G/DL (ref 31–37)
MCV RBC AUTO: 90.7 FL (ref 74–97)
MONOCYTES # BLD: 0.7 K/UL (ref 0.05–1.2)
MONOCYTES NFR BLD: 7 % (ref 3–10)
NEUTS SEG # BLD: 7.3 K/UL (ref 1.8–8)
NEUTS SEG NFR BLD: 73 % (ref 40–73)
PHOSPHATE SERPL-MCNC: 2.7 MG/DL (ref 2.5–4.9)
PLATELET # BLD AUTO: 245 K/UL (ref 135–420)
PMV BLD AUTO: 10.1 FL (ref 9.2–11.8)
POTASSIUM SERPL-SCNC: 3.5 MMOL/L (ref 3.5–5.5)
RBC # BLD AUTO: 3.87 M/UL (ref 4.7–5.5)
SODIUM SERPL-SCNC: 142 MMOL/L (ref 136–145)
WBC # BLD AUTO: 10.1 K/UL (ref 4.6–13.2)

## 2020-02-22 PROCEDURE — 74011250637 HC RX REV CODE- 250/637: Performed by: SURGERY

## 2020-02-22 PROCEDURE — 82962 GLUCOSE BLOOD TEST: CPT

## 2020-02-22 PROCEDURE — 36415 COLL VENOUS BLD VENIPUNCTURE: CPT

## 2020-02-22 PROCEDURE — 84100 ASSAY OF PHOSPHORUS: CPT

## 2020-02-22 PROCEDURE — 80048 BASIC METABOLIC PNL TOTAL CA: CPT

## 2020-02-22 PROCEDURE — 74011000250 HC RX REV CODE- 250: Performed by: NURSE PRACTITIONER

## 2020-02-22 PROCEDURE — 74011250636 HC RX REV CODE- 250/636: Performed by: NURSE PRACTITIONER

## 2020-02-22 PROCEDURE — 65660000000 HC RM CCU STEPDOWN

## 2020-02-22 PROCEDURE — 83735 ASSAY OF MAGNESIUM: CPT

## 2020-02-22 PROCEDURE — 85025 COMPLETE CBC W/AUTO DIFF WBC: CPT

## 2020-02-22 PROCEDURE — 94762 N-INVAS EAR/PLS OXIMTRY CONT: CPT

## 2020-02-22 PROCEDURE — 74011250637 HC RX REV CODE- 250/637: Performed by: NURSE PRACTITIONER

## 2020-02-22 PROCEDURE — 74011250636 HC RX REV CODE- 250/636: Performed by: SURGERY

## 2020-02-22 PROCEDURE — 74011250637 HC RX REV CODE- 250/637: Performed by: HOSPITALIST

## 2020-02-22 PROCEDURE — 77010033678 HC OXYGEN DAILY

## 2020-02-22 RX ADMIN — FAMOTIDINE 20 MG: 10 INJECTION, SOLUTION INTRAVENOUS at 21:49

## 2020-02-22 RX ADMIN — Medication 10 ML: at 14:00

## 2020-02-22 RX ADMIN — FAMOTIDINE 20 MG: 10 INJECTION, SOLUTION INTRAVENOUS at 08:00

## 2020-02-22 RX ADMIN — NEBIVOLOL HYDROCHLORIDE 10 MG: 5 TABLET ORAL at 08:49

## 2020-02-22 RX ADMIN — GABAPENTIN 100 MG: 100 CAPSULE ORAL at 08:00

## 2020-02-22 RX ADMIN — HYDROMORPHONE HYDROCHLORIDE 1 MG: 1 INJECTION, SOLUTION INTRAMUSCULAR; INTRAVENOUS; SUBCUTANEOUS at 06:20

## 2020-02-22 RX ADMIN — DOCUSATE SODIUM 100 MG: 100 CAPSULE, LIQUID FILLED ORAL at 08:00

## 2020-02-22 RX ADMIN — THERA TABS 1 TABLET: TAB at 08:49

## 2020-02-22 RX ADMIN — GABAPENTIN 100 MG: 100 CAPSULE ORAL at 16:00

## 2020-02-22 RX ADMIN — HEPARIN SODIUM 5000 UNITS: 5000 INJECTION INTRAVENOUS; SUBCUTANEOUS at 16:47

## 2020-02-22 RX ADMIN — HEPARIN SODIUM 5000 UNITS: 5000 INJECTION INTRAVENOUS; SUBCUTANEOUS at 08:00

## 2020-02-22 RX ADMIN — Medication 10 ML: at 06:22

## 2020-02-22 RX ADMIN — GABAPENTIN 100 MG: 100 CAPSULE ORAL at 21:48

## 2020-02-22 RX ADMIN — HYDROMORPHONE HYDROCHLORIDE 1 MG: 1 INJECTION, SOLUTION INTRAMUSCULAR; INTRAVENOUS; SUBCUTANEOUS at 18:35

## 2020-02-22 RX ADMIN — OXYCODONE HYDROCHLORIDE AND ACETAMINOPHEN 2 TABLET: 5; 325 TABLET ORAL at 03:45

## 2020-02-22 RX ADMIN — LATANOPROST 1 DROP: 50 SOLUTION OPHTHALMIC at 21:52

## 2020-02-22 RX ADMIN — NYSTATIN 500000 UNITS: 500000 SUSPENSION ORAL at 21:49

## 2020-02-22 RX ADMIN — NYSTATIN 500000 UNITS: 500000 SUSPENSION ORAL at 18:25

## 2020-02-22 RX ADMIN — NYSTATIN 500000 UNITS: 500000 SUSPENSION ORAL at 13:00

## 2020-02-22 RX ADMIN — NYSTATIN 500000 UNITS: 500000 SUSPENSION ORAL at 08:00

## 2020-02-22 NOTE — PROGRESS NOTES
Yifan San M.D. FACS  PROGRESS NOTE    Name: Alfonso Henriquez MRN: [id number]   : 1950 Hospital: [facility]   Date: 2020 Admission Date: 2020  5:32 AM     Hospital Day: 10  9 Days Post-Op  Subjective:  Patient with rumbling in the lower abdomen with the urge to have a bowel movement last night. He had an enema. He was on the bed pan having a bowel movement. He is eating about 25% of his meals. Objective:  Vitals:    20 0338 20 0400 20 0500 20 0600   BP:  139/84  (!) 168/94   Pulse:  98 (!) 104 (!) 103   Resp:     Temp: 98.7 °F (37.1 °C) 98.3 °F (36.8 °C)     SpO2:  98% 99% 99%   Weight:       Height:         Date 20 0700 - 20 0659 20 0700 - 20 0659   Shift 0178-5112 3347-5373 24 Hour Total 1406-1257 6720-2197 24 Hour Total   INTAKE   P.O. 360 240 600        P. O. 360 240 600      I. V.(mL/kg/hr) 60(0.1) 186 246        I.V.  186 186        Volume (cefepime (MAXIPIME) 2 g in sterile water (preservative free) 10 mL IV syringe) 60  60      Other 0 0 0        Intake (ml) (Vacuum Assisted Closure Medial;Upper Abdominal) 0 0 0      Shift Total(mL/kg) 420(4.3) 426(4.4) 846(8.7)      OUTPUT   Urine(mL/kg/hr) 425(0.4) 325 750        Urine Voided 425 325 750        Urine Occurrence(s) 1 x 0 x 1 x      Emesis/NG output           Emesis Occurrence(s)  0 x 0 x      Drains 85 110 195        Output (ml) (Vacuum Assisted Closure Medial;Upper Abdominal) 0 0 0        Output (ml) (Matthew-Garcia Drain 20 Abdomen) 60 90 150        Output (ml) (Matthew-Garcia Drain 20 Abdomen) 25 20 45      Stool           Stool Occurrence(s) 1 x 0 x 1 x      Shift Total(mL/kg) 510(5.2) 435(4.5) 945(9.8)      NET -      Weight (kg) 98.7 96.7 96.7 96.7 96.7 96.7         Physical Exam:    General: A&A, NAD, Ox4   Abdomen: abdomen is soft with incisional tenderness. Incision(s) are C/D/I.  ANGELI drain sites ok. VAC intact.   No masses, organomegaly or guarding    Labs:  Recent Results (from the past 24 hour(s))   GLUCOSE, POC    Collection Time: 02/21/20  8:14 AM   Result Value Ref Range    Glucose (POC) 142 (H) 70 - 110 mg/dL   GLUCOSE, POC    Collection Time: 02/21/20 12:46 PM   Result Value Ref Range    Glucose (POC) 232 (H) 70 - 110 mg/dL   CBC WITH AUTOMATED DIFF    Collection Time: 02/21/20  3:04 PM   Result Value Ref Range    WBC 9.9 4.6 - 13.2 K/uL    RBC 3.75 (L) 4.70 - 5.50 M/uL    HGB 10.9 (L) 13.0 - 16.0 g/dL    HCT 33.9 (L) 36.0 - 48.0 %    MCV 90.4 74.0 - 97.0 FL    MCH 29.1 24.0 - 34.0 PG    MCHC 32.2 31.0 - 37.0 g/dL    RDW 14.8 (H) 11.6 - 14.5 %    PLATELET 605 101 - 575 K/uL    MPV 9.8 9.2 - 11.8 FL    NEUTROPHILS 77 (H) 40 - 73 %    LYMPHOCYTES 14 (L) 21 - 52 %    MONOCYTES 7 3 - 10 %    EOSINOPHILS 2 0 - 5 %    BASOPHILS 0 0 - 2 %    ABS. NEUTROPHILS 7.6 1.8 - 8.0 K/UL    ABS. LYMPHOCYTES 1.4 0.9 - 3.6 K/UL    ABS. MONOCYTES 0.7 0.05 - 1.2 K/UL    ABS. EOSINOPHILS 0.2 0.0 - 0.4 K/UL    ABS.  BASOPHILS 0.0 0.0 - 0.1 K/UL    DF AUTOMATED     GLUCOSE, POC    Collection Time: 02/21/20  3:58 PM   Result Value Ref Range    Glucose (POC) 152 (H) 70 - 110 mg/dL   GLUCOSE, POC    Collection Time: 02/21/20  9:18 PM   Result Value Ref Range    Glucose (POC) 153 (H) 70 - 110 mg/dL     All Micro Results     Procedure Component Value Units Date/Time    CULTURE, RESPIRATORY/SPUTUM/BRONCH Maicol Alpers STAIN [[id number]] Collected:  02/15/20 1010    Order Status:  Completed Specimen:  Sputum Updated:  02/17/20 1009     Special Requests: NO SPECIAL REQUESTS        GRAM STAIN RARE WBCS SEEN         FEW EPITHELIAL CELLS SEEN               RARE GRAM POSITIVE COCCI IN PAIRS           Culture result:       RARE NORMAL RESPIRATORY GALE          CULTURE, URINE [[id number]] Collected:  02/14/20 1230    Order Status:  Completed Specimen:  Clean catch Updated:  02/15/20 0908     Special Requests: NO SPECIAL REQUESTS        Culture result: NO GROWTH 1 DAY Current Medications:  Current Facility-Administered Medications   Medication Dose Route Frequency Provider Last Rate Last Dose    insulin lispro (HUMALOG) injection   SubCUTAneous AC&HS Yann Tamez NP   3 Units at 02/21/20 2120    therapeutic multivitamin (THERAGRAN) tablet 1 Tab  1 Tab Oral DAILY Eve Link MD   1 Tab at 02/21/20 0900    cloNIDine (CATAPRES) 0.1 mg/24 hr patch 1 Patch  1 Patch TransDERmal Q7D Harriet Shultz MD   1 Patch at 02/18/20 1711    oxyCODONE-acetaminophen (PERCOCET) 5-325 mg per tablet 1 Tab  1 Tab Oral Q4H PRN Yann Tamez NP        famotidine (PF) (PEPCID) 20 mg in 0.9% sodium chloride 10 mL injection  20 mg IntraVENous Q12H Zo Gonzalez NP   20 mg at 02/21/20 2005    ondansetron [facility]) injection 4 mg  4 mg IntraVENous Q6H PRN Adriel Hardy PA-C   4 mg at 02/20/20 1721    albuterol (PROVENTIL VENTOLIN) nebulizer solution 2.5 mg  2.5 mg Nebulization Q4H PRN Chandni ZARCO DO   2.5 mg at 02/14/20 0341    sodium chloride (OCEAN) 0.65 % nasal squeeze bottle 2 Spray  2 Spray Both Nostrils Q2H PRN Alicia Villasenor, DO        glucose chewable tablet 16 g  4 Tab Oral PRN Martin Gonzalez NP        glucagon (GLUCAGEN) injection 1 mg  1 mg IntraMUSCular PRN Martin Gonzalez NP        dextrose (D50W) injection syrg 12.5-25 g  25-50 mL IntraVENous PRN Martin Gonzalez NP        oxyCODONE-acetaminophen (PERCOCET) 5-325 mg per tablet 2 Tab  2 Tab Oral Q6H PRN Yann Tamez NP   2 Tab at 02/22/20 0345    nebivolol (BYSTOLIC) tablet 10 mg  10 mg Oral DAILY Kimberly Saucedo MD   Stopped at 02/15/20 0900    benzocaine-menthol (CEPACOL) lozenge 1 Lozenge  1 Lozenge Mucous Membrane PRN Pati Nair MD        nystatin (MYCOSTATIN) 100,000 unit/mL oral suspension 500,000 Units  500,000 Units Oral QID Yann Tamez NP   500,000 Units at 02/21/20 4147  phenol throat spray (CHLORASEPTIC) 1 Spray  1 [place] Oral PRN Zo Gonzalez NP   1 [place] at 02/14/20 1055    albuterol-ipratropium (DUO-NEB) 2.5 MG-0.5 MG/3 ML  3 mL Nebulization Q4H PRN Kimberly Saucedo MD   3 mL at 02/15/20 2320    sodium chloride (NS) flush 5-40 mL  5-40 mL IntraVENous Q8H Eve Link MD   10 mL at 02/22/20 0622    sodium chloride (NS) flush 5-40 mL  5-40 mL IntraVENous PRN Eve Link MD   10 mL at 02/21/20 1400    heparin (porcine) injection 5,000 Units  5,000 Units SubCUTAneous Q8H Eve Link MD   5,000 Units at 02/21/20 2344    gabapentin (NEURONTIN) capsule 100 mg  100 mg Oral TID Eve Link MD   100 mg at 02/21/20 2158    [Held by provider] ferrous sulfate tablet 325 mg  325 mg Oral Q MON, WED & Lina Mendoza MD   325 mg at 02/14/20 2117    latanoprost (XALATAN) 0.005 % ophthalmic solution 1 Drop  1 Drop Both Eyes QHS Eve Link MD   1 Drop at 02/21/20 2200    acetaminophen (TYLENOL) tablet 650 mg  650 mg Oral Q4H PRN Martin Gonzalez NP        [Held by provider] polyethylene glycol (MIRALAX) packet 17 g  17 g Oral DAILY Yann Tamez NP   Stopped at 02/14/20 6667    docusate sodium (COLACE) capsule 100 mg  100 mg Oral DAILY Martin Gonzalez NP   Stopped at 02/14/20 0908    [Held by provider] lactobacillus sp. 50 billion cpu (BIO-K PLUS) capsule 1 Cap  1 Cap Oral DAILY Zo Gonzalez NP   Stopped at 02/15/20 0900    HYDROmorphone (DILAUDID) injection 1 mg  1 mg IntraVENous Q3H PRN Eve Link MD   1 mg at 02/22/20 0620     IMPRESSION:   · Pt is POD 9 from abdominal wall reconstruction.        PLAN:/DISCUSION:   · ANGELI drain management  · Encourage p.o. intake with supplements  · DVT prophylaxis  · Out of bed to chair, ambulate, encourage incentive spirometry        Julian Rehman MD

## 2020-02-22 NOTE — ROUTINE PROCESS
1919 Bedside shift change report given to jag guerra (oncoming nurse) by Marisol Gayle (offgoing nurse). Report included the following information SBAR, Kardex and Recent Results. Side rails up x 3, call light within reach/ hb elevated 30 degrees. 2000 assessment completed. Oral and renny care provided. 0000 reassessment completed. Oral and renny care completed. 0400 REASSessment completed. Oral and renny care. [id number] placed on bed pan , unable to pass flatus , voided. Repositioned in bed tilted . Oral care provided. 3825 paged dr Frankie Alexandre. 0700 Dr Claire Duke notified abd firm distended, uncomfortable , patient has positive bowel sounds, but can't get comfort . With receiving dilaudid at 0620. Patient was comfortable earlier until [phone], and had passed flatus earlier. Orders received to given tap water enema. 0730 Bedside shift change report given to YANETH Veronica rn (oncoming nurse) by jag guerra (offgoing nurse). Report included the following information SBAR, Kardex and Recent Results. Side rails up x 4, call light within reach.  bed tilted 30 degrees

## 2020-02-22 NOTE — PROGRESS NOTES
Problem: Infection - Risk of, Surgical Site Infection  Goal: *Absence of surgical site infection signs and symptoms  Outcome: Progressing Towards Goal     Problem: Patient Education: Go to Patient Education Activity  Goal: Patient/Family Education  Outcome: Progressing Towards Goal     Problem: Deep Venous Thrombosis - Risk of  Goal: *Absence of impaired coagulation signs and symptoms  Outcome: Progressing Towards Goal     Problem: Deep Venous Thrombosis - Risk of  Goal: *Knowledge of prescribed medications  Outcome: Progressing Towards Goal     Problem: Falls - Risk of  Goal: *Absence of Falls  Description  Document Danielle Jacob Fall Risk and appropriate interventions in the flowsheet. Outcome: Progressing Towards Goal  Note: Fall Risk Interventions:  Mobility Interventions: Assess mobility with egress test, Bed/chair exit alarm, Patient to call before getting OOB, PT Consult for mobility concerns, Strengthening exercises (ROM-active/passive)    Mentation Interventions: Adequate sleep, hydration, pain control, Bed/chair exit alarm, Door open when patient unattended, Update white board, Toileting rounds, Room close to nurse's station, More frequent rounding    Medication Interventions: Bed/chair exit alarm, Evaluate medications/consider consulting pharmacy, Patient to call before getting OOB, Teach patient to arise slowly    Elimination Interventions: Bed/chair exit alarm, Call light in reach, Elevated toilet seat, Patient to call for help with toileting needs, Stay With Me (per policy), Toilet paper/wipes in reach, Urinal in reach, Toileting schedule/hourly rounds              Problem: Pressure Injury - Risk of  Goal: *Prevention of pressure injury  Description  Document Edin Scale and appropriate interventions in the flowsheet.   Outcome: Progressing Towards Goal  Note: Pressure Injury Interventions:  Sensory Interventions: Avoid rigorous massage over bony prominences, Assess need for specialty bed, Minimize linen layers, Maintain/enhance activity level, Keep linens dry and wrinkle-free, Float heels, Monitor skin under medical devices, Sit a 90-degree angle/use footstool if needed, Use 30-degree side-lying position, Turn and reposition approx. every two hours (pillows and wedges if needed)    Moisture Interventions: Absorbent underpads, Apply protective barrier, creams and emollients, Assess need for specialty bed, Contain wound drainage, Minimize layers    Activity Interventions: Increase time out of bed, Pressure redistribution bed/mattress(bed type)    Mobility Interventions: Assess need for specialty bed, Float heels, HOB 30 degrees or less, Pressure redistribution bed/mattress (bed type), Turn and reposition approx. every two hours(pillow and wedges)    Nutrition Interventions: Document food/fluid/supplement intake    Friction and Shear Interventions: Apply protective barrier, creams and emollients, Minimize layers, Transferring/repositioning devices, HOB 30 degrees or less, Foam dressings/transparent film/skin sealants                Problem: Pain  Goal: *Control of Pain  Outcome: Progressing Towards Goal     Problem: Diabetes Self-Management  Goal: *Monitoring blood glucose, interpreting and using results  Description  Identify recommended blood glucose targets  and personal targets.   Outcome: Progressing Towards Goal     Problem: Injury - Risk of, Adverse Drug Event  Goal: *Absence of adverse drug events  Outcome: Progressing Towards Goal     Problem: Injury - Risk of, Adverse Drug Event  Goal: *Absence of medication errors  Outcome: Progressing Towards Goal

## 2020-02-22 NOTE — PROGRESS NOTES
[facility] Hospitalist Group  Progress Note    Patient: Rom Fortune Age: 77 y.o. : 1950 MR#: [id number] SSN: [id number]  Date: 2020       Subjective:     Patient is alert awake oriented  Had 1 BM after edema  Currently, his abdomen is very distended and tympanic, denies any pain or distress  No chest pain no nausea vomiting     Objective:     BP (!) 169/94 (BP 1 Location: Right arm, BP Patient Position: At rest)   Pulse 99   Temp 97.5 °F (36.4 °C)   Resp 21   Ht 5' 10\" (1.778 m)   Wt 96.7 kg (213 lb 3 oz)   SpO2 99%   BMI 30.59 kg/m²     General:  Alert, NAD, follows commands appropriately  Cardiovascular:  RRR  Pulmonary: bilateral coarse breath sounds, no wheezing  GI: Distended and tympanic, bowel sounds very sluggish to none  Extremities:  + edema  Neuro: AAOx4. moves all extremities    Assessment:     1. Incisional hernia s/p open repair of incisional hernia with mesh  2. Post-op ileus with abdominal distention  3. SIRS, better  4. Acute hypoxic respiratory failure due to ileus, improving currently  5. ARF on CKD stage II due to dehydration  6. Lactic acidosis, improved  7. Hypomagnesemia  8. Hyper kalemia  9. DMT2. a1c 7.7  10. HTN  11. GERD  12. History of throat cancer  13. History of intractable low back pain  14.   Sinus tachycardia, improving    Plan:    -Follow surgical recommendations  -NG tube placement if patient develops nausea vomiting  -Encourage patient to participate with physical therapy  -Discussed case with patient and patient's relative in room with patient      Case discussed with:  [x]Patient  []Family  [x]Nursing  []Case Management  DVT Prophylaxis:  []Lovenox  [x]Hep SQ  []SCDs  []Coumadin   []On Heparin gtt    Labs:    Recent Results (from the past 24 hour(s))   GLUCOSE, POC    Collection Time: 20  9:18 PM   Result Value Ref Range    Glucose (POC) 153 (H) 70 - 610 mg/dL   METABOLIC PANEL, BASIC    Collection Time: 20  5:40 AM   Result Value Ref Range    Sodium 142 136 - 145 mmol/L    Potassium 3.5 3.5 - 5.5 mmol/L    Chloride 110 100 - 111 mmol/L    CO2 27 21 - 32 mmol/L    Anion gap 5 3.0 - 18 mmol/L    Glucose 133 (H) 74 - 99 mg/dL    BUN 23 (H) 7.0 - 18 MG/DL    Creatinine 1.15 0.6 - 1.3 MG/DL    BUN/Creatinine ratio 20 12 - 20      GFR est AA >60 >60 ml/min/1.73m2    GFR est non-AA >60 >60 ml/min/1.73m2    Calcium 8.4 (L) 8.5 - 10.1 MG/DL   PHOSPHORUS    Collection Time: 02/22/20  5:40 AM   Result Value Ref Range    Phosphorus 2.7 2.5 - 4.9 MG/DL   MAGNESIUM    Collection Time: 02/22/20  5:40 AM   Result Value Ref Range    Magnesium 2.5 1.6 - 2.6 mg/dL   CBC WITH AUTOMATED DIFF    Collection Time: 02/22/20  5:40 AM   Result Value Ref Range    WBC 10.1 4.6 - 13.2 K/uL    RBC 3.87 (L) 4.70 - 5.50 M/uL    HGB 11.3 (L) 13.0 - 16.0 g/dL    HCT 35.1 (L) 36.0 - 48.0 %    MCV 90.7 74.0 - 97.0 FL    MCH 29.2 24.0 - 34.0 PG    MCHC 32.2 31.0 - 37.0 g/dL    RDW 14.6 (H) 11.6 - 14.5 %    PLATELET 572 306 - 726 K/uL    MPV 10.1 9.2 - 11.8 FL    NEUTROPHILS 73 40 - 73 %    LYMPHOCYTES 18 (L) 21 - 52 %    MONOCYTES 7 3 - 10 %    EOSINOPHILS 2 0 - 5 %    BASOPHILS 0 0 - 2 %    ABS. NEUTROPHILS 7.3 1.8 - 8.0 K/UL    ABS. LYMPHOCYTES 1.8 0.9 - 3.6 K/UL    ABS. MONOCYTES 0.7 0.05 - 1.2 K/UL    ABS. EOSINOPHILS 0.2 0.0 - 0.4 K/UL    ABS.  BASOPHILS 0.0 0.0 - 0.1 K/UL    DF AUTOMATED     GLUCOSE, POC    Collection Time: 02/22/20  7:37 AM   Result Value Ref Range    Glucose (POC) 144 (H) 70 - 110 mg/dL   GLUCOSE, POC    Collection Time: 02/22/20 12:08 PM   Result Value Ref Range    Glucose (POC) 143 (H) 70 - 110 mg/dL       Signed By: Mesha Love MD     February 22, 2020

## 2020-02-23 LAB
ANION GAP SERPL CALC-SCNC: 6 MMOL/L (ref 3–18)
BUN SERPL-MCNC: 22 MG/DL (ref 7–18)
BUN/CREAT SERPL: 19 (ref 12–20)
CALCIUM SERPL-MCNC: 8.4 MG/DL (ref 8.5–10.1)
CHLORIDE SERPL-SCNC: 110 MMOL/L (ref 100–111)
CO2 SERPL-SCNC: 27 MMOL/L (ref 21–32)
CREAT SERPL-MCNC: 1.13 MG/DL (ref 0.6–1.3)
GLUCOSE BLD STRIP.AUTO-MCNC: 119 MG/DL (ref 70–110)
GLUCOSE BLD STRIP.AUTO-MCNC: 175 MG/DL (ref 70–110)
GLUCOSE BLD STRIP.AUTO-MCNC: 182 MG/DL (ref 70–110)
GLUCOSE BLD STRIP.AUTO-MCNC: 188 MG/DL (ref 70–110)
GLUCOSE SERPL-MCNC: 120 MG/DL (ref 74–99)
MAGNESIUM SERPL-MCNC: 2.2 MG/DL (ref 1.6–2.6)
PHOSPHATE SERPL-MCNC: 2.4 MG/DL (ref 2.5–4.9)
POTASSIUM SERPL-SCNC: 3.6 MMOL/L (ref 3.5–5.5)
SODIUM SERPL-SCNC: 143 MMOL/L (ref 136–145)

## 2020-02-23 PROCEDURE — 74011250637 HC RX REV CODE- 250/637: Performed by: HOSPITALIST

## 2020-02-23 PROCEDURE — 94762 N-INVAS EAR/PLS OXIMTRY CONT: CPT

## 2020-02-23 PROCEDURE — 82962 GLUCOSE BLOOD TEST: CPT

## 2020-02-23 PROCEDURE — 74011250636 HC RX REV CODE- 250/636: Performed by: NURSE PRACTITIONER

## 2020-02-23 PROCEDURE — 74011250637 HC RX REV CODE- 250/637: Performed by: NURSE PRACTITIONER

## 2020-02-23 PROCEDURE — 77010033678 HC OXYGEN DAILY

## 2020-02-23 PROCEDURE — 36415 COLL VENOUS BLD VENIPUNCTURE: CPT

## 2020-02-23 PROCEDURE — 74011000250 HC RX REV CODE- 250: Performed by: NURSE PRACTITIONER

## 2020-02-23 PROCEDURE — 74011636637 HC RX REV CODE- 636/637: Performed by: NURSE PRACTITIONER

## 2020-02-23 PROCEDURE — 80048 BASIC METABOLIC PNL TOTAL CA: CPT

## 2020-02-23 PROCEDURE — 83735 ASSAY OF MAGNESIUM: CPT

## 2020-02-23 PROCEDURE — 84100 ASSAY OF PHOSPHORUS: CPT

## 2020-02-23 PROCEDURE — 74011250637 HC RX REV CODE- 250/637: Performed by: SURGERY

## 2020-02-23 PROCEDURE — 74011250636 HC RX REV CODE- 250/636: Performed by: SURGERY

## 2020-02-23 PROCEDURE — 65660000000 HC RM CCU STEPDOWN

## 2020-02-23 RX ADMIN — HEPARIN SODIUM 5000 UNITS: 5000 INJECTION INTRAVENOUS; SUBCUTANEOUS at 17:58

## 2020-02-23 RX ADMIN — Medication 10 ML: at 00:27

## 2020-02-23 RX ADMIN — Medication 10 ML: at 05:45

## 2020-02-23 RX ADMIN — FAMOTIDINE 20 MG: 10 INJECTION, SOLUTION INTRAVENOUS at 09:04

## 2020-02-23 RX ADMIN — FAMOTIDINE 20 MG: 10 INJECTION, SOLUTION INTRAVENOUS at 21:37

## 2020-02-23 RX ADMIN — INSULIN LISPRO 3 UNITS: 100 INJECTION, SOLUTION INTRAVENOUS; SUBCUTANEOUS at 21:53

## 2020-02-23 RX ADMIN — INSULIN LISPRO 3 UNITS: 100 INJECTION, SOLUTION INTRAVENOUS; SUBCUTANEOUS at 12:30

## 2020-02-23 RX ADMIN — GABAPENTIN 100 MG: 100 CAPSULE ORAL at 09:03

## 2020-02-23 RX ADMIN — HEPARIN SODIUM 5000 UNITS: 5000 INJECTION INTRAVENOUS; SUBCUTANEOUS at 09:04

## 2020-02-23 RX ADMIN — HEPARIN SODIUM 5000 UNITS: 5000 INJECTION INTRAVENOUS; SUBCUTANEOUS at 00:26

## 2020-02-23 RX ADMIN — HYDROMORPHONE HYDROCHLORIDE 1 MG: 1 INJECTION, SOLUTION INTRAMUSCULAR; INTRAVENOUS; SUBCUTANEOUS at 09:00

## 2020-02-23 RX ADMIN — GABAPENTIN 100 MG: 100 CAPSULE ORAL at 21:37

## 2020-02-23 RX ADMIN — Medication 10 ML: at 21:41

## 2020-02-23 RX ADMIN — HYDROMORPHONE HYDROCHLORIDE 1 MG: 1 INJECTION, SOLUTION INTRAMUSCULAR; INTRAVENOUS; SUBCUTANEOUS at 00:37

## 2020-02-23 RX ADMIN — NYSTATIN 500000 UNITS: 500000 SUSPENSION ORAL at 13:00

## 2020-02-23 RX ADMIN — GABAPENTIN 100 MG: 100 CAPSULE ORAL at 17:58

## 2020-02-23 RX ADMIN — NYSTATIN 500000 UNITS: 500000 SUSPENSION ORAL at 21:37

## 2020-02-23 RX ADMIN — NEBIVOLOL HYDROCHLORIDE 10 MG: 5 TABLET ORAL at 09:03

## 2020-02-23 RX ADMIN — INSULIN LISPRO 3 UNITS: 100 INJECTION, SOLUTION INTRAVENOUS; SUBCUTANEOUS at 17:57

## 2020-02-23 RX ADMIN — DOCUSATE SODIUM 100 MG: 100 CAPSULE, LIQUID FILLED ORAL at 09:03

## 2020-02-23 RX ADMIN — NYSTATIN 500000 UNITS: 500000 SUSPENSION ORAL at 17:58

## 2020-02-23 RX ADMIN — LATANOPROST 1 DROP: 50 SOLUTION OPHTHALMIC at 21:39

## 2020-02-23 RX ADMIN — Medication 10 ML: at 14:00

## 2020-02-23 RX ADMIN — THERA TABS 1 TABLET: TAB at 09:04

## 2020-02-23 NOTE — ROUTINE PROCESS
1921 Bedside shift change report given to jag guerra (oncoming nurse) by Alicia Bonner (offgoing nurse). Report included the following information SBAR, Kardex and Recent Results. Side rails up, hob elevated 30 degrees, call light within reach. 2000 assessment completed. Oral and oma care completed. 0000 reassessment completed. Oral and urinal care provided. 0400 reassessment completed. Oral and  Oma care completed. 0730 Bedside shift change report given to Kimber Dyer (oncoming nurse) by Lazaro Obando RN(offgoing nurse). Report included the following information SBAR, Kardex and Recent Results.    Call light within reach, hob elevated 30 degrees, side rails up/

## 2020-02-23 NOTE — PROGRESS NOTES
Problem: Infection - Risk of, Surgical Site Infection  Goal: *Absence of surgical site infection signs and symptoms  Outcome: Progressing Towards Goal     Problem: Patient Education: Go to Patient Education Activity  Goal: Patient/Family Education  Outcome: Progressing Towards Goal     Problem: Deep Venous Thrombosis - Risk of  Goal: *Absence of impaired coagulation signs and symptoms  Outcome: Progressing Towards Goal     Problem: Falls - Risk of  Goal: *Absence of Falls  Description  Document Sivakumar Up Fall Risk and appropriate interventions in the flowsheet. Outcome: Progressing Towards Goal  Note: Fall Risk Interventions:  Mobility Interventions: Bed/chair exit alarm, Strengthening exercises (ROM-active/passive), Assess mobility with egress test    Mentation Interventions: Adequate sleep, hydration, pain control, Bed/chair exit alarm, Door open when patient unattended, Eyeglasses and hearing aids, More frequent rounding, Reorient patient, Room close to nurse's station, Toileting rounds, Update white board    Medication Interventions: Assess postural VS orthostatic hypotension, Bed/chair exit alarm, Evaluate medications/consider consulting pharmacy, Patient to call before getting OOB, Teach patient to arise slowly    Elimination Interventions: Bed/chair exit alarm, Call light in reach, Elevated toilet seat, Patient to call for help with toileting needs, Toileting schedule/hourly rounds, Urinal in reach, Toilet paper/wipes in reach              Problem: Pressure Injury - Risk of  Goal: *Prevention of pressure injury  Description  Document Edin Scale and appropriate interventions in the flowsheet.   Outcome: Progressing Towards Goal  Note: Pressure Injury Interventions:  Sensory Interventions: Assess changes in LOC    Moisture Interventions: Absorbent underpads, Apply protective barrier, creams and emollients, Assess need for specialty bed, Moisture barrier, Minimize layers, Maintain skin hydration (lotion/cream), Check for incontinence Q2 hours and as needed    Activity Interventions: Assess need for specialty bed, Pressure redistribution bed/mattress(bed type)    Mobility Interventions: Assess need for specialty bed, Float heels, HOB 30 degrees or less, Pressure redistribution bed/mattress (bed type), Turn and reposition approx. every two hours(pillow and wedges)    Nutrition Interventions: Discuss nutritional consult with provider, Document food/fluid/supplement intake    Friction and Shear Interventions: Apply protective barrier, creams and emollients, Feet elevated on foot rest, Foam dressings/transparent film/skin sealants, HOB 30 degrees or less, Transferring/repositioning devices, Minimize layers                Problem: Pain  Goal: *Control of Pain  Outcome: Progressing Towards Goal     Problem: Diabetes Self-Management  Goal: *Disease process and treatment process  Description  Define diabetes and identify own type of diabetes; list 3 options for treating diabetes. Outcome: Progressing Towards Goal     Problem: Injury - Risk of, Adverse Drug Event  Goal: *Absence of adverse drug events  Outcome: Progressing Towards Goal     Problem: Diabetes Self-Management  Goal: *Incorporating physical activity into lifestyle  Description  State effect of exercise on blood glucose levels.   Outcome: Progressing Towards Goal

## 2020-02-23 NOTE — PROGRESS NOTES
[facility] Hospitalist Group  Progress Note    Patient: Manuel Chun Age: 77 y.o. : 1950 MR#: [id number] SSN: [id number]  Date: 2020       Subjective:   Patient is sitting up in the chair, quite comfortable no distress, no nausea vomiting diarrhea    Last night had 1 BM    No chest pain no shortness of breath    Very slow recovery    Objective:     /81   Pulse 97   Temp 98.2 °F (36.8 °C)   Resp 21   Ht 5' 10\" (1.778 m)   Wt 96.7 kg (213 lb 3 oz)   SpO2 100%   BMI 30.59 kg/m²     General:  Alert, NAD, follows commands appropriately  Cardiovascular:  RRR  Pulmonary: bilateral coarse breath sounds, no wheezing  GI: Distended and tympanic, bowel sounds very sluggish to none  Extremities:  + edema  Neuro: AAOx4. moves all extremities    Assessment:     1. Incisional hernia s/p open repair of incisional hernia with mesh  2. Post-op ileus with abdominal distention  3. SIRS, better  4. Acute hypoxic respiratory failure due to ileus, improving currently  5. ARF on CKD stage II due to dehydration  6. Lactic acidosis, improved  7. Hypomagnesemia  8. Hyper kalemia  9. DMT2. a1c 7.7  10. HTN  11. GERD  12. History of throat cancer  13. History of intractable low back pain  14.   Sinus tachycardia, improving    Plan:    --Case discussed with surgery  -Continue current management no need for NG tube  -Monitor electrolytes replace.  -     Case discussed with:  [x]Patient  []Family  [x]Nursing  []Case Management  DVT Prophylaxis:  []Lovenox  [x]Hep SQ  []SCDs  []Coumadin   []On Heparin gtt    Labs:    Recent Results (from the past 24 hour(s))   GLUCOSE, POC    Collection Time: 20 12:08 PM   Result Value Ref Range    Glucose (POC) 143 (H) 70 - 110 mg/dL   GLUCOSE, POC    Collection Time: 20  4:27 PM   Result Value Ref Range    Glucose (POC) 130 (H) 70 - 110 mg/dL   GLUCOSE, POC    Collection Time: 20  9:46 PM   Result Value Ref Range    Glucose (POC) 144 (H) 70 - 168 mg/dL   METABOLIC PANEL, BASIC    Collection Time: 02/23/20  4:18 AM   Result Value Ref Range    Sodium 143 136 - 145 mmol/L    Potassium 3.6 3.5 - 5.5 mmol/L    Chloride 110 100 - 111 mmol/L    CO2 27 21 - 32 mmol/L    Anion gap 6 3.0 - 18 mmol/L    Glucose 120 (H) 74 - 99 mg/dL    BUN 22 (H) 7.0 - 18 MG/DL    Creatinine 1.13 0.6 - 1.3 MG/DL    BUN/Creatinine ratio 19 12 - 20      GFR est AA >60 >60 ml/min/1.73m2    GFR est non-AA >60 >60 ml/min/1.73m2    Calcium 8.4 (L) 8.5 - 10.1 MG/DL   PHOSPHORUS    Collection Time: 02/23/20  4:18 AM   Result Value Ref Range    Phosphorus 2.4 (L) 2.5 - 4.9 MG/DL   MAGNESIUM    Collection Time: 02/23/20  4:18 AM   Result Value Ref Range    Magnesium 2.2 1.6 - 2.6 mg/dL   GLUCOSE, POC    Collection Time: 02/23/20  7:36 AM   Result Value Ref Range    Glucose (POC) 119 (H) 70 - 110 mg/dL       Signed By: Bryce Mathew MD     February 23, 2020

## 2020-02-23 NOTE — PROGRESS NOTES
Yifan Barrios M.D. FACS  PROGRESS NOTE    Name: Shira Hsieh MRN: [id number]   : 1950 [facility]   Date: 2020 Admission Date: 2020  5:32 AM     Hospital Day: 11  10 Days Post-Op  Subjective:  Pt without acute overnight events. He endorses flatus and bowel movements. He feels less distended. He ate more of his breakfast today than yesterday. He has been ambulating in the ICU. Objective:  Vitals:    20 1000 20 1100 20 1200 20 1300   BP: 144/79      Pulse: (!) 102 (!) 101 (!) 108 87   Resp: 21 22 24 15   Temp:   98.4 °F (36.9 °C)    SpO2: 99% 99%  97%   Weight:       Height:         Date 20 0700 - 20 0659 20 0700 - 20 0659   Shift 2775-0545 2993-9438 24 Hour Total 2134-6152 8277-0646 24 Hour Total   INTAKE   P.O. 1320 60 1380 270  270     P.O. 1320 60 1380 270  270   I. V.(mL/kg/hr)  21(0) 21(0)        I.V.  21 21      Other 0 0 0 0  0     Intake (ml) (Vacuum Assisted Closure Medial;Upper Abdominal) 0 0 0 0  0   Shift Total(mL/kg) 1320(13.7) 81(0.8) 1401(14.5) 270(2.8)  270(2.8)   OUTPUT   Urine(mL/kg/hr) 375(0.3) 290(0.2) 665(0.3)        Urine Voided 375 290 665        Urine Occurrence(s)  0 x 0 x      Emesis/NG output           Emesis Occurrence(s)  0 x 0 x      Drains 70 70 140        Output (ml) (Vacuum Assisted Closure Medial;Upper Abdominal) 0 0 0        Output (ml) (Matthew-Garcia Drain 20 Abdomen) 50 50 100        Output (ml) (Matthew-Garcia Drain 20 Abdomen) 20 20 40      Stool           Stool Occurrence(s) 1 x 0 x 1 x      Shift Total(mL/kg) 445(4.6) 360(3.7) 805(8.3)       -924 596 270  270   Weight (kg) 96.7 96.7 96.7 96.7 96.7 96.7         Physical Exam:    General: Awake and alert, oriented x4, no apparent distress   Abdomen: abdomen is soft with minimal midline incisional tenderness. Incision with VAC intact. Serosanguineous drainage from both JPs.   No masses, organomegaly or guarding    Labs:  Recent Results (from the past 24 hour(s))   GLUCOSE, POC    Collection Time: 02/22/20  4:27 PM   Result Value Ref Range    Glucose (POC) 130 (H) 70 - 110 mg/dL   GLUCOSE, POC    Collection Time: 02/22/20  9:46 PM   Result Value Ref Range    Glucose (POC) 144 (H) 70 - 693 mg/dL   METABOLIC PANEL, BASIC    Collection Time: 02/23/20  4:18 AM   Result Value Ref Range    Sodium 143 136 - 145 mmol/L    Potassium 3.6 3.5 - 5.5 mmol/L    Chloride 110 100 - 111 mmol/L    CO2 27 21 - 32 mmol/L    Anion gap 6 3.0 - 18 mmol/L    Glucose 120 (H) 74 - 99 mg/dL    BUN 22 (H) 7.0 - 18 MG/DL    Creatinine 1.13 0.6 - 1.3 MG/DL    BUN/Creatinine ratio 19 12 - 20      GFR est AA >60 >60 ml/min/1.73m2    GFR est non-AA >60 >60 ml/min/1.73m2    Calcium 8.4 (L) 8.5 - 10.1 MG/DL   PHOSPHORUS    Collection Time: 02/23/20  4:18 AM   Result Value Ref Range    Phosphorus 2.4 (L) 2.5 - 4.9 MG/DL   MAGNESIUM    Collection Time: 02/23/20  4:18 AM   Result Value Ref Range    Magnesium 2.2 1.6 - 2.6 mg/dL   GLUCOSE, POC    Collection Time: 02/23/20  7:36 AM   Result Value Ref Range    Glucose (POC) 119 (H) 70 - 110 mg/dL   GLUCOSE, POC    Collection Time: 02/23/20 11:36 AM   Result Value Ref Range    Glucose (POC) 188 (H) 70 - 110 mg/dL     All Micro Results     Procedure Component Value Units Date/Time    CULTURE, RESPIRATORY/SPUTUM/BRONCH Minnette Hoe STAIN [[id number]] Collected:  02/15/20 1010    Order Status:  Completed Specimen:  Sputum Updated:  02/17/20 1009     Special Requests: NO SPECIAL REQUESTS        GRAM STAIN RARE WBCS SEEN         FEW EPITHELIAL CELLS SEEN               RARE GRAM POSITIVE COCCI IN PAIRS           Culture result:       RARE NORMAL RESPIRATORY GALE          CULTURE, URINE [[id number]] Collected:  02/14/20 1230    Order Status:  Completed Specimen:  Clean catch Updated:  02/15/20 0908     Special Requests: NO SPECIAL REQUESTS        Culture result: NO GROWTH 1 DAY             Current Medications:  Current Facility-Administered Medications   Medication Dose Route Frequency Provider Last Rate Last Dose    insulin lispro (HUMALOG) injection   SubCUTAneous AC&HS Marixa Gonzalez NP   Stopped at 02/22/20 0730    therapeutic multivitamin (THERAGRAN) tablet 1 Tab  1 Tab Oral DAILY Kristen Corona MD   1 Tab at 02/23/20 3388    cloNIDine (CATAPRES) 0.1 mg/24 hr patch 1 Patch  1 Patch TransDERmal Q7D Stacy Gupta MD   1 Patch at 02/18/20 1711    oxyCODONE-acetaminophen (PERCOCET) 5-325 mg per tablet 1 Tab  1 Tab Oral Q4H PRN Kathleen Peñaloza NP        famotidine (PF) (PEPCID) 20 mg in 0.9% sodium chloride 10 mL injection  20 mg IntraVENous Q12H Marixa Gonzalez NP   20 mg at 02/23/20 0904    ondansetron (ZOFRAN) injection 4 mg  4 mg IntraVENous Q6H PRN Russ Martinez PA-C   4 mg at 02/20/20 6937    albuterol (PROVENTIL VENTOLIN) nebulizer solution 2.5 mg  2.5 mg Nebulization Q4H PRN Dimple Turk A, DO   2.5 mg at 02/14/20 0341    sodium chloride (OCEAN) 0.65 % nasal squeeze bottle 2 Spray  2 Spray Both Nostrils Q2H PRN Flavio Ship, DO        glucose chewable tablet 16 g  4 Tab Oral PRN Marixa Gonzalez NP        glucagon (GLUCAGEN) injection 1 mg  1 mg IntraMUSCular PRN Marixa Gonzalez NP        dextrose (D50W) injection syrg 12.5-25 g  25-50 mL IntraVENous PRN Marixa Gonzalez NP        oxyCODONE-acetaminophen (PERCOCET) 5-325 mg per tablet 2 Tab  2 Tab Oral Q6H PRN Marixa Gonzalez NP   2 Tab at 02/22/20 0345    nebivolol (BYSTOLIC) tablet 10 mg  10 mg Oral DAILY Cedric Meyers MD   10 mg at 02/23/20 1050    benzocaine-menthol (CEPACOL) lozenge 1 Lozenge  1 Lozenge Mucous Membrane PRN Talur-Matadha, Hazle Lefort, MD        nystatin (MYCOSTATIN) 100,000 unit/mL oral suspension 500,000 Units  500,000 Units Oral QID Marixa Gonzalez NP   500,000 Units at 02/22/20 6720    phenol throat spray (CHLORASEPTIC) 1 Spray  1 [place] Oral PRN Alissa Gonzalez NP   1 Spray at 02/14/20 1055    albuterol-ipratropium (DUO-NEB) 2.5 MG-0.5 MG/3 ML  3 mL Nebulization Q4H PRN Ma Scheuermann, MD   3 mL at 02/15/20 2320    sodium chloride (NS) flush 5-40 mL  5-40 mL IntraVENous Q8H Oral MD Radha   10 mL at 02/23/20 0545    sodium chloride (NS) flush 5-40 mL  5-40 mL IntraVENous PRN Leon Garcia MD   10 mL at 02/21/20 1400    heparin (porcine) injection 5,000 Units  5,000 Units SubCUTAneous Q8H Oral MD Radha   5,000 Units at 02/23/20 2305    gabapentin (NEURONTIN) capsule 100 mg  100 mg Oral TID Oral MD Radha   100 mg at 02/23/20 5810    [Held by provider] ferrous sulfate tablet 325 mg  325 mg Oral Q MON, WED & Morgan Infante MD   325 mg at 02/14/20 2117    latanoprost (XALATAN) 0.005 % ophthalmic solution 1 Drop  1 Drop Both Eyes QHS Oral MD Radha   1 Drop at 02/22/20 2152    acetaminophen (TYLENOL) tablet 650 mg  650 mg Oral Q4H PRN Alissa Gonzalez NP        [Held by provider] polyethylene glycol (MIRALAX) packet 17 g  17 g Oral DAILY Alissa Gonzalez NP   Stopped at 02/14/20 3911    docusate sodium (COLACE) capsule 100 mg  100 mg Oral DAILY Zo Gonzalez NP   100 mg at 02/23/20 5543    [Held by provider] lactobacillus sp. 50 billion cpu (BIO-K PLUS) capsule 1 Cap  1 Cap Oral DAILY Zo Gonzalez NP   Stopped at 02/15/20 0900    HYDROmorphone (DILAUDID) injection 1 mg  1 mg IntraVENous Q3H PRN Oral MD Radha   1 mg at 02/23/20 0900       Chart and notes reviewed. Data reviewed. I have evaluated and examined the patient. IMPRESSION:   · Patient is postop day 10 from abdominal wall reconstruction who is hospital course has been prolonged by ileus which is now resolving. PLAN:/DISCUSION:   · Disposition planning.   · Aggressive pulmonary toilet with ambulation deep breathing cough and incentive spirometry usage.   · Fernanda Landon MD

## 2020-02-24 LAB
ANION GAP SERPL CALC-SCNC: 5 MMOL/L (ref 3–18)
BUN SERPL-MCNC: 19 MG/DL (ref 7–18)
BUN/CREAT SERPL: 22 (ref 12–20)
CALCIUM SERPL-MCNC: 8.1 MG/DL (ref 8.5–10.1)
CHLORIDE SERPL-SCNC: 109 MMOL/L (ref 100–111)
CO2 SERPL-SCNC: 27 MMOL/L (ref 21–32)
CREAT SERPL-MCNC: 0.88 MG/DL (ref 0.6–1.3)
GLUCOSE BLD STRIP.AUTO-MCNC: 110 MG/DL (ref 70–110)
GLUCOSE BLD STRIP.AUTO-MCNC: 128 MG/DL (ref 70–110)
GLUCOSE BLD STRIP.AUTO-MCNC: 145 MG/DL (ref 70–110)
GLUCOSE BLD STRIP.AUTO-MCNC: 149 MG/DL (ref 70–110)
GLUCOSE SERPL-MCNC: 118 MG/DL (ref 74–99)
MAGNESIUM SERPL-MCNC: 1.9 MG/DL (ref 1.6–2.6)
PHOSPHATE SERPL-MCNC: 2.7 MG/DL (ref 2.5–4.9)
POTASSIUM SERPL-SCNC: 3.2 MMOL/L (ref 3.5–5.5)
SODIUM SERPL-SCNC: 141 MMOL/L (ref 136–145)

## 2020-02-24 PROCEDURE — 84100 ASSAY OF PHOSPHORUS: CPT

## 2020-02-24 PROCEDURE — 80048 BASIC METABOLIC PNL TOTAL CA: CPT

## 2020-02-24 PROCEDURE — 74011000250 HC RX REV CODE- 250: Performed by: INTERNAL MEDICINE

## 2020-02-24 PROCEDURE — 74011250636 HC RX REV CODE- 250/636: Performed by: NURSE PRACTITIONER

## 2020-02-24 PROCEDURE — 97116 GAIT TRAINING THERAPY: CPT

## 2020-02-24 PROCEDURE — 36415 COLL VENOUS BLD VENIPUNCTURE: CPT

## 2020-02-24 PROCEDURE — 74011250636 HC RX REV CODE- 250/636: Performed by: SURGERY

## 2020-02-24 PROCEDURE — 74011636637 HC RX REV CODE- 636/637: Performed by: NURSE PRACTITIONER

## 2020-02-24 PROCEDURE — 74011250637 HC RX REV CODE- 250/637: Performed by: NURSE PRACTITIONER

## 2020-02-24 PROCEDURE — 65270000029 HC RM PRIVATE

## 2020-02-24 PROCEDURE — 74011250637 HC RX REV CODE- 250/637: Performed by: SURGERY

## 2020-02-24 PROCEDURE — 94762 N-INVAS EAR/PLS OXIMTRY CONT: CPT

## 2020-02-24 PROCEDURE — 74011000258 HC RX REV CODE- 258: Performed by: INTERNAL MEDICINE

## 2020-02-24 PROCEDURE — 77030018836 HC SOL IRR NACL ICUM -A

## 2020-02-24 PROCEDURE — 74011250637 HC RX REV CODE- 250/637: Performed by: HOSPITALIST

## 2020-02-24 PROCEDURE — 82962 GLUCOSE BLOOD TEST: CPT

## 2020-02-24 PROCEDURE — 77030025414 HC DRSG VAC ASST SMPLC KCON -B

## 2020-02-24 PROCEDURE — 74011250636 HC RX REV CODE- 250/636: Performed by: PHYSICIAN ASSISTANT

## 2020-02-24 PROCEDURE — 74011250636 HC RX REV CODE- 250/636: Performed by: INTERNAL MEDICINE

## 2020-02-24 PROCEDURE — 74011000250 HC RX REV CODE- 250: Performed by: NURSE PRACTITIONER

## 2020-02-24 PROCEDURE — 83735 ASSAY OF MAGNESIUM: CPT

## 2020-02-24 PROCEDURE — 97535 SELF CARE MNGMENT TRAINING: CPT

## 2020-02-24 RX ADMIN — NYSTATIN 500000 UNITS: 500000 SUSPENSION ORAL at 18:41

## 2020-02-24 RX ADMIN — INSULIN LISPRO 3 UNITS: 100 INJECTION, SOLUTION INTRAVENOUS; SUBCUTANEOUS at 22:00

## 2020-02-24 RX ADMIN — DOCUSATE SODIUM 100 MG: 100 CAPSULE, LIQUID FILLED ORAL at 09:06

## 2020-02-24 RX ADMIN — HEPARIN SODIUM 5000 UNITS: 5000 INJECTION INTRAVENOUS; SUBCUTANEOUS at 09:07

## 2020-02-24 RX ADMIN — FAMOTIDINE 20 MG: 10 INJECTION, SOLUTION INTRAVENOUS at 22:00

## 2020-02-24 RX ADMIN — NYSTATIN 500000 UNITS: 500000 SUSPENSION ORAL at 22:00

## 2020-02-24 RX ADMIN — NYSTATIN 500000 UNITS: 500000 SUSPENSION ORAL at 15:45

## 2020-02-24 RX ADMIN — POTASSIUM CHLORIDE: 2 INJECTION, SOLUTION, CONCENTRATE INTRAVENOUS at 18:41

## 2020-02-24 RX ADMIN — ONDANSETRON 4 MG: 2 INJECTION INTRAMUSCULAR; INTRAVENOUS at 16:02

## 2020-02-24 RX ADMIN — Medication 10 ML: at 22:00

## 2020-02-24 RX ADMIN — ONDANSETRON 4 MG: 2 INJECTION INTRAMUSCULAR; INTRAVENOUS at 23:54

## 2020-02-24 RX ADMIN — OXYCODONE HYDROCHLORIDE AND ACETAMINOPHEN 2 TABLET: 5; 325 TABLET ORAL at 09:12

## 2020-02-24 RX ADMIN — NYSTATIN 500000 UNITS: 500000 SUSPENSION ORAL at 09:07

## 2020-02-24 RX ADMIN — OXYCODONE HYDROCHLORIDE AND ACETAMINOPHEN 2 TABLET: 5; 325 TABLET ORAL at 01:31

## 2020-02-24 RX ADMIN — FAMOTIDINE 20 MG: 10 INJECTION, SOLUTION INTRAVENOUS at 09:07

## 2020-02-24 RX ADMIN — OXYCODONE HYDROCHLORIDE AND ACETAMINOPHEN 2 TABLET: 5; 325 TABLET ORAL at 15:45

## 2020-02-24 RX ADMIN — Medication 10 ML: at 05:26

## 2020-02-24 RX ADMIN — HEPARIN SODIUM 5000 UNITS: 5000 INJECTION INTRAVENOUS; SUBCUTANEOUS at 15:45

## 2020-02-24 RX ADMIN — HEPARIN SODIUM 5000 UNITS: 5000 INJECTION INTRAVENOUS; SUBCUTANEOUS at 01:31

## 2020-02-24 RX ADMIN — THERA TABS 1 TABLET: TAB at 09:06

## 2020-02-24 RX ADMIN — GABAPENTIN 100 MG: 100 CAPSULE ORAL at 09:06

## 2020-02-24 RX ADMIN — Medication 10 ML: at 15:48

## 2020-02-24 RX ADMIN — HEPARIN SODIUM 5000 UNITS: 5000 INJECTION INTRAVENOUS; SUBCUTANEOUS at 23:49

## 2020-02-24 RX ADMIN — POTASSIUM CHLORIDE: 2 INJECTION, SOLUTION, CONCENTRATE INTRAVENOUS at 16:18

## 2020-02-24 RX ADMIN — LATANOPROST 1 DROP: 50 SOLUTION OPHTHALMIC at 22:00

## 2020-02-24 RX ADMIN — NEBIVOLOL HYDROCHLORIDE 10 MG: 5 TABLET ORAL at 09:06

## 2020-02-24 RX ADMIN — POTASSIUM CHLORIDE: 2 INJECTION, SOLUTION, CONCENTRATE INTRAVENOUS at 17:17

## 2020-02-24 NOTE — PROGRESS NOTES
Yifan Duke M.D. FACS  PROGRESS NOTE    Name: Ana Maria Sheth MRN: [id number]   : 1950 [facility]   Date: 2020 Admission Date: 2020  5:32 AM     Hospital Day: 12  11 Days Post-Op  Subjective:  Patient sitting up in a chair today. No complaints. He is tolerating his food. Bowels are moving well. Objective:  Vitals:    20 0400 20 0525 20 0600 20 0800   BP: 155/88  154/86    Pulse: 86  80    Resp: 18  15    Temp: 98.4 °F (36.9 °C)   98.3 °F (36.8 °C)   SpO2: 97%  99%    Weight:  91.1 kg (200 lb 13.4 oz)     Height:         Date 20 0700 - 20 0659 20 0700 - 20 0659   Shift 6659-8132 4538-2829 24 Hour Total 3609-0616 9483-1536 24 Hour Total   INTAKE   P.O. 750  750        P. O. 750  750      Other 0 0 0        Intake (ml) (Vacuum Assisted Closure Medial;Upper Abdominal) 0 0 0      Shift Total(mL/kg) 750(7.8) 0(0) 750(8.2)      OUTPUT   Urine(mL/kg/hr)           Urine Occurrence(s) 1 x 1 x 2 x      Drains  120 120        Output (ml) (Vacuum Assisted Closure Medial;Upper Abdominal)  0 0        Output (ml) (Matthew-Garcia Drain 20 Abdomen)  90 90        Output (ml) (Matthew-Garcia Drain 20 Abdomen)  30 30      Stool 2  2        Stool Occurrence(s) 1 x 1 x 2 x        Stool 2  2      Shift Total(mL/kg) 2(0) 120(1.3) 122(1.3)       -120 628      Weight (kg) 96.7 91.1 91.1 91.1 91.1 91.1         Physical Exam:    General: Awake and alert, oriented x4, no apparent distress   Abdomen: abdomen is soft with minimal midline abdominal tenderness. Incision with VAC clean dry and intact. JPs with serosanguineous drainage.   No masses, organomegaly or guarding    Labs:  Recent Results (from the past 24 hour(s))   GLUCOSE, POC    Collection Time: 20 11:36 AM   Result Value Ref Range    Glucose (POC) 188 (H) 70 - 110 mg/dL   GLUCOSE, POC    Collection Time: 20  4:29 PM   Result Value Ref Range    Glucose (POC) 175 (H) 70 - 110 mg/dL   GLUCOSE, POC    Collection Time: 02/23/20  9:51 PM   Result Value Ref Range    Glucose (POC) 182 (H) 70 - 608 mg/dL   METABOLIC PANEL, BASIC    Collection Time: 02/24/20  4:47 AM   Result Value Ref Range    Sodium 141 136 - 145 mmol/L    Potassium 3.2 (L) 3.5 - 5.5 mmol/L    Chloride 109 100 - 111 mmol/L    CO2 27 21 - 32 mmol/L    Anion gap 5 3.0 - 18 mmol/L    Glucose 118 (H) 74 - 99 mg/dL    BUN 19 (H) 7.0 - 18 MG/DL    Creatinine 0.88 0.6 - 1.3 MG/DL    BUN/Creatinine ratio 22 (H) 12 - 20      GFR est AA >60 >60 ml/min/1.73m2    GFR est non-AA >60 >60 ml/min/1.73m2    Calcium 8.1 (L) 8.5 - 10.1 MG/DL   PHOSPHORUS    Collection Time: 02/24/20  4:47 AM   Result Value Ref Range    Phosphorus 2.7 2.5 - 4.9 MG/DL   MAGNESIUM    Collection Time: 02/24/20  4:47 AM   Result Value Ref Range    Magnesium 1.9 1.6 - 2.6 mg/dL   GLUCOSE, POC    Collection Time: 02/24/20  7:25 AM   Result Value Ref Range    Glucose (POC) 110 70 - 110 mg/dL     All Micro Results     Procedure Component Value Units Date/Time    CULTURE, RESPIRATORY/SPUTUM/BRONCH Ashlyn Rude STAIN [[id number]] Collected:  02/15/20 1010    Order Status:  Completed Specimen:  Sputum Updated:  02/17/20 1009     Special Requests: NO SPECIAL REQUESTS        GRAM STAIN RARE WBCS SEEN         FEW EPITHELIAL CELLS SEEN               RARE GRAM POSITIVE COCCI IN PAIRS           Culture result:       RARE NORMAL RESPIRATORY GALE          CULTURE, URINE [[id number]] Collected:  02/14/20 1230    Order Status:  Completed Specimen:  Clean catch Updated:  02/15/20 0908     Special Requests: NO SPECIAL REQUESTS        Culture result: NO GROWTH 1 DAY             Current Medications:  Current Facility-Administered Medications   Medication Dose Route Frequency Provider Last Rate Last Dose    insulin lispro (HUMALOG) injection   SubCUTAneous AC&HS Vena Foots, NP   Stopped at 02/24/20 0906    therapeutic multivitamin (THERAGRAN) tablet 1 Tab 1 Tab Oral DAILY Oral MD Radha   1 Tab at 02/24/20 0257    cloNIDine (CATAPRES) 0.1 mg/24 hr patch 1 Patch  1 Patch TransDERmal Q7D Jorge A Martinez MD   1 Patch at 02/18/20 1711    oxyCODONE-acetaminophen (PERCOCET) 5-325 mg per tablet 1 Tab  1 Tab Oral Q4H PRN Emmanuel Sandoval NP        famotidine (PF) (PEPCID) 20 mg in 0.9% sodium chloride 10 mL injection  20 mg IntraVENous Q12H Alissa Gonzalez NP   20 mg at 02/24/20 0907    ondansetron (ZOFRAN) injection 4 mg  4 mg IntraVENous Q6H PRN Michele Almeida PA-C   4 mg at 02/20/20 7858    albuterol (PROVENTIL VENTOLIN) nebulizer solution 2.5 mg  2.5 mg Nebulization Q4H PRN Jaki Garcia A, DO   2.5 mg at 02/14/20 0341    sodium chloride (OCEAN) 0.65 % nasal squeeze bottle 2 Spray  2 Spray Both Nostrils Q2H PRN Nely Rapp DO        glucose chewable tablet 16 g  4 Tab Oral PRN Alissa Gonzalez NP        glucagon (GLUCAGEN) injection 1 mg  1 mg IntraMUSCular PRN Alissa Gonzalez NP        dextrose (D50W) injection syrg 12.5-25 g  25-50 mL IntraVENous PRN Alissa Gonzalez NP        oxyCODONE-acetaminophen (PERCOCET) 5-325 mg per tablet 2 Tab  2 Tab Oral Q6H PRN Emmanuel Sandoval NP   2 Tab at 02/24/20 0912    nebivolol (BYSTOLIC) tablet 10 mg  10 mg Oral DAILY Ma Scheuermann, MD   10 mg at 02/24/20 0906    benzocaine-menthol (CEPACOL) lozenge 1 Lozenge  1 Lozenge Mucous Membrane PRN Erica Nair MD        nystatin (MYCOSTATIN) 100,000 unit/mL oral suspension 500,000 Units  500,000 Units Oral QID Alissa Gonzalez NP   500,000 Units at 02/24/20 0907    phenol throat spray (CHLORASEPTIC) 1 Spray  1 [place] Oral PRN Alissa Gonzalez NP   1 Spray at 02/14/20 1055    albuterol-ipratropium (DUO-NEB) 2.5 MG-0.5 MG/3 ML  3 mL Nebulization Q4H PRN Ma Scheuermann, MD   3 mL at 02/15/20 2320    sodium chloride (NS) flush 5-40 mL  5-40 mL IntraVENous Q8H Jenny Leonardo MD   10 mL at 02/24/20 0526    sodium chloride (NS) flush 5-40 mL  5-40 mL IntraVENous PRN Jneny Leonardo MD   10 mL at 02/21/20 1400    heparin (porcine) injection 5,000 Units  5,000 Units SubCUTAneous Q8H Jenny Leonardo MD   5,000 Units at 02/24/20 4026    gabapentin (NEURONTIN) capsule 100 mg  100 mg Oral TID Jenny Leonardo MD   100 mg at 02/24/20 9124    [Held by provider] ferrous sulfate tablet 325 mg  325 mg Oral Q MON, WED & Loi Vargas MD   325 mg at 02/14/20 2117    latanoprost (XALATAN) 0.005 % ophthalmic solution 1 Drop  1 Drop Both Eyes QHS Jenny Leonardo MD   1 Drop at 02/23/20 2139    acetaminophen (TYLENOL) tablet 650 mg  650 mg Oral Q4H PRN Karena Gonzalez NP        [Held by provider] polyethylene glycol (MIRALAX) packet 17 g  17 g Oral DAILY Karena Gonzalez NP   Stopped at 02/14/20 6560    docusate sodium (COLACE) capsule 100 mg  100 mg Oral DAILY Karena Gonzalez NP   100 mg at 02/24/20 5402    [Held by provider] lactobacillus sp. 50 billion cpu (BIO-K PLUS) capsule 1 Cap  1 Cap Oral DAILY Zo Gonzalez NP   Stopped at 02/15/20 0900    HYDROmorphone (DILAUDID) injection 1 mg  1 mg IntraVENous Q3H PRN Jenny Leonardo MD   1 mg at 02/23/20 0900       Chart and notes reviewed. Data reviewed. I have evaluated and examined the patient. IMPRESSION:   · Pt is  POD 11 from abdominal wall reconstruction with resolved postoperative ileus.        PLAN:/DISCUSION:   · Transfer to surgical floor  · Discharge planning  · ANGELI and Leonidas Lechuga MD

## 2020-02-24 NOTE — PROGRESS NOTES
Problem: Self Care Deficits Care Plan (Adult)  Goal: *Acute Goals and Plan of Care (Insert Text)  Description  Occupational Therapy Goals  Initiated 2/17/2020 within 7 day(s). 1.  Patient will perform functional activity standing for 2-4 minutes with modified independence and F+ balance. 2.  Patient will perform lower body dressing with supervision/set-up. 3.  Patient will perform toilet transfers with supervision/set-up. 4.  Patient will perform all aspects of toileting with supervision/set-up. 5.  Patient will participate in upper extremity therapeutic exercise/activities with modified independence for 8 minutes to increase ROM/strength for selfcare. 6.  Patient will utilize energy conservation techniques during functional activities with verbal cues. Prior Level of Function: Patient was independent with self-care (exception to bathing his back) and used a RW for functional mobility PTA. Outcome: Progressing Towards Goal   OCCUPATIONAL THERAPY TREATMENT    Patient: Manuel Chun (58 y.o. male)  Date: 2/24/2020  Diagnosis: Incisional hernia [K43.2]   Acute kidney injury ([facility] [address]  Procedure(s) (LRB):  OPEN REPAIR OF INCISIONAL HERNIA WITH MESH (N/A) 11 Days Post-Op  Precautions: Fall    Chart, occupational therapy assessment, plan of care, and goals were reviewed. ASSESSMENT:  Pt is pleasant and cooperative, motivated for OOB activity. Pt demonstrates \"log roll\" technique w/bed mobility requiring Min Assist 2/2 increase abdominal dissension. Pt performs ADL grooming tasks at EOB and UB dressing task. Decrease external rotation BUE requires assist w/UB [facility]. Decrease dynamic standing balance and body habitus requires assist w/clothing mgt and bowel hygiene. Pt educated on benefits OOB and encouraged OOB for all meals. Provided improvised scrotal sling for comfort 2/2 increase scrotal edema.    Progression toward goals:  [x]          Improving appropriately and progressing toward goals  []          Improving slowly and progressing toward goals  []          Not making progress toward goals and plan of care will be adjusted     PLAN:  Patient continues to benefit from skilled intervention to address the above impairments. Continue treatment per established plan of care. Discharge Recommendations:  Rehab  Further Equipment Recommendations for Discharge:  shower chair and rolling walker     SUBJECTIVE:   Patient stated That feels so much better.  reference OOB to chair    OBJECTIVE DATA SUMMARY:   Cognitive/Behavioral Status:  Neurologic State: Alert  Orientation Level: Oriented X4  Cognition: Follows commands  Safety/Judgement: Fall prevention    Functional Mobility and Transfers for ADLs:   Bed Mobility:  Supine to Sit: Minimum assistance(w/HOB raised and SR)   Transfers:  Sit to Stand: Contact guard assistance  Bed to Chair: Contact guard assistance   Toilet Transfer : Contact guard assistance(w/HHA)   Balance:  Sitting: Intact  Standing: Impaired  Standing - Static: Fair(fair plus)  Standing - Dynamic : Fair    ADL Intervention:  Grooming  Position Performed: Seated edge of bed  Washing Face: Set-up  Washing Hands: Set-up  Brushing Teeth: Set-up    Upper Body Dressing Assistance  Shirt simulation with hospital gown: Moderate assistance    Toileting  Toileting Assistance: Maximum assistance  Bowel Hygiene: Maximum assistance  Clothing Management: Maximum assistance    Pain:  Pain level pre-treatment: 0/10   Pain level post-treatment: 0/10    Activity Tolerance:    Good    Please refer to the flowsheet for vital signs taken during this treatment.   After treatment:   [x]  Patient left in no apparent distress sitting up in chair  []  Patient left in no apparent distress in bed  [x]  Call bell left within reach  [x]  Gisele Martin, notified  []  Caregiver present  []  Bed alarm activated    COMMUNICATION/EDUCATION:   [] Role of Occupational Therapy in the acute care setting  [] Home safety education was provided and the patient/caregiver indicated understanding. [] Patient/family have participated as able in working towards goals and plan of care. [x] Patient/family agree to work toward stated goals and plan of care. [] Patient understands intent and goals of therapy, but is neutral about his/her participation. [] Patient is unable to participate in goal setting and plan of care.       Thank you for this referral.  REX Erazo  Time Calculation: 29 mins

## 2020-02-24 NOTE — PROGRESS NOTES
Problem: Mobility Impaired (Adult and Pediatric)  Goal: *Acute Goals and Plan of Care (Insert Text)  Description  Physical Therapy Goals  Initiated 2/17/2020 and to be accomplished within 7 day(s)  1. Patient will move from supine to sit and sit to supine  and scoot up and down in bed with supervision/set-up. 2.  Patient will transfer from bed to chair and chair to bed with supervision/set-up using the least restrictive device. 3.  Patient will perform sit to stand with supervision/set-up. 4.  Patient will ambulate with supervision/set-up for 50 feet with the least restrictive device. PLOF: Pt reports he was ambulating with RW and independent with self care. Outcome: Progressing Towards Goal   PHYSICAL THERAPY TREATMENT    Patient: Víctor Calhoun (15 y.o. male)  Date: 2/24/2020  Diagnosis: Incisional hernia [K43.2]   Acute kidney injury ([facility] [address]  Procedure(s) (LRB):  OPEN REPAIR OF INCISIONAL HERNIA WITH MESH (N/A) 11 Days Post-Op  Precautions: Fall      ASSESSMENT:  Pt found seated in recliner willing to participate w/ therapy as pt is highly motivated to improve functionally. Pt able to make great functional progress this visit, improving in balance and stability w/ higher level gait tasks. Prior to mobility, increased time req for line negotiation for safe training as well as drains, left drain attached to gown in case need to use bathroom post tx, instructed pt to notify nursing if gown replacement in necessary. Pt stood to RW w/ good UE and trunk strength all w/ a slight post lean to compensate for distended abdomen. Once upright w/ support of RW, pt displayed good balance and stability, progressing to marching in prep for gait training when more appropriate. Pt voiced no discomfort w/ marching, performing 50 total. Pt progressed to step training in room w/ use of (B) hand rails w/ RW.  Pt performed X10 steps, leading w/ both right and left LEs to trial which provided more comfort and stability, pt voiced most comfort when leading w/ LLE as right LE weaker. T/o tx, pt remained stable w/ all vitals w/ SPO2 only fluctuating when pt grasped RW tightly on sensor hand, likely inaccurate. Pt returned to sitting in recliner, provided further education on importance of cont mobility safely and appropriate progression. Pt voiced understanding and left in room w/ all needs in reach. Will cont to progress as remains in hospital care to safely progress to home mobility. Progression toward goals: good   [x]      Improving appropriately and progressing toward goals  []      Improving slowly and progressing toward goals  []      Not making progress toward goals and plan of care will be adjusted     PLAN:  Patient continues to benefit from skilled intervention to address the above impairments. Continue treatment per established plan of care. Discharge Recommendations:  Home Health w/ assistance   Further Equipment Recommendations for Discharge: Has RW     SUBJECTIVE:   Patient stated I feel much better now.     OBJECTIVE DATA SUMMARY:   Critical Behavior:  Neurologic State: Alert  Orientation Level: Oriented X4  Cognition: Follows commands  Safety/Judgement: Fall prevention  Functional Mobility Training:  Bed Mobility:  Supine to Sit: Minimum assistance(w/HOB raised and SR)  Transfers:  Sit to Stand: Stand-by assistance  Stand to Sit: Stand-by assistance  Bed to Chair: Contact guard assistance  Balance:  Sitting: Intact  Standing: Intact; With support  Standing - Static: Good  Standing - Dynamic : Fair(+)  Ambulation/Gait Training:  Distance (ft): 50 Feet (ft)(simulated w/ marching in place )  Assistive Device: Walker, rolling  Ambulation - Level of Assistance: Contact guard assistance;Stand-by assistance  Base of Support: Center of gravity altered; Widened  Stairs:  Number of Stairs Trained: 10  Stairs - Level of Assistance: Stand-by assistance  Rail Use: Both        Pain:  Pain level pre-treatment: 0/10  Pain level post-treatment: 0/10   Some discomfort in abdomen     Activity Tolerance:   Good   Please refer to the flowsheet for vital signs taken during this treatment. After treatment:   [x] Patient left in no apparent distress sitting up in chair  [] Patient left in no apparent distress in bed  [x] Call bell left within reach  [x] Nursing notified  [] Caregiver present  [] Bed alarm activated  [] SCDs applied      COMMUNICATION/EDUCATION:   [x]         Role of Physical Therapy in the acute care setting. [x]         Fall prevention education was provided and the patient/caregiver indicated understanding. [x]         Patient/family have participated as able in working toward goals and plan of care. [x]         Patient/family agree to work toward stated goals and plan of care. []         Patient understands intent and goals of therapy, but is neutral about his/her participation.   []         Patient is unable to participate in stated goals/plan of care: ongoing with therapy staff.  []         Other:        Charla Macedo PTA   Time Calculation: 25 mins

## 2020-02-24 NOTE — ROUTINE PROCESS
Wound vac dressing removed. Patient mid line incision healed and approxiated. Stirstrips were removed with dressing removal. No drainage noted. Call paced out to Dr Kristen Nguyen re- need to reapply wound vac or stirstrips with binder. Return call pending. Dr Jonathon Larikn covering. 18 Dr Jonathon Larkin return call. MD wants wound vac sponge to go on top of incision and will discuss with Dr Kristen Nguyen in am. No steri strips need to be applied.

## 2020-02-24 NOTE — PROGRESS NOTES
[facility] Hospitalist Group  Progress Note    Patient: Manuel Chun Age: 77 y.o. : 1950 MR#: [id number] SSN: [id number]  Date: 2020       Subjective:   Patient appears comfortable alert awake oriented  Denies any chest pain shortness of breath nausea vomiting  Abdomen is still somewhat distended    Slow recovery postop from    Objective:     /85 (BP 1 Location: Left arm, BP Patient Position: At rest;Sitting)   Pulse 89   Temp 98.1 °F (36.7 °C)   Resp 20   Ht 5' 10\" (1.778 m)   Wt 91.1 kg (200 lb 13.4 oz)   SpO2 100%   BMI 28.82 kg/m²     General:  Alert, NAD, follows commands appropriately  Cardiovascular:  RRR  Pulmonary: bilateral coarse breath sounds, no wheezing  GI: Distended and tympanic, bowel sounds very sluggish to none  Extremities:  + edema  Neuro: AAOx4. moves all extremities    Assessment:     1. Incisional hernia s/p open repair of incisional hernia with mesh  2. Post-op ileus with abdominal distention  3. SIRS, better  4. Acute hypoxic respiratory failure due to ileus, improving currently  5. ARF on CKD stage II due to dehydration  6. Lactic acidosis, improved  7. Hypomagnesemia  8. Hyper kalemia /hypokalemia  9. DMT2. a1c 7.7  10. HTN  11. GERD  12. History of throat cancer  13. History of intractable low back pain  14.   Sinus tachycardia, improving    Plan:    -Slow recovery from surgery  -Continue p.o. intake  -Continue PT OT  -Replace potassium-monitor and replace as needed  -Improved renal function  - Titrate down o2 to maintain o2 sats at or above 92%    Case discussed with:  [x]Patient  []Family  [x]Nursing  []Case Management  DVT Prophylaxis:  []Lovenox  [x]Hep SQ  []SCDs  []Coumadin   []On Heparin gtt    Labs:    Recent Results (from the past 24 hour(s))   GLUCOSE, POC    Collection Time: 20  4:29 PM   Result Value Ref Range    Glucose (POC) 175 (H) 70 - 110 mg/dL   GLUCOSE, POC    Collection Time: 20  9:51 PM   Result Value Ref Range    Glucose (POC) 182 (H) 70 - 616 mg/dL   METABOLIC PANEL, BASIC    Collection Time: 02/24/20  4:47 AM   Result Value Ref Range    Sodium 141 136 - 145 mmol/L    Potassium 3.2 (L) 3.5 - 5.5 mmol/L    Chloride 109 100 - 111 mmol/L    CO2 27 21 - 32 mmol/L    Anion gap 5 3.0 - 18 mmol/L    Glucose 118 (H) 74 - 99 mg/dL    BUN 19 (H) 7.0 - 18 MG/DL    Creatinine 0.88 0.6 - 1.3 MG/DL    BUN/Creatinine ratio 22 (H) 12 - 20      GFR est AA >60 >60 ml/min/1.73m2    GFR est non-AA >60 >60 ml/min/1.73m2    Calcium 8.1 (L) 8.5 - 10.1 MG/DL   PHOSPHORUS    Collection Time: 02/24/20  4:47 AM   Result Value Ref Range    Phosphorus 2.7 2.5 - 4.9 MG/DL   MAGNESIUM    Collection Time: 02/24/20  4:47 AM   Result Value Ref Range    Magnesium 1.9 1.6 - 2.6 mg/dL   GLUCOSE, POC    Collection Time: 02/24/20  7:25 AM   Result Value Ref Range    Glucose (POC) 110 70 - 110 mg/dL   GLUCOSE, POC    Collection Time: 02/24/20 10:53 AM   Result Value Ref Range    Glucose (POC) 149 (H) 70 - 110 mg/dL       Signed By: Bryce Mathew MD     February 24, 2020

## 2020-02-24 NOTE — ROUTINE PROCESS
Patient downgraded to surgical status from stepdown. Patient OOB to chair. No sign of distress noted.

## 2020-02-24 NOTE — ROUTINE PROCESS
Patient C/O \"some queezyness to my stomach\" per patient words. Asked Patient if he  feels some type of nausea. He agreed. Zofran 4 mg IVP given as ordered PRN.

## 2020-02-24 NOTE — PROGRESS NOTES
conducted a Follow up consultation and Spiritual Assessment for Ashley Short, who is a 77 y. o.,male. The  provided the following Interventions:  Continued the relationship of care and support. Chart reviewed. The following outcomes were achieved:  Patient expressed gratitude for 's visit. Assessment:  There are no further spiritual or Bahai issues which require Spiritual Care Services interventions at this time. Plan:  Chaplains will continue to follow and will provide pastoral care on an as needed/requested basis.  recommends bedside caregivers page  on duty if patient shows signs of acute spiritual or emotional distress.      [address] [facility]   Board Certified [address] [facility]   [phone]

## 2020-02-24 NOTE — PROGRESS NOTES
0700 Bedside and Verbal shift change report given to Zackary Metz (oncoming nurse) by 1[facility] (offgoing nurse). Report included the following information SBAR, Kardex and ED Summary. 1900 Bedside and Verbal shift change report given to Lucie Díaz RN (oncoming nurse) by Zackary Metz (offgoing nurse). Report included the following information SBAR, Kardex and ED Summary.

## 2020-02-24 NOTE — ROUTINE PROCESS
Patient C/O pain to lower back as a 6 out of 10 scale. Abd feels some kind of way from eating his breakfast of some pancakes and some eggs and orange Juice with coffie. Cough occasion productive of brown sputum. Patient states he is passing flatus. No sign of acute distress noted. 930 Percocet 2 tabs po given for pain.

## 2020-02-24 NOTE — PROGRESS NOTES
Infectious Disease progress Note      Reason: SIRS, ?abdominal infection    Current abx Prior abx   Metronidazole since 2/13  Cefepime since 2/15 Levofloxacin 2/13-2/15; pip/tazo 2/15     Lines:       Assessment :    77 y. o. male with a past medical history of type 2 DM (last hgbA1C not known), throat cancer, and essential HTN benign who presented to ed   On 2/13/20 for hernia surgery. Now with persistent leukocytosis, abdominal distension. No fever/chills, no worsening abdominal pain    Highly complex clinical picture. After obtaining a detailed history and exam, clinical suspicion of sepsis is low. Leukocytosis likely due to SIRS from recent intra abdominal surgery/inflammation, gaseous distension of the abdomen. Improved. No fevers. Resolving ileus. Doing fine off abx.      Recommendations:     1.  hold further abx  2.  mx of abdominal distension, drain removal per surgery team    Will sign off. F/u prn. thanks          Advance Care planning: full code: discussed  with patient/surrogate decision maker:Kalli Durbin: [phone]     Above plan was discussed in details with patient, dr. Andra Gamble. Please call me if any further questions or concerns. Will continue to participate in the care of this patient.         HPI:      Patient feels better. Tolerating feeds. decreasing abdominal distension. Had 2 BM overnight. No nausea, vomiting, abdominal pain. Patient denies headaches, visual disturbances, sore throat, runny nose, earaches, cp, sob, chills, cough,  burning micturition, pain or weakness in extremities. He denies worsening back pain/flank pain.         home Medication List    Details   omega 3-DHA-EPA-fish oil (FISH OIL) 1,000 mg (120 mg-180 mg) capsule Take 1 Cap by mouth daily. oxyCODONE-acetaminophen (PERCOCET) 5-325 mg per tablet Take 1-2 Tabs by mouth every four (4) hours as needed. Max Daily Amount: 12 Tabs.   Qty: 40 Tab, Refills: 0    Associated Diagnoses: Intractable back pain; Malignant neoplasm of sigmoid colon (HCC)      multivitamin, tx-iron-ca-min (THERA-M W/ IRON) 9 mg iron-400 mcg tab tablet Take 1 Tab by mouth daily. latanoprost (XALATAN) 0.005 % ophthalmic solution Administer 1 Drop to both eyes nightly. beclomethasone dipropionate (QNASL) 80 mcg/actuation HFAA 80 mcg by Nasal route daily. Directions on at home label are as follows: Use 2 sprays in each Nostril Daily      metFORMIN (GLUCOPHAGE) 1,000 mg tablet Take 1,000 mg by mouth two (2) times daily (with meals). do not take morning of surgery (12/4/180. losartan (COZAAR) 50 mg tablet Take 50 mg by mouth daily. nebivolol (BYSTOLIC) 5 mg tablet Take 5 mg by mouth daily. Indications: Sinus Tachycardia      omeprazole (PRILOSEC) 20 mg capsule Take 20 mg by mouth daily. Dexlansoprazole 60 mg CpDB Take 1 Cap by mouth every evening. calcium-cholecalciferol, d3, 600-125 mg-unit tab Take 1 Tab by mouth daily. IRON, FERROUS SULFATE, PO Take 325 mg by mouth every Monday, Wednesday, Friday.  3 times a week              Current Facility-Administered Medications   Medication Dose Route Frequency    insulin lispro (HUMALOG) injection   SubCUTAneous AC&HS    therapeutic multivitamin (THERAGRAN) tablet 1 Tab  1 Tab Oral DAILY    cloNIDine (CATAPRES) 0.1 mg/24 hr patch 1 Patch  1 Patch TransDERmal Q7D    oxyCODONE-acetaminophen (PERCOCET) 5-325 mg per tablet 1 Tab  1 Tab Oral Q4H PRN    famotidine (PF) (PEPCID) 20 mg in 0.9% sodium chloride 10 mL injection  20 mg IntraVENous Q12H    ondansetron (ZOFRAN) injection 4 mg  4 mg IntraVENous Q6H PRN    albuterol (PROVENTIL VENTOLIN) nebulizer solution 2.5 mg  2.5 mg Nebulization Q4H PRN    sodium chloride (OCEAN) 0.65 % nasal squeeze bottle 2 Spray  2 Spray Both Nostrils Q2H PRN    glucose chewable tablet 16 g  4 Tab Oral PRN    glucagon (GLUCAGEN) injection 1 mg  1 mg IntraMUSCular PRN    dextrose (D50W) injection syrg 12.5-25 g  25-50 mL IntraVENous PRN  oxyCODONE-acetaminophen (PERCOCET) 5-325 mg per tablet 2 Tab  2 Tab Oral Q6H PRN    nebivolol (BYSTOLIC) tablet 10 mg  10 mg Oral DAILY    benzocaine-menthol (CEPACOL) lozenge 1 Lozenge  1 Lozenge Mucous Membrane PRN    nystatin (MYCOSTATIN) 100,000 unit/mL oral suspension 500,000 Units  500,000 Units Oral QID    phenol throat spray (CHLORASEPTIC) 1 Spray  1 Spray Oral PRN    albuterol-ipratropium (DUO-NEB) 2.5 MG-0.5 MG/3 ML  3 mL Nebulization Q4H PRN    sodium chloride (NS) flush 5-40 mL  5-40 mL IntraVENous Q8H    sodium chloride (NS) flush 5-40 mL  5-40 mL IntraVENous PRN    heparin (porcine) injection 5,000 Units  5,000 Units SubCUTAneous Q8H    gabapentin (NEURONTIN) capsule 100 mg  100 mg Oral TID    [Held by provider] ferrous sulfate tablet 325 mg  325 mg Oral Q MON, WED & FRI    latanoprost (XALATAN) 0.005 % ophthalmic solution 1 Drop  1 Drop Both Eyes QHS    acetaminophen (TYLENOL) tablet 650 mg  650 mg Oral Q4H PRN    [Held by provider] polyethylene glycol (MIRALAX) packet 17 g  17 g Oral DAILY    docusate sodium (COLACE) capsule 100 mg  100 mg Oral DAILY    [Held by provider] lactobacillus sp. 50 billion cpu (BIO-K PLUS) capsule 1 Cap  1 Cap Oral DAILY    HYDROmorphone (DILAUDID) injection 1 mg  1 mg IntraVENous Q3H PRN       Allergies: Carrot    Temp (24hrs), Av.2 °F (36.8 °C), Min:98.1 °F (36.7 °C), Max:98.4 °F (36.9 °C)    Visit Vitals  /86   Pulse 80   Temp 98.1 °F (36.7 °C)   Resp 15   Ht 5' 10\" (1.778 m)   Wt 91.1 kg (200 lb 13.4 oz)   SpO2 99%   BMI 28.82 kg/m²       ROS: 12 point ROS obtained in details. Pertinent positives as mentioned in HPI,   otherwise negative    Physical Exam:    GENERAL: alert, cooperative, no distress, appears stated age,   EYE: conjunctivae/corneas clear. PERRL, EOM's intact.   THROAT & NECK: normal and no erythema or exudates noted. ,    LYMPHATIC: Cervical, supraclavicular, and axillary nodes normal. ,   LUNG: clear to auscultation bilaterally,   HEART: regular rate and rhythm, S1, S2 normal, no murmur, click, rub or gallop,   ABDOMEN: firm, distended non-tender. midline surgical site without erythema or drainage, 2 drains in lower abdomen with serous drainage. Bowel sounds normal. No masses,  no organomegaly,   EXTREMITIES:  extremities normal, atraumatic, no cyanosis or edema,   SKIN: Normal.,   NEUROLOGIC: AOx3. Cranial nerves 2-12 and sensation grossly intact. PSYCHIATRY: appropriate mood and affect      Labs: Results:   Chemistry Recent Labs     02/24/20  0447 02/23/20  0418 02/22/20  0540   * 120* 133*    143 142   K 3.2* 3.6 3.5    110 110   CO2 27 27 27   BUN 19* 22* 23*   CREA 0.88 1.13 1.15   CA 8.1* 8.4* 8.4*   AGAP 5 6 5   BUCR 22* 19 20      CBC w/Diff Recent Labs     02/22/20  0540 02/21/20  1504   WBC 10.1 9.9   RBC 3.87* 3.75*   HGB 11.3* 10.9*   HCT 35.1* 33.9*    234   GRANS 73 77*   LYMPH 18* 14*   EOS 2 2      Microbiology No results for input(s): CULT in the last 72 hours.        RADIOLOGY:    All available imaging studies/reports in [facility] for this admission were reviewed    Dr. Erick Burgos, Infectious Disease Specialist  [phone]  February 24, 2020  12:07 PM

## 2020-02-24 NOTE — ROUTINE PROCESS
0730 Bedside and Verbal shift change report given to Angel Jones (oncoming nurse) by SAVANNA Russ (offgoing nurse). Report included the following information SBAR, Kardex, MAR and Recent Results.

## 2020-02-25 LAB
ANION GAP SERPL CALC-SCNC: 6 MMOL/L (ref 3–18)
BUN SERPL-MCNC: 22 MG/DL (ref 7–18)
BUN/CREAT SERPL: 20 (ref 12–20)
CALCIUM SERPL-MCNC: 8.3 MG/DL (ref 8.5–10.1)
CHLORIDE SERPL-SCNC: 108 MMOL/L (ref 100–111)
CO2 SERPL-SCNC: 27 MMOL/L (ref 21–32)
CREAT SERPL-MCNC: 1.1 MG/DL (ref 0.6–1.3)
GLUCOSE BLD STRIP.AUTO-MCNC: 125 MG/DL (ref 70–110)
GLUCOSE BLD STRIP.AUTO-MCNC: 130 MG/DL (ref 70–110)
GLUCOSE BLD STRIP.AUTO-MCNC: 134 MG/DL (ref 70–110)
GLUCOSE BLD STRIP.AUTO-MCNC: 156 MG/DL (ref 70–110)
GLUCOSE SERPL-MCNC: 112 MG/DL (ref 74–99)
MAGNESIUM SERPL-MCNC: 1.9 MG/DL (ref 1.6–2.6)
PHOSPHATE SERPL-MCNC: 3.2 MG/DL (ref 2.5–4.9)
POTASSIUM SERPL-SCNC: 3.5 MMOL/L (ref 3.5–5.5)
SODIUM SERPL-SCNC: 141 MMOL/L (ref 136–145)

## 2020-02-25 PROCEDURE — 94762 N-INVAS EAR/PLS OXIMTRY CONT: CPT

## 2020-02-25 PROCEDURE — 97164 PT RE-EVAL EST PLAN CARE: CPT

## 2020-02-25 PROCEDURE — 80048 BASIC METABOLIC PNL TOTAL CA: CPT

## 2020-02-25 PROCEDURE — 97116 GAIT TRAINING THERAPY: CPT

## 2020-02-25 PROCEDURE — 74011250636 HC RX REV CODE- 250/636: Performed by: NURSE PRACTITIONER

## 2020-02-25 PROCEDURE — 84100 ASSAY OF PHOSPHORUS: CPT

## 2020-02-25 PROCEDURE — 74011250637 HC RX REV CODE- 250/637: Performed by: SURGERY

## 2020-02-25 PROCEDURE — 36415 COLL VENOUS BLD VENIPUNCTURE: CPT

## 2020-02-25 PROCEDURE — 97168 OT RE-EVAL EST PLAN CARE: CPT

## 2020-02-25 PROCEDURE — 74011250637 HC RX REV CODE- 250/637: Performed by: HOSPITALIST

## 2020-02-25 PROCEDURE — 74011250637 HC RX REV CODE- 250/637: Performed by: NURSE PRACTITIONER

## 2020-02-25 PROCEDURE — 74011000250 HC RX REV CODE- 250: Performed by: NURSE PRACTITIONER

## 2020-02-25 PROCEDURE — 77010033678 HC OXYGEN DAILY

## 2020-02-25 PROCEDURE — 74011636637 HC RX REV CODE- 636/637: Performed by: NURSE PRACTITIONER

## 2020-02-25 PROCEDURE — 74011250636 HC RX REV CODE- 250/636: Performed by: SURGERY

## 2020-02-25 PROCEDURE — 74011250636 HC RX REV CODE- 250/636: Performed by: PHYSICIAN ASSISTANT

## 2020-02-25 PROCEDURE — 74011250637 HC RX REV CODE- 250/637: Performed by: INTERNAL MEDICINE

## 2020-02-25 PROCEDURE — 82962 GLUCOSE BLOOD TEST: CPT

## 2020-02-25 PROCEDURE — 97110 THERAPEUTIC EXERCISES: CPT

## 2020-02-25 PROCEDURE — 65270000029 HC RM PRIVATE

## 2020-02-25 PROCEDURE — 83735 ASSAY OF MAGNESIUM: CPT

## 2020-02-25 RX ORDER — FAMOTIDINE 20 MG/1
20 TABLET, FILM COATED ORAL 2 TIMES DAILY
Status: DISCONTINUED | OUTPATIENT
Start: 2020-02-25 | End: 2020-02-28 | Stop reason: HOSPADM

## 2020-02-25 RX ADMIN — OXYCODONE HYDROCHLORIDE AND ACETAMINOPHEN 2 TABLET: 5; 325 TABLET ORAL at 09:15

## 2020-02-25 RX ADMIN — FAMOTIDINE 20 MG: 10 INJECTION, SOLUTION INTRAVENOUS at 09:16

## 2020-02-25 RX ADMIN — ONDANSETRON 4 MG: 2 INJECTION INTRAMUSCULAR; INTRAVENOUS at 05:38

## 2020-02-25 RX ADMIN — Medication 10 ML: at 15:00

## 2020-02-25 RX ADMIN — THERA TABS 1 TABLET: TAB at 09:17

## 2020-02-25 RX ADMIN — HEPARIN SODIUM 5000 UNITS: 5000 INJECTION INTRAVENOUS; SUBCUTANEOUS at 16:16

## 2020-02-25 RX ADMIN — INSULIN LISPRO 3 UNITS: 100 INJECTION, SOLUTION INTRAVENOUS; SUBCUTANEOUS at 12:25

## 2020-02-25 RX ADMIN — DOCUSATE SODIUM 100 MG: 100 CAPSULE, LIQUID FILLED ORAL at 09:16

## 2020-02-25 RX ADMIN — HEPARIN SODIUM 5000 UNITS: 5000 INJECTION INTRAVENOUS; SUBCUTANEOUS at 09:16

## 2020-02-25 RX ADMIN — NYSTATIN 500000 UNITS: 500000 SUSPENSION ORAL at 09:16

## 2020-02-25 RX ADMIN — HEPARIN SODIUM 5000 UNITS: 5000 INJECTION INTRAVENOUS; SUBCUTANEOUS at 23:03

## 2020-02-25 RX ADMIN — FAMOTIDINE 20 MG: 20 TABLET ORAL at 17:09

## 2020-02-25 RX ADMIN — OXYCODONE HYDROCHLORIDE AND ACETAMINOPHEN 2 TABLET: 5; 325 TABLET ORAL at 16:16

## 2020-02-25 RX ADMIN — Medication 10 ML: at 21:12

## 2020-02-25 RX ADMIN — OXYCODONE HYDROCHLORIDE AND ACETAMINOPHEN 2 TABLET: 5; 325 TABLET ORAL at 23:00

## 2020-02-25 RX ADMIN — Medication 10 ML: at 06:00

## 2020-02-25 RX ADMIN — LATANOPROST 1 DROP: 50 SOLUTION OPHTHALMIC at 21:00

## 2020-02-25 RX ADMIN — NYSTATIN 500000 UNITS: 500000 SUSPENSION ORAL at 12:25

## 2020-02-25 RX ADMIN — NEBIVOLOL HYDROCHLORIDE 10 MG: 5 TABLET ORAL at 09:16

## 2020-02-25 RX ADMIN — ONDANSETRON 4 MG: 2 INJECTION INTRAMUSCULAR; INTRAVENOUS at 18:47

## 2020-02-25 RX ADMIN — NYSTATIN 500000 UNITS: 500000 SUSPENSION ORAL at 21:02

## 2020-02-25 RX ADMIN — ONDANSETRON 4 MG: 2 INJECTION INTRAMUSCULAR; INTRAVENOUS at 12:25

## 2020-02-25 RX ADMIN — NYSTATIN 500000 UNITS: 500000 SUSPENSION ORAL at 17:09

## 2020-02-25 NOTE — ROUTINE PROCESS
Bedside and Verbal shift change report given to Nikhil Araujo RN (oncoming nurse) by Cecile Ny RN (offgoing nurse). Report included the following information SBAR, Intake/Output, MAR, Recent Results, Cardiac Rhythm SR and Quality Measures.

## 2020-02-25 NOTE — PROGRESS NOTES
Problem: Mobility Impaired (Adult and Pediatric)  Goal: *Acute Goals and Plan of Care (Insert Text)  Description  Physical Therapy Goals  Initiated 2/25/2020 and to be accomplished within 7 day(s)  1. Patient will move from supine to sit and sit to supine  and scoot up and down in bed with supervision/set-up. 2.  Patient will transfer from bed to chair and chair to bed with modified independence using the least restrictive device. 3.  Patient will perform sit to stand with modified independence. 4.  Patient will ambulate with supervision/set-up for >200 feet with the least restrictive device. 5.  Patient will negotiate 10 steps with HR at supervision in order to safely enter/exit home. PLOF: Pt reports lives with his wife and son and was ambulating with RW PTA. Patient has RW, WC, SC, and SPC at home. Outcome: Progressing Towards Goal   PHYSICAL THERAPY RE-EVALUATION    Patient: Eleni Allison (61 y.o. male)  Date: 2/25/2020  Primary Diagnosis: Incisional hernia [K43.2]  Procedure(s) (LRB):  OPEN REPAIR OF INCISIONAL HERNIA WITH MESH (N/A) 12 Days Post-Op   Precautions: Other (comment)(abdominal binder and wound vac)    ASSESSMENT :  Patient presents today alert and agreeable to therapy and nursing cleared patient for portable tele to walk in hallway. Patient tolerated activity well and VS WNL during session. Abdominal binder, wound vac, and NC O2 in place during session in which patient transferred from lower recliner height to standing. Patient completed 300ft ambulation at slow pace and did not require standing rest break. Patient required verbal and tactile cues to attain more upright posture and was able to self correct apprx 25-40% of session. Educated on improved posture for proper healing of abdominal surgical site and for improved breathing; patient demos good carryover of learning and is motivated to participate in therapy. Will continue to follow.    Patient will benefit from skilled intervention to address the above impairments. Patient's rehabilitation potential is considered to be Good  Factors which may influence rehabilitation potential include:   []         None noted  []         Mental ability/status  [x]         Medical condition  [x]         Home/family situation and support systems  [x]         Safety awareness  [x]         Pain tolerance/management  []         Other:      PLAN :  Recommendations and Planned Interventions:   [x]           Bed Mobility Training             [x]    Neuromuscular Re-Education  [x]           Transfer Training                   []    Orthotic/Prosthetic Training  [x]           Gait Training                          []    Modalities  [x]           Therapeutic Exercises           []    Edema Management/Control  [x]           Therapeutic Activities            [x]    Family Training/Education  [x]           Patient Education  []           Other (comment):    Frequency/Duration: Patient will be followed by physical therapy 1-2 times per day/4-7 days per week to address goals. Discharge Recommendations: Home Health 24/7 supervision  Further Equipment Recommendations for Discharge: transfer bench and rolling walker     SUBJECTIVE:   Patient stated You put a spark in me and I said, I got to get up and moving.     OBJECTIVE DATA SUMMARY:   Hospital course since last seen and reason for re-evaluation: Patient seen for re-eval and has been progressing appropriately towards goals. Patient goals updated to reflect current functional status and will continue to benefit from therapy.    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 Moy Baxter    COLONOSCOPY N/A 11/12/2018    COLONOSCOPY with Polypectomies, Bx's, Injection, Tattooing & Clip Placement x 3 performed by Alysha Geller MD at [facility]  11/12/2018         COLONOSCOPY,REMV Nadiya Isarben  11/12/2018         ENDOSCOPIC MUCOSAL RESECT  11/12/2018         EXPLORATORY OF ABDOMEN N/A 12/11/2018    Dr. Paula Jarrett N/A 5/8/2019    SIGMOIDOSCOPY FLEXIBLE with polypectomy performed by Andrew Araujo MD at [facility] ENDOSCOPY    HX COLONOSCOPY      HX GI      robotic sigmoid colectomy complicated by small anastomotic leak, status post diverting ileostomy    HX GI      reversal ileostomy    HX HEENT  2008    Throat Ca - 1 vocal chord removed     HX HEENT Bilateral 1970    eye surgery muscles    HX TRACHEOSTOMY  2008    LAP,SURG,COLECTOMY, PARTIAL, W/ANAST N/A 12/04/2018    Dr. Moy Baxter     Barriers to Learning/Limitations: None  Compensate with: N/A  Home Situation:   Home Situation  Home Environment: Private residence  # Steps to Enter: 3  Rails to Enter: Yes  Wheelchair Ramp: Yes(in garage)  One/Two Story Residence: One story  Living Alone: No  Support Systems: Spouse/Significant Other/Partner  Patient Expects to be Discharged to[de-identified] Private residence  Current DME Used/Available at Home: Alexa Gutierrez, rolling, Ivory Assonet, straight, Grab bars, Shower chair, Commode, bedside  Tub or Shower Type: Tub/Shower combination  Critical Behavior:    A&Ox4  Strength:    Strength: Generally decreased, functional(BLE)   Tone & Sensation:   Tone: Normal(BLE)   Sensation: Intact(BLE)   Range Of Motion:  AROM: Within functional limits(BLE)   Posture:   Posture Assessment: Forward head;Trunk flexion; Increased(2/2 abdominal discomfort during stading/ambulation)  Functional Mobility:   Transfers:  Sit to Stand: Stand-by assistance; Additional time(from low recliner height)  Stand to Sit: Stand-by assistance    Balance:   Sitting: Intact  Standing: Intact; With support  Standing - Static: Good  Standing - Dynamic : Fair  Ambulation/Gait Training:  Distance (ft): 300 Feet (ft)  Assistive Device: Walker, rolling  Ambulation - Level of Assistance: Contact guard assistance   Gait Abnormalities: Decreased step clearance(see posture assessment)   Base of Support: Center of gravity altered; Widened   Speed/Geovanna: Slow  Step Length: Left shortened;Right shortened    Pain:  Pain level pre-treatment: 3/10   Pain level post-treatment: 3/10   Pain Intervention(s) : Medication (see MAR); Rest, Repositioning   Response to intervention: Nurse notified, See doc flow    Activity Tolerance:   Patient tolerated activity well and was very please with his progress during session. Please refer to the flowsheet for vital signs taken during this treatment. After treatment:   []         Patient left in no apparent distress sitting up in chair  [x]         Patient left in no apparent distress in bed  [x]         Call bell left within reach  [x]         Nursing notified  []         Caregiver present  []         Bed alarm activated  []         SCDs applied    COMMUNICATION/EDUCATION:   [x]         Role of Physical Therapy in the acute care setting. [x]         Fall prevention education was provided and the patient/caregiver indicated understanding. [x]         Patient/family have participated as able in goal setting and plan of care. [x]         Patient/family agree to work toward stated goals and plan of care. []         Patient understands intent and goals of therapy, but is neutral about his/her participation. []         Patient is unable to participate in goal setting/plan of care: ongoing with therapy staff.  []         Other:     Thank you for this referral.  Aron Araujo, PT   Time Calculation: 40 mins

## 2020-02-25 NOTE — PROGRESS NOTES
[facility] Hospitalist Group  Progress Note    Patient: Rom Fortune Age: 77 y.o. : 1950 MR#: [id number] SSN: [id number]  Date: 2020       Subjective:   Patient sitting in bed in a chair in NAD, awake, alert, tolerating diet, feels better    Objective:     /67   Pulse 79   Temp 98.4 °F (36.9 °C)   Resp 16   Ht 5' 10\" (1.778 m)   Wt 91.1 kg (200 lb 13.4 oz)   SpO2 94%   BMI 28.82 kg/m²     General:  Awake, alert  Cardiovascular:  S1S2+, RRR  Pulmonary:  CTA b/l  GI:  Soft, BS+, NT, Distended  Extremities:  + edema      Assessment:     1. Incisional hernia s/p open repair of incisional hernia with mesh  2. Post-op ileus with abdominal distention  3. SIRS, better  4. Acute hypoxic respiratory failure due to ileus, improving currently  5. ARF on CKD stage II due to dehydration  6. Lactic acidosis, improved  9. DMT2. a1c 7.7  10. HTN  11. GERD  12. History of throat cancer  13. History of intractable low back pain  14. Sinus tachycardia, improved    Plan:     Mobilize, PT, OT  ICS  Wean O2  Monitor BP, on bystolic and catapres  Will follow peripherally  D/w patient    Case discussed with:  [x]Patient  []Family  [x]Nursing  []Case Management  DVT Prophylaxis:  []Lovenox  [x]Hep SQ  []SCDs  []Coumadin   []On Heparin gtt    Labs:    Recent Results (from the past 24 hour(s))   GLUCOSE, POC    Collection Time: 20 11:50 PM   Result Value Ref Range    Glucose (POC) 128 (H) 70 - 484 mg/dL   METABOLIC PANEL, BASIC    Collection Time: 20  4:47 AM   Result Value Ref Range    Sodium 141 136 - 145 mmol/L    Potassium 3.5 3.5 - 5.5 mmol/L    Chloride 108 100 - 111 mmol/L    CO2 27 21 - 32 mmol/L    Anion gap 6 3.0 - 18 mmol/L    Glucose 112 (H) 74 - 99 mg/dL    BUN 22 (H) 7.0 - 18 MG/DL    Creatinine 1.10 0.6 - 1.3 MG/DL    BUN/Creatinine ratio 20 12 - 20      GFR est AA >60 >60 ml/min/1.73m2    GFR est non-AA >60 >60 ml/min/1.73m2    Calcium 8.3 (L) 8.5 - 10.1 MG/DL   PHOSPHORUS    Collection Time: 02/25/20  4:47 AM   Result Value Ref Range    Phosphorus 3.2 2.5 - 4.9 MG/DL   MAGNESIUM    Collection Time: 02/25/20  4:47 AM   Result Value Ref Range    Magnesium 1.9 1.6 - 2.6 mg/dL   GLUCOSE, POC    Collection Time: 02/25/20  7:36 AM   Result Value Ref Range    Glucose (POC) 134 (H) 70 - 110 mg/dL   GLUCOSE, POC    Collection Time: 02/25/20 11:20 AM   Result Value Ref Range    Glucose (POC) 156 (H) 70 - 110 mg/dL       Signed By: Ángela Uriarte MD     February 25, 2020

## 2020-02-25 NOTE — ROUTINE PROCESS
Bedside and Verbal shift change report given to Jovany Arguello RN (oncoming nurse) by Domi Owen RN   (offgoing nurse). Report included the following information Kardex, ED Summary, OR Summary, Procedure Summary, Intake/Output, MAR, Recent Results, Cardiac Rhythm NSR and Alarm Parameters .

## 2020-02-25 NOTE — PROGRESS NOTES
Yifan Hwang M.D. FACS  PROGRESS NOTE    Name: eLster Rangel MRN: [id number]   : 1950 Hospital: [facility]   Date: 2020 Admission Date: 2020  5:32 AM     Hospital Day: 13  12 Days Post-Op  Subjective:  Patient without acute overnight events. He is sitting on the bedside commode this morning having a bowel movement. He is eating breakfast and all his meals without any nausea vomiting. Objective:  Vitals:    20 0800 20 0900 20 1100 20 1200   BP: 148/73   129/67   Pulse: 94 91 82 89   Resp: 18 18 17 17   Temp: 98.4 °F (36.9 °C)   98.4 °F (36.9 °C)   SpO2: 98%  95% 96%   Weight:       Height:         Date 20 0700 - 20 0659 20 0700 - 20 0659   Shift 4703-6615 0579-2645 24 Hour Total 7394-6093 3746-1567 24 Hour Total   INTAKE   Other  0 0        Intake (ml) (Vacuum Assisted Closure Medial;Upper Abdominal)  0 0      Shift Total(mL/kg)  0(0) 0(0)      OUTPUT   Urine(mL/kg/hr)  300(0.3) 300(0.1)        Urine Voided  300 300        Urine Occurrence(s) 2 x 2 x 4 x      Drains  0 0        Output (ml) (Vacuum Assisted Closure Medial;Upper Abdominal)  0 0      Stool  1 1        Stool Occurrence(s) 1 x 1 x 2 x 1 x  1 x     Stool  1 1      Shift Total(mL/kg)  301(3.3) 301(3.3)      NET  -301 -301      Weight (kg) 91.1 91.1 91.1 91.1 91.1 91.1         Physical Exam:    General: Awake and alert, oriented x4, no apparent distress   Abdomen: abdomen is soft with midline incisional tenderness. Incision(s) are C/D/I.  ANGELI drains with serosanguineous drainage. No evidence of hernia recurrence.   No masses, organomegaly or guarding    Labs:  Recent Results (from the past 24 hour(s))   GLUCOSE, POC    Collection Time: 20  3:46 PM   Result Value Ref Range    Glucose (POC) 145 (H) 70 - 110 mg/dL   GLUCOSE, POC    Collection Time: 20 11:50 PM   Result Value Ref Range    Glucose (POC) 128 (H) 70 - 043 mg/dL   METABOLIC PANEL, BASIC Collection Time: 02/25/20  4:47 AM   Result Value Ref Range    Sodium 141 136 - 145 mmol/L    Potassium 3.5 3.5 - 5.5 mmol/L    Chloride 108 100 - 111 mmol/L    CO2 27 21 - 32 mmol/L    Anion gap 6 3.0 - 18 mmol/L    Glucose 112 (H) 74 - 99 mg/dL    BUN 22 (H) 7.0 - 18 MG/DL    Creatinine 1.10 0.6 - 1.3 MG/DL    BUN/Creatinine ratio 20 12 - 20      GFR est AA >60 >60 ml/min/1.73m2    GFR est non-AA >60 >60 ml/min/1.73m2    Calcium 8.3 (L) 8.5 - 10.1 MG/DL   PHOSPHORUS    Collection Time: 02/25/20  4:47 AM   Result Value Ref Range    Phosphorus 3.2 2.5 - 4.9 MG/DL   MAGNESIUM    Collection Time: 02/25/20  4:47 AM   Result Value Ref Range    Magnesium 1.9 1.6 - 2.6 mg/dL   GLUCOSE, POC    Collection Time: 02/25/20  7:36 AM   Result Value Ref Range    Glucose (POC) 134 (H) 70 - 110 mg/dL   GLUCOSE, POC    Collection Time: 02/25/20 11:20 AM   Result Value Ref Range    Glucose (POC) 156 (H) 70 - 110 mg/dL     All Micro Results     Procedure Component Value Units Date/Time    CULTURE, RESPIRATORY/SPUTUM/BRONCH Ashlyn Rude STAIN [[id number]] Collected:  02/15/20 1010    Order Status:  Completed Specimen:  Sputum Updated:  02/17/20 1009     Special Requests: NO SPECIAL REQUESTS        GRAM STAIN RARE WBCS SEEN         FEW EPITHELIAL CELLS SEEN               RARE GRAM POSITIVE COCCI IN PAIRS           Culture result:       RARE NORMAL RESPIRATORY GALE          CULTURE, URINE [[id number]] Collected:  02/14/20 1230    Order Status:  Completed Specimen:  Clean catch Updated:  02/15/20 0908     Special Requests: NO SPECIAL REQUESTS        Culture result: NO GROWTH 1 DAY             Current Medications:  Current Facility-Administered Medications   Medication Dose Route Frequency Provider Last Rate Last Dose    insulin lispro (HUMALOG) injection   SubCUTAneous AC&HS Vena Foots, NP   3 Units at 02/25/20 1225    therapeutic multivitamin (THERAGRAN) tablet 1 Tab  1 Tab Oral DAILY Jerri Jasmine MD   1 Tab at 02/25/20 6919    cloNIDine (CATAPRES) 0.1 mg/24 hr patch 1 Patch  1 Patch TransDERmal Q7D Arie Allen MD   1 Patch at 02/25/20 1232    oxyCODONE-acetaminophen (PERCOCET) 5-325 mg per tablet 1 Tab  1 Tab Oral Q4H PRN Wendy Gonzalez NP        famotidine (PF) (PEPCID) 20 mg in 0.9% sodium chloride 10 mL injection  20 mg IntraVENous Q12H Zo Gonzalez NP   20 mg at 02/25/20 0916    ondansetron (ZOFRAN) injection 4 mg  4 mg IntraVENous Q6H PRN Amanda Dorantes PA-C   4 mg at 02/25/20 1225    albuterol (PROVENTIL VENTOLIN) nebulizer solution 2.5 mg  2.5 mg Nebulization Q4H PRN Amber Ashton A, DO   2.5 mg at 02/14/20 0341    sodium chloride (OCEAN) 0.65 % nasal squeeze bottle 2 Spray  2 Spray Both Nostrils Q2H PRN Bean Sor, DO        glucose chewable tablet 16 g  4 Tab Oral PRN Wendy Gonzalez NP        glucagon (GLUCAGEN) injection 1 mg  1 mg IntraMUSCular PRN Wendy Gonzalez NP        dextrose (D50W) injection syrg 12.5-25 g  25-50 mL IntraVENous PRN Wendy Gonzalez NP        oxyCODONE-acetaminophen (PERCOCET) 5-325 mg per tablet 2 Tab  2 Tab Oral Q6H PRN Talita Fagan NP   2 Tab at 02/25/20 0915    nebivolol (BYSTOLIC) tablet 10 mg  10 mg Oral DAILY Ngozi Kenny MD   10 mg at 02/25/20 0916    benzocaine-menthol (CEPACOL) lozenge 1 Lozenge  1 Lozenge Mucous Membrane PRN Priyank Nair MD        nystatin (MYCOSTATIN) 100,000 unit/mL oral suspension 500,000 Units  500,000 Units Oral QID Wendy Gonzalez NP   500,000 Units at 02/25/20 1225    phenol throat spray (CHLORASEPTIC) 1 Spray  1 [place] Oral PRN Wendy Gonzalez NP   1 Spray at 02/14/20 1055    albuterol-ipratropium (DUO-NEB) 2.5 MG-0.5 MG/3 ML  3 mL Nebulization Q4H PRN Ngozi eKnny MD   3 mL at 02/15/20 2320    sodium chloride (NS) flush 5-40 mL  5-40 mL IntraVENous Q8H Eric Tomlin MD   10 mL at 02/25/20 0600    sodium chloride (NS) flush 5-40 mL  5-40 mL IntraVENous PRN Jerri Jasmine MD   10 mL at 02/21/20 1400    heparin (porcine) injection 5,000 Units  5,000 Units SubCUTAneous Q8H Jerri Jasmine MD   5,000 Units at 02/25/20 8803    [Held by provider] ferrous sulfate tablet 325 mg  325 mg Oral Q MON, WED & Bonita Graves MD   325 mg at 02/14/20 2117    latanoprost (XALATAN) 0.005 % ophthalmic solution 1 Drop  1 Drop Both Eyes QHS Jerri Jasmine MD   1 Drop at 02/24/20 2200    acetaminophen (TYLENOL) tablet 650 mg  650 mg Oral Q4H PRN Ermias Gonzalez NP        [Held by provider] polyethylene glycol (MIRALAX) packet 17 g  17 g Oral DAILY Ermias Gonzalez NP   Stopped at 02/14/20 8197    docusate sodium (COLACE) capsule 100 mg  100 mg Oral DAILY Ermias Gonzalez NP   100 mg at 02/25/20 0916    [Held by provider] lactobacillus sp. 50 billion cpu (BIO-K PLUS) capsule 1 Cap  1 Cap Oral DAILY Ermias Gonzalez NP   Stopped at 02/15/20 0900       Chart and notes reviewed. Data reviewed. I have evaluated and examined the patient. IMPRESSION:   · Patient is postop day 12 from abdominal wall reconstruction with resolved ileus.       PLAN:/DISCUSION:   · Okay to transfer to surgical floor  · Discharge planning        Carry MD [place]

## 2020-02-26 LAB
ANION GAP SERPL CALC-SCNC: 6 MMOL/L (ref 3–18)
BUN SERPL-MCNC: 20 MG/DL (ref 7–18)
BUN/CREAT SERPL: 18 (ref 12–20)
CALCIUM SERPL-MCNC: 8.3 MG/DL (ref 8.5–10.1)
CHLORIDE SERPL-SCNC: 104 MMOL/L (ref 100–111)
CO2 SERPL-SCNC: 30 MMOL/L (ref 21–32)
CREAT SERPL-MCNC: 1.13 MG/DL (ref 0.6–1.3)
ERYTHROCYTE [DISTWIDTH] IN BLOOD BY AUTOMATED COUNT: 14.4 % (ref 11.6–14.5)
GLUCOSE BLD STRIP.AUTO-MCNC: 100 MG/DL (ref 70–110)
GLUCOSE BLD STRIP.AUTO-MCNC: 122 MG/DL (ref 70–110)
GLUCOSE BLD STRIP.AUTO-MCNC: 130 MG/DL (ref 70–110)
GLUCOSE BLD STRIP.AUTO-MCNC: 151 MG/DL (ref 70–110)
GLUCOSE SERPL-MCNC: 112 MG/DL (ref 74–99)
HCT VFR BLD AUTO: 30.7 % (ref 36–48)
HGB BLD-MCNC: 10 G/DL (ref 13–16)
MAGNESIUM SERPL-MCNC: 1.8 MG/DL (ref 1.6–2.6)
MCH RBC QN AUTO: 29.2 PG (ref 24–34)
MCHC RBC AUTO-ENTMCNC: 32.6 G/DL (ref 31–37)
MCV RBC AUTO: 89.5 FL (ref 74–97)
PHOSPHATE SERPL-MCNC: 3.6 MG/DL (ref 2.5–4.9)
PLATELET # BLD AUTO: 272 K/UL (ref 135–420)
PMV BLD AUTO: 9.9 FL (ref 9.2–11.8)
POTASSIUM SERPL-SCNC: 3.2 MMOL/L (ref 3.5–5.5)
RBC # BLD AUTO: 3.43 M/UL (ref 4.7–5.5)
SODIUM SERPL-SCNC: 140 MMOL/L (ref 136–145)
WBC # BLD AUTO: 11.8 K/UL (ref 4.6–13.2)

## 2020-02-26 PROCEDURE — 85027 COMPLETE CBC AUTOMATED: CPT

## 2020-02-26 PROCEDURE — 84100 ASSAY OF PHOSPHORUS: CPT

## 2020-02-26 PROCEDURE — 74011250636 HC RX REV CODE- 250/636: Performed by: SURGERY

## 2020-02-26 PROCEDURE — 74011250637 HC RX REV CODE- 250/637: Performed by: HOSPITALIST

## 2020-02-26 PROCEDURE — 74011250637 HC RX REV CODE- 250/637: Performed by: SURGERY

## 2020-02-26 PROCEDURE — 80048 BASIC METABOLIC PNL TOTAL CA: CPT

## 2020-02-26 PROCEDURE — 74011636637 HC RX REV CODE- 636/637: Performed by: NURSE PRACTITIONER

## 2020-02-26 PROCEDURE — 36415 COLL VENOUS BLD VENIPUNCTURE: CPT

## 2020-02-26 PROCEDURE — 74011250637 HC RX REV CODE- 250/637: Performed by: NURSE PRACTITIONER

## 2020-02-26 PROCEDURE — 92526 ORAL FUNCTION THERAPY: CPT

## 2020-02-26 PROCEDURE — 97116 GAIT TRAINING THERAPY: CPT

## 2020-02-26 PROCEDURE — 77030019934 HC DRSG VAC ASST KCON -B

## 2020-02-26 PROCEDURE — 77030040162

## 2020-02-26 PROCEDURE — 97110 THERAPEUTIC EXERCISES: CPT

## 2020-02-26 PROCEDURE — 65270000029 HC RM PRIVATE

## 2020-02-26 PROCEDURE — 83735 ASSAY OF MAGNESIUM: CPT

## 2020-02-26 PROCEDURE — 82962 GLUCOSE BLOOD TEST: CPT

## 2020-02-26 PROCEDURE — 97605 NEG PRS WND THER DME<=50SQCM: CPT

## 2020-02-26 RX ADMIN — NYSTATIN 500000 UNITS: 500000 SUSPENSION ORAL at 17:22

## 2020-02-26 RX ADMIN — NYSTATIN 500000 UNITS: 500000 SUSPENSION ORAL at 21:14

## 2020-02-26 RX ADMIN — HEPARIN SODIUM 5000 UNITS: 5000 INJECTION INTRAVENOUS; SUBCUTANEOUS at 17:00

## 2020-02-26 RX ADMIN — OXYCODONE HYDROCHLORIDE AND ACETAMINOPHEN 2 TABLET: 5; 325 TABLET ORAL at 09:49

## 2020-02-26 RX ADMIN — BENZOCAINE AND MENTHOL 1 LOZENGE: 15; 3.6 LOZENGE ORAL at 06:12

## 2020-02-26 RX ADMIN — HEPARIN SODIUM 5000 UNITS: 5000 INJECTION INTRAVENOUS; SUBCUTANEOUS at 08:19

## 2020-02-26 RX ADMIN — NYSTATIN 500000 UNITS: 500000 SUSPENSION ORAL at 14:00

## 2020-02-26 RX ADMIN — NYSTATIN 500000 UNITS: 500000 SUSPENSION ORAL at 08:18

## 2020-02-26 RX ADMIN — INSULIN LISPRO 3 UNITS: 100 INJECTION, SOLUTION INTRAVENOUS; SUBCUTANEOUS at 12:00

## 2020-02-26 RX ADMIN — FAMOTIDINE 20 MG: 20 TABLET ORAL at 09:00

## 2020-02-26 RX ADMIN — FAMOTIDINE 20 MG: 20 TABLET ORAL at 17:22

## 2020-02-26 RX ADMIN — Medication 10 ML: at 21:48

## 2020-02-26 RX ADMIN — Medication 10 ML: at 07:00

## 2020-02-26 RX ADMIN — Medication 10 ML: at 15:00

## 2020-02-26 RX ADMIN — LATANOPROST 1 DROP: 50 SOLUTION OPHTHALMIC at 21:14

## 2020-02-26 RX ADMIN — NEBIVOLOL HYDROCHLORIDE 10 MG: 5 TABLET ORAL at 08:18

## 2020-02-26 RX ADMIN — THERA TABS 1 TABLET: TAB at 08:18

## 2020-02-26 RX ADMIN — DOCUSATE SODIUM 100 MG: 100 CAPSULE, LIQUID FILLED ORAL at 08:18

## 2020-02-26 NOTE — PROGRESS NOTES
Yifan Kelly M.D. FACS  PROGRESS NOTE    Name: Gurdeep Henry MRN: [id number]   : 1950 [facility]   Date: 2020 Admission Date: 2020  5:32 AM     Hospital Day: 14  13 Days Post-Op  Subjective:  Patient continues to tolerate GI light diet have bowel movements and pass gas. ANGELI output is serosanguineous and minimal.  Objective:  Vitals:    20 0400 20 0500 20 0600 20 0800   BP: 155/78  157/72    Pulse: 90 88 86    Resp:     Temp: 98.1 °F (36.7 °C)   98.1 °F (36.7 °C)   SpO2: 97% 98% 98%    Weight:       Height:         Date 20 0700 - 20 0659 20 0700 - 20 0659   Shift 3457-8881 1244-4248 24 Hour Total 5804-9507 7026-2221 24 Hour Total   INTAKE   P.O. 120 100 220        P. O. 120 100 220      Other 0  0        Intake (ml) (Vacuum Assisted Closure Medial;Upper Abdominal) 0  0      Shift Total(mL/kg) 120(1.3) 100(1.1) 220(2.4)      OUTPUT   Urine(mL/kg/hr)  175(0.2) 175(0.1)        Urine Voided  175 175        Urine Occurrence(s) 2 x 2 x 4 x      Drains 80 70 150        Output (ml) (Vacuum Assisted Closure Medial;Upper Abdominal) 0 0 0        Output (ml) (Matthew-Garcia Drain 20 Abdomen) 60 50 110        Output (ml) (Matthew-Garcia Drain 20 Abdomen) 20 20 40      Stool           Stool Occurrence(s) 2 x  2 x      Shift Total(mL/kg) 80(0.9) 245(2.7) 325(3.6)      NET 40 -145 -105      Weight (kg) 91.1 91.1 91.1 91.1 91.1 91.1         Physical Exam:    General: Awake and alert, oriented x4, no apparent distress   Abdomen: abdomen is soft with distention and minimal incisional tenderness. VAC intact at the midline incision. Bilateral JPs with serosanguineous drainage. ANGELI sites clean and clear. .  No masses, organomegaly or guarding    Labs:  Recent Results (from the past 24 hour(s))   GLUCOSE, POC    Collection Time: 20 11:20 AM   Result Value Ref Range    Glucose (POC) 156 (H) 70 - 110 mg/dL   GLUCOSE, POC    Collection Time: 02/25/20  4:47 PM   Result Value Ref Range    Glucose (POC) 125 (H) 70 - 110 mg/dL   GLUCOSE, POC    Collection Time: 02/25/20  9:08 PM   Result Value Ref Range    Glucose (POC) 130 (H) 70 - 920 mg/dL   METABOLIC PANEL, BASIC    Collection Time: 02/26/20  2:38 AM   Result Value Ref Range    Sodium 140 136 - 145 mmol/L    Potassium 3.2 (L) 3.5 - 5.5 mmol/L    Chloride 104 100 - 111 mmol/L    CO2 30 21 - 32 mmol/L    Anion gap 6 3.0 - 18 mmol/L    Glucose 112 (H) 74 - 99 mg/dL    BUN 20 (H) 7.0 - 18 MG/DL    Creatinine 1.13 0.6 - 1.3 MG/DL    BUN/Creatinine ratio 18 12 - 20      GFR est AA >60 >60 ml/min/1.73m2    GFR est non-AA >60 >60 ml/min/1.73m2    Calcium 8.3 (L) 8.5 - 10.1 MG/DL   PHOSPHORUS    Collection Time: 02/26/20  2:38 AM   Result Value Ref Range    Phosphorus 3.6 2.5 - 4.9 MG/DL   MAGNESIUM    Collection Time: 02/26/20  2:38 AM   Result Value Ref Range    Magnesium 1.8 1.6 - 2.6 mg/dL   CBC W/O DIFF    Collection Time: 02/26/20  2:38 AM   Result Value Ref Range    WBC 11.8 4.6 - 13.2 K/uL    RBC 3.43 (L) 4.70 - 5.50 M/uL    HGB 10.0 (L) 13.0 - 16.0 g/dL    HCT 30.7 (L) 36.0 - 48.0 %    MCV 89.5 74.0 - 97.0 FL    MCH 29.2 24.0 - 34.0 PG    MCHC 32.6 31.0 - 37.0 g/dL    RDW 14.4 11.6 - 14.5 %    PLATELET 017 532 - 388 K/uL    MPV 9.9 9.2 - 11.8 FL   GLUCOSE, POC    Collection Time: 02/26/20  8:11 AM   Result Value Ref Range    Glucose (POC) 100 70 - 110 mg/dL     All Micro Results     Procedure Component Value Units Date/Time    CULTURE, RESPIRATORY/SPUTUM/BRONCH Willistine Fam STAIN [[id number]] Collected:  02/15/20 1010    Order Status:  Completed Specimen:  Sputum Updated:  02/17/20 1009     Special Requests: NO SPECIAL REQUESTS        GRAM STAIN RARE WBCS SEEN         FEW EPITHELIAL CELLS SEEN               RARE GRAM POSITIVE COCCI IN PAIRS           Culture result:       RARE NORMAL RESPIRATORY GALE          CULTURE, URINE [[id number]] Collected:  02/14/20 1230    Order Status: Completed Specimen:  Clean catch Updated:  02/15/20 0908     Special Requests: NO SPECIAL REQUESTS        Culture result: NO GROWTH 1 DAY             Current Medications:  Current Facility-Administered Medications   Medication Dose Route Frequency Provider Last Rate Last Dose    famotidine (PEPCID) tablet 20 mg  20 mg Oral BID Zo Gonzalez NP   20 mg at 02/26/20 0900    insulin lispro (HUMALOG) injection   SubCUTAneous AC&HS Glorine , NP   Stopped at 02/25/20 1647    therapeutic multivitamin (THERAGRAN) tablet 1 Tab  1 Tab Oral DAILY Ramirez Fortune MD   1 Tab at 02/26/20 0818    cloNIDine (CATAPRES) 0.1 mg/24 hr patch 1 Patch  1 Patch TransDERmal Q7D Lenka Easley MD   1 Patch at 02/25/20 1232    oxyCODONE-acetaminophen (PERCOCET) 5-325 mg per tablet 1 Tab  1 Tab Oral Q4H PRN Glorine , MARLENE        ondansetron (ZOFRAN) injection 4 mg  4 mg IntraVENous Q6H PRN Tom Gibson PA-C   4 mg at 02/25/20 1847    albuterol (PROVENTIL VENTOLIN) nebulizer solution 2.5 mg  2.5 mg Nebulization Q4H PRN Michelle Sever A, DO   2.5 mg at 02/14/20 0341    sodium chloride (OCEAN) 0.65 % nasal squeeze bottle 2 Spray  2 Spray Both Nostrils Q2H PRN Lynder Click, DO        glucose chewable tablet 16 g  4 Tab Oral PRN Aide Gonzalez, NP        glucagon (GLUCAGEN) injection 1 mg  1 mg IntraMUSCular PRN Aide Gonzalez, NP        dextrose (D50W) injection syrg 12.5-25 g  25-50 mL IntraVENous PRN Aide Gonzalez, MARLENE        oxyCODONE-acetaminophen (PERCOCET) 5-325 mg per tablet 2 Tab  2 Tab Oral Q6H PRN Aide Gonzalez NP   2 Tab at 02/25/20 2300    nebivolol (BYSTOLIC) tablet 10 mg  10 mg Oral DAILY Rolando Chang MD   10 mg at 02/26/20 0818    benzocaine-menthol (CEPACOL) lozenge 1 Lozenge  1 Lozenge Mucous Membrane PRN Rolando Chang MD   1 Lozenge at 02/26/20 0612    nystatin (MYCOSTATIN) 100,000 unit/mL oral suspension 500,000 Units  500,000 Units Oral QID Abebe Gonzalez NP   500,000 Units at 02/26/20 0818    phenol throat spray (CHLORASEPTIC) 1 Spray  1 [place] Oral PRN Abebe Gonzalez NP   1 Spray at 02/14/20 1055    albuterol-ipratropium (DUO-NEB) 2.5 MG-0.5 MG/3 ML  3 mL Nebulization Q4H PRN Farooq Craft MD   3 mL at 02/15/20 2320    sodium chloride (NS) flush 5-40 mL  5-40 mL IntraVENous Q8H Jose Moreno MD   10 mL at 02/26/20 0700    sodium chloride (NS) flush 5-40 mL  5-40 mL IntraVENous PRN Jose Moreno MD   10 mL at 02/21/20 1400    heparin (porcine) injection 5,000 Units  5,000 Units SubCUTAneous Q8H Jose Moreno MD   5,000 Units at 02/26/20 1329    [Held by provider] ferrous sulfate tablet 325 mg  325 mg Oral Q MON, WED & Richard Clark MD   325 mg at 02/14/20 2117    latanoprost (XALATAN) 0.005 % ophthalmic solution 1 Drop  1 Drop Both Eyes QHS Jose Moreno MD   1 Drop at 02/25/20 2100    acetaminophen (TYLENOL) tablet 650 mg  650 mg Oral Q4H PRN Abebe Gonzalez NP        [Held by provider] polyethylene glycol (MIRALAX) packet 17 g  17 g Oral DAILY Abebe Gonzalez NP   Stopped at 02/14/20 1414    docusate sodium (COLACE) capsule 100 mg  100 mg Oral DAILY Zo Gonzalez NP   100 mg at 02/26/20 0818    [Held by provider] lactobacillus sp. 50 billion cpu (BIO-K PLUS) capsule 1 Cap  1 Cap Oral DAILY Abebe Gonzalez NP   Stopped at 02/15/20 0900       Chart and notes reviewed. Data reviewed. I have evaluated and examined the patient. IMPRESSION:   · Patient is postop day 13 from abdominal wall reconstruction for large incisional hernia with resolved postoperative ileus but continued oxygen requirements. PLAN:/DISCUSION:   · Continue present care.   · Remove ANGELI drains tomorrow  · Continue wound VAC as written Monday Wednesday Friday  · Ambulate, encourage incentive spirometry usage        Jovi Khan MD

## 2020-02-26 NOTE — ROUTINE PROCESS
Skin Care Nurse changed wound vac dressing this am. Stated that she'll talk to Dr. Rodger Milan about not changing dressing for few days due to skin erosion due to foam sticks to the patient's skin because adaptive device was not used.

## 2020-02-26 NOTE — ROUTINE PROCESS
Bedside, Verbal and Written shift change report given to Siena Escobedo (oncoming nurse) by Sammy Huber RN   (offgoing nurse). Report included the following information SBAR, Kardex, Procedure Summary, Intake/Output, MAR, Recent Results, Cardiac Rhythm NSR, Alarm Parameters  and Quality Measures.

## 2020-02-26 NOTE — PROGRESS NOTES
Problem: Dysphagia (Adult)  Goal: *Acute Goals and Plan of Care (Insert Text)  Description  Patient will:  1. Tolerate PO trials with 0 s/s overt distress in 4/5 trials - met  2. Utilize compensatory swallow strategies/maneuvers (decrease bite/sip, size/rate, alt. liq/sol) with min cues in 4/5 trials - met    Rec:     Reg solid with thin liquids when cleared by MD  Aspiration precautions  HOB >45 during po intake, remain >30 for 30-45 minutes after po   Small bites/sips; alternate liquid/solid with slow feeding rate   Oral care TID  Meds per pt preference     Outcome: Resolved/Met     [id number] [address] [facility] TREATMENT & DISCHARGE    Patient: Rom Fortune (7 y.o. male)  Date: 2/26/2020  Diagnosis: Incisional hernia [K43.2]   Acute kidney injury ([address]  Procedure(s) (LRB):  OPEN REPAIR OF INCISIONAL HERNIA WITH MESH (N/A) 13 Days Post-Op  Precautions: aspiration (abdominal binder and wound vac)  PLOF: As per H&P     ASSESSMENT:  Pt was seen at bedside for follow up dysphagia management. He was observed tolerating reg solid, puree, and thin liquids from GI lite breakfast tray with no overt s/sx of aspiration. He is currently tolerating LRD without difficulty. No further skilled SLP services are indicated at this time. Please re-consult if there is a change in medical status and SLP is needed. Progression toward goals:  [x]         Improving appropriately - goals met/approximated  []         Not making progress/Not appropriate - will d/c POC     PLAN:  Recommendations and Planned Interventions:  Maximum therapeutic gains met; safest, least restrictive diet achieved. D/C ST intervention at this time. Discharge Recommendations:  None     SUBJECTIVE:   Patient stated I'm doing better today than I've been in a while.     OBJECTIVE:   Cognitive and Communication Status:  Neurologic State: Alert, Eyes open spontaneously, Other (Comment)  Orientation Level: Oriented to place, Oriented to situation, Oriented to person  Cognition: Appropriate decision making, Appropriate for age attention/concentration, Appropriate safety awareness, Follows commands  Perception: Appears intact  Perseveration: No perseveration noted  Safety/Judgement: Awareness of environment, Good awareness of safety precautions  Dysphagia Treatment:  Oral Assessment:  Oral Assessment  Labial: No impairment  Dentition: Natural, Intact  Oral Hygiene: adequate  Lingual: No impairment  Velum: No impairment  Mandible: No impairment  P.O. Trials:   Patient Position: [facility] at [facility]   Vocal quality prior to P.O.: No impairment   Consistency Presented:  Thin liquid, Solid, Puree   How Presented: Self-fed/presented, Cup/sip, Spoon, Straw   Bolus Acceptance: No impairment   Bolus Formation/Control: No impairment   Propulsion: No impairment   Oral Residue: None   Initiation of Swallow: No impairment   Laryngeal Elevation: Functional   Aspiration Signs/Symptoms: None   Pharyngeal Phase Characteristics: No impairment, issues, or problems    Effective Modifications: None   Cues for Modifications: None       Oral Phase Severity: No impairment   Pharyngeal Phase Severity : Mild    PAIN:  Start of Tx: 0  End of Tx: 0     After treatment:   []              Patient left in no apparent distress sitting up in chair  [x]              Patient left in no apparent distress in bed  [x]              Call bell left within reach  [x]              Nursing notified  []              Caregiver present  []              Bed alarm activated      COMMUNICATION/EDUCATION:   [x] Aspiration precautions; swallow safety; compensatory techniques  [x]        Patient/family able to participate in training and education     Thank you for this referral.    Toyin Azevedo M.S. CCC-SLP/L  Speech-Language Pathologist

## 2020-02-26 NOTE — ROUTINE PROCESS
Bedside and Verbal shift change report given to Ashley Guillaume RN (oncoming nurse) by Karen Ellison RN (offgoing nurse). Report included the following information SBAR, Kardex, Intake/Output, MAR, Recent Results and Cardiac Rhythm is SR on telemetry.

## 2020-02-26 NOTE — PROGRESS NOTES
Problem: Self Care Deficits Care Plan (Adult)  Goal: *Acute Goals and Plan of Care (Insert Text)  Description  Occupational Therapy Goals  Initiated 2/17/2020 within 7 day(s). Goals met. 1.  Patient will perform functional activity standing for 2-4 minutes with modified independence and F+ balance. 2.  Patient will perform lower body dressing with supervision/set-up. 3.  Patient will perform toilet transfers with supervision/set-up. 4.  Patient will perform all aspects of toileting with supervision/set-up. 5.  Patient will participate in upper extremity therapeutic exercise/activities with modified independence for 8 minutes to increase ROM/strength for selfcare. 6.  Patient will utilize energy conservation techniques during functional activities with verbal cues. Prior Level of Function: Patient was independent with self-care (exception to bathing his back) and used a RW for functional mobility PTA. Outcome: Resolved/Met   OCCUPATIONAL THERAPY RE-EVALUATION/DISCHARGE    Patient: Alfonso Henriquez (54 y.o. male)  Date: 2/25/2020  Primary Diagnosis: Incisional hernia [K43.2]  Procedure(s) (LRB):  OPEN REPAIR OF INCISIONAL HERNIA WITH MESH (N/A) 12 Days Post-Op   Precautions:   (abdominal binder and wound vac)    ASSESSMENT AND RECOMMENDATIONS:  Based on the objective data described below, the patient is able to perform basic self care tasks without assistance while seated. Supervision given for functional standing and transfers. Will defer to PT for mobility training. Patient has a supportive spouse at home to assist him prn and all needed DME for home safety. BUE therapeutic exercises reviewed and he was able to return demo with independence with wife performing as well. Patient advised to sit up for all meals and remain OOB during the day as tolerated. He verbalized understanding and was left seated in a recliner with all needs met.        Skilled occupational therapy is not indicated at this time. Discharge Recommendations: None  Further Equipment Recommendations for Discharge: N/A      SUBJECTIVE:   Patient stated If you gave me my clothes, I would get dressed and be out the door. 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 Piotr Moctezuma    COLONOSCOPY N/A 11/12/2018    COLONOSCOPY with Polypectomies, Bx's, Injection, Tattooing & Clip Placement x 3 performed by Sussy Kebede MD at [facility]  11/12/2018         [address], [place]  11/12/2018         ENDOSCOPIC MUCOSAL RESECT  11/12/2018         EXPLORATORY OF ABDOMEN N/A 12/11/2018    Dr. Lila Gr N/A 5/8/2019    SIGMOIDOSCOPY FLEXIBLE with polypectomy performed by Shanna Goldberg, MD at [facility] ENDOSCOPY    HX COLONOSCOPY      HX GI      robotic sigmoid colectomy complicated by small anastomotic leak, status post diverting ileostomy    HX GI      reversal ileostomy    HX HEENT  2008    Throat Ca - 1 vocal chord removed     HX HEENT Bilateral 1970    eye surgery muscles    HX TRACHEOSTOMY  2008    LAP,SURG,COLECTOMY, PARTIAL, W/ANAST N/A 12/04/2018    Dr. Piotr Moctezuma     Barriers to Learning/Limitations: None  Compensate with: visual, verbal, tactile, kinesthetic cues/model    Home Situation:   Home Situation  Home Environment: Private residence  # Steps to Enter: 3  Rails to Enter: Yes  Wheelchair Ramp: Yes(in garage)  One/Two Story Residence: One story  Living Alone: No  Support Systems: Spouse/Significant Other/Partner  Patient Expects to be Discharged to[de-identified] Private residence  Current DME Used/Available at Home: Braden Canavan, Alexus ball, straight, Grab bars, Shower chair, Commode, bedside  Tub or Shower Type: Tub/Shower combination  [x]     Right hand dominant   []     Left hand dominant    Cognitive/Behavioral Status:  Neurologic State: Alert  Orientation Level: Oriented X4  Cognition: Appropriate decision making; Follows commands  Safety/Judgement: Awareness of environment;Good awareness of safety precautions    Skin: Intact on UEs  Edema: None noted in UEs    Vision/Perceptual:     Acuity: Within Defined Limits    Corrective Lenses: Glasses    Coordination: BUE  Fine Motor Skills-Upper: Left Intact; Right Intact    Gross Motor Skills-Upper: Left Intact; Right Intact    Balance:  Sitting: Intact  Standing: Intact; With support  Standing - Static: Good  Standing - Dynamic : Fair    Strength: BUE  Strength: Within functional limits(decreased endurance)    Tone & Sensation: BUE  Tone: Normal  Sensation: Intact    Range of Motion: BUE  AROM: Within functional limits  PROM: Within functional limits    Functional Mobility and Transfers for ADLs:  Transfers:  Sit to Stand: Stand-by assistance; Additional time(from low recliner height)  Stand to Sit: Stand-by assistance   Toilet Transfer : Supervision    ADL Assessment:  Feeding: Independent    Oral Facial Hygiene/Grooming: Independent    Bathing: Supervision    Upper Body Dressing: Modified independent    Lower Body Dressing: Supervision    Toileting: Modified independent    Therapeutic Exercise:  Pt was able to perform:    EXERCISE   Sets   Reps   Active Active Assist   Passive Self- assisted ROM   Comments   Shoulder horizontal abduction  1 10  [x]  []  []  []      Shoulder flexion 1   10 [x]  []  []  []      Shoulder extension  1 10  [x]  []  []  []      Forearm extension/flexion 1 10 [x]  []  []  []     Shoulder external rotation 1  10  [x]  []  []  [] Shoulder internal rotation  1 10  [x]  []  []  []      Scapular retraction/protraction  1 10  [x]  []  []  []      Chair pushups     []  []  []  []      To increase strength/endurance for ADLs. Pain:  Pain level pre-treatment: 0/10   Pain level post-treatment: 0/10   Pain Intervention(s): NA  Response to intervention: NA    Activity Tolerance:   Good  Please refer to the flowsheet for vital signs taken during this treatment. After treatment:   [x]  Patient left in no apparent distress sitting up in chair  []  Patient left in no apparent distress in bed  [x]  Call bell left within reach  [x]  Nursing notified  [x]  Caregiver present  []  Bed alarm activated    COMMUNICATION/EDUCATION:   [x]      Role of Occupational Therapy in the acute care setting  [x]      Home safety education was provided and the patient/caregiver indicated understanding. [x]      Patient/family have participated as able and agree with findings and recommendations. []      Patient is unable to participate in plan of care at this time.     Thank you for this referral.  Boaz Mortensen MS OTR/L   Time Calculation: 25 mins

## 2020-02-26 NOTE — PROGRESS NOTES
Problem: Infection - Risk of, Surgical Site Infection  Goal: *Absence of surgical site infection signs and symptoms  Outcome: Progressing Towards Goal     Problem: Patient Education: Go to Patient Education Activity  Goal: Patient/Family Education  Outcome: Progressing Towards Goal     Problem: Deep Venous Thrombosis - Risk of  Goal: *Absence of deep venous thrombosis signs and symptoms(Stroke Metric)  Outcome: Progressing Towards Goal  Goal: *Absence of impaired coagulation signs and symptoms  Outcome: Progressing Towards Goal  Goal: *Knowledge of prescribed medications  Outcome: Progressing Towards Goal  Goal: *Absence of bleeding  Outcome: Progressing Towards Goal     Problem: Patient Education: Go to Patient Education Activity  Goal: Patient/Family Education  Outcome: Progressing Towards Goal     Problem: Falls - Risk of  Goal: *Absence of Falls  Description  Document Ashley Latin Fall Risk and appropriate interventions in the flowsheet. Outcome: Progressing Towards Goal  Note: Fall Risk Interventions:  Mobility Interventions: Assess mobility with egress test, Bed/chair exit alarm, Communicate number of staff needed for ambulation/transfer, Utilize walker, cane, or other assistive device    Mentation Interventions: Adequate sleep, hydration, pain control, HELP ([address]) if available, Room close to nurse's station, More frequent rounding    Medication Interventions: Bed/chair exit alarm, Evaluate medications/consider consulting pharmacy    Elimination Interventions: Bed/chair exit alarm, Elevated toilet seat, Toilet paper/wipes in reach              Problem: Patient Education: Go to Patient Education Activity  Goal: Patient/Family Education  Outcome: Progressing Towards Goal     Problem: Pressure Injury - Risk of  Goal: *Prevention of pressure injury  Description  Document Edin Scale and appropriate interventions in the flowsheet.   Outcome: Progressing Towards Goal  Note: Pressure Injury Interventions:  Sensory Interventions: Assess need for specialty bed, Minimize linen layers, Pad between skin to skin    Moisture Interventions: Absorbent underpads, Contain wound drainage, Maintain skin hydration (lotion/cream)    Activity Interventions: Assess need for specialty bed, Chair cushion, Pressure redistribution bed/mattress(bed type)    Mobility Interventions: Chair cushion, HOB 30 degrees or less, Suspension boots, Trapeze to reposition    Nutrition Interventions: Offer support with meals,snacks and hydration, Discuss nutritional consult with provider, Document food/fluid/supplement intake    Friction and Shear Interventions: Feet elevated on foot rest, HOB 30 degrees or less, Lift team/patient mobility team                Problem: Patient Education: Go to Patient Education Activity  Goal: Patient/Family Education  Outcome: Progressing Towards Goal     Problem: Pain  Goal: *Control of Pain  Outcome: Progressing Towards Goal     Problem: Patient Education: Go to Patient Education Activity  Goal: Patient/Family Education  Outcome: Progressing Towards Goal     Problem: Patient Education: Go to Patient Education Activity  Goal: Patient/Family Education  Outcome: Progressing Towards Goal     Problem: Diabetes Self-Management  Goal: *Disease process and treatment process  Description  Define diabetes and identify own type of diabetes; list 3 options for treating diabetes. Outcome: Progressing Towards Goal  Goal: *Incorporating nutritional management into lifestyle  Description  Describe effect of type, amount and timing of food on blood glucose; list 3 methods for planning meals. Outcome: Progressing Towards Goal  Goal: *Incorporating physical activity into lifestyle  Description  State effect of exercise on blood glucose levels.   Outcome: Progressing Towards Goal  Goal: *Developing strategies to promote health/change behavior  Description  Define the ABC's of diabetes; identify appropriate screenings, schedule and personal plan for screenings. Outcome: Progressing Towards Goal  Goal: *Using medications safely  Description  State effect of diabetes medications on diabetes; name diabetes medication taking, action and side effects. Outcome: Progressing Towards Goal  Goal: *Monitoring blood glucose, interpreting and using results  Description  Identify recommended blood glucose targets  and personal targets. Outcome: Progressing Towards Goal  Goal: *Prevention, detection, treatment of acute complications  Description  List symptoms of hyper- and hypoglycemia; describe how to treat low blood sugar and actions for lowering  high blood glucose level. Outcome: Progressing Towards Goal  Goal: *Prevention, detection and treatment of chronic complications  Description  Define the natural course of diabetes and describe the relationship of blood glucose levels to long term complications of diabetes.   Outcome: Progressing Towards Goal  Goal: *Developing strategies to address psychosocial issues  Description  Describe feelings about living with diabetes; identify support needed and support network  Outcome: Progressing Towards Goal  Goal: *Sick day guidelines  Outcome: Progressing Towards Goal     Problem: Patient Education: Go to Patient Education Activity  Goal: Patient/Family Education  Outcome: Progressing Towards Goal     Problem: Injury - Risk of, Adverse Drug Event  Goal: *Absence of adverse drug events  Outcome: Progressing Towards Goal  Goal: *Absence of medication errors  Outcome: Progressing Towards Goal  Goal: *Knowledge of prescribed medications  Outcome: Progressing Towards Goal     Problem: Patient Education: Go to Patient Education Activity  Goal: Patient/Family Education  Outcome: Progressing Towards Goal     Problem: Gas Exchange - Impaired  Goal: *Absence of hypoxia  Outcome: Progressing Towards Goal     Problem: Patient Education: Go to Patient Education Activity  Goal: Patient/Family Education  Outcome: Progressing Towards Goal     Problem: Patient Education: Go to Patient Education Activity  Goal: Patient/Family Education  Outcome: Progressing Towards Goal     Problem: Patient Education: Go to Patient Education Activity  Goal: Patient/Family Education  Outcome: Progressing Towards Goal     Problem: Nutrition Deficit  Goal: *Optimize nutritional status  Outcome: Progressing Towards Goal     Problem: Diabetes Maintenance:Admission  Goal: *Blood glucose 80 to 180 mg/dl  Outcome: Progressing Towards Goal

## 2020-02-26 NOTE — PROGRESS NOTES
Problem: Mobility Impaired (Adult and Pediatric)  Goal: *Acute Goals and Plan of Care (Insert Text)  Description  Physical Therapy Goals  Initiated 2/25/2020 and to be accomplished within 7 day(s)  1. Patient will move from supine to sit and sit to supine  and scoot up and down in bed with supervision/set-up. 2.  Patient will transfer from bed to chair and chair to bed with modified independence using the least restrictive device. 3.  Patient will perform sit to stand with modified independence. 4.  Patient will ambulate with supervision/set-up for >200 feet with the least restrictive device. 5.  Patient will negotiate 10 steps with HR at supervision in order to safely enter/exit home. PLOF: Pt reports lives with his wife and son and was ambulating with RW PTA. Patient has RW, WC, SC, and SPC at home. Outcome: Progressing Towards Goal     PHYSICAL THERAPY TREATMENT    Patient: Vicky Morfin (24 y.o. male)  Date: 2/26/2020  Diagnosis: Incisional hernia [K43.2]   Acute kidney injury ([address]  Procedure(s) (LRB):  OPEN REPAIR OF INCISIONAL HERNIA WITH MESH (N/A) 13 Days Post-Op  Precautions: (abdominal binder and wound vac)  PLOF: see above    ASSESSMENT:  Pt cleared for PT treatment session per nursing. Pt received in supine with HOB elevated, all lines and leads donned in place, and agreeable to treatment session. Vitals remained WNL throughout session. Pt required a slight increase in assistance this session, CGA overall secondary to nausea and stiffness. Pt required verbal cues for hand placement to maneuver to EOB. Ambulated 25 ft with RW and CGA, limited due to nausea. Pt instructed in seated TherEx to maintain/increase strength for future transfers and ambulation. Pt left sitting up in beside chair, wife at bedside, lines and leads donned in place, and all needs met/within reach.     Progression toward goals:   [x]      Improving appropriately and progressing toward goals  [] Improving slowly and progressing toward goals  []      Not making progress toward goals and plan of care will be adjusted     PLAN:  Patient continues to benefit from skilled intervention to address the above impairments. Continue treatment per established plan of care. Discharge Recommendations:  Home Health  Further Equipment Recommendations for Discharge:  rolling walker     SUBJECTIVE:   Patient stated I'm feeling better now that I'm sitting up.  \"I don't recognize these legs, they're so swollen. \"    OBJECTIVE DATA SUMMARY:   Critical Behavior:  Neurologic State: Alert  Orientation Level: Oriented X4  Cognition: Appropriate decision making, Appropriate for age attention/concentration, Appropriate safety awareness  Safety/Judgement: Awareness of environment, Good awareness of safety precautions  Functional Mobility Training:  Bed Mobility:  Supine to Sit: Contact guard assistance(cues for hand placement )  Transfers:  Sit to Stand: Contact guard assistance  Stand to Sit: Stand-by assistance  Balance:  Sitting: Intact  Standing: Intact; With support  Standing - Static: Good  Standing - Dynamic : Fair   Ambulation/Gait Training:  Distance (ft): 25 Feet (ft)  Assistive Device: Walker, rolling  Ambulation - Level of Assistance: Contact guard assistance  Gait Abnormalities: Decreased step clearance  Base of Support: Widened;Center of gravity altered    Therapeutic Exercises:   See flowsheet below. All TE performed to maintain/increase strength for future transfers and ambulation.         EXERCISE   Sets   Reps   Active Active Assist   Passive Self ROM   Comments   Seated HR/TR 1 15  [x] [] [] []    Quad Sets/Glut Sets    [] [] [] [] Hold for 5 secs   Hamstring Sets   [] [] [] []    Short Arc Quads   [] [] [] []    Heel Slides   [] [] [] []    Straight Leg Raises   [] [] [] []    Hip Add 1 15 [x] [] [] [] Hold for 5 secs, w/ pillow squeeze   Long Arc Quads 1 15 [x] [] [] []    Seated Marching 1 15 [x] [] [] [] Standing Marching   [] [] [] []       [] [] [] []        Pain:  Pain level pre-treatment: 0/10  Pain level post-treatment: 0/10     Activity Tolerance:   Fair, limited due to nausea this session  Please refer to the flowsheet for vital signs taken during this treatment. After treatment:   [x] Patient left in no apparent distress sitting up in chair  [] Patient left in no apparent distress in bed  [x] Call bell left within reach  [x] Nursing notified  [x] Caregiver present: Wife at bedside  [] Bed alarm activated  [] SCDs applied      COMMUNICATION/EDUCATION:   [x]         Role of Physical Therapy in the acute care setting. [x]         Fall prevention education was provided and the patient/caregiver indicated understanding. [x]         Patient/family have participated as able in working toward goals and plan of care. []         Patient/family agree to work toward stated goals and plan of care. []         Patient understands intent and goals of therapy, but is neutral about his/her participation.   []         Patient is unable to participate in stated goals/plan of care: ongoing with therapy staff.  []         Other:        Vernestine Clippurnima, PTA   Time Calculation: 23 mins

## 2020-02-26 NOTE — PROGRESS NOTES
New PT orders received with pt on caseload. Will follow up as appropriate. Acknowledged new orders.      Thank you,   David Grossman, PT, DPT

## 2020-02-26 NOTE — WOUND CARE
Physical Exam  Abdominal:         Seen by wound care for follow up assessment of NPWT dressing and incision site performed at this time. Incision status listed above noted during skin assessment. Treatment plan explained to bedside nurse and Pt, understanding voiced and agreeable to continue current treatment. Nursing to hold dressing changes until further notice. Wound care will co-ordinate with Dr. Sherry Quinones for next assessment. Wound care education provided to pt at this time. Plan of care reviewed with nursing. Will turn over care to nursing staff at this time  Adriana Lund, Wound Care Department

## 2020-02-27 LAB
ANION GAP SERPL CALC-SCNC: 7 MMOL/L (ref 3–18)
BUN SERPL-MCNC: 17 MG/DL (ref 7–18)
BUN/CREAT SERPL: 16 (ref 12–20)
CALCIUM SERPL-MCNC: 8.6 MG/DL (ref 8.5–10.1)
CHLORIDE SERPL-SCNC: 108 MMOL/L (ref 100–111)
CO2 SERPL-SCNC: 27 MMOL/L (ref 21–32)
CREAT SERPL-MCNC: 1.07 MG/DL (ref 0.6–1.3)
GLUCOSE BLD STRIP.AUTO-MCNC: 101 MG/DL (ref 70–110)
GLUCOSE BLD STRIP.AUTO-MCNC: 108 MG/DL (ref 70–110)
GLUCOSE BLD STRIP.AUTO-MCNC: 126 MG/DL (ref 70–110)
GLUCOSE BLD STRIP.AUTO-MCNC: 143 MG/DL (ref 70–110)
GLUCOSE SERPL-MCNC: 108 MG/DL (ref 74–99)
MAGNESIUM SERPL-MCNC: 1.7 MG/DL (ref 1.6–2.6)
PHOSPHATE SERPL-MCNC: 3.5 MG/DL (ref 2.5–4.9)
POTASSIUM SERPL-SCNC: 3.4 MMOL/L (ref 3.5–5.5)
SODIUM SERPL-SCNC: 142 MMOL/L (ref 136–145)

## 2020-02-27 PROCEDURE — 74011250637 HC RX REV CODE- 250/637: Performed by: NURSE PRACTITIONER

## 2020-02-27 PROCEDURE — 82962 GLUCOSE BLOOD TEST: CPT

## 2020-02-27 PROCEDURE — 65270000029 HC RM PRIVATE

## 2020-02-27 PROCEDURE — 36415 COLL VENOUS BLD VENIPUNCTURE: CPT

## 2020-02-27 PROCEDURE — 84100 ASSAY OF PHOSPHORUS: CPT

## 2020-02-27 PROCEDURE — 74011250636 HC RX REV CODE- 250/636: Performed by: SURGERY

## 2020-02-27 PROCEDURE — 83735 ASSAY OF MAGNESIUM: CPT

## 2020-02-27 PROCEDURE — 74011250637 HC RX REV CODE- 250/637: Performed by: HOSPITALIST

## 2020-02-27 PROCEDURE — 74011250637 HC RX REV CODE- 250/637: Performed by: SURGERY

## 2020-02-27 PROCEDURE — 80048 BASIC METABOLIC PNL TOTAL CA: CPT

## 2020-02-27 PROCEDURE — 74011250636 HC RX REV CODE- 250/636: Performed by: PHYSICIAN ASSISTANT

## 2020-02-27 RX ORDER — DOCUSATE SODIUM 100 MG/1
100 CAPSULE, LIQUID FILLED ORAL 2 TIMES DAILY
Qty: 60 CAP | Refills: 2 | Status: SHIPPED | OUTPATIENT
Start: 2020-02-27 | End: 2020-05-27

## 2020-02-27 RX ORDER — OXYCODONE AND ACETAMINOPHEN 5; 325 MG/1; MG/1
1 TABLET ORAL
Qty: 25 TAB | Refills: 0 | Status: SHIPPED | OUTPATIENT
Start: 2020-02-27 | End: 2020-03-03

## 2020-02-27 RX ADMIN — FAMOTIDINE 20 MG: 20 TABLET ORAL at 18:54

## 2020-02-27 RX ADMIN — LATANOPROST 1 DROP: 50 SOLUTION OPHTHALMIC at 22:08

## 2020-02-27 RX ADMIN — NEBIVOLOL HYDROCHLORIDE 10 MG: 5 TABLET ORAL at 09:34

## 2020-02-27 RX ADMIN — NYSTATIN 500000 UNITS: 500000 SUSPENSION ORAL at 09:33

## 2020-02-27 RX ADMIN — ONDANSETRON 4 MG: 2 INJECTION INTRAMUSCULAR; INTRAVENOUS at 22:34

## 2020-02-27 RX ADMIN — HEPARIN SODIUM 5000 UNITS: 5000 INJECTION INTRAVENOUS; SUBCUTANEOUS at 16:12

## 2020-02-27 RX ADMIN — NYSTATIN 500000 UNITS: 500000 SUSPENSION ORAL at 13:00

## 2020-02-27 RX ADMIN — Medication 10 ML: at 22:07

## 2020-02-27 RX ADMIN — HEPARIN SODIUM 5000 UNITS: 5000 INJECTION INTRAVENOUS; SUBCUTANEOUS at 00:48

## 2020-02-27 RX ADMIN — Medication 10 ML: at 14:00

## 2020-02-27 RX ADMIN — NYSTATIN 500000 UNITS: 500000 SUSPENSION ORAL at 22:07

## 2020-02-27 RX ADMIN — ACETAMINOPHEN 650 MG: 325 TABLET ORAL at 10:08

## 2020-02-27 RX ADMIN — FAMOTIDINE 20 MG: 20 TABLET ORAL at 09:34

## 2020-02-27 RX ADMIN — BENZOCAINE AND MENTHOL 1 LOZENGE: 15; 3.6 LOZENGE ORAL at 09:45

## 2020-02-27 RX ADMIN — ACETAMINOPHEN 650 MG: 325 TABLET ORAL at 22:34

## 2020-02-27 RX ADMIN — NYSTATIN 500000 UNITS: 500000 SUSPENSION ORAL at 18:54

## 2020-02-27 RX ADMIN — THERA TABS 1 TABLET: TAB at 09:34

## 2020-02-27 RX ADMIN — ACETAMINOPHEN 650 MG: 325 TABLET ORAL at 16:10

## 2020-02-27 RX ADMIN — HEPARIN SODIUM 5000 UNITS: 5000 INJECTION INTRAVENOUS; SUBCUTANEOUS at 09:34

## 2020-02-27 RX ADMIN — DOCUSATE SODIUM 100 MG: 100 CAPSULE, LIQUID FILLED ORAL at 09:34

## 2020-02-27 RX ADMIN — OXYCODONE HYDROCHLORIDE AND ACETAMINOPHEN 2 TABLET: 5; 325 TABLET ORAL at 00:48

## 2020-02-27 NOTE — PROGRESS NOTES
Discharge orders placed for pt. Per Dr. Sherry Quinones for 2/28 AM. Case management paged and notified.

## 2020-02-27 NOTE — PROGRESS NOTES
Discharge planning update:    Patient remains in the ICU. His discharge plan remains that he will return home with help from his family and family will transport home, at the time of discharge. He is currently being recommended for home health services, once he discharges home. This writer will continue to monitor for discharge planning to ensure a safe discharge home from [facility]. This writer will continue to closely monitor for any additional discharge recommendations. Wu Steiner MSW  Care Manager  Pager#: [phone]

## 2020-02-27 NOTE — ROUTINE PROCESS
Bedside and Verbal shift change report given to Faisal Snyder RN, and Wicho Garcia RN (oncoming nurses) by Mavis Vargas RN (offgoing nurse). Report included the following information SBAR, OR Summary, Intake/Output, MAR, Recent Results and Med Rec Status.

## 2020-02-27 NOTE — ROUTINE PROCESS
Bedside and Verbal shift change report given to [address] (oncoming nurse) by Karyle Cristal   (offgoing nurse). Report included the following information SBAR, MAR and Recent Results.

## 2020-02-27 NOTE — DISCHARGE SUMMARY
Yvonne Shaver MD, [address]  General Surgery  Discharge Summary     Patient ID:  Alexsander Rodriguez  [id number]  89 y.o.  1950    Admit Date: 2/13/2020    Discharge Date: 2/27/2020    Admission Diagnoses: Incisional hernia [K43.2]    Discharge Diagnoses:    Problem List as of 2/27/2020 Date Reviewed: 1/24/2020          Codes Class Noted - Resolved    * (Principal) Acute kidney injury ([facility] [address] ICD-10-CM: N17.9  ICD-9-CM: 584.9  2/17/2020 - Present        Metabolic acidosis GII-13-VR: E87.2  ICD-9-CM: 276.2  2/17/2020 - Present        Incisional hernia ICD-10-CM: K43.2  ICD-9-CM: 553.21  2/13/2020 - Present        Diverticulitis ICD-10-CM: K57.92  ICD-9-CM: 562.11  7/5/2019 - Present        Colon polyps ICD-10-CM: K63.5  ICD-9-CM: 211.3  11/12/2018 - Present        Hemorrhoids ICD-10-CM: K64.9  ICD-9-CM: 455.6  11/12/2018 - Present        Debility ICD-10-CM: R53.81  ICD-9-CM: 799.3  11/5/2018 - Present        Leukocytosis ICD-10-CM: J79.706  ICD-9-CM: 288.60  11/5/2018 - Present        Intractable low back pain ICD-10-CM: M54.5  ICD-9-CM: 724.2  11/5/2018 - Present        RESOLVED: Colon cancer ([facility] ICD-10-CM: C18.9  ICD-9-CM: 153.9  12/4/2018 - 8/26/2019               Admission Condition: Good    Discharge Condition: Good    Last Procedure: Procedure(s):  OPEN REPAIR OF [address] Course:   Normal hospital course for this procedure. Consults: None    Significant Diagnostic Studies: None    Disposition: home    Patient Instructions:   Current Discharge Medication List      START taking these medications    Details   docusate sodium (COLACE) 100 mg capsule Take 1 Cap by mouth two (2) times a day for 90 days. Qty: 60 Cap, Refills: 2         CONTINUE these medications which have CHANGED    Details   oxyCODONE-acetaminophen (PERCOCET) 5-325 mg per tablet Take 1 Tab by mouth every six (6) hours as needed for Pain for up to 5 days.  Max Daily Amount: 4 Tabs.  Qty: 25 Tab, Refills: 0    Associated Diagnoses: Intractable back pain; Malignant neoplasm of sigmoid colon ([address] Postoperative pain         CONTINUE these medications which have NOT CHANGED    Details   omega 3-DHA-EPA-fish oil (FISH OIL) 1,000 mg (120 mg-180 mg) capsule Take 1 Cap by mouth daily. multivitamin, tx-iron-ca-min (THERA-M W/ IRON) 9 mg iron-400 mcg tab tablet Take 1 Tab by mouth daily. latanoprost (XALATAN) 0.005 % ophthalmic solution Administer 1 Drop to both eyes nightly. beclomethasone dipropionate (QNASL) 80 mcg/actuation HFAA 80 mcg by Nasal route daily. Directions on at home label are as follows: Use 2 sprays in each Nostril Daily      metFORMIN (GLUCOPHAGE) 1,000 mg tablet Take 1,000 mg by mouth two (2) times daily (with meals). do not take morning of surgery (12/4/180. losartan (COZAAR) 50 mg tablet Take 50 mg by mouth daily. nebivolol (BYSTOLIC) 5 mg tablet Take 5 mg by mouth daily. Indications: Sinus Tachycardia      omeprazole (PRILOSEC) 20 mg capsule Take 20 mg by mouth daily. Dexlansoprazole 60 mg CpDB Take 1 Cap by mouth every evening. calcium-cholecalciferol, d3, 600-125 mg-unit tab Take 1 Tab by mouth daily. IRON, FERROUS SULFATE, PO Take 325 mg by mouth every Monday, Wednesday, Friday. 3 times a week            Activity: See surgical instructions  Diet: Low fat, Low cholesterol, high fiber plant based diet  Wound Care: As directed    Follow-up with Dr. Marlen Choudhury in 2 weeks.   Follow-up tests/labs None    Signed:  Michaela Denson MD  2/27/2020  5:39 PM

## 2020-02-27 NOTE — PROGRESS NOTES
NUTRITION    Nutrition Consult: TPN: RD to Manage -completed      RECOMMENDATIONS / PLAN:     - Continue current nutrition interventions. - Monitor and encourage meal/supplement intake. - Continue RD inpatient monitoring and evaluation. NUTRITION INTERVENTIONS & DIAGNOSIS:     - Meals/snacks: continue  - Medical food supplement therapy: Glucerna Shake BID, Magic Cup GLEN once daily  - Vitamin and mineral supplement therapy: theragran    Nutrition Diagnosis: Inadequate oral intake related to diet intolerance due to altered GI function as evidenced by variable intake of low residue diet. ASSESSMENT:     2/27: Tolerating diet with good appetite and fair meal intake, likes and is consuming supplements. Denies nausea/vomiting, passing flatus and BM this morning/multiple yesterday, abdomen still distended but improving. States he is ambulating and working with PT.     2/24: Consuming 50% or less of recent meals but likes and is consuming supplements, denies nausea/vomiting but c/o abdominal distension and discomfort; passing flatus and BM this morning. Encouraged continued activity with PT.   2/21: BM overnight. C/o abdominal distension, discomfort and burping asking for another enema. Pt reports consuming around 25%-50% of meals, likes all supplements. Hyperglycemia noted. 2/20: BM after mag citrate and enema overnight, increased distension and discomfort today with no BM and did not eat breakfast, ate well for first clear meal at dinner yesterday. No orders placed for PICC placement and no longer indicated as PPN stopping tonight. Pt denies nausea/vomiting, states increased abdominal distension also occurred with previous abdominal surgeries and resolved after 4-5 days postop. 2/19: Renal function improving, IV fluid stopped and NGT removed after pt tolerated clamping trial yesterday. Started on clear liquids.  Pt c/o abdominal distension, episode of bilious spit up this morning, nausea now resolved and pt hungry/thirsty and asking to eat. 2/18: S/p open repair of incisional hernia with mesh on 2/13/20; developed postoperative ileus and acute kidney injury with elevated abdominal pressures and remains NPO with NGT for decompression, increased abdominal distension and loose stool via FMS. Plan for PICC placement per surgery, not on the schedule today per discussion with IR.       Nutritional intake adequate to meet patients estimated nutritional needs:  Yes    Diet: DIET GI LITE (POST SURGICAL)  DIET NUTRITIONAL SUPPLEMENTS Breakfast, Dinner; GLUCERNA SHAKE  DIET NUTRITIONAL SUPPLEMENTS Lunch; MAGIC CUPS GLEN      Food Allergies: cooked carrots cause regurgitation per pt report (able to tolerate raw carrots)  Current Appetite: Good  Appetite/meal intake prior to admission: Good  Feeding Limitations:  [] Swallowing difficulty    [] Chewing difficulty    [x] Other: SLP following   Current Meal Intake:   Patient Vitals for the past 100 hrs:   % Diet Eaten   02/27/20 0800 60 %   02/25/20 0800 10 %   02/23/20 1730 50 %   02/23/20 1300 50 %     BM: 2/27, loose (abdomen distended)   Skin Integrity: abdominal surgical incision, wound VAC  Edema:   [] No     [x] Yes   Pertinent Medications: Reviewed: colace, SSI, ondansetron, famotidine; oral medications being held (ferrous sulfate, lactobacillus, miralax)    Labs:   Recent Labs     02/27/20  0348 02/26/20  0238 02/25/20  0447    140 141   K 3.4* 3.2* 3.5    104 108   CO2 27 30 27   * 112* 112*   BUN 17 20* 22*   CREA 1.07 1.13 1.10   CA 8.6 8.3* 8.3*   MG 1.7 1.8 1.9   PHOS 3.5 3.6 3.2     Lab Results   Component Value Date/Time    Triglyceride 185 (H) 02/18/2020 10:21 AM     Prealbumin: 12.2 mg/dL (2/18/20)  Ionized Calcium: 1.09 mmol/L (2/18/20)       Intake/Output Summary (Last 24 hours) at 2/27/2020 1446  Last data filed at 2/27/2020 1200  Gross per 24 hour   Intake 360 ml   Output 635 ml   Net -275 ml       Anthropometrics:  Ht Readings from Last 1 Encounters:   02/13/20 5' 10\" (1.778 m)     Last 3 Recorded Weights in this Encounter    02/24/20 0525 02/25/20 0400 02/27/20 1200   Weight: 91.1 kg (200 lb 13.4 oz) 91.1 kg (200 lb 13.4 oz) 93.3 kg (205 lb 11 oz)     Standing, estimated dry weight of 81.6 kg on 2/13/20    Body mass index is 29.51 kg/m².   Obese Class I     Weight History: patient reports usual weight of 175-180 lb and weight loss down to 130 lb from December 2018 to January 2019 then gained weight back up to 170 lb in July 2019 and reports weight of 180 on admission; 29 lb, 17% weight loss x 2-3 months per chart hx review and has since gained weight back with additional weight gain since admission due to fluid accumulation     Weight Metrics 2/27/2020 2/13/2020 1/24/2020 12/19/2019 8/26/2019 7/29/2019 7/5/2019   Weight 205 lb 11 oz - 183 lb 177 lb 154 lb 160 lb 148 lb 3.2 oz   BMI - 29.51 kg/m2 26.26 kg/m2 25.4 kg/m2 22.1 kg/m2 22.96 kg/m2 21.26 kg/m2        Admitting Diagnosis: Incisional hernia [K43.2]  Pertinent PMHx: HTN, DM, GERD, diverticulitis, 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) with PEG placement (9/9/18), colon cancer s/p sigmoid colectomy (43/5/63) complicated by small anastomotic leak s/p diverting ileostomy (12/11/18) and received parenteral nutrition perioperatively from 12/9 until 12/19/18, s/p loop ileostomy reversal (7/5/19)     Education Needs:        [x] None identified  [] Identified - Not appropriate at this time  []  Identified and addressed - refer to education log  Learning Limitations:   [x] None identified  [] Identified    Cultural, Druze & ethnic food preferences:  [x] None identified    [] Identified and addressed     ESTIMATED NUTRITION NEEDS:     Calories: 5678-4777 kcal (20-30 kcal/kg) based on  [x] Actual BW 82 kg (dry weight)     [] IBW   Protein:  gm (1-1.5 gm/kg) based on  [x] Actual BW      [] IBW   Fluid: 1 mL/kcal     MONITORING & EVALUATION:     Nutrition Goal(s):   - PO nutrition intake will meet >75% of patient estimated nutritional needs within the next 7 days. Outcome: Progressing towards goal      Monitoring:   [x] Food and nutrient intake   [x] Food and nutrient administration  [x] Comparative standards   [x] Nutrition-focused physical findings   [x] Anthropometric Measurements   [x] Treatment/therapy   [x] Biochemical data, medical tests, and procedures        Previous Recommendations (for follow-up assessments only): Implemented      Discharge Planning: No nutritional discharge needs at this time. Participated in care planning, discharge planning, & interdisciplinary rounds as appropriate.       James Mantilla RD, [address]   Pager: [phone]

## 2020-02-28 ENCOUNTER — HOME HEALTH ADMISSION (OUTPATIENT)
Dept: HOME HEALTH SERVICES | Facility: HOME HEALTH | Age: 70
End: 2020-02-28
Payer: MEDICARE

## 2020-02-28 VITALS
OXYGEN SATURATION: 95 % | HEIGHT: 70 IN | WEIGHT: 205.69 LBS | TEMPERATURE: 98.4 F | BODY MASS INDEX: 29.45 KG/M2 | RESPIRATION RATE: 21 BRPM | DIASTOLIC BLOOD PRESSURE: 76 MMHG | SYSTOLIC BLOOD PRESSURE: 164 MMHG | HEART RATE: 85 BPM

## 2020-02-28 LAB
ANION GAP SERPL CALC-SCNC: 9 MMOL/L (ref 3–18)
BUN SERPL-MCNC: 12 MG/DL (ref 7–18)
BUN/CREAT SERPL: 13 (ref 12–20)
CALCIUM SERPL-MCNC: 7.9 MG/DL (ref 8.5–10.1)
CHLORIDE SERPL-SCNC: 105 MMOL/L (ref 100–111)
CO2 SERPL-SCNC: 27 MMOL/L (ref 21–32)
CREAT SERPL-MCNC: 0.93 MG/DL (ref 0.6–1.3)
GLUCOSE BLD STRIP.AUTO-MCNC: 104 MG/DL (ref 70–110)
GLUCOSE BLD STRIP.AUTO-MCNC: 97 MG/DL (ref 70–110)
GLUCOSE SERPL-MCNC: 88 MG/DL (ref 74–99)
MAGNESIUM SERPL-MCNC: 1.5 MG/DL (ref 1.6–2.6)
PHOSPHATE SERPL-MCNC: 3.1 MG/DL (ref 2.5–4.9)
POTASSIUM SERPL-SCNC: 2.8 MMOL/L (ref 3.5–5.5)
SODIUM SERPL-SCNC: 141 MMOL/L (ref 136–145)

## 2020-02-28 PROCEDURE — 74011000258 HC RX REV CODE- 258: Performed by: SURGERY

## 2020-02-28 PROCEDURE — 36415 COLL VENOUS BLD VENIPUNCTURE: CPT

## 2020-02-28 PROCEDURE — 74011000250 HC RX REV CODE- 250: Performed by: SURGERY

## 2020-02-28 PROCEDURE — 74011250636 HC RX REV CODE- 250/636: Performed by: SURGERY

## 2020-02-28 PROCEDURE — 74011250637 HC RX REV CODE- 250/637: Performed by: HOSPITALIST

## 2020-02-28 PROCEDURE — 74011250637 HC RX REV CODE- 250/637: Performed by: SURGERY

## 2020-02-28 PROCEDURE — 84100 ASSAY OF PHOSPHORUS: CPT

## 2020-02-28 PROCEDURE — 80048 BASIC METABOLIC PNL TOTAL CA: CPT

## 2020-02-28 PROCEDURE — 83735 ASSAY OF MAGNESIUM: CPT

## 2020-02-28 PROCEDURE — 82962 GLUCOSE BLOOD TEST: CPT

## 2020-02-28 PROCEDURE — 74011250637 HC RX REV CODE- 250/637: Performed by: NURSE PRACTITIONER

## 2020-02-28 RX ORDER — LANOLIN ALCOHOL/MO/W.PET/CERES
400 CREAM (GRAM) TOPICAL 2 TIMES DAILY
Status: DISCONTINUED | OUTPATIENT
Start: 2020-02-28 | End: 2020-02-28 | Stop reason: HOSPADM

## 2020-02-28 RX ADMIN — POTASSIUM CHLORIDE: 2 INJECTION, SOLUTION, CONCENTRATE INTRAVENOUS at 12:20

## 2020-02-28 RX ADMIN — POTASSIUM CHLORIDE: 2 INJECTION, SOLUTION, CONCENTRATE INTRAVENOUS at 09:45

## 2020-02-28 RX ADMIN — POTASSIUM CHLORIDE: 2 INJECTION, SOLUTION, CONCENTRATE INTRAVENOUS at 11:01

## 2020-02-28 RX ADMIN — FAMOTIDINE 20 MG: 20 TABLET ORAL at 10:05

## 2020-02-28 RX ADMIN — OXYCODONE HYDROCHLORIDE AND ACETAMINOPHEN 1 TABLET: 5; 325 TABLET ORAL at 02:03

## 2020-02-28 RX ADMIN — HEPARIN SODIUM 5000 UNITS: 5000 INJECTION INTRAVENOUS; SUBCUTANEOUS at 10:05

## 2020-02-28 RX ADMIN — THERA TABS 1 TABLET: TAB at 10:05

## 2020-02-28 RX ADMIN — HEPARIN SODIUM 5000 UNITS: 5000 INJECTION INTRAVENOUS; SUBCUTANEOUS at 00:30

## 2020-02-28 RX ADMIN — DOCUSATE SODIUM 100 MG: 100 CAPSULE, LIQUID FILLED ORAL at 10:05

## 2020-02-28 RX ADMIN — Medication 10 ML: at 06:41

## 2020-02-28 RX ADMIN — Medication 400 MG: at 10:04

## 2020-02-28 RX ADMIN — NEBIVOLOL HYDROCHLORIDE 10 MG: 5 TABLET ORAL at 09:31

## 2020-02-28 RX ADMIN — NYSTATIN 500000 UNITS: 500000 SUSPENSION ORAL at 10:05

## 2020-02-28 NOTE — PROGRESS NOTES
Discharge: The Plan for Transition of Care is related to the following treatment goals: Home with home health [facility]). The patient  was provided with a choice of provider and agrees with the discharge plan. [x] Yes [] No    Freedom of choice list was provided with basic dialogue that supports the patient's individualized plan of care/goals, treatment preferences and shares the quality data associated with the providers. [x] Yes [] No.    Patient's referral was sent to [facility]. Patient's wife will transport home, at the time of discharge. There are no other care management concerns regarding this discharge. This writer will continue to monitor for discharge planning to ensure a safe discharge home from [facility]. Wu ALCOCER  Care Manager  Pager#: [phone]

## 2020-02-28 NOTE — PROGRESS NOTES
Yifan Rivero M.D. FACS  PROGRESS NOTE    Name: Alivia Ingram MRN: [id number]   : 1950 Hospital: [facility]   Date: 2020 Admission Date: 2020  5:32 AM     Hospital Day: 16  15 Days Post-Op  Subjective:  Patient without acute overnight events. Potassium and magnesium are low this morning. Will replete prior to discharge. Objective:  Vitals:    20 0000 20 0200 20 0400 20 0600   BP: 147/75 136/69 133/79 151/71   Pulse:  88 88 82   Resp:  18 18 15   Temp: 98.4 °F (36.9 °C)      SpO2: 96% 93% 95% 96%   Weight:       Height:         Date 20 0700 - 20 0659 20 0700 - 20 0659   Shift 1728-0967 8036-2317 24 Hour Total 0648-5573 6157-2923 24 Hour Total   INTAKE   P.O. 600 0 600        P. O. 600 0 600      Blood 0  0        Autotransfused 0  0      Other 0 0 0        Other 0  0        Intake (ml) (Vacuum Assisted Closure Medial;Upper Abdominal) 0 0 0      Shift Total(mL/kg) 600(6.4) 0(0) 600(6.4)      OUTPUT   Urine(mL/kg/hr) 0(0) 200(0.2) 200(0.1)        Urine Voided 0 200 200        Urine Occurrence(s) 1 x 0 x 1 x      Emesis/NG output 0 125 125        Emesis 0 125 125        Emesis Occurrence(s) 0 x 0 x 0 x      Drains 100 10 110        Output (ml) (Vacuum Assisted Closure Medial;Upper Abdominal) 0 0 0        Output (ml) (Matthew-Garcia Drain 20 Abdomen) 100 10 110      Other 0 120 120        Other Output 0 120 120      Stool 0 0 0        Stool Occurrence(s) 1 x 1 x 2 x 1 x  1 x     Stool 0 0 0      Blood 0 0 0        Estimated Blood Loss 0 0 0        Quantitative Blood Loss 0 0 0        Blood 0 0 0      Shift Total(mL/kg) 100(1.1) 455(4.9) 555(5.9)       -455 45      Weight (kg) 93.3 93.3 93.3 93.3 93.3 93.3         Physical Exam:    General: Awake and alert, oriented x4, no apparent distress   Abdomen: abdomen is soft with midline incisional tenderness. VAC is intact at the midline incision.   Right lower quadrant ANGELI with serosanguineous drainage.   No masses, organomegaly or guarding    Labs:  Recent Results (from the past 24 hour(s))   GLUCOSE, POC    Collection Time: 02/27/20  9:28 AM   Result Value Ref Range    Glucose (POC) 108 70 - 110 mg/dL   GLUCOSE, POC    Collection Time: 02/27/20 11:28 AM   Result Value Ref Range    Glucose (POC) 126 (H) 70 - 110 mg/dL   GLUCOSE, POC    Collection Time: 02/27/20  5:18 PM   Result Value Ref Range    Glucose (POC) 143 (H) 70 - 110 mg/dL   GLUCOSE, POC    Collection Time: 02/27/20 10:27 PM   Result Value Ref Range    Glucose (POC) 101 70 - 887 mg/dL   METABOLIC PANEL, BASIC    Collection Time: 02/28/20  1:57 AM   Result Value Ref Range    Sodium 141 136 - 145 mmol/L    Potassium 2.8 (LL) 3.5 - 5.5 mmol/L    Chloride 105 100 - 111 mmol/L    CO2 27 21 - 32 mmol/L    Anion gap 9 3.0 - 18 mmol/L    Glucose 88 74 - 99 mg/dL    BUN 12 7.0 - 18 MG/DL    Creatinine 0.93 0.6 - 1.3 MG/DL    BUN/Creatinine ratio 13 12 - 20      GFR est AA >60 >60 ml/min/1.73m2    GFR est non-AA >60 >60 ml/min/1.73m2    Calcium 7.9 (L) 8.5 - 10.1 MG/DL   PHOSPHORUS    Collection Time: 02/28/20  1:57 AM   Result Value Ref Range    Phosphorus 3.1 2.5 - 4.9 MG/DL   MAGNESIUM    Collection Time: 02/28/20  1:57 AM   Result Value Ref Range    Magnesium 1.5 (L) 1.6 - 2.6 mg/dL     All Micro Results     Procedure Component Value Units Date/Time    CULTURE, RESPIRATORY/SPUTUM/BRONCH Ashlyn Rude STAIN [[id number]] Collected:  02/15/20 1010    Order Status:  Completed Specimen:  Sputum Updated:  02/17/20 1009     Special Requests: NO SPECIAL REQUESTS        GRAM STAIN RARE WBCS SEEN         FEW EPITHELIAL CELLS SEEN               RARE GRAM POSITIVE COCCI IN PAIRS           Culture result:       RARE NORMAL RESPIRATORY GALE          CULTURE, URINE [[id number]] Collected:  02/14/20 1230    Order Status:  Completed Specimen:  Clean catch Updated:  02/15/20 0908     Special Requests: NO SPECIAL REQUESTS        Culture result: NO GROWTH 1 DAY             Current Medications:  Current Facility-Administered Medications   Medication Dose Route Frequency Provider Last Rate Last Dose    potassium chloride 10 mEq, lidocaine (PF) (XYLOCAINE) 10 mg/mL (1 %) 1 mL in 0.9% sodium chloride 100 mL IVPB   IntraVENous ONCE Obdulio Lomax MD        potassium chloride 10 mEq, lidocaine (PF) (XYLOCAINE) 10 mg/mL (1 %) 1 mL in 0.9% sodium chloride 100 mL IVPB   IntraVENous Q1H Ernesto Isidro MD        famotidine (PEPCID) tablet 20 mg  20 mg Oral BID Dann Gonzalez NP   20 mg at 02/27/20 1854    insulin lispro (HUMALOG) injection   SubCUTAneous AC&HS Ashleigh Byrne NP   Stopped at 02/26/20 1630    therapeutic multivitamin (THERAGRAN) tablet 1 Tab  1 Tab Oral DAILY Ernesto Isidro MD   1 Tab at 02/27/20 9402    cloNIDine (CATAPRES) 0.1 mg/24 hr patch 1 Patch  1 Patch TransDERmal Q7D Luzma Friedman MD   1 Patch at 02/25/20 1232    oxyCODONE-acetaminophen (PERCOCET) 5-325 mg per tablet 1 Tab  1 Tab Oral Q4H PRN Ashleigh Byrne NP   1 Tab at 02/28/20 0203    ondansetron (ZOFRAN) injection 4 mg  4 mg IntraVENous Q6H PRN Claire Alanis PA-C   4 mg at 02/27/20 2234    albuterol (PROVENTIL VENTOLIN) nebulizer solution 2.5 mg  2.5 mg Nebulization Q4H PRN Nancy ZARCO, DO   2.5 mg at 02/14/20 0341    sodium chloride (OCEAN) 0.65 % nasal squeeze bottle 2 Spray  2 Spray Both Nostrils Q2H PRN Peggy Rouge, DO        glucose chewable tablet 16 g  4 Tab Oral PRN Dann Gonzalez NP        glucagon (GLUCAGEN) injection 1 mg  1 mg IntraMUSCular PRN Dann Gonzalez NP        dextrose (D50W) injection syrg 12.5-25 g  25-50 mL IntraVENous PRN Dann Gonzalez NP        oxyCODONE-acetaminophen (PERCOCET) 5-325 mg per tablet 2 Tab  2 Tab Oral Q6H PRN Ashleigh Byrne NP   2 Tab at 02/27/20 0048    nebivolol (BYSTOLIC) tablet 10 mg  10 mg Oral DAILY Jimmie Hernandez MD 10 mg at 02/27/20 0934    benzocaine-menthol (CEPACOL) lozenge 1 Lozenge  1 Lozenge Mucous Membrane PRN Kimberly Saucedo MD   1 Lozenge at 02/27/20 0945    nystatin (MYCOSTATIN) 100,000 unit/mL oral suspension 500,000 Units  500,000 Units Oral QID Martin Gonzalez NP   500,000 Units at 02/27/20 2207    phenol throat spray (CHLORASEPTIC) 1 Spray  1 Deal Oral PRN Martin Gonzalez NP   1 Spray at 02/14/20 1055    albuterol-ipratropium (DUO-NEB) 2.5 MG-0.5 MG/3 ML  3 mL Nebulization Q4H PRN Kimberly Saucedo MD   3 mL at 02/15/20 2320    sodium chloride (NS) flush 5-40 mL  5-40 mL IntraVENous Q8H Eve Link MD   10 mL at 02/28/20 0641    sodium chloride (NS) flush 5-40 mL  5-40 mL IntraVENous PRN Eve Link MD   10 mL at 02/21/20 1400    heparin (porcine) injection 5,000 Units  5,000 Units SubCUTAneous Q8H Eve Link MD   5,000 Units at 02/28/20 0030    [Held by provider] ferrous sulfate tablet 325 mg  325 mg Oral Q MON, WED & Lina Mendoza MD   325 mg at 02/14/20 2117    latanoprost (XALATAN) 0.005 % ophthalmic solution 1 Drop  1 Drop Both Eyes QHS Eve Link MD   1 Drop at 02/27/20 2208    acetaminophen (TYLENOL) tablet 650 mg  650 mg Oral Q4H PRN Martin Gonzalez NP   650 mg at 02/27/20 2234    [Held by provider] polyethylene glycol (MIRALAX) packet 17 g  17 g Oral DAILY Martin Gonzalez NP   Stopped at 02/14/20 8467    docusate sodium (COLACE) capsule 100 mg  100 mg Oral DAILY Zo Gonzalez NP   100 mg at 02/27/20 0934    [Held by provider] lactobacillus sp. 50 billion cpu (BIO-K PLUS) capsule 1 Cap  1 Cap Oral DAILY Martin Gonzalez NP   Stopped at 02/15/20 0900       Chart and notes reviewed. Data reviewed. I have evaluated and examined the patient. IMPRESSION:   · Patient with resolution of ileus following abdominal wall reconstruction.       PLAN:/DISCUSION: · We will remove the wound VAC and change to Aquacel Ag dressing Monday Wednesday Friday. · Replenish K and magnesium prior to discharge encourage whole food plant-based diet with minimal meat to keep his electrolytes at healthy levels.   · Discharge home with home care this evening        Autumn Mercedes MD

## 2020-02-28 NOTE — DISCHARGE INSTRUCTIONS
Patient Education        Abdominal Hernia Repair: What to Expect at Home  Your Recovery  After surgery to repair your hernia, you are likely to have pain for a few days. You may also feel like you have the flu, and you may have a low fever and feel tired and nauseated. This is common. You should feel better after a few days and will probably feel much better in 7 days. For several weeks you may feel twinges or pulling in the hernia repair when you move. You may have some bruising around the area of your hernia repair. This is normal.  This care sheet gives you a general idea about how long it will take for you to recover. But each person recovers at a different pace. Follow the steps below to get better as quickly as possible. How can you care for yourself at home? Activity    · Rest when you feel tired. Getting enough sleep will help you recover.     · Try to walk each day. Start by walking a little more than you did the day before. Bit by bit, increase the amount you walk. Walking boosts blood flow and helps prevent pneumonia and constipation.     · If your doctor gives you an abdominal binder to wear, use it as directed. This is an elastic bandage that wraps around your belly and upper hips. It helps support your belly muscles after surgery.     · Avoid strenuous activities, such as biking, jogging, weight lifting, or aerobic exercise, until your doctor says it is okay.     · Avoid lifting anything that would make you strain. This may include heavy grocery bags and milk containers, a heavy briefcase or backpack, cat litter or dog food bags, a vacuum , or a child.     · Ask your doctor when you can drive again.     · Most people are able to return to work within 1 to 2 weeks after surgery. But if your job requires that you to do heavy lifting or strenuous activity, you may need to take 4 to 6 weeks off from work.     · You may shower 24 to 48 hours after surgery, if your doctor okays it.  Pat the cut (incision) dry. Do not take a bath for the first 2 weeks, or until your doctor tells you it is okay.     · Ask your doctor when it is okay for you to have sex. Diet    · You can eat your normal diet. If your stomach is upset, try bland, low-fat foods like plain rice, broiled chicken, toast, and yogurt.     · Drink plenty of fluids (unless your doctor tells you not to).     · You may notice that your bowel movements are not regular right after your surgery. This is common. Avoid constipation and straining with bowel movements. You may want to take a fiber supplement every day. If you have not had a bowel movement after a couple of days, ask your doctor about taking a mild laxative. Medicines    · Your doctor will tell you if and when you can restart your medicines. He or she will also give you instructions about taking any new medicines.     · If you take blood thinners, such as warfarin (Coumadin), clopidogrel (Plavix), or aspirin, be sure to talk to your doctor. He or she will tell you if and when to start taking those medicines again. Make sure that you understand exactly what your doctor wants you to do.     · Be safe with medicines. Take pain medicines exactly as directed. ? If the doctor gave you a prescription medicine for pain, take it as prescribed. ? If you are not taking a prescription pain medicine, ask your doctor if you can take an over-the-counter medicine.     · If your doctor prescribed antibiotics, take them as directed. Do not stop taking them just because you feel better. You need to take the full course of antibiotics.     · If you think your pain medicine is making you sick to your stomach:  ? Take your medicine after meals (unless your doctor has told you not to). ? Ask your doctor for a different pain medicine. Incision care    · If you have strips of tape on the cut (incision) the doctor made, leave the tape on for a week or until it falls off.  Or follow your doctor's instructions for removing the tape.     · If you have staples closing the cut, you will need to visit your doctor in 1 to 2 weeks to have them removed.     · Wash the area daily with warm, soapy water, and pat it dry. Don't use hydrogen peroxide or alcohol, which can slow healing. You may cover the area with a gauze bandage if it weeps or rubs against clothing. Change the bandage every day. Other instructions    · Hold a pillow over your incision when you cough or take deep breaths. This will support your belly and decrease your pain.     · Do breathing exercises at home as instructed by your doctor. This will help prevent pneumonia.     · If you had laparoscopic surgery, you may also have pain in your left shoulder. The pain usually lasts about a day or two. Follow-up care is a key part of your treatment and safety. Be sure to make and go to all appointments, and call your doctor if you are having problems. It's also a good idea to know your test results and keep a list of the medicines you take. When should you call for help? Call 911 anytime you think you may need emergency care. For example, call if:    · You passed out (lost consciousness).     · You are short of breath.    Call your doctor now or seek immediate medical care if:    · You are sick to your stomach and cannot drink fluids.     · You have signs of a blood clot in your leg (called a deep vein thrombosis), such as:  ? Pain in your calf, back of the knee, thigh, or groin. ? Redness and swelling in your leg or groin.     · You have signs of infection, such as:  ? Increased pain, swelling, warmth, or redness. ? Red streaks leading from the incision. ? Pus draining from the incision. ?  A fever.     · You cannot pass stools or gas.     · You have pain that does not get better after you take pain medicine.     · You have loose stitches, or your incision comes open.     · Bright red blood has soaked through the bandage over your incision.    Watch closely for changes in your health, and be sure to contact your doctor if you have any problems. Where can you learn more? Go to http://ar-mariaa.info/. Enter B577 in the search box to learn more about \"Abdominal Hernia Repair: What to Expect at Home. \"  Current as of: November 7, 2018  Content Version: 12.2  © 0168-9717 [employer]. Care instructions adapted under license by [employer] (which disclaims liability or warranty for this information). If you have questions about a medical condition or this instruction, always ask your healthcare professional. [address] any warranty or liability for your use of this information. Patient Education        Hernia: Care Instructions  Your Care Instructions    A hernia develops when tissue bulges through a weak spot in the wall of your belly. The groin area and the navel are common areas for a hernia. A hernia can also develop near the area of a surgery you had before. Pressure from lifting, straining, or coughing can tear the weak area, causing the hernia to bulge and be painful. If you cannot push a hernia back into place, the tissue may become trapped outside the belly wall. If the hernia gets twisted and loses its blood supply, it will swell and die. This is called a strangulated hernia. It usually causes a lot of pain. It needs treatment right away. Some hernias need to be repaired to prevent a strangulated hernia. If your hernia causes symptoms or is large, you may need surgery. Follow-up care is a key part of your treatment and safety. Be sure to make and go to all appointments, and call your doctor if you are having problems. It's also a good idea to know your test results and keep a list of the medicines you take. How can you care for yourself at home? · Take care when lifting heavy objects. · Stay at a healthy weight. · Do not smoke.  Smoking can cause coughing, which can cause your hernia to bulge. If you need help quitting, talk to your doctor about stop-smoking programs and medicines. These can increase your chances of quitting for good. · Talk with your doctor before wearing a corset or truss for a hernia. These devices are not recommended for treating hernias and sometimes can do more harm than good. There may be certain situations when your doctor thinks a truss would work, but these are rare. When should you call for help? Call your doctor now or seek immediate medical care if:    · You have new or worse belly pain.     · You are vomiting.     · You cannot pass stools or gas.     · You cannot push the hernia back into place with gentle pressure when you are lying down.     · The area over the hernia turns red or becomes tender.    Watch closely for changes in your health, and be sure to contact your doctor if you have any problems. Where can you learn more? Go to http://ar-mariaa.info/. Enter C129 in the search box to learn more about \"Hernia: Care Instructions. \"  Current as of: November 7, 2018  Content Version: 12.2  © 2601-6072 [employer]. Care instructions adapted under license by [employer] (which disclaims liability or warranty for this information). If you have questions about a medical condition or this instruction, always ask your healthcare professional. [address] any warranty or liability for your use of this information. Saline moistened Aquacel Ag to midline surgical incision. Cover with 4 x4 gauze, ABD and Medipore tape. Change dressing MWF. Thank you    Patient Education        Abdominal Hernia Repair: What to Expect at Home  Your Recovery  After surgery to repair your hernia, you are likely to have pain for a few days. You may also feel like you have the flu, and you may have a low fever and feel tired and nauseated. This is common.   You should feel better after a few days and will probably feel much better in 7 days. For several weeks you may feel twinges or pulling in the hernia repair when you move. You may have some bruising around the area of your hernia repair. This is normal.  This care sheet gives you a general idea about how long it will take for you to recover. But each person recovers at a different pace. Follow the steps below to get better as quickly as possible. How can you care for yourself at home? Activity    · Rest when you feel tired. Getting enough sleep will help you recover.     · Try to walk each day. Start by walking a little more than you did the day before. Bit by bit, increase the amount you walk. Walking boosts blood flow and helps prevent pneumonia and constipation.     · If your doctor gives you an abdominal binder to wear, use it as directed. This is an elastic bandage that wraps around your belly and upper hips. It helps support your belly muscles after surgery.     · Avoid strenuous activities, such as biking, jogging, weight lifting, or aerobic exercise, until your doctor says it is okay.     · Avoid lifting anything that would make you strain. This may include heavy grocery bags and milk containers, a heavy briefcase or backpack, cat litter or dog food bags, a vacuum , or a child.     · Ask your doctor when you can drive again.     · Most people are able to return to work within 1 to 2 weeks after surgery. But if your job requires that you to do heavy lifting or strenuous activity, you may need to take 4 to 6 weeks off from work.     · You may shower 24 to 48 hours after surgery, if your doctor okays it. Pat the cut (incision) dry. Do not take a bath for the first 2 weeks, or until your doctor tells you it is okay.     · Ask your doctor when it is okay for you to have sex. Diet    · You can eat your normal diet.  If your stomach is upset, try bland, low-fat foods like plain rice, broiled chicken, toast, and yogurt.     · Drink plenty of fluids (unless your doctor tells you not to).     · You may notice that your bowel movements are not regular right after your surgery. This is common. Avoid constipation and straining with bowel movements. You may want to take a fiber supplement every day. If you have not had a bowel movement after a couple of days, ask your doctor about taking a mild laxative. Medicines    · Your doctor will tell you if and when you can restart your medicines. He or she will also give you instructions about taking any new medicines.     · If you take blood thinners, such as warfarin (Coumadin), clopidogrel (Plavix), or aspirin, be sure to talk to your doctor. He or she will tell you if and when to start taking those medicines again. Make sure that you understand exactly what your doctor wants you to do.     · Be safe with medicines. Take pain medicines exactly as directed. ? If the doctor gave you a prescription medicine for pain, take it as prescribed. ? If you are not taking a prescription pain medicine, ask your doctor if you can take an over-the-counter medicine.     · If your doctor prescribed antibiotics, take them as directed. Do not stop taking them just because you feel better. You need to take the full course of antibiotics.     · If you think your pain medicine is making you sick to your stomach:  ? Take your medicine after meals (unless your doctor has told you not to). ? Ask your doctor for a different pain medicine. Incision care    · If you have strips of tape on the cut (incision) the doctor made, leave the tape on for a week or until it falls off. Or follow your doctor's instructions for removing the tape.     · If you have staples closing the cut, you will need to visit your doctor in 1 to 2 weeks to have them removed.     · Wash the area daily with warm, soapy water, and pat it dry. Don't use hydrogen peroxide or alcohol, which can slow healing. You may cover the area with a gauze bandage if it weeps or rubs against clothing. Change the bandage every day. Other instructions    · Hold a pillow over your incision when you cough or take deep breaths. This will support your belly and decrease your pain.     · Do breathing exercises at home as instructed by your doctor. This will help prevent pneumonia.     · If you had laparoscopic surgery, you may also have pain in your left shoulder. The pain usually lasts about a day or two. Follow-up care is a key part of your treatment and safety. Be sure to make and go to all appointments, and call your doctor if you are having problems. It's also a good idea to know your test results and keep a list of the medicines you take. When should you call for help? Call 911 anytime you think you may need emergency care. For example, call if:    · You passed out (lost consciousness).     · You are short of breath.    Call your doctor now or seek immediate medical care if:    · You are sick to your stomach and cannot drink fluids.     · You have signs of a blood clot in your leg (called a deep vein thrombosis), such as:  ? Pain in your calf, back of the knee, thigh, or groin. ? Redness and swelling in your leg or groin.     · You have signs of infection, such as:  ? Increased pain, swelling, warmth, or redness. ? Red streaks leading from the incision. ? Pus draining from the incision. ? A fever.     · You cannot pass stools or gas.     · You have pain that does not get better after you take pain medicine.     · You have loose stitches, or your incision comes open.     · Bright red blood has soaked through the bandage over your incision.    Watch closely for changes in your health, and be sure to contact your doctor if you have any problems. Where can you learn more? Go to http://ar-mariaa.info/. Enter B577 in the search box to learn more about \"Abdominal Hernia Repair: What to Expect at Home. \"  Current as of: November 7, 2018  Content Version: 12.2  © 4361-5508 [employer]. Care instructions adapted under license by [employer] (which disclaims liability or warranty for this information). If you have questions about a medical condition or this instruction, always ask your healthcare professional. [address] any warranty or liability for your use of this information.

## 2020-02-28 NOTE — HOME CARE
Discharge noted for today. Received home health referral for [facility] for SN, PT, and OT - wound care and ANGELI drain care. Jonna Medellin RN met with patient, explained services and answered all questions. Demographics verified. Order processed and emailed to central office.   Belkis Shook, [facility] Liaison

## 2020-02-28 NOTE — PROGRESS NOTES
Problem: Infection - Risk of, Surgical Site Infection  Goal: *Absence of surgical site infection signs and symptoms  Outcome: Resolved/Met     Problem: Deep Venous Thrombosis - Risk of  Goal: *Absence of deep venous thrombosis signs and symptoms(Stroke Metric)  Outcome: Resolved/Met  Goal: *Absence of impaired coagulation signs and symptoms  Outcome: Resolved/Met  Goal: *Knowledge of prescribed medications  Outcome: Resolved/Met  Goal: *Absence of bleeding  Outcome: Resolved/Met

## 2020-02-28 NOTE — PROGRESS NOTES
4159 - Attempted to see pt for PT at this time, but pt has critical potassium level at this time. Will hold PT and follow up later as appropriate.

## 2020-02-28 NOTE — ROUTINE PROCESS
Bedside shift change report given to Cecy Bullock RN (oncoming nurse) by Thalia Holloway (offgoing nurse). Report included the following information SBAR, Kardex, Intake/Output and MAR.

## 2020-02-28 NOTE — PROGRESS NOTES
Problem: Infection - Risk of, Surgical Site Infection  Goal: *Absence of surgical site infection signs and symptoms  Outcome: Resolved/Met     Problem: Deep Venous Thrombosis - Risk of  Goal: *Absence of deep venous thrombosis signs and symptoms(Stroke Metric)  Outcome: Resolved/Met  Goal: *Absence of impaired coagulation signs and symptoms  Outcome: Resolved/Met  Goal: *Knowledge of prescribed medications  Outcome: Resolved/Met  Goal: *Absence of bleeding  Outcome: Resolved/Met     Problem: Pressure Injury - Risk of  Goal: *Prevention of pressure injury  Description  Document Edin Scale and appropriate interventions in the flowsheet. Outcome: Resolved/Met  Note: Pressure Injury Interventions:  Sensory Interventions: Assess changes in LOC, Assess need for specialty bed, Avoid rigorous massage over bony prominences, Check visual cues for pain, Discuss PT/OT consult with provider, Float heels, Keep linens dry and wrinkle-free, Maintain/enhance activity level, Minimize linen layers, Pad between skin to skin, Pressure redistribution bed/mattress (bed type), Turn and reposition approx. every two hours (pillows and wedges if needed)    Moisture Interventions: Absorbent underpads, Apply protective barrier, creams and emollients, Assess need for specialty bed, Check for incontinence Q2 hours and as needed, Maintain skin hydration (lotion/cream), Minimize layers, Moisture barrier, Offer toileting Q_hr    Activity Interventions: Assess need for specialty bed, Increase time out of bed, Pressure redistribution bed/mattress(bed type), PT/OT evaluation    Mobility Interventions: Assess need for specialty bed, Float heels, HOB 30 degrees or less, Pressure redistribution bed/mattress (bed type), PT/OT evaluation, Turn and reposition approx.  every two hours(pillow and wedges)    Nutrition Interventions: Document food/fluid/supplement intake    Friction and Shear Interventions: Apply protective barrier, creams and emollients, Feet elevated on foot rest, Foam dressings/transparent film/skin sealants, HOB 30 degrees or less, Minimize layers                Problem: Pain  Goal: *Control of Pain  Outcome: Resolved/Met

## 2020-02-29 ENCOUNTER — HOME CARE VISIT (OUTPATIENT)
Dept: SCHEDULING | Facility: HOME HEALTH | Age: 70
End: 2020-02-29
Payer: MEDICARE

## 2020-02-29 PROCEDURE — 400013 HH SOC

## 2020-02-29 PROCEDURE — G0299 HHS/HOSPICE OF RN EA 15 MIN: HCPCS

## 2020-03-02 ENCOUNTER — HOME CARE VISIT (OUTPATIENT)
Dept: HOME HEALTH SERVICES | Facility: HOME HEALTH | Age: 70
End: 2020-03-02
Payer: MEDICARE

## 2020-03-02 ENCOUNTER — HOME CARE VISIT (OUTPATIENT)
Dept: SCHEDULING | Facility: HOME HEALTH | Age: 70
End: 2020-03-02
Payer: MEDICARE

## 2020-03-02 VITALS
OXYGEN SATURATION: 98 % | TEMPERATURE: 98.4 F | HEART RATE: 84 BPM | SYSTOLIC BLOOD PRESSURE: 150 MMHG | DIASTOLIC BLOOD PRESSURE: 90 MMHG | RESPIRATION RATE: 16 BRPM

## 2020-03-02 PROCEDURE — G0151 HHCP-SERV OF PT,EA 15 MIN: HCPCS

## 2020-03-02 PROCEDURE — G0299 HHS/HOSPICE OF RN EA 15 MIN: HCPCS

## 2020-03-03 VITALS
OXYGEN SATURATION: 94 % | DIASTOLIC BLOOD PRESSURE: 71 MMHG | TEMPERATURE: 98.9 F | SYSTOLIC BLOOD PRESSURE: 134 MMHG | HEART RATE: 90 BPM

## 2020-03-04 ENCOUNTER — HOME CARE VISIT (OUTPATIENT)
Dept: SCHEDULING | Facility: HOME HEALTH | Age: 70
End: 2020-03-04
Payer: MEDICARE

## 2020-03-04 VITALS
DIASTOLIC BLOOD PRESSURE: 71 MMHG | HEART RATE: 85 BPM | SYSTOLIC BLOOD PRESSURE: 134 MMHG | TEMPERATURE: 98.9 F | RESPIRATION RATE: 20 BRPM | OXYGEN SATURATION: 97 %

## 2020-03-04 PROCEDURE — G0157 HHC PT ASSISTANT EA 15: HCPCS

## 2020-03-04 PROCEDURE — G0299 HHS/HOSPICE OF RN EA 15 MIN: HCPCS

## 2020-03-05 ENCOUNTER — HOME CARE VISIT (OUTPATIENT)
Dept: HOME HEALTH SERVICES | Facility: HOME HEALTH | Age: 70
End: 2020-03-05
Payer: MEDICARE

## 2020-03-06 ENCOUNTER — HOME CARE VISIT (OUTPATIENT)
Dept: SCHEDULING | Facility: HOME HEALTH | Age: 70
End: 2020-03-06
Payer: MEDICARE

## 2020-03-06 VITALS
SYSTOLIC BLOOD PRESSURE: 130 MMHG | TEMPERATURE: 97.8 F | OXYGEN SATURATION: 98 % | DIASTOLIC BLOOD PRESSURE: 76 MMHG | HEART RATE: 83 BPM | RESPIRATION RATE: 18 BRPM

## 2020-03-06 PROCEDURE — G0299 HHS/HOSPICE OF RN EA 15 MIN: HCPCS

## 2020-03-07 ENCOUNTER — HOME CARE VISIT (OUTPATIENT)
Dept: SCHEDULING | Facility: HOME HEALTH | Age: 70
End: 2020-03-07
Payer: MEDICARE

## 2020-03-07 PROCEDURE — A4216 STERILE WATER/SALINE, 10 ML: HCPCS

## 2020-03-07 PROCEDURE — A4452 WATERPROOF TAPE: HCPCS

## 2020-03-07 PROCEDURE — A6252 ABSORPT DRG >16 <=48 W/O BDR: HCPCS

## 2020-03-07 PROCEDURE — G0157 HHC PT ASSISTANT EA 15: HCPCS

## 2020-03-08 VITALS
DIASTOLIC BLOOD PRESSURE: 82 MMHG | SYSTOLIC BLOOD PRESSURE: 138 MMHG | HEART RATE: 87 BPM | SYSTOLIC BLOOD PRESSURE: 150 MMHG | TEMPERATURE: 98.2 F | RESPIRATION RATE: 19 BRPM | DIASTOLIC BLOOD PRESSURE: 80 MMHG | OXYGEN SATURATION: 97 % | RESPIRATION RATE: 19 BRPM | TEMPERATURE: 98.6 F | OXYGEN SATURATION: 98 % | HEART RATE: 60 BPM

## 2020-03-09 ENCOUNTER — HOME CARE VISIT (OUTPATIENT)
Dept: SCHEDULING | Facility: HOME HEALTH | Age: 70
End: 2020-03-09
Payer: MEDICARE

## 2020-03-09 VITALS
HEART RATE: 86 BPM | SYSTOLIC BLOOD PRESSURE: 120 MMHG | OXYGEN SATURATION: 99 % | DIASTOLIC BLOOD PRESSURE: 72 MMHG | RESPIRATION RATE: 20 BRPM | TEMPERATURE: 98.9 F

## 2020-03-09 VITALS
HEART RATE: 90 BPM | OXYGEN SATURATION: 97 % | TEMPERATURE: 98.4 F | RESPIRATION RATE: 16 BRPM | SYSTOLIC BLOOD PRESSURE: 146 MMHG | DIASTOLIC BLOOD PRESSURE: 82 MMHG

## 2020-03-09 VITALS
DIASTOLIC BLOOD PRESSURE: 68 MMHG | HEART RATE: 90 BPM | TEMPERATURE: 98.4 F | OXYGEN SATURATION: 97 % | SYSTOLIC BLOOD PRESSURE: 150 MMHG

## 2020-03-09 PROCEDURE — G0152 HHCP-SERV OF OT,EA 15 MIN: HCPCS

## 2020-03-09 PROCEDURE — G0299 HHS/HOSPICE OF RN EA 15 MIN: HCPCS

## 2020-03-10 ENCOUNTER — HOME CARE VISIT (OUTPATIENT)
Dept: SCHEDULING | Facility: HOME HEALTH | Age: 70
End: 2020-03-10
Payer: MEDICARE

## 2020-03-10 VITALS
HEART RATE: 86 BPM | DIASTOLIC BLOOD PRESSURE: 83 MMHG | TEMPERATURE: 98.6 F | RESPIRATION RATE: 18 BRPM | OXYGEN SATURATION: 98 % | SYSTOLIC BLOOD PRESSURE: 158 MMHG

## 2020-03-10 PROCEDURE — G0157 HHC PT ASSISTANT EA 15: HCPCS

## 2020-03-11 ENCOUNTER — HOME CARE VISIT (OUTPATIENT)
Dept: SCHEDULING | Facility: HOME HEALTH | Age: 70
End: 2020-03-11
Payer: MEDICARE

## 2020-03-11 PROCEDURE — G0299 HHS/HOSPICE OF RN EA 15 MIN: HCPCS

## 2020-03-13 ENCOUNTER — HOME CARE VISIT (OUTPATIENT)
Dept: SCHEDULING | Facility: HOME HEALTH | Age: 70
End: 2020-03-13
Payer: MEDICARE

## 2020-03-13 ENCOUNTER — HOME CARE VISIT (OUTPATIENT)
Dept: HOME HEALTH SERVICES | Facility: HOME HEALTH | Age: 70
End: 2020-03-13
Payer: MEDICARE

## 2020-03-13 PROCEDURE — G0299 HHS/HOSPICE OF RN EA 15 MIN: HCPCS

## 2020-03-14 VITALS
TEMPERATURE: 98.7 F | TEMPERATURE: 99.1 F | DIASTOLIC BLOOD PRESSURE: 90 MMHG | SYSTOLIC BLOOD PRESSURE: 158 MMHG | HEART RATE: 91 BPM | SYSTOLIC BLOOD PRESSURE: 110 MMHG | OXYGEN SATURATION: 97 % | HEART RATE: 88 BPM | OXYGEN SATURATION: 99 % | RESPIRATION RATE: 20 BRPM | DIASTOLIC BLOOD PRESSURE: 50 MMHG

## 2020-03-16 ENCOUNTER — HOME CARE VISIT (OUTPATIENT)
Dept: SCHEDULING | Facility: HOME HEALTH | Age: 70
End: 2020-03-16
Payer: MEDICARE

## 2020-03-16 PROCEDURE — G0299 HHS/HOSPICE OF RN EA 15 MIN: HCPCS

## 2020-03-17 ENCOUNTER — HOME CARE VISIT (OUTPATIENT)
Dept: SCHEDULING | Facility: HOME HEALTH | Age: 70
End: 2020-03-17
Payer: MEDICARE

## 2020-03-17 PROCEDURE — G0151 HHCP-SERV OF PT,EA 15 MIN: HCPCS

## 2020-03-18 ENCOUNTER — HOME CARE VISIT (OUTPATIENT)
Dept: SCHEDULING | Facility: HOME HEALTH | Age: 70
End: 2020-03-18
Payer: MEDICARE

## 2020-03-18 ENCOUNTER — OFFICE VISIT (OUTPATIENT)
Dept: SURGERY | Age: 70
End: 2020-03-18

## 2020-03-18 VITALS — BODY MASS INDEX: 23.62 KG/M2 | HEIGHT: 70 IN | RESPIRATION RATE: 18 BRPM | TEMPERATURE: 98.3 F | WEIGHT: 165 LBS

## 2020-03-18 DIAGNOSIS — Z09 POSTOPERATIVE EXAMINATION: Primary | ICD-10-CM

## 2020-03-19 VITALS
HEART RATE: 98 BPM | DIASTOLIC BLOOD PRESSURE: 94 MMHG | RESPIRATION RATE: 20 BRPM | TEMPERATURE: 98.8 F | SYSTOLIC BLOOD PRESSURE: 170 MMHG | OXYGEN SATURATION: 97 %

## 2020-03-19 VITALS
TEMPERATURE: 98.6 F | HEART RATE: 94 BPM | OXYGEN SATURATION: 98 % | DIASTOLIC BLOOD PRESSURE: 98 MMHG | SYSTOLIC BLOOD PRESSURE: 131 MMHG

## 2020-03-20 ENCOUNTER — HOME CARE VISIT (OUTPATIENT)
Dept: SCHEDULING | Facility: HOME HEALTH | Age: 70
End: 2020-03-20
Payer: MEDICARE

## 2020-03-20 PROCEDURE — G0299 HHS/HOSPICE OF RN EA 15 MIN: HCPCS

## 2020-03-23 ENCOUNTER — HOME CARE VISIT (OUTPATIENT)
Dept: SCHEDULING | Facility: HOME HEALTH | Age: 70
End: 2020-03-23
Payer: MEDICARE

## 2020-03-23 VITALS
OXYGEN SATURATION: 98 % | RESPIRATION RATE: 20 BRPM | TEMPERATURE: 98.6 F | HEART RATE: 97 BPM | DIASTOLIC BLOOD PRESSURE: 84 MMHG | SYSTOLIC BLOOD PRESSURE: 134 MMHG

## 2020-03-23 NOTE — PROGRESS NOTES
[address] [place] [address] [facility]  General Surgery    Name: Alivia Ingram MRN: [id number]   : 1950 [facility]   Date: 3/23/2020 Admission Date: No admission date for patient encounter. Subjective:  Patient feels well. He denies any nausea vomiting diarrhea, constipation he denies fever or chills. Awake and alert, oriented x4, no apparent distress. Objective:  Vitals:    20 1531   Resp: 18   Temp: 98.3 °F (36.8 °C)   TempSrc: Oral   Weight: 74.8 kg (165 lb)   Height: 5' 10\" (1.778 m)       Physical Exam:    General: Awake and alert, oriented x4, no apparent distress   Abdomen: abdomen is soft with minimal tenderness. Incision(s) are C/D/I. No   masses, organomegaly or guarding    Current Medications:  Current Outpatient Medications   Medication Sig Dispense Refill    docusate sodium (COLACE) 100 mg capsule Take 100 mg by mouth two (2) times a day.  metFORMIN (GLUCOPHAGE) 1,000 mg tablet Take 1,000 mg by mouth two (2) times daily (with meals).  cholecalciferol (VITAMIN D3) (1000 Units /25 mcg) tablet Take 400 Units by mouth daily.  nebivolol (BYSTOLIC) 5 mg tablet Take 5 mg by mouth daily.  losartan (COZAAR) 50 mg tablet Take 100 mg by mouth daily.  docusate sodium (COLACE) 100 mg capsule Take 1 Cap by mouth two (2) times a day for 90 days. 60 Cap 2    omega 3-DHA-EPA-fish oil (FISH OIL) 1,000 mg (120 mg-180 mg) capsule Take 1 Cap by mouth daily.  multivitamin, tx-iron-ca-min (THERA-M W/ IRON) 9 mg iron-400 mcg tab tablet Take 1 Tab by mouth daily.  latanoprost (XALATAN) 0.005 % ophthalmic solution Administer 1 Drop to both eyes nightly.  beclomethasone dipropionate (QNASL) 80 mcg/actuation HFAA 80 mcg by Nasal route daily. Directions on at home label are as follows: Use 2 sprays in each Nostril Daily      metFORMIN (GLUCOPHAGE) 1,000 mg tablet Take 1,000 mg by mouth two (2) times daily (with meals).  do not take morning of surgery (12/4/180.  losartan (COZAAR) 50 mg tablet Take 50 mg by mouth daily.  nebivolol (BYSTOLIC) 5 mg tablet Take 5 mg by mouth daily. Indications: Sinus Tachycardia      omeprazole (PRILOSEC) 20 mg capsule Take 20 mg by mouth daily.  Dexlansoprazole 60 mg CpDB Take 1 Cap by mouth every evening.  calcium-cholecalciferol, d3, 600-125 mg-unit tab Take 1 Tab by mouth daily.  IRON, FERROUS SULFATE, PO Take 325 mg by mouth every Monday, Wednesday, Friday. 3 times a week          Chart and notes reviewed. Data reviewed. I have evaluated and examined the patient. IMPRESSION:   ·  patient doing well 1 month out from repair of complex incisional hernia. PLAN:/DISCUSION:   · Follow-up in 4 weeks.         Wyatt Best MD

## 2020-03-25 ENCOUNTER — HOME CARE VISIT (OUTPATIENT)
Dept: SCHEDULING | Facility: HOME HEALTH | Age: 70
End: 2020-03-25
Payer: MEDICARE

## 2020-03-25 PROCEDURE — G0299 HHS/HOSPICE OF RN EA 15 MIN: HCPCS

## 2020-03-30 VITALS
RESPIRATION RATE: 20 BRPM | TEMPERATURE: 98.8 F | SYSTOLIC BLOOD PRESSURE: 148 MMHG | HEART RATE: 100 BPM | DIASTOLIC BLOOD PRESSURE: 80 MMHG | OXYGEN SATURATION: 98 %

## 2020-04-21 ENCOUNTER — VIRTUAL VISIT (OUTPATIENT)
Dept: SURGERY | Age: 70
End: 2020-04-21

## 2020-04-21 VITALS
DIASTOLIC BLOOD PRESSURE: 73 MMHG | BODY MASS INDEX: 21.9 KG/M2 | HEIGHT: 70 IN | HEART RATE: 88 BPM | SYSTOLIC BLOOD PRESSURE: 114 MMHG | WEIGHT: 153 LBS

## 2020-04-21 DIAGNOSIS — Z09 POSTOPERATIVE EXAMINATION: Primary | ICD-10-CM

## 2020-04-21 NOTE — PROGRESS NOTES
[employer] [facility]  General Surgery    Name: Stephanie Field MRN: [id number]   : 1950 Location: [facility]   Date: 2020         Virtal Visit  Informed consent was obtained from the patient for a telehealth visit on via [facility] the patient understands that the charges may be billed for this visit if care is given outside of the global period. Visit is performed with the patient at home. I am in the office. Start time: 1300 hrs  End time: 1315 hrs  Subjective:  Patient without complaints. Objective:    Physical Exam:    General: Awake and alert, oriented x4   Neuro: Cranial nerves II through XII intact    Current Medications:  Current Outpatient Medications   Medication Sig Dispense Refill    docusate sodium (COLACE) 100 mg capsule Take 100 mg by mouth two (2) times a day.  metFORMIN (GLUCOPHAGE) 1,000 mg tablet Take 1,000 mg by mouth two (2) times daily (with meals).  cholecalciferol (VITAMIN D3) (1000 Units /25 mcg) tablet Take 400 Units by mouth daily.  losartan (COZAAR) 50 mg tablet Take 100 mg by mouth daily.  docusate sodium (COLACE) 100 mg capsule Take 1 Cap by mouth two (2) times a day for 90 days. 60 Cap 2    omega 3-DHA-EPA-fish oil (FISH OIL) 1,000 mg (120 mg-180 mg) capsule Take 1 Cap by mouth daily.  multivitamin, tx-iron-ca-min (THERA-M W/ IRON) 9 mg iron-400 mcg tab tablet Take 1 Tab by mouth daily.  latanoprost (XALATAN) 0.005 % ophthalmic solution Administer 1 Drop to both eyes nightly.  beclomethasone dipropionate (QNASL) 80 mcg/actuation HFAA 80 mcg by Nasal route daily. Directions on at home label are as follows: Use 2 sprays in each Nostril Daily      metFORMIN (GLUCOPHAGE) 1,000 mg tablet Take 1,000 mg by mouth two (2) times daily (with meals). do not take morning of surgery (.  nebivolol (BYSTOLIC) 5 mg tablet Take 10 mg by mouth daily.  Indications: Sinus Tachycardia      omeprazole (PRILOSEC) 20 mg capsule Take 20 mg by mouth daily.  Dexlansoprazole 60 mg CpDB Take 1 Cap by mouth every evening.  calcium-cholecalciferol, d3, 600-125 mg-unit tab Take 1 Tab by mouth daily.  IRON, FERROUS SULFATE, PO Take 325 mg by mouth every Monday, Wednesday, Friday. 3 times a week       nebivolol (BYSTOLIC) 5 mg tablet Take 5 mg by mouth daily.  losartan (COZAAR) 50 mg tablet Take 50 mg by mouth daily. Chart and notes reviewed. Data reviewed. I have evaluated and examined the patient. IMPRESSION:   · Patient doing well.       PLAN:/DISCUSION:   · Follow-up CHILANGO Calderón MD

## 2020-06-22 ENCOUNTER — OFFICE VISIT (OUTPATIENT)
Dept: SURGERY | Age: 70
End: 2020-06-22

## 2020-06-22 VITALS
BODY MASS INDEX: 23.62 KG/M2 | RESPIRATION RATE: 16 BRPM | WEIGHT: 165 LBS | OXYGEN SATURATION: 99 % | HEART RATE: 91 BPM | DIASTOLIC BLOOD PRESSURE: 86 MMHG | SYSTOLIC BLOOD PRESSURE: 124 MMHG | HEIGHT: 70 IN | TEMPERATURE: 97.5 F

## 2020-06-22 DIAGNOSIS — Z08 ENCOUNTER FOR ROUTINE CANCER FOLLOW-UP: Primary | ICD-10-CM

## 2020-06-22 DIAGNOSIS — Z85.038 PERSONAL HISTORY OF COLON CANCER: ICD-10-CM

## 2020-06-22 NOTE — PROGRESS NOTES
Subjective: Did not have his colonoscopy because he had a ventral hernia repair. This is currently scheduled for later this month. Tolerating diet moving his bowels. Strength is near normal.    Past medical history and ROS were reviewed and unchanged. Abdomen: Soft, nontender nondistended  All wounds healed, no hernia    CT C/A/P 12/2019 negative for mets  CEA in 12/2019 is 4.6     Assessment / Plan     S/p Robotic sigmoid colectomy for T1N0 malignant polyp of the sigmoid colon 64/5022 complicated by anastomotic leak  S/P Loop ileostomy reversal 7/2019  Check CEA  Follow-up in December of this year, repeat CT imaging after that    The diagnoses and plan were discussed with patient. All questions answered. Plan of care agreed to by all concerned.

## 2020-08-13 ENCOUNTER — HOSPITAL ENCOUNTER (OUTPATIENT)
Dept: LAB | Age: 70
Discharge: HOME | End: 2020-08-13
Payer: MEDICARE

## 2020-08-13 DIAGNOSIS — Z85.038 PERSONAL HISTORY OF COLON CANCER: ICD-10-CM

## 2020-08-13 LAB — CEA SERPL-MCNC: 3 NG/ML

## 2020-08-13 PROCEDURE — 36415 COLL VENOUS BLD VENIPUNCTURE: CPT

## 2020-08-13 PROCEDURE — 82378 CARCINOEMBRYONIC ANTIGEN: CPT

## 2020-08-31 ENCOUNTER — HOSPITAL ENCOUNTER (OUTPATIENT)
Dept: LAB | Age: 70
Discharge: HOME | End: 2020-08-31
Payer: MEDICARE

## 2020-08-31 PROCEDURE — 87635 SARS-COV-2 COVID-19 AMP PRB: CPT

## 2020-09-01 LAB — SARS-COV-2, COV2NT: NOT DETECTED

## 2020-09-02 RX ORDER — ASCORBIC ACID 500 MG
500 TABLET ORAL DAILY
COMMUNITY

## 2020-09-03 ENCOUNTER — ANESTHESIA EVENT (OUTPATIENT)
Dept: ENDOSCOPY | Age: 70
End: 2020-09-03
Payer: MEDICARE

## 2020-09-04 ENCOUNTER — ANESTHESIA (OUTPATIENT)
Dept: ENDOSCOPY | Age: 70
End: 2020-09-04
Payer: MEDICARE

## 2020-09-04 ENCOUNTER — HOSPITAL ENCOUNTER (OUTPATIENT)
Age: 70
Setting detail: OUTPATIENT SURGERY
Discharge: HOME | End: 2020-09-04
Attending: INTERNAL MEDICINE | Admitting: INTERNAL MEDICINE
Payer: MEDICARE

## 2020-09-04 VITALS
OXYGEN SATURATION: 100 % | RESPIRATION RATE: 18 BRPM | BODY MASS INDEX: 23.05 KG/M2 | HEART RATE: 70 BPM | DIASTOLIC BLOOD PRESSURE: 73 MMHG | SYSTOLIC BLOOD PRESSURE: 118 MMHG | WEIGHT: 161 LBS | TEMPERATURE: 96.9 F | HEIGHT: 70 IN

## 2020-09-04 LAB
GLUCOSE BLD STRIP.AUTO-MCNC: 103 MG/DL (ref 70–110)
GLUCOSE BLD STRIP.AUTO-MCNC: 96 MG/DL (ref 70–110)

## 2020-09-04 PROCEDURE — 74011250636 HC RX REV CODE- 250/636: Performed by: NURSE ANESTHETIST, CERTIFIED REGISTERED

## 2020-09-04 PROCEDURE — 76060000031 HC ANESTHESIA FIRST 0.5 HR: Performed by: INTERNAL MEDICINE

## 2020-09-04 PROCEDURE — 77030029384 HC SNR POLYP CAPTVR II BSC -B: Performed by: INTERNAL MEDICINE

## 2020-09-04 PROCEDURE — 77030008565 HC TBNG SUC IRR ERBE -B: Performed by: INTERNAL MEDICINE

## 2020-09-04 PROCEDURE — 74011250637 HC RX REV CODE- 250/637: Performed by: NURSE ANESTHETIST, CERTIFIED REGISTERED

## 2020-09-04 PROCEDURE — 77030021593 HC FCPS BIOP ENDOSC BSC -A: Performed by: INTERNAL MEDICINE

## 2020-09-04 PROCEDURE — 76040000019: Performed by: INTERNAL MEDICINE

## 2020-09-04 PROCEDURE — 77030013992 HC SNR POLYP ENDOSC BSC -B: Performed by: INTERNAL MEDICINE

## 2020-09-04 PROCEDURE — 74011000250 HC RX REV CODE- 250: Performed by: NURSE ANESTHETIST, CERTIFIED REGISTERED

## 2020-09-04 PROCEDURE — 88305 TISSUE EXAM BY PATHOLOGIST: CPT

## 2020-09-04 PROCEDURE — 82962 GLUCOSE BLOOD TEST: CPT

## 2020-09-04 RX ORDER — DEXTROSE 50 % IN WATER (D50W) INTRAVENOUS SYRINGE
25-50 AS NEEDED
Status: DISCONTINUED | OUTPATIENT
Start: 2020-09-04 | End: 2020-09-04 | Stop reason: HOSPADM

## 2020-09-04 RX ORDER — SODIUM CHLORIDE, SODIUM LACTATE, POTASSIUM CHLORIDE, CALCIUM CHLORIDE 600; 310; 30; 20 MG/100ML; MG/100ML; MG/100ML; MG/100ML
50 INJECTION, SOLUTION INTRAVENOUS CONTINUOUS
Status: DISCONTINUED | OUTPATIENT
Start: 2020-09-04 | End: 2020-09-04 | Stop reason: HOSPADM

## 2020-09-04 RX ORDER — SODIUM CHLORIDE 0.9 % (FLUSH) 0.9 %
5-40 SYRINGE (ML) INJECTION EVERY 8 HOURS
Status: DISCONTINUED | OUTPATIENT
Start: 2020-09-04 | End: 2020-09-04 | Stop reason: HOSPADM

## 2020-09-04 RX ORDER — SODIUM CHLORIDE, SODIUM LACTATE, POTASSIUM CHLORIDE, CALCIUM CHLORIDE 600; 310; 30; 20 MG/100ML; MG/100ML; MG/100ML; MG/100ML
25 INJECTION, SOLUTION INTRAVENOUS CONTINUOUS
Status: DISCONTINUED | OUTPATIENT
Start: 2020-09-04 | End: 2020-09-04 | Stop reason: HOSPADM

## 2020-09-04 RX ORDER — FAMOTIDINE 20 MG/1
20 TABLET, FILM COATED ORAL ONCE
Status: COMPLETED | OUTPATIENT
Start: 2020-09-04 | End: 2020-09-04

## 2020-09-04 RX ORDER — INSULIN LISPRO 100 [IU]/ML
INJECTION, SOLUTION INTRAVENOUS; SUBCUTANEOUS ONCE
Status: DISCONTINUED | OUTPATIENT
Start: 2020-09-04 | End: 2020-09-04 | Stop reason: HOSPADM

## 2020-09-04 RX ORDER — LIDOCAINE HYDROCHLORIDE 10 MG/ML
0.1 INJECTION, SOLUTION EPIDURAL; INFILTRATION; INTRACAUDAL; PERINEURAL AS NEEDED
Status: DISCONTINUED | OUTPATIENT
Start: 2020-09-04 | End: 2020-09-04 | Stop reason: HOSPADM

## 2020-09-04 RX ORDER — LIDOCAINE HYDROCHLORIDE 20 MG/ML
INJECTION, SOLUTION EPIDURAL; INFILTRATION; INTRACAUDAL; PERINEURAL AS NEEDED
Status: DISCONTINUED | OUTPATIENT
Start: 2020-09-04 | End: 2020-09-04 | Stop reason: HOSPADM

## 2020-09-04 RX ORDER — SODIUM CHLORIDE 0.9 % (FLUSH) 0.9 %
5-40 SYRINGE (ML) INJECTION AS NEEDED
Status: DISCONTINUED | OUTPATIENT
Start: 2020-09-04 | End: 2020-09-04 | Stop reason: HOSPADM

## 2020-09-04 RX ORDER — PROPOFOL 10 MG/ML
INJECTION, EMULSION INTRAVENOUS AS NEEDED
Status: DISCONTINUED | OUTPATIENT
Start: 2020-09-04 | End: 2020-09-04 | Stop reason: HOSPADM

## 2020-09-04 RX ORDER — MAGNESIUM SULFATE 100 %
4 CRYSTALS MISCELLANEOUS AS NEEDED
Status: DISCONTINUED | OUTPATIENT
Start: 2020-09-04 | End: 2020-09-04 | Stop reason: HOSPADM

## 2020-09-04 RX ADMIN — PROPOFOL 10 MG: 10 INJECTION, EMULSION INTRAVENOUS at 10:38

## 2020-09-04 RX ADMIN — PROPOFOL 20 MG: 10 INJECTION, EMULSION INTRAVENOUS at 10:35

## 2020-09-04 RX ADMIN — PROPOFOL 100 MG: 10 INJECTION, EMULSION INTRAVENOUS at 10:32

## 2020-09-04 RX ADMIN — SODIUM CHLORIDE, SODIUM LACTATE, POTASSIUM CHLORIDE, AND CALCIUM CHLORIDE 25 ML/HR: 600; 310; 30; 20 INJECTION, SOLUTION INTRAVENOUS at 09:54

## 2020-09-04 RX ADMIN — FAMOTIDINE 20 MG: 20 TABLET ORAL at 10:02

## 2020-09-04 RX ADMIN — LIDOCAINE HYDROCHLORIDE 60 MG: 20 INJECTION, SOLUTION EPIDURAL; INFILTRATION; INTRACAUDAL; PERINEURAL at 10:38

## 2020-09-04 NOTE — ANESTHESIA POSTPROCEDURE EVALUATION
Procedure(s):  COLONOSCOPY with polypectomies. MAC    Anesthesia Post Evaluation      Multimodal analgesia: multimodal analgesia used between 6 hours prior to anesthesia start to PACU discharge  Patient location during evaluation: PACU  Patient participation: complete - patient participated  Level of consciousness: awake  Pain management: adequate  Airway patency: patent  Anesthetic complications: no  Cardiovascular status: acceptable  Respiratory status: acceptable  Hydration status: acceptable  Post anesthesia nausea and vomiting:  controlled      INITIAL Post-op Vital signs:   Vitals Value Taken Time   /67 9/4/2020 11:15 AM   Temp 36.6 °C (97.9 °F) 9/4/2020 10:56 AM   Pulse 70 9/4/2020 11:24 AM   Resp 14 9/4/2020 11:24 AM   SpO2 100 % 9/4/2020 11:24 AM   Vitals shown include unvalidated device data.

## 2020-09-04 NOTE — DISCHARGE INSTRUCTIONS
DISCHARGE SUMMARY from Nurse    PATIENT INSTRUCTIONS:    After general anesthesia or intravenous sedation, for 24 hours or while taking prescription Narcotics:  · Limit your activities  · Do not drive and operate hazardous machinery  · Do not make important personal or business decisions  · Do  not drink alcoholic beverages  · If you have not urinated within 8 hours after discharge, please contact your surgeon on call. Report the following to your surgeon:  · Excessive pain, swelling, redness or odor of or around the surgical area  · Temperature over 100.5  · Nausea and vomiting lasting longer than 4 hours or if unable to take medications  · Any signs of decreased circulation or nerve impairment to extremity: change in color, persistent  numbness, tingling, coldness or increase pain  · Any questions    What to do at Home:  Recommended activity: Activity as tolerated and no driving for today. *  Please give a list of your current medications to your Primary Care Provider. *  Please update this list whenever your medications are discontinued, doses are      changed, or new medications (including over-the-counter products) are added. *  Please carry medication information at all times in case of emergency situations. These are general instructions for a healthy lifestyle:    No smoking/ No tobacco products/ Avoid exposure to second hand smoke  Surgeon General's Warning:  Quitting smoking now greatly reduces serious risk to your health. Obesity, smoking, and sedentary lifestyle greatly increases your risk for illness    A healthy diet, regular physical exercise & weight monitoring are important for maintaining a healthy lifestyle    You may be retaining fluid if you have a history of heart failure or if you experience any of the following symptoms:  Weight gain of 3 pounds or more overnight or 5 pounds in a week, increased swelling in our hands or feet or shortness of breath while lying flat in bed. Please call your doctor as soon as you notice any of these symptoms; do not wait until your next office visit. The discharge information has been reviewed with the patient. The patient verbalized understanding. Discharge medications reviewed with the patient and appropriate educational materials and side effects teaching were provided. ___________________________________________________________________________________________________________________________________    Patient Education        Colonoscopy: What to Expect at Home  Your Recovery  After a colonoscopy, you'll stay at the clinic for 1 to 2 hours until the medicines wear off. Then you can go home. But you'll need to arrange for a ride. Your doctor will tell you when you can eat and do your other usual activities. Your doctor will talk to you about when you'll need your next colonoscopy. Your doctor can help you decide how often you need to be checked. This will depend on the results of your test and your risk for colorectal cancer. After the test, you may be bloated or have gas pains. You may need to pass gas. If a biopsy was done or a polyp was removed, you may have streaks of blood in your stool (feces) for a few days. Problems such as heavy rectal bleeding may not occur until several weeks after the test. This isn't common. But it can happen after polyps are removed. This care sheet gives you a general idea about how long it will take for you to recover. But each person recovers at a different pace. Follow the steps below to get better as quickly as possible. How can you care for yourself at home? Activity    · Rest when you feel tired.     · You can do your normal activities when it feels okay to do so. Diet    · Follow your doctor's directions for eating.     · Unless your doctor has told you not to, drink plenty of fluids. This helps to replace the fluids that were lost during the colon prep.     · Do not drink alcohol. Medicines    · Your doctor will tell you if and when you can restart your medicines. He or she will also give you instructions about taking any new medicines.     · If you take aspirin or some other blood thinner, ask your doctor if and when to start taking it again. Make sure that you understand exactly what your doctor wants you to do.     · If polyps were removed or a biopsy was done during the test, your doctor may tell you not to take aspirin or other anti-inflammatory medicines for a few days. These include ibuprofen (Advil, Motrin) and naproxen (Aleve). Other instructions    · For your safety, do not drive or operate machinery until the medicine wears off and you can think clearly. Your doctor may tell you not to drive or operate machinery until the day after your test.     · Do not sign legal documents or make major decisions until the medicine wears off and you can think clearly. The anesthesia can make it hard for you to fully understand what you are agreeing to. Follow-up care is a key part of your treatment and safety. Be sure to make and go to all appointments, and call your doctor if you are having problems. It's also a good idea to know your test results and keep a list of the medicines you take. When should you call for help? Call 911 anytime you think you may need emergency care. For example, call if:    · You passed out (lost consciousness).     · You pass maroon or bloody stools.     · You have trouble breathing. Call your doctor now or seek immediate medical care if:    · You have pain that does not get better after you take pain medicine.     · You are sick to your stomach or cannot drink fluids.     · You have new or worse belly pain.     · You have blood in your stools.     · You have a fever.     · You cannot pass stools or gas. Watch closely for changes in your health, and be sure to contact your doctor if you have any problems. Where can you learn more?   Go to http://ar-mariaa.info/  Enter E264 in the search box to learn more about \"Colonoscopy: What to Expect at Home. \"  Current as of: April 29, 2020               Content Version: 12.6  © 2006-2020 [employer]. Care instructions adapted under license by [employer] (which disclaims liability or warranty for this information). If you have questions about a medical condition or this instruction, always ask your healthcare professional. [place] [address] any warranty or liability for your use of this information. Patient Education        Colon Polyps: Care Instructions  Your Care Instructions     Colon polyps are growths in the colon or the rectum. The cause of most colon polyps is not known, and most people who get them do not have any problems. But a certain kind can turn into cancer. For this reason, regular testing for colon polyps is important for people as they get older. It is also important for anyone who has an increased risk for colon cancer. Polyps are usually found through routine colon cancer screening tests. Although most colon polyps are not cancerous, they are usually removed and then tested for cancer. Screening for colon cancer saves lives because the cancer can usually be cured if it is caught early. If you have a polyp that is the type that can turn into cancer, you may need more tests to examine your entire colon. The doctor will remove any other polyps that he or she finds, and you will be tested more often. Follow-up care is a key part of your treatment and safety. Be sure to make and go to all appointments, and call your doctor if you are having problems. It's also a good idea to know your test results and keep a list of the medicines you take. How can you care for yourself at home? Regular exams to look for colon polyps are the best way to prevent polyps from turning into colon cancer.  These can include stool tests, sigmoidoscopy, colonoscopy, and CT colonography. Talk with your doctor about a testing schedule that is right for you. To prevent polyps  There is no home treatment that can prevent colon polyps. But these steps may help lower your risk for cancer. · Stay active. Being active can help you get to and stay at a healthy weight. Try to exercise on most days of the week. Walking is a good choice. · Eat well. Choose a variety of vegetables, fruits, legumes (such as peas and beans), fish, poultry, and whole grains. · Do not smoke. If you need help quitting, talk to your doctor about stop-smoking programs and medicines. These can increase your chances of quitting for good. · If you drink alcohol, limit how much you drink. Limit alcohol to 2 drinks a day for men and 1 drink a day for women. When should you call for help? Call your doctor now or seek immediate medical care if:    · You have severe belly pain.     · Your stools are maroon or very bloody. Watch closely for changes in your health, and be sure to contact your doctor if:    · You have a fever.     · You have nausea or vomiting.     · You have a change in bowel habits (new constipation or diarrhea).     · Your symptoms get worse or are not improving as expected. Where can you learn more? Go to http://www.sigala.com/  Enter C571 in the search box to learn more about \"Colon Polyps: Care Instructions. \"  Current as of: April 29, 2020               Content Version: 12.6  © 4050-0443 [employer]. Care instructions adapted under license by [employer] (which disclaims liability or warranty for this information). If you have questions about a medical condition or this instruction, always ask your healthcare professional. [place] [address] any warranty or liability for your use of this information.   Patient Education        Hemorrhoids: Care Instructions  Your Care Instructions     Hemorrhoids are enlarged veins that develop in the anal canal. Bleeding during bowel movements, itching, swelling, and rectal pain are the most common symptoms. They can be uncomfortable at times, but hemorrhoids rarely are a serious problem. You can treat most hemorrhoids with simple changes to your diet and bowel habits. These changes include eating more fiber and not straining to pass stools. Most hemorrhoids do not need surgery or other treatment unless they are very large and painful or bleed a lot. Follow-up care is a key part of your treatment and safety. Be sure to make and go to all appointments, and call your doctor if you are having problems. It's also a good idea to know your test results and keep a list of the medicines you take. How can you care for yourself at home? · Sit in a few inches of warm water (sitz bath) 3 times a day and after bowel movements. The warm water helps with pain and itching. · Put ice on your anal area several times a day for 10 minutes at a time. Put a thin cloth between the ice and your skin. Follow this by placing a warm, wet towel on the area for another 10 to 20 minutes. · Take pain medicines exactly as directed. ? If the doctor gave you a prescription medicine for pain, take it as prescribed. ? If you are not taking a prescription pain medicine, ask your doctor if you can take an over-the-counter medicine. · Keep the anal area clean, but be gentle. Use water and a fragrance-free soap, such as [place], or use baby wipes or medicated pads, such as Tucks. · Wear cotton underwear and loose clothing to decrease moisture in the anal area. · Eat more fiber. Include foods such as whole-grain breads and cereals, raw vegetables, raw and dried fruits, and beans. · Drink plenty of fluids, enough so that your urine is light yellow or clear like water.  If you have kidney, heart, or liver disease and have to limit fluids, talk with your doctor before you increase the amount of fluids you drink.  · Use a stool softener that contains bran or psyllium. You can save money by buying bran or psyllium (available in bulk at most health food stores) and sprinkling it on foods or stirring it into fruit juice. Or you can use a product such as Metamucil or Hydrocil. · Practice healthy bowel habits. ? Go to the bathroom as soon as you have the urge. ? Avoid straining to pass stools. Relax and give yourself time to let things happen naturally. ? Do not hold your breath while passing stools. ? Do not read while sitting on the toilet. Get off the toilet as soon as you have finished. · Take your medicines exactly as prescribed. Call your doctor if you think you are having a problem with your medicine. When should you call for help? Call 911 anytime you think you may need emergency care. For example, call if:    · You pass maroon or very bloody stools. Call your doctor now or seek immediate medical care if:    · You have increased pain.     · You have increased bleeding. Watch closely for changes in your health, and be sure to contact your doctor if:    · Your symptoms have not improved after 3 or 4 days. Where can you learn more? Go to http://ar-mariaa.info/  Enter F228 in the search box to learn more about \"Hemorrhoids: Care Instructions. \"  Current as of: April 15, 2020               Content Version: 12.6  © 0022-9766 [employer]. Care instructions adapted under license by [employer] (which disclaims liability or warranty for this information). If you have questions about a medical condition or this instruction, always ask your healthcare professional. [place] [address] any warranty or liability for your use of this information.

## 2020-09-04 NOTE — PROGRESS NOTES
[email or web address]  [place], [address]      Brief Procedure Note    Omega Darrian  1950  [id number]    Date of Procedure: 9/4/2020    Preoperative diagnosis: Personal history of other malignant neoplasms of large intestine:   V10.05 - Z85.038  Incisional hernia:  553.21 - K43.2  Hemorrhoids (internal without complication):   866.7 - K64.8    Postoperative diagnosis: Ascending colon poylp  Transverse polyps  internal hemmorhoids    Type of Anesthesia: MAC (Monitored anesthesia care)    Description of findings: same as post op dx    Procedure: Procedure(s):  COLONOSCOPY with polypectomies    :  Dr. Bennie Gtz MD    Assistant(s): Endoscopy Technician-1: Britany Duong  Endoscopy RN-1: Florence Reyes RN  Float Staff: Shelby Jordan;  Jadon Cohn RN    Devices/implants/grafts/tissues/prosthesis: None    EBL:None    Specimens:   ID Type Source Tests Collected by Time Destination   1 : ascending colon polyp Preservative Colon, Ascending  Janeth Jimenez MD 9/4/2020 1038 Pathology   2 : Transverse polyps Preservative Colon, [place]  Janeth Jimenez MD 9/4/2020 1045 Pathology       Findings: See printed and scanned procedure note    Complications: None    Dr. Bennie Gtz MD  9/4/2020  11:06 AM

## 2020-09-04 NOTE — ANESTHESIA PREPROCEDURE EVALUATION
Relevant Problems   No relevant active problems       Anesthetic History   No history of anesthetic complications       Comments: ENT Cancer  No XRT  Edentulous  Easy intubation earlier this year     Review of Systems / Medical History  Patient summary reviewed and pertinent labs reviewed    Pulmonary  Within defined limits                 Neuro/Psych   Within defined limits           Cardiovascular    Hypertension: well controlled              Exercise tolerance: >4 METS  Comments: ENT cancer involving vocal cords  No XRT  Easy intubation earlier this year for hernia  No teeth   GI/Hepatic/Renal     GERD: well controlled           Endo/Other    Diabetes: well controlled, type 2    Cancer     Other Findings              Physical Exam    Airway    TM Distance: 4 - 6 cm  Neck ROM: normal range of motion   Mouth opening: Diminished (comment)     Cardiovascular    Rhythm: regular  Rate: normal         Dental    Dentition: Full lower dentures and Full upper dentures     Pulmonary                Comments: Non labored Abdominal  GI exam deferred       Other Findings            Anesthetic Plan    ASA: 3  Anesthesia type: MAC            Anesthetic plan and risks discussed with: Patient and Spouse      Spouse at bedside

## 2020-09-04 NOTE — H&P
[email or web address]  [phone]    GASTROENTEROLOGY Pre-Procedure H and P      Impression/Plan:   1.  This patient is consented for a colonoscopy for personal history of colon cancer    Addendum: All lab tests orders pertaining to the procedure have been ordered by the anesthesia personnel and results will be addressed by the anesthesia team    Chief Complaint: personal history of colon cancer      HPI:  Yosef Garza is a 72 y.o. 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 Minta Jeans 11/12/2018    COLONOSCOPY with Polypectomies, Bx's, Injection, Tattooing & Clip Placement x 3 performed by Letty Piper MD at [address] [place] [address]  11/12/2018         Qian Alford  11/12/2018         ENDOSCOPIC MUCOSAL RESECT  11/12/2018         EXPLORATORY OF ABDOMEN N/A 12/11/2018    Dr. Geraldine Stephen N/A 5/8/2019    SIGMOIDOSCOPY FLEXIBLE with polypectomy performed by Renetta Rojas MD at [facility] ENDOSCOPY    HX COLONOSCOPY      HX GI      robotic sigmoid colectomy complicated by small anastomotic leak, status post diverting ileostomy    HX GI      reversal ileostomy    HX HEENT  2008    Throat Ca - 1 vocal chord removed     HX HEENT Bilateral 1970    eye surgery muscles    HX HERNIA REPAIR      HX TRACHEOSTOMY  2008    LAP,SURG,COLECTOMY, PARTIAL, W/ANAST N/A 12/04/2018     Julio César Trevizo       Social HX:   Social History     Socioeconomic History    Marital status:      Spouse name: Not on file    Number of children: Not on file    Years of education: Not on file    Highest education level: Not on file   Occupational History    Not on file   Social Needs    Financial resource strain: Not on file    Food insecurity     Worry: Not on file     Inability: Not on file    Transportation needs     Medical: Not on file     Non-medical: Not on file   Tobacco Use    Smoking status: Former Smoker     Last attempt to quit: 2008     Years since quittin.8    Smokeless tobacco: Never Used   Substance and Sexual Activity    Alcohol use: No    Drug use: No    Sexual activity: Not on file   Lifestyle    Physical activity     Days per week: Not on file     Minutes per session: Not on file    Stress: Not on file   Relationships    Social connections     Talks on phone: Not on file     Gets together: Not on file     Attends Rastafarian service: Not on file     Active member of club or organization: Not on file     Attends meetings of clubs or organizations: Not on file     Relationship status: Not on file    Intimate partner violence     Fear of current or ex partner: Not on file     Emotionally abused: Not on file     Physically abused: Not on file     Forced sexual activity: Not on file   Other Topics Concern    Not on file   Social History Narrative    Not on file       FHX:   History reviewed. No pertinent family history. Allergy:   Allergies   Allergen Reactions    Carrot Nausea and Vomiting       Home Medications:     Medications Prior to Admission   Medication Sig    ascorbic acid, vitamin C, (Vitamin C) 500 mg tablet Take 500 mg by mouth daily.  cholecalciferol (VITAMIN D3) (1000 Units /25 mcg) tablet Take 400 Units by mouth daily.  losartan (COZAAR) 50 mg tablet Take 100 mg by mouth daily.     omega 3-DHA-EPA-fish oil (FISH OIL) 1,000 mg (120 mg-180 mg) capsule Take 1 Cap by mouth daily.  multivitamin, tx-iron-ca-min (THERA-M W/ IRON) 9 mg iron-400 mcg tab tablet Take 1 Tab by mouth daily.  latanoprost (XALATAN) 0.005 % ophthalmic solution Administer 1 Drop to both eyes nightly.  beclomethasone dipropionate (QNASL) 80 mcg/actuation HFAA 80 mcg by Nasal route daily. Directions on at home label are as follows: Use 2 sprays in each Nostril Daily    metFORMIN (GLUCOPHAGE) 1,000 mg tablet Take 1,000 mg by mouth two (2) times daily (with meals). do not take morning of surgery (12/4/180.  nebivolol (BYSTOLIC) 5 mg tablet Take 10 mg by mouth daily. Indications: Sinus Tachycardia    omeprazole (PRILOSEC) 20 mg capsule Take 20 mg by mouth daily.  Dexlansoprazole 60 mg CpDB Take 1 Cap by mouth every evening.  calcium-cholecalciferol, d3, 600-125 mg-unit tab Take 1 Tab by mouth daily.  IRON, FERROUS SULFATE, PO Take 325 mg by mouth every Monday, Wednesday, Friday. 3 times a week        Review of Systems:     Constitutional: No fevers, chills, weight loss, fatigue. Skin: No rashes, pruritis, jaundice, ulcerations, erythema. HENT: No headaches, nosebleeds, sinus pressure, rhinorrhea, sore throat. Eyes: No visual changes, blurred vision, eye pain, photophobia, jaundice. Cardiovascular: No chest pain, heart palpitations. Respiratory: No cough, SOB, wheezing, chest discomfort, orthopnea. Gastrointestinal:    Genitourinary: No dysuria, bleeding, discharge, pyuria. Musculoskeletal: No weakness, arthralgias, wasting. Endo: No sweats. Heme: No bruising, easy bleeding. Allergies: As noted. Neurological: Cranial nerves intact. Alert and oriented. Gait not assessed. Psychiatric:  No anxiety, depression, hallucinations.           Visit Vitals  Ht 5' 10\" (1.778 m)   Wt 74.8 kg (165 lb)   BMI 23.68 kg/m²       Physical Assessment:     constitutional: appearance: well developed, well nourished, normal habitus, no deformities, in no acute distress. skin: inspection: no rashes, ulcers, icterus or other lesions; no clubbing or telangiectasias. palpation: no induration or subcutaneos nodules. eyes: inspection: normal conjunctivae and lids; no jaundice pupils: normal  ENMT: mouth: normal oral mucosa,lips and gums; good dentition. oropharynx: normal tongue, hard and soft palate; posterior pharynx without erithema, exudate or lesions. neck: thyroid: normal size, consistency and position; no masses or tenderness. respiratory: effort: normal chest excursion; no intercostal retraction or accessory muscle use. cardiovascular: abdominal aorta: normal size and position; no bruits. palpation: PMI of normal size and position; normal rhythm; no thrill or murmurs. abdominal: abdomen: normal consistency; no tenderness or masses. hernias: no hernias appreciated. liver: normal size and consistency. spleen: not palpable. rectal: hemoccult/guaiac: not performed. musculoskeletal: digits and nails: no clubbing, cyanosis, petechiae or other inflammatory conditions. gait: normal gait and station head and neck: normal range of motion; no pain, crepitation or contracture. spine/ribs/pelvis: normal range of motion; no pain, deformity or contracture. neurologic: cranial nerves: II-XII normal.   psychiatric: judgement/insight: within normal limits. memory: within normal limits for recent and remote events. mood and affect: no evidence of depression, anxiety or agitation. orientation: oriented to time, space and person. Basic Metabolic Profile   No results for input(s): NA, K, CL, CO2, BUN, GLU, CA, MG, PHOS in the last 72 hours. No lab exists for component: CREAT      CBC w/Diff    No results for input(s): WBC, RBC, HGB, HCT, MCV, MCH, MCHC, RDW, PLT, HGBEXT, HCTEXT, PLTEXT in the last 72 hours. No lab exists for component: MPV No results for input(s): GRANS, LYMPH, EOS, PRO, MYELO, METAS, BLAST in the last 72 hours.     No lab exists for component: MONO, BASO     Hepatic Function   No results for input(s): ALB, TP, TBILI, AP, AML, LPSE in the last 72 hours. No lab exists for component: DBILI, GPT, SGOT     Coags   No results for input(s): PTP, INR, APTT, INREXT in the last 72 hours. Leticia Patel MD.  [facility] [address]  Cell: [phone]  Direct pager: [phone]  [email or web address]  [email or web address]

## 2020-12-21 ENCOUNTER — OFFICE VISIT (OUTPATIENT)
Dept: SURGERY | Age: 70
End: 2020-12-21
Payer: MEDICARE

## 2020-12-21 VITALS
SYSTOLIC BLOOD PRESSURE: 147 MMHG | OXYGEN SATURATION: 99 % | HEIGHT: 70 IN | BODY MASS INDEX: 24.34 KG/M2 | RESPIRATION RATE: 17 BRPM | WEIGHT: 170 LBS | TEMPERATURE: 97 F | HEART RATE: 90 BPM | DIASTOLIC BLOOD PRESSURE: 82 MMHG

## 2020-12-21 DIAGNOSIS — Z85.038 PERSONAL HISTORY OF COLON CANCER: ICD-10-CM

## 2020-12-21 DIAGNOSIS — Z08 ENCOUNTER FOR ROUTINE CANCER FOLLOW-UP: Primary | ICD-10-CM

## 2020-12-21 PROCEDURE — 99213 OFFICE O/P EST LOW 20 MIN: CPT | Performed by: COLON & RECTAL SURGERY

## 2020-12-21 RX ORDER — CHLORTHALIDONE 25 MG/1
TABLET ORAL
COMMUNITY
Start: 2020-11-25

## 2020-12-21 NOTE — PROGRESS NOTES
Subjective: Hernia has been repaired. Had his colonoscopy. Tolerating diet. Moving his bowels. Past medical history and ROS were reviewed and unchanged. Abdomen: Soft, nontender nondistended  Wounds healed, no recurrent hernia    CT C/A/P 12/2019 negative for mets  CEA in 8/2020 is 3  Colonoscopy by Governor Bravo 9/2020 polyps, 3 year recall     Assessment / Plan     S/p Robotic sigmoid colectomy for T1N0 malignant polyp of the sigmoid colon 67/1409 complicated by anastomotic leak  S/P Loop ileostomy reversal 7/2019  Check CEA  CT/ C/A/P  F/U 6 months    A total of 15 minutes was spent with the patient, with >50% of time spent on counseling and coordination of care. The diagnoses and plan were discussed with patient. All questions answered. Plan of care agreed to by all concerned.

## 2021-01-07 ENCOUNTER — HOSPITAL ENCOUNTER (OUTPATIENT)
Dept: LAB | Age: 71
Discharge: HOME | End: 2021-01-07

## 2021-01-07 ENCOUNTER — HOSPITAL ENCOUNTER (OUTPATIENT)
Dept: CT IMAGING | Age: 71
Discharge: HOME | End: 2021-01-07
Attending: COLON & RECTAL SURGERY
Payer: MEDICARE

## 2021-01-07 DIAGNOSIS — Z85.038 PERSONAL HISTORY OF COLON CANCER: ICD-10-CM

## 2021-01-07 LAB — CREAT UR-MCNC: 1.6 MG/DL (ref 0.6–1.3)

## 2021-01-07 PROCEDURE — 82565 ASSAY OF CREATININE: CPT

## 2021-01-07 PROCEDURE — 74177 CT ABD & PELVIS W/CONTRAST: CPT

## 2021-01-07 PROCEDURE — 74011000636 HC RX REV CODE- 636: Performed by: COLON & RECTAL SURGERY

## 2021-01-07 RX ADMIN — IOPAMIDOL 100 ML: 612 INJECTION, SOLUTION INTRAVENOUS at 14:42

## 2021-01-08 LAB — CEA SERPL-MCNC: 3.9 NG/ML (ref 0–4.7)

## 2021-01-12 ENCOUNTER — TELEPHONE (OUTPATIENT)
Dept: SURGERY | Age: 71
End: 2021-01-12

## 2021-01-12 NOTE — TELEPHONE ENCOUNTER
----- Message from Joe Lance MD sent at 1/11/2021 11:17 AM EST -----  Call patient and let him know that CT does not show any recurrent cancer. Notified patient. He understands results.

## 2021-06-21 ENCOUNTER — OFFICE VISIT (OUTPATIENT)
Dept: SURGERY | Age: 71
End: 2021-06-21
Payer: MEDICARE

## 2021-06-21 VITALS
OXYGEN SATURATION: 98 % | HEIGHT: 70 IN | TEMPERATURE: 97.3 F | BODY MASS INDEX: 25.62 KG/M2 | SYSTOLIC BLOOD PRESSURE: 121 MMHG | DIASTOLIC BLOOD PRESSURE: 81 MMHG | WEIGHT: 179 LBS | RESPIRATION RATE: 18 BRPM | HEART RATE: 89 BPM

## 2021-06-21 DIAGNOSIS — Z85.038 PERSONAL HISTORY OF COLON CANCER: Primary | ICD-10-CM

## 2021-06-21 PROCEDURE — G8536 NO DOC ELDER MAL SCRN: HCPCS | Performed by: COLON & RECTAL SURGERY

## 2021-06-21 PROCEDURE — 1101F PT FALLS ASSESS-DOCD LE1/YR: CPT | Performed by: COLON & RECTAL SURGERY

## 2021-06-21 PROCEDURE — G8419 CALC BMI OUT NRM PARAM NOF/U: HCPCS | Performed by: COLON & RECTAL SURGERY

## 2021-06-21 PROCEDURE — G8427 DOCREV CUR MEDS BY ELIG CLIN: HCPCS | Performed by: COLON & RECTAL SURGERY

## 2021-06-21 PROCEDURE — G9711 PT HX TOT COL OR COLON CA: HCPCS | Performed by: COLON & RECTAL SURGERY

## 2021-06-21 PROCEDURE — 99213 OFFICE O/P EST LOW 20 MIN: CPT | Performed by: COLON & RECTAL SURGERY

## 2021-06-21 PROCEDURE — G8432 DEP SCR NOT DOC, RNG: HCPCS | Performed by: COLON & RECTAL SURGERY

## 2021-06-21 RX ORDER — ROSUVASTATIN CALCIUM 10 MG/1
10 TABLET, COATED ORAL
COMMUNITY

## 2021-06-21 RX ORDER — AMOXICILLIN AND CLAVULANATE POTASSIUM 500; 125 MG/1; MG/1
TABLET, FILM COATED ORAL
COMMUNITY

## 2021-06-21 NOTE — PROGRESS NOTES
Subjective: Tolerating diet moving his bowels. Normal bowel function. Weight is up. He had hernia repairs which have been successful. Past medical history and ROS were reviewed and unchanged. CT C/A/P 1/2021 negative for mets  CEA in 1/2021 is 3.9  Colonoscopy by Janice Aguilar 9/2020 polyps, 3 year recall     Assessment / Plan     S/p Robotic sigmoid colectomy for T1N0 malignant polyp of the sigmoid colon 69/8905 complicated by anastomotic leak  S/P Loop ileostomy reversal 7/2019  Check CEA  F/U 6 months    20 minutes was spent in patient care. The diagnoses and plan were discussed with patient. All questions answered. Plan of care agreed to by all concerned.

## 2022-01-11 ENCOUNTER — HOSPITAL ENCOUNTER (OUTPATIENT)
Dept: LAB | Age: 72
Discharge: HOME | End: 2022-01-11

## 2022-01-11 LAB — XX-LABCORP SPECIMEN COL,LCBCF: NORMAL

## 2022-01-11 PROCEDURE — 99001 SPECIMEN HANDLING PT-LAB: CPT

## 2022-01-12 LAB
CEA SERPL-MCNC: 3 NG/ML (ref 0–4.7)
SPECIMEN STATUS REPORT, ROLRST: NORMAL

## 2022-01-13 ENCOUNTER — OFFICE VISIT (OUTPATIENT)
Dept: SURGERY | Age: 72
End: 2022-01-13
Payer: MEDICARE

## 2022-01-13 VITALS
OXYGEN SATURATION: 98 % | WEIGHT: 180 LBS | DIASTOLIC BLOOD PRESSURE: 83 MMHG | HEART RATE: 96 BPM | HEIGHT: 70 IN | TEMPERATURE: 97.5 F | RESPIRATION RATE: 16 BRPM | BODY MASS INDEX: 25.77 KG/M2 | SYSTOLIC BLOOD PRESSURE: 137 MMHG

## 2022-01-13 DIAGNOSIS — Z08 ENCOUNTER FOR ROUTINE CANCER FOLLOW-UP: ICD-10-CM

## 2022-01-13 DIAGNOSIS — Z85.038 PERSONAL HISTORY OF COLON CANCER: Primary | ICD-10-CM

## 2022-01-13 PROCEDURE — G9711 PT HX TOT COL OR COLON CA: HCPCS | Performed by: COLON & RECTAL SURGERY

## 2022-01-13 PROCEDURE — G8427 DOCREV CUR MEDS BY ELIG CLIN: HCPCS | Performed by: COLON & RECTAL SURGERY

## 2022-01-13 PROCEDURE — 1101F PT FALLS ASSESS-DOCD LE1/YR: CPT | Performed by: COLON & RECTAL SURGERY

## 2022-01-13 PROCEDURE — G8432 DEP SCR NOT DOC, RNG: HCPCS | Performed by: COLON & RECTAL SURGERY

## 2022-01-13 PROCEDURE — 99213 OFFICE O/P EST LOW 20 MIN: CPT | Performed by: COLON & RECTAL SURGERY

## 2022-01-13 PROCEDURE — G8536 NO DOC ELDER MAL SCRN: HCPCS | Performed by: COLON & RECTAL SURGERY

## 2022-01-13 PROCEDURE — G8419 CALC BMI OUT NRM PARAM NOF/U: HCPCS | Performed by: COLON & RECTAL SURGERY

## 2022-01-13 RX ORDER — ERTUGLIFLOZIN AND METFORMIN HYDROCHLORIDE 2.5; 5 MG/1; MG/1
TABLET, FILM COATED ORAL
COMMUNITY

## 2022-01-13 NOTE — PROGRESS NOTES
Subjective: Tolerating a diet and moving his bowels. Weight is stable. Past medical history and ROS were reviewed and unchanged. CT C/A/P 1/2021 negative for mets  CEA in 1/2022 is 3.0  Colonoscopy by Cande Mari 9/2020 polyps, 3 year recall     Assessment / Plan     S/p Robotic sigmoid colectomy for T1N0 malignant polyp of the sigmoid colon 12/2018   CT C/A/P  F/U 1 year    20 minutes was spent in patient care. The diagnoses and plan were discussed with patient. All questions answered. Plan of care agreed to by all concerned.

## 2022-01-28 ENCOUNTER — HOSPITAL ENCOUNTER (OUTPATIENT)
Dept: CT IMAGING | Age: 72
Discharge: HOME | End: 2022-01-28
Attending: COLON & RECTAL SURGERY

## 2022-01-28 DIAGNOSIS — Z85.038 PERSONAL HISTORY OF COLON CANCER: ICD-10-CM

## 2022-02-10 ENCOUNTER — HOSPITAL ENCOUNTER (OUTPATIENT)
Dept: CT IMAGING | Age: 72
Discharge: HOME | End: 2022-02-10
Attending: COLON & RECTAL SURGERY
Payer: MEDICARE

## 2022-02-10 LAB — CREAT UR-MCNC: 1.6 MG/DL (ref 0.6–1.3)

## 2022-02-10 PROCEDURE — 74011000636 HC RX REV CODE- 636: Performed by: COLON & RECTAL SURGERY

## 2022-02-10 PROCEDURE — 82565 ASSAY OF CREATININE: CPT

## 2022-02-10 PROCEDURE — 74177 CT ABD & PELVIS W/CONTRAST: CPT

## 2022-02-10 RX ADMIN — IOPAMIDOL 70 ML: 612 INJECTION, SOLUTION INTRAVENOUS at 15:59

## 2022-02-14 DIAGNOSIS — R91.1 LUNG NODULE: ICD-10-CM

## 2022-02-14 DIAGNOSIS — Z85.038 HISTORY OF COLON CANCER: Primary | ICD-10-CM

## 2022-02-14 NOTE — PROGRESS NOTES
Result of CT imaging for surveillance for colon cancer discussed with the patient by phone. He has a 5 mm nodule in his lung which is new. Radiologist recommended 3-month repeat which we will order. Patient understands.

## 2022-03-18 PROBLEM — R53.81 DEBILITY: Status: ACTIVE | Noted: 2018-11-05

## 2022-03-18 PROBLEM — N17.9 ACUTE KIDNEY INJURY (HCC): Status: ACTIVE | Noted: 2020-02-17

## 2022-03-18 PROBLEM — D72.829 LEUKOCYTOSIS: Status: ACTIVE | Noted: 2018-11-05

## 2022-03-19 PROBLEM — K63.5 COLON POLYPS: Status: ACTIVE | Noted: 2018-11-12

## 2022-03-19 PROBLEM — K43.2 INCISIONAL HERNIA: Status: ACTIVE | Noted: 2020-02-13

## 2022-03-19 PROBLEM — M54.59 INTRACTABLE LOW BACK PAIN: Status: ACTIVE | Noted: 2018-11-05

## 2022-03-19 PROBLEM — E87.20 METABOLIC ACIDOSIS: Status: ACTIVE | Noted: 2020-02-17

## 2022-03-20 PROBLEM — K57.92 DIVERTICULITIS: Status: ACTIVE | Noted: 2019-07-05

## 2022-03-20 PROBLEM — K64.9 HEMORRHOIDS: Status: ACTIVE | Noted: 2018-11-12

## 2022-05-14 ENCOUNTER — HOSPITAL ENCOUNTER (OUTPATIENT)
Dept: CT IMAGING | Age: 72
Discharge: HOME | End: 2022-05-14
Attending: COLON & RECTAL SURGERY
Payer: MEDICARE

## 2022-05-14 DIAGNOSIS — Z85.038 HISTORY OF COLON CANCER: ICD-10-CM

## 2022-05-14 DIAGNOSIS — R91.1 LUNG NODULE: ICD-10-CM

## 2022-05-14 PROCEDURE — 71250 CT THORAX DX C-: CPT

## 2022-05-31 ENCOUNTER — TELEPHONE (OUTPATIENT)
Dept: SURGERY | Age: 72
End: 2022-05-31

## 2022-05-31 NOTE — TELEPHONE ENCOUNTER
----- Message from Artur Galvin MD sent at 5/19/2022  2:56 PM EDT -----  Call patient and tell them their CT chest is unchanged. Follow up after the new year as planned. Notified patient of results. Patient understands all information and will call to make appt for January.

## 2023-05-24 NOTE — PROGRESS NOTES
RENAL DAILY PROGRESS NOTE            75y M with PMH HTN, DM, s/p robotic sigmoid colectomy , post op ileus, seen for JOSE ANTONIO  Subjective:       Complaint:   Overnight events noted  Remain intubated and in ICU. Improving urine output  Low grade fever last night  Bladder pressure this morning at 6. Plan for possible extubation today. IMPRESSION:   · JOSE ANTONIO, suspect ATN; multiple risk factor including; Hypotension, was on nsaids and ARB, very higher NG tube output,  Abdominal compartment syndrome. · Hypotnesion, better  · S/p robot assisted sigmoidectomy ,   · Post op ileus  · S/p reapir of anastomotic leak  · DM  · Renal stone, non obstructive,    PLAN:      Improving renal function today, Continue supportive care,IV hydration with TPN  Adjust K in TPN  Check bladder pressure per shift. Okay to dc paez after extubation, as bladder pressure is improving.      Discussed with ICU team.       Current Facility-Administered Medications   Medication Dose Route Frequency    calcium gluconate 2 g in 0.9% sodium chloride 100 mL IVPB  2 g IntraVENous ONCE    potassium chloride 10 mEq in 100 ml IVPB  10 mEq IntraVENous Q1H    hydrocortisone Sod Succ (PF) (SOLU-CORTEF) injection 50 mg  50 mg IntraVENous Q12H    HYDROmorphone (DILAUDID) syringe 0.5 mg  0.5 mg IntraVENous Q3H PRN    TPN ADULT - CENTRAL   IntraVENous CONTINUOUS    insulin lispro (HUMALOG) injection   SubCUTAneous Q6H    albuterol (ACCUNEB) nebulizer solution 2.5 mg  2.5 mg Nebulization Q6H PRN    piperacillin-tazobactam (ZOSYN) 3.375 g in 0.9% sodium chloride (MBP/ADV) 100 mL MBP  3.375 g IntraVENous Q6H    sodium chloride (NS) flush 10 mL  10 mL InterCATHeter PRN    menthol 5 MG (NICE) lozenge  1 Lozenge Mucous Membrane Q6H PRN    diphenhydrAMINE (BENADRYL) injection 25 mg  25 mg IntraVENous Q6H PRN    ondansetron (ZOFRAN) injection 4 mg  4 mg IntraVENous Q6H PRN    latanoprost (XALATAN) 0.005 % ophthalmic solution 1 Drop  1 Drop Both Eyes QPM    glucose chewable tablet 16 g  4 Tab Oral PRN    glucagon (GLUCAGEN) injection 1 mg  1 mg IntraMUSCular PRN    dextrose (D50W) injection syrg 12.5-25 g  25-50 mL IntraVENous PRN    heparin (porcine) injection 5,000 Units  5,000 Units SubCUTAneous Q8H       Review of Symptoms: comprehensive ROS negative except above.    Objective:     Patient Vitals for the past 24 hrs:   Temp Pulse Resp BP SpO2   12/12/18 0842 -- 76 16 -- 100 %   12/12/18 0800 97.2 °F (36.2 °C) -- -- -- --   12/12/18 0700 -- 81 16 133/70 100 %   12/12/18 0600 -- 76 16 119/65 100 %   12/12/18 0500 -- 79 16 110/63 100 %   12/12/18 0425 -- 95 22 -- 100 %   12/12/18 0400 98.4 °F (36.9 °C) 91 19 176/78 100 %   12/12/18 0300 -- (!) 110 22 168/73 100 %   12/12/18 0200 -- 78 16 133/73 100 %   12/12/18 0106 -- 78 16 -- 100 %   12/12/18 0100 -- 78 16 133/69 100 %   12/12/18 0000 99.7 °F (37.6 °C) 75 16 131/68 100 %   12/11/18 2300 -- 93 20 144/85 100 %   12/11/18 2200 -- 90 19 126/69 100 %   12/11/18 2100 -- (!) 111 17 -- 100 %   12/11/18 2034 -- 85 16 -- 100 %   12/11/18 2000 99.3 °F (37.4 °C) 81 17 139/68 100 %   12/11/18 1915 -- 83 17 -- 100 %   12/11/18 1900 -- 88 17 140/66 100 %   12/11/18 1845 -- 86 18 -- 100 %   12/11/18 1830 -- 88 19 120/57 100 %   12/11/18 1815 -- 95 20 -- 99 %   12/11/18 1800 -- 93 21 134/65 100 %   12/11/18 1745 -- 87 18 -- 100 %   12/11/18 1730 -- 87 18 131/60 100 %   12/11/18 1715 -- 87 18 -- 100 %   12/11/18 1700 -- 88 17 121/63 99 %   12/11/18 1645 -- 89 19 -- 99 %   12/11/18 1630 -- 87 17 108/58 100 %   12/11/18 1615 -- 88 17 -- 100 %   12/11/18 1600 (!) 100.5 °F (38.1 °C) 89 17 122/62 100 %   12/11/18 1545 -- 88 17 -- 99 %   12/11/18 1530 -- 88 17 106/62 100 %   12/11/18 1515 -- 89 17 -- 100 %   12/11/18 1500 -- 89 17 (!) 88/51 100 %   12/11/18 1445 -- 88 17 -- 100 %   12/11/18 1430 -- 89 17 (!) 86/51 100 %   12/11/18 1415 -- 89 17 -- 100 %   12/11/18 1400 -- 92 18 (!) 83/51 100 %   12/11/18 1310 -- 92 17 -- 99 %   12/11/18 1300 -- 93 17 (!) 86/46 98 %   12/11/18 1245 -- 94 18 -- 98 %   12/11/18 1230 -- 95 18 (!) 68/40 98 %   12/11/18 1215 -- 99 18 -- 98 %   12/11/18 1200 98.4 °F (36.9 °C) 96 17 (!) 78/41 99 %   12/11/18 1145 -- 98 19 -- 98 %   12/11/18 1130 -- (!) 108 18 (!) 83/50 99 %   12/11/18 1115 -- (!) 105 17 -- 100 %   12/11/18 1100 -- (!) 107 17 (!) 75/52 98 %   12/11/18 1045 -- (!) 112 18 (!) 77/47 99 %   12/11/18 1030 -- (!) 114 17 (!) 67/51 99 %   12/11/18 1015 -- (!) 118 17 -- 99 %        Weight change:      12/10 1901 - 12/12 0700  In: 8135.6 [I.V.:8060.6]  Out: 2680 [Urine:1630; Drains:105]    Intake/Output Summary (Last 24 hours) at 12/12/2018 1012  Last data filed at 12/12/2018 0640  Gross per 24 hour   Intake 4214.96 ml   Output 1745 ml   Net 2469.96 ml     Physical Exam:   General: intubated,   HEENT sclera anicteric, supple neck, no thyromegaly  CVS: S1S2 heard,  no rub  RS: + air entry b/l,   Abd:distended abdomen, has colostomy , ANGELI drain  Neuro: intubated, awake, follows simple commend  Extrm: +edema, no cyanosis, clubbing   Skin: no visible  Rash  Musculoskeletal: No gross joints or bone deformities         Data Review:     LABS:   Hematology:   Recent Labs     12/12/18  0415 12/11/18  1500 12/11/18  0105 12/10/18  0134   WBC 9.7 8.4 7.1 5.3   HGB 7.9* 7.4* 9.7* 9.5*   HCT 22.6* 21.7* 28.3* 27.9*     Chemistry:   Recent Labs     12/12/18  0415 12/11/18  1500 12/11/18  0105 12/10/18  0134   BUN 32* 37* 37* 33*   CREA 1.32* 1.75* 1.73* 3.34*   CA 7.8* 7.3* 8.8 8.3*   ALB 2.1* 2.2*  --   --    K 3.5 3.9 3.8 4.1    140 138 137    106 103 102   CO2 27 26 26 27   PHOS 2.5 3.2 3.3 4.0   * 214* 199* 160*            Procedures/imaging: see electronic medical records for all procedures, Xrays and details which were not copied into this note but were reviewed prior to creation of Plan          Assessment & Plan:     As above         Gautam Tucker MD  12/12/2018  10:09 AM Statement Selected

## 2023-10-30 NOTE — PROGRESS NOTES
Yifan Dupont M.D. FACS  PROGRESS NOTE    Name: Zachary Coreas MRN: [id number]   : 1950 Hospital: [facility]   Date: 2020 Admission Date: 2020  5:32 AM     Hospital Day: 15  14 Days Post-Op  Subjective:  Patient without acute overnight events. He is tolerating diet without nausea vomiting. He endorses flatus and bowel movements. Objective:  Vitals:    20 0600 20 0800 20 0933 20 1200   BP: 161/66 142/75 142/75 140/70   Pulse: 84 85 83 77   Resp:   17   Temp:  98.2 °F (36.8 °C)  98.1 °F (36.7 °C)   SpO2: 98% 100%  98%   Weight:    93.3 kg (205 lb 11 oz)   Height:         Date 20 0700 - 20 0659 20 0700 - 20 0659   Shift 6306-9992 3653-3726 24 Hour Total 4012-4179 2207-4407 24 Hour Total   INTAKE   P.O.    360  360     P. O.    360  360   Blood    0  0     Autotransfused    0  0   Other 0 0 0 0  0     Other    0  0     Intake (ml) (Vacuum Assisted Closure Medial;Upper Abdominal) 0 0 0 0  0   Shift Total(mL/kg) 0(0) 0(0) 0(0) 360(3.9)  360(3.9)   OUTPUT   Urine(mL/kg/hr) 400(0.4) 125(0.1) 525(0.2) 0  0     Urine Voided 400 125 525 0  0     Urine Occurrence(s)    1 x  1 x   Emesis/NG output    0  0     Emesis    0  0     Emesis Occurrence(s)    0 x  0 x   Drains 45 65 110 0  0     Output (ml) (Vacuum Assisted Closure Medial;Upper Abdominal) 0 0 0 0  0     Output (ml) (Matthew-Garcia Drain 20 Abdomen) 40 50 90        Output (ml) (Matthew-Garcia Drain 20 Abdomen) 5 15 20      Other    0  0     Other Output    0  0   Stool    0  0     Stool Occurrence(s)  4 x 4 x 1 x  1 x     Stool    0  0   Blood    0  0     Estimated Blood Loss    0  0     Quantitative Blood Loss    0  0     Blood    0  0   Shift Total(mL/kg) 445(4.9) 190(2.1) 635(7) 0(0)  0(0)   NET -333 -190 -707 360  360   Weight (kg) 91.1 91.1 91.1 93.3 93.3 93.3         Physical Exam:    General: Awake and alert, no apparent distress   Abdomen: abdomen is soft with midline incisional tenderness. Left lower quadrant ANGELI was removed. Right lower quadrant ANGELI with significant serosanguineous output 90 mL's. VAC in place and midline incision.    Labs:  Recent Results (from the past 24 hour(s))   GLUCOSE, POC    Collection Time: 02/26/20  9:46 PM   Result Value Ref Range    Glucose (POC) 122 (H) 70 - 575 mg/dL   METABOLIC PANEL, BASIC    Collection Time: 02/27/20  3:48 AM   Result Value Ref Range    Sodium 142 136 - 145 mmol/L    Potassium 3.4 (L) 3.5 - 5.5 mmol/L    Chloride 108 100 - 111 mmol/L    CO2 27 21 - 32 mmol/L    Anion gap 7 3.0 - 18 mmol/L    Glucose 108 (H) 74 - 99 mg/dL    BUN 17 7.0 - 18 MG/DL    Creatinine 1.07 0.6 - 1.3 MG/DL    BUN/Creatinine ratio 16 12 - 20      GFR est AA >60 >60 ml/min/1.73m2    GFR est non-AA >60 >60 ml/min/1.73m2    Calcium 8.6 8.5 - 10.1 MG/DL   PHOSPHORUS    Collection Time: 02/27/20  3:48 AM   Result Value Ref Range    Phosphorus 3.5 2.5 - 4.9 MG/DL   MAGNESIUM    Collection Time: 02/27/20  3:48 AM   Result Value Ref Range    Magnesium 1.7 1.6 - 2.6 mg/dL   GLUCOSE, POC    Collection Time: 02/27/20  9:28 AM   Result Value Ref Range    Glucose (POC) 108 70 - 110 mg/dL   GLUCOSE, POC    Collection Time: 02/27/20 11:28 AM   Result Value Ref Range    Glucose (POC) 126 (H) 70 - 110 mg/dL   GLUCOSE, POC    Collection Time: 02/27/20  5:18 PM   Result Value Ref Range    Glucose (POC) 143 (H) 70 - 110 mg/dL     All Micro Results     Procedure Component Value Units Date/Time    CULTURE, RESPIRATORY/SPUTUM/BRONCH Minnette Hoe STAIN [[id number]] Collected:  02/15/20 1010    Order Status:  Completed Specimen:  Sputum Updated:  02/17/20 1009     Special Requests: NO SPECIAL REQUESTS        GRAM STAIN RARE WBCS SEEN         FEW EPITHELIAL CELLS SEEN               RARE GRAM POSITIVE COCCI IN PAIRS           Culture result:       RARE NORMAL RESPIRATORY GALE          CULTURE, URINE [[id number]] Collected:  02/14/20 1230    Order Status:  Completed Specimen: Clean catch Updated:  02/15/20 0908     Special Requests: NO SPECIAL REQUESTS        Culture result: NO GROWTH 1 DAY             Current Medications:  Current Facility-Administered Medications   Medication Dose Route Frequency Provider Last Rate Last Dose    famotidine (PEPCID) tablet 20 mg  20 mg Oral BID Alissa Gonzalez NP   20 mg at 02/27/20 1644    insulin lispro (HUMALOG) injection   SubCUTAneous AC&HS Emmanuel Sandoval NP   Stopped at 02/26/20 1630    therapeutic multivitamin (THERAGRAN) tablet 1 Tab  1 Tab Oral DAILY Oral MD Radha   1 Tab at 02/27/20 3695    cloNIDine (CATAPRES) 0.1 mg/24 hr patch 1 Patch  1 Patch TransDERmal Q7D Jorge A Martinez MD   1 Patch at 02/25/20 1232    oxyCODONE-acetaminophen (PERCOCET) 5-325 mg per tablet 1 Tab  1 Tab Oral Q4H PRN Emmanuel Sandoval NP        ondansetron (ZOFRAN) injection 4 mg  4 mg IntraVENous Q6H PRN Michele Almeida PA-C   4 mg at 02/25/20 1847    albuterol (PROVENTIL VENTOLIN) nebulizer solution 2.5 mg  2.5 mg Nebulization Q4H PRN Jaki ZARCO DO   2.5 mg at 02/14/20 0341    sodium chloride (OCEAN) 0.65 % nasal squeeze bottle 2 Spray  2 Spray Both Nostrils Q2H PRN Nely Rapp DO        glucose chewable tablet 16 g  4 Tab Oral PRN Alissa Gonzalez NP        glucagon (GLUCAGEN) injection 1 mg  1 mg IntraMUSCular PRN Alissa Gonzalez NP        dextrose (D50W) injection syrg 12.5-25 g  25-50 mL IntraVENous PRN Alissa Gonzalez NP        oxyCODONE-acetaminophen (PERCOCET) 5-325 mg per tablet 2 Tab  2 Tab Oral Q6H PRN Alissa Gonzalez NP   2 Tab at 02/27/20 0048    nebivolol (BYSTOLIC) tablet 10 mg  10 mg Oral DAILY Ma Scheuermann, MD   10 mg at 02/27/20 0934    benzocaine-menthol (CEPACOL) lozenge 1 Lozenge  1 Lozenge Mucous Membrane PRN Ma Scheuermann, MD   1 Lozenge at 02/27/20 0915    nystatin (MYCOSTATIN) 100,000 unit/mL oral suspension 500,000 Units  500,000 Units Oral QID Jac Gonzalez NP   500,000 Units at 02/27/20 1300    phenol throat spray (CHLORASEPTIC) 1 Spray  1 [place] Oral PRN Jac Gnozalez NP   1 Spray at 02/14/20 1055    albuterol-ipratropium (DUO-NEB) 2.5 MG-0.5 MG/3 ML  3 mL Nebulization Q4H PRN Shell Wright MD   3 mL at 02/15/20 2320    sodium chloride (NS) flush 5-40 mL  5-40 mL IntraVENous Q8H Beryl Wells MD   10 mL at 02/27/20 1400    sodium chloride (NS) flush 5-40 mL  5-40 mL IntraVENous PRN Beryl Wells MD   10 mL at 02/21/20 1400    heparin (porcine) injection 5,000 Units  5,000 Units SubCUTAneous Q8H Beryl Wells MD   5,000 Units at 02/27/20 1612    [Held by provider] ferrous sulfate tablet 325 mg  325 mg Oral Q MON, WED & Guy Lai MD   325 mg at 02/14/20 2117    latanoprost (XALATAN) 0.005 % ophthalmic solution 1 Drop  1 Drop Both Eyes QHS Beryl Wells MD   1 Drop at 02/26/20 2114    acetaminophen (TYLENOL) tablet 650 mg  650 mg Oral Q4H PRN Jac Gonzalez NP   650 mg at 02/27/20 1610    [Held by provider] polyethylene glycol (MIRALAX) packet 17 g  17 g Oral DAILY Jac Gonzalez NP   Stopped at 02/14/20 9830    docusate sodium (COLACE) capsule 100 mg  100 mg Oral DAILY Zo Gonzalez NP   100 mg at 02/27/20 0934    [Held by provider] lactobacillus sp. 50 billion cpu (BIO-K PLUS) capsule 1 Cap  1 Cap Oral DAILY Jac Gonzalez NP   Stopped at 02/15/20 0900       Chart and notes reviewed. Data reviewed. I have evaluated and examined the patient. IMPRESSION:   · Patient is improving 2 weeks out from abdominal wall reconstruction for large incisional hernia. His postoperative course has been complicated by ileus which is now resolved. PLAN:/DISCUSION:   · Plan to discharge home with home health in the morning. · Continue present care.         Mary Grace Huang, MD Topical Retinoid counseling:  Patient advised to apply a pea-sized amount only at bedtime and wait 30 minutes after washing their face before applying.  If too drying, patient may add a non-comedogenic moisturizer. The patient verbalized understanding of the proper use and possible adverse effects of retinoids.  All of the patient's questions and concerns were addressed.

## 2024-01-05 RX ORDER — LOSARTAN POTASSIUM AND HYDROCHLOROTHIAZIDE 25; 100 MG/1; MG/1
1 TABLET ORAL DAILY
COMMUNITY

## 2024-01-05 RX ORDER — ACETAMINOPHEN 160 MG
TABLET,DISINTEGRATING ORAL DAILY
COMMUNITY

## 2024-01-05 RX ORDER — METOPROLOL SUCCINATE 50 MG/1
50 TABLET, EXTENDED RELEASE ORAL DAILY
COMMUNITY

## 2024-01-05 RX ORDER — CHLORAL HYDRATE 500 MG
CAPSULE ORAL 2 TIMES DAILY
COMMUNITY

## 2024-01-05 NOTE — PROGRESS NOTES
Instructions for your procedure at [facility]      Today's Date: 1/5/2024      Patient's Name: Isael Parsons      Procedure Date: 1/19        Please enter the main entrance of the hospital and check-in at the  located in the lobby.      Do NOT eat or drink anything, including candy, gum, or ice chips after midnight prior to your procedure, unless it is part of your prep.  Brush your teeth before coming to the hospital.You may swish with water, but do not swallow.  No smoking/Vaping/E-Cigarettes 24 hours prior to the day of procedure.  No alcohol 24 hours prior to the day of procedure.  No recreational drugs for one week prior to the day of procedure.  Bring Photo ID, Insurance information, and Co-pay if required on day of procedure.  Bring in pertinent legal documents, such as, Medical Power of , DNR, Advance Directive, etc.  Leave all other valuables, including money/purse, at home.  Remove jewelry, including ALL body piercings, nail polish, acrylic nails, and makeup (including mascara); no lotions, powders, deodorant, and/or perfume/cologne/after shave on the skin.  Glasses and dentures may be worn to the hospital.  They must be removed prior to procedure. Please bring case/container for glasses or dentures.  11. Contacts should not be worn on day of procedure.   12. Call the office ([phone]) if you have symptoms of a cold or illness within 24-48 hours prior to your procedure.   13. AN ADULT (relative or friend 18 years or older) MUST DRIVE YOU HOME AFTER YOUR PROCEDURE.   14. Please make arrangements for a responsible adult (18 years or older) to be with you for 24 hours after your procedure.   15. ONE VISITOR will be allowed in the waiting area during your procedure.       Special Instructions:      Bring list of CURRENT medications.  Follow instructions from the office regarding Bowel Prep, Vitamins, Iron, Blood Thinners, Insulin, Seizure, and Blood

## 2024-01-18 ENCOUNTER — ANESTHESIA EVENT (OUTPATIENT)
Facility: HOSPITAL | Age: 74
End: 2024-01-18
Payer: MEDICARE

## 2024-01-19 ENCOUNTER — ANESTHESIA (OUTPATIENT)
Facility: HOSPITAL | Age: 74
End: 2024-01-19
Payer: MEDICARE

## 2024-01-19 ENCOUNTER — HOSPITAL ENCOUNTER (OUTPATIENT)
Facility: HOSPITAL | Age: 74
Setting detail: OUTPATIENT SURGERY
Discharge: HOME | End: 2024-01-19
Attending: INTERNAL MEDICINE | Admitting: INTERNAL MEDICINE
Payer: MEDICARE

## 2024-01-19 VITALS
TEMPERATURE: 98.3 F | RESPIRATION RATE: 17 BRPM | SYSTOLIC BLOOD PRESSURE: 105 MMHG | HEART RATE: 89 BPM | DIASTOLIC BLOOD PRESSURE: 68 MMHG | WEIGHT: 160 LBS | BODY MASS INDEX: 22.9 KG/M2 | OXYGEN SATURATION: 96 % | HEIGHT: 70 IN

## 2024-01-19 LAB — GLUCOSE BLD STRIP.AUTO-MCNC: 106 MG/DL (ref 70–110)

## 2024-01-19 PROCEDURE — 2500000003 HC RX 250 WO HCPCS: Performed by: NURSE ANESTHETIST, CERTIFIED REGISTERED

## 2024-01-19 PROCEDURE — 7100000000 HC PACU RECOVERY - FIRST 15 MIN: Performed by: INTERNAL MEDICINE

## 2024-01-19 PROCEDURE — 7100000010 HC PHASE II RECOVERY - FIRST 15 MIN: Performed by: INTERNAL MEDICINE

## 2024-01-19 PROCEDURE — 3600007502: Performed by: INTERNAL MEDICINE

## 2024-01-19 PROCEDURE — 82962 GLUCOSE BLOOD TEST: CPT

## 2024-01-19 PROCEDURE — 3700000000 HC ANESTHESIA ATTENDED CARE: Performed by: INTERNAL MEDICINE

## 2024-01-19 PROCEDURE — 2709999900 HC NON-CHARGEABLE SUPPLY: Performed by: INTERNAL MEDICINE

## 2024-01-19 PROCEDURE — 3600007512: Performed by: INTERNAL MEDICINE

## 2024-01-19 PROCEDURE — 3700000001 HC ADD 15 MINUTES (ANESTHESIA): Performed by: INTERNAL MEDICINE

## 2024-01-19 PROCEDURE — 88305 TISSUE EXAM BY PATHOLOGIST: CPT

## 2024-01-19 PROCEDURE — 2580000003 HC RX 258: Performed by: NURSE ANESTHETIST, CERTIFIED REGISTERED

## 2024-01-19 PROCEDURE — 6360000002 HC RX W HCPCS: Performed by: NURSE ANESTHETIST, CERTIFIED REGISTERED

## 2024-01-19 RX ORDER — FAMOTIDINE 20 MG/1
20 TABLET, FILM COATED ORAL ONCE
Status: DISCONTINUED | OUTPATIENT
Start: 2024-01-19 | End: 2024-01-19 | Stop reason: HOSPADM

## 2024-01-19 RX ORDER — DEXTROSE MONOHYDRATE 100 MG/ML
INJECTION, SOLUTION INTRAVENOUS CONTINUOUS PRN
Status: DISCONTINUED | OUTPATIENT
Start: 2024-01-19 | End: 2024-01-19 | Stop reason: HOSPADM

## 2024-01-19 RX ORDER — LIDOCAINE HYDROCHLORIDE 10 MG/ML
1 INJECTION, SOLUTION EPIDURAL; INFILTRATION; INTRACAUDAL; PERINEURAL
Status: DISCONTINUED | OUTPATIENT
Start: 2024-01-19 | End: 2024-01-19 | Stop reason: HOSPADM

## 2024-01-19 RX ORDER — SODIUM CHLORIDE 0.9 % (FLUSH) 0.9 %
5-40 SYRINGE (ML) INJECTION PRN
Status: DISCONTINUED | OUTPATIENT
Start: 2024-01-19 | End: 2024-01-19 | Stop reason: HOSPADM

## 2024-01-19 RX ORDER — VASOPRESSIN 20 U/ML
INJECTION PARENTERAL PRN
Status: DISCONTINUED | OUTPATIENT
Start: 2024-01-19 | End: 2024-01-19 | Stop reason: SDUPTHER

## 2024-01-19 RX ORDER — INSULIN LISPRO 100 [IU]/ML
0-15 INJECTION, SOLUTION INTRAVENOUS; SUBCUTANEOUS ONCE
Status: DISCONTINUED | OUTPATIENT
Start: 2024-01-19 | End: 2024-01-19 | Stop reason: HOSPADM

## 2024-01-19 RX ORDER — LIDOCAINE HYDROCHLORIDE 20 MG/ML
INJECTION, SOLUTION EPIDURAL; INFILTRATION; INTRACAUDAL; PERINEURAL PRN
Status: DISCONTINUED | OUTPATIENT
Start: 2024-01-19 | End: 2024-01-19 | Stop reason: SDUPTHER

## 2024-01-19 RX ORDER — PROPOFOL 10 MG/ML
INJECTION, EMULSION INTRAVENOUS PRN
Status: DISCONTINUED | OUTPATIENT
Start: 2024-01-19 | End: 2024-01-19 | Stop reason: SDUPTHER

## 2024-01-19 RX ORDER — SODIUM CHLORIDE, SODIUM LACTATE, POTASSIUM CHLORIDE, CALCIUM CHLORIDE 600; 310; 30; 20 MG/100ML; MG/100ML; MG/100ML; MG/100ML
INJECTION, SOLUTION INTRAVENOUS CONTINUOUS
Status: DISCONTINUED | OUTPATIENT
Start: 2024-01-19 | End: 2024-01-19 | Stop reason: HOSPADM

## 2024-01-19 RX ADMIN — PHENYLEPHRINE HYDROCHLORIDE 100 MCG: 10 INJECTION INTRAVENOUS at 12:04

## 2024-01-19 RX ADMIN — PROPOFOL 150 MG: 10 INJECTION, EMULSION INTRAVENOUS at 12:00

## 2024-01-19 RX ADMIN — VASOPRESSIN 1 UNITS: 20 INJECTION INTRAVENOUS at 12:07

## 2024-01-19 RX ADMIN — LIDOCAINE HYDROCHLORIDE 20 MG: 20 INJECTION, SOLUTION EPIDURAL; INFILTRATION; INTRACAUDAL; PERINEURAL at 12:00

## 2024-01-19 RX ADMIN — SODIUM CHLORIDE, SODIUM LACTATE, POTASSIUM CHLORIDE, AND CALCIUM CHLORIDE: 600; 310; 30; 20 INJECTION, SOLUTION INTRAVENOUS at 11:58

## 2024-01-19 RX ADMIN — VASOPRESSIN 1 UNITS: 20 INJECTION INTRAVENOUS at 12:11

## 2024-01-19 NOTE — ANESTHESIA PRE PROCEDURE
ROS.         Other Findings:         Anesthesia Plan      MAC     ASA 3       Induction: intravenous.      Anesthetic plan and risks discussed with patient.                    Jayson Fletcher MD   1/19/2024

## 2024-01-19 NOTE — H&P
[phone]    Gastroenterology pre op History and Physical     Impression:   1. High risk colon cancer screening/Colon polyp surveillance exam      Plan:     1. Colonoscopy    Addendum: All lab tests orders pertaining to the procedure have been ordered by the anesthesia personnel and results will be addressed by the anesthesia team      Chief Complaint: high risk colon cancer screening exam.      HPI:  Isael Parsons is a 73 y.o. male who is being seen on consult for high risk colon cancer screening with colonoscopy. 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 Lezama    COLONOSCOPY N/A 9/4/2020    COLONOSCOPY with polypectomies performed by Ozzie Vasquez MD at [facility] ENDOSCOPY    COLONOSCOPY      COLONOSCOPY N/A 11/12/2018    COLONOSCOPY with Polypectomies, Bx's, Injection, Tattooing & Clip Placement x 3 performed by Ozzie Vasquez MD at [facility] ENDOSCOPY    COLONOSCOPY,DIAGNOSTIC  11/12/2018         COLONOSCOPY,BRIGHT EASLEY,SNARE  11/12/2018         ENDOSCOPIC MUCOSAL RESECT  11/12/2018         EXPLORATORY OF ABDOMEN N/A 12/11/2018    Dr. Lezama    FLEXIBLE SIGMOIDOSCOPY N/A 5/8/2019    SIGMOIDOSCOPY FLEXIBLE with polypectomy performed by Russell Lezama MD at [facility] ENDOSCOPY    GASTROSTOMY TUBE PLACEMENT  2008    GI      robotic sigmoid colectomy complicated by small anastomotic leak, status post diverting ileostomy    GI      reversal

## 2024-01-19 NOTE — BRIEF OP NOTE
[phone]    [facility]  [address] [place] [id number]      Brief Procedure Note    Isael Parsons  1950  [id number]    Date of Procedure: 1/19/2024     Preoperative diagnosis: Colon cancer screening [Z12.11]  Personal history of colonic polyps [Z86.010]    Postoperative diagnosis: Colon cancer screening [Z12.11]  Personal history of colonic polyps [Z86.010]    Type of Anesthesia: MAC (Monitored anesthesia care)    Description of findings: same as post op dx    Procedure: Procedure(s):  COLONOSCOPY W/ POLYPECTOMIES    :  Dr. Ozzie Vasquez MD    Assistant(s): Circulator: Dereck Segura RN; Jared Sky RN; Viviane Gaytan RN    Devices/implants/grafts/tissues/prosthesis: None    EBL:None    Specimens:   ID Type Source Tests Collected by Time Destination   A : Ascending polyps Tissue Colon-Ascending SURGICAL PATHOLOGY Ozzie Vasquez MD 1/19/2024 1207    B : Descending polyps Tissue Colon-Descending SURGICAL PATHOLOGY Ozzie Vasquez MD 1/19/2024 1211    C : Sigmoid polyps Tissue Sigmoid Colon SURGICAL PATHOLOGY Ozzie Vasquez MD 1/19/2024 1218        Findings: See printed and scanned procedure note    Complications: None    Dr. Ozzie Vasquez MD  1/19/2024  12:22 PM

## 2024-01-19 NOTE — DISCHARGE INSTRUCTIONS
coordination.  The medication you received during your procedure may have some effect on your mental awareness.  Do NOT make important personal or business decisions.  The medication you received during your procedure may have some effect on your mental awareness.  Do NOT drink alcoholic beverages.  These drinks do not mix well with the medications that have been given to you.    Upon discharge from the hospital, you must be accompanied by a responsible adult.    Resume your diet as directed by your physician.    Resume medications as your physician has prescribed.  Please give a list of your current medications to your Primary Care Provider.  Please update this list whenever your medications are discontinued, doses are changed, or new medications (including over-the-counter products) are added.  Please carry medication information at all times in case of emergency situations.          These are general instructions for a healthy lifestyle:    No smoking/ No tobacco products/ Avoid exposure to second hand smoke.  Surgeon General's Warning:  Quitting smoking now greatly reduces serious risk to your health.    Obesity, smoking, and a sedentary lifestyle greatly increase your risk for illness.  A healthy diet, regular physical exercise & weight monitoring are important for maintaining a healthy lifestyle  You may be retaining fluid if you have a history of heart failure or if you experience any of the following symptoms:  Weight gain of 3 pounds or more overnight or 5 pounds in a week, increased swelling in our hands or feet or shortness of breath while lying flat in bed.  Please call your doctor as soon as you notice any of these symptoms; do not wait until your next office visit.    Recognize signs and symptoms of STROKE:  F  -  Face looks uneven  A  -  Arms unable to move or move unevenly  S  -  Speech slurred or non-existent  T  -  Time to call 911 - as soon as signs and symptoms begin - DO NOT go back to bed or  wait to see If you get better - TIME IS BRAIN.    Colorectal Screening  Colorectal cancer almost always develops from precancerous polyps (abnormal growths) in the colon or rectum.  Screening tests can find precancerous polyps, so that they can be removed before they turn into cancer. Screening tests can also find colorectal cancer early, when treatment works best.  Speak with your physician about when you should begin screening and how often you should be tested.              [employer] Activation    Thank you for requesting access to [employer]. Please follow the instructions below to securely access and download your online medical record. [employer] allows you to send messages to your doctor, view your test results, renew your prescriptions, schedule appointments, and more.    How Do I Sign Up?    In your internet browser, go to [email or web address].  Click on the First Time User? Click Here link in the Sign In box. You will see the New Member Sign Up page.  Enter your [employer] Access Code exactly as it appears below. You will not need to use this code after you’ve completed the sign-up process. If you do not sign up before the expiration date, you must request a new code.    [employer] Access Code: Activation code not generated  Current [employer] Status: Active (This is the date your [employer] access code will )    Enter the last four digits of your Social Security Number (xxxx) and Date of Birth (mm/dd/yyyy) as indicated and click Submit. You will be taken to the next sign-up page.  Create a [employer] ID. This will be your [employer] login ID and cannot be changed, so think of one that is secure and easy to remember.  Create a [employer] password. You can change your password at any time.  Enter your Password Reset Question and Answer. This can be used at a later time if you forget your password.   Enter your e-mail address. You will receive e-mail notification when new information is available in

## 2024-01-20 NOTE — ANESTHESIA POSTPROCEDURE EVALUATION
Department of Anesthesiology  Postprocedure Note    Patient: Isael Parsons  MRN: [id number]  YOB: 1950  Date of evaluation: 1/19/2024    Procedure Summary       Date: 01/19/24 Room / Location: [facility] ENDO 02 / [facility] ENDOSCOPY    Anesthesia Start: 1158 Anesthesia Stop: 1220    Procedure: COLONOSCOPY W/ POLYPECTOMIES (Abdomen) Diagnosis:       Colon cancer screening      Personal history of colonic polyps      (Colon cancer screening [Z12.11])      (Personal history of colonic polyps [Z86.010])    Surgeons: Ozzie Vasquez MD Responsible Provider: Jayson Fletcher MD    Anesthesia Type: MAC ASA Status: 3            Anesthesia Type: MAC    Desiree Phase I: Desiree Score: 10    Desiree Phase II: Desiree Score: 10    Anesthesia Post Evaluation    Patient location during evaluation: bedside  Patient participation: complete - patient participated  Level of consciousness: responsive to verbal stimuli  Airway patency: patent  Nausea & Vomiting: no nausea  Respiratory status: acceptable  Hydration status: euvolemic        No notable events documented.

## 2025-01-02 ENCOUNTER — TRANSCRIBE ORDERS (OUTPATIENT)
Dept: ADMINISTRATIVE | Age: 75
End: 2025-01-02

## 2025-01-02 ENCOUNTER — TRANSCRIBE ORDERS (OUTPATIENT)
Facility: HOSPITAL | Age: 75
End: 2025-01-02

## 2025-01-02 DIAGNOSIS — R10.9 ABDOMINAL PAIN, UNSPECIFIED ABDOMINAL LOCATION: ICD-10-CM

## 2025-01-02 DIAGNOSIS — Z86.0100 HX OF COLONIC POLYP: ICD-10-CM

## 2025-01-02 DIAGNOSIS — Z86.0100 PERSONAL HISTORY OF COLON POLYPS, UNSPECIFIED: ICD-10-CM

## 2025-01-02 DIAGNOSIS — Z85.038 PERSONAL HISTORY OF OTHER MALIGNANT NEOPLASM OF LARGE INTESTINE: Primary | ICD-10-CM

## 2025-01-27 ENCOUNTER — HOSPITAL ENCOUNTER (OUTPATIENT)
Facility: HOSPITAL | Age: 75
Discharge: HOME | End: 2025-01-30
Attending: INTERNAL MEDICINE
Payer: MEDICARE

## 2025-01-27 DIAGNOSIS — Z86.0100 PERSONAL HISTORY OF COLON POLYPS, UNSPECIFIED: ICD-10-CM

## 2025-01-27 DIAGNOSIS — Z85.038 PERSONAL HISTORY OF OTHER MALIGNANT NEOPLASM OF LARGE INTESTINE: ICD-10-CM

## 2025-01-27 DIAGNOSIS — R10.9 ABDOMINAL PAIN, UNSPECIFIED ABDOMINAL LOCATION: ICD-10-CM

## 2025-01-27 LAB — CREAT UR-MCNC: 1.7 MG/DL (ref 0.6–1.3)

## 2025-01-27 PROCEDURE — 82565 ASSAY OF CREATININE: CPT

## 2025-01-27 PROCEDURE — 6360000004 HC RX CONTRAST MEDICATION: Performed by: INTERNAL MEDICINE

## 2025-01-27 PROCEDURE — 74177 CT ABD & PELVIS W/CONTRAST: CPT

## 2025-01-27 RX ORDER — IOPAMIDOL 612 MG/ML
100 INJECTION, SOLUTION INTRAVASCULAR
Status: COMPLETED | OUTPATIENT
Start: 2025-01-27 | End: 2025-01-27

## 2025-01-27 RX ORDER — DIATRIZOATE MEGLUMINE AND DIATRIZOATE SODIUM 660; 100 MG/ML; MG/ML
30 SOLUTION ORAL; RECTAL
Status: DISCONTINUED | OUTPATIENT
Start: 2025-01-27 | End: 2025-01-31 | Stop reason: HOSPADM

## 2025-01-27 RX ADMIN — IOPAMIDOL 80 ML: 612 INJECTION, SOLUTION INTRAVENOUS at 17:41

## 2025-01-27 RX ADMIN — DIATRIZOATE MEGLUMINE AND DIATRIZOATE SODIUM 30 ML: 660; 100 LIQUID ORAL; RECTAL at 16:00

## 2025-01-30 ENCOUNTER — TRANSCRIBE ORDERS (OUTPATIENT)
Facility: HOSPITAL | Age: 75
End: 2025-01-30

## 2025-01-30 DIAGNOSIS — K86.2 PANCREAS CYST: Primary | ICD-10-CM

## 2025-02-15 ENCOUNTER — HOSPITAL ENCOUNTER (OUTPATIENT)
Facility: HOSPITAL | Age: 75
Discharge: HOME | End: 2025-02-18
Attending: INTERNAL MEDICINE
Payer: MEDICARE

## 2025-02-15 DIAGNOSIS — K86.2 PANCREAS CYST: ICD-10-CM

## 2025-02-15 PROCEDURE — 6360000004 HC RX CONTRAST MEDICATION: Performed by: INTERNAL MEDICINE

## 2025-02-15 PROCEDURE — A9575 INJ GADOTERATE MEGLUMI 0.1ML: HCPCS | Performed by: INTERNAL MEDICINE

## 2025-02-15 PROCEDURE — 74183 MRI ABD W/O CNTR FLWD CNTR: CPT

## 2025-02-15 RX ADMIN — GADOTERATE MEGLUMINE 14 ML: 376.9 INJECTION INTRAVENOUS at 15:26

## 2025-05-08 RX ORDER — DAPAGLIFLOZIN AND METFORMIN HYDROCHLORIDE 10; 500 MG/1; MG/1
0.5 TABLET, FILM COATED, EXTENDED RELEASE ORAL DAILY
COMMUNITY

## 2025-05-08 NOTE — PERIOP NOTE
Instructions for your procedure at [facility]      Today's Date: 5/8/2025      Patient's Name: Isael Parsons      Procedure Date: 5/15/2025        Please enter the main entrance of the hospital and check-in at the  located in the lobby.      Do NOT eat or drink anything, including candy, gum, or ice chips after midnight prior to your procedure, unless it is part of your prep.  Brush your teeth before coming to the hospital.You may swish with water, but do not swallow.  No smoking/Vaping/E-Cigarettes 24 hours prior to the day of procedure.  No alcohol 24 hours prior to the day of procedure.  No recreational drugs for one week prior to the day of procedure.  Bring Photo ID, Insurance information, and Co-pay if required on day of procedure.  Bring in pertinent legal documents, such as, Medical Power of , DNR, Advance Directive, etc.  Leave all other valuables, including money/purse, weapons at home.  Remove jewelry, including ALL body piercings, nail polish, acrylic nails, and makeup (including mascara); no lotions, powders, deodorant, and/or perfume/cologne/after shave on the skin.  Glasses and dentures may be worn to the hospital.  They must be removed prior to procedure. Please bring case/container for glasses or dentures.  11. Contacts should not be worn on day of procedure.   12. Call the office ([phone]) if you have symptoms of a cold or illness within 24-48 hours prior to your procedure.   13. AN ADULT (relative or friend 18 years or older) MUST DRIVE YOU HOME AFTER YOUR PROCEDURE.   14. Please make arrangements for a responsible adult (18 years or older) to be with you for 24 hours after your procedure.   15. TWO VISITORS will be allowed in the waiting area during your procedure.       Special Instructions:      Bring list of CURRENT medications.  Follow instructions from the office regarding Bowel Prep, Vitamins, Iron, Blood Thinners, Insulin, Seizure, and

## 2025-05-14 ENCOUNTER — ANESTHESIA EVENT (OUTPATIENT)
Facility: HOSPITAL | Age: 75
End: 2025-05-14
Payer: MEDICARE

## 2025-05-15 ENCOUNTER — ANESTHESIA (OUTPATIENT)
Facility: HOSPITAL | Age: 75
End: 2025-05-15
Payer: MEDICARE

## 2025-05-15 ENCOUNTER — HOSPITAL ENCOUNTER (OUTPATIENT)
Facility: HOSPITAL | Age: 75
Setting detail: OUTPATIENT SURGERY
Discharge: HOME | End: 2025-05-15
Attending: INTERNAL MEDICINE | Admitting: INTERNAL MEDICINE
Payer: MEDICARE

## 2025-05-15 VITALS
BODY MASS INDEX: 21.62 KG/M2 | RESPIRATION RATE: 12 BRPM | OXYGEN SATURATION: 97 % | HEART RATE: 71 BPM | SYSTOLIC BLOOD PRESSURE: 139 MMHG | HEIGHT: 70 IN | DIASTOLIC BLOOD PRESSURE: 73 MMHG | TEMPERATURE: 98.3 F | WEIGHT: 151 LBS

## 2025-05-15 LAB
GLUCOSE BLD STRIP.AUTO-MCNC: 102 MG/DL (ref 70–110)
GLUCOSE BLD STRIP.AUTO-MCNC: 109 MG/DL (ref 70–110)

## 2025-05-15 PROCEDURE — 2580000003 HC RX 258: Performed by: NURSE ANESTHETIST, CERTIFIED REGISTERED

## 2025-05-15 PROCEDURE — 2709999900 HC NON-CHARGEABLE SUPPLY: Performed by: INTERNAL MEDICINE

## 2025-05-15 PROCEDURE — 7100000010 HC PHASE II RECOVERY - FIRST 15 MIN: Performed by: INTERNAL MEDICINE

## 2025-05-15 PROCEDURE — 88305 TISSUE EXAM BY PATHOLOGIST: CPT

## 2025-05-15 PROCEDURE — 7100000000 HC PACU RECOVERY - FIRST 15 MIN: Performed by: INTERNAL MEDICINE

## 2025-05-15 PROCEDURE — 3600007502: Performed by: INTERNAL MEDICINE

## 2025-05-15 PROCEDURE — 3700000000 HC ANESTHESIA ATTENDED CARE: Performed by: INTERNAL MEDICINE

## 2025-05-15 PROCEDURE — 82962 GLUCOSE BLOOD TEST: CPT

## 2025-05-15 PROCEDURE — 6360000002 HC RX W HCPCS: Performed by: NURSE ANESTHETIST, CERTIFIED REGISTERED

## 2025-05-15 PROCEDURE — 3600007512: Performed by: INTERNAL MEDICINE

## 2025-05-15 PROCEDURE — 3700000001 HC ADD 15 MINUTES (ANESTHESIA): Performed by: INTERNAL MEDICINE

## 2025-05-15 RX ORDER — LIDOCAINE HYDROCHLORIDE 20 MG/ML
INJECTION, SOLUTION EPIDURAL; INFILTRATION; INTRACAUDAL; PERINEURAL
Status: DISCONTINUED | OUTPATIENT
Start: 2025-05-15 | End: 2025-05-15 | Stop reason: SDUPTHER

## 2025-05-15 RX ORDER — SODIUM CHLORIDE 9 MG/ML
INJECTION, SOLUTION INTRAVENOUS PRN
Status: DISCONTINUED | OUTPATIENT
Start: 2025-05-15 | End: 2025-05-15 | Stop reason: HOSPADM

## 2025-05-15 RX ORDER — SODIUM CHLORIDE, SODIUM LACTATE, POTASSIUM CHLORIDE, CALCIUM CHLORIDE 600; 310; 30; 20 MG/100ML; MG/100ML; MG/100ML; MG/100ML
INJECTION, SOLUTION INTRAVENOUS CONTINUOUS
Status: DISCONTINUED | OUTPATIENT
Start: 2025-05-15 | End: 2025-05-15 | Stop reason: HOSPADM

## 2025-05-15 RX ORDER — PROPOFOL 10 MG/ML
INJECTION, EMULSION INTRAVENOUS
Status: DISCONTINUED | OUTPATIENT
Start: 2025-05-15 | End: 2025-05-15 | Stop reason: SDUPTHER

## 2025-05-15 RX ORDER — SODIUM CHLORIDE 0.9 % (FLUSH) 0.9 %
5-40 SYRINGE (ML) INJECTION PRN
Status: DISCONTINUED | OUTPATIENT
Start: 2025-05-15 | End: 2025-05-15 | Stop reason: HOSPADM

## 2025-05-15 RX ORDER — SODIUM CHLORIDE 0.9 % (FLUSH) 0.9 %
5-40 SYRINGE (ML) INJECTION EVERY 12 HOURS SCHEDULED
Status: DISCONTINUED | OUTPATIENT
Start: 2025-05-15 | End: 2025-05-15 | Stop reason: HOSPADM

## 2025-05-15 RX ORDER — FAMOTIDINE 20 MG/1
20 TABLET, FILM COATED ORAL ONCE
Status: DISCONTINUED | OUTPATIENT
Start: 2025-05-15 | End: 2025-05-15 | Stop reason: HOSPADM

## 2025-05-15 RX ADMIN — SODIUM CHLORIDE, SODIUM LACTATE, POTASSIUM CHLORIDE, AND CALCIUM CHLORIDE: 600; 310; 30; 20 INJECTION, SOLUTION INTRAVENOUS at 11:09

## 2025-05-15 RX ADMIN — PROPOFOL 50 MG: 10 INJECTION, EMULSION INTRAVENOUS at 12:06

## 2025-05-15 RX ADMIN — PHENYLEPHRINE HYDROCHLORIDE 200 MCG: 10 INJECTION INTRAVENOUS at 12:07

## 2025-05-15 RX ADMIN — PROPOFOL 50 MG: 10 INJECTION, EMULSION INTRAVENOUS at 11:56

## 2025-05-15 RX ADMIN — LIDOCAINE HYDROCHLORIDE 60 MG: 20 INJECTION, SOLUTION EPIDURAL; INFILTRATION; INTRACAUDAL; PERINEURAL at 11:56

## 2025-05-15 ASSESSMENT — PAIN - FUNCTIONAL ASSESSMENT
PAIN_FUNCTIONAL_ASSESSMENT: NONE - DENIES PAIN
PAIN_FUNCTIONAL_ASSESSMENT: 0-10

## 2025-05-15 NOTE — ANESTHESIA POSTPROCEDURE EVALUATION
Department of Anesthesiology  Postprocedure Note    Patient: Isael Parsons  MRN: [id number]  YOB: 1950  Date of evaluation: 5/15/2025    Procedure Summary       Date: 01/19/24 Room / Location: [facility] ENDO 02 / [facility] ENDOSCOPY    Anesthesia Start: 1158 Anesthesia Stop: 1220    Procedure: COLONOSCOPY W/ POLYPECTOMIES (Abdomen) Diagnosis:       Colon cancer screening      Personal history of colonic polyps      (Colon cancer screening [Z12.11])      (Personal history of colonic polyps [Z86.010])    Surgeons: Ozzie Vasquez MD Responsible Provider: Jayson Fletcher MD    Anesthesia Type: MAC ASA Status: 3            Anesthesia Type: No value filed.    Desiree Phase I: Desiree Score: 10    Desiree Phase II:      Anesthesia Post Evaluation    Patient location during evaluation: bedside  Patient participation: complete - patient participated  Level of consciousness: responsive to verbal stimuli  Airway patency: patent  Nausea & Vomiting: no nausea  Respiratory status: acceptable  Hydration status: euvolemic        No notable events documented.

## 2025-05-15 NOTE — ANESTHESIA PRE PROCEDURE
Department of Anesthesiology  Preprocedure Note       Name:  Isael Parsons   Age:  74 y.o.  :  1950                                          MRN:  [id number]         Date:  5/15/2025      Surgeon: Surgeon(s):  Ozzie Vasquez MD    Procedure: Procedure(s):  COLONOSCOPY    Medications prior to admission:   Prior to Admission medications    Medication Sig Start Date End Date Taking? Authorizing Provider   Dapagliflozin Pro-metFORMIN ER (XIGDUO XR)  MG TB24 Take 0.5 tablets by mouth daily   Yes Mariann Stevens MD   losartan-hydroCHLOROthiazide (HYZAAR) 100-25 MG per tablet Take 1 tablet by mouth daily 4 pm   Yes Mariann Stevens MD   metoprolol succinate (TOPROL XL) 50 MG extended release tablet Take 1 tablet by mouth daily HS   Yes Mariann Stevens MD   Multiple Vitamin (MULTIVITAMIN ADULT PO) Take by mouth 4 pm   Yes Mariann Stevens MD   Iron, Ferrous Sulfate, 325 (65 Fe) MG TABS Take 325 mg by mouth three times a week 4 pm   Yes Mariann Stevens MD   Cholecalciferol (VITAMIN D3) 50 MCG (2000 UT) CAPS Take by mouth daily   Yes Mariann Stevens MD   Omega-3 Fatty Acids (FISH OIL) 1000 MG capsule Take by mouth 2 times daily   Yes Mariann Stevens MD   ascorbic acid (VITAMIN C) 500 MG tablet Take 1 tablet by mouth daily   Yes Automatic Reconciliation, Ar   dexlansoprazole (DEXILANT) 60 MG CPDR delayed release capsule Take 1 capsule by mouth at bedtime   Yes Automatic Reconciliation, Ar   latanoprost (XALATAN) 0.005 % ophthalmic solution Place 1 drop into both eyes nightly   Yes Automatic Reconciliation, Ar   omeprazole (PRILOSEC) 20 MG delayed release capsule Take 1 capsule by mouth daily Indications: in the am   Yes Automatic Reconciliation, Ar   rosuvastatin (CRESTOR) 10 MG tablet Take 1 tablet by mouth daily Around 4 pm   Yes Automatic Reconciliation, Ar   Fluticasone Propionate (XHANCE NA) by Nasal route  Patient not taking: Reported on 2025    Hilda

## 2025-05-15 NOTE — H&P
[phone]    Gastroenterology pre op History and Physical     Impression:   1. High risk colon cancer screening/Colon polyp surveillance exam      Plan:     1. Colonoscopy    Addendum: All lab tests orders pertaining to the procedure have been ordered by the anesthesia personnel and results will be addressed by the anesthesia team      Chief Complaint: high risk colon cancer screening exam.      HPI:  Isael Parsons is a 74 y.o. male who is being seen on consult for high risk colon cancer screening with colonoscopy. 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 Lezama    COLONOSCOPY N/A 9/4/2020    COLONOSCOPY with polypectomies performed by Ozzie Vasquez MD at [facility] ENDOSCOPY    COLONOSCOPY      COLONOSCOPY N/A 11/12/2018    COLONOSCOPY with Polypectomies, Bx's, Injection, Tattooing & Clip Placement x 3 performed by Ozzie Vasquez MD at [facility] ENDOSCOPY    COLONOSCOPY N/A 1/19/2024    COLONOSCOPY W/ POLYPECTOMIES performed by Ozzie Vasquez MD at [facility] ENDOSCOPY    COLONOSCOPY,DIAGNOSTIC  11/12/2018         COLONOSCOPY,REMV LESN,SNARE  11/12/2018         ENDOSCOPIC MUCOSAL RESECT  11/12/2018         EXPLORATORY OF ABDOMEN N/A 12/11/2018    Dr. Lezama    FLEXIBLE SIGMOIDOSCOPY N/A 5/8/2019    SIGMOIDOSCOPY FLEXIBLE with polypectomy performed by Russell Lezama MD at [facility] ENDOSCOPY    GASTROSTOMY TUBE PLACEMENT  2008    GI      robotic    Gastrointestinal: Neg unless noted otherwise in H&P   Genitourinary: No dysuria, bleeding, discharge, pyuria.   Musculoskeletal: No weakness, arthralgias, wasting.   Endo: No sweats.   Heme: No bruising, easy bleeding.   Allergies: As noted.   Neurological: Cranial nerves intact.  Alert and oriented. Gait not assessed.   Psychiatric:  No anxiety, depression, hallucinations.                 /73   Pulse (!) 140   Temp 98.3 °F (36.8 °C) (Oral)   Resp 17   Ht 1.778 m (5' 10\")   Wt 68.5 kg (151 lb)   SpO2 98%   BMI 21.67 kg/m²     Physical Assessment:     constitutional: appearance: well developed, well nourished,    skin: inspection: no rashes,   eyes: inspection: normal conjunctivae and lids  ENMT: mouth: normal oral mucosa,lips and gums  neck: thyroid: normal size   respiratory: effort: normal chest excursion; no intercostal retraction or accessory muscle use.   abdominal: abdomen: normal consistency; no tenderness or masses. hernias: no hernias appreciated. liver: normal size and consistency. spleen: not palpable.   rectal: hemoccult/guaiac: not performed.   musculoskeletal: digits and nails: no clubbing, cyanosis,        Basic Metabolic Profile   No results for input(s): \"NA\", \"K\", \"CL\", \"CO2\", \"BUN\", \"GLU\", \"MG\", \"PHOS\" in the last 72 hours.    Invalid input(s): \"CREAT\", \"CA\"      CBC w/Diff    No results for input(s): \"WBC\", \"RBC\", \"HGB\", \"HCT\", \"MCV\", \"MCH\", \"MCHC\", \"RDW\", \"PLT\", \"MPV\" in the last 72 hours. No results for input(s): \"MONO\", \"PRO\", \"METAS\" in the last 72 hours.    Invalid input(s): \"GRANS\", \"LYMPH\", \"EOS\", \"BASO\", \"MYELO\", \"BLAST\"     Hepatic Function   No results for input(s): \"TP\" in the last 72 hours.    Invalid input(s): \"ALB\", \"DBILI\", \"TBILI\", \"GPT\", \"SGOT\", \"AP\", \"AML\", \"LPSE\"       Ozzie Vasquez MD  [facility]  [facility]  [phone]  [email or web address]

## (undated) DEVICE — SYRINGE MED 25GA 3ML L5/8IN SUBQ PLAS W/ DETACH NDL SFTY

## (undated) DEVICE — REM POLYHESIVE ADULT PATIENT RETURN ELECTRODE: Brand: VALLEYLAB

## (undated) DEVICE — Device

## (undated) DEVICE — PACK PROCEDURE SURG MAJ W/ BASIN LF

## (undated) DEVICE — ARM DRAPE

## (undated) DEVICE — SYR 10ML LUER LOK 1/5ML GRAD --

## (undated) DEVICE — 3M™ BAIR PAWS FLEX™ WARMING GOWN, STANDARD, 20 PER CASE 81003: Brand: BAIR PAWS™

## (undated) DEVICE — SEAL

## (undated) DEVICE — GOWN ISOL IMPERV UNIV, DISP, OPEN BACK, BLUE --

## (undated) DEVICE — RETAINER INT ORGAN VISCERA L W6 3/8XL9 5/8IN RADPQ DISP

## (undated) DEVICE — VESSEL SEALER: Brand: ENDOWRIST

## (undated) DEVICE — DRAPE TOWEL: Brand: CONVERTORS

## (undated) DEVICE — SNARE POLYP M W27MMXL240CM OVL STIFF DISP CAPTIVATOR

## (undated) DEVICE — PENCIL ES L3M BTTN SWCH S STL HEX LOK BLDE ELECTRD HOLSTER

## (undated) DEVICE — CANNULA NSL AD TBNG L14FT STD PVC O2 CRV CONN NONFLARED NSL

## (undated) DEVICE — SUTURE VCRL SZ 0 L18IN ABSRB UD POLYGLACTIN 910 BRAID TIE J912G

## (undated) DEVICE — SUTURE PDS II SZ 1 L36IN ABSRB VLT CTXB L48MM 1/2 CIR BLNT ZB371

## (undated) DEVICE — DRAIN SURG L0.75IN TRCR

## (undated) DEVICE — DRAIN SURG 15FR L3/16IN SIL RND 3/4 FLUT 3/16IN TRCR

## (undated) DEVICE — STAPLER INT L75MM CUT LN L73MM STPL LN L77MM BLU B FRM 8

## (undated) DEVICE — SYRINGE MED 10ML LUERLOCK TIP W/O SFTY DISP

## (undated) DEVICE — SOL ANTI-FOG 6ML MEDC -- MEDICHOICE - CONVERT TO 358427

## (undated) DEVICE — 3M™ DURAPORE™ SURGICAL TAPE 1538-3, 3 INCH X 10 YARD (7,5CM X 9,1M), 4 ROLLS/BOX: Brand: 3M™ DURAPORE™

## (undated) DEVICE — 3M™ IOBAN™ 2 ANTIMICROBIAL INCISE DRAPE 6651EZ: Brand: IOBAN™ 2

## (undated) DEVICE — THREE-QUARTER SHEET: Brand: CONVERTORS

## (undated) DEVICE — CATH IV SAFE STR 22GX1IN BLU -- PROTECTIV PLUS

## (undated) DEVICE — GAUZE SPONGES,16 PLY: Brand: CURITY

## (undated) DEVICE — FCPS ENDOSCP 5MMX33CM -- ENDOPATH

## (undated) DEVICE — GOWN PLASTIC FILM THMBHKS UNIV BLUE: Brand: CARDINAL HEALTH

## (undated) DEVICE — SUTURE VCRL SZ 0 L27IN ABSRB UD L36MM CT-1 1/2 CIR J260H

## (undated) DEVICE — SYRINGE 20ML LL S/C 50

## (undated) DEVICE — TRAY CATH OD16FR SIL URIN M STATLOK STBL DEV SURSTP

## (undated) DEVICE — LINER SUCT CANSTR 3000CC PLAS SFT PRE ASSEMB W/OUT TBNG W/

## (undated) DEVICE — 40580 - THE PINK PAD - ADVANCED TRENDELENBURG POSITIONING KIT: Brand: 40580 - THE PINK PAD - ADVANCED TRENDELENBURG POSITIONING KIT

## (undated) DEVICE — GAUZE,SPONGE,4"X4",16PLY,STRL,LF,10/TRAY: Brand: MEDLINE

## (undated) DEVICE — STERILE POLYISOPRENE POWDER-FREE SURGICAL GLOVES: Brand: PROTEXIS

## (undated) DEVICE — BLADELESS OPTICAL TROCAR WITH FIXATION CANNULA: Brand: VERSAPORT

## (undated) DEVICE — SUTURE VCRL SZ 3-0 L27IN ABSRB VLT L26MM SH 1/2 CIR J316H

## (undated) DEVICE — INTENDED FOR TISSUE SEPARATION, AND OTHER PROCEDURES THAT REQUIRE A SHARP SURGICAL BLADE TO PUNCTURE OR CUT.: Brand: BARD-PARKER SAFETY BLADES SIZE 15, STERILE

## (undated) DEVICE — KIT CLN UP BON SECOURS MARYV

## (undated) DEVICE — ENDOSCOPY PUMP TUBING/ CAP SET: Brand: ERBE

## (undated) DEVICE — STAPLER 45 RELOAD BLUE: Brand: ENDOWRIST

## (undated) DEVICE — UNDERPAD INCONT W23XL36IN STD BLU POLYPR BK FLUF SFT

## (undated) DEVICE — TRAP SPEC POLYP REM STRNR CLN DSGN MAGNIFYING WIND DISP

## (undated) DEVICE — FLEX ADVANTAGE 3000CC: Brand: FLEX ADVANTAGE

## (undated) DEVICE — 3M™ DURAPORE™ SURGICAL TAPE 2 INCHES X 10YARDS (5.0CM X 9.1M) 6ROLLS/CARTON 10CARTONS/CASE 1538-2: Brand: 3M™ DURAPORE™

## (undated) DEVICE — YANKAUER,SMOOTH HANDLE,HIGH CAPACITY: Brand: MEDLINE INDUSTRIES, INC.

## (undated) DEVICE — STAPLER SHEATH: Brand: ENDOWRIST

## (undated) DEVICE — (D)STRIP SKN CLSR 0.5X4IN WHT --

## (undated) DEVICE — SYS INCISION MANAGEMENT -- PREVENA

## (undated) DEVICE — CANNULA ORIG TL CLR W FOAM CUSHIONS AND 14FT SUPL TB 3 CHN

## (undated) DEVICE — BLADE ELECTRODE: Brand: EDGE

## (undated) DEVICE — LUB SURG MEDC STRL 2OZ TUBE MC -- MEDICHOICE

## (undated) DEVICE — FORCEPS BX L240CM JAW DIA2.8MM L CAP W/ NDL MIC MESH TOOTH

## (undated) DEVICE — SNARE VASC L240CM LOOP W10MM SHTH DIA2.4MM RND STIFF CLD

## (undated) DEVICE — BULB SYRINGE, IRRIGATION WITH PROTECTIVE CAP, 60 CC, INDIVIDUALLY WRAPPED: Brand: DOVER

## (undated) DEVICE — SYR 20ML LL STRL LF --

## (undated) DEVICE — INTENDED FOR TISSUE SEPARATION, AND OTHER PROCEDURES THAT REQUIRE A SHARP SURGICAL BLADE TO PUNCTURE OR CUT.: Brand: BARD-PARKER ®  SAFETY SCALPED

## (undated) DEVICE — MEDI-VAC NON-CONDUCTIVE SUCTION TUBING: Brand: CARDINAL HEALTH

## (undated) DEVICE — ELECTRODE BLDE L4IN NONINSULATED EDGE

## (undated) DEVICE — SPONGE LAP 18X18IN STRL -- 5/PK

## (undated) DEVICE — SNARE ENDO 2.4MMX230CM -- COLD EXACTO

## (undated) DEVICE — CLIP HEMO ENDOSCP 235CM BX/10 -- RESOLUTION 360

## (undated) DEVICE — WOUND RETRACTOR AND PROTECTOR: Brand: ALEXIS O WOUND PROTECTOR-RETRACTOR

## (undated) DEVICE — REDUCER CANN ENDOWRIST 12-8MM -- DA VINCI XI - SNGL USE

## (undated) DEVICE — SYRINGE MED 50ML LUERSLIP TIP

## (undated) DEVICE — STAPLER INT L60MM REG TISS BLU B FRM 8 FIRING 2 ROW AUTO

## (undated) DEVICE — SOLUTION IRRIG 1000ML H2O STRL BLT

## (undated) DEVICE — KENDALL SCD EXPRESS SLEEVES, KNEE LENGTH, MEDIUM: Brand: KENDALL SCD

## (undated) DEVICE — TRAP SPEC COLL POLYP POLYSTYR --

## (undated) DEVICE — TRAY PREP DRY W/ PREM GLV 2 APPL 6 SPNG 2 UNDPD 1 OVERWRAP

## (undated) DEVICE — GARMENT,MEDLINE,DVT,SEQUENTIAL,CALF,MD: Brand: MEDLINE

## (undated) DEVICE — GLOVE SURG SZ 8 L11.77IN FNGR THK9.8MIL STRW LTX POLYMER

## (undated) DEVICE — BINDER ABD M/L 9X46-62IN --

## (undated) DEVICE — MEDI-VAC SUCTION HIGH CAPACITY: Brand: CARDINAL HEALTH

## (undated) DEVICE — SUTURE PERMAHAND SZ 3-0 L18IN NONABSORBABLE BLK L26MM SH C013D

## (undated) DEVICE — LAPAROSCOPIC SMOKE FILTRATION SYSTEM: Brand: PALL LAPAROSHIELD® PLUS LAPAROSCOPIC SMOKE FILTRATION SYSTEM

## (undated) DEVICE — SYR 50ML SLIP TIP NSAF LF STRL --

## (undated) DEVICE — COVER LT HNDL BLU STRL -- MEDICHOICE

## (undated) DEVICE — STERILE SLEEVE: Brand: CONVERTORS

## (undated) DEVICE — OCCLUSIVE GAUZE STRIP,3% BISMUTH TRIBROMOPHENATE IN PETROLATUM BLEND: Brand: XEROFORM

## (undated) DEVICE — DRAPE TWL SURG 16X26IN BLU ORB04] ALLCARE INC]

## (undated) DEVICE — FLEX ADVANTAGE 1500CC: Brand: FLEX ADVANTAGE

## (undated) DEVICE — SOLUTION IV 1000ML 0.9% SOD CHL

## (undated) DEVICE — INTENDED FOR TISSUE SEPARATION, AND OTHER PROCEDURES THAT REQUIRE A SHARP SURGICAL BLADE TO PUNCTURE OR CUT.: Brand: BARD-PARKER SAFETY BLADES SIZE 10, STERILE

## (undated) DEVICE — SOFT SILICONE HYDROCELLULAR SACRUM DRESSING WITH LOCK AWAY LAYER: Brand: ALLEVYN LIFE SACRUM (LARGE) PACK OF 10

## (undated) DEVICE — MEDI-VAC SUCTION HANDLE REGULAR CAPACITY: Brand: CARDINAL HEALTH

## (undated) DEVICE — SWAB CULT LIQ STUART AGR AERB MOD IN BRK SGL RAYON TIP PLAS 220099] BECTON DICKINSON MICRO]

## (undated) DEVICE — COLOSTOMY/ILEOSTOMY KIT,FLEXWEAR: Brand: NEW IMAGE

## (undated) DEVICE — SEAL UNIV 5-8MM DISP BX/10 -- DA VINCI XI - SNGL USE

## (undated) DEVICE — SUTURE PDS II SZ 1 L36IN ABSRB VLT L36MM CT-1 1/2 CIR Z347H

## (undated) DEVICE — AIRLIFE™ NASAL OXYGEN CANNULA CURVED, NONFLARED TIP WITH 14 FOOT (4.3 M) CRUSH-RESISTANT TUBING, OVER-THE-EAR STYLE: Brand: AIRLIFE™

## (undated) DEVICE — Device: Brand: SPOT EX ENDOSCOPIC TATTOO

## (undated) DEVICE — (D)GLOVE EXAM LG NITRL NS -- DISC BY MFR NO SUB

## (undated) DEVICE — ELECTRO LUBE IS A SINGLE PATIENT USE DEVICE THAT IS INTENDED TO BE USED ON ELECTROSURGICAL ELECTRODES TO REDUCE STICKING.: Brand: KEY SURGICAL ELECTRO LUBE

## (undated) DEVICE — LAPAROSCOPIC ACCESS SYSTEM: Brand: ALEXIS LAPAROSCOPIC SYSTEM WITH KII FIOS FIRST ENTRY

## (undated) DEVICE — TUBING IRRIG PRESSURIZED BG SET SPIK PRB + II

## (undated) DEVICE — SOLUTION SCRB 4OZ 10% PVP I POVIDONE IOD TOP PAINT EXIDINE

## (undated) DEVICE — Device: Brand: DISPOSABLE ELECTROSURGICAL SNARE

## (undated) DEVICE — BARRIER TISS ABSRB 5X6IN 1/PK -- DIRECT ORDER ONLY NON MEDLINE

## (undated) DEVICE — CATHETER SUCT TR FL TIP 14FR W/ O CTRL

## (undated) DEVICE — 3M™ STERI-DRAPE™ INSTRUMENT POUCH 1018L: Brand: STERI-DRAPE™

## (undated) DEVICE — FLUFF AND POLYMER UNDERPAD,EXTRA HEAVY: Brand: WINGS

## (undated) DEVICE — SPONGE DISSECT PNUT SM 3/8IN -- 5/PK

## (undated) DEVICE — 3M™ STERI-STRIP™ COMPOUND BENZOIN TINCTURE 40 BAGS/CARTON 4 CARTONS/CASE C1544: Brand: 3M™ STERI-STRIP™

## (undated) DEVICE — STAPLER INT L28CM DIA29MM CLS STPL H10-2.5MM OPN LEG L5.5MM

## (undated) DEVICE — SYR 10ML CTRL LR LCK NSAF LF --

## (undated) DEVICE — SUTURE VCRL SZ 3-0 L27IN ABSRB UD L26MM SH 1/2 CIR J416H

## (undated) DEVICE — TIP COVER ACCESSORY

## (undated) DEVICE — CULTURETTE SGL EVAC TUBE PALL -- 100/CA

## (undated) DEVICE — SUTURE MCRYL SZ 4-0 L18IN ABSRB UD L19MM PS-2 3/8 CIR PRIM Y496G

## (undated) DEVICE — NDL INJ SCLERO 25G 240CM -- INTERJECT M00518360 BX/5

## (undated) DEVICE — SMOKE EVACUATION PENCIL: Brand: VALLEYLAB

## (undated) DEVICE — SUTURE ETHLN SZ 2-0 L18IN NONABSORBABLE BLK L26MM PS 3/8 585H

## (undated) DEVICE — SYSTEM INF CTRL

## (undated) DEVICE — TAPE SURG W4INXL11YD 2IN PERF LINERLESS NONWOVEN MEDFIX EZ

## (undated) DEVICE — DRSG PATCH ANTIMIC 1INX4.0MM -- CONVERT TO ITEM 356053

## (undated) DEVICE — TAPE,CLOTH/SILK,CURAD,3"X10YD,LF,40/CS: Brand: CURAD

## (undated) DEVICE — SUTURE VCRL SZ 2-0 L27IN ABSRB UD L26MM SH 1/2 CIR J417H

## (undated) DEVICE — COLUMN DRAPE

## (undated) DEVICE — DRAPE,UNDERBUTTOCKS,PCH,STERILE: Brand: MEDLINE

## (undated) DEVICE — SUTURE SZ 0 27IN 5/8 CIR UR-6  TAPER PT VIOLET ABSRB VICRYL J603H

## (undated) DEVICE — BLADELESS OBTURATOR: Brand: WECK VISTA

## (undated) DEVICE — SUT PROL 2-0 30IN SH BLU --

## (undated) DEVICE — NEEDLE HYPO 22GA L1.5IN BLK S STL HUB POLYPR SHLD REG BVL

## (undated) DEVICE — RELOAD STPL L75MM OPN H3.8MM CLS 1.5MM WIRE DIA0.2MM REG

## (undated) DEVICE — SYRINGE MED 20ML STD CLR PLAS LUERLOCK TIP N CTRL DISP

## (undated) DEVICE — BLANKET WRM AD W50XL85.8IN PACU FULL BODY FORC AIR

## (undated) DEVICE — SEALER ENDOSCP NANO COAT OPN DIV CRV L JAW LIGASURE IMPACT